# Patient Record
Sex: MALE | Race: WHITE | NOT HISPANIC OR LATINO | Employment: OTHER | URBAN - METROPOLITAN AREA
[De-identification: names, ages, dates, MRNs, and addresses within clinical notes are randomized per-mention and may not be internally consistent; named-entity substitution may affect disease eponyms.]

---

## 2017-04-04 DIAGNOSIS — I10 ESSENTIAL HYPERTENSION: ICD-10-CM

## 2017-04-04 RX ORDER — LOSARTAN POTASSIUM 50 MG/1
50 TABLET ORAL DAILY
Qty: 90 TAB | Refills: 3 | OUTPATIENT
Start: 2017-04-04 | End: 2017-06-19 | Stop reason: SDUPTHER

## 2017-04-13 ENCOUNTER — OFFICE VISIT (OUTPATIENT)
Dept: CARDIOLOGY | Facility: MEDICAL CENTER | Age: 77
End: 2017-04-13
Payer: MEDICARE

## 2017-04-13 VITALS
HEART RATE: 84 BPM | DIASTOLIC BLOOD PRESSURE: 80 MMHG | SYSTOLIC BLOOD PRESSURE: 150 MMHG | OXYGEN SATURATION: 96 % | HEIGHT: 75 IN | BODY MASS INDEX: 27.35 KG/M2 | WEIGHT: 220 LBS

## 2017-04-13 DIAGNOSIS — I49.3 PVC (PREMATURE VENTRICULAR CONTRACTION): ICD-10-CM

## 2017-04-13 DIAGNOSIS — E78.5 DYSLIPIDEMIA: ICD-10-CM

## 2017-04-13 DIAGNOSIS — I20.89 EFFORT ANGINA: ICD-10-CM

## 2017-04-13 DIAGNOSIS — I10 ESSENTIAL HYPERTENSION, BENIGN: ICD-10-CM

## 2017-04-13 PROCEDURE — 99214 OFFICE O/P EST MOD 30 MIN: CPT | Performed by: INTERNAL MEDICINE

## 2017-04-13 PROCEDURE — G8599 NO ASA/ANTIPLAT THER USE RNG: HCPCS | Performed by: INTERNAL MEDICINE

## 2017-04-13 PROCEDURE — G8432 DEP SCR NOT DOC, RNG: HCPCS | Performed by: INTERNAL MEDICINE

## 2017-04-13 PROCEDURE — 1101F PT FALLS ASSESS-DOCD LE1/YR: CPT | Mod: 8P | Performed by: INTERNAL MEDICINE

## 2017-04-13 PROCEDURE — 4040F PNEUMOC VAC/ADMIN/RCVD: CPT | Mod: 8P | Performed by: INTERNAL MEDICINE

## 2017-04-13 PROCEDURE — G8419 CALC BMI OUT NRM PARAM NOF/U: HCPCS | Performed by: INTERNAL MEDICINE

## 2017-04-13 PROCEDURE — 1036F TOBACCO NON-USER: CPT | Performed by: INTERNAL MEDICINE

## 2017-04-13 ASSESSMENT — ENCOUNTER SYMPTOMS
DIZZINESS: 1
NERVOUS/ANXIOUS: 1
BRUISES/BLEEDS EASILY: 1

## 2017-04-13 NOTE — Clinical Note
Name:          Pedro Champion   YOB: 1940  Date:     4/13/2017      Maame Ruelas M.D.  [address] [address]  [facility] [address]     Chuckie Escudero MD  [address] , [facility] [address]  [place] [address]  Phone: [phone]  Back Line: [phone]  Fax: [phone]  E-mail: [email or web address]   Dear Dr. Ruelas,    We had the pleasure of seeing your patient, Pedro Champion, in Cardiology Clinic at [facility] and Vascular today.    As you know, he is a 76-year-old man with a history of hypertension, left ventricular hypertrophy, dyslipidemia and previously symptomatic PACs and PVCs.    He tells me today about some shoulder pain with exertion that initially sounded like rotator cuff pain. However, I did do a rotator cuff examination, and he had good strength with those maneuvers. As such, I am somewhat concerned that it could represent angina related to which I ordered an exercise stress echocardiogram. His blood pressure was also a bit elevated in our office at 150/80 mmHg, and I asked him to send us and a blood pressure log. He is presently on losartan 50 mg by mouth daily. I also ordered labs including a CBC, chemistry panel, and lipids.    We will have the patient follow-up in 6 months, sooner if his stress echo is abnormal.    Thank you for the referral and please do not hesitate to contact me at any time. My contact information is listed above.    This note was dictated using Dragon speech recognition software.     A full note including my physical examination and a full list of rectified medications is available in our medical record, and can be faxed as well.    Chuckie Escudero MD  Cardiologist  [facility]

## 2017-04-13 NOTE — MR AVS SNAPSHOT
"        Pedro Brooksbrook   2017 11:30 AM   Office Visit   MRN: [id number]    Department:  [facility]   Dept Phone:  [phone]    Description:  Male : 1940   Provider:  Chuckie Escudero M.D.           Reason for Visit     Follow-Up           Allergies as of 2017     Allergen Noted Reactions    Nkda [No Known Drug Allergy] 07/15/2013         You were diagnosed with     Effort angina (CMS-HCC)   [280776]       PVC (premature ventricular contraction)   [613590]       Dyslipidemia   [699709]       Essential hypertension, benign   [401.1.ICD-9-CM]         Vital Signs     Blood Pressure Pulse Height Weight Body Mass Index Oxygen Saturation    150/80 mmHg 84 1.905 m (6' 3\") 99.791 kg (220 lb) 27.50 kg/m2 96%    Smoking Status                   Never Smoker            Basic Information     Date Of Birth Sex Race Ethnicity Preferred Language    1940 Male White Non- English      Your appointments     Oct 31, 2017 11:00 AM   FOLLOW UP with Chuckie Escudero M.D.   [facility] (--)    [address]  [place] [address]   [phone]              Problem List              ICD-10-CM Priority Class Noted - Resolved    Vertigo R42   2012 - Present    Palpitations R00.2   2012 - Present    Elevated blood pressure I10   2012 - Present    Hearing loss of both ears H91.93   2012 - Present    Hypercholesteremia E78.00   2012 - Present    Parotid nodule K11.8   2013 - Present    Essential hypertension, benign I10   2016 - Present    Dyslipidemia E78.5   2016 - Present    PVC (premature ventricular contraction) I49.3   2017 - Present      Health Maintenance        Date Due Completion Dates    COLONOSCOPY 1988 ---    IMM ZOSTER VACCINE 1998 ---    IMM PNEUMOCOCCAL 65+ (ADULT) LOW/MEDIUM RISK SERIES (1 of 2 - PCV13) 2003 ---    IMM DTaP/Tdap/Td Vaccine (2 - Td) 2022            Current " Immunizations     Tdap Vaccine 9/1/2012      Below and/or attached are the medications your provider expects you to take. Review all of your home medications and newly ordered medications with your provider and/or pharmacist. Follow medication instructions as directed by your provider and/or pharmacist. Please keep your medication list with you and share with your provider. Update the information when medications are discontinued, doses are changed, or new medications (including over-the-counter products) are added; and carry medication information at all times in the event of emergency situations     Allergies:  NKDA - (reactions not documented)               Medications  Valid as of: April 13, 2017 - 12:01 PM    Generic Name Brand Name Tablet Size Instructions for use    Doxycycline Hyclate (Tab) VIBRAMYCIN 100 MG Take 100 mg by mouth every day.        Escitalopram Oxalate (Tab) LEXAPRO 20 MG Take 20 mg by mouth every day.        Losartan Potassium (Tab) COZAAR 50 MG Take 1 Tab by mouth every day.        Multiple Vitamin   Take  by mouth every day.        Simvastatin (Tab) ZOCOR 40 MG Take 1 Tab by mouth every evening.        .                 Medicines prescribed today were sent to:     [facility][id number] - [place], [place] - [address] [id number] [facility] [address]    Phone: [phone] Fax: [phone]    Open 24 Hours?: No      Medication refill instructions:       If your prescription bottle indicates you have medication refills left, it is not necessary to call your provider’s office. Please contact your pharmacy and they will refill your medication.    If your prescription bottle indicates you do not have any refills left, you may request refills at any time through one of the following ways: The online [employer] system (except Urgent Care), by calling your provider’s office, or by asking your pharmacy to contact your provider’s office with a refill request. Medication refills are processed only during  regular business hours and may not be available until the next business day. Your provider may request additional information or to have a follow-up visit with you prior to refilling your medication.   *Please Note: Medication refills are assigned a new Rx number when refilled electronically. Your pharmacy may indicate that no refills were authorized even though a new prescription for the same medication is available at the pharmacy. Please request the medicine by name with the pharmacy before contacting your provider for a refill.        Your To Do List     Future Labs/Procedures Complete By Expires    BASIC METABOLIC PANEL  As directed 4/13/2018    ECHO-REST/STRESS W/O CONTRAST  As directed 4/14/2018         MyChart Status: Patient Declined

## 2017-04-13 NOTE — PROGRESS NOTES
Subjective:   Pedro Champion is a 76-year-old man with a history of hypertension, left ventricular hypertrophy, dyslipidemia and previously symptomatic PACs and PVCs.    He is doing well, and has no palpitations. However, he does tell me that he has bilateral shoulder pain with physical activity that lasts about 4 minutes at a time. That occurs about once a month, and has been present for the last several months. Prograf he tells me that his blood pressure was in the 150s/80s mmHg.    He reviews with me his vertigo, and chronic meclizine use.     Past Medical History   Diagnosis Date   • Hearing loss      Past Surgical History   Procedure Laterality Date   • Inguinal hernia repair bilateral  1960's   • Lumbar decompression  1988   • Parotidectomy superficial  7/19/2013     Performed by Mendy Stern M.D. at [facility]     Family History   Problem Relation Age of Onset   • Heart Disease Father      Sudden cardiac death probably coronary     History   Smoking status   • Never Smoker    Smokeless tobacco   • Never Used     Allergies   Allergen Reactions   • Nkda [No Known Drug Allergy]      Outpatient Encounter Prescriptions as of 4/13/2017   Medication Sig Dispense Refill   • losartan (COZAAR) 50 MG Tab Take 1 Tab by mouth every day. 90 Tab 3   • simvastatin (ZOCOR) 40 MG Tab Take 1 Tab by mouth every evening. 90 Tab 3   • escitalopram (LEXAPRO) 20 MG tablet Take 20 mg by mouth every day.     • Multiple Vitamin (ONE-A-DAY 55 PLUS PO) Take  by mouth every day.     • doxycycline (VIBRAMYCIN) 100 MG TABS Take 100 mg by mouth every day.       No facility-administered encounter medications on file as of 4/13/2017.     Review of Systems   Cardiovascular: Positive for chest pain.   Musculoskeletal: Positive for joint pain (shoulder pain).   Skin: Positive for rash.   Neurological: Positive for dizziness (chronic vertigo).   Endo/Heme/Allergies: Bruises/bleeds easily.   Psychiatric/Behavioral: The  "patient is nervous/anxious.    All other systems reviewed and are negative.       Objective:   /80 mmHg  Pulse 84  Ht 1.905 m (6' 3\")  Wt 99.791 kg (220 lb)  BMI 27.50 kg/m2  SpO2 96%    Physical Exam   Constitutional: He is oriented to person, place, and time. He appears well-developed and well-nourished. No distress.   HENT:   Head: Normocephalic and atraumatic.   All to healed scar on his left temple   Eyes: Conjunctivae and EOM are normal. Pupils are equal, round, and reactive to light. No scleral icterus.   Neck: Neck supple. No JVD present. No tracheal deviation present.   Cardiovascular: Normal rate, regular rhythm, normal heart sounds and intact distal pulses.  Exam reveals no gallop and no friction rub.    No murmur heard.  Pulses:       Dorsalis pedis pulses are 2+ on the right side, and 2+ on the left side.   No carotid bruits   Pulmonary/Chest: Effort normal and breath sounds normal. No stridor. No respiratory distress. He has no wheezes. He has no rales.   Abdominal: Soft. Bowel sounds are normal. He exhibits no distension.   Musculoskeletal: He exhibits no edema.   Neurological: He is alert and oriented to person, place, and time.   Skin: Skin is warm and dry. He is not diaphoretic. No erythema. No pallor.   Fairly pronouncing no purpura noted on his arms bilaterally.   Psychiatric: He has a normal mood and affect. Judgment and thought content normal.   Vitals reviewed.      Assessment:     1. Effort angina (CMS-HCC)  ECHO-REST/STRESS W/O CONTRAST    CBC WITH DIFFERENTIAL    BASIC METABOLIC PANEL    LIPID PANEL   2. PVC (premature ventricular contraction)     3. Dyslipidemia  LIPID PANEL   4. Essential hypertension, benign  CBC WITH DIFFERENTIAL    BASIC METABOLIC PANEL       Medical Decision Making:  Today's Assessment / Status / Plan:     Medical Decision Making:    He tells me today about some shoulder pain with exertion that initially sounded like rotator cuff pain. However, I did do a " rotator cuff examination, and he had good strength with those maneuvers. As such, I am somewhat concerned that it could represent angina related to which I ordered an exercise stress echocardiogram. His blood pressure was also a bit elevated in our office at 150/80 mmHg, and I asked him to send us and a blood pressure log. He is presently on losartan 50 mg by mouth daily. I also ordered labs including a CBC, chemistry panel, and lipids.    Chuckie Escudero MD  Cardiologist, [facility] V[facility] [place]     I spent 30 minutes with Mr. Pedro Champion, over fifty percent was spent counseling the patient on their condition, best management practices, reviewing test results, risks and benefits of treatment and coordinating care.

## 2017-04-14 NOTE — PROGRESS NOTES
Name:          Pedro Champion   YOB: 1940  Date:     4/13/2017      Maame Ruelas M.D.  [address] [facility] [address]  [facility] [address]     Chuckie Escudero MD  [address] [facility] [address]  [place] [address]  Phone: [phone]  Back Line: [phone]  Fax: [phone]  E-mail: [email or web address]   Dear Dr. Ruelas,    We had the pleasure of seeing your patient, Pedro Champion, in Cardiology Clinic at [facility] and Vascular today.    As you know, he is a 76-year-old man with a history of hypertension, left ventricular hypertrophy, dyslipidemia and previously symptomatic PACs and PVCs.    He tells me today about some shoulder pain with exertion that initially sounded like rotator cuff pain. However, I did do a rotator cuff examination, and he had good strength with those maneuvers. As such, I am somewhat concerned that it could represent angina related to which I ordered an exercise stress echocardiogram. His blood pressure was also a bit elevated in our office at 150/80 mmHg, and I asked him to send us and a blood pressure log. He is presently on losartan 50 mg by mouth daily. I also ordered labs including a CBC, chemistry panel, and lipids.    We will have the patient follow-up in 6 months, sooner if his stress echo is abnormal.    Thank you for the referral and please do not hesitate to contact me at any time. My contact information is listed above.    This note was dictated using Dragon speech recognition software.     A full note including my physical examination and a full list of rectified medications is available in our medical record, and can be faxed as well.    Chuckie Escudero MD  Cardiologist  [facility]

## 2017-04-15 DIAGNOSIS — E78.5 DYSLIPIDEMIA: ICD-10-CM

## 2017-04-17 RX ORDER — SIMVASTATIN 40 MG
40 TABLET ORAL EVERY EVENING
Qty: 90 TAB | Refills: 3 | OUTPATIENT
Start: 2017-04-17 | End: 2017-06-19 | Stop reason: SDUPTHER

## 2017-04-19 LAB
BASOPHILS # BLD AUTO: 0 X10E3/UL (ref 0–0.2)
BASOPHILS NFR BLD AUTO: 0 %
CHOLEST SERPL-MCNC: 169 MG/DL (ref 100–199)
COMMENT 011824: NORMAL
EOSINOPHIL # BLD AUTO: 0 X10E3/UL (ref 0–0.4)
EOSINOPHIL NFR BLD AUTO: 1 %
ERYTHROCYTE [DISTWIDTH] IN BLOOD BY AUTOMATED COUNT: 13 % (ref 12.3–15.4)
HCT VFR BLD AUTO: 46.7 % (ref 37.5–51)
HDLC SERPL-MCNC: 57 MG/DL
HGB BLD-MCNC: 16 G/DL (ref 12.6–17.7)
IMM GRANULOCYTES # BLD: 0 X10E3/UL (ref 0–0.1)
IMM GRANULOCYTES NFR BLD: 0 %
IMMATURE CELLS  115398: ABNORMAL
LDLC SERPL CALC-MCNC: 93 MG/DL (ref 0–99)
LYMPHOCYTES # BLD AUTO: 1.5 X10E3/UL (ref 0.7–3.1)
LYMPHOCYTES NFR BLD AUTO: 21 %
MCH RBC QN AUTO: 34.5 PG (ref 26.6–33)
MCHC RBC AUTO-ENTMCNC: 34.3 G/DL (ref 31.5–35.7)
MCV RBC AUTO: 101 FL (ref 79–97)
MONOCYTES # BLD AUTO: 0.4 X10E3/UL (ref 0.1–0.9)
MONOCYTES NFR BLD AUTO: 6 %
MORPHOLOGY BLD-IMP: ABNORMAL
NEUTROPHILS # BLD AUTO: 5 X10E3/UL (ref 1.4–7)
NEUTROPHILS NFR BLD AUTO: 72 %
NRBC BLD AUTO-RTO: ABNORMAL %
PLATELET # BLD AUTO: 219 X10E3/UL (ref 150–379)
RBC # BLD AUTO: 4.64 X10E6/UL (ref 4.14–5.8)
TRIGL SERPL-MCNC: 96 MG/DL (ref 0–149)
VLDLC SERPL CALC-MCNC: 19 MG/DL (ref 5–40)
WBC # BLD AUTO: 6.9 X10E3/UL (ref 3.4–10.8)

## 2017-05-23 ENCOUNTER — HOSPITAL ENCOUNTER (OUTPATIENT)
Dept: CARDIOLOGY | Facility: MEDICAL CENTER | Age: 77
End: 2017-05-23
Attending: INTERNAL MEDICINE
Payer: MEDICARE

## 2017-05-23 DIAGNOSIS — I20.89 EFFORT ANGINA: ICD-10-CM

## 2017-05-23 LAB — LV EJECT FRACT  99904: 60

## 2017-05-23 PROCEDURE — 93350 STRESS TTE ONLY: CPT

## 2017-05-23 PROCEDURE — 93350 STRESS TTE ONLY: CPT | Mod: 26 | Performed by: INTERNAL MEDICINE

## 2017-05-23 PROCEDURE — 93018 CV STRESS TEST I&R ONLY: CPT | Performed by: INTERNAL MEDICINE

## 2017-05-23 PROCEDURE — 93017 CV STRESS TEST TRACING ONLY: CPT

## 2017-06-01 ENCOUNTER — TELEPHONE (OUTPATIENT)
Dept: CARDIOLOGY | Facility: MEDICAL CENTER | Age: 77
End: 2017-06-01

## 2017-06-01 NOTE — TELEPHONE ENCOUNTER
Called patient's wife Rosana and advised her of negative stress echocardiogram result.    ASH RN    Stress Echo Report      Echocardiography Laboratory  CONCLUSIONS  Occasional PVC's , Occasional PAC's.  Hypertension with stress.  Below average exercise capacity.  Poor echo quality, but no evidence of flow limiting coronary disease to   105% maximal age predicted heart rate.     HETAL MOLINA  Age:    76    Gender:    M  MRN:    [id number]  :    1940  Exam Date:           2017

## 2017-06-01 NOTE — TELEPHONE ENCOUNTER
----- Message from Shy Burns sent at 6/1/2017 10:53 AM PDT -----  Regarding: echo results  Contact: [phone]  IA/rosa isela Espino's wife Rosana calling for results of echo on 5/23. Please call Rosana at .

## 2017-06-19 DIAGNOSIS — I10 ESSENTIAL HYPERTENSION: ICD-10-CM

## 2017-06-19 DIAGNOSIS — E78.5 DYSLIPIDEMIA: ICD-10-CM

## 2017-06-19 RX ORDER — LOSARTAN POTASSIUM 50 MG/1
50 TABLET ORAL DAILY
Qty: 90 TAB | Refills: 3 | Status: SHIPPED | OUTPATIENT
Start: 2017-06-19 | End: 2018-08-04 | Stop reason: SDUPTHER

## 2017-06-19 RX ORDER — SIMVASTATIN 40 MG
40 TABLET ORAL EVERY EVENING
Qty: 90 TAB | Refills: 3 | Status: SHIPPED | OUTPATIENT
Start: 2017-06-19 | End: 2018-07-11 | Stop reason: SDUPTHER

## 2017-10-31 ENCOUNTER — OFFICE VISIT (OUTPATIENT)
Dept: CARDIOLOGY | Facility: MEDICAL CENTER | Age: 77
End: 2017-10-31
Payer: MEDICARE

## 2017-10-31 VITALS
SYSTOLIC BLOOD PRESSURE: 144 MMHG | BODY MASS INDEX: 27.15 KG/M2 | OXYGEN SATURATION: 97 % | DIASTOLIC BLOOD PRESSURE: 82 MMHG | HEIGHT: 75 IN | WEIGHT: 218.4 LBS | HEART RATE: 72 BPM

## 2017-10-31 DIAGNOSIS — R07.89 NON-CARDIAC CHEST PAIN: Primary | ICD-10-CM

## 2017-10-31 DIAGNOSIS — I10 ESSENTIAL HYPERTENSION, BENIGN: ICD-10-CM

## 2017-10-31 DIAGNOSIS — E78.5 DYSLIPIDEMIA: ICD-10-CM

## 2017-10-31 PROCEDURE — 99213 OFFICE O/P EST LOW 20 MIN: CPT | Performed by: INTERNAL MEDICINE

## 2017-10-31 ASSESSMENT — ENCOUNTER SYMPTOMS
BRUISES/BLEEDS EASILY: 1
NERVOUS/ANXIOUS: 1
CARDIOVASCULAR NEGATIVE: 1
DIZZINESS: 1

## 2017-10-31 ASSESSMENT — PAIN SCALES - GENERAL: PAINLEVEL: NO PAIN

## 2017-10-31 NOTE — LETTER
Name:          Pedro Champion   YOB: 1940  Date:     10/31/2017      Maame Ruelas M.D.  [address]  [facility] [address]     Chuckie Escudero MD  [address]  [place] [address]  Phone: [phone]  Back Line: [phone]  Fax: [phone]  E-mail: [email or web address]   Dear Dr. Ruelas,    We had the pleasure of seeing your patient, Pedro Champion, in Cardiology Clinic at [facility] V[facility] today.    As you know, he is a 77-year-old man with a history of hypertension, left ventricular hypertrophy, dyslipidemia and previously symptomatic PACs and PVCs.    He is doing well today, and I reviewed with him the results of his exercise stress echocardiogram that was negative for ischemia. His blood pressure is well-controlled as have his lipids been in previously checked. I have ordered no additional testing nor changed his medical regimen.    Return in about 7 months (around 5/31/2018).    Thank you for the referral and please do not hesitate to contact me at any time. My contact information is listed above.    This note was dictated using Dragon speech recognition software.     A full note including my physical examination and a full list of rectified medications is available in our medical record, and can be faxed as well.    Chuckie Escudero MD  Cardiologist  [facility] for Heart and Vascular Health

## 2017-10-31 NOTE — PROGRESS NOTES
Subjective:   Pedro Champion is a 77 -year-old man with a history of hypertension, left ventricular hypertrophy, dyslipidemia and previously symptomatic PACs and PVCs.    He is doing well today, and comes in to review the results of his exercise stress echocardiogram done in May that documented no wall motion abnormality with adequate heart rate by exercise criteria and (105% of maximum though with poor exercise capacity).     His shoulder pain has since resolved, and probably was related to tendinitis without any significant weakness that I can find on physical examination again.    He is tolerating his medications well, and has no cardiovascular complaints today.    He shares with me today his zest for life telling me in anicteric about getting a small dog to bark on a road trip up to [place]. He'll be visiting family over the holidays in [place].    Past Medical History:   Diagnosis Date   • Hearing loss      Past Surgical History:   Procedure Laterality Date   • PAROTIDECTOMY SUPERFICIAL  7/19/2013    Performed by Mendy Stern M.D. at [facility]   • LUMBAR DECOMPRESSION  1988   • INGUINAL HERNIA REPAIR BILATERAL  1960's     Family History   Problem Relation Age of Onset   • Heart Disease Father      Sudden cardiac death probably coronary     History   Smoking Status   • Never Smoker   Smokeless Tobacco   • Never Used     Allergies   Allergen Reactions   • Nkda [No Known Drug Allergy]      Outpatient Encounter Prescriptions as of 10/31/2017   Medication Sig Dispense Refill   • losartan (COZAAR) 50 MG Tab Take 1 Tab by mouth every day. 90 Tab 3   • simvastatin (ZOCOR) 40 MG Tab Take 1 Tab by mouth every evening. 90 Tab 3   • escitalopram (LEXAPRO) 20 MG tablet Take 20 mg by mouth every day.     • Multiple Vitamin (ONE-A-DAY 55 PLUS PO) Take  by mouth every day.     • doxycycline (VIBRAMYCIN) 100 MG TABS Take 100 mg by mouth every day.       No facility-administered encounter  "medications on file as of 10/31/2017.      Review of Systems   Cardiovascular: Negative.    Musculoskeletal: Positive for joint pain (shoulder pain).   Skin: Positive for rash.   Neurological: Positive for dizziness (chronic vertigo).   Endo/Heme/Allergies: Bruises/bleeds easily.   Psychiatric/Behavioral: The patient is nervous/anxious.    All other systems reviewed and are negative.       Objective:   /82   Pulse 72   Ht 1.905 m (6' 3\")   Wt 99.1 kg (218 lb 6.4 oz)   SpO2 97%   BMI 27.30 kg/m²     Physical Exam   Constitutional: He is oriented to person, place, and time. He appears well-developed and well-nourished. No distress.   HENT:   Head: Normocephalic and atraumatic.   All to healed scar on his left temple   Eyes: Conjunctivae and EOM are normal. Pupils are equal, round, and reactive to light. No scleral icterus.   Neck: Neck supple. No JVD present. No tracheal deviation present.   Cardiovascular: Normal rate, regular rhythm, normal heart sounds and intact distal pulses.  Exam reveals no gallop and no friction rub.    No murmur heard.  Pulses:       Dorsalis pedis pulses are 2+ on the right side, and 2+ on the left side.   No carotid bruits   Pulmonary/Chest: Effort normal and breath sounds normal. No stridor. No respiratory distress. He has no wheezes. He has no rales.   Abdominal: Soft. Bowel sounds are normal. He exhibits no distension.   Musculoskeletal: He exhibits no edema.   Neurological: He is alert and oriented to person, place, and time.   Skin: Skin is warm and dry. He is not diaphoretic. No erythema. No pallor.   Fairly pronouncing no purpura noted on his arms bilaterally.   Psychiatric: He has a normal mood and affect. Judgment and thought content normal.   Vitals reviewed.    Lab Results   Component Value Date/Time    WBC 6.9 04/18/2017 11:08 AM    WBC 7.3 07/15/2013 01:21 PM    RBC 4.64 04/18/2017 11:08 AM    RBC 4.61 (L) 07/15/2013 01:21 PM    HEMOGLOBIN 16.0 04/18/2017 11:08 AM    " "HEMOGLOBIN 15.9 07/15/2013 01:21 PM    HEMATOCRIT 46.7 04/18/2017 11:08 AM    HEMATOCRIT 46.0 07/15/2013 01:21 PM     (H) 04/18/2017 11:08 AM    MCV 99.9 (H) 07/15/2013 01:21 PM    MCH 34.5 (H) 04/18/2017 11:08 AM    MCH 34.5 (H) 07/15/2013 01:21 PM    MCHC 34.3 04/18/2017 11:08 AM    MCHC 34.5 07/15/2013 01:21 PM    MPV 6.2 (L) 07/15/2013 01:21 PM        Lab Results   Component Value Date/Time    SODIUM 136 05/12/2016 11:26 AM    SODIUM 136 07/15/2013 01:21 PM    POTASSIUM 4.2 05/12/2016 11:26 AM    POTASSIUM 3.9 07/15/2013 01:21 PM    CHLORIDE 97 05/12/2016 11:26 AM    CHLORIDE 102 07/15/2013 01:21 PM    CO2 23 05/12/2016 11:26 AM    CO2 29 07/15/2013 01:21 PM    GLUCOSE 104 (H) 05/12/2016 11:26 AM    GLUCOSE 109 (H) 07/15/2013 01:21 PM    BUN 11 05/12/2016 11:26 AM    BUN 12 07/15/2013 01:21 PM    CREATININE 0.72 (L) 05/12/2016 11:26 AM    CREATININE 0.89 07/15/2013 01:21 PM    CREATININE 1.0 06/30/2005 10:40 AM    BUNCREATRAT 15 05/12/2016 11:26 AM        Lab Results   Component Value Date/Time    ASTSGOT 36 04/27/2015 10:14 AM    ASTSGOT 22 06/30/2005 10:40 AM    ALTSGPT 51 (H) 04/27/2015 10:14 AM    ALTSGPT 23 06/30/2005 10:40 AM        Lab Results   Component Value Date/Time    CHOLSTRLTOT 169 04/18/2017 11:08 AM    LDL 93 04/18/2017 11:08 AM    HDL 57 04/18/2017 11:08 AM    TRIGLYCERIDE 96 04/18/2017 11:08 AM       Exercise stress echo, 5/23/2017:  \"CONCLUSIONS  Occasional PVC's , Occasional PAC's.  Hypertension with stress.  Below average exercise capacity.  Poor echo quality, but no evidence of flow limiting coronary disease to   105% maximal age predicted heart rate.\"    Assessment:     1. Non-cardiac chest pain     2. Dyslipidemia     3. Essential hypertension, benign         Medical Decision Making:  Today's Assessment / Status / Plan:     He is doing well today, and I reviewed with him the results of his exercise stress echocardiogram that was negative for ischemia. His blood pressure is " well-controlled as have his lipids been in previously checked. I have ordered no additional testing nor changed his medical regimen.    Chuckie Escudero MD  Cardiologist, [facility] and Vascular [place]     Return in about 7 months (around 5/31/2018).

## 2018-05-03 ENCOUNTER — OFFICE VISIT (OUTPATIENT)
Dept: CARDIOLOGY | Facility: MEDICAL CENTER | Age: 78
End: 2018-05-03
Payer: MEDICARE

## 2018-05-03 VITALS
OXYGEN SATURATION: 98 % | HEIGHT: 75 IN | WEIGHT: 220 LBS | SYSTOLIC BLOOD PRESSURE: 132 MMHG | BODY MASS INDEX: 27.35 KG/M2 | DIASTOLIC BLOOD PRESSURE: 72 MMHG | HEART RATE: 72 BPM

## 2018-05-03 DIAGNOSIS — I49.3 PVC (PREMATURE VENTRICULAR CONTRACTION): Primary | ICD-10-CM

## 2018-05-03 DIAGNOSIS — I10 ESSENTIAL HYPERTENSION, BENIGN: ICD-10-CM

## 2018-05-03 DIAGNOSIS — E78.5 DYSLIPIDEMIA: ICD-10-CM

## 2018-05-03 PROCEDURE — 99213 OFFICE O/P EST LOW 20 MIN: CPT | Performed by: INTERNAL MEDICINE

## 2018-05-03 RX ORDER — VITS A,C,E/LUTEIN/MINERALS 300MCG-200
1 TABLET ORAL DAILY
COMMUNITY
End: 2020-01-08

## 2018-05-03 ASSESSMENT — ENCOUNTER SYMPTOMS
NERVOUS/ANXIOUS: 1
DIZZINESS: 1
CARDIOVASCULAR NEGATIVE: 1
BRUISES/BLEEDS EASILY: 1

## 2018-05-03 NOTE — PROGRESS NOTES
Subjective:   Pedro Champion is a 77 -year-old man with a history of hypertension, left ventricular hypertrophy, dyslipidemia and previously symptomatic PACs and PVCs.    He continues to do quite well from a cardiovascular perspective, and has no complaints today. He does tell me about her recent cold that he had, which he had gotten from his wife.    He has no complaints with his medications.    Past Medical History:   Diagnosis Date   • Hearing loss      Past Surgical History:   Procedure Laterality Date   • PAROTIDECTOMY SUPERFICIAL  7/19/2013    Performed by Mendy Stern M.D. at [facility]   • LUMBAR DECOMPRESSION  1988   • INGUINAL HERNIA REPAIR BILATERAL  1960's     Family History   Problem Relation Age of Onset   • Heart Disease Father      Sudden cardiac death probably coronary     History   Smoking Status   • Never Smoker   Smokeless Tobacco   • Never Used     Allergies   Allergen Reactions   • Nkda [No Known Drug Allergy]      Outpatient Encounter Prescriptions as of 5/3/2018   Medication Sig Dispense Refill   • Cholecalciferol (VITAMIN D3) 1000 units Cap Take  by mouth.     • Multiple Vitamins-Minerals (OCUVITE-LUTEIN) Tab Take 1 tablet by mouth every day.     • losartan (COZAAR) 50 MG Tab Take 1 Tab by mouth every day. 90 Tab 3   • simvastatin (ZOCOR) 40 MG Tab Take 1 Tab by mouth every evening. 90 Tab 3   • escitalopram (LEXAPRO) 20 MG tablet Take 20 mg by mouth every day.     • Multiple Vitamin (ONE-A-DAY 55 PLUS PO) Take  by mouth every day.     • doxycycline (VIBRAMYCIN) 100 MG TABS Take 100 mg by mouth every day.       No facility-administered encounter medications on file as of 5/3/2018.      Review of Systems   Cardiovascular: Negative.    Musculoskeletal: Positive for joint pain (shoulder pain).   Skin: Positive for rash.   Neurological: Positive for dizziness (chronic vertigo).   Endo/Heme/Allergies: Bruises/bleeds easily.   Psychiatric/Behavioral: The patient is  "nervous/anxious.    All other systems reviewed and are negative.       Objective:   /72   Pulse 72   Ht 1.905 m (6' 3\")   Wt 99.8 kg (220 lb)   SpO2 98%   BMI 27.50 kg/m²     Physical Exam   Constitutional: He is oriented to person, place, and time. He appears well-developed and well-nourished. No distress.   HENT:   Head: Normocephalic and atraumatic.   Old healed scar on his left temple   Eyes: Conjunctivae and EOM are normal. Pupils are equal, round, and reactive to light. No scleral icterus.   Neck: Neck supple. No JVD present. No tracheal deviation present.   Cardiovascular: Normal rate, regular rhythm, normal heart sounds and intact distal pulses.  Exam reveals no gallop and no friction rub.    No murmur heard.  Pulses:       Dorsalis pedis pulses are 2+ on the right side, and 2+ on the left side.   No carotid bruits   Pulmonary/Chest: Effort normal and breath sounds normal. No stridor. No respiratory distress. He has no wheezes. He has no rales.   Abdominal: Soft. Bowel sounds are normal. He exhibits no distension.   Musculoskeletal: He exhibits no edema.   Neurological: He is alert and oriented to person, place, and time.   Skin: Skin is warm and dry. He is not diaphoretic. No erythema. No pallor.   Fairly pronouncing purpura noted on his arms bilaterally.   Psychiatric: He has a normal mood and affect. Judgment and thought content normal.   Vitals reviewed.    Lab Results   Component Value Date/Time    WBC 6.9 04/18/2017 11:08 AM    WBC 7.3 07/15/2013 01:21 PM    RBC 4.64 04/18/2017 11:08 AM    RBC 4.61 (L) 07/15/2013 01:21 PM    HEMOGLOBIN 16.0 04/18/2017 11:08 AM    HEMOGLOBIN 15.9 07/15/2013 01:21 PM    HEMATOCRIT 46.7 04/18/2017 11:08 AM    HEMATOCRIT 46.0 07/15/2013 01:21 PM     (H) 04/18/2017 11:08 AM    MCV 99.9 (H) 07/15/2013 01:21 PM    MCH 34.5 (H) 04/18/2017 11:08 AM    MCH 34.5 (H) 07/15/2013 01:21 PM    MCHC 34.3 04/18/2017 11:08 AM    [facility] 34.5 07/15/2013 01:21 PM    MPV 6.2 " "(L) 07/15/2013 01:21 PM        Lab Results   Component Value Date/Time    SODIUM 136 05/12/2016 11:26 AM    SODIUM 136 07/15/2013 01:21 PM    POTASSIUM 4.2 05/12/2016 11:26 AM    POTASSIUM 3.9 07/15/2013 01:21 PM    CHLORIDE 97 05/12/2016 11:26 AM    CHLORIDE 102 07/15/2013 01:21 PM    CO2 23 05/12/2016 11:26 AM    CO2 29 07/15/2013 01:21 PM    GLUCOSE 104 (H) 05/12/2016 11:26 AM    GLUCOSE 109 (H) 07/15/2013 01:21 PM    BUN 11 05/12/2016 11:26 AM    BUN 12 07/15/2013 01:21 PM    CREATININE 0.72 (L) 05/12/2016 11:26 AM    CREATININE 0.89 07/15/2013 01:21 PM    CREATININE 1.0 06/30/2005 10:40 AM    BUNCREATRAT 15 05/12/2016 11:26 AM        Lab Results   Component Value Date/Time    ASTSGOT 36 04/27/2015 10:14 AM    ASTSGOT 22 06/30/2005 10:40 AM    ALTSGPT 51 (H) 04/27/2015 10:14 AM    ALTSGPT 23 06/30/2005 10:40 AM        Lab Results   Component Value Date/Time    CHOLSTRLTOT 169 04/18/2017 11:08 AM    LDL 93 04/18/2017 11:08 AM    HDL 57 04/18/2017 11:08 AM    TRIGLYCERIDE 96 04/18/2017 11:08 AM       Exercise stress echo, 5/23/2017:  \"CONCLUSIONS  Occasional PVC's , Occasional PAC's.  Hypertension with stress.  Below average exercise capacity.  Poor echo quality, but no evidence of flow limiting coronary disease to   105% maximal age predicted heart rate.\"    Assessment:     1. PVC (premature ventricular contraction)     2. Dyslipidemia     3. Essential hypertension, benign         Medical Decision Making:  Today's Assessment / Status / Plan:     He continues to do quite well from a cardiovascular perspective, and has good control of his blood pressure in our office. The labs that he had drawn at [facility] did not make it to us, though I we will request those modifying mostly his lipid regimen if needed though I would be reticent to do so as he seems to be doing quite well and has had reasonable control of his lipids in the past. My LDL target for him is less than 100 mg/dL.    Chuckie Escudero MD  Cardiologist, [facility]     Return in about 1 year (around 5/3/2019) for NP in 6 months, me in 1 year.      Return in about 1 year (around 5/3/2019) for NP in 6 months, me in 1 year.    Physical Exam   Constitutional: He is oriented to person, place, and time. He appears well-developed and well-nourished. No distress.   HENT:   Head: Normocephalic and atraumatic.   Old healed scar on his left temple   Eyes: Conjunctivae and EOM are normal. Pupils are equal, round, and reactive to light. No scleral icterus.   Neck: Neck supple. No JVD present. No tracheal deviation present.   Cardiovascular: Normal rate, regular rhythm, normal heart sounds and intact distal pulses.  Exam reveals no gallop and no friction rub.    No murmur heard.  Pulses:       Dorsalis pedis pulses are 2+ on the right side, and 2+ on the left side.   No carotid bruits   Pulmonary/Chest: Effort normal and breath sounds normal. No stridor. No respiratory distress. He has no wheezes. He has no rales.   Abdominal: Soft. Bowel sounds are normal. He exhibits no distension.   Musculoskeletal: He exhibits no edema.   Neurological: He is alert and oriented to person, place, and time.   Skin: Skin is warm and dry. He is not diaphoretic. No erythema. No pallor.   Fairly pronouncing purpura noted on his arms bilaterally.   Psychiatric: He has a normal mood and affect. Judgment and thought content normal.   Vitals reviewed.

## 2018-05-03 NOTE — LETTER
Name:          Pedro Champion   YOB: 1940  Date:     05/03/2018      Maame Ruelas M.D.  [address]  [place] [address]     Chuckie Escudero MD  [address]  [place] [address]  Phone: [phone]  Back Line: [phone]  Fax: [phone]  E-mail: [email or web address]   Dear Dr. Ruelas,    We had the pleasure of seeing your patient, Pedro Champion, in Cardiology Clinic at [facility] today.    As you know, he is a 77-year-old man with a history of hypertension, left ventricular hypertrophy, dyslipidemia and previously symptomatic PACs and PVCs.    He continues to do quite well from a cardiovascular perspective, and has good control of his blood pressure in our office. The labs that he had drawn at [facility] did not make it to us, though I we will request those modifying mostly his lipid regimen if needed though I would be reticent to do so as he seems to be doing quite well and has had reasonable control of his lipids in the past. My LDL target for him is less than 100 mg/dL.    Return in about 1 year (around 5/3/2019) for NP in 6 months, me in 1 year.    Thank you for the referral and please do not hesitate to contact me at any time. My contact information is listed above.    This note was dictated using Dragon speech recognition software.     A full note including my physical examination and a full list of rectified medications is available in our medical record, and can be faxed as well.    Chuckie Escudero MD  Cardiologist  [facility]

## 2018-05-12 LAB
BUN SERPL-MCNC: 13 MG/DL (ref 8–27)
BUN/CREAT SERPL: 18 (ref 10–24)
CALCIUM SERPL-MCNC: 11.2 MG/DL (ref 8.6–10.2)
CHLORIDE SERPL-SCNC: 100 MMOL/L (ref 96–106)
CO2 SERPL-SCNC: 23 MMOL/L (ref 18–29)
CREAT SERPL-MCNC: 0.73 MG/DL (ref 0.76–1.27)
GFR SERPLBLD CREATININE-BSD FMLA CKD-EPI: 102 ML/MIN/1.73
GFR SERPLBLD CREATININE-BSD FMLA CKD-EPI: 88 ML/MIN/1.73
GLUCOSE SERPL-MCNC: 119 MG/DL (ref 65–99)
POTASSIUM SERPL-SCNC: 4.1 MMOL/L (ref 3.5–5.2)
SODIUM SERPL-SCNC: 139 MMOL/L (ref 134–144)

## 2018-05-16 ENCOUNTER — TELEPHONE (OUTPATIENT)
Dept: CARDIOLOGY | Facility: MEDICAL CENTER | Age: 78
End: 2018-05-16

## 2018-05-16 NOTE — TELEPHONE ENCOUNTER
Pt notified and agrees to f/u with PCP re: elevated calcium levels.  Lab work sent to PCP as well.

## 2018-05-16 NOTE — TELEPHONE ENCOUNTER
----- Message from Chuckie Escudero M.D. sent at 5/15/2018  5:11 PM PDT -----  Normal renal function, and electrolytes.  Persistently elevated calcium levels are noted.  I will defer workup of those to the primary care setting.    NICOLÁS SHARMA

## 2018-06-25 ENCOUNTER — OFFICE VISIT (OUTPATIENT)
Dept: ENDOCRINOLOGY | Facility: MEDICAL CENTER | Age: 78
End: 2018-06-25
Payer: MEDICARE

## 2018-06-25 VITALS
SYSTOLIC BLOOD PRESSURE: 150 MMHG | HEART RATE: 58 BPM | DIASTOLIC BLOOD PRESSURE: 80 MMHG | WEIGHT: 217 LBS | HEIGHT: 75 IN | BODY MASS INDEX: 26.98 KG/M2 | OXYGEN SATURATION: 95 %

## 2018-06-25 DIAGNOSIS — E83.52 HYPERCALCEMIA: ICD-10-CM

## 2018-06-25 DIAGNOSIS — E21.3 HYPERPARATHYROIDISM (HCC): ICD-10-CM

## 2018-06-25 PROCEDURE — 99204 OFFICE O/P NEW MOD 45 MIN: CPT | Performed by: INTERNAL MEDICINE

## 2018-06-25 NOTE — PATIENT INSTRUCTIONS
· Drink at least 6-8 glasses of water daily  · Aim for calcium intake of 1000 mg per day, preferably through diet  · Maintain vitamin D intake of 400-800 international units daily

## 2018-06-25 NOTE — PROGRESS NOTES
New Patient Consult Note  Primary care physician: Maame Ruelas M.D.    Reason for consult: Hypercalcemia, hyperparathyroidism    HPI:  Pedro Champion is a 77 y.o. old patient who comes in today for evaluation of hypercalcemia and hyperparathyroidism. Lab review from 2015 shows elevated serum calcium of 10.4. Over the last couple of years serum calcium has increased further, most recently serum calcium was moderately elevated 11.2. He denies any associated history of kidney stones. No history of fragility fractures. No family history of kidney stones or parathyroid problems. He takes supplemental vitamin D, he is not sure of the dose. He wakes up 3-4 times a night to urinate.    ROS:  Constitutional: No weight loss  Cardiac: No palpitations or racing heart  Resp: No shortness of breath  Neuro: No numbness or tinging in feet  Endo: No heat or cold intolerance, no polyuria or polydipsia  All other systems were reviewed and were negative.    Past Medical History:  Patient Active Problem List    Diagnosis Date Noted   • PVC (premature ventricular contraction) 04/13/2017   • Essential hypertension, benign 04/13/2016   • Dyslipidemia 04/13/2016   • Parotid nodule 07/19/2013   • Hypercholesteremia 12/21/2012   • Vertigo 09/26/2012   • Palpitations 09/26/2012   • Elevated blood pressure 09/26/2012   • Hearing loss of both ears 09/26/2012       Past Surgical History:  Past Surgical History:   Procedure Laterality Date   • PAROTIDECTOMY SUPERFICIAL  7/19/2013    Performed by Mendy [facility] M.D. at [facility]   • LUMBAR DECOMPRESSION  1988   • INGUINAL HERNIA REPAIR BILATERAL  1960's       Allergies:  Nkda [no known drug allergy]    Social History:  Social History     Social History   • Marital status:      Spouse name: N/A   • Number of children: N/A   • Years of education: N/A     Occupational History   • Not on file.     Social History Main Topics   • Smoking status: Never Smoker   • Smokeless  "tobacco: Never Used   • Alcohol use 1.8 - 2.4 oz/week     3 - 4 Glasses of wine per week      Comment: wine or beer   • Drug use: No   • Sexual activity: Yes     Partners: Female     Other Topics Concern   • Not on file     Social History Narrative   • No narrative on file       Family History:  Family History   Problem Relation Age of Onset   • Heart Disease Father      Sudden cardiac death probably coronary       Medications:    Current Outpatient Prescriptions:   •  Cholecalciferol (VITAMIN D3) 1000 units Cap, Take  by mouth., Disp: , Rfl:   •  Multiple Vitamins-Minerals (OCUVITE-LUTEIN) Tab, Take 1 tablet by mouth every day., Disp: , Rfl:   •  losartan (COZAAR) 50 MG Tab, Take 1 Tab by mouth every day., Disp: 90 Tab, Rfl: 3  •  simvastatin (ZOCOR) 40 MG Tab, Take 1 Tab by mouth every evening., Disp: 90 Tab, Rfl: 3  •  escitalopram (LEXAPRO) 20 MG tablet, Take 20 mg by mouth every day., Disp: , Rfl:   •  Multiple Vitamin (ONE-A-DAY 55 PLUS PO), Take  by mouth every day., Disp: , Rfl:   •  doxycycline (VIBRAMYCIN) 100 MG TABS, Take 100 mg by mouth every day., Disp: , Rfl:       Physical Examination:  Vital signs: /80   Pulse (!) 58   Ht 1.905 m (6' 3\")   Wt 98.4 kg (217 lb)   SpO2 95%   BMI 27.12 kg/m²   General: No apparent distress, cooperative  Eyes: No scleral icterus or discharge  ENMT: Normal on external inspection of nose, lips  Neck: No abnormal masses on inspection  Resp: Normal effort, clear to auscultation bilaterally   CVS: Regular rate and rhythm, S1 S2 normal, no murmur   Extremities: No edema  Neuro: Alert and oriented  Skin: No rash  Psych: Normal mood and affect    Assessment and Plan:    1. Hypercalcemia, hyperparathyroidism  · From history he appears to have primary hyperparathyroidism  · We will obtain following lab work up  · We will obtain renal ultrasound to rule out nephrolithiasis   · Advised to drink 6-8 glasses of water daily  · Advised to take 400-800 international units of " vitamin D daily  · Advised to ensure daily calcium intake of 1000 mg per day, preferably from diet  - ALBUMIN; Future  - IONIZED CALCIUM; Future  - PHOSPHORUS; Future  - PTH WITH CALCIUM; Future  - VITAMIN D,25 HYDROXY; Future  - URINE CALCIUM 24 HR; Future  - URINE CREATININE 24 HR; Future  - US-RENAL; Future  - BASIC METABOLIC PANEL; Future    Return in about 6 weeks (around 8/6/2018).    Thank you for allowing me to participate in the care of this patient.    Andrey Lopez M.D.  06/25/18    CC:   Maame Ruelas M.D.    This note was created using voice recognition software (Dragon). The accuracy of the dictation is limited by the abilities of the software. I have reviewed the note prior to signing, however some errors in grammar and context are still possible. If you have any questions related to this note please do not hesitate to contact our office.

## 2018-07-11 DIAGNOSIS — E78.5 DYSLIPIDEMIA: ICD-10-CM

## 2018-07-12 RX ORDER — SIMVASTATIN 40 MG
TABLET ORAL
Qty: 90 TAB | Refills: 3 | Status: SHIPPED | OUTPATIENT
Start: 2018-07-12 | End: 2019-07-17 | Stop reason: SDUPTHER

## 2018-07-24 ENCOUNTER — HOSPITAL ENCOUNTER (OUTPATIENT)
Dept: LAB | Facility: MEDICAL CENTER | Age: 78
End: 2018-07-24
Attending: INTERNAL MEDICINE
Payer: MEDICARE

## 2018-07-24 ENCOUNTER — HOSPITAL ENCOUNTER (OUTPATIENT)
Dept: RADIOLOGY | Facility: MEDICAL CENTER | Age: 78
End: 2018-07-24
Attending: INTERNAL MEDICINE
Payer: MEDICARE

## 2018-07-24 DIAGNOSIS — E21.3 HYPERPARATHYROIDISM (HCC): ICD-10-CM

## 2018-07-24 DIAGNOSIS — E83.52 HYPERCALCEMIA: ICD-10-CM

## 2018-07-24 PROCEDURE — 83970 ASSAY OF PARATHORMONE: CPT

## 2018-07-24 PROCEDURE — 82330 ASSAY OF CALCIUM: CPT

## 2018-07-24 PROCEDURE — 82040 ASSAY OF SERUM ALBUMIN: CPT

## 2018-07-24 PROCEDURE — 36415 COLL VENOUS BLD VENIPUNCTURE: CPT

## 2018-07-24 PROCEDURE — 76775 US EXAM ABDO BACK WALL LIM: CPT

## 2018-07-24 PROCEDURE — 82306 VITAMIN D 25 HYDROXY: CPT

## 2018-07-24 PROCEDURE — 80048 BASIC METABOLIC PNL TOTAL CA: CPT

## 2018-07-24 PROCEDURE — 84100 ASSAY OF PHOSPHORUS: CPT

## 2018-07-25 LAB
25(OH)D3 SERPL-MCNC: 33 NG/ML (ref 30–100)
ALBUMIN SERPL BCP-MCNC: 4.2 G/DL (ref 3.2–4.9)
ANION GAP SERPL CALC-SCNC: 5 MMOL/L (ref 0–11.9)
BUN SERPL-MCNC: 11 MG/DL (ref 8–22)
CALCIUM SERPL-MCNC: 11.1 MG/DL (ref 8.5–10.5)
CHLORIDE SERPL-SCNC: 102 MMOL/L (ref 96–112)
CO2 SERPL-SCNC: 27 MMOL/L (ref 20–33)
CREAT SERPL-MCNC: 0.78 MG/DL (ref 0.5–1.4)
GLUCOSE SERPL-MCNC: 105 MG/DL (ref 65–99)
PHOSPHATE SERPL-MCNC: 1.9 MG/DL (ref 2.5–4.5)
POTASSIUM SERPL-SCNC: 4.1 MMOL/L (ref 3.6–5.5)
PTH-INTACT SERPL-MCNC: 184.3 PG/ML (ref 14–72)
SODIUM SERPL-SCNC: 134 MMOL/L (ref 135–145)

## 2018-07-27 ENCOUNTER — HOSPITAL ENCOUNTER (OUTPATIENT)
Facility: MEDICAL CENTER | Age: 78
End: 2018-07-27
Attending: INTERNAL MEDICINE
Payer: MEDICARE

## 2018-07-27 DIAGNOSIS — E21.3 HYPERPARATHYROIDISM (HCC): ICD-10-CM

## 2018-07-27 DIAGNOSIS — E83.52 HYPERCALCEMIA: ICD-10-CM

## 2018-07-27 LAB
CREAT 24H UR-MSRATE: 2164 MG/24 HR (ref 1000–2000)
CREAT UR-MCNC: 141.9 MG/DL
SPECIMEN VOL UR: 1525 ML

## 2018-07-27 PROCEDURE — 82570 ASSAY OF URINE CREATININE: CPT

## 2018-07-27 PROCEDURE — 82340 ASSAY OF CALCIUM IN URINE: CPT

## 2018-07-27 PROCEDURE — 81050 URINALYSIS VOLUME MEASURE: CPT

## 2018-07-29 LAB
CALCIUM 24H UR-MCNC: 44.9 MG/DL
CALCIUM 24H UR-MRATE: 685 MG/D
CALCIUM/CREAT 24H UR: 362 MG/G (ref 20–240)
COLLECT DURATION TIME SPEC: 24 HRS
CREAT 24H UR-MCNC: 124 MG/DL
CREAT 24H UR-MRATE: 1891 MG/D (ref 800–2100)
SPECIMEN VOL ?TM UR: 1525 ML

## 2018-08-01 ENCOUNTER — HOSPITAL ENCOUNTER (OUTPATIENT)
Facility: MEDICAL CENTER | Age: 78
End: 2018-08-01
Attending: INTERNAL MEDICINE
Payer: MEDICARE

## 2018-08-01 LAB — CA-I SERPL-SCNC: 1.4 MMOL/L (ref 1.1–1.3)

## 2018-08-01 PROCEDURE — 82330 ASSAY OF CALCIUM: CPT

## 2018-08-04 DIAGNOSIS — I10 ESSENTIAL HYPERTENSION: ICD-10-CM

## 2018-08-06 RX ORDER — LOSARTAN POTASSIUM 50 MG/1
TABLET ORAL
Qty: 90 TAB | Refills: 3 | Status: SHIPPED | OUTPATIENT
Start: 2018-08-06 | End: 2019-07-17 | Stop reason: SDUPTHER

## 2018-08-08 ENCOUNTER — OFFICE VISIT (OUTPATIENT)
Dept: ENDOCRINOLOGY | Facility: MEDICAL CENTER | Age: 78
End: 2018-08-08
Payer: MEDICARE

## 2018-08-08 VITALS
OXYGEN SATURATION: 95 % | HEIGHT: 75 IN | SYSTOLIC BLOOD PRESSURE: 130 MMHG | WEIGHT: 219 LBS | HEART RATE: 102 BPM | DIASTOLIC BLOOD PRESSURE: 72 MMHG | BODY MASS INDEX: 27.23 KG/M2

## 2018-08-08 DIAGNOSIS — R82.994 HYPERCALCIURIA: ICD-10-CM

## 2018-08-08 DIAGNOSIS — E83.52 HYPERCALCEMIA: ICD-10-CM

## 2018-08-08 DIAGNOSIS — E21.0 PRIMARY HYPERPARATHYROIDISM (HCC): ICD-10-CM

## 2018-08-08 PROCEDURE — 99214 OFFICE O/P EST MOD 30 MIN: CPT | Performed by: INTERNAL MEDICINE

## 2018-08-08 NOTE — PROGRESS NOTES
"Endocrinology Clinic Progress Note    CC: Hypercalcemia    HPI:  77-year-old male with hypercalcemia comes in today for follow-up.  Recent labs showed quite elevated 24 hour urine calcium of 635 mg per day.  Serum calcium is 11.1 and parathyroid hormone level is elevated at 184.  GFR is normal.  No history of kidney stones.  Renal ultrasound in July 2018 did not show any kidney stones.  No history of fragility fractures.    ROS:  Constitutional: Negative for unintentional weight loss  Endo: Positive for nocturia    PMH:  Patient Active Problem List   Diagnosis   • Vertigo   • Palpitations   • Elevated blood pressure   • Hearing loss of both ears   • Hypercholesteremia   • Parotid nodule   • Essential hypertension, benign   • Dyslipidemia   • PVC (premature ventricular contraction)     EXAM:  Vital signs: /72   Pulse (!) 102   Ht 1.905 m (6' 3\")   Wt 99.3 kg (219 lb)   SpO2 95%   BMI 27.37 kg/m²   General: No apparent distress, cooperative  Eyes: No scleral icterus, no discharge  Neck: Normal on external inspection  Resp: Normal effort, clear to auscultation bilaterally  CVS: Regular rate and rhythm, S1 S2 normal  Musculoskeletal: Normal gait  Extremities: No lower extremity edema  Skin: No rash on visible skin  Psych: Alert and oriented, normal mood and affect    Assessment and Plan:    1. Primary hyperparathyroidism (HCC)  · We discussed that he does meet 1 criteria for parathyroidectomy which is very high 24 hour urine calcium of 635 milligrams per day  · We will obtain parathyroid scan and refer to Dr. Ann  · We discussed about medical management including supplemental calcium, vitamin D, adequate hydration  · We also discussed that if surgery was not an option that we can use Sensipar to lower parathyroid hormone level and calcium level  - NM-PARATHYROID (SESTAMIBI) SCAN; Future  - COMP METABOLIC PANEL; Future  - IONIZED CALCIUM; Future  - PHOSPHORUS; Future  - PTH WITH CALCIUM; Future  - " REFERRAL TO GENERAL SURGERY    2. Hypercalcemia  · Due to primary hyperparathyroidism    3. Hypercalciuria  · Due to primary hyperparathyroidism  · Advised to drink at least 6-8 glasses of water daily    Return in about 3 months (around 11/8/2018).    Thank you for allowing me to participate in the care of this patient.    Andrey Lopez M.D.    CC:   Maame Ruelas M.D.    This note was created using voice recognition software (Dragon). The accuracy of the dictation is limited by the abilities of the software. I have reviewed the note prior to signing, however some errors in grammar and context are still possible. If you have any questions related to this note please do not hesitate to contact our office.

## 2018-08-22 ENCOUNTER — HOSPITAL ENCOUNTER (OUTPATIENT)
Dept: RADIOLOGY | Facility: MEDICAL CENTER | Age: 78
End: 2018-08-22
Attending: INTERNAL MEDICINE
Payer: MEDICARE

## 2018-08-22 DIAGNOSIS — E83.52 HYPERCALCEMIA: ICD-10-CM

## 2018-08-22 DIAGNOSIS — E21.0 PRIMARY HYPERPARATHYROIDISM (HCC): ICD-10-CM

## 2018-08-22 PROCEDURE — A9500 TC99M SESTAMIBI: HCPCS

## 2018-10-22 ENCOUNTER — HOSPITAL ENCOUNTER (OUTPATIENT)
Dept: LAB | Facility: MEDICAL CENTER | Age: 78
End: 2018-10-22
Attending: INTERNAL MEDICINE
Payer: MEDICARE

## 2018-10-22 ENCOUNTER — OFFICE VISIT (OUTPATIENT)
Dept: CARDIOLOGY | Facility: MEDICAL CENTER | Age: 78
End: 2018-10-22
Payer: MEDICARE

## 2018-10-22 VITALS
HEIGHT: 75 IN | HEART RATE: 80 BPM | WEIGHT: 218.26 LBS | OXYGEN SATURATION: 96 % | DIASTOLIC BLOOD PRESSURE: 82 MMHG | BODY MASS INDEX: 27.14 KG/M2 | SYSTOLIC BLOOD PRESSURE: 128 MMHG

## 2018-10-22 DIAGNOSIS — E21.0 PRIMARY HYPERPARATHYROIDISM (HCC): ICD-10-CM

## 2018-10-22 DIAGNOSIS — I10 ESSENTIAL HYPERTENSION, BENIGN: ICD-10-CM

## 2018-10-22 DIAGNOSIS — E78.5 DYSLIPIDEMIA: ICD-10-CM

## 2018-10-22 DIAGNOSIS — R00.2 PALPITATIONS: ICD-10-CM

## 2018-10-22 DIAGNOSIS — E78.00 HYPERCHOLESTEREMIA: ICD-10-CM

## 2018-10-22 DIAGNOSIS — I49.3 PVC (PREMATURE VENTRICULAR CONTRACTION): Primary | ICD-10-CM

## 2018-10-22 LAB
ALBUMIN SERPL BCP-MCNC: 4.4 G/DL (ref 3.2–4.9)
ALBUMIN/GLOB SERPL: 1.6 G/DL
ALP SERPL-CCNC: 72 U/L (ref 30–99)
ALT SERPL-CCNC: 15 U/L (ref 2–50)
ANION GAP SERPL CALC-SCNC: 7 MMOL/L (ref 0–11.9)
AST SERPL-CCNC: 18 U/L (ref 12–45)
BILIRUB SERPL-MCNC: 1 MG/DL (ref 0.1–1.5)
BUN SERPL-MCNC: 13 MG/DL (ref 8–22)
CA-I SERPL-SCNC: 1.2 MMOL/L (ref 1.1–1.3)
CALCIUM SERPL-MCNC: 9.8 MG/DL (ref 8.5–10.5)
CHLORIDE SERPL-SCNC: 102 MMOL/L (ref 96–112)
CO2 SERPL-SCNC: 28 MMOL/L (ref 20–33)
CREAT SERPL-MCNC: 0.84 MG/DL (ref 0.5–1.4)
GLOBULIN SER CALC-MCNC: 2.7 G/DL (ref 1.9–3.5)
GLUCOSE SERPL-MCNC: 94 MG/DL (ref 65–99)
PHOSPHATE SERPL-MCNC: 2.8 MG/DL (ref 2.5–4.5)
POTASSIUM SERPL-SCNC: 3.7 MMOL/L (ref 3.6–5.5)
PROT SERPL-MCNC: 7.1 G/DL (ref 6–8.2)
PTH-INTACT SERPL-MCNC: 67.1 PG/ML (ref 14–72)
SODIUM SERPL-SCNC: 137 MMOL/L (ref 135–145)

## 2018-10-22 PROCEDURE — 36415 COLL VENOUS BLD VENIPUNCTURE: CPT

## 2018-10-22 PROCEDURE — 83970 ASSAY OF PARATHORMONE: CPT

## 2018-10-22 PROCEDURE — 82330 ASSAY OF CALCIUM: CPT

## 2018-10-22 PROCEDURE — 84100 ASSAY OF PHOSPHORUS: CPT

## 2018-10-22 PROCEDURE — 99213 OFFICE O/P EST LOW 20 MIN: CPT | Performed by: INTERNAL MEDICINE

## 2018-10-22 PROCEDURE — 80053 COMPREHEN METABOLIC PANEL: CPT

## 2018-10-22 RX ORDER — FINASTERIDE 5 MG/1
5 TABLET, FILM COATED ORAL
Refills: 0 | COMMUNITY
Start: 2018-09-20 | End: 2021-12-14

## 2018-10-22 RX ORDER — TAMSULOSIN HYDROCHLORIDE 0.4 MG/1
0.4 CAPSULE ORAL
Refills: 0 | COMMUNITY
Start: 2018-09-20 | End: 2021-12-14

## 2018-10-22 ASSESSMENT — ENCOUNTER SYMPTOMS
NERVOUS/ANXIOUS: 1
CARDIOVASCULAR NEGATIVE: 1
DIZZINESS: 1
BRUISES/BLEEDS EASILY: 1

## 2018-10-22 NOTE — PROGRESS NOTES
Subjective:   Pedro Champion is an 78 -year-old man with a history of hypertension, left ventricular hypertrophy, dyslipidemia and previously symptomatic PACs and PVCs.    He is doing well as usual, and has no cardiovascular complaints nor relates any side effects with his medications.    He does tell me that he continues to exercise on the treadmill at a low intensity for 30 minutes several days a week without any new dyspnea nor chest discomfort.    He brings his labs in for review.  He is wonderfully jovial as usual.    Past Medical History:   Diagnosis Date   • Hearing loss      Past Surgical History:   Procedure Laterality Date   • PAROTIDECTOMY SUPERFICIAL  7/19/2013    Performed by Mendy Stern M.D. at [facility] ORS   • LUMBAR DECOMPRESSION  1988   • INGUINAL HERNIA REPAIR BILATERAL  1960's     Family History   Problem Relation Age of Onset   • Heart Disease Father         Sudden cardiac death probably coronary     History   Smoking Status   • Never Smoker   Smokeless Tobacco   • Never Used     Allergies   Allergen Reactions   • Nkda [No Known Drug Allergy]      Outpatient Encounter Prescriptions as of 10/22/2018   Medication Sig Dispense Refill   • tamsulosin (FLOMAX) 0.4 MG capsule Take 0.4 mg by mouth every day.  0   • Calcium 200 MG Tab Take  by mouth.     • losartan (COZAAR) 50 MG Tab TAKE 1 TABLET BY MOUTH EVERY DAY 90 Tab 3   • simvastatin (ZOCOR) 40 MG Tab TAKE 1 TABLET BY MOUTH EVERY EVENING 90 Tab 3   • Cholecalciferol (VITAMIN D3) 1000 units Cap Take  by mouth.     • Multiple Vitamins-Minerals (OCUVITE-LUTEIN) Tab Take 1 tablet by mouth every day.     • escitalopram (LEXAPRO) 20 MG tablet Take 20 mg by mouth every day.     • Multiple Vitamin (ONE-A-DAY 55 PLUS PO) Take  by mouth every day.     • finasteride (PROSCAR) 5 MG Tab Take 5 mg by mouth every day.  0   • [DISCONTINUED] doxycycline (VIBRAMYCIN) 100 MG TABS Take 100 mg by mouth every day.       No facility-administered  "encounter medications on file as of 10/22/2018.      Review of Systems   Cardiovascular: Negative.    Musculoskeletal: Positive for joint pain (shoulder pain).   Skin: Positive for rash.   Neurological: Positive for dizziness (chronic vertigo).   Endo/Heme/Allergies: Bruises/bleeds easily.   Psychiatric/Behavioral: The patient is nervous/anxious.    All other systems reviewed and are negative.       Objective:   /82 (BP Location: Right arm, Patient Position: Sitting, BP Cuff Size: Adult)   Pulse 80   Ht 1.905 m (6' 3\")   Wt 99 kg (218 lb 4.1 oz)   SpO2 96%   BMI 27.28 kg/m²     Physical Exam   Constitutional: He is oriented to person, place, and time. He appears well-developed and well-nourished. No distress.   Very pleasant elderly man in no distress (he does minimally remind me of the actor Hadley Calvillo).  Physical examination is unchanged except as specified from 5/3/2018.   HENT:   Head: Normocephalic and atraumatic.   Old healed scar on his left temple   Eyes: Pupils are equal, round, and reactive to light. Conjunctivae and EOM are normal. No scleral icterus.   Neck: Neck supple. No JVD present. No tracheal deviation present.   Cardiovascular: Normal rate, regular rhythm, normal heart sounds and intact distal pulses.  Exam reveals no gallop and no friction rub.    No murmur heard.  Pulses:       Dorsalis pedis pulses are 2+ on the right side, and 2+ on the left side.   No carotid bruits   Pulmonary/Chest: Effort normal and breath sounds normal. No stridor. No respiratory distress. He has no wheezes. He has no rales.   Abdominal: Soft. Bowel sounds are normal. He exhibits no distension.   Musculoskeletal: He exhibits no edema.   Neurological: He is alert and oriented to person, place, and time.   Skin: Skin is warm and dry. He is not diaphoretic. No erythema. No pallor.   Fairly pronouncing purpura noted on his arms bilaterally.   Psychiatric: He has a normal mood and affect. Judgment and thought " "content normal.   Vitals reviewed.    Lab Results   Component Value Date/Time    WBC 6.9 04/18/2017 11:08 AM    WBC 7.3 07/15/2013 01:21 PM    RBC 4.64 04/18/2017 11:08 AM    RBC 4.61 (L) 07/15/2013 01:21 PM    HEMOGLOBIN 16.0 04/18/2017 11:08 AM    HEMOGLOBIN 15.9 07/15/2013 01:21 PM    HEMATOCRIT 46.7 04/18/2017 11:08 AM    HEMATOCRIT 46.0 07/15/2013 01:21 PM     (H) 04/18/2017 11:08 AM    MCV 99.9 (H) 07/15/2013 01:21 PM    MCH 34.5 (H) 04/18/2017 11:08 AM    MCH 34.5 (H) 07/15/2013 01:21 PM    MCHC 34.3 04/18/2017 11:08 AM    MCHC 34.5 07/15/2013 01:21 PM    MPV 6.2 (L) 07/15/2013 01:21 PM        Lab Results   Component Value Date/Time    SODIUM 134 (L) 07/24/2018 10:43 AM    POTASSIUM 4.1 07/24/2018 10:43 AM    CHLORIDE 102 07/24/2018 10:43 AM    CO2 27 07/24/2018 10:43 AM    GLUCOSE 105 (H) 07/24/2018 10:43 AM    BUN 11 07/24/2018 10:43 AM    CREATININE 0.78 07/24/2018 10:43 AM    CREATININE 1.0 06/30/2005 10:40 AM    BUNCREATRAT 18 05/11/2018 12:34 PM        Lab Results   Component Value Date/Time    ASTSGOT 36 04/27/2015 10:14 AM    ASTSGOT 22 06/30/2005 10:40 AM    ALTSGPT 51 (H) 04/27/2015 10:14 AM    ALTSGPT 23 06/30/2005 10:40 AM        Lab Results   Component Value Date/Time    CHOLSTRLTOT 169 04/18/2017 11:08 AM    LDL 93 04/18/2017 11:08 AM    HDL 57 04/18/2017 11:08 AM    TRIGLYCERIDE 96 04/18/2017 11:08 AM       Labs, 5/11/2018  Chemistry panel: Sodium 139, potassium 4.1, chloride 100, CO2 23, BUN 13, creatinine 0.73, glucose 119, calcium 11.2 (repeat calcium 9/26/2018 was 7.2 with a PTH of 48 pg/mL at that time)    Exercise stress echo, 5/23/2017:  \"CONCLUSIONS  Occasional PVC's , Occasional PAC's.  Hypertension with stress.  Below average exercise capacity.  Poor echo quality, but no evidence of flow limiting coronary disease to   105% maximal age predicted heart rate.\"    Assessment:     1. PVC (premature ventricular contraction)  EC-ECHOCARDIOGRAM COMPLETE W/O CONT   2. Hypercholesteremia "  LIPID PANEL   3. Essential hypertension, benign  CBC WITH DIFFERENTIAL   4. Dyslipidemia     5. Palpitations         Medical Decision Making:  Today's Assessment / Status / Plan:     He continues to do very well with no complaints of palpitations in conjunction with his previous symptomatic PACs and PVCs.  I reviewed with him his labs from the summer, and discussed with him his upcoming labs in the near future.  I did not see a lipid panel or CBC on those lab requests, and I did order those tests.  I also ordered a repeat resting transthoracic echocardiogram.  Otherwise, I made no changes to his medical regimen.  His blood pressure is well controlled measured at 128/82 mmHg in our office, and his LDL previously is been at goal below 100 mg/dL.    Chuckie Escudero MD  Cardiologist, [facility] [place]     Return in about 6 months (around 4/22/2019).    Physical Exam   Constitutional: He is oriented to person, place, and time. He appears well-developed and well-nourished. No distress.   Very pleasant elderly man in no distress (he does minimally remind me of the actor Hadley Calvillo).  Physical examination is unchanged except as specified from 5/3/2018.   HENT:   Head: Normocephalic and atraumatic.   Old healed scar on his left temple   Eyes: Pupils are equal, round, and reactive to light. Conjunctivae and EOM are normal. No scleral icterus.   Neck: Neck supple. No JVD present. No tracheal deviation present.   Cardiovascular: Normal rate, regular rhythm, normal heart sounds and intact distal pulses.  Exam reveals no gallop and no friction rub.    No murmur heard.  Pulses:       Dorsalis pedis pulses are 2+ on the right side, and 2+ on the left side.   No carotid bruits   Pulmonary/Chest: Effort normal and breath sounds normal. No stridor. No respiratory distress. He has no wheezes. He has no rales.   Abdominal: Soft. Bowel sounds are normal. He exhibits no distension.   Musculoskeletal: He exhibits no  edema.   Neurological: He is alert and oriented to person, place, and time.   Skin: Skin is warm and dry. He is not diaphoretic. No erythema. No pallor.   Fairly pronouncing purpura noted on his arms bilaterally.   Psychiatric: He has a normal mood and affect. Judgment and thought content normal.   Vitals reviewed.

## 2018-10-22 NOTE — LETTER
Name:          Pedro Champion   YOB: 1940  Date:     10/22/2018      Maame Ruelas M.D.  [address]  [facility] [address]     Chuckie Escudero MD  [address], [address]  [place] [address]  Phone: [phone]  Back Line: [phone]  Fax: [phone]  E-mail: [email or web address]   Dear Dr. Ruelas,    We had the pleasure of seeing your patient, Pedro Champion, in Cardiology Clinic at [facility] today.    As you know, he is an 78-year-old man with a history of hypertension, left ventricular hypertrophy, dyslipidemia and previously symptomatic PACs and PVCs.    He continues to do very well with no complaints of palpitations in conjunction with his previous symptomatic PACs and PVCs.  I reviewed with him his labs from the summer, and discussed with him his upcoming labs in the near future.  I did not see a lipid panel or CBC on those lab requests, and I did order those tests.  I also ordered a repeat resting transthoracic echocardiogram.  Otherwise, I made no changes to his medical regimen.  His blood pressure is well controlled measured at 128/82 mmHg in our office, and his LDL previously is been at goal below 100 mg/dL.    Return in about 6 months (around 4/22/2019).    Thank you for the referral and please do not hesitate to contact me at any time. My contact information is listed above.    This note was dictated using Dragon speech recognition software.     A full note including my physical examination and a full list of rectified medications is available in our medical record, and can be faxed as well.    Chuckie Escudero MD  Cardiologist  [facility]

## 2018-10-30 ENCOUNTER — HOSPITAL ENCOUNTER (OUTPATIENT)
Dept: LAB | Facility: MEDICAL CENTER | Age: 78
End: 2018-10-30
Attending: INTERNAL MEDICINE
Payer: MEDICARE

## 2018-10-30 DIAGNOSIS — I10 ESSENTIAL HYPERTENSION, BENIGN: ICD-10-CM

## 2018-10-30 LAB
ANION GAP SERPL CALC-SCNC: 6 MMOL/L (ref 0–11.9)
BASOPHILS # BLD AUTO: 0.6 % (ref 0–1.8)
BASOPHILS # BLD: 0.04 K/UL (ref 0–0.12)
BUN SERPL-MCNC: 10 MG/DL (ref 8–22)
CALCIUM SERPL-MCNC: 9.3 MG/DL (ref 8.5–10.5)
CHLORIDE SERPL-SCNC: 105 MMOL/L (ref 96–112)
CHOLEST SERPL-MCNC: 180 MG/DL (ref 100–199)
CO2 SERPL-SCNC: 29 MMOL/L (ref 20–33)
CREAT SERPL-MCNC: 0.75 MG/DL (ref 0.5–1.4)
EOSINOPHIL # BLD AUTO: 0.13 K/UL (ref 0–0.51)
EOSINOPHIL NFR BLD: 1.8 % (ref 0–6.9)
ERYTHROCYTE [DISTWIDTH] IN BLOOD BY AUTOMATED COUNT: 46.6 FL (ref 35.9–50)
FASTING STATUS PATIENT QL REPORTED: NORMAL
GLUCOSE SERPL-MCNC: 118 MG/DL (ref 65–99)
HCT VFR BLD AUTO: 46.3 % (ref 42–52)
HDLC SERPL-MCNC: 57 MG/DL
HGB BLD-MCNC: 15 G/DL (ref 14–18)
IMM GRANULOCYTES # BLD AUTO: 0.04 K/UL (ref 0–0.11)
IMM GRANULOCYTES NFR BLD AUTO: 0.6 % (ref 0–0.9)
LDLC SERPL CALC-MCNC: 107 MG/DL
LYMPHOCYTES # BLD AUTO: 1.24 K/UL (ref 1–4.8)
LYMPHOCYTES NFR BLD: 17.4 % (ref 22–41)
MCH RBC QN AUTO: 32 PG (ref 27–33)
MCHC RBC AUTO-ENTMCNC: 32.4 G/DL (ref 33.7–35.3)
MCV RBC AUTO: 98.7 FL (ref 81.4–97.8)
MONOCYTES # BLD AUTO: 0.45 K/UL (ref 0–0.85)
MONOCYTES NFR BLD AUTO: 6.3 % (ref 0–13.4)
NEUTROPHILS # BLD AUTO: 5.24 K/UL (ref 1.82–7.42)
NEUTROPHILS NFR BLD: 73.3 % (ref 44–72)
NRBC # BLD AUTO: 0 K/UL
NRBC BLD-RTO: 0 /100 WBC
PLATELET # BLD AUTO: 200 K/UL (ref 164–446)
PMV BLD AUTO: 9.7 FL (ref 9–12.9)
POTASSIUM SERPL-SCNC: 4.1 MMOL/L (ref 3.6–5.5)
RBC # BLD AUTO: 4.69 M/UL (ref 4.7–6.1)
SODIUM SERPL-SCNC: 140 MMOL/L (ref 135–145)
TRIGL SERPL-MCNC: 79 MG/DL (ref 0–149)
WBC # BLD AUTO: 7.1 K/UL (ref 4.8–10.8)

## 2018-10-30 PROCEDURE — 85025 COMPLETE CBC W/AUTO DIFF WBC: CPT

## 2018-10-30 PROCEDURE — 80061 LIPID PANEL: CPT

## 2018-10-30 PROCEDURE — 36415 COLL VENOUS BLD VENIPUNCTURE: CPT

## 2018-10-30 PROCEDURE — 80048 BASIC METABOLIC PNL TOTAL CA: CPT

## 2018-11-01 ENCOUNTER — HOSPITAL ENCOUNTER (OUTPATIENT)
Dept: CARDIOLOGY | Facility: MEDICAL CENTER | Age: 78
End: 2018-11-01
Attending: INTERNAL MEDICINE
Payer: MEDICARE

## 2018-11-01 DIAGNOSIS — I49.3 PVC (PREMATURE VENTRICULAR CONTRACTION): ICD-10-CM

## 2018-11-01 PROCEDURE — 93306 TTE W/DOPPLER COMPLETE: CPT

## 2018-11-01 PROCEDURE — 93306 TTE W/DOPPLER COMPLETE: CPT | Mod: 26 | Performed by: INTERNAL MEDICINE

## 2018-11-05 ENCOUNTER — OFFICE VISIT (OUTPATIENT)
Dept: ENDOCRINOLOGY | Facility: MEDICAL CENTER | Age: 78
End: 2018-11-05
Payer: MEDICARE

## 2018-11-05 VITALS
SYSTOLIC BLOOD PRESSURE: 142 MMHG | OXYGEN SATURATION: 97 % | WEIGHT: 219 LBS | HEART RATE: 78 BPM | HEIGHT: 75 IN | BODY MASS INDEX: 27.23 KG/M2 | DIASTOLIC BLOOD PRESSURE: 82 MMHG

## 2018-11-05 DIAGNOSIS — Z86.39 H/O HYPERTHYROIDISM: ICD-10-CM

## 2018-11-05 DIAGNOSIS — R82.994 HYPERCALCIURIA: ICD-10-CM

## 2018-11-05 DIAGNOSIS — Z86.39 H/O HYPERPARATHYROIDISM: ICD-10-CM

## 2018-11-05 DIAGNOSIS — E83.52 HYPERCALCEMIA: ICD-10-CM

## 2018-11-05 LAB
LV EJECT FRACT  99904: 60
LV EJECT FRACT MOD 2C 99903: 70.47
LV EJECT FRACT MOD 4C 99902: 54.64
LV EJECT FRACT MOD BP 99901: 57.41

## 2018-11-05 PROCEDURE — 99214 OFFICE O/P EST MOD 30 MIN: CPT | Performed by: INTERNAL MEDICINE

## 2018-11-05 NOTE — PROGRESS NOTES
"Endocrinology Clinic Progress Note    CC: History of primary hyperparathyroid    HPI:  1. H/O primary hyperparathyroidism  He underwent resection of parathyroid adenoma a few months ago followed by resolution of hyperparathyroidism and hypercalcemia.  He denies any issues with numbness or tingling in fingers or around the lips.  Recent labs showed normal serum calcium and normal parathyroid hormone level.    2. Hypercalcemia  A few months ago his serum calcium was elevated at 11.2, since resection of parathyroid adenoma his calcium level has been normal.    3. Hypercalciuria  Prior to parathyroid surgery his 24-hour urine calcium was elevated at 685 mcg per 24 hours.    4. H/O hyperthyroidism  He was admitted to the hospital a few months ago with atrial fibrillation, at that time his TSH was found to be slightly suppressed and subclinical hypothyroidism range.    ROS:  Constitutional: Negative for unintentional weight loss  Cardiac: Negative for palpitations    PMH:  Patient Active Problem List   Diagnosis   • Vertigo   • Palpitations   • Elevated blood pressure   • Hearing loss of both ears   • Hypercholesteremia   • Parotid nodule   • Essential hypertension, benign   • Dyslipidemia   • PVC (premature ventricular contraction)     EXAM:  Vital signs: /82 (BP Location: Left arm, Patient Position: Sitting)   Pulse 78   Ht 1.905 m (6' 3\")   Wt 99.3 kg (219 lb)   SpO2 97%   BMI 27.37 kg/m²   General: No apparent distress, cooperative  Eyes: No scleral icterus, no discharge  Neck: Normal on external inspection  Resp: Normal effort  Extremities: No lower extremity edema  Skin: No rash on visible skin  Psych: Alert and oriented, normal mood and affect    Assessment and Plan:    1. H/O hyperparathyroidism  · Status post resection of parathyroid adenoma few months ago, followed by resolution of hyperparathyroidism and hypercalcemia    2. Hypercalcemia, hypercalciuria -- resolved    3. H/O hyperthyroidism  · Repeat " labs now  - TSH; Future  - FREE THYROXINE; Future    Thank you for allowing me to participate in the care of this patient.    Andrey Lopez M.D.    CC:   Maame Ruelas M.D.    This note was created using voice recognition software (Dragon). The accuracy of the dictation is limited by the abilities of the software. I have reviewed the note prior to signing, however some errors in grammar and context are still possible. If you have any questions related to this note please do not hesitate to contact our office.

## 2018-11-06 ENCOUNTER — HOSPITAL ENCOUNTER (OUTPATIENT)
Dept: LAB | Facility: MEDICAL CENTER | Age: 78
End: 2018-11-06
Attending: INTERNAL MEDICINE
Payer: MEDICARE

## 2018-11-06 DIAGNOSIS — Z86.39 H/O HYPERTHYROIDISM: ICD-10-CM

## 2018-11-06 LAB
T4 FREE SERPL-MCNC: 0.71 NG/DL (ref 0.53–1.43)
TSH SERPL DL<=0.005 MIU/L-ACNC: 7.44 UIU/ML (ref 0.38–5.33)

## 2018-11-06 PROCEDURE — 84439 ASSAY OF FREE THYROXINE: CPT

## 2018-11-06 PROCEDURE — 36415 COLL VENOUS BLD VENIPUNCTURE: CPT

## 2018-11-06 PROCEDURE — 84443 ASSAY THYROID STIM HORMONE: CPT

## 2018-11-09 DIAGNOSIS — I10 ESSENTIAL HYPERTENSION, BENIGN: ICD-10-CM

## 2018-11-09 DIAGNOSIS — E78.5 DYSLIPIDEMIA: ICD-10-CM

## 2018-11-13 ENCOUNTER — TELEPHONE (OUTPATIENT)
Dept: CARDIOLOGY | Facility: MEDICAL CENTER | Age: 78
End: 2018-11-13

## 2018-11-13 NOTE — TELEPHONE ENCOUNTER
Chuckie Escudero M.D.  Ivette Huizar R.N.; Maame Ruelas M.D.             Normal renal function and electrolytes, mildly elevated blood sugar, which I will defer further evaluation of the primary care setting      Chuckie Escudero M.D.  Ivette Huizar R.N.             Normal heart muscle function, mildly leaky valve unchanged from 6 years ago.  No cause for concern, will likely repeat an echocardiogram in 3-5 years.        Called pt, s/w wife (Rosana) discussed lab and Echo results per Dr Escudero, she verbalizes understanding

## 2018-11-27 ENCOUNTER — HOSPITAL ENCOUNTER (OUTPATIENT)
Dept: RADIOLOGY | Facility: MEDICAL CENTER | Age: 78
End: 2018-11-27
Attending: PHYSICIAN ASSISTANT
Payer: MEDICARE

## 2018-11-27 ENCOUNTER — OFFICE VISIT (OUTPATIENT)
Dept: URGENT CARE | Facility: PHYSICIAN GROUP | Age: 78
End: 2018-11-27
Payer: MEDICARE

## 2018-11-27 VITALS
DIASTOLIC BLOOD PRESSURE: 84 MMHG | RESPIRATION RATE: 16 BRPM | BODY MASS INDEX: 28.35 KG/M2 | HEART RATE: 84 BPM | OXYGEN SATURATION: 97 % | HEIGHT: 75 IN | WEIGHT: 228 LBS | TEMPERATURE: 98.4 F | SYSTOLIC BLOOD PRESSURE: 152 MMHG

## 2018-11-27 DIAGNOSIS — S80.12XA HEMATOMA OF LEFT LOWER EXTREMITY, INITIAL ENCOUNTER: ICD-10-CM

## 2018-11-27 DIAGNOSIS — S89.92XA INJURY OF LEFT LOWER EXTREMITY, INITIAL ENCOUNTER: ICD-10-CM

## 2018-11-27 DIAGNOSIS — S80.12XA CONTUSION OF LEFT LOWER EXTREMITY, INITIAL ENCOUNTER: ICD-10-CM

## 2018-11-27 PROCEDURE — 99214 OFFICE O/P EST MOD 30 MIN: CPT | Performed by: PHYSICIAN ASSISTANT

## 2018-11-27 PROCEDURE — 73590 X-RAY EXAM OF LOWER LEG: CPT | Mod: LT

## 2018-11-27 RX ORDER — CEPHALEXIN 500 MG/1
500 CAPSULE ORAL 4 TIMES DAILY
Qty: 20 CAP | Refills: 0 | Status: SHIPPED | OUTPATIENT
Start: 2018-11-27 | End: 2018-12-02

## 2018-11-30 ASSESSMENT — ENCOUNTER SYMPTOMS
NUMBNESS: 0
FEVER: 0
NAUSEA: 0
MUSCLE WEAKNESS: 0
ABDOMINAL PAIN: 0
VOMITING: 0
INABILITY TO BEAR WEIGHT: 0
DIARRHEA: 0
CHILLS: 0
FALLS: 0
TINGLING: 0
DIZZINESS: 0
SHORTNESS OF BREATH: 0

## 2018-11-30 NOTE — PROGRESS NOTES
"Subjective:      Pedro Champion is a 78 y.o. male who presents with Leg Injury (fell and landed on left shin, swelling, bruising x4days)            Leg Injury    The incident occurred 3 to 5 days ago (4 days). The incident occurred at home. The injury mechanism was a fall. The pain is present in the left leg. The quality of the pain is described as aching. The pain is at a severity of 2/10. The pain is mild. The pain has been constant since onset. Pertinent negatives include no inability to bear weight, muscle weakness, numbness or tingling. The symptoms are aggravated by weight bearing and palpation. He has tried nothing for the symptoms.     Patient presents to urgent care reporting a 4 day history of left lower leg pain and swelling following a fall where he landed on the leg. He denies prior leg injuries. No calf pain or swelling. He denies distal numbness/tingling.     Review of Systems   Constitutional: Negative for chills and fever.   HENT: Negative for congestion.    Respiratory: Negative for shortness of breath.    Cardiovascular: Negative for chest pain.   Gastrointestinal: Negative for abdominal pain, diarrhea, nausea and vomiting.   Genitourinary: Negative.    Musculoskeletal: Negative for falls.        + left leg pain   Skin: Negative for rash.   Neurological: Negative for dizziness, tingling and numbness.        Objective:     /84   Pulse 84   Temp 36.9 °C (98.4 °F)   Resp 16   Ht 1.905 m (6' 3\")   Wt 103.4 kg (228 lb)   SpO2 97%   BMI 28.50 kg/m²      Physical Exam   Constitutional: He is oriented to person, place, and time. He appears well-developed and well-nourished. No distress.   HENT:   Head: Normocephalic and atraumatic.   Eyes: Pupils are equal, round, and reactive to light.   Neck: Normal range of motion.   Cardiovascular: Normal rate.    Pulmonary/Chest: Effort normal.   Musculoskeletal: Normal range of motion.        Legs:  Superficial abrasion present on anterior " aspect of left lower extremity with firm area of swelling deep to the abrasion. Slight ecchymosis present. + TTP. No surrounding erythema, warmth to touch, or discharge from the site.    Neurological: He is alert and oriented to person, place, and time.   Skin: Skin is warm and dry. He is not diaphoretic.   Psychiatric: He has a normal mood and affect. His behavior is normal.   Nursing note and vitals reviewed.         PMH:  has a past medical history of Hearing loss.  MEDS:   Current Outpatient Prescriptions:   •  cephALEXin (KEFLEX) 500 MG Cap, Take 1 Cap by mouth 4 times a day for 5 days., Disp: 20 Cap, Rfl: 0  •  finasteride (PROSCAR) 5 MG Tab, Take 5 mg by mouth every day., Disp: , Rfl: 0  •  tamsulosin (FLOMAX) 0.4 MG capsule, Take 0.4 mg by mouth every day., Disp: , Rfl: 0  •  Calcium 200 MG Tab, Take  by mouth., Disp: , Rfl:   •  losartan (COZAAR) 50 MG Tab, TAKE 1 TABLET BY MOUTH EVERY DAY, Disp: 90 Tab, Rfl: 3  •  simvastatin (ZOCOR) 40 MG Tab, TAKE 1 TABLET BY MOUTH EVERY EVENING, Disp: 90 Tab, Rfl: 3  •  Cholecalciferol (VITAMIN D3) 1000 units Cap, Take  by mouth., Disp: , Rfl:   •  Multiple Vitamins-Minerals (OCUVITE-LUTEIN) Tab, Take 1 tablet by mouth every day., Disp: , Rfl:   •  escitalopram (LEXAPRO) 20 MG tablet, Take 20 mg by mouth every day., Disp: , Rfl:   •  Multiple Vitamin (ONE-A-DAY 55 PLUS PO), Take  by mouth every day., Disp: , Rfl:   ALLERGIES:   Allergies   Allergen Reactions   • Nkda [No Known Drug Allergy]      SURGHX:   Past Surgical History:   Procedure Laterality Date   • PAROTIDECTOMY SUPERFICIAL  7/19/2013    Performed by Mendy Stern M.D. at [facility] ORS   • LUMBAR DECOMPRESSION  1988   • INGUINAL HERNIA REPAIR BILATERAL  1960's     SOCHX:  reports that he has never smoked. He has never used smokeless tobacco. He reports that he drinks about 1.8 - 2.4 oz of alcohol per week . He reports that he does not use drugs.  FH: family history includes Heart Disease in  his father.     Assessment/Plan:     1. Contusion of left lower extremity, initial encounter  - DX-TIBIA AND FIBULA LEFT; Future  Impression       No radiographic evidence of acute traumatic bone injury.       - cephALEXin (KEFLEX) 500 MG Cap; Take 1 Cap by mouth 4 times a day for 5 days.  Dispense: 20 Cap; Refill: 0   - Complete full course of antibiotics as prescribed     2. Hematoma of left lower extremity, initial encounter    Encouraged warm compresses to the area, gentle massage, and elevation of the leg. OTC nsaids as needed for pain. Call or return to office if symptoms persist or worsen. The patient demonstrated a good understanding and agreed with the treatment plan.

## 2019-05-07 LAB
ALBUMIN SERPL-MCNC: 4.1 G/DL (ref 3.5–4.7)
ALBUMIN/GLOB SERPL: 1.7 {RATIO} (ref 1.2–2.2)
ALP SERPL-CCNC: 41 IU/L (ref 39–117)
ALT SERPL-CCNC: 18 IU/L (ref 0–44)
AST SERPL-CCNC: 21 IU/L (ref 0–40)
BILIRUB SERPL-MCNC: 1.1 MG/DL (ref 0–1.2)
BUN SERPL-MCNC: 14 MG/DL (ref 8–27)
BUN/CREAT SERPL: 16 (ref 10–24)
CALCIUM SERPL-MCNC: 9 MG/DL (ref 8.6–10.2)
CHLORIDE SERPL-SCNC: 104 MMOL/L (ref 96–106)
CHOLEST SERPL-MCNC: 158 MG/DL (ref 100–199)
CO2 SERPL-SCNC: 24 MMOL/L (ref 20–29)
CREAT SERPL-MCNC: 0.89 MG/DL (ref 0.76–1.27)
GLOBULIN SER CALC-MCNC: 2.4 G/DL (ref 1.5–4.5)
GLUCOSE SERPL-MCNC: 107 MG/DL (ref 65–99)
HDLC SERPL-MCNC: 52 MG/DL
LABORATORY COMMENT REPORT: NORMAL
LDLC SERPL CALC-MCNC: 88 MG/DL (ref 0–99)
POTASSIUM SERPL-SCNC: 4.3 MMOL/L (ref 3.5–5.2)
PROT SERPL-MCNC: 6.5 G/DL (ref 6–8.5)
SODIUM SERPL-SCNC: 139 MMOL/L (ref 134–144)
TRIGL SERPL-MCNC: 88 MG/DL (ref 0–149)
VLDLC SERPL CALC-MCNC: 18 MG/DL (ref 5–40)

## 2019-07-17 DIAGNOSIS — I10 ESSENTIAL HYPERTENSION: ICD-10-CM

## 2019-07-17 DIAGNOSIS — E78.5 DYSLIPIDEMIA: ICD-10-CM

## 2019-07-17 RX ORDER — LOSARTAN POTASSIUM 50 MG/1
50 TABLET ORAL
Qty: 90 TAB | Refills: 1 | Status: SHIPPED
Start: 2019-07-17 | End: 2020-01-08

## 2019-07-17 RX ORDER — SIMVASTATIN 40 MG
40 TABLET ORAL EVERY EVENING
Qty: 90 TAB | Refills: 1 | Status: SHIPPED
Start: 2019-07-17 | End: 2020-01-08

## 2019-08-24 ENCOUNTER — HOSPITAL ENCOUNTER (EMERGENCY)
Facility: MEDICAL CENTER | Age: 79
End: 2019-08-24
Attending: EMERGENCY MEDICINE
Payer: MEDICARE

## 2019-08-24 VITALS
OXYGEN SATURATION: 94 % | SYSTOLIC BLOOD PRESSURE: 121 MMHG | HEIGHT: 74 IN | DIASTOLIC BLOOD PRESSURE: 77 MMHG | BODY MASS INDEX: 31.18 KG/M2 | WEIGHT: 242.95 LBS | RESPIRATION RATE: 20 BRPM | TEMPERATURE: 97.2 F | HEART RATE: 87 BPM

## 2019-08-24 DIAGNOSIS — F51.05 INSOMNIA SECONDARY TO SITUATIONAL DEPRESSION: ICD-10-CM

## 2019-08-24 DIAGNOSIS — F43.21 INSOMNIA SECONDARY TO SITUATIONAL DEPRESSION: ICD-10-CM

## 2019-08-24 DIAGNOSIS — F41.9 ANXIETY: ICD-10-CM

## 2019-08-24 PROCEDURE — 99284 EMERGENCY DEPT VISIT MOD MDM: CPT

## 2019-08-24 PROCEDURE — A9270 NON-COVERED ITEM OR SERVICE: HCPCS | Performed by: EMERGENCY MEDICINE

## 2019-08-24 PROCEDURE — 700102 HCHG RX REV CODE 250 W/ 637 OVERRIDE(OP): Performed by: EMERGENCY MEDICINE

## 2019-08-24 RX ORDER — PAROXETINE HYDROCHLORIDE 20 MG/1
20 TABLET, FILM COATED ORAL ONCE
Status: COMPLETED | OUTPATIENT
Start: 2019-08-24 | End: 2019-08-24

## 2019-08-24 RX ORDER — LORAZEPAM 1 MG/1
1 TABLET ORAL ONCE
Status: COMPLETED | OUTPATIENT
Start: 2019-08-24 | End: 2019-08-24

## 2019-08-24 RX ORDER — PAROXETINE 10 MG/1
10 TABLET, FILM COATED ORAL DAILY
Qty: 30 TAB | Refills: 0 | Status: SHIPPED | OUTPATIENT
Start: 2019-08-24 | End: 2019-09-23

## 2019-08-24 RX ORDER — LORAZEPAM 1 MG/1
1 TABLET ORAL NIGHTLY PRN
Qty: 20 TAB | Refills: 0 | Status: SHIPPED | OUTPATIENT
Start: 2019-08-24 | End: 2019-09-13

## 2019-08-24 RX ADMIN — PAROXETINE HYDROCHLORIDE 20 MG: 20 TABLET, FILM COATED ORAL at 05:12

## 2019-08-24 RX ADMIN — LORAZEPAM 1 MG: 1 TABLET ORAL at 05:12

## 2019-08-24 NOTE — ED NOTES
Discharge instructions given and discussed. RX for ativan and paxil  given and pt educated, consent signed.  Pt educated to come back to ER for new or worsening symptoms, CP or SOB. Per wife, pt ha a hx of a fib and has a cardiologist.  Pt instructed to follow up with PCP as instructed. Pt verbalized understanding. VSS. Pt  Discharged in stable condition.

## 2019-08-24 NOTE — DISCHARGE INSTRUCTIONS
Call Dr. Ruelas's office first thing Monday morning to be seen this week.  Take the medications I am prescribing and get some sleep  Return to the ER immediately should you have worsening suicidal thoughts or inability to sleep.
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
## 2019-08-26 ENCOUNTER — TELEPHONE (OUTPATIENT)
Dept: CARDIOLOGY | Facility: MEDICAL CENTER | Age: 79
End: 2019-08-26

## 2019-08-26 NOTE — TELEPHONE ENCOUNTER
ADD Call    HODA/Walter Chavez with PCP Dr. Maame Ruelas office is calling. Pt is in office now. States pt is in AFIB with Ventricular rate of 132. They want to know if pt should go to the ER or wait for new pt appt with [facility] on 8/28. Please call Kathy at [phone](back line) to advise.

## 2019-08-27 ENCOUNTER — APPOINTMENT (OUTPATIENT)
Dept: RADIOLOGY | Facility: MEDICAL CENTER | Age: 79
DRG: 003 | End: 2019-08-27
Payer: MEDICARE

## 2019-08-27 ENCOUNTER — APPOINTMENT (OUTPATIENT)
Dept: RADIOLOGY | Facility: MEDICAL CENTER | Age: 79
DRG: 003 | End: 2019-08-27
Attending: EMERGENCY MEDICINE
Payer: MEDICARE

## 2019-08-27 ENCOUNTER — HOSPITAL ENCOUNTER (INPATIENT)
Facility: MEDICAL CENTER | Age: 79
LOS: 51 days | DRG: 003 | End: 2019-10-18
Attending: EMERGENCY MEDICINE | Admitting: SURGERY
Payer: MEDICARE

## 2019-08-27 DIAGNOSIS — R13.12 OROPHARYNGEAL DYSPHAGIA: ICD-10-CM

## 2019-08-27 DIAGNOSIS — S02.600B OPEN FRACTURE OF BODY OF MANDIBLE, UNSPECIFIED LATERALITY, INITIAL ENCOUNTER (HCC): ICD-10-CM

## 2019-08-27 DIAGNOSIS — J96.90 RESPIRATORY FAILURE FOLLOWING TRAUMA (HCC): ICD-10-CM

## 2019-08-27 DIAGNOSIS — S11.93XA GUNSHOT WOUND OF NECK WITH COMPLICATION, INITIAL ENCOUNTER: ICD-10-CM

## 2019-08-27 DIAGNOSIS — I48.91 ATRIAL FIBRILLATION, UNSPECIFIED TYPE (HCC): ICD-10-CM

## 2019-08-27 PROCEDURE — 80053 COMPREHEN METABOLIC PANEL: CPT

## 2019-08-27 PROCEDURE — 700105 HCHG RX REV CODE 258: Performed by: EMERGENCY MEDICINE

## 2019-08-27 PROCEDURE — 83605 ASSAY OF LACTIC ACID: CPT

## 2019-08-27 PROCEDURE — 99291 CRITICAL CARE FIRST HOUR: CPT

## 2019-08-27 PROCEDURE — 85347 COAGULATION TIME ACTIVATED: CPT

## 2019-08-27 PROCEDURE — 85384 FIBRINOGEN ACTIVITY: CPT

## 2019-08-27 PROCEDURE — 85610 PROTHROMBIN TIME: CPT

## 2019-08-27 PROCEDURE — 90471 IMMUNIZATION ADMIN: CPT

## 2019-08-27 PROCEDURE — 700111 HCHG RX REV CODE 636 W/ 250 OVERRIDE (IP): Performed by: EMERGENCY MEDICINE

## 2019-08-27 PROCEDURE — 85576 BLOOD PLATELET AGGREGATION: CPT | Mod: 91

## 2019-08-27 PROCEDURE — 80307 DRUG TEST PRSMV CHEM ANLYZR: CPT

## 2019-08-27 PROCEDURE — 82803 BLOOD GASES ANY COMBINATION: CPT

## 2019-08-27 PROCEDURE — G0390 TRAUMA RESPONS W/HOSP CRITI: HCPCS

## 2019-08-27 PROCEDURE — 85730 THROMBOPLASTIN TIME PARTIAL: CPT

## 2019-08-27 PROCEDURE — 85027 COMPLETE CBC AUTOMATED: CPT

## 2019-08-27 PROCEDURE — 90715 TDAP VACCINE 7 YRS/> IM: CPT | Performed by: EMERGENCY MEDICINE

## 2019-08-27 PROCEDURE — 96365 THER/PROPH/DIAG IV INF INIT: CPT

## 2019-08-27 RX ORDER — CEFAZOLIN SODIUM 1 G/50ML
INJECTION, SOLUTION INTRAVENOUS
Status: COMPLETED | OUTPATIENT
Start: 2019-08-27 | End: 2019-08-28

## 2019-08-27 RX ADMIN — AMPICILLIN AND SULBACTAM 3 G: 2; 1 INJECTION, POWDER, FOR SOLUTION INTRAVENOUS at 23:55

## 2019-08-27 RX ADMIN — CLOSTRIDIUM TETANI TOXOID ANTIGEN (FORMALDEHYDE INACTIVATED), CORYNEBACTERIUM DIPHTHERIAE TOXOID ANTIGEN (FORMALDEHYDE INACTIVATED), BORDETELLA PERTUSSIS TOXOID ANTIGEN (GLUTARALDEHYDE INACTIVATED), BORDETELLA PERTUSSIS FILAMENTOUS HEMAGGLUTININ ANTIGEN (FORMALDEHYDE INACTIVATED), BORDETELLA PERTUSSIS PERTACTIN ANTIGEN, AND BORDETELLA PERTUSSIS FIMBRIAE 2/3 ANTIGEN 0.5 ML: 5; 2; 2.5; 5; 3; 5 INJECTION, SUSPENSION INTRAMUSCULAR at 23:53

## 2019-08-28 ENCOUNTER — APPOINTMENT (OUTPATIENT)
Dept: RADIOLOGY | Facility: MEDICAL CENTER | Age: 79
DRG: 003 | End: 2019-08-28
Attending: SURGERY
Payer: MEDICARE

## 2019-08-28 ENCOUNTER — APPOINTMENT (OUTPATIENT)
Dept: CARDIOLOGY | Facility: MEDICAL CENTER | Age: 79
DRG: 003 | End: 2019-08-28
Attending: NURSE PRACTITIONER
Payer: MEDICARE

## 2019-08-28 ENCOUNTER — APPOINTMENT (OUTPATIENT)
Dept: RADIOLOGY | Facility: MEDICAL CENTER | Age: 79
DRG: 003 | End: 2019-08-28
Attending: NURSE PRACTITIONER
Payer: MEDICARE

## 2019-08-28 ENCOUNTER — APPOINTMENT (OUTPATIENT)
Dept: RADIOLOGY | Facility: MEDICAL CENTER | Age: 79
DRG: 003 | End: 2019-08-28
Attending: EMERGENCY MEDICINE
Payer: MEDICARE

## 2019-08-28 PROBLEM — S02.609B: Status: ACTIVE | Noted: 2019-08-28

## 2019-08-28 PROBLEM — Z79.01 ANTICOAGULANT LONG-TERM USE: Status: ACTIVE | Noted: 2019-08-28

## 2019-08-28 PROBLEM — J96.90 RESPIRATORY FAILURE FOLLOWING TRAUMA (HCC): Status: ACTIVE | Noted: 2019-08-28

## 2019-08-28 PROBLEM — Z53.09 CONTRAINDICATION TO DEEP VEIN THROMBOSIS (DVT) PROPHYLAXIS: Status: ACTIVE | Noted: 2019-08-28

## 2019-08-28 PROBLEM — T14.90XA TRAUMA: Status: ACTIVE | Noted: 2019-08-28

## 2019-08-28 PROBLEM — T14.91XA SUICIDAL BEHAVIOR WITH ATTEMPTED SELF-INJURY (HCC): Status: ACTIVE | Noted: 2019-08-28

## 2019-08-28 PROBLEM — F32.A DEPRESSION: Status: ACTIVE | Noted: 2019-08-28

## 2019-08-28 PROBLEM — I48.20 CHRONIC ATRIAL FIBRILLATION (HCC): Status: ACTIVE | Noted: 2019-08-28

## 2019-08-28 PROBLEM — I48.91 A-FIB (HCC): Status: ACTIVE | Noted: 2019-08-28

## 2019-08-28 LAB
ABO + RH BLD: NORMAL
ABO GROUP BLD: NORMAL
ALBUMIN SERPL BCP-MCNC: 2.8 G/DL (ref 3.2–4.9)
ALBUMIN SERPL BCP-MCNC: 3.7 G/DL (ref 3.2–4.9)
ALBUMIN/GLOB SERPL: 1.9 G/DL
ALBUMIN/GLOB SERPL: 1.9 G/DL
ALP SERPL-CCNC: 75 U/L (ref 30–99)
ALP SERPL-CCNC: 98 U/L (ref 30–99)
ALT SERPL-CCNC: 61 U/L (ref 2–50)
ALT SERPL-CCNC: 82 U/L (ref 2–50)
ANION GAP SERPL CALC-SCNC: 10 MMOL/L (ref 0–11.9)
ANION GAP SERPL CALC-SCNC: 6 MMOL/L (ref 0–11.9)
APTT PPP: 27.8 SEC (ref 24.7–36)
AST SERPL-CCNC: 32 U/L (ref 12–45)
AST SERPL-CCNC: 38 U/L (ref 12–45)
BASE EXCESS BLDA CALC-SCNC: -6 MMOL/L (ref -4–3)
BASOPHILS # BLD AUTO: 0.3 % (ref 0–1.8)
BASOPHILS # BLD: 0.07 K/UL (ref 0–0.12)
BILIRUB SERPL-MCNC: 1 MG/DL (ref 0.1–1.5)
BILIRUB SERPL-MCNC: 1.2 MG/DL (ref 0.1–1.5)
BLD GP AB SCN SERPL QL: NORMAL
BODY TEMPERATURE: ABNORMAL CENTIGRADE
BUN SERPL-MCNC: 15 MG/DL (ref 8–22)
BUN SERPL-MCNC: 16 MG/DL (ref 8–22)
CALCIUM SERPL-MCNC: 7.6 MG/DL (ref 8.5–10.5)
CALCIUM SERPL-MCNC: 8.6 MG/DL (ref 8.5–10.5)
CFT BLD TEG: 4.4 MIN (ref 5–10)
CHLORIDE SERPL-SCNC: 101 MMOL/L (ref 96–112)
CHLORIDE SERPL-SCNC: 99 MMOL/L (ref 96–112)
CLOT ANGLE BLD TEG: 64.3 DEGREES (ref 53–72)
CLOT LYSIS 30M P MA LENFR BLD TEG: 0 % (ref 0–8)
CO2 SERPL-SCNC: 22 MMOL/L (ref 20–33)
CO2 SERPL-SCNC: 22 MMOL/L (ref 20–33)
CREAT SERPL-MCNC: 0.88 MG/DL (ref 0.5–1.4)
CREAT SERPL-MCNC: 0.92 MG/DL (ref 0.5–1.4)
CT.EXTRINSIC BLD ROTEM: 1.9 MIN (ref 1–3)
EKG IMPRESSION: NORMAL
EKG IMPRESSION: NORMAL
EOSINOPHIL # BLD AUTO: 0.01 K/UL (ref 0–0.51)
EOSINOPHIL NFR BLD: 0 % (ref 0–6.9)
ERYTHROCYTE [DISTWIDTH] IN BLOOD BY AUTOMATED COUNT: 50.5 FL (ref 35.9–50)
ERYTHROCYTE [DISTWIDTH] IN BLOOD BY AUTOMATED COUNT: 52.5 FL (ref 35.9–50)
ETHANOL BLD-MCNC: 0 G/DL
GLOBULIN SER CALC-MCNC: 1.5 G/DL (ref 1.9–3.5)
GLOBULIN SER CALC-MCNC: 1.9 G/DL (ref 1.9–3.5)
GLUCOSE SERPL-MCNC: 112 MG/DL (ref 65–99)
GLUCOSE SERPL-MCNC: 169 MG/DL (ref 65–99)
HCO3 BLDA-SCNC: 17 MMOL/L (ref 17–25)
HCT VFR BLD AUTO: 36.2 % (ref 42–52)
HCT VFR BLD AUTO: 44.1 % (ref 42–52)
HGB BLD-MCNC: 10 G/DL (ref 14–18)
HGB BLD-MCNC: 10.1 G/DL (ref 14–18)
HGB BLD-MCNC: 11.4 G/DL (ref 14–18)
HGB BLD-MCNC: 14.7 G/DL (ref 14–18)
IMM GRANULOCYTES # BLD AUTO: 0.24 K/UL (ref 0–0.11)
IMM GRANULOCYTES NFR BLD AUTO: 1 % (ref 0–0.9)
INR PPP: 1.45 (ref 0.87–1.13)
LACTATE BLD-SCNC: 1.4 MMOL/L (ref 0.5–2)
LACTATE BLD-SCNC: 2.5 MMOL/L (ref 0.5–2)
LYMPHOCYTES # BLD AUTO: 1.09 K/UL (ref 1–4.8)
LYMPHOCYTES NFR BLD: 4.4 % (ref 22–41)
MAGNESIUM SERPL-MCNC: 1.8 MG/DL (ref 1.5–2.5)
MCF BLD TEG: 65.6 MM (ref 50–70)
MCH RBC QN AUTO: 32.5 PG (ref 27–33)
MCH RBC QN AUTO: 33.5 PG (ref 27–33)
MCHC RBC AUTO-ENTMCNC: 31.5 G/DL (ref 33.7–35.3)
MCHC RBC AUTO-ENTMCNC: 33.3 G/DL (ref 33.7–35.3)
MCV RBC AUTO: 100.5 FL (ref 81.4–97.8)
MCV RBC AUTO: 103.1 FL (ref 81.4–97.8)
MONOCYTES # BLD AUTO: 1.7 K/UL (ref 0–0.85)
MONOCYTES NFR BLD AUTO: 6.9 % (ref 0–13.4)
NEUTROPHILS # BLD AUTO: 21.67 K/UL (ref 1.82–7.42)
NEUTROPHILS NFR BLD: 87.4 % (ref 44–72)
NRBC # BLD AUTO: 0 K/UL
NRBC BLD-RTO: 0 /100 WBC
PA AA BLD-ACNC: 47.1 %
PA ADP BLD-ACNC: 89.4 %
PCO2 BLDA: 26.8 MMHG (ref 26–37)
PH BLDA: 7.42 [PH] (ref 7.4–7.5)
PLATELET # BLD AUTO: 224 K/UL (ref 164–446)
PLATELET # BLD AUTO: 297 K/UL (ref 164–446)
PMV BLD AUTO: 10.3 FL (ref 9–12.9)
PMV BLD AUTO: 10.4 FL (ref 9–12.9)
PO2 BLDA: 112.4 MMHG (ref 64–87)
POTASSIUM SERPL-SCNC: 3.5 MMOL/L (ref 3.6–5.5)
POTASSIUM SERPL-SCNC: 3.8 MMOL/L (ref 3.6–5.5)
PROT SERPL-MCNC: 4.3 G/DL (ref 6–8.2)
PROT SERPL-MCNC: 5.6 G/DL (ref 6–8.2)
PROTHROMBIN TIME: 18 SEC (ref 12–14.6)
RBC # BLD AUTO: 3.51 M/UL (ref 4.7–6.1)
RBC # BLD AUTO: 4.39 M/UL (ref 4.7–6.1)
RH BLD: NORMAL
SAO2 % BLDA: 97.9 % (ref 93–99)
SODIUM SERPL-SCNC: 129 MMOL/L (ref 135–145)
SODIUM SERPL-SCNC: 131 MMOL/L (ref 135–145)
TEG ALGORITHM TGALG: ABNORMAL
TSH SERPL DL<=0.005 MIU/L-ACNC: 2.29 UIU/ML (ref 0.38–5.33)
WBC # BLD AUTO: 17.5 K/UL (ref 4.8–10.8)
WBC # BLD AUTO: 24.8 K/UL (ref 4.8–10.8)

## 2019-08-28 PROCEDURE — A9270 NON-COVERED ITEM OR SERVICE: HCPCS | Performed by: SURGERY

## 2019-08-28 PROCEDURE — 93005 ELECTROCARDIOGRAM TRACING: CPT | Performed by: NURSE PRACTITIONER

## 2019-08-28 PROCEDURE — 51702 INSERT TEMP BLADDER CATH: CPT

## 2019-08-28 PROCEDURE — 700112 HCHG RX REV CODE 229: Performed by: NURSE PRACTITIONER

## 2019-08-28 PROCEDURE — 700101 HCHG RX REV CODE 250: Performed by: SURGERY

## 2019-08-28 PROCEDURE — C1751 CATH, INF, PER/CENT/MIDLINE: HCPCS

## 2019-08-28 PROCEDURE — 700102 HCHG RX REV CODE 250 W/ 637 OVERRIDE(OP): Performed by: SURGERY

## 2019-08-28 PROCEDURE — 36556 INSERT NON-TUNNEL CV CATH: CPT

## 2019-08-28 PROCEDURE — 31500 INSERT EMERGENCY AIRWAY: CPT

## 2019-08-28 PROCEDURE — 700105 HCHG RX REV CODE 258: Performed by: SURGERY

## 2019-08-28 PROCEDURE — 71045 X-RAY EXAM CHEST 1 VIEW: CPT

## 2019-08-28 PROCEDURE — 0BH17EZ INSERTION OF ENDOTRACHEAL AIRWAY INTO TRACHEA, VIA NATURAL OR ARTIFICIAL OPENING: ICD-10-PCS | Performed by: EMERGENCY MEDICINE

## 2019-08-28 PROCEDURE — 700111 HCHG RX REV CODE 636 W/ 250 OVERRIDE (IP): Performed by: NURSE PRACTITIONER

## 2019-08-28 PROCEDURE — 86900 BLOOD TYPING SEROLOGIC ABO: CPT

## 2019-08-28 PROCEDURE — 83605 ASSAY OF LACTIC ACID: CPT

## 2019-08-28 PROCEDURE — 302136 NUTRITION PUMP: Performed by: SURGERY

## 2019-08-28 PROCEDURE — 700111 HCHG RX REV CODE 636 W/ 250 OVERRIDE (IP)

## 2019-08-28 PROCEDURE — 83735 ASSAY OF MAGNESIUM: CPT

## 2019-08-28 PROCEDURE — 770022 HCHG ROOM/CARE - ICU (200)

## 2019-08-28 PROCEDURE — 700105 HCHG RX REV CODE 258: Performed by: NURSE PRACTITIONER

## 2019-08-28 PROCEDURE — A9270 NON-COVERED ITEM OR SERVICE: HCPCS | Performed by: NURSE PRACTITIONER

## 2019-08-28 PROCEDURE — 94002 VENT MGMT INPAT INIT DAY: CPT

## 2019-08-28 PROCEDURE — 700111 HCHG RX REV CODE 636 W/ 250 OVERRIDE (IP): Performed by: SURGERY

## 2019-08-28 PROCEDURE — 0T9B70Z DRAINAGE OF BLADDER WITH DRAINAGE DEVICE, VIA NATURAL OR ARTIFICIAL OPENING: ICD-10-PCS | Performed by: SURGERY

## 2019-08-28 PROCEDURE — 70450 CT HEAD/BRAIN W/O DYE: CPT

## 2019-08-28 PROCEDURE — 700102 HCHG RX REV CODE 250 W/ 637 OVERRIDE(OP): Performed by: NURSE PRACTITIONER

## 2019-08-28 PROCEDURE — 99291 CRITICAL CARE FIRST HOUR: CPT | Performed by: SURGERY

## 2019-08-28 PROCEDURE — 86850 RBC ANTIBODY SCREEN: CPT

## 2019-08-28 PROCEDURE — 93005 ELECTROCARDIOGRAM TRACING: CPT | Performed by: INTERNAL MEDICINE

## 2019-08-28 PROCEDURE — 303105 HCHG CATHETER EXTRA

## 2019-08-28 PROCEDURE — 85018 HEMOGLOBIN: CPT | Mod: 91

## 2019-08-28 PROCEDURE — 80053 COMPREHEN METABOLIC PANEL: CPT

## 2019-08-28 PROCEDURE — 5A1955Z RESPIRATORY VENTILATION, GREATER THAN 96 CONSECUTIVE HOURS: ICD-10-PCS | Performed by: EMERGENCY MEDICINE

## 2019-08-28 PROCEDURE — 700111 HCHG RX REV CODE 636 W/ 250 OVERRIDE (IP): Performed by: EMERGENCY MEDICINE

## 2019-08-28 PROCEDURE — 93010 ELECTROCARDIOGRAM REPORT: CPT | Mod: 76 | Performed by: INTERNAL MEDICINE

## 2019-08-28 PROCEDURE — 70486 CT MAXILLOFACIAL W/O DYE: CPT

## 2019-08-28 PROCEDURE — 85025 COMPLETE CBC W/AUTO DIFF WBC: CPT

## 2019-08-28 PROCEDURE — 700101 HCHG RX REV CODE 250

## 2019-08-28 PROCEDURE — 93010 ELECTROCARDIOGRAM REPORT: CPT | Performed by: INTERNAL MEDICINE

## 2019-08-28 PROCEDURE — C9132 KCENTRA, PER I.U.: HCPCS | Mod: JG | Performed by: EMERGENCY MEDICINE

## 2019-08-28 PROCEDURE — 84443 ASSAY THYROID STIM HORMONE: CPT

## 2019-08-28 PROCEDURE — 72125 CT NECK SPINE W/O DYE: CPT

## 2019-08-28 PROCEDURE — 86901 BLOOD TYPING SEROLOGIC RH(D): CPT

## 2019-08-28 PROCEDURE — 99221 1ST HOSP IP/OBS SF/LOW 40: CPT | Performed by: INTERNAL MEDICINE

## 2019-08-28 RX ORDER — POLYETHYLENE GLYCOL 3350 17 G/17G
1 POWDER, FOR SOLUTION ORAL 2 TIMES DAILY
Status: DISCONTINUED | OUTPATIENT
Start: 2019-08-28 | End: 2019-09-25

## 2019-08-28 RX ORDER — MORPHINE SULFATE 10 MG/ML
2-4 INJECTION, SOLUTION INTRAMUSCULAR; INTRAVENOUS
Status: DISCONTINUED | OUTPATIENT
Start: 2019-08-28 | End: 2019-09-02

## 2019-08-28 RX ORDER — ACETAMINOPHEN 650 MG/1
650 SUPPOSITORY RECTAL EVERY 4 HOURS PRN
Status: DISCONTINUED | OUTPATIENT
Start: 2019-08-28 | End: 2019-10-07

## 2019-08-28 RX ORDER — METOPROLOL TARTRATE 1 MG/ML
INJECTION, SOLUTION INTRAVENOUS
Status: COMPLETED
Start: 2019-08-28 | End: 2019-08-28

## 2019-08-28 RX ORDER — BISACODYL 10 MG
10 SUPPOSITORY, RECTAL RECTAL
Status: DISCONTINUED | OUTPATIENT
Start: 2019-08-28 | End: 2019-09-28

## 2019-08-28 RX ORDER — MIDAZOLAM HYDROCHLORIDE 1 MG/ML
INJECTION INTRAMUSCULAR; INTRAVENOUS
Status: COMPLETED | OUTPATIENT
Start: 2019-08-28 | End: 2019-08-28

## 2019-08-28 RX ORDER — SODIUM CHLORIDE, SODIUM LACTATE, POTASSIUM CHLORIDE, CALCIUM CHLORIDE 600; 310; 30; 20 MG/100ML; MG/100ML; MG/100ML; MG/100ML
INJECTION, SOLUTION INTRAVENOUS CONTINUOUS
Status: DISCONTINUED | OUTPATIENT
Start: 2019-08-28 | End: 2019-08-28

## 2019-08-28 RX ORDER — PAROXETINE 10 MG/1
10 TABLET, FILM COATED ORAL DAILY
Status: ON HOLD | COMMUNITY
End: 2019-10-18

## 2019-08-28 RX ORDER — ONDANSETRON 2 MG/ML
4 INJECTION INTRAMUSCULAR; INTRAVENOUS EVERY 4 HOURS PRN
Status: DISCONTINUED | OUTPATIENT
Start: 2019-08-28 | End: 2019-10-18

## 2019-08-28 RX ORDER — DOXYCYCLINE 50 MG/1
50 TABLET ORAL DAILY
Status: ON HOLD | COMMUNITY
End: 2019-10-18

## 2019-08-28 RX ORDER — SODIUM CHLORIDE 9 MG/ML
250 INJECTION, SOLUTION INTRAVENOUS
Status: DISCONTINUED | OUTPATIENT
Start: 2019-08-28 | End: 2019-09-08

## 2019-08-28 RX ORDER — AMOXICILLIN 250 MG
1 CAPSULE ORAL
Status: DISCONTINUED | OUTPATIENT
Start: 2019-08-28 | End: 2019-09-22

## 2019-08-28 RX ORDER — ACETAMINOPHEN 325 MG/1
650 TABLET ORAL EVERY 4 HOURS PRN
Status: DISCONTINUED | OUTPATIENT
Start: 2019-08-28 | End: 2019-10-07

## 2019-08-28 RX ORDER — AMOXICILLIN 250 MG
1 CAPSULE ORAL NIGHTLY
Status: DISCONTINUED | OUTPATIENT
Start: 2019-08-28 | End: 2019-09-25

## 2019-08-28 RX ORDER — DEXTROSE MONOHYDRATE 50 MG/ML
INJECTION, SOLUTION INTRAVENOUS CONTINUOUS
Status: DISCONTINUED | OUTPATIENT
Start: 2019-08-28 | End: 2019-08-30

## 2019-08-28 RX ORDER — ENEMA 19; 7 G/133ML; G/133ML
1 ENEMA RECTAL
Status: DISCONTINUED | OUTPATIENT
Start: 2019-08-28 | End: 2019-09-28

## 2019-08-28 RX ORDER — LORAZEPAM 1 MG/1
1 TABLET ORAL
Status: ON HOLD | COMMUNITY
End: 2019-10-18

## 2019-08-28 RX ORDER — SODIUM CHLORIDE 9 MG/ML
1000 INJECTION, SOLUTION INTRAVENOUS ONCE
Status: COMPLETED | OUTPATIENT
Start: 2019-08-28 | End: 2019-08-28

## 2019-08-28 RX ORDER — METOPROLOL SUCCINATE 25 MG/1
25 TABLET, EXTENDED RELEASE ORAL DAILY
Status: ON HOLD | COMMUNITY
End: 2019-10-18

## 2019-08-28 RX ORDER — ETOMIDATE 2 MG/ML
INJECTION INTRAVENOUS
Status: COMPLETED | OUTPATIENT
Start: 2019-08-28 | End: 2019-08-28

## 2019-08-28 RX ORDER — SUCCINYLCHOLINE CHLORIDE 20 MG/ML
INJECTION INTRAMUSCULAR; INTRAVENOUS
Status: COMPLETED | OUTPATIENT
Start: 2019-08-28 | End: 2019-08-28

## 2019-08-28 RX ORDER — METOPROLOL TARTRATE 1 MG/ML
5 INJECTION, SOLUTION INTRAVENOUS ONCE
Status: ACTIVE | OUTPATIENT
Start: 2019-08-28 | End: 2019-08-29

## 2019-08-28 RX ORDER — FAMOTIDINE 20 MG/1
20 TABLET, FILM COATED ORAL 2 TIMES DAILY
Status: DISCONTINUED | OUTPATIENT
Start: 2019-08-28 | End: 2019-09-12

## 2019-08-28 RX ORDER — ROCURONIUM BROMIDE 10 MG/ML
INJECTION, SOLUTION INTRAVENOUS
Status: COMPLETED | OUTPATIENT
Start: 2019-08-28 | End: 2019-08-28

## 2019-08-28 RX ORDER — METOPROLOL TARTRATE 1 MG/ML
5 INJECTION, SOLUTION INTRAVENOUS
Status: DISCONTINUED | OUTPATIENT
Start: 2019-08-28 | End: 2019-08-29

## 2019-08-28 RX ORDER — SODIUM CHLORIDE, SODIUM LACTATE, POTASSIUM CHLORIDE, AND CALCIUM CHLORIDE .6; .31; .03; .02 G/100ML; G/100ML; G/100ML; G/100ML
1000 INJECTION, SOLUTION INTRAVENOUS ONCE
Status: COMPLETED | OUTPATIENT
Start: 2019-08-28 | End: 2019-08-28

## 2019-08-28 RX ORDER — SODIUM CHLORIDE 9 MG/ML
INJECTION, SOLUTION INTRAVENOUS CONTINUOUS
Status: DISCONTINUED | OUTPATIENT
Start: 2019-08-28 | End: 2019-09-03

## 2019-08-28 RX ORDER — OXYCODONE HYDROCHLORIDE 5 MG/1
5-10 TABLET ORAL EVERY 4 HOURS PRN
Status: DISCONTINUED | OUTPATIENT
Start: 2019-08-28 | End: 2019-08-29

## 2019-08-28 RX ORDER — MORPHINE SULFATE 4 MG/ML
INJECTION, SOLUTION INTRAMUSCULAR; INTRAVENOUS
Status: COMPLETED
Start: 2019-08-28 | End: 2019-08-28

## 2019-08-28 RX ORDER — DOCUSATE SODIUM 50 MG/5ML
100 LIQUID ORAL 2 TIMES DAILY
Status: DISCONTINUED | OUTPATIENT
Start: 2019-08-28 | End: 2019-09-25

## 2019-08-28 RX ADMIN — MORPHINE SULFATE 2 MG: 10 INJECTION INTRAVENOUS at 08:45

## 2019-08-28 RX ADMIN — SODIUM CHLORIDE, POTASSIUM CHLORIDE, SODIUM LACTATE AND CALCIUM CHLORIDE: 600; 310; 30; 20 INJECTION, SOLUTION INTRAVENOUS at 11:01

## 2019-08-28 RX ADMIN — OXYCODONE HYDROCHLORIDE 5 MG: 5 TABLET ORAL at 18:11

## 2019-08-28 RX ADMIN — AMIODARONE HYDROCHLORIDE 1 MG/MIN: 50 INJECTION, SOLUTION INTRAVENOUS at 19:30

## 2019-08-28 RX ADMIN — SODIUM CHLORIDE, POTASSIUM CHLORIDE, SODIUM LACTATE AND CALCIUM CHLORIDE: 600; 310; 30; 20 INJECTION, SOLUTION INTRAVENOUS at 01:09

## 2019-08-28 RX ADMIN — SODIUM CHLORIDE, POTASSIUM CHLORIDE, SODIUM LACTATE AND CALCIUM CHLORIDE: 600; 310; 30; 20 INJECTION, SOLUTION INTRAVENOUS at 19:31

## 2019-08-28 RX ADMIN — METOPROLOL TARTRATE 5 MG: 5 INJECTION, SOLUTION INTRAVENOUS at 01:50

## 2019-08-28 RX ADMIN — SUCCINYLCHOLINE CHLORIDE 120 MG: 20 INJECTION, SOLUTION INTRAMUSCULAR; INTRAVENOUS at 00:00

## 2019-08-28 RX ADMIN — MORPHINE SULFATE 4 MG: 10 INJECTION INTRAVENOUS at 15:40

## 2019-08-28 RX ADMIN — DEXTROSE MONOHYDRATE 30 ML: 50 INJECTION, SOLUTION INTRAVENOUS at 02:22

## 2019-08-28 RX ADMIN — AMPICILLIN AND SULBACTAM 3 G: 2; 1 INJECTION, POWDER, FOR SOLUTION INTRAVENOUS at 06:07

## 2019-08-28 RX ADMIN — MIDAZOLAM 2 MG: 1 INJECTION INTRAMUSCULAR; INTRAVENOUS at 00:05

## 2019-08-28 RX ADMIN — DOCUSATE SODIUM 100 MG: 50 LIQUID ORAL at 18:11

## 2019-08-28 RX ADMIN — AMPICILLIN AND SULBACTAM 3 G: 2; 1 INJECTION, POWDER, FOR SOLUTION INTRAVENOUS at 18:11

## 2019-08-28 RX ADMIN — MORPHINE SULFATE 4 MG: 10 INJECTION INTRAVENOUS at 20:33

## 2019-08-28 RX ADMIN — SENNOSIDES, DOCUSATE SODIUM 1 TABLET: 50; 8.6 TABLET, FILM COATED ORAL at 18:12

## 2019-08-28 RX ADMIN — PROPOFOL 40 MCG/KG/MIN: 10 INJECTION, EMULSION INTRAVENOUS at 00:30

## 2019-08-28 RX ADMIN — PROTHROMBIN, COAGULATION FACTOR VII HUMAN, COAGULATION FACTOR IX HUMAN, COAGULATION FACTOR X HUMAN, PROTEIN C, PROTEIN S HUMAN, AND WATER 4794 UNITS: KIT at 00:45

## 2019-08-28 RX ADMIN — SODIUM CHLORIDE, POTASSIUM CHLORIDE, SODIUM LACTATE AND CALCIUM CHLORIDE 1000 ML: 600; 310; 30; 20 INJECTION, SOLUTION INTRAVENOUS at 00:50

## 2019-08-28 RX ADMIN — FENTANYL CITRATE 100 MCG: 50 INJECTION, SOLUTION INTRAMUSCULAR; INTRAVENOUS at 00:16

## 2019-08-28 RX ADMIN — ETOMIDATE 30 MG: 2 INJECTION INTRAVENOUS at 00:00

## 2019-08-28 RX ADMIN — FAMOTIDINE 20 MG: 10 INJECTION INTRAVENOUS at 06:08

## 2019-08-28 RX ADMIN — FAMOTIDINE 20 MG: 20 TABLET ORAL at 18:11

## 2019-08-28 RX ADMIN — AMPICILLIN AND SULBACTAM 3 G: 2; 1 INJECTION, POWDER, FOR SOLUTION INTRAVENOUS at 23:34

## 2019-08-28 RX ADMIN — AMIODARONE HYDROCHLORIDE 150 MG: 1.5 INJECTION, SOLUTION INTRAVENOUS at 02:08

## 2019-08-28 RX ADMIN — DEXTROSE MONOHYDRATE: 50 INJECTION, SOLUTION INTRAVENOUS at 18:17

## 2019-08-28 RX ADMIN — PROPOFOL 20 MCG/KG/MIN: 10 INJECTION, EMULSION INTRAVENOUS at 08:46

## 2019-08-28 RX ADMIN — MORPHINE SULFATE 4 MG: 4 INJECTION INTRAVENOUS at 01:06

## 2019-08-28 RX ADMIN — AMIODARONE HYDROCHLORIDE 1 MG/MIN: 50 INJECTION, SOLUTION INTRAVENOUS at 02:31

## 2019-08-28 RX ADMIN — PROPOFOL 20 MCG/KG/MIN: 10 INJECTION, EMULSION INTRAVENOUS at 20:25

## 2019-08-28 RX ADMIN — MORPHINE SULFATE 2 MG: 10 INJECTION INTRAVENOUS at 06:02

## 2019-08-28 RX ADMIN — SODIUM CHLORIDE 1000 ML: 9 INJECTION, SOLUTION INTRAVENOUS at 20:45

## 2019-08-28 RX ADMIN — SODIUM CHLORIDE: 9 INJECTION, SOLUTION INTRAVENOUS at 21:00

## 2019-08-28 RX ADMIN — ROCURONIUM BROMIDE 100 MG: 10 INJECTION, SOLUTION INTRAVENOUS at 00:05

## 2019-08-28 RX ADMIN — AMIODARONE HYDROCHLORIDE 150 MG: 1.5 INJECTION, SOLUTION INTRAVENOUS at 10:56

## 2019-08-28 RX ADMIN — AMIODARONE HYDROCHLORIDE 1 MG/MIN: 50 INJECTION, SOLUTION INTRAVENOUS at 11:50

## 2019-08-28 RX ADMIN — PROPOFOL 40 MCG/KG/MIN: 10 INJECTION, EMULSION INTRAVENOUS at 03:30

## 2019-08-28 RX ADMIN — METOPROLOL TARTRATE 25 MG: 25 TABLET, FILM COATED ORAL at 18:24

## 2019-08-28 RX ADMIN — MORPHINE SULFATE 4 MG: 10 INJECTION INTRAVENOUS at 12:09

## 2019-08-28 RX ADMIN — POLYETHYLENE GLYCOL 3350 1 PACKET: 17 POWDER, FOR SOLUTION ORAL at 18:11
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
## 2019-08-28 NOTE — ASSESSMENT & PLAN NOTE
Systemic anticoagulation contraindicated secondary to elevated bleeding risk.  8/29 screening duplex without DVT  On full anti-coagulation
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
## 2019-08-28 NOTE — CARE PLAN
Adult Ventilation Update    Total Vent Days: 1    Patient Lines/Drains/Airways Status      Active Airway       Name: Placement date: Placement time: Site: Days:    Airway ETT Oral 8.0  08/28/19   0004   Oral  less than 1                    In the last 24 hours, the patient tolerated SBT for 0 hours.             Plateau Pressure (Q Shift): 17 (08/28/19 0219)  Static Compliance (ml / cm H2O): 48 (08/28/19 0219)    Patient failed trials because of    Barriers to SBT    Length of Weaning Trial        Sputum/Suction                 Mobility  Activity Performed: Unable to mobilize (08/28/19 0200)  Reason Not Mobilized: Unstable condition (08/28/19 0200)    Events/Summary/Plan: Vent check  (08/28/19 0219)

## 2019-08-28 NOTE — PROGRESS NOTES
Gauze in mouth removed per Dr. López. If needed to be packed, use kerlex and leave a 6 inch tail outside the mouth.

## 2019-08-28 NOTE — CARE PLAN
Problem: Pain Management  Goal: Pain level will decrease to patient's comfort goal  Intervention: Follow pain managment plan developed in collaboration with patient and Interdisciplinary Team  Note:   Pain assessed q2h. Pt positioned with pillows for comfort. Pain medications given PRN as needed per MAR. Distraction techniques in place - television on.      Problem: Respiratory:  Goal: Respiratory status will improve  Outcome: PROGRESSING AS EXPECTED  Note:   VAP bundle in place. Q2h suctioning and oral care provided. Continuous pulse ox monitoring in place. Collaborating with MD and RT for vent management.

## 2019-08-28 NOTE — ASSESSMENT & PLAN NOTE
Seen in ED on 8/24/19- Contract made for safety  9/1 continue Paxil.  Seroquel for agitation  9/9 Psychiatry consult  9/10 Legal hold extended / DC Paxil  9/27 Legal hold continues    - Continue seroquel 75mg, consider switch to zyprexa if he doesn't improve cognitively    - Trazodone to prn due to somnolence    - D/C haldol prn / Geodon prn for agitation    10/11 Legal hold discontinued   Roselia Mcginnis M.D., Psychiatry
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
## 2019-08-28 NOTE — DIETARY
Nutrition Services: Nutrition Support Assessment  Day 0 of admit.  Pedro Champion is a 78 y.o. male with admitting DX of Trauma     Current problem list:  1. Respiratory failure following trauma  2. Mandibular fracture, open  3. A-fib  4. Anticoagulant long-term use  5. Depression  6. Trauma  7. Suicidal behavior with attempted self-injury     Assessment:  Estimated Nutritional Needs: based on: Ht: 182.9 cm, Wt : 104 kg via bed scale, IBW: 80.7 kg, BMI: 31.09 - Class I Obesity     Calculation/Equation: REE per MSJ x 1 = 1790 kcal/day (65-70%=0766-2979 kcal/day); RMR per [facility] (vent L/min 10.0, T max 24 hours 37.4) = 2004 kcal/day (65-70%=5090-6687 kcal/day)  Total Calories / day: 1144 - 1456  (Calories / k - 14)  Total Grams Protein / day: 121 - 161  (Grams Protein / kg IBW: 1.5 - 2) (Grams Protein / k.2 - 1.5)  Total Fluids ml / day: 2083.7 ml    Evaluation:   1. Consult received for TF assessment. Pt is intubated and in need of nutrition support.    2. Enteral access: Gastric Cortrak   3. Labs: Na 129, Glucose 169  4. Meds: colace, pepcid, milk of magnesia, lopressor, miralax, pericolace, morphine (prn)  5. Propofol running @ 5.7 mL/hr which provides 150 kcal/day; RD will adjust TF as needed.   6. Fluids: LR infusion @ 125 mL/hr  7. Pt with BMI >30, will hypo-calorically feed per SCCM guidelines.   8. Peptamen Intense VHP appropriate to meet pt's estimated protein needs      Malnutrition Risk: No criteria noted at this time.      Recommendations/Plan:  Start Peptamen Intense VHP @ 25 ml/hr and advance per protocol to 55 ml/hr (goal rate with propofol) to provide 1320 kcal + kcal from propofol (14 kcal/kg), 121 grams protein (1.2 gm/kg, 1.5 gm/kg IBW), and 1109 ml free water per day.   When propofol d/c'd increase TF to final goal rate of 60 ml/hr to provide 1440 kcal (14 kcal/kg), 132 grams protein (1.3 gm/kg, 1.6 gm/kg IBW), and 1210 ml free water per day.  Fluids per MD.        RD following
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
## 2019-08-28 NOTE — ASSESSMENT & PLAN NOTE
Per chart went into afib around 8/26/2019 prior to admission and was started on Eliquis. Took two doses prior to suicide attempt.   Rate 160's on arrival  On Lopressor at home  Amiodarone protocol initiated   8/28 Rebolus and initiate protocol  8/29 Increase Lopressor   - Echocardiogram: EF 20%, biventricular dilatation with significant wall motion abnormalities of the left ventricle  8/30 Continue Lopressor and digoxin  Amiodarone stopped  9/3 Start Eliquis   9/5 Amiodarone restarted.  9/7 Cardiology signed off - continue current medications  9/23 Decrease dig and amiodarone dosing  9/24 Decrease Metoprolol to 100mg TID-amiodarone stopped  9/27 Decreased Metoprolol to 75mg TID  Consider decreasing dose if bradycardia is present  10/13 am meds not given because of NPO for surgery  Atrial fibrillation postop - Digoxin level low: Reloaded and amiodarone  10/15 digoxin level 1.0 / restart anticoagulation  10/16 Increase digoxin to 250 mcg  10/17 DC amiodarone infusion  Ashkan Morrow MD: Cardiology.
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
## 2019-08-28 NOTE — ASSESSMENT & PLAN NOTE
Recently diagnosed with afib and started on Eliquis.  Reversed with K centra on arrival  10/14 restart anticoagulation after surgery
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
## 2019-08-28 NOTE — CARE PLAN
Adult Ventilation Update    Total Vent Days: 1    Patient Lines/Drains/Airways Status      Active Airway       Name: Placement date: Placement time: Site: Days:    Airway ETT Oral 8.0 @ 26  08/28/19   0004   Oral  less than 1                  APV: 22, 450, +8, 30%    No SBT's at this time, pt -160's
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
## 2019-08-28 NOTE — CARE PLAN
Problem: Safety  Goal: Will remain free from injury  Note:   Bed in low locked position room near nurses station.      Problem: Skin Integrity  Goal: Risk for impaired skin integrity will decrease  Note:   Pt medicated per MAR and active MD order

## 2019-08-28 NOTE — ASSESSMENT & PLAN NOTE
Legal 2000 initiated on arrival  Psych hold in place   Patient does not have the capacity to make medical decisions  10/7 Legal hold discontinued by psych in epic, needs signature on actual legal hold document by psych  10/11 Legal hold discontinued   Roselia Mcginnis MD. Psychiatry.
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
## 2019-08-29 ENCOUNTER — APPOINTMENT (OUTPATIENT)
Dept: RADIOLOGY | Facility: MEDICAL CENTER | Age: 79
DRG: 003 | End: 2019-08-29
Attending: NURSE PRACTITIONER
Payer: MEDICARE

## 2019-08-29 ENCOUNTER — APPOINTMENT (OUTPATIENT)
Dept: RADIOLOGY | Facility: MEDICAL CENTER | Age: 79
DRG: 003 | End: 2019-08-29
Attending: SURGERY
Payer: MEDICARE

## 2019-08-29 ENCOUNTER — APPOINTMENT (OUTPATIENT)
Dept: CARDIOLOGY | Facility: MEDICAL CENTER | Age: 79
DRG: 003 | End: 2019-08-29
Attending: NURSE PRACTITIONER
Payer: MEDICARE

## 2019-08-29 PROBLEM — R57.1 HYPOVOLEMIC SHOCK (HCC): Status: ACTIVE | Noted: 2019-08-29

## 2019-08-29 LAB
ACTION RANGE TRIGGERED IACRT: NO
ALBUMIN SERPL BCP-MCNC: 2.5 G/DL (ref 3.2–4.9)
ALBUMIN SERPL BCP-MCNC: 2.7 G/DL (ref 3.2–4.9)
ALBUMIN/GLOB SERPL: 1.6 G/DL
ALBUMIN/GLOB SERPL: 1.6 G/DL
ALP SERPL-CCNC: 58 U/L (ref 30–99)
ALP SERPL-CCNC: 59 U/L (ref 30–99)
ALT SERPL-CCNC: 129 U/L (ref 2–50)
ALT SERPL-CCNC: 57 U/L (ref 2–50)
ANION GAP SERPL CALC-SCNC: 7 MMOL/L (ref 0–11.9)
ANION GAP SERPL CALC-SCNC: 7 MMOL/L (ref 0–11.9)
AST SERPL-CCNC: 105 U/L (ref 12–45)
AST SERPL-CCNC: 35 U/L (ref 12–45)
BASE EXCESS BLDA CALC-SCNC: -5 MMOL/L (ref -4–3)
BASOPHILS # BLD AUTO: 0.2 % (ref 0–1.8)
BASOPHILS # BLD AUTO: 0.3 % (ref 0–1.8)
BASOPHILS # BLD: 0.03 K/UL (ref 0–0.12)
BASOPHILS # BLD: 0.06 K/UL (ref 0–0.12)
BILIRUB SERPL-MCNC: 1.2 MG/DL (ref 0.1–1.5)
BILIRUB SERPL-MCNC: 1.4 MG/DL (ref 0.1–1.5)
BODY TEMPERATURE: ABNORMAL DEGREES
BUN SERPL-MCNC: 13 MG/DL (ref 8–22)
BUN SERPL-MCNC: 16 MG/DL (ref 8–22)
CALCIUM SERPL-MCNC: 6.7 MG/DL (ref 8.5–10.5)
CALCIUM SERPL-MCNC: 7 MG/DL (ref 8.5–10.5)
CFT BLD TEG: 4 MIN (ref 5–10)
CHLORIDE SERPL-SCNC: 101 MMOL/L (ref 96–112)
CHLORIDE SERPL-SCNC: 101 MMOL/L (ref 96–112)
CLOT ANGLE BLD TEG: 67.9 DEGREES (ref 53–72)
CLOT LYSIS 30M P MA LENFR BLD TEG: 0 % (ref 0–8)
CO2 BLDA-SCNC: 19 MMOL/L (ref 20–33)
CO2 SERPL-SCNC: 19 MMOL/L (ref 20–33)
CO2 SERPL-SCNC: 20 MMOL/L (ref 20–33)
CORTIS SERPL-MCNC: 30.3 UG/DL (ref 0–23)
CREAT SERPL-MCNC: 0.76 MG/DL (ref 0.5–1.4)
CREAT SERPL-MCNC: 0.94 MG/DL (ref 0.5–1.4)
CRP SERPL HS-MCNC: 6.26 MG/DL (ref 0–0.75)
CT.EXTRINSIC BLD ROTEM: 1.6 MIN (ref 1–3)
EOSINOPHIL # BLD AUTO: 0 K/UL (ref 0–0.51)
EOSINOPHIL # BLD AUTO: 0.01 K/UL (ref 0–0.51)
EOSINOPHIL NFR BLD: 0 % (ref 0–6.9)
EOSINOPHIL NFR BLD: 0 % (ref 0–6.9)
ERYTHROCYTE [DISTWIDTH] IN BLOOD BY AUTOMATED COUNT: 52.5 FL (ref 35.9–50)
ERYTHROCYTE [DISTWIDTH] IN BLOOD BY AUTOMATED COUNT: 53.1 FL (ref 35.9–50)
GLOBULIN SER CALC-MCNC: 1.6 G/DL (ref 1.9–3.5)
GLOBULIN SER CALC-MCNC: 1.7 G/DL (ref 1.9–3.5)
GLUCOSE SERPL-MCNC: 159 MG/DL (ref 65–99)
GLUCOSE SERPL-MCNC: 189 MG/DL (ref 65–99)
HCO3 BLDA-SCNC: 18.3 MMOL/L (ref 17–25)
HCT VFR BLD AUTO: 31.3 % (ref 42–52)
HCT VFR BLD AUTO: 35.4 % (ref 42–52)
HGB BLD-MCNC: 10.4 G/DL (ref 14–18)
HGB BLD-MCNC: 10.4 G/DL (ref 14–18)
HGB BLD-MCNC: 11.3 G/DL (ref 14–18)
HOROWITZ INDEX BLDA+IHG-RTO: 273 MM[HG]
IMM GRANULOCYTES # BLD AUTO: 0.14 K/UL (ref 0–0.11)
IMM GRANULOCYTES # BLD AUTO: 0.24 K/UL (ref 0–0.11)
IMM GRANULOCYTES NFR BLD AUTO: 0.8 % (ref 0–0.9)
IMM GRANULOCYTES NFR BLD AUTO: 1 % (ref 0–0.9)
INST. QUALIFIED PATIENT IIQPT: YES
LV EJECT FRACT  99904: 20
LV EJECT FRACT MOD 2C 99903: 31.67
LV EJECT FRACT MOD 4C 99902: 28.2
LV EJECT FRACT MOD BP 99901: 29.46
LYMPHOCYTES # BLD AUTO: 1.15 K/UL (ref 1–4.8)
LYMPHOCYTES # BLD AUTO: 1.42 K/UL (ref 1–4.8)
LYMPHOCYTES NFR BLD: 6.1 % (ref 22–41)
LYMPHOCYTES NFR BLD: 6.7 % (ref 22–41)
MAGNESIUM SERPL-MCNC: 1.7 MG/DL (ref 1.5–2.5)
MAGNESIUM SERPL-MCNC: 1.9 MG/DL (ref 1.5–2.5)
MCF BLD TEG: 71.1 MM (ref 50–70)
MCH RBC QN AUTO: 32.3 PG (ref 27–33)
MCH RBC QN AUTO: 33.8 PG (ref 27–33)
MCHC RBC AUTO-ENTMCNC: 31.9 G/DL (ref 33.7–35.3)
MCHC RBC AUTO-ENTMCNC: 33.2 G/DL (ref 33.7–35.3)
MCV RBC AUTO: 101.1 FL (ref 81.4–97.8)
MCV RBC AUTO: 101.6 FL (ref 81.4–97.8)
MONOCYTES # BLD AUTO: 1.44 K/UL (ref 0–0.85)
MONOCYTES # BLD AUTO: 2.3 K/UL (ref 0–0.85)
MONOCYTES NFR BLD AUTO: 8.4 % (ref 0–13.4)
MONOCYTES NFR BLD AUTO: 9.9 % (ref 0–13.4)
NEUTROPHILS # BLD AUTO: 14.3 K/UL (ref 1.82–7.42)
NEUTROPHILS # BLD AUTO: 19.17 K/UL (ref 1.82–7.42)
NEUTROPHILS NFR BLD: 82.7 % (ref 44–72)
NEUTROPHILS NFR BLD: 83.9 % (ref 44–72)
NRBC # BLD AUTO: 0 K/UL
NRBC # BLD AUTO: 0.02 K/UL
NRBC BLD-RTO: 0 /100 WBC
NRBC BLD-RTO: 0.1 /100 WBC
O2/TOTAL GAS SETTING VFR VENT: 30 %
PA AA BLD-ACNC: 2.7 %
PA ADP BLD-ACNC: 95.6 %
PCO2 BLDA: 27.3 MMHG (ref 26–37)
PCO2 TEMP ADJ BLDA: 28.2 MMHG (ref 26–37)
PH BLDA: 7.43 [PH] (ref 7.4–7.5)
PH TEMP ADJ BLDA: 7.42 [PH] (ref 7.4–7.5)
PHOSPHATE SERPL-MCNC: 2.9 MG/DL (ref 2.5–4.5)
PHOSPHATE SERPL-MCNC: 3.1 MG/DL (ref 2.5–4.5)
PLATELET # BLD AUTO: 223 K/UL (ref 164–446)
PLATELET # BLD AUTO: 249 K/UL (ref 164–446)
PMV BLD AUTO: 10.5 FL (ref 9–12.9)
PMV BLD AUTO: 10.5 FL (ref 9–12.9)
PO2 BLDA: 82 MMHG (ref 64–87)
PO2 TEMP ADJ BLDA: 86 MMHG (ref 64–87)
POTASSIUM SERPL-SCNC: 3.5 MMOL/L (ref 3.6–5.5)
POTASSIUM SERPL-SCNC: 4.3 MMOL/L (ref 3.6–5.5)
PREALB SERPL-MCNC: 15 MG/DL (ref 18–38)
PROT SERPL-MCNC: 4.1 G/DL (ref 6–8.2)
PROT SERPL-MCNC: 4.4 G/DL (ref 6–8.2)
RBC # BLD AUTO: 3.08 M/UL (ref 4.7–6.1)
RBC # BLD AUTO: 3.5 M/UL (ref 4.7–6.1)
SAO2 % BLDA: 97 % (ref 93–99)
SODIUM SERPL-SCNC: 127 MMOL/L (ref 135–145)
SODIUM SERPL-SCNC: 128 MMOL/L (ref 135–145)
SPECIMEN DRAWN FROM PATIENT: ABNORMAL
TEG ALGORITHM TGALG: ABNORMAL
TSH SERPL DL<=0.005 MIU/L-ACNC: 5.65 UIU/ML (ref 0.38–5.33)
WBC # BLD AUTO: 17.1 K/UL (ref 4.8–10.8)
WBC # BLD AUTO: 23.2 K/UL (ref 4.8–10.8)

## 2019-08-29 PROCEDURE — 99233 SBSQ HOSP IP/OBS HIGH 50: CPT | Performed by: INTERNAL MEDICINE

## 2019-08-29 PROCEDURE — A9270 NON-COVERED ITEM OR SERVICE: HCPCS | Performed by: SURGERY

## 2019-08-29 PROCEDURE — 85347 COAGULATION TIME ACTIVATED: CPT

## 2019-08-29 PROCEDURE — 71045 X-RAY EXAM CHEST 1 VIEW: CPT

## 2019-08-29 PROCEDURE — 0B113F4 BYPASS TRACHEA TO CUTANEOUS WITH TRACHEOSTOMY DEVICE, PERCUTANEOUS APPROACH: ICD-10-PCS | Performed by: SURGERY

## 2019-08-29 PROCEDURE — 85025 COMPLETE CBC W/AUTO DIFF WBC: CPT | Mod: 91

## 2019-08-29 PROCEDURE — 700111 HCHG RX REV CODE 636 W/ 250 OVERRIDE (IP): Performed by: NURSE PRACTITIONER

## 2019-08-29 PROCEDURE — 36620 INSERTION CATHETER ARTERY: CPT | Performed by: SURGERY

## 2019-08-29 PROCEDURE — 99152 MOD SED SAME PHYS/QHP 5/>YRS: CPT

## 2019-08-29 PROCEDURE — 84134 ASSAY OF PREALBUMIN: CPT

## 2019-08-29 PROCEDURE — C1751 CATH, INF, PER/CENT/MIDLINE: HCPCS

## 2019-08-29 PROCEDURE — B548ZZA ULTRASONOGRAPHY OF SUPERIOR VENA CAVA, GUIDANCE: ICD-10-PCS | Performed by: SURGERY

## 2019-08-29 PROCEDURE — 93306 TTE W/DOPPLER COMPLETE: CPT | Mod: 26 | Performed by: INTERNAL MEDICINE

## 2019-08-29 PROCEDURE — 85576 BLOOD PLATELET AGGREGATION: CPT

## 2019-08-29 PROCEDURE — 700101 HCHG RX REV CODE 250: Performed by: SURGERY

## 2019-08-29 PROCEDURE — 700102 HCHG RX REV CODE 250 W/ 637 OVERRIDE(OP): Performed by: SURGERY

## 2019-08-29 PROCEDURE — 84443 ASSAY THYROID STIM HORMONE: CPT

## 2019-08-29 PROCEDURE — A9270 NON-COVERED ITEM OR SERVICE: HCPCS | Performed by: NURSE PRACTITIONER

## 2019-08-29 PROCEDURE — 02HV33Z INSERTION OF INFUSION DEVICE INTO SUPERIOR VENA CAVA, PERCUTANEOUS APPROACH: ICD-10-PCS | Performed by: SURGERY

## 2019-08-29 PROCEDURE — 82533 TOTAL CORTISOL: CPT

## 2019-08-29 PROCEDURE — 86140 C-REACTIVE PROTEIN: CPT

## 2019-08-29 PROCEDURE — 700105 HCHG RX REV CODE 258: Performed by: SURGERY

## 2019-08-29 PROCEDURE — 99153 MOD SED SAME PHYS/QHP EA: CPT

## 2019-08-29 PROCEDURE — 82803 BLOOD GASES ANY COMBINATION: CPT

## 2019-08-29 PROCEDURE — 700101 HCHG RX REV CODE 250

## 2019-08-29 PROCEDURE — 94003 VENT MGMT INPAT SUBQ DAY: CPT

## 2019-08-29 PROCEDURE — 36600 WITHDRAWAL OF ARTERIAL BLOOD: CPT

## 2019-08-29 PROCEDURE — 84100 ASSAY OF PHOSPHORUS: CPT

## 2019-08-29 PROCEDURE — 700117 HCHG RX CONTRAST REV CODE 255: Performed by: NURSE PRACTITIONER

## 2019-08-29 PROCEDURE — 99291 CRITICAL CARE FIRST HOUR: CPT | Mod: 25 | Performed by: SURGERY

## 2019-08-29 PROCEDURE — 31600 PLANNED TRACHEOSTOMY: CPT | Performed by: SURGERY

## 2019-08-29 PROCEDURE — 700111 HCHG RX REV CODE 636 W/ 250 OVERRIDE (IP): Performed by: SURGERY

## 2019-08-29 PROCEDURE — 700112 HCHG RX REV CODE 229: Performed by: NURSE PRACTITIONER

## 2019-08-29 PROCEDURE — 85018 HEMOGLOBIN: CPT

## 2019-08-29 PROCEDURE — 80053 COMPREHEN METABOLIC PANEL: CPT

## 2019-08-29 PROCEDURE — 85384 FIBRINOGEN ACTIVITY: CPT

## 2019-08-29 PROCEDURE — 770022 HCHG ROOM/CARE - ICU (200)

## 2019-08-29 PROCEDURE — 36556 INSERT NON-TUNNEL CV CATH: CPT | Performed by: SURGERY

## 2019-08-29 PROCEDURE — 36556 INSERT NON-TUNNEL CV CATH: CPT

## 2019-08-29 PROCEDURE — 302977 HCHG BRONCHOSCOPY PROC-THERAPEUTIC

## 2019-08-29 PROCEDURE — 304255 HCHG KIT,PERC TRACHE

## 2019-08-29 PROCEDURE — 31622 DX BRONCHOSCOPE/WASH: CPT | Performed by: SURGERY

## 2019-08-29 PROCEDURE — 83735 ASSAY OF MAGNESIUM: CPT | Mod: 91

## 2019-08-29 PROCEDURE — 31600 PLANNED TRACHEOSTOMY: CPT

## 2019-08-29 PROCEDURE — 700102 HCHG RX REV CODE 250 W/ 637 OVERRIDE(OP): Performed by: NURSE PRACTITIONER

## 2019-08-29 PROCEDURE — 700111 HCHG RX REV CODE 636 W/ 250 OVERRIDE (IP)

## 2019-08-29 PROCEDURE — 51798 US URINE CAPACITY MEASURE: CPT

## 2019-08-29 PROCEDURE — 03HY32Z INSERTION OF MONITORING DEVICE INTO UPPER ARTERY, PERCUTANEOUS APPROACH: ICD-10-PCS | Performed by: SURGERY

## 2019-08-29 PROCEDURE — 700102 HCHG RX REV CODE 250 W/ 637 OVERRIDE(OP): Performed by: INTERNAL MEDICINE

## 2019-08-29 PROCEDURE — 93970 EXTREMITY STUDY: CPT

## 2019-08-29 PROCEDURE — 36620 INSERTION CATHETER ARTERY: CPT

## 2019-08-29 PROCEDURE — A9270 NON-COVERED ITEM OR SERVICE: HCPCS | Performed by: INTERNAL MEDICINE

## 2019-08-29 PROCEDURE — 93306 TTE W/DOPPLER COMPLETE: CPT

## 2019-08-29 PROCEDURE — 700111 HCHG RX REV CODE 636 W/ 250 OVERRIDE (IP): Performed by: EMERGENCY MEDICINE

## 2019-08-29 RX ORDER — PHENYLEPHRINE HCL IN 0.9% NACL 0.5 MG/5ML
100-200 SYRINGE (ML) INTRAVENOUS
Status: DISCONTINUED | OUTPATIENT
Start: 2019-08-29 | End: 2019-09-02

## 2019-08-29 RX ORDER — ROCURONIUM BROMIDE 10 MG/ML
50 INJECTION, SOLUTION INTRAVENOUS ONCE
Status: COMPLETED | OUTPATIENT
Start: 2019-08-29 | End: 2019-08-29

## 2019-08-29 RX ORDER — DIGOXIN 125 MCG
125 TABLET ORAL DAILY
Status: DISCONTINUED | OUTPATIENT
Start: 2019-08-30 | End: 2019-08-30

## 2019-08-29 RX ORDER — NOREPINEPHRINE BITARTRATE 1 MG/ML
INJECTION, SOLUTION INTRAVENOUS
Status: COMPLETED
Start: 2019-08-29 | End: 2019-08-29

## 2019-08-29 RX ORDER — MAGNESIUM SULFATE HEPTAHYDRATE 40 MG/ML
2 INJECTION, SOLUTION INTRAVENOUS ONCE
Status: COMPLETED | OUTPATIENT
Start: 2019-08-29 | End: 2019-08-29

## 2019-08-29 RX ORDER — METOPROLOL TARTRATE 50 MG/1
50 TABLET, FILM COATED ORAL TWICE DAILY
Status: DISCONTINUED | OUTPATIENT
Start: 2019-08-29 | End: 2019-08-29

## 2019-08-29 RX ORDER — DIGOXIN 0.25 MG/ML
250 INJECTION INTRAMUSCULAR; INTRAVENOUS EVERY 6 HOURS
Status: COMPLETED | OUTPATIENT
Start: 2019-08-29 | End: 2019-08-30

## 2019-08-29 RX ORDER — OXYCODONE HYDROCHLORIDE 5 MG/1
5-10 TABLET ORAL EVERY 4 HOURS PRN
Status: DISCONTINUED | OUTPATIENT
Start: 2019-08-29 | End: 2019-09-03

## 2019-08-29 RX ORDER — CLINDAMYCIN PHOSPHATE 10 UG/ML
1 LOTION TOPICAL 2 TIMES DAILY
Status: ON HOLD | COMMUNITY
End: 2019-10-18

## 2019-08-29 RX ADMIN — SENNOSIDES, DOCUSATE SODIUM 1 TABLET: 50; 8.6 TABLET, FILM COATED ORAL at 22:47

## 2019-08-29 RX ADMIN — DOCUSATE SODIUM 100 MG: 50 LIQUID ORAL at 05:26

## 2019-08-29 RX ADMIN — AMPICILLIN AND SULBACTAM 3 G: 2; 1 INJECTION, POWDER, FOR SOLUTION INTRAVENOUS at 06:37

## 2019-08-29 RX ADMIN — NOREPINEPHRINE BITARTRATE 5 MCG/MIN: 1 INJECTION INTRAVENOUS at 14:37

## 2019-08-29 RX ADMIN — DIGOXIN 250 MCG: 0.25 INJECTION INTRAMUSCULAR; INTRAVENOUS at 22:42

## 2019-08-29 RX ADMIN — FAMOTIDINE 20 MG: 20 TABLET ORAL at 05:26

## 2019-08-29 RX ADMIN — NOREPINEPHRINE BITARTRATE 8 MG: 1 INJECTION INTRAVENOUS at 12:20

## 2019-08-29 RX ADMIN — METOPROLOL TARTRATE 25 MG: 25 TABLET, FILM COATED ORAL at 19:06

## 2019-08-29 RX ADMIN — AMPICILLIN AND SULBACTAM 3 G: 2; 1 INJECTION, POWDER, FOR SOLUTION INTRAVENOUS at 23:56

## 2019-08-29 RX ADMIN — AMIODARONE HYDROCHLORIDE 1 MG/MIN: 50 INJECTION, SOLUTION INTRAVENOUS at 12:40

## 2019-08-29 RX ADMIN — AMIODARONE HYDROCHLORIDE 1 MG/MIN: 50 INJECTION, SOLUTION INTRAVENOUS at 04:20

## 2019-08-29 RX ADMIN — PROPOFOL 10 MCG/KG/MIN: 10 INJECTION, EMULSION INTRAVENOUS at 11:14

## 2019-08-29 RX ADMIN — AMIODARONE HYDROCHLORIDE 0.5 MG/MIN: 50 INJECTION, SOLUTION INTRAVENOUS at 23:55

## 2019-08-29 RX ADMIN — AMPICILLIN AND SULBACTAM 3 G: 2; 1 INJECTION, POWDER, FOR SOLUTION INTRAVENOUS at 19:06

## 2019-08-29 RX ADMIN — MORPHINE SULFATE 4 MG: 10 INJECTION INTRAVENOUS at 04:39

## 2019-08-29 RX ADMIN — DIGOXIN 250 MCG: 0.25 INJECTION INTRAMUSCULAR; INTRAVENOUS at 14:47

## 2019-08-29 RX ADMIN — HUMAN ALBUMIN MICROSPHERES AND PERFLUTREN 3 ML: 10; .22 INJECTION, SOLUTION INTRAVENOUS at 11:30

## 2019-08-29 RX ADMIN — MORPHINE SULFATE 4 MG: 10 INJECTION INTRAVENOUS at 12:46

## 2019-08-29 RX ADMIN — FAMOTIDINE 20 MG: 20 TABLET ORAL at 19:06

## 2019-08-29 RX ADMIN — MORPHINE SULFATE 4 MG: 10 INJECTION INTRAVENOUS at 19:04

## 2019-08-29 RX ADMIN — DOCUSATE SODIUM 100 MG: 50 LIQUID ORAL at 19:06

## 2019-08-29 RX ADMIN — FENTANYL CITRATE 100 MCG: 50 INJECTION, SOLUTION INTRAMUSCULAR; INTRAVENOUS at 11:46

## 2019-08-29 RX ADMIN — MAGNESIUM SULFATE IN WATER 2 G: 40 INJECTION, SOLUTION INTRAVENOUS at 11:04

## 2019-08-29 RX ADMIN — POLYETHYLENE GLYCOL 3350 1 PACKET: 17 POWDER, FOR SOLUTION ORAL at 05:26

## 2019-08-29 RX ADMIN — ROCURONIUM BROMIDE 50 MG: 10 INJECTION INTRAVENOUS at 11:46

## 2019-08-29 RX ADMIN — AMPICILLIN AND SULBACTAM 3 G: 2; 1 INJECTION, POWDER, FOR SOLUTION INTRAVENOUS at 14:45

## 2019-08-29 RX ADMIN — POLYETHYLENE GLYCOL 3350 1 PACKET: 17 POWDER, FOR SOLUTION ORAL at 19:06

## 2019-08-29 RX ADMIN — POTASSIUM BICARBONATE 50 MEQ: 978 TABLET, EFFERVESCENT ORAL at 09:14

## 2019-08-29 RX ADMIN — METOPROLOL TARTRATE 25 MG: 25 TABLET, FILM COATED ORAL at 05:26

## 2019-08-29 RX ADMIN — OXYCODONE HYDROCHLORIDE 5 MG: 5 TABLET ORAL at 09:14
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
## 2019-08-29 NOTE — CARE PLAN
Problem: Psychosocial needs  Goal: Anxiety reduction  Outcome: PROGRESSING AS EXPECTED  Note:   Pt educated on all interventions and reoriented to situation frequently throughout shift.     Problem: Mechanical Ventilation  Goal: Safe management of artificial airway and ventilation  Intervention: Daily awakening trials, Daily assessment of readiness, oral care, DVT/VTE Prophylaxis, Peptic Ulcer Prophylaxis, Head of bed elevated  Note:   VAP bundle in place. Q2h suctioning and oral care provided. Continuous pulse ox monitoring in place. Collaborating with MD and RT for vent management.
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
## 2019-08-29 NOTE — CARE PLAN
Problem: Safety  Goal: Will remain free from injury  Note:   Bed in low locked position room near nurses station      Problem: Pain Management  Goal: Pain level will decrease to patient's comfort goal  Note:   Pt medicated per MAR and active MD order

## 2019-08-29 NOTE — CARE PLAN
Adult Ventilation Update    Total Vent Days: 2    Patient Lines/Drains/Airways Status      Active Airway       Name: Placement date: Placement time: Site: Days:    Airway ETT Oral 8.0  08/28/19   0004   Oral  1                    In the last 24 hours, the patient tolerated SBT for 0 hours.             Plateau Pressure (Q Shift): 16 (08/28/19 1922)  Static Compliance (ml / cm H2O): 48 (08/29/19 0230)    Patient failed trials because of Barriers to Wean: Other (Comments)(Pt unstable, -160) (08/28/19 1440)  Barriers to SBT    Length of Weaning Trial        Sputum/Suction   Cough: Non Productive (08/29/19 0230)  Sputum Amount: Small (08/29/19 0000)  Sputum Color: Bloody (08/29/19 0000)  Sputum Consistency: Thin (08/29/19 0000)    Mobility  Level of Mobility: Level IV (08/28/19 0800)  Activity Performed: Unable to mobilize (08/28/19 2000)  Reason Not Mobilized: Unstable condition(afib rvr, active bleeding) (08/28/19 2000)  Mobilization Comments: HR up to 160s at rest, afib RVR (08/28/19 0800)    Events/Summary/Plan: vent check (08/28/19 1440)
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
## 2019-08-29 NOTE — PROGRESS NOTES
Dr. Torres at bedside for trach and central line. Time out performed. Consents signed and on chart. See MAR for medications and flowsheet for vitals for these procedures.
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
## 2019-08-29 NOTE — PROGRESS NOTES
Cortrak unable to flush. Unable to retrace with wire. Pulled back to 70cm, able to thread wire, and new xray ordered.

## 2019-08-30 ENCOUNTER — APPOINTMENT (OUTPATIENT)
Dept: RADIOLOGY | Facility: MEDICAL CENTER | Age: 79
DRG: 003 | End: 2019-08-30
Attending: NURSE PRACTITIONER
Payer: MEDICARE

## 2019-08-30 PROBLEM — I10 BENIGN HYPERTENSION: Status: ACTIVE | Noted: 2019-08-30

## 2019-08-30 LAB
ALBUMIN SERPL BCP-MCNC: 2.6 G/DL (ref 3.2–4.9)
ALBUMIN/GLOB SERPL: 1.6 G/DL
ALP SERPL-CCNC: 68 U/L (ref 30–99)
ALT SERPL-CCNC: 332 U/L (ref 2–50)
ANION GAP SERPL CALC-SCNC: 7 MMOL/L (ref 0–11.9)
AST SERPL-CCNC: 230 U/L (ref 12–45)
BASOPHILS # BLD AUTO: 0.2 % (ref 0–1.8)
BASOPHILS # BLD: 0.03 K/UL (ref 0–0.12)
BILIRUB SERPL-MCNC: 1.1 MG/DL (ref 0.1–1.5)
BUN SERPL-MCNC: 15 MG/DL (ref 8–22)
CALCIUM SERPL-MCNC: 7 MG/DL (ref 8.5–10.5)
CHLORIDE SERPL-SCNC: 103 MMOL/L (ref 96–112)
CO2 SERPL-SCNC: 20 MMOL/L (ref 20–33)
CREAT SERPL-MCNC: 0.72 MG/DL (ref 0.5–1.4)
EOSINOPHIL # BLD AUTO: 0.01 K/UL (ref 0–0.51)
EOSINOPHIL NFR BLD: 0.1 % (ref 0–6.9)
ERYTHROCYTE [DISTWIDTH] IN BLOOD BY AUTOMATED COUNT: 51.9 FL (ref 35.9–50)
GLOBULIN SER CALC-MCNC: 1.6 G/DL (ref 1.9–3.5)
GLUCOSE SERPL-MCNC: 141 MG/DL (ref 65–99)
HCT VFR BLD AUTO: 32.3 % (ref 42–52)
HGB BLD-MCNC: 10.7 G/DL (ref 14–18)
IMM GRANULOCYTES # BLD AUTO: 0.23 K/UL (ref 0–0.11)
IMM GRANULOCYTES NFR BLD AUTO: 1.2 % (ref 0–0.9)
LYMPHOCYTES # BLD AUTO: 1.49 K/UL (ref 1–4.8)
LYMPHOCYTES NFR BLD: 7.9 % (ref 22–41)
MAGNESIUM SERPL-MCNC: 2.1 MG/DL (ref 1.5–2.5)
MCH RBC QN AUTO: 33 PG (ref 27–33)
MCHC RBC AUTO-ENTMCNC: 33.1 G/DL (ref 33.7–35.3)
MCV RBC AUTO: 99.7 FL (ref 81.4–97.8)
MONOCYTES # BLD AUTO: 1.18 K/UL (ref 0–0.85)
MONOCYTES NFR BLD AUTO: 6.2 % (ref 0–13.4)
NEUTROPHILS # BLD AUTO: 15.98 K/UL (ref 1.82–7.42)
NEUTROPHILS NFR BLD: 84.4 % (ref 44–72)
NRBC # BLD AUTO: 0.02 K/UL
NRBC BLD-RTO: 0.1 /100 WBC
PLATELET # BLD AUTO: 183 K/UL (ref 164–446)
PMV BLD AUTO: 10.4 FL (ref 9–12.9)
POTASSIUM SERPL-SCNC: 4 MMOL/L (ref 3.6–5.5)
PROT SERPL-MCNC: 4.2 G/DL (ref 6–8.2)
RBC # BLD AUTO: 3.24 M/UL (ref 4.7–6.1)
SODIUM SERPL-SCNC: 130 MMOL/L (ref 135–145)
TRIGL SERPL-MCNC: 79 MG/DL (ref 0–149)
WBC # BLD AUTO: 18.9 K/UL (ref 4.8–10.8)

## 2019-08-30 PROCEDURE — 700111 HCHG RX REV CODE 636 W/ 250 OVERRIDE (IP): Performed by: EMERGENCY MEDICINE

## 2019-08-30 PROCEDURE — 80053 COMPREHEN METABOLIC PANEL: CPT

## 2019-08-30 PROCEDURE — A9270 NON-COVERED ITEM OR SERVICE: HCPCS | Performed by: NURSE PRACTITIONER

## 2019-08-30 PROCEDURE — 700105 HCHG RX REV CODE 258: Performed by: SURGERY

## 2019-08-30 PROCEDURE — 700102 HCHG RX REV CODE 250 W/ 637 OVERRIDE(OP): Performed by: NURSE PRACTITIONER

## 2019-08-30 PROCEDURE — A9270 NON-COVERED ITEM OR SERVICE: HCPCS | Performed by: SURGERY

## 2019-08-30 PROCEDURE — A9270 NON-COVERED ITEM OR SERVICE: HCPCS | Performed by: INTERNAL MEDICINE

## 2019-08-30 PROCEDURE — 700111 HCHG RX REV CODE 636 W/ 250 OVERRIDE (IP): Performed by: SURGERY

## 2019-08-30 PROCEDURE — 700111 HCHG RX REV CODE 636 W/ 250 OVERRIDE (IP): Performed by: NURSE PRACTITIONER

## 2019-08-30 PROCEDURE — 700102 HCHG RX REV CODE 250 W/ 637 OVERRIDE(OP): Performed by: INTERNAL MEDICINE

## 2019-08-30 PROCEDURE — 84478 ASSAY OF TRIGLYCERIDES: CPT

## 2019-08-30 PROCEDURE — 94003 VENT MGMT INPAT SUBQ DAY: CPT

## 2019-08-30 PROCEDURE — 83735 ASSAY OF MAGNESIUM: CPT

## 2019-08-30 PROCEDURE — 770022 HCHG ROOM/CARE - ICU (200)

## 2019-08-30 PROCEDURE — 71045 X-RAY EXAM CHEST 1 VIEW: CPT

## 2019-08-30 PROCEDURE — 700112 HCHG RX REV CODE 229: Performed by: NURSE PRACTITIONER

## 2019-08-30 PROCEDURE — 85025 COMPLETE CBC W/AUTO DIFF WBC: CPT

## 2019-08-30 PROCEDURE — 700102 HCHG RX REV CODE 250 W/ 637 OVERRIDE(OP): Performed by: SURGERY

## 2019-08-30 PROCEDURE — 99291 CRITICAL CARE FIRST HOUR: CPT | Performed by: SURGERY

## 2019-08-30 RX ORDER — HYDRALAZINE HYDROCHLORIDE 20 MG/ML
20 INJECTION INTRAMUSCULAR; INTRAVENOUS EVERY 6 HOURS PRN
Status: DISCONTINUED | OUTPATIENT
Start: 2019-08-30 | End: 2019-09-14

## 2019-08-30 RX ORDER — ENALAPRILAT 1.25 MG/ML
1.25 INJECTION INTRAVENOUS EVERY 6 HOURS PRN
Status: DISCONTINUED | OUTPATIENT
Start: 2019-08-30 | End: 2019-09-14

## 2019-08-30 RX ORDER — DIGOXIN 125 MCG
125 TABLET ORAL DAILY
Status: DISCONTINUED | OUTPATIENT
Start: 2019-08-30 | End: 2019-09-02

## 2019-08-30 RX ADMIN — FAMOTIDINE 20 MG: 20 TABLET ORAL at 05:45

## 2019-08-30 RX ADMIN — METOPROLOL TARTRATE 25 MG: 25 TABLET, FILM COATED ORAL at 05:45

## 2019-08-30 RX ADMIN — MAGNESIUM HYDROXIDE 30 ML: 400 SUSPENSION ORAL at 05:45

## 2019-08-30 RX ADMIN — MORPHINE SULFATE 4 MG: 10 INJECTION INTRAVENOUS at 22:02

## 2019-08-30 RX ADMIN — METOPROLOL TARTRATE 25 MG: 25 TABLET, FILM COATED ORAL at 18:02

## 2019-08-30 RX ADMIN — PROPOFOL 20 MCG/KG/MIN: 10 INJECTION, EMULSION INTRAVENOUS at 13:30

## 2019-08-30 RX ADMIN — DOCUSATE SODIUM 100 MG: 50 LIQUID ORAL at 05:45

## 2019-08-30 RX ADMIN — OXYCODONE HYDROCHLORIDE 5 MG: 5 TABLET ORAL at 03:20

## 2019-08-30 RX ADMIN — OXYCODONE HYDROCHLORIDE 5 MG: 5 TABLET ORAL at 19:57

## 2019-08-30 RX ADMIN — POLYETHYLENE GLYCOL 3350 1 PACKET: 17 POWDER, FOR SOLUTION ORAL at 05:45

## 2019-08-30 RX ADMIN — PROPOFOL 20 MCG/KG/MIN: 10 INJECTION, EMULSION INTRAVENOUS at 03:21

## 2019-08-30 RX ADMIN — AMPICILLIN AND SULBACTAM 3 G: 2; 1 INJECTION, POWDER, FOR SOLUTION INTRAVENOUS at 05:43

## 2019-08-30 RX ADMIN — CALCIUM GLUCONATE 1 G: 98 INJECTION, SOLUTION INTRAVENOUS at 20:32

## 2019-08-30 RX ADMIN — SODIUM CHLORIDE: 9 INJECTION, SOLUTION INTRAVENOUS at 22:32

## 2019-08-30 RX ADMIN — POLYETHYLENE GLYCOL 3350 1 PACKET: 17 POWDER, FOR SOLUTION ORAL at 18:02

## 2019-08-30 RX ADMIN — PROPOFOL 30 MCG/KG/MIN: 10 INJECTION, EMULSION INTRAVENOUS at 22:27

## 2019-08-30 RX ADMIN — DIGOXIN 250 MCG: 0.25 INJECTION INTRAMUSCULAR; INTRAVENOUS at 05:50

## 2019-08-30 RX ADMIN — SENNOSIDES, DOCUSATE SODIUM 1 TABLET: 50; 8.6 TABLET, FILM COATED ORAL at 21:34

## 2019-08-30 RX ADMIN — FAMOTIDINE 20 MG: 20 TABLET ORAL at 18:02

## 2019-08-30 RX ADMIN — DIGOXIN 125 MCG: 125 TABLET ORAL at 18:02

## 2019-08-30 RX ADMIN — DIGOXIN 250 MCG: 0.25 INJECTION INTRAMUSCULAR; INTRAVENOUS at 11:45

## 2019-08-30 RX ADMIN — SODIUM CHLORIDE: 9 INJECTION, SOLUTION INTRAVENOUS at 05:41

## 2019-08-30 RX ADMIN — DOCUSATE SODIUM 100 MG: 50 LIQUID ORAL at 18:02

## 2019-08-30 RX ADMIN — AMPICILLIN AND SULBACTAM 3 G: 2; 1 INJECTION, POWDER, FOR SOLUTION INTRAVENOUS at 11:30

## 2019-08-30 RX ADMIN — AMPICILLIN AND SULBACTAM 3 G: 2; 1 INJECTION, POWDER, FOR SOLUTION INTRAVENOUS at 18:00

## 2019-08-30 RX ADMIN — OXYCODONE HYDROCHLORIDE 5 MG: 5 TABLET ORAL at 21:34

## 2019-08-30 NOTE — PROCEDURES
Arterial Line Procedure    Date of operation: 8/29/2019    Preoperative diagnosis: acute respiratory failure (518.81)    Postoperative diagnosis: acute respiratory failure (518.81)    Operation performed: insertion of right radial artery catheter.    Surgeon: Dr. Torres    Anesthesia: local anesthesia    Indications: The patient is a 78 y.o. male with acute respiratory failure (518.81). An arterial line is placed in the SICU    Procedure: Following informed consent, the patient was properly identified and optimally positioned in bed. An Zach test was employed to ensure collateral circulation via the ulnar artery to the hand. The ventral surface of the forearm was exposed. The wrist was dorsiflexed and secured. The wrist was prepped and draped in a sterile manner.  The radial artery was canulated percutaneously. A wire was advanced without resistance. The arterial catheter was advanced over the wire using sterile Selldinger technique and connected to an invasive hemodynamic monitoring line. A satisfactory arterial waveform was observed. The catheter was secured to the skin with a slik suture. A sterile dressing was applied.  The patient tolerated the procedure well. There were no apparent immediate complications.

## 2019-08-30 NOTE — PROGRESS NOTES
Cardiology APRN at bedside to assess patient. Spoke to Dr. Torres regarding multimodal therapy concerning afib. Awaiting new orders from cardiology APRN.

## 2019-08-30 NOTE — CARE PLAN
Problem: Nutritional:  Goal: Nutrition support tolerated and meeting greater than 85% of estimated needs  Outcome: PROGRESSING AS EXPECTED  TF @ goal with propofol

## 2019-08-30 NOTE — CARE PLAN
Adult Ventilation Update    Total Vent Days: 3    Patient Lines/Drains/Airways Status      Active Airway       Name: Placement date: Placement time: Site: Days:    Airway Trach 8.0  08/29/19   1206   --  2                    Barriers to Wean: FiO2 >60% or PEEP >10 CM H2O (08/29/19 1424)    Cough: Productive (08/30/19 0215)  Sputum Amount: Small (08/30/19 0215)  Sputum Color: Bloody;Clear (08/30/19 0215)  Sputum Consistency: Thin;Thick (08/30/19 0215)    Mobility  Level of Mobility: Level IV (08/28/19 0800)  Activity Performed: Unable to mobilize (08/29/19 2000)  Reason Not Mobilized: Unstable condition (08/29/19 2000)

## 2019-08-30 NOTE — ASSESSMENT & PLAN NOTE
Significant hypotension with oliguria.  Fluid resuscitation with high CVP  Given biventricular failure, augment resuscitation with vasopressors  Norepinephrine started to keep map greater than 65  8/30 off vasopressors since 0100  Labs normalizing  CVP more appropriate: 15-18  resolved

## 2019-08-30 NOTE — PROCEDURES
"Central line Op Note    Date of operation: 8/29/2019    Preoperative diagnosis: acute respiratory failure (518.81)    Postoperative diagnosis: acute respiratory failure (518.81)    Operation performed: Insertion of right subclavian triple lumen catheter placement.    Surgeon: Dr. Torres    Anesthesia: local anesthesia    Indications: The patient is a 78 y.o. male. Placement of a central line was performed in the SICU    Procedure: Following informed consent, the patient was properly identified and optimally positioned in bed. Maximal barrier precautions were employed. A\" time out\" was initiated. The correct patient, procedure, and operative site was verified. The patient's allergy status was assessed. Procedural modifications were made as required.    The right chest wall was prepped with chlorhexidine and draped into a sterile field  The patient was placed in a shallow Trendelenburg position. The subclavian vein was accessed percutaneously and a wire advanced without resistance. A previously flushed triple lumen catheter was passed using sterile Selldinger technique and secured to the skin with silk sutures. All of the ports flushed and aspirated freely. The site was cleaned. An antibacterial disk was placed about the catheter exit site and dressed with a transparent sterile occlusive dressing.    The patient tolerated the procedure well. There were no apparent immediate complications. A stat portable chest radiograph was ordered.    "
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
## 2019-08-30 NOTE — CARE PLAN
Problem: Skin Integrity  Goal: Risk for impaired skin integrity will decrease  Outcome: PROGRESSING AS EXPECTED  Note:   2 Rn skin check complete, bertrand skin scale assessed, all appropriate skin interventions in place.      Problem: Safety - Medical Restraint  Goal: Remains free of injury from restraints (Restraint for Interference with Medical Device)  Description  INTERVENTIONS:  1. Determine that other, less restrictive measures have been tried or would not be effective before applying the restraint  2. Evaluate the patient's condition at the time of restraint application  3. Inform patient/family regarding the reason for restraint  4. Q2H: Monitor safety, psychosocial status, comfort, nutrition and hydration  Outcome: PROGRESSING AS EXPECTED  Note:   Pt in soft wrist restraints, necessity of restraints continuously reviewed, pt reoriented to reasoning, reinforcement needed. Restraints removed q2 hrs, range of motion performed, no signs of injury.

## 2019-08-31 ENCOUNTER — APPOINTMENT (OUTPATIENT)
Dept: RADIOLOGY | Facility: MEDICAL CENTER | Age: 79
DRG: 003 | End: 2019-08-31
Attending: NURSE PRACTITIONER
Payer: MEDICARE

## 2019-08-31 ENCOUNTER — ANESTHESIA EVENT (OUTPATIENT)
Dept: SURGERY | Facility: MEDICAL CENTER | Age: 79
DRG: 003 | End: 2019-08-31
Payer: MEDICARE

## 2019-08-31 ENCOUNTER — ANESTHESIA (OUTPATIENT)
Dept: SURGERY | Facility: MEDICAL CENTER | Age: 79
DRG: 003 | End: 2019-08-31
Payer: MEDICARE

## 2019-08-31 LAB
ANION GAP SERPL CALC-SCNC: 5 MMOL/L (ref 0–11.9)
BASOPHILS # BLD AUTO: 0.2 % (ref 0–1.8)
BASOPHILS # BLD: 0.03 K/UL (ref 0–0.12)
BUN SERPL-MCNC: 12 MG/DL (ref 8–22)
CALCIUM SERPL-MCNC: 7.5 MG/DL (ref 8.5–10.5)
CHLORIDE SERPL-SCNC: 105 MMOL/L (ref 96–112)
CO2 SERPL-SCNC: 25 MMOL/L (ref 20–33)
CREAT SERPL-MCNC: 0.64 MG/DL (ref 0.5–1.4)
EOSINOPHIL # BLD AUTO: 0.03 K/UL (ref 0–0.51)
EOSINOPHIL NFR BLD: 0.2 % (ref 0–6.9)
ERYTHROCYTE [DISTWIDTH] IN BLOOD BY AUTOMATED COUNT: 52.8 FL (ref 35.9–50)
GLUCOSE SERPL-MCNC: 95 MG/DL (ref 65–99)
HCT VFR BLD AUTO: 32.5 % (ref 42–52)
HGB BLD-MCNC: 10.5 G/DL (ref 14–18)
IMM GRANULOCYTES # BLD AUTO: 0.15 K/UL (ref 0–0.11)
IMM GRANULOCYTES NFR BLD AUTO: 1.2 % (ref 0–0.9)
LYMPHOCYTES # BLD AUTO: 0.94 K/UL (ref 1–4.8)
LYMPHOCYTES NFR BLD: 7.6 % (ref 22–41)
MCH RBC QN AUTO: 33 PG (ref 27–33)
MCHC RBC AUTO-ENTMCNC: 32.3 G/DL (ref 33.7–35.3)
MCV RBC AUTO: 102.2 FL (ref 81.4–97.8)
MONOCYTES # BLD AUTO: 0.59 K/UL (ref 0–0.85)
MONOCYTES NFR BLD AUTO: 4.7 % (ref 0–13.4)
NEUTROPHILS # BLD AUTO: 10.7 K/UL (ref 1.82–7.42)
NEUTROPHILS NFR BLD: 86.1 % (ref 44–72)
NRBC # BLD AUTO: 0.02 K/UL
NRBC BLD-RTO: 0.2 /100 WBC
PLATELET # BLD AUTO: 160 K/UL (ref 164–446)
PMV BLD AUTO: 10.1 FL (ref 9–12.9)
POTASSIUM SERPL-SCNC: 3.4 MMOL/L (ref 3.6–5.5)
RBC # BLD AUTO: 3.18 M/UL (ref 4.7–6.1)
SODIUM SERPL-SCNC: 135 MMOL/L (ref 135–145)
WBC # BLD AUTO: 12.4 K/UL (ref 4.8–10.8)

## 2019-08-31 PROCEDURE — 700102 HCHG RX REV CODE 250 W/ 637 OVERRIDE(OP): Performed by: SURGERY

## 2019-08-31 PROCEDURE — 770022 HCHG ROOM/CARE - ICU (200)

## 2019-08-31 PROCEDURE — 71045 X-RAY EXAM CHEST 1 VIEW: CPT

## 2019-08-31 PROCEDURE — 700111 HCHG RX REV CODE 636 W/ 250 OVERRIDE (IP): Performed by: SURGERY

## 2019-08-31 PROCEDURE — 700102 HCHG RX REV CODE 250 W/ 637 OVERRIDE(OP): Performed by: NURSE PRACTITIONER

## 2019-08-31 PROCEDURE — 501838 HCHG SUTURE GENERAL: Performed by: ORAL & MAXILLOFACIAL SURGERY

## 2019-08-31 PROCEDURE — 94150 VITAL CAPACITY TEST: CPT

## 2019-08-31 PROCEDURE — 99291 CRITICAL CARE FIRST HOUR: CPT | Performed by: SURGERY

## 2019-08-31 PROCEDURE — 80048 BASIC METABOLIC PNL TOTAL CA: CPT

## 2019-08-31 PROCEDURE — 700105 HCHG RX REV CODE 258: Performed by: SURGERY

## 2019-08-31 PROCEDURE — 85025 COMPLETE CBC W/AUTO DIFF WBC: CPT

## 2019-08-31 PROCEDURE — A9270 NON-COVERED ITEM OR SERVICE: HCPCS | Performed by: SURGERY

## 2019-08-31 PROCEDURE — 700111 HCHG RX REV CODE 636 W/ 250 OVERRIDE (IP): Performed by: NURSE PRACTITIONER

## 2019-08-31 PROCEDURE — A6250 SKIN SEAL PROTECT MOISTURIZR: HCPCS | Performed by: SURGERY

## 2019-08-31 PROCEDURE — A9270 NON-COVERED ITEM OR SERVICE: HCPCS | Performed by: NURSE PRACTITIONER

## 2019-08-31 PROCEDURE — A9270 NON-COVERED ITEM OR SERVICE: HCPCS | Performed by: INTERNAL MEDICINE

## 2019-08-31 PROCEDURE — C1713 ANCHOR/SCREW BN/BN,TIS/BN: HCPCS | Performed by: ORAL & MAXILLOFACIAL SURGERY

## 2019-08-31 PROCEDURE — 160048 HCHG OR STATISTICAL LEVEL 1-5: Performed by: ORAL & MAXILLOFACIAL SURGERY

## 2019-08-31 PROCEDURE — 700105 HCHG RX REV CODE 258: Performed by: ANESTHESIOLOGY

## 2019-08-31 PROCEDURE — 160009 HCHG ANES TIME/MIN: Performed by: ORAL & MAXILLOFACIAL SURGERY

## 2019-08-31 PROCEDURE — 700101 HCHG RX REV CODE 250: Performed by: ORAL & MAXILLOFACIAL SURGERY

## 2019-08-31 PROCEDURE — 160028 HCHG SURGERY MINUTES - 1ST 30 MINS LEVEL 3: Performed by: ORAL & MAXILLOFACIAL SURGERY

## 2019-08-31 PROCEDURE — 700111 HCHG RX REV CODE 636 W/ 250 OVERRIDE (IP): Performed by: EMERGENCY MEDICINE

## 2019-08-31 PROCEDURE — 700101 HCHG RX REV CODE 250: Performed by: ANESTHESIOLOGY

## 2019-08-31 PROCEDURE — 160039 HCHG SURGERY MINUTES - EA ADDL 1 MIN LEVEL 3: Performed by: ORAL & MAXILLOFACIAL SURGERY

## 2019-08-31 PROCEDURE — 700111 HCHG RX REV CODE 636 W/ 250 OVERRIDE (IP): Performed by: ANESTHESIOLOGY

## 2019-08-31 PROCEDURE — 700102 HCHG RX REV CODE 250 W/ 637 OVERRIDE(OP): Performed by: INTERNAL MEDICINE

## 2019-08-31 PROCEDURE — 700101 HCHG RX REV CODE 250: Performed by: SURGERY

## 2019-08-31 PROCEDURE — 94003 VENT MGMT INPAT SUBQ DAY: CPT

## 2019-08-31 PROCEDURE — 700112 HCHG RX REV CODE 229: Performed by: NURSE PRACTITIONER

## 2019-08-31 PROCEDURE — 0NSV04Z REPOSITION LEFT MANDIBLE WITH INTERNAL FIXATION DEVICE, OPEN APPROACH: ICD-10-PCS | Performed by: ORAL & MAXILLOFACIAL SURGERY

## 2019-08-31 DEVICE — SCREW STRYK UFS 2.0X6MM ST - (2UFS5+M10=20): Type: IMPLANTABLE DEVICE | Status: FUNCTIONAL

## 2019-08-31 DEVICE — SCREW LOCKING 2.0X6MM SELF DRILLING (2UFSX50=100) (5EA/PK): Type: IMPLANTABLE DEVICE | Status: FUNCTIONAL

## 2019-08-31 DEVICE — PLATE STRYK UFS MIN 4H BAR HP - (2UFS2+M2=6): Type: IMPLANTABLE DEVICE | Status: FUNCTIONAL

## 2019-08-31 DEVICE — IMPLANTABLE DEVICE: Type: IMPLANTABLE DEVICE | Status: FUNCTIONAL

## 2019-08-31 DEVICE — SCREW STRYK UFS 2.0X5MM ST - (2UFS5+10=20): Type: IMPLANTABLE DEVICE | Status: FUNCTIONAL

## 2019-08-31 DEVICE — PLATE HMMF (2UFSX4=8) (2EA/PK): Type: IMPLANTABLE DEVICE | Status: FUNCTIONAL

## 2019-08-31 RX ORDER — METOPROLOL TARTRATE 1 MG/ML
5 INJECTION, SOLUTION INTRAVENOUS
Status: COMPLETED | OUTPATIENT
Start: 2019-08-31 | End: 2019-08-31

## 2019-08-31 RX ORDER — LIDOCAINE HYDROCHLORIDE AND EPINEPHRINE 10; 10 MG/ML; UG/ML
INJECTION, SOLUTION INFILTRATION; PERINEURAL
Status: DISCONTINUED | OUTPATIENT
Start: 2019-08-31 | End: 2019-08-31 | Stop reason: HOSPADM

## 2019-08-31 RX ORDER — METOPROLOL TARTRATE 50 MG/1
50 TABLET, FILM COATED ORAL EVERY 8 HOURS
Status: DISCONTINUED | OUTPATIENT
Start: 2019-08-31 | End: 2019-09-01

## 2019-08-31 RX ORDER — BACITRACIN ZINC AND POLYMYXIN B SULFATE 500; 1000 [USP'U]/G; [USP'U]/G
OINTMENT TOPICAL
Status: DISCONTINUED | OUTPATIENT
Start: 2019-08-31 | End: 2019-08-31 | Stop reason: HOSPADM

## 2019-08-31 RX ORDER — SODIUM CHLORIDE, SODIUM LACTATE, POTASSIUM CHLORIDE, CALCIUM CHLORIDE 600; 310; 30; 20 MG/100ML; MG/100ML; MG/100ML; MG/100ML
INJECTION, SOLUTION INTRAVENOUS
Status: DISCONTINUED | OUTPATIENT
Start: 2019-08-31 | End: 2019-08-31 | Stop reason: SURG

## 2019-08-31 RX ADMIN — METOPROLOL TARTRATE 25 MG: 25 TABLET, FILM COATED ORAL at 05:06

## 2019-08-31 RX ADMIN — AMPICILLIN AND SULBACTAM 3 G: 2; 1 INJECTION, POWDER, FOR SOLUTION INTRAVENOUS at 05:07

## 2019-08-31 RX ADMIN — SODIUM CHLORIDE, POTASSIUM CHLORIDE, SODIUM LACTATE AND CALCIUM CHLORIDE: 600; 310; 30; 20 INJECTION, SOLUTION INTRAVENOUS at 07:48

## 2019-08-31 RX ADMIN — PROPOFOL 20 MCG/KG/MIN: 10 INJECTION, EMULSION INTRAVENOUS at 19:10

## 2019-08-31 RX ADMIN — AMPICILLIN AND SULBACTAM 3 G: 2; 1 INJECTION, POWDER, FOR SOLUTION INTRAVENOUS at 00:19

## 2019-08-31 RX ADMIN — ROCURONIUM BROMIDE 25 MG: 10 INJECTION, SOLUTION INTRAVENOUS at 09:44

## 2019-08-31 RX ADMIN — ROCURONIUM BROMIDE 50 MG: 10 INJECTION, SOLUTION INTRAVENOUS at 08:08

## 2019-08-31 RX ADMIN — METOPROLOL TARTRATE 5 MG: 5 INJECTION, SOLUTION INTRAVENOUS at 19:43

## 2019-08-31 RX ADMIN — FENTANYL CITRATE 100 MCG: 50 INJECTION, SOLUTION INTRAMUSCULAR; INTRAVENOUS at 08:50

## 2019-08-31 RX ADMIN — FENTANYL CITRATE 100 MCG: 50 INJECTION, SOLUTION INTRAMUSCULAR; INTRAVENOUS at 12:30

## 2019-08-31 RX ADMIN — MORPHINE SULFATE 4 MG: 10 INJECTION INTRAVENOUS at 12:16

## 2019-08-31 RX ADMIN — AMPICILLIN AND SULBACTAM 3 G: 2; 1 INJECTION, POWDER, FOR SOLUTION INTRAVENOUS at 18:00

## 2019-08-31 RX ADMIN — METOPROLOL TARTRATE 5 MG: 5 INJECTION, SOLUTION INTRAVENOUS at 21:54

## 2019-08-31 RX ADMIN — SENNOSIDES, DOCUSATE SODIUM 1 TABLET: 50; 8.6 TABLET, FILM COATED ORAL at 19:43

## 2019-08-31 RX ADMIN — METOPROLOL TARTRATE 50 MG: 50 TABLET ORAL at 16:34

## 2019-08-31 RX ADMIN — METOPROLOL TARTRATE 5 MG: 5 INJECTION, SOLUTION INTRAVENOUS at 16:35

## 2019-08-31 RX ADMIN — PROPOFOL 40 MCG/KG/MIN: 10 INJECTION, EMULSION INTRAVENOUS at 12:30

## 2019-08-31 RX ADMIN — FENTANYL CITRATE 100 MCG: 50 INJECTION, SOLUTION INTRAMUSCULAR; INTRAVENOUS at 08:22

## 2019-08-31 RX ADMIN — MORPHINE SULFATE 4 MG: 10 INJECTION INTRAVENOUS at 01:31

## 2019-08-31 RX ADMIN — FENTANYL CITRATE 50 MCG: 50 INJECTION, SOLUTION INTRAMUSCULAR; INTRAVENOUS at 10:25

## 2019-08-31 RX ADMIN — POTASSIUM BICARBONATE 25 MEQ: 978 TABLET, EFFERVESCENT ORAL at 17:57

## 2019-08-31 RX ADMIN — PROPOFOL 40 MCG/KG/MIN: 10 INJECTION, EMULSION INTRAVENOUS at 02:24

## 2019-08-31 RX ADMIN — POLYETHYLENE GLYCOL 3350 1 PACKET: 17 POWDER, FOR SOLUTION ORAL at 17:57

## 2019-08-31 RX ADMIN — FAMOTIDINE 20 MG: 10 INJECTION INTRAVENOUS at 05:06

## 2019-08-31 RX ADMIN — DIGOXIN 125 MCG: 125 TABLET ORAL at 17:56

## 2019-08-31 RX ADMIN — FAMOTIDINE 20 MG: 20 TABLET ORAL at 17:57

## 2019-08-31 RX ADMIN — DOCUSATE SODIUM 100 MG: 50 LIQUID ORAL at 17:56

## 2019-08-31 RX ADMIN — METOPROLOL TARTRATE 50 MG: 50 TABLET ORAL at 21:31

## 2019-08-31 RX ADMIN — POTASSIUM BICARBONATE 25 MEQ: 978 TABLET, EFFERVESCENT ORAL at 15:03

## 2019-08-31 RX ADMIN — AMPICILLIN AND SULBACTAM 3 G: 2; 1 INJECTION, POWDER, FOR SOLUTION INTRAVENOUS at 13:00

## 2019-08-31 ASSESSMENT — PULMONARY FUNCTION TESTS: FVC: -7.2

## 2019-08-31 ASSESSMENT — PAIN SCALES - GENERAL: PAIN_LEVEL: 0

## 2019-08-31 NOTE — FLOWSHEET NOTE
08/31/19 1612   Weaning Parameters   RR (bpm) 13   #FVC / Vital Capacity (liters)  -7.2   NIF (cm H2O)  0.6   Rapid Shallow Breathing Index (RR/VT) 20   Spontaneous VE 8   Spontaneous    Weaning Trial   Weaning Trial Stopped due to: Pt weaned for 1 hour and returned to rest settings per protocol   Length of Weaning Trial (Hours) 1 hour   Vent Weaning Smart Text completed? Yes
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
## 2019-08-31 NOTE — CARE PLAN
Problem: Communication  Goal: The ability to communicate needs accurately and effectively will improve  Outcome: PROGRESSING AS EXPECTED  Note:   Pt with hearing deficit, use of hearing aids in place when communicating with patient. RN to reorient patient multiple times in regards to hospital admit and date. Patient appropriately writing notes under RN supervision. RN in communication with wife in regards to planned surgery for today's shift. 2 Rn consent verified over the phone. All questions answered and needs met at this time.      Problem: Pain Management  Goal: Pain level will decrease to patient's comfort goal  Outcome: PROGRESSING AS EXPECTED  Note:   Pain assessed using appropriate pain scale, non pharmacologic measures implemented, medicated PRN per MAR.

## 2019-08-31 NOTE — ANESTHESIA PROCEDURE NOTES
Airway  Date/Time: 8/31/2019 8:04 AM  Performed by: Douglas Burr D.O.  Authorized by: Douglas Burr D.O.     Indications for Airway Management:  Anesthesia and respiratory failure  Final Airway Type:  Endotracheal airway  Blade Type:  Malcom  Laryngoscope Blade/Videolaryngoscope Blade Size:  00   Trach in situ

## 2019-08-31 NOTE — ANESTHESIA QCDR
2019 Qualified Clinical Data Registry (for Quality Improvement)     Postoperative nausea/vomiting risk protocol (Adult = 18 yrs and Pediatric 3-17 yrs)- (430 and 463)  General inhalation anesthetic (NOT TIVA) with PONV risk factors: Yes  Provision of anti-emetic therapy with at least 2 different classes of agents: No   Patient DID NOT receive anti-emetic therapy and reason is documented in Medical Record: Yes       Multimodal Pain Management- (AQI59)  Patient undergoing Elective Surgery (i.e. Outpatient, or ASC, or Prescheduled Surgery prior to Hospital Admission): No  Use of Multimodal Pain Management, two or more drugs and/or interventions, NOT including systemic opioids: N/A  Exception: Documented allergy to multiple classes of analgesics: N/A    PACU assessment of acute postoperative pain prior to Anesthesia Care End- Applies to Patients Age = 18- (ABG7)  Initial PACU pain score is which of the following: Pain not assessed  Patient unable to report pain score: Yes (Patient Unable to Report)    Post-anesthetic transfer of care checklist/protocol to PACU/ICU- (426 and 427)  Upon conclusion of case, patient transferred to which of the following locations: ICU  Use of transfer checklist/protocol: Yes  Exclusion: Service Performed in Patient Hospital Room (and thus did not require transfer): N/A    PACU Reintubation- (AQI31)  General anesthesia requiring endotracheal intubation (ETT) along with subsequent extubation in OR or PACU: No  Required reintubation in the PACU: N/A  Extubation was a planned trial documented in the medical record prior to removal of the original airway device: N/A    Unplanned admission to ICU related to anesthesia service up through end of PACU care- (MD51)  Unplanned admission to ICU (not initially anticipated at anesthesia start time): No
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
## 2019-08-31 NOTE — ASSESSMENT & PLAN NOTE
Patient on no medication for hypertension at home.  Consistent control with multiple PO agents.

## 2019-08-31 NOTE — CARE PLAN
Problem: Safety  Goal: Will remain free from injury  Outcome: PROGRESSING AS EXPECTED  Intervention: Provide assistance with mobility  Note:   Pt understands fall risk and understands the need to call for help. The pt does require orientation and was assisted with mobilization with three staff members     Problem: Pain Management  Goal: Pain level will decrease to patient's comfort goal  Outcome: PROGRESSING AS EXPECTED  Intervention: Follow pain managment plan developed in collaboration with patient and Interdisciplinary Team  Note:   Pt medicated for pain per orders. The pt is able to communicate his pain with head nods and using arms. The pt was positioned and sedated per the MAR for comfort.

## 2019-08-31 NOTE — ANESTHESIA PREPROCEDURE EVALUATION
Relevant Problems   ANESTHESIA (within normal limits)      PULMONARY (within normal limits)      NEURO (within normal limits)      CARDIAC   (+) A-fib (HCC)   (+) Benign hypertension      GI (within normal limits)       (within normal limits)      ENDO (within normal limits)       Physical Exam    Airway - unable to assess  Patient is intubated/trached     Cardiovascular - normal exam  Rhythm: irregular  Rate: abnormal     Dental    Pulmonary - normal exam  (+) decreased breath sounds     Abdominal   (+) obese     Neurological              Anesthesia Plan    ASA 4   ASA physical status 4 criteria: respiratory failure    Plan - general       Airway plan will be ETT        Induction: inhalational      Pertinent diagnostic labs and testing reviewed    Informed Consent:    Anesthetic plan and risks discussed with healthcare power of .    Use of blood products discussed with: healthcare power of  whom.

## 2019-08-31 NOTE — PROGRESS NOTES
Pt placed on OR monitor, transported to Pre-op with ACLS RN, RT, and transport tech. VSS. Hand off report given to OR RN and anesthesiologist.
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
## 2019-08-31 NOTE — PROGRESS NOTES
Pt returned from OR, placed on ICU monitor. Patient in afib, rate 150-160's, SBP > 180. Sedation restarted. Dr. Torres at bedside. New order received to give 100 mcg Fentanyl.

## 2019-08-31 NOTE — PROGRESS NOTES
Pt remains in afib, sustaining -150's. -180's.     Cardiology APRN harshal and Dr. Torres updated on clinical status. New orders received and implemented.

## 2019-08-31 NOTE — CARE PLAN
Adult Ventilation Update    Total Vent Days: 4    Patient Lines/Drains/Airways Status      Active Airway       Name: Placement date: Placement time: Site: Days:    Airway Trach 8.0  08/29/19   1206   --  3                    In the last 24 hours, the patient tolerated SBT for 1 hour (08/30/19 1529)    Cough: Productive (08/31/19 0300)  Sputum Amount: Small (08/31/19 0300)  Sputum Color: Bloody;Clear (08/31/19 0300)  Sputum Consistency: Thin (08/31/19 0300)    Mobility  Pt Calls for Assistance: No (08/30/19 1800)

## 2019-09-01 ENCOUNTER — APPOINTMENT (OUTPATIENT)
Dept: RADIOLOGY | Facility: MEDICAL CENTER | Age: 79
DRG: 003 | End: 2019-09-01
Attending: SURGERY
Payer: MEDICARE

## 2019-09-01 ENCOUNTER — APPOINTMENT (OUTPATIENT)
Dept: RADIOLOGY | Facility: MEDICAL CENTER | Age: 79
DRG: 003 | End: 2019-09-01
Attending: ORAL & MAXILLOFACIAL SURGERY
Payer: MEDICARE

## 2019-09-01 ENCOUNTER — APPOINTMENT (OUTPATIENT)
Dept: RADIOLOGY | Facility: MEDICAL CENTER | Age: 79
DRG: 003 | End: 2019-09-01
Attending: NURSE PRACTITIONER
Payer: MEDICARE

## 2019-09-01 LAB
ANION GAP SERPL CALC-SCNC: 6 MMOL/L (ref 0–11.9)
BASOPHILS # BLD AUTO: 0.2 % (ref 0–1.8)
BASOPHILS # BLD: 0.03 K/UL (ref 0–0.12)
BUN SERPL-MCNC: 14 MG/DL (ref 8–22)
CALCIUM SERPL-MCNC: 7.3 MG/DL (ref 8.5–10.5)
CHLORIDE SERPL-SCNC: 105 MMOL/L (ref 96–112)
CO2 SERPL-SCNC: 24 MMOL/L (ref 20–33)
CREAT SERPL-MCNC: 0.55 MG/DL (ref 0.5–1.4)
EOSINOPHIL # BLD AUTO: 0.04 K/UL (ref 0–0.51)
EOSINOPHIL NFR BLD: 0.3 % (ref 0–6.9)
ERYTHROCYTE [DISTWIDTH] IN BLOOD BY AUTOMATED COUNT: 52.1 FL (ref 35.9–50)
GLUCOSE SERPL-MCNC: 125 MG/DL (ref 65–99)
HCT VFR BLD AUTO: 34.2 % (ref 42–52)
HGB BLD-MCNC: 11 G/DL (ref 14–18)
IMM GRANULOCYTES # BLD AUTO: 0.18 K/UL (ref 0–0.11)
IMM GRANULOCYTES NFR BLD AUTO: 1.2 % (ref 0–0.9)
LYMPHOCYTES # BLD AUTO: 1.09 K/UL (ref 1–4.8)
LYMPHOCYTES NFR BLD: 7.1 % (ref 22–41)
MCH RBC QN AUTO: 32.4 PG (ref 27–33)
MCHC RBC AUTO-ENTMCNC: 32.2 G/DL (ref 33.7–35.3)
MCV RBC AUTO: 100.6 FL (ref 81.4–97.8)
MONOCYTES # BLD AUTO: 0.88 K/UL (ref 0–0.85)
MONOCYTES NFR BLD AUTO: 5.7 % (ref 0–13.4)
NEUTROPHILS # BLD AUTO: 13.14 K/UL (ref 1.82–7.42)
NEUTROPHILS NFR BLD: 85.5 % (ref 44–72)
NRBC # BLD AUTO: 0 K/UL
NRBC BLD-RTO: 0 /100 WBC
PLATELET # BLD AUTO: 191 K/UL (ref 164–446)
PMV BLD AUTO: 9.3 FL (ref 9–12.9)
POTASSIUM SERPL-SCNC: 3.3 MMOL/L (ref 3.6–5.5)
RBC # BLD AUTO: 3.4 M/UL (ref 4.7–6.1)
SODIUM SERPL-SCNC: 135 MMOL/L (ref 135–145)
WBC # BLD AUTO: 15.4 K/UL (ref 4.8–10.8)

## 2019-09-01 PROCEDURE — 70486 CT MAXILLOFACIAL W/O DYE: CPT

## 2019-09-01 PROCEDURE — 71045 X-RAY EXAM CHEST 1 VIEW: CPT

## 2019-09-01 PROCEDURE — 700102 HCHG RX REV CODE 250 W/ 637 OVERRIDE(OP): Performed by: SURGERY

## 2019-09-01 PROCEDURE — A9270 NON-COVERED ITEM OR SERVICE: HCPCS | Performed by: SURGERY

## 2019-09-01 PROCEDURE — 700111 HCHG RX REV CODE 636 W/ 250 OVERRIDE (IP): Performed by: SURGERY

## 2019-09-01 PROCEDURE — 94003 VENT MGMT INPAT SUBQ DAY: CPT

## 2019-09-01 PROCEDURE — 700112 HCHG RX REV CODE 229: Performed by: NURSE PRACTITIONER

## 2019-09-01 PROCEDURE — 80048 BASIC METABOLIC PNL TOTAL CA: CPT

## 2019-09-01 PROCEDURE — 770022 HCHG ROOM/CARE - ICU (200)

## 2019-09-01 PROCEDURE — 700105 HCHG RX REV CODE 258: Performed by: SURGERY

## 2019-09-01 PROCEDURE — 85025 COMPLETE CBC W/AUTO DIFF WBC: CPT

## 2019-09-01 PROCEDURE — 700111 HCHG RX REV CODE 636 W/ 250 OVERRIDE (IP): Performed by: EMERGENCY MEDICINE

## 2019-09-01 PROCEDURE — A9270 NON-COVERED ITEM OR SERVICE: HCPCS | Performed by: NURSE PRACTITIONER

## 2019-09-01 PROCEDURE — 94150 VITAL CAPACITY TEST: CPT

## 2019-09-01 PROCEDURE — 700102 HCHG RX REV CODE 250 W/ 637 OVERRIDE(OP): Performed by: ORAL & MAXILLOFACIAL SURGERY

## 2019-09-01 PROCEDURE — 700102 HCHG RX REV CODE 250 W/ 637 OVERRIDE(OP): Performed by: NURSE PRACTITIONER

## 2019-09-01 PROCEDURE — A9270 NON-COVERED ITEM OR SERVICE: HCPCS | Performed by: ORAL & MAXILLOFACIAL SURGERY

## 2019-09-01 PROCEDURE — 99291 CRITICAL CARE FIRST HOUR: CPT | Performed by: SURGERY

## 2019-09-01 PROCEDURE — 700111 HCHG RX REV CODE 636 W/ 250 OVERRIDE (IP): Performed by: NURSE PRACTITIONER

## 2019-09-01 RX ORDER — CHLORHEXIDINE GLUCONATE ORAL RINSE 1.2 MG/ML
15 SOLUTION DENTAL 2 TIMES DAILY
Status: DISCONTINUED | OUTPATIENT
Start: 2019-09-01 | End: 2019-10-12

## 2019-09-01 RX ADMIN — Medication 10 MG: at 16:25

## 2019-09-01 RX ADMIN — SODIUM CHLORIDE: 9 INJECTION, SOLUTION INTRAVENOUS at 23:44

## 2019-09-01 RX ADMIN — SODIUM CHLORIDE: 9 INJECTION, SOLUTION INTRAVENOUS at 00:45

## 2019-09-01 RX ADMIN — METOPROLOL TARTRATE 75 MG: 25 TABLET, FILM COATED ORAL at 14:19

## 2019-09-01 RX ADMIN — MAGNESIUM HYDROXIDE 30 ML: 400 SUSPENSION ORAL at 06:16

## 2019-09-01 RX ADMIN — PROPOFOL 30 MCG/KG/MIN: 10 INJECTION, EMULSION INTRAVENOUS at 23:58

## 2019-09-01 RX ADMIN — PROPOFOL 20 MCG/KG/MIN: 10 INJECTION, EMULSION INTRAVENOUS at 17:15

## 2019-09-01 RX ADMIN — AMPICILLIN AND SULBACTAM 3 G: 2; 1 INJECTION, POWDER, FOR SOLUTION INTRAVENOUS at 00:45

## 2019-09-01 RX ADMIN — FAMOTIDINE 20 MG: 20 TABLET ORAL at 17:15

## 2019-09-01 RX ADMIN — MORPHINE SULFATE 2 MG: 10 INJECTION INTRAVENOUS at 20:47

## 2019-09-01 RX ADMIN — POLYETHYLENE GLYCOL 3350 1 PACKET: 17 POWDER, FOR SOLUTION ORAL at 06:13

## 2019-09-01 RX ADMIN — PROPOFOL 30 MCG/KG/MIN: 10 INJECTION, EMULSION INTRAVENOUS at 01:35

## 2019-09-01 RX ADMIN — METOPROLOL TARTRATE 50 MG: 50 TABLET ORAL at 06:14

## 2019-09-01 RX ADMIN — OXYCODONE HYDROCHLORIDE 5 MG: 5 TABLET ORAL at 02:50

## 2019-09-01 RX ADMIN — POTASSIUM BICARBONATE 25 MEQ: 978 TABLET, EFFERVESCENT ORAL at 17:15

## 2019-09-01 RX ADMIN — AMPICILLIN AND SULBACTAM 3 G: 2; 1 INJECTION, POWDER, FOR SOLUTION INTRAVENOUS at 11:30

## 2019-09-01 RX ADMIN — OXYCODONE HYDROCHLORIDE 10 MG: 5 TABLET ORAL at 11:45

## 2019-09-01 RX ADMIN — POTASSIUM BICARBONATE 25 MEQ: 978 TABLET, EFFERVESCENT ORAL at 14:18

## 2019-09-01 RX ADMIN — DIGOXIN 125 MCG: 125 TABLET ORAL at 17:15

## 2019-09-01 RX ADMIN — SODIUM CHLORIDE: 9 INJECTION, SOLUTION INTRAVENOUS at 22:07

## 2019-09-01 RX ADMIN — HYDRALAZINE HYDROCHLORIDE 20 MG: 20 INJECTION INTRAMUSCULAR; INTRAVENOUS at 02:59

## 2019-09-01 RX ADMIN — AMPICILLIN AND SULBACTAM 3 G: 2; 1 INJECTION, POWDER, FOR SOLUTION INTRAVENOUS at 17:15

## 2019-09-01 RX ADMIN — DOCUSATE SODIUM 100 MG: 50 LIQUID ORAL at 06:13

## 2019-09-01 RX ADMIN — ENOXAPARIN SODIUM 30 MG: 100 INJECTION SUBCUTANEOUS at 14:20

## 2019-09-01 RX ADMIN — CHLORHEXIDINE GLUCONATE 0.12% ORAL RINSE 15 ML: 1.2 LIQUID ORAL at 17:15

## 2019-09-01 RX ADMIN — MORPHINE SULFATE 2 MG: 10 INJECTION INTRAVENOUS at 03:04

## 2019-09-01 RX ADMIN — AMPICILLIN AND SULBACTAM 3 G: 2; 1 INJECTION, POWDER, FOR SOLUTION INTRAVENOUS at 06:13

## 2019-09-01 RX ADMIN — METOPROLOL TARTRATE 75 MG: 25 TABLET, FILM COATED ORAL at 22:06

## 2019-09-01 RX ADMIN — AMPICILLIN AND SULBACTAM 3 G: 2; 1 INJECTION, POWDER, FOR SOLUTION INTRAVENOUS at 23:44

## 2019-09-01 RX ADMIN — POTASSIUM BICARBONATE 25 MEQ: 978 TABLET, EFFERVESCENT ORAL at 11:27

## 2019-09-01 RX ADMIN — POTASSIUM BICARBONATE 25 MEQ: 978 TABLET, EFFERVESCENT ORAL at 22:06

## 2019-09-01 RX ADMIN — FAMOTIDINE 20 MG: 20 TABLET ORAL at 06:14

## 2019-09-01 RX ADMIN — PROPOFOL 40 MCG/KG/MIN: 10 INJECTION, EMULSION INTRAVENOUS at 06:13

## 2019-09-01 ASSESSMENT — PULMONARY FUNCTION TESTS: FVC: .7
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
## 2019-09-01 NOTE — CARE PLAN
Problem: Communication  Goal: The ability to communicate needs accurately and effectively will improve  Outcome: PROGRESSING AS EXPECTED  Note:   Pt with hearing and vision deficits, glasses and hearing aids in place when communicating with patient. Pt requests that they are removed to allow him resting/sleeping periods. Patient reoriented, updated on plan of care. Pt able to nod yes or no appropriately. Spoken to wife on the phone, updated as well. All questions answered and needs met at this time.      Problem: Pain Management  Goal: Pain level will decrease to patient's comfort goal  Outcome: PROGRESSING AS EXPECTED  Note:   Pain assessed using appropriate pain scale, non pharmacologic measures implemented, medicated per MAR.

## 2019-09-01 NOTE — PROGRESS NOTES
Cardiology APRN at bedside. Discussed mechanisms of heart rate control and plan of care. APRN to place new orders.

## 2019-09-01 NOTE — FLOWSHEET NOTE
09/01/19 1002   Weaning Parameters   RR (bpm) 17   #FVC / Vital Capacity (liters)  0.7   NIF (cm H2O)  -11   Rapid Shallow Breathing Index (RR/VT) 26   Spontaneous VE 9.1   Spontaneous    Weaning Trial   Weaning Trial Stopped due to: Pt weaned for 1 hour and returned to rest settings per protocol   Length of Weaning Trial (Hours) 1 hour   Vent Weaning Smart Text completed? Yes

## 2019-09-01 NOTE — CARE PLAN
Problem: Infection  Goal: Will remain free from infection  Intervention: Assess signs and symptoms of infection  Note:   Pt assessed for s.s infection at site of surgical repair of face, as well as via vital sign and lab trends. Mu-ism oral care in place for VAP prevention. Fever addressed, and patient/family educated on possibility of infection and preventative measures in place.     Problem: Skin Integrity  Goal: Risk for impaired skin integrity will decrease  Intervention: Assess risk factors for impaired skin integrity and/or pressure ulcers  Note:   Pt at risk for impaired skin integrity given age, weight, impaired bed mobility, prominent edema to extremities/face/scrotum, presence of various medical devices, and presence of existing wounds/incision sites. Q2h turns in place, 2-RN skin check performed. See skin note for further details.
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
## 2019-09-01 NOTE — CARE PLAN
Adult Ventilation Update    Total Vent Days: 5    Patient Lines/Drains/Airways Status      Active Airway      #8 Portex trach day#3                   In the last 24 hours, the patient tolerated SBT for 1 hour    Cough: Productive (09/01/19 0225)  Sputum Amount: Small (09/01/19 0225)  Sputum Color: Bloody;Clear (09/01/19 0225)  Sputum Consistency: Thin;Thick (09/01/19 0225)    Mobility  Level of Mobility: Level IV (08/31/19 2000)  Activity Performed: Unable to mobilize (09/01/19 0000)  Pt Calls for Assistance: No (09/01/19 0200)  Staff Present for Mobilization: RN;CNA (08/31/19 0400)  Reason Not Mobilized: Unstable condition (09/01/19 0000)  Mobilization Comments: Pt remains with HR fluctuating between 105-120's, requiring PRN antihypertensives at rest (09/01/19 0000)
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
## 2019-09-02 ENCOUNTER — APPOINTMENT (OUTPATIENT)
Dept: RADIOLOGY | Facility: MEDICAL CENTER | Age: 79
DRG: 003 | End: 2019-09-02
Attending: NURSE PRACTITIONER
Payer: MEDICARE

## 2019-09-02 LAB
ANION GAP SERPL CALC-SCNC: 7 MMOL/L (ref 0–11.9)
BASOPHILS # BLD AUTO: 0.2 % (ref 0–1.8)
BASOPHILS # BLD: 0.03 K/UL (ref 0–0.12)
BUN SERPL-MCNC: 13 MG/DL (ref 8–22)
CALCIUM SERPL-MCNC: 6.6 MG/DL (ref 8.5–10.5)
CHLORIDE SERPL-SCNC: 108 MMOL/L (ref 96–112)
CO2 SERPL-SCNC: 22 MMOL/L (ref 20–33)
CREAT SERPL-MCNC: 0.38 MG/DL (ref 0.5–1.4)
EOSINOPHIL # BLD AUTO: 0.1 K/UL (ref 0–0.51)
EOSINOPHIL NFR BLD: 0.8 % (ref 0–6.9)
ERYTHROCYTE [DISTWIDTH] IN BLOOD BY AUTOMATED COUNT: 53.7 FL (ref 35.9–50)
GLUCOSE SERPL-MCNC: 111 MG/DL (ref 65–99)
HCT VFR BLD AUTO: 31.2 % (ref 42–52)
HGB BLD-MCNC: 9.8 G/DL (ref 14–18)
IMM GRANULOCYTES # BLD AUTO: 0.08 K/UL (ref 0–0.11)
IMM GRANULOCYTES NFR BLD AUTO: 0.6 % (ref 0–0.9)
LYMPHOCYTES # BLD AUTO: 0.96 K/UL (ref 1–4.8)
LYMPHOCYTES NFR BLD: 7.3 % (ref 22–41)
MAGNESIUM SERPL-MCNC: 1.8 MG/DL (ref 1.5–2.5)
MCH RBC QN AUTO: 32.5 PG (ref 27–33)
MCHC RBC AUTO-ENTMCNC: 31.4 G/DL (ref 33.7–35.3)
MCV RBC AUTO: 103.3 FL (ref 81.4–97.8)
MONOCYTES # BLD AUTO: 0.75 K/UL (ref 0–0.85)
MONOCYTES NFR BLD AUTO: 5.7 % (ref 0–13.4)
NEUTROPHILS # BLD AUTO: 11.26 K/UL (ref 1.82–7.42)
NEUTROPHILS NFR BLD: 85.4 % (ref 44–72)
NRBC # BLD AUTO: 0 K/UL
NRBC BLD-RTO: 0 /100 WBC
PHOSPHATE SERPL-MCNC: 2.2 MG/DL (ref 2.5–4.5)
PLATELET # BLD AUTO: 171 K/UL (ref 164–446)
PMV BLD AUTO: 8.9 FL (ref 9–12.9)
POTASSIUM SERPL-SCNC: 3.3 MMOL/L (ref 3.6–5.5)
RBC # BLD AUTO: 3.02 M/UL (ref 4.7–6.1)
SODIUM SERPL-SCNC: 137 MMOL/L (ref 135–145)
TRIGL SERPL-MCNC: 79 MG/DL (ref 0–149)
WBC # BLD AUTO: 13.2 K/UL (ref 4.8–10.8)

## 2019-09-02 PROCEDURE — 700111 HCHG RX REV CODE 636 W/ 250 OVERRIDE (IP): Performed by: INTERNAL MEDICINE

## 2019-09-02 PROCEDURE — 700105 HCHG RX REV CODE 258: Performed by: SURGERY

## 2019-09-02 PROCEDURE — 97166 OT EVAL MOD COMPLEX 45 MIN: CPT

## 2019-09-02 PROCEDURE — 99291 CRITICAL CARE FIRST HOUR: CPT | Performed by: SURGERY

## 2019-09-02 PROCEDURE — 700102 HCHG RX REV CODE 250 W/ 637 OVERRIDE(OP): Performed by: NURSE PRACTITIONER

## 2019-09-02 PROCEDURE — 84478 ASSAY OF TRIGLYCERIDES: CPT

## 2019-09-02 PROCEDURE — 700111 HCHG RX REV CODE 636 W/ 250 OVERRIDE (IP): Performed by: NURSE PRACTITIONER

## 2019-09-02 PROCEDURE — 700111 HCHG RX REV CODE 636 W/ 250 OVERRIDE (IP): Performed by: SURGERY

## 2019-09-02 PROCEDURE — A9270 NON-COVERED ITEM OR SERVICE: HCPCS | Performed by: ORAL & MAXILLOFACIAL SURGERY

## 2019-09-02 PROCEDURE — 700102 HCHG RX REV CODE 250 W/ 637 OVERRIDE(OP): Performed by: INTERNAL MEDICINE

## 2019-09-02 PROCEDURE — 94003 VENT MGMT INPAT SUBQ DAY: CPT

## 2019-09-02 PROCEDURE — 770022 HCHG ROOM/CARE - ICU (200)

## 2019-09-02 PROCEDURE — A9270 NON-COVERED ITEM OR SERVICE: HCPCS | Performed by: NURSE PRACTITIONER

## 2019-09-02 PROCEDURE — A9270 NON-COVERED ITEM OR SERVICE: HCPCS | Performed by: INTERNAL MEDICINE

## 2019-09-02 PROCEDURE — 700102 HCHG RX REV CODE 250 W/ 637 OVERRIDE(OP): Performed by: ORAL & MAXILLOFACIAL SURGERY

## 2019-09-02 PROCEDURE — 84100 ASSAY OF PHOSPHORUS: CPT

## 2019-09-02 PROCEDURE — 700101 HCHG RX REV CODE 250: Performed by: SURGERY

## 2019-09-02 PROCEDURE — 80048 BASIC METABOLIC PNL TOTAL CA: CPT

## 2019-09-02 PROCEDURE — 83735 ASSAY OF MAGNESIUM: CPT

## 2019-09-02 PROCEDURE — 700111 HCHG RX REV CODE 636 W/ 250 OVERRIDE (IP): Performed by: EMERGENCY MEDICINE

## 2019-09-02 PROCEDURE — 71045 X-RAY EXAM CHEST 1 VIEW: CPT

## 2019-09-02 PROCEDURE — 85025 COMPLETE CBC W/AUTO DIFF WBC: CPT

## 2019-09-02 PROCEDURE — 97163 PT EVAL HIGH COMPLEX 45 MIN: CPT

## 2019-09-02 PROCEDURE — 94640 AIRWAY INHALATION TREATMENT: CPT

## 2019-09-02 RX ORDER — DIGOXIN 125 MCG
125 TABLET ORAL DAILY
Status: DISCONTINUED | OUTPATIENT
Start: 2019-09-03 | End: 2019-09-04

## 2019-09-02 RX ORDER — DIGOXIN 0.25 MG/ML
500 INJECTION INTRAMUSCULAR; INTRAVENOUS ONCE
Status: COMPLETED | OUTPATIENT
Start: 2019-09-02 | End: 2019-09-02

## 2019-09-02 RX ORDER — DIGOXIN 0.25 MG/ML
250 INJECTION INTRAMUSCULAR; INTRAVENOUS EVERY 6 HOURS
Status: COMPLETED | OUTPATIENT
Start: 2019-09-02 | End: 2019-09-02

## 2019-09-02 RX ORDER — MORPHINE SULFATE 4 MG/ML
2-4 INJECTION, SOLUTION INTRAMUSCULAR; INTRAVENOUS
Status: DISCONTINUED | OUTPATIENT
Start: 2019-09-02 | End: 2019-09-13

## 2019-09-02 RX ORDER — METOPROLOL TARTRATE 100 MG/1
100 TABLET ORAL TWICE DAILY
Status: DISCONTINUED | OUTPATIENT
Start: 2019-09-02 | End: 2019-09-05

## 2019-09-02 RX ORDER — MAGNESIUM SULFATE HEPTAHYDRATE 40 MG/ML
2 INJECTION, SOLUTION INTRAVENOUS ONCE
Status: COMPLETED | OUTPATIENT
Start: 2019-09-02 | End: 2019-09-02

## 2019-09-02 RX ADMIN — DIGOXIN 250 MCG: 0.25 INJECTION INTRAMUSCULAR; INTRAVENOUS at 17:13

## 2019-09-02 RX ADMIN — FAMOTIDINE 20 MG: 20 TABLET ORAL at 05:35

## 2019-09-02 RX ADMIN — CHLORHEXIDINE GLUCONATE 0.12% ORAL RINSE 15 ML: 1.2 LIQUID ORAL at 17:16

## 2019-09-02 RX ADMIN — PROPOFOL 35 MCG/KG/MIN: 10 INJECTION, EMULSION INTRAVENOUS at 12:33

## 2019-09-02 RX ADMIN — METOPROLOL TARTRATE 75 MG: 25 TABLET, FILM COATED ORAL at 05:35

## 2019-09-02 RX ADMIN — POTASSIUM PHOSPHATE, MONOBASIC AND POTASSIUM PHOSPHATE, DIBASIC 15 MMOL: 224; 236 INJECTION, SOLUTION, CONCENTRATE INTRAVENOUS at 12:17

## 2019-09-02 RX ADMIN — ENOXAPARIN SODIUM 30 MG: 100 INJECTION SUBCUTANEOUS at 17:16

## 2019-09-02 RX ADMIN — CALCIUM GLUCONATE 2 G: 98 INJECTION, SOLUTION INTRAVENOUS at 11:12

## 2019-09-02 RX ADMIN — ENOXAPARIN SODIUM 30 MG: 100 INJECTION SUBCUTANEOUS at 05:35

## 2019-09-02 RX ADMIN — PROPOFOL 25 MCG/KG/MIN: 10 INJECTION, EMULSION INTRAVENOUS at 05:46

## 2019-09-02 RX ADMIN — FAMOTIDINE 20 MG: 20 TABLET ORAL at 17:16

## 2019-09-02 RX ADMIN — METOPROLOL TARTRATE 100 MG: 100 TABLET ORAL at 17:16

## 2019-09-02 RX ADMIN — CHLORHEXIDINE GLUCONATE 0.12% ORAL RINSE 15 ML: 1.2 LIQUID ORAL at 05:36

## 2019-09-02 RX ADMIN — MAGNESIUM SULFATE IN WATER 2 G: 40 INJECTION, SOLUTION INTRAVENOUS at 09:45

## 2019-09-02 RX ADMIN — DIGOXIN 250 MCG: 0.25 INJECTION INTRAMUSCULAR; INTRAVENOUS at 14:30

## 2019-09-02 RX ADMIN — DIGOXIN 500 MCG: 0.25 INJECTION INTRAMUSCULAR; INTRAVENOUS at 08:19

## 2019-09-02 RX ADMIN — MORPHINE SULFATE 2 MG: 10 INJECTION INTRAVENOUS at 01:30

## 2019-09-02 ASSESSMENT — COGNITIVE AND FUNCTIONAL STATUS - GENERAL
SUGGESTED CMS G CODE MODIFIER MOBILITY: CM
EATING MEALS: TOTAL
MOVING TO AND FROM BED TO CHAIR: UNABLE
WALKING IN HOSPITAL ROOM: A LOT
SUGGESTED CMS G CODE MODIFIER DAILY ACTIVITY: CM
HELP NEEDED FOR BATHING: A LOT
MOBILITY SCORE: 9
DAILY ACTIVITIY SCORE: 8
DRESSING REGULAR LOWER BODY CLOTHING: TOTAL
TOILETING: TOTAL
PERSONAL GROOMING: A LOT
DRESSING REGULAR UPPER BODY CLOTHING: TOTAL
MOVING FROM LYING ON BACK TO SITTING ON SIDE OF FLAT BED: UNABLE
CLIMB 3 TO 5 STEPS WITH RAILING: A LOT
STANDING UP FROM CHAIR USING ARMS: A LOT
TURNING FROM BACK TO SIDE WHILE IN FLAT BAD: UNABLE

## 2019-09-02 ASSESSMENT — GAIT ASSESSMENTS: GAIT LEVEL OF ASSIST: UNABLE TO PARTICIPATE

## 2019-09-02 ASSESSMENT — ACTIVITIES OF DAILY LIVING (ADL): TOILETING: UNABLE TO DETERMINE AT THIS TIME

## 2019-09-02 NOTE — CARE PLAN
Problem: Nutritional:  Goal: Nutrition support tolerated and meeting greater than 85% of estimated needs  Outcome: MET  TF is with Peptamen Intense VHP at 55 mL/hr, which is the goal with pt on propofol.  However, propofol is now at 20 mL/hr, which provides 528 kcals/day.  In order to provide enough protein, pt is now being fed at estimated RMR of ~1800 kcals/day (MSJ x 1.0) instead of being hypocalorically fed.  RD following.
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
## 2019-09-02 NOTE — CARE PLAN
Problem: Venous Thromboembolism (VTW)/Deep Vein Thrombosis (DVT) Prevention:  Goal: Patient will participate in Venous Thrombosis (VTE)/Deep Vein Thrombosis (DVT)Prevention Measures  Outcome: PROGRESSING AS EXPECTED  Intervention: Ensure patient wears graduated elastic stockings (JULIO hose) and/or SCDs, if ordered, when in bed or chair (Remove at least once per shift for skin check)  Flowsheets (Taken 9/1/2019 2000 by Elle Cedillo RCLEMENT)  Mechanical Prophylaxis : SCDs, Sequential Compression Device  Note:   SCDs in place for mechanical prophylaxis. Pharmacologic prophylaxis contraindicated at this time per MD.     Problem: Pain Management  Goal: Pain level will decrease to patient's comfort goal  Outcome: PROGRESSING AS EXPECTED  Intervention: Follow pain managment plan developed in collaboration with patient and Interdisciplinary Team  Note:   Assessing pain q2hrs and PRN. Providing pain meds per MAR as needed. Assisting patient to rest and reposition for comfort.

## 2019-09-02 NOTE — FLOWSHEET NOTE
09/02/19 1134   Trache Weaning   Placed on T-Piece/Collar and Stopped Vent Clock Yes   Length of T-Piece Trial (Hours) 4 hours 4 min   T-Piece Trial Smart Text Completed? Yes
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
## 2019-09-02 NOTE — CARE PLAN
Problem: Pain Management  Goal: Pain level will decrease to patient's comfort goal  Outcome: PROGRESSING AS EXPECTED  Intervention: Follow pain managment plan developed in collaboration with patient and Interdisciplinary Team  Note:   Assess pain Q2H and provide interventions as indicated     Problem: Respiratory:  Goal: Respiratory status will improve  Outcome: PROGRESSING AS EXPECTED  Intervention: Assess and monitor pulmonary status  Note:   Collaborate with RT in attempt to wean patient's ventilator requirements

## 2019-09-02 NOTE — FLOWSHEET NOTE
09/02/19 0730   Weaning Parameters   RR (bpm) 17   Rapid Shallow Breathing Index (RR/VT) 20   Spontaneous VE 9.5   Spontaneous    Weaning Trial   Weaning Trial Stopped due to: Pt weaned for 1 hour and returned to rest settings per protocol  (Pt Place don T-Piece )   Length of Weaning Trial (Hours) 1 hour   Vent Weaning Smart Text completed? Yes
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
## 2019-09-02 NOTE — PROGRESS NOTES
Cortrak Placement    Tube Team verified patient name and medical record number prior to tube placement.  Cortrak tube (55 inches, 8 [place] citizen) placed at 70 cm in right nare.  Per Cortrak picture, tube appears to be in the stomach.  Nursing Instructions: Awaiting KUB to confirm placement before use for medications or feeding. Once placement confirmed, flush tube with 30 ml of water, and then remove and save stylet, in patient medication drawer.
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
## 2019-09-02 NOTE — CARE PLAN
Problem: Ventilation Defect:  Goal: Ability to achieve and maintain unassisted ventilation or tolerate decreased levels of ventilator support  Outcome: PROGRESSING AS EXPECTED   Adult Ventilation Update    Total Vent Days: 6  Trach Day: 4    Patient Lines/Drains/Airways Status      Active Airway       Name: Placement date: Placement time: Site: Days:    Airway Trach Tracheostomy 8.0  08/29/19   1800   Tracheostomy  3      Plateau Pressure (Q Shift): 10 (08/31/19 1852)  Static Compliance (ml / cm H2O): 73 (09/02/19 0229)    3 day post trache.  The patient is currently in T-piece wean according to protocol.    (ASV) % MIN VOL: 120 (09/02/19 0229)  PEEP: 8  FIO2: 40%    Pt remains on vent with no changes made overnight.

## 2019-09-03 ENCOUNTER — APPOINTMENT (OUTPATIENT)
Dept: RADIOLOGY | Facility: MEDICAL CENTER | Age: 79
DRG: 003 | End: 2019-09-03
Attending: NURSE PRACTITIONER
Payer: MEDICARE

## 2019-09-03 LAB
ANION GAP SERPL CALC-SCNC: 6 MMOL/L (ref 0–11.9)
BASOPHILS # BLD AUTO: 0.2 % (ref 0–1.8)
BASOPHILS # BLD: 0.03 K/UL (ref 0–0.12)
BUN SERPL-MCNC: 15 MG/DL (ref 8–22)
CALCIUM SERPL-MCNC: 7.8 MG/DL (ref 8.5–10.5)
CHLORIDE SERPL-SCNC: 108 MMOL/L (ref 96–112)
CO2 SERPL-SCNC: 27 MMOL/L (ref 20–33)
CREAT SERPL-MCNC: 0.58 MG/DL (ref 0.5–1.4)
CRP SERPL HS-MCNC: 4.18 MG/DL (ref 0–0.75)
EOSINOPHIL # BLD AUTO: 0.15 K/UL (ref 0–0.51)
EOSINOPHIL NFR BLD: 1.2 % (ref 0–6.9)
ERYTHROCYTE [DISTWIDTH] IN BLOOD BY AUTOMATED COUNT: 53.8 FL (ref 35.9–50)
GLUCOSE SERPL-MCNC: 105 MG/DL (ref 65–99)
HCT VFR BLD AUTO: 33.8 % (ref 42–52)
HGB BLD-MCNC: 10.2 G/DL (ref 14–18)
IMM GRANULOCYTES # BLD AUTO: 0.09 K/UL (ref 0–0.11)
IMM GRANULOCYTES NFR BLD AUTO: 0.7 % (ref 0–0.9)
LYMPHOCYTES # BLD AUTO: 1.02 K/UL (ref 1–4.8)
LYMPHOCYTES NFR BLD: 8.3 % (ref 22–41)
MCH RBC QN AUTO: 31.1 PG (ref 27–33)
MCHC RBC AUTO-ENTMCNC: 30.2 G/DL (ref 33.7–35.3)
MCV RBC AUTO: 103 FL (ref 81.4–97.8)
MONOCYTES # BLD AUTO: 0.66 K/UL (ref 0–0.85)
MONOCYTES NFR BLD AUTO: 5.4 % (ref 0–13.4)
NEUTROPHILS # BLD AUTO: 10.38 K/UL (ref 1.82–7.42)
NEUTROPHILS NFR BLD: 84.2 % (ref 44–72)
NRBC # BLD AUTO: 0 K/UL
NRBC BLD-RTO: 0 /100 WBC
PLATELET # BLD AUTO: 187 K/UL (ref 164–446)
PMV BLD AUTO: 8.9 FL (ref 9–12.9)
POTASSIUM SERPL-SCNC: 3.5 MMOL/L (ref 3.6–5.5)
PREALB SERPL-MCNC: 12 MG/DL (ref 18–38)
RBC # BLD AUTO: 3.28 M/UL (ref 4.7–6.1)
SODIUM SERPL-SCNC: 141 MMOL/L (ref 135–145)
WBC # BLD AUTO: 12.3 K/UL (ref 4.8–10.8)

## 2019-09-03 PROCEDURE — 700102 HCHG RX REV CODE 250 W/ 637 OVERRIDE(OP): Performed by: SURGERY

## 2019-09-03 PROCEDURE — 700112 HCHG RX REV CODE 229: Performed by: NURSE PRACTITIONER

## 2019-09-03 PROCEDURE — 700111 HCHG RX REV CODE 636 W/ 250 OVERRIDE (IP): Performed by: SURGERY

## 2019-09-03 PROCEDURE — 700102 HCHG RX REV CODE 250 W/ 637 OVERRIDE(OP): Performed by: NURSE PRACTITIONER

## 2019-09-03 PROCEDURE — 700111 HCHG RX REV CODE 636 W/ 250 OVERRIDE (IP): Performed by: NURSE PRACTITIONER

## 2019-09-03 PROCEDURE — 86140 C-REACTIVE PROTEIN: CPT

## 2019-09-03 PROCEDURE — 84134 ASSAY OF PREALBUMIN: CPT

## 2019-09-03 PROCEDURE — A9270 NON-COVERED ITEM OR SERVICE: HCPCS | Performed by: NURSE PRACTITIONER

## 2019-09-03 PROCEDURE — 71045 X-RAY EXAM CHEST 1 VIEW: CPT

## 2019-09-03 PROCEDURE — A9270 NON-COVERED ITEM OR SERVICE: HCPCS | Performed by: SURGERY

## 2019-09-03 PROCEDURE — 94003 VENT MGMT INPAT SUBQ DAY: CPT

## 2019-09-03 PROCEDURE — 770022 HCHG ROOM/CARE - ICU (200)

## 2019-09-03 PROCEDURE — 99232 SBSQ HOSP IP/OBS MODERATE 35: CPT | Performed by: INTERNAL MEDICINE

## 2019-09-03 PROCEDURE — 94640 AIRWAY INHALATION TREATMENT: CPT

## 2019-09-03 PROCEDURE — 85025 COMPLETE CBC W/AUTO DIFF WBC: CPT

## 2019-09-03 PROCEDURE — A9270 NON-COVERED ITEM OR SERVICE: HCPCS | Performed by: ORAL & MAXILLOFACIAL SURGERY

## 2019-09-03 PROCEDURE — A9270 NON-COVERED ITEM OR SERVICE: HCPCS | Performed by: INTERNAL MEDICINE

## 2019-09-03 PROCEDURE — 700102 HCHG RX REV CODE 250 W/ 637 OVERRIDE(OP): Performed by: INTERNAL MEDICINE

## 2019-09-03 PROCEDURE — 99291 CRITICAL CARE FIRST HOUR: CPT | Performed by: SURGERY

## 2019-09-03 PROCEDURE — 80048 BASIC METABOLIC PNL TOTAL CA: CPT

## 2019-09-03 PROCEDURE — 700111 HCHG RX REV CODE 636 W/ 250 OVERRIDE (IP): Performed by: EMERGENCY MEDICINE

## 2019-09-03 PROCEDURE — 700102 HCHG RX REV CODE 250 W/ 637 OVERRIDE(OP): Performed by: ORAL & MAXILLOFACIAL SURGERY

## 2019-09-03 RX ORDER — PAROXETINE HYDROCHLORIDE 20 MG/1
10 TABLET, FILM COATED ORAL DAILY
Status: DISCONTINUED | OUTPATIENT
Start: 2019-09-04 | End: 2019-09-10

## 2019-09-03 RX ORDER — LISINOPRIL 5 MG/1
5 TABLET ORAL
Status: DISCONTINUED | OUTPATIENT
Start: 2019-09-03 | End: 2019-09-03

## 2019-09-03 RX ORDER — PAROXETINE HYDROCHLORIDE 20 MG/1
10 TABLET, FILM COATED ORAL DAILY
Status: DISCONTINUED | OUTPATIENT
Start: 2019-09-03 | End: 2019-09-03

## 2019-09-03 RX ORDER — POTASSIUM CHLORIDE 20 MEQ/1
10 TABLET, EXTENDED RELEASE ORAL 2 TIMES DAILY
Status: DISCONTINUED | OUTPATIENT
Start: 2019-09-03 | End: 2019-09-03

## 2019-09-03 RX ORDER — FUROSEMIDE 10 MG/ML
20 INJECTION INTRAMUSCULAR; INTRAVENOUS
Status: DISCONTINUED | OUTPATIENT
Start: 2019-09-03 | End: 2019-09-04

## 2019-09-03 RX ORDER — OXYCODONE HYDROCHLORIDE 10 MG/1
10 TABLET ORAL EVERY 4 HOURS PRN
Status: DISCONTINUED | OUTPATIENT
Start: 2019-09-03 | End: 2019-09-23

## 2019-09-03 RX ORDER — LISINOPRIL 5 MG/1
5 TABLET ORAL
Status: DISCONTINUED | OUTPATIENT
Start: 2019-09-04 | End: 2019-09-15

## 2019-09-03 RX ORDER — OXYCODONE HYDROCHLORIDE 5 MG/1
5 TABLET ORAL EVERY 4 HOURS PRN
Status: DISCONTINUED | OUTPATIENT
Start: 2019-09-03 | End: 2019-09-23

## 2019-09-03 RX ADMIN — APIXABAN 5 MG: 5 TABLET, FILM COATED ORAL at 18:10

## 2019-09-03 RX ADMIN — CHLORHEXIDINE GLUCONATE 0.12% ORAL RINSE 15 ML: 1.2 LIQUID ORAL at 06:09

## 2019-09-03 RX ADMIN — FAMOTIDINE 20 MG: 20 TABLET ORAL at 06:09

## 2019-09-03 RX ADMIN — DIGOXIN 125 MCG: 125 TABLET ORAL at 18:10

## 2019-09-03 RX ADMIN — OXYCODONE HYDROCHLORIDE 10 MG: 5 TABLET ORAL at 14:14

## 2019-09-03 RX ADMIN — FUROSEMIDE 20 MG: 10 INJECTION, SOLUTION INTRAMUSCULAR; INTRAVENOUS at 15:59

## 2019-09-03 RX ADMIN — PROPOFOL 25 MCG/KG/MIN: 10 INJECTION, EMULSION INTRAVENOUS at 11:15

## 2019-09-03 RX ADMIN — POLYETHYLENE GLYCOL 3350 1 PACKET: 17 POWDER, FOR SOLUTION ORAL at 18:09

## 2019-09-03 RX ADMIN — POTASSIUM BICARBONATE 25 MEQ: 978 TABLET, EFFERVESCENT ORAL at 11:18

## 2019-09-03 RX ADMIN — DOCUSATE SODIUM 100 MG: 50 LIQUID ORAL at 18:09

## 2019-09-03 RX ADMIN — ENOXAPARIN SODIUM 30 MG: 100 INJECTION SUBCUTANEOUS at 06:10

## 2019-09-03 RX ADMIN — FAMOTIDINE 20 MG: 20 TABLET ORAL at 18:09

## 2019-09-03 RX ADMIN — FUROSEMIDE 20 MG: 10 INJECTION, SOLUTION INTRAMUSCULAR; INTRAVENOUS at 11:18

## 2019-09-03 RX ADMIN — OXYCODONE HYDROCHLORIDE 10 MG: 5 TABLET ORAL at 09:51

## 2019-09-03 RX ADMIN — CHLORHEXIDINE GLUCONATE 0.12% ORAL RINSE 15 ML: 1.2 LIQUID ORAL at 18:09

## 2019-09-03 RX ADMIN — OXYCODONE HYDROCHLORIDE 10 MG: 5 TABLET ORAL at 02:00

## 2019-09-03 RX ADMIN — SENNOSIDES, DOCUSATE SODIUM 1 TABLET: 50; 8.6 TABLET, FILM COATED ORAL at 22:00

## 2019-09-03 RX ADMIN — DOCUSATE SODIUM 100 MG: 50 LIQUID ORAL at 06:09

## 2019-09-03 RX ADMIN — POTASSIUM BICARBONATE 25 MEQ: 978 TABLET, EFFERVESCENT ORAL at 18:09

## 2019-09-03 RX ADMIN — HYDRALAZINE HYDROCHLORIDE 20 MG: 20 INJECTION INTRAMUSCULAR; INTRAVENOUS at 00:11

## 2019-09-03 RX ADMIN — OXYCODONE HYDROCHLORIDE 10 MG: 10 TABLET ORAL at 18:09

## 2019-09-03 RX ADMIN — LISINOPRIL 5 MG: 5 TABLET ORAL at 11:17

## 2019-09-03 RX ADMIN — MORPHINE SULFATE 4 MG: 4 INJECTION INTRAVENOUS at 15:18

## 2019-09-03 RX ADMIN — METOPROLOL TARTRATE 100 MG: 100 TABLET ORAL at 18:10

## 2019-09-03 RX ADMIN — OXYCODONE HYDROCHLORIDE 10 MG: 10 TABLET ORAL at 22:00

## 2019-09-03 RX ADMIN — POTASSIUM BICARBONATE 50 MEQ: 978 TABLET, EFFERVESCENT ORAL at 09:51

## 2019-09-03 RX ADMIN — METOPROLOL TARTRATE 100 MG: 100 TABLET ORAL at 06:09

## 2019-09-03 RX ADMIN — PAROXETINE HYDROCHLORIDE 10 MG: 20 TABLET, FILM COATED ORAL at 14:14

## 2019-09-03 ASSESSMENT — CHA2DS2 SCORE
AGE 75 OR GREATER: YES
PRIOR STROKE OR TIA OR THROMBOEMBOLISM: NO
HYPERTENSION: YES
AGE 65 TO 74: NO
DIABETES: NO
CHF OR LEFT VENTRICULAR DYSFUNCTION: YES
VASCULAR DISEASE: NO
SEX: MALE
CHA2DS2 VASC SCORE: 4

## 2019-09-03 NOTE — CARE PLAN
Problem: Skin Integrity  Goal: Risk for impaired skin integrity will decrease  Outcome: PROGRESSING AS EXPECTED  Note:   Problem: Skin Integrity  Goal: Skin Integrity is maintained or improved  Intervention: Pato Skin Risk Assessment  Pato assessed q shift-14, repositioned q 2hrs  Intervention: TURN EVERY 2 HOURS WHILE ON BEDREST  Pillows for positioning, mattress pressure 26, SCDs in place, Heel Float Boots in Place      Problem: Mechanical Ventilation  Goal: Ability to express needs and understand communication  Outcome: PROGRESSING AS EXPECTED  Note:   Patient tolerating T-piece trials

## 2019-09-03 NOTE — CARE PLAN
Problem: Pain Management  Goal: Pain level will decrease to patient's comfort goal  Outcome: PROGRESSING AS EXPECTED  Intervention: Follow pain managment plan developed in collaboration with patient and Interdisciplinary Team  Note:   Assess pain level Q2H and provide interventions as indicated     Problem: Respiratory:  Goal: Respiratory status will improve  Outcome: PROGRESSING AS EXPECTED  Intervention: Assess and monitor pulmonary status  Note:   Collaborate with RT in attempt to wean patient from the ventilator

## 2019-09-03 NOTE — CARE PLAN
Vent Day 7, Trache day 5. T-Piece from 1284-3899. 30-40% 5-10LPM. Desat X1. Moderate thick tan/bloody secretions.
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
## 2019-09-03 NOTE — PROGRESS NOTES
2 Rn skin check with ANAMARIA Rogel. Skin noted as followed:     Skin assessed under SCD's- no pressure areas of concern.   Skin assessed around trach, old blood present. Trach care performed.    R cortrak- no areas of concern.   Tay assessed- no breakdown noted; increasing edema 3/4+ penile/scrotal edema.   Central line assessed- no areas of concern.  Sacrum intact-mepilex  Pillows in use for q2 turns.   Elbows and heels floated on pillows, no areas of concern.   LUE w/ skin tear- weeping   Surgical incision to chin- sutured, EMRE.   Mouth and lips assessed- pink; jaw wiring   Oral hygiene per protocol.   Facial edema to chin/neck L>R     All lines & skin preventative measures in place.

## 2019-09-03 NOTE — CARE PLAN
Problem: Ventilation Defect:  Goal: Ability to achieve and maintain unassisted ventilation or tolerate decreased levels of ventilator support  Outcome: PROGRESSING AS EXPECTED   Adult Ventilation Update    Total Vent Days: 7  Trach Day: 5    Patient Lines/Drains/Airways Status      Active Airway       Name: Placement date: Placement time: Site: Days:    Airway Trach Tracheostomy 8.0  08/29/19   1800   Tracheostomy  4            Plateau Pressure (Q Shift): 13 (09/03/19 0239)  Static Compliance (ml / cm H2O): 110 (09/03/19 0239)        5 day post trache.  The patient is currently in T-piece wean according to protocol.    (ASV) % MIN VOL: 120 (09/03/19 0239)    Pt did 4 hours on T-piece and then placed back on vent for the night.
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
## 2019-09-04 ENCOUNTER — APPOINTMENT (OUTPATIENT)
Dept: RADIOLOGY | Facility: MEDICAL CENTER | Age: 79
DRG: 003 | End: 2019-09-04
Attending: SURGERY
Payer: MEDICARE

## 2019-09-04 ENCOUNTER — APPOINTMENT (OUTPATIENT)
Dept: RADIOLOGY | Facility: MEDICAL CENTER | Age: 79
DRG: 003 | End: 2019-09-04
Attending: NURSE PRACTITIONER
Payer: MEDICARE

## 2019-09-04 LAB
ANION GAP SERPL CALC-SCNC: 6 MMOL/L (ref 0–11.9)
BASOPHILS # BLD AUTO: 0.3 % (ref 0–1.8)
BASOPHILS # BLD: 0.04 K/UL (ref 0–0.12)
BUN SERPL-MCNC: 22 MG/DL (ref 8–22)
CALCIUM SERPL-MCNC: 7.8 MG/DL (ref 8.5–10.5)
CHLORIDE SERPL-SCNC: 104 MMOL/L (ref 96–112)
CO2 SERPL-SCNC: 28 MMOL/L (ref 20–33)
CREAT SERPL-MCNC: 0.67 MG/DL (ref 0.5–1.4)
EOSINOPHIL # BLD AUTO: 0.14 K/UL (ref 0–0.51)
EOSINOPHIL NFR BLD: 1.1 % (ref 0–6.9)
ERYTHROCYTE [DISTWIDTH] IN BLOOD BY AUTOMATED COUNT: 53.9 FL (ref 35.9–50)
GLUCOSE SERPL-MCNC: 103 MG/DL (ref 65–99)
HCT VFR BLD AUTO: 32.3 % (ref 42–52)
HGB BLD-MCNC: 9.8 G/DL (ref 14–18)
IMM GRANULOCYTES # BLD AUTO: 0.09 K/UL (ref 0–0.11)
IMM GRANULOCYTES NFR BLD AUTO: 0.7 % (ref 0–0.9)
LYMPHOCYTES # BLD AUTO: 0.86 K/UL (ref 1–4.8)
LYMPHOCYTES NFR BLD: 7 % (ref 22–41)
MCH RBC QN AUTO: 31.3 PG (ref 27–33)
MCHC RBC AUTO-ENTMCNC: 30.3 G/DL (ref 33.7–35.3)
MCV RBC AUTO: 103.2 FL (ref 81.4–97.8)
MONOCYTES # BLD AUTO: 0.67 K/UL (ref 0–0.85)
MONOCYTES NFR BLD AUTO: 5.5 % (ref 0–13.4)
NEUTROPHILS # BLD AUTO: 10.49 K/UL (ref 1.82–7.42)
NEUTROPHILS NFR BLD: 85.4 % (ref 44–72)
NRBC # BLD AUTO: 0 K/UL
NRBC BLD-RTO: 0 /100 WBC
PLATELET # BLD AUTO: 182 K/UL (ref 164–446)
PMV BLD AUTO: 8.9 FL (ref 9–12.9)
POTASSIUM SERPL-SCNC: 3.8 MMOL/L (ref 3.6–5.5)
RBC # BLD AUTO: 3.13 M/UL (ref 4.7–6.1)
SODIUM SERPL-SCNC: 138 MMOL/L (ref 135–145)
WBC # BLD AUTO: 12.3 K/UL (ref 4.8–10.8)

## 2019-09-04 PROCEDURE — 700102 HCHG RX REV CODE 250 W/ 637 OVERRIDE(OP): Performed by: INTERNAL MEDICINE

## 2019-09-04 PROCEDURE — A9270 NON-COVERED ITEM OR SERVICE: HCPCS | Performed by: NURSE PRACTITIONER

## 2019-09-04 PROCEDURE — 97530 THERAPEUTIC ACTIVITIES: CPT

## 2019-09-04 PROCEDURE — 97535 SELF CARE MNGMENT TRAINING: CPT

## 2019-09-04 PROCEDURE — 700112 HCHG RX REV CODE 229: Performed by: NURSE PRACTITIONER

## 2019-09-04 PROCEDURE — A9270 NON-COVERED ITEM OR SERVICE: HCPCS | Performed by: SURGERY

## 2019-09-04 PROCEDURE — 94003 VENT MGMT INPAT SUBQ DAY: CPT

## 2019-09-04 PROCEDURE — 700111 HCHG RX REV CODE 636 W/ 250 OVERRIDE (IP): Performed by: INTERNAL MEDICINE

## 2019-09-04 PROCEDURE — 99232 SBSQ HOSP IP/OBS MODERATE 35: CPT | Performed by: INTERNAL MEDICINE

## 2019-09-04 PROCEDURE — 700111 HCHG RX REV CODE 636 W/ 250 OVERRIDE (IP): Performed by: NURSE PRACTITIONER

## 2019-09-04 PROCEDURE — 700105 HCHG RX REV CODE 258: Performed by: INTERNAL MEDICINE

## 2019-09-04 PROCEDURE — 700102 HCHG RX REV CODE 250 W/ 637 OVERRIDE(OP): Performed by: SURGERY

## 2019-09-04 PROCEDURE — 94640 AIRWAY INHALATION TREATMENT: CPT

## 2019-09-04 PROCEDURE — 700102 HCHG RX REV CODE 250 W/ 637 OVERRIDE(OP): Performed by: NURSE PRACTITIONER

## 2019-09-04 PROCEDURE — 71045 X-RAY EXAM CHEST 1 VIEW: CPT

## 2019-09-04 PROCEDURE — 85025 COMPLETE CBC W/AUTO DIFF WBC: CPT

## 2019-09-04 PROCEDURE — A9270 NON-COVERED ITEM OR SERVICE: HCPCS | Performed by: ORAL & MAXILLOFACIAL SURGERY

## 2019-09-04 PROCEDURE — A9270 NON-COVERED ITEM OR SERVICE: HCPCS | Performed by: INTERNAL MEDICINE

## 2019-09-04 PROCEDURE — 700102 HCHG RX REV CODE 250 W/ 637 OVERRIDE(OP): Performed by: ORAL & MAXILLOFACIAL SURGERY

## 2019-09-04 PROCEDURE — 80048 BASIC METABOLIC PNL TOTAL CA: CPT

## 2019-09-04 PROCEDURE — 99291 CRITICAL CARE FIRST HOUR: CPT | Performed by: SURGERY

## 2019-09-04 PROCEDURE — 770022 HCHG ROOM/CARE - ICU (200)

## 2019-09-04 RX ORDER — QUETIAPINE FUMARATE 25 MG/1
50 TABLET, FILM COATED ORAL EVERY 8 HOURS
Status: DISCONTINUED | OUTPATIENT
Start: 2019-09-04 | End: 2019-09-06

## 2019-09-04 RX ORDER — FUROSEMIDE 10 MG/ML
20 INJECTION INTRAMUSCULAR; INTRAVENOUS ONCE
Status: COMPLETED | OUTPATIENT
Start: 2019-09-04 | End: 2019-09-04

## 2019-09-04 RX ORDER — DIGOXIN 250 MCG
250 TABLET ORAL DAILY
Status: DISCONTINUED | OUTPATIENT
Start: 2019-09-04 | End: 2019-09-23

## 2019-09-04 RX ORDER — DIGOXIN 0.25 MG/ML
125 INJECTION INTRAMUSCULAR; INTRAVENOUS ONCE
Status: COMPLETED | OUTPATIENT
Start: 2019-09-04 | End: 2019-09-04

## 2019-09-04 RX ORDER — DIGOXIN 125 MCG
125 TABLET ORAL ONCE
Status: DISCONTINUED | OUTPATIENT
Start: 2019-09-04 | End: 2019-09-04

## 2019-09-04 RX ORDER — FUROSEMIDE 10 MG/ML
40 INJECTION INTRAMUSCULAR; INTRAVENOUS
Status: DISCONTINUED | OUTPATIENT
Start: 2019-09-04 | End: 2019-09-12

## 2019-09-04 RX ADMIN — DOCUSATE SODIUM 100 MG: 50 LIQUID ORAL at 16:25

## 2019-09-04 RX ADMIN — MORPHINE SULFATE 4 MG: 4 INJECTION INTRAVENOUS at 09:45

## 2019-09-04 RX ADMIN — CHLORHEXIDINE GLUCONATE 0.12% ORAL RINSE 15 ML: 1.2 LIQUID ORAL at 05:25

## 2019-09-04 RX ADMIN — MORPHINE SULFATE 4 MG: 4 INJECTION INTRAVENOUS at 04:00

## 2019-09-04 RX ADMIN — SENNOSIDES, DOCUSATE SODIUM 1 TABLET: 50; 8.6 TABLET, FILM COATED ORAL at 21:43

## 2019-09-04 RX ADMIN — AMIODARONE HYDROCHLORIDE 1 MG/MIN: 50 INJECTION, SOLUTION INTRAVENOUS at 16:39

## 2019-09-04 RX ADMIN — OXYCODONE HYDROCHLORIDE 10 MG: 10 TABLET ORAL at 17:51

## 2019-09-04 RX ADMIN — MORPHINE SULFATE 4 MG: 4 INJECTION INTRAVENOUS at 02:00

## 2019-09-04 RX ADMIN — FUROSEMIDE 20 MG: 10 INJECTION, SOLUTION INTRAMUSCULAR; INTRAVENOUS at 05:26

## 2019-09-04 RX ADMIN — PAROXETINE HYDROCHLORIDE 10 MG: 20 TABLET, FILM COATED ORAL at 05:25

## 2019-09-04 RX ADMIN — DOCUSATE SODIUM 100 MG: 50 LIQUID ORAL at 05:25

## 2019-09-04 RX ADMIN — MAGNESIUM HYDROXIDE 30 ML: 400 SUSPENSION ORAL at 05:26

## 2019-09-04 RX ADMIN — MORPHINE SULFATE 4 MG: 4 INJECTION INTRAVENOUS at 14:25

## 2019-09-04 RX ADMIN — QUETIAPINE FUMARATE 50 MG: 25 TABLET ORAL at 09:45

## 2019-09-04 RX ADMIN — CHLORHEXIDINE GLUCONATE 0.12% ORAL RINSE 15 ML: 1.2 LIQUID ORAL at 16:25

## 2019-09-04 RX ADMIN — QUETIAPINE FUMARATE 50 MG: 25 TABLET ORAL at 16:24

## 2019-09-04 RX ADMIN — POTASSIUM BICARBONATE 25 MEQ: 978 TABLET, EFFERVESCENT ORAL at 05:25

## 2019-09-04 RX ADMIN — METOPROLOL TARTRATE 100 MG: 100 TABLET ORAL at 05:25

## 2019-09-04 RX ADMIN — APIXABAN 5 MG: 5 TABLET, FILM COATED ORAL at 05:25

## 2019-09-04 RX ADMIN — DIGOXIN 250 MCG: 250 TABLET ORAL at 16:25

## 2019-09-04 RX ADMIN — APIXABAN 5 MG: 5 TABLET, FILM COATED ORAL at 16:25

## 2019-09-04 RX ADMIN — ACETAMINOPHEN 650 MG: 325 TABLET, FILM COATED ORAL at 17:51

## 2019-09-04 RX ADMIN — POLYETHYLENE GLYCOL 3350 1 PACKET: 17 POWDER, FOR SOLUTION ORAL at 05:26

## 2019-09-04 RX ADMIN — OXYCODONE HYDROCHLORIDE 10 MG: 10 TABLET ORAL at 03:00

## 2019-09-04 RX ADMIN — FAMOTIDINE 20 MG: 20 TABLET ORAL at 05:26

## 2019-09-04 RX ADMIN — AMIODARONE HYDROCHLORIDE 0.5 MG/MIN: 50 INJECTION, SOLUTION INTRAVENOUS at 23:59

## 2019-09-04 RX ADMIN — MORPHINE SULFATE 4 MG: 4 INJECTION INTRAVENOUS at 00:15

## 2019-09-04 RX ADMIN — FUROSEMIDE 40 MG: 10 INJECTION, SOLUTION INTRAMUSCULAR; INTRAVENOUS at 16:24

## 2019-09-04 RX ADMIN — MORPHINE SULFATE 4 MG: 4 INJECTION INTRAVENOUS at 12:15

## 2019-09-04 RX ADMIN — OXYCODONE HYDROCHLORIDE 10 MG: 10 TABLET ORAL at 08:30

## 2019-09-04 RX ADMIN — METOPROLOL TARTRATE 100 MG: 100 TABLET ORAL at 16:24

## 2019-09-04 RX ADMIN — OXYCODONE HYDROCHLORIDE 10 MG: 10 TABLET ORAL at 14:35

## 2019-09-04 RX ADMIN — AMIODARONE HYDROCHLORIDE 150 MG: 1.5 INJECTION, SOLUTION INTRAVENOUS at 16:11

## 2019-09-04 RX ADMIN — POLYETHYLENE GLYCOL 3350 1 PACKET: 17 POWDER, FOR SOLUTION ORAL at 16:24

## 2019-09-04 RX ADMIN — DIGOXIN 125 MCG: 0.25 INJECTION INTRAMUSCULAR; INTRAVENOUS at 14:29

## 2019-09-04 RX ADMIN — POTASSIUM BICARBONATE 50 MEQ: 978 TABLET, EFFERVESCENT ORAL at 16:26

## 2019-09-04 RX ADMIN — LISINOPRIL 5 MG: 5 TABLET ORAL at 05:26

## 2019-09-04 RX ADMIN — FAMOTIDINE 20 MG: 20 TABLET ORAL at 16:25

## 2019-09-04 RX ADMIN — FUROSEMIDE 20 MG: 10 INJECTION, SOLUTION INTRAMUSCULAR; INTRAVENOUS at 08:21

## 2019-09-04 ASSESSMENT — COGNITIVE AND FUNCTIONAL STATUS - GENERAL
DAILY ACTIVITIY SCORE: 7
WALKING IN HOSPITAL ROOM: A LOT
SUGGESTED CMS G CODE MODIFIER MOBILITY: CM
DRESSING REGULAR UPPER BODY CLOTHING: TOTAL
TURNING FROM BACK TO SIDE WHILE IN FLAT BAD: UNABLE
MOBILITY SCORE: 9
HELP NEEDED FOR BATHING: TOTAL
MOVING FROM LYING ON BACK TO SITTING ON SIDE OF FLAT BED: UNABLE
MOVING TO AND FROM BED TO CHAIR: UNABLE
TOILETING: TOTAL
CLIMB 3 TO 5 STEPS WITH RAILING: A LOT
STANDING UP FROM CHAIR USING ARMS: A LOT
PERSONAL GROOMING: A LOT
EATING MEALS: TOTAL
DRESSING REGULAR LOWER BODY CLOTHING: TOTAL
SUGGESTED CMS G CODE MODIFIER DAILY ACTIVITY: CM

## 2019-09-04 ASSESSMENT — CHA2DS2 SCORE
PRIOR STROKE OR TIA OR THROMBOEMBOLISM: NO
CHA2DS2 VASC SCORE: 4
VASCULAR DISEASE: NO
AGE 75 OR GREATER: YES
AGE 65 TO 74: NO
SEX: MALE
DIABETES: NO
CHF OR LEFT VENTRICULAR DYSFUNCTION: YES
HYPERTENSION: YES

## 2019-09-04 ASSESSMENT — GAIT ASSESSMENTS: GAIT LEVEL OF ASSIST: UNABLE TO PARTICIPATE

## 2019-09-04 NOTE — CARE PLAN
Problem: Pain Management  Goal: Pain level will decrease to patient's comfort goal  Outcome: PROGRESSING AS EXPECTED  Note:   PRN medications utilized to reduce patient pain level and anxiety related restlessness     Problem: Skin Integrity  Goal: Risk for impaired skin integrity will decrease  Outcome: PROGRESSING AS EXPECTED  Note:   Problem: Skin Integrity  Goal: Skin Integrity is maintained or improved  Intervention: Pato Skin Risk Assessment  Pato assessed q shift-15, repositioned q 2hrs  Intervention: TURN EVERY 2 HOURS WHILE ON BEDREST  Pillows for positioning, mattress pressure 26, SCDs in place, Heel Float Boots in Place

## 2019-09-04 NOTE — PROGRESS NOTES
Cortrak Placement    Tube Team verified patient name and medical record number prior to tube placement.  Cortrak tube (43 inches, 12 Nauruan) placed at 81 cm in right nare.  Per Cortrak picture, tube appears to be in the stomach.  Nursing Instructions: Awaiting KUB to confirm placement before use for medications or feeding. Once placement confirmed, flush tube with 30 ml of water, and then remove and save stylet, in patient medication drawer.
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
## 2019-09-04 NOTE — THERAPY
"Physical Therapy Treatment completed.   Bed Mobility:  Supine to Sit: Moderate Assist  Transfers: Sit to Stand: Moderate Assist(x2)  Gait: Level Of Assist: Unable to Participate with No Equipment Needed       Plan of Care: Will benefit from Physical Therapy 3 times per week  Discharge Recommendations: Equipment: Front-Wheel Walker. Post-acute therapy Discharge to a transitional care facility for continued skilled therapy services.     See \"Rehab Therapy-Acute\" Patient Summary Report for complete documentation.     Pt was much more lucid during therapy today. Pt was able to follow 1 step commands 75% of the time. Pt demonstrates increased functional mobility, balance, activity tolerance, and ability to follow commands compared to previous session. Pt is still very impulsive when sitting on EOB (pt unsafely reaching for chair and attempting standing). Pt would benefit from skilled PT in the acute setting to improve his limitations. Anticipate d/c to transitional care. Pt is able to communicate with paper and pen.    "
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
## 2019-09-04 NOTE — CARE PLAN
Problem: Ventilation Defect:  Goal: Ability to achieve and maintain unassisted ventilation or tolerate decreased levels of ventilator support  Outcome: PROGRESSING AS EXPECTED   Adult Ventilation Update    Total Vent Days: 8  Trach Day: 6    Patient Lines/Drains/Airways Status      Active Airway       Name: Placement date: Placement time: Site: Days:    Airway Trach Tracheostomy 8.0  08/29/19   1800   Tracheostomy  5      Plateau Pressure (Q Shift): 12 (09/03/19 1858)  Static Compliance (ml / cm H2O): 68 (09/04/19 0245)      6 day post trache.  The patient is currently in T-piece wean according to protocol.    (ASV) % MIN VOL: 120 (09/04/19 0245)    Pt remains on vent with no changes made overnight.

## 2019-09-04 NOTE — PROGRESS NOTES
2 Rn skin check with Marli DOMINGUEZ RN. Skin noted as followed:     Skin assessed under SCD's- no pressure areas of concern.   Skin assessed around trache. Trach care performed.    R cortrak- no areas of concern.   Tay assessed- no breakdown noted; increasing edema 3+ penile/scrotal edema.   Central line assessed- no areas of concern.  Sacrum intact-mepilex  Pillows in use for q2 turns.   Elbows and heels floated on pillows, no areas of concern.   LUE w/ skin tear- weeping   Surgical incision to chin- sutured, EMRE.   Mouth and lips assessed- pink; jaw wiring   Oral hygiene per protocol.   Facial edema to chin/neck L>R  L chin/neck incision with sutures      All lines & skin preventative measures in place.

## 2019-09-04 NOTE — THERAPY
"Occupational Therapy Treatment completed with focus on ADLs, ADL transfers and patient education.  Functional Status:  Mod A supine>sit, Max A x2 sit>stand, Max A x2 lateral steps EOB, Max A LB dressing and seated grooming.   Plan of Care: Will benefit from Occupational Therapy 3 times per week  Discharge Recommendations:  Equipment Will Continue to Assess for Equipment Needs. Post-acute therapy Recommend post-acute placement for additional occupational therapy services prior to discharge home. Patient can tolerate post-acute therapies at a 5x/week frequency.    See \"Rehab Therapy-Acute\" Patient Summary Report for complete documentation.     Pt seen for OT tx session on this date. Pt eager to mobilize, wrote notes to communicate, perseverating on voiding and his wife. Pt followed 75% of 1 step commands w/ very delayed responses. Pt's strength appears to be WFL, however balance, coordination, and cognition appear impaired. Will continue to follow for Acute OT services. Recommend post acte placement.   "

## 2019-09-04 NOTE — PROGRESS NOTES
"Pt is able to write comprehensive notes & nods appropriately; pt nods \"yes\" when asked if he wants to die, nods \"yes\" when asked if he wants to kill himself, writes notes that read \"God help me, I need help\".  Pt found trying to smother himself with his pillow; MD made aware, legal hold initiated  "

## 2019-09-05 ENCOUNTER — APPOINTMENT (OUTPATIENT)
Dept: RADIOLOGY | Facility: MEDICAL CENTER | Age: 79
DRG: 003 | End: 2019-09-05
Attending: NURSE PRACTITIONER
Payer: MEDICARE

## 2019-09-05 ENCOUNTER — APPOINTMENT (OUTPATIENT)
Dept: RADIOLOGY | Facility: MEDICAL CENTER | Age: 79
DRG: 003 | End: 2019-09-05
Attending: SURGERY
Payer: MEDICARE

## 2019-09-05 LAB
ANION GAP SERPL CALC-SCNC: 6 MMOL/L (ref 0–11.9)
BASOPHILS # BLD AUTO: 0.4 % (ref 0–1.8)
BASOPHILS # BLD: 0.03 K/UL (ref 0–0.12)
BUN SERPL-MCNC: 23 MG/DL (ref 8–22)
CALCIUM SERPL-MCNC: 7.9 MG/DL (ref 8.5–10.5)
CHLORIDE SERPL-SCNC: 100 MMOL/L (ref 96–112)
CO2 SERPL-SCNC: 30 MMOL/L (ref 20–33)
CREAT SERPL-MCNC: 0.64 MG/DL (ref 0.5–1.4)
DIGOXIN SERPL-MCNC: 1.1 NG/ML (ref 0.8–2)
EOSINOPHIL # BLD AUTO: 0.1 K/UL (ref 0–0.51)
EOSINOPHIL NFR BLD: 1.3 % (ref 0–6.9)
ERYTHROCYTE [DISTWIDTH] IN BLOOD BY AUTOMATED COUNT: 52.2 FL (ref 35.9–50)
GLUCOSE SERPL-MCNC: 104 MG/DL (ref 65–99)
HCT VFR BLD AUTO: 30.7 % (ref 42–52)
HGB BLD-MCNC: 9.7 G/DL (ref 14–18)
IMM GRANULOCYTES # BLD AUTO: 0.06 K/UL (ref 0–0.11)
IMM GRANULOCYTES NFR BLD AUTO: 0.8 % (ref 0–0.9)
LYMPHOCYTES # BLD AUTO: 0.72 K/UL (ref 1–4.8)
LYMPHOCYTES NFR BLD: 9.2 % (ref 22–41)
MAGNESIUM SERPL-MCNC: 1.9 MG/DL (ref 1.5–2.5)
MCH RBC QN AUTO: 32.2 PG (ref 27–33)
MCHC RBC AUTO-ENTMCNC: 31.6 G/DL (ref 33.7–35.3)
MCV RBC AUTO: 102 FL (ref 81.4–97.8)
MONOCYTES # BLD AUTO: 0.55 K/UL (ref 0–0.85)
MONOCYTES NFR BLD AUTO: 7.1 % (ref 0–13.4)
NEUTROPHILS # BLD AUTO: 6.34 K/UL (ref 1.82–7.42)
NEUTROPHILS NFR BLD: 81.2 % (ref 44–72)
NRBC # BLD AUTO: 0 K/UL
NRBC BLD-RTO: 0 /100 WBC
PHOSPHATE SERPL-MCNC: 2.9 MG/DL (ref 2.5–4.5)
PLATELET # BLD AUTO: 170 K/UL (ref 164–446)
PMV BLD AUTO: 9.2 FL (ref 9–12.9)
POTASSIUM SERPL-SCNC: 3.5 MMOL/L (ref 3.6–5.5)
RBC # BLD AUTO: 3.01 M/UL (ref 4.7–6.1)
SODIUM SERPL-SCNC: 136 MMOL/L (ref 135–145)
TRIGL SERPL-MCNC: 89 MG/DL (ref 0–149)
WBC # BLD AUTO: 7.8 K/UL (ref 4.8–10.8)

## 2019-09-05 PROCEDURE — 84478 ASSAY OF TRIGLYCERIDES: CPT

## 2019-09-05 PROCEDURE — 700102 HCHG RX REV CODE 250 W/ 637 OVERRIDE(OP): Performed by: INTERNAL MEDICINE

## 2019-09-05 PROCEDURE — 700102 HCHG RX REV CODE 250 W/ 637 OVERRIDE(OP): Performed by: SURGERY

## 2019-09-05 PROCEDURE — 94003 VENT MGMT INPAT SUBQ DAY: CPT

## 2019-09-05 PROCEDURE — 94760 N-INVAS EAR/PLS OXIMETRY 1: CPT

## 2019-09-05 PROCEDURE — 99291 CRITICAL CARE FIRST HOUR: CPT | Performed by: SURGERY

## 2019-09-05 PROCEDURE — 700111 HCHG RX REV CODE 636 W/ 250 OVERRIDE (IP): Performed by: INTERNAL MEDICINE

## 2019-09-05 PROCEDURE — 700102 HCHG RX REV CODE 250 W/ 637 OVERRIDE(OP): Performed by: NURSE PRACTITIONER

## 2019-09-05 PROCEDURE — 306565 RIGID MIT RESTRAINT(PAIR): Performed by: SURGERY

## 2019-09-05 PROCEDURE — 700112 HCHG RX REV CODE 229: Performed by: NURSE PRACTITIONER

## 2019-09-05 PROCEDURE — 94640 AIRWAY INHALATION TREATMENT: CPT

## 2019-09-05 PROCEDURE — 83735 ASSAY OF MAGNESIUM: CPT

## 2019-09-05 PROCEDURE — 80162 ASSAY OF DIGOXIN TOTAL: CPT

## 2019-09-05 PROCEDURE — A9270 NON-COVERED ITEM OR SERVICE: HCPCS | Performed by: NURSE PRACTITIONER

## 2019-09-05 PROCEDURE — 99232 SBSQ HOSP IP/OBS MODERATE 35: CPT | Performed by: INTERNAL MEDICINE

## 2019-09-05 PROCEDURE — 700102 HCHG RX REV CODE 250 W/ 637 OVERRIDE(OP): Performed by: ORAL & MAXILLOFACIAL SURGERY

## 2019-09-05 PROCEDURE — A9270 NON-COVERED ITEM OR SERVICE: HCPCS | Performed by: ORAL & MAXILLOFACIAL SURGERY

## 2019-09-05 PROCEDURE — A9270 NON-COVERED ITEM OR SERVICE: HCPCS | Performed by: INTERNAL MEDICINE

## 2019-09-05 PROCEDURE — 85025 COMPLETE CBC W/AUTO DIFF WBC: CPT

## 2019-09-05 PROCEDURE — A9270 NON-COVERED ITEM OR SERVICE: HCPCS | Performed by: SURGERY

## 2019-09-05 PROCEDURE — 700111 HCHG RX REV CODE 636 W/ 250 OVERRIDE (IP): Performed by: NURSE PRACTITIONER

## 2019-09-05 PROCEDURE — 770022 HCHG ROOM/CARE - ICU (200)

## 2019-09-05 PROCEDURE — 700105 HCHG RX REV CODE 258: Performed by: INTERNAL MEDICINE

## 2019-09-05 PROCEDURE — 84100 ASSAY OF PHOSPHORUS: CPT

## 2019-09-05 PROCEDURE — 80048 BASIC METABOLIC PNL TOTAL CA: CPT

## 2019-09-05 PROCEDURE — 71045 X-RAY EXAM CHEST 1 VIEW: CPT

## 2019-09-05 RX ORDER — METOPROLOL TARTRATE 50 MG/1
100 TABLET, FILM COATED ORAL 3 TIMES DAILY
Status: DISCONTINUED | OUTPATIENT
Start: 2019-09-05 | End: 2019-09-06

## 2019-09-05 RX ORDER — AMIODARONE HYDROCHLORIDE 200 MG/1
400 TABLET ORAL
Status: DISCONTINUED | OUTPATIENT
Start: 2019-09-05 | End: 2019-09-23

## 2019-09-05 RX ADMIN — PAROXETINE HYDROCHLORIDE 10 MG: 20 TABLET, FILM COATED ORAL at 05:10

## 2019-09-05 RX ADMIN — POTASSIUM BICARBONATE 50 MEQ: 978 TABLET, EFFERVESCENT ORAL at 13:34

## 2019-09-05 RX ADMIN — AMIODARONE HYDROCHLORIDE 0.5 MG/MIN: 50 INJECTION, SOLUTION INTRAVENOUS at 13:34

## 2019-09-05 RX ADMIN — METOPROLOL TARTRATE 100 MG: 100 TABLET ORAL at 05:11

## 2019-09-05 RX ADMIN — MAGNESIUM HYDROXIDE 30 ML: 400 SUSPENSION ORAL at 05:10

## 2019-09-05 RX ADMIN — ACETAMINOPHEN 650 MG: 325 TABLET, FILM COATED ORAL at 08:47

## 2019-09-05 RX ADMIN — MORPHINE SULFATE 4 MG: 4 INJECTION INTRAVENOUS at 00:06

## 2019-09-05 RX ADMIN — FAMOTIDINE 20 MG: 20 TABLET ORAL at 20:05

## 2019-09-05 RX ADMIN — QUETIAPINE FUMARATE 50 MG: 25 TABLET ORAL at 01:41

## 2019-09-05 RX ADMIN — QUETIAPINE FUMARATE 50 MG: 25 TABLET ORAL at 08:47

## 2019-09-05 RX ADMIN — POTASSIUM BICARBONATE 50 MEQ: 978 TABLET, EFFERVESCENT ORAL at 22:43

## 2019-09-05 RX ADMIN — APIXABAN 5 MG: 5 TABLET, FILM COATED ORAL at 20:06

## 2019-09-05 RX ADMIN — OXYCODONE HYDROCHLORIDE 5 MG: 5 TABLET ORAL at 12:22

## 2019-09-05 RX ADMIN — POTASSIUM BICARBONATE 50 MEQ: 978 TABLET, EFFERVESCENT ORAL at 05:27

## 2019-09-05 RX ADMIN — FAMOTIDINE 20 MG: 20 TABLET ORAL at 05:10

## 2019-09-05 RX ADMIN — POLYETHYLENE GLYCOL 3350 1 PACKET: 17 POWDER, FOR SOLUTION ORAL at 05:10

## 2019-09-05 RX ADMIN — DIGOXIN 250 MCG: 250 TABLET ORAL at 20:05

## 2019-09-05 RX ADMIN — FUROSEMIDE 40 MG: 10 INJECTION, SOLUTION INTRAMUSCULAR; INTRAVENOUS at 05:11

## 2019-09-05 RX ADMIN — METOPROLOL TARTRATE 100 MG: 50 TABLET ORAL at 14:37

## 2019-09-05 RX ADMIN — DOCUSATE SODIUM 100 MG: 50 LIQUID ORAL at 05:10

## 2019-09-05 RX ADMIN — MORPHINE SULFATE 4 MG: 4 INJECTION INTRAVENOUS at 20:09

## 2019-09-05 RX ADMIN — LISINOPRIL 5 MG: 5 TABLET ORAL at 05:10

## 2019-09-05 RX ADMIN — CHLORHEXIDINE GLUCONATE 0.12% ORAL RINSE 15 ML: 1.2 LIQUID ORAL at 05:10

## 2019-09-05 RX ADMIN — CHLORHEXIDINE GLUCONATE 0.12% ORAL RINSE 15 ML: 1.2 LIQUID ORAL at 18:07

## 2019-09-05 RX ADMIN — QUETIAPINE FUMARATE 50 MG: 25 TABLET ORAL at 20:11

## 2019-09-05 RX ADMIN — AMIODARONE HYDROCHLORIDE 400 MG: 200 TABLET ORAL at 14:37

## 2019-09-05 RX ADMIN — METOPROLOL TARTRATE 100 MG: 50 TABLET ORAL at 20:06

## 2019-09-05 RX ADMIN — APIXABAN 5 MG: 5 TABLET, FILM COATED ORAL at 05:11

## 2019-09-05 RX ADMIN — FUROSEMIDE 40 MG: 10 INJECTION, SOLUTION INTRAMUSCULAR; INTRAVENOUS at 15:45

## 2019-09-05 ASSESSMENT — CHA2DS2 SCORE
DIABETES: NO
PRIOR STROKE OR TIA OR THROMBOEMBOLISM: NO
CHA2DS2 VASC SCORE: 4
AGE 75 OR GREATER: YES
VASCULAR DISEASE: NO
AGE 65 TO 74: NO
SEX: MALE
CHF OR LEFT VENTRICULAR DYSFUNCTION: YES
HYPERTENSION: YES

## 2019-09-05 NOTE — PROGRESS NOTES
2 RN skin check complete with ANAMARIA Munguia.  Devices in place BP cuff, EKG leads, pulse ox, cortrak, trach, vale   Skin assessed under the above devices.   Preventative measures in place including mepilex, q2h turns.    Following areas of concern:     -Laceration to chin, sutures in place. Well approximated edges. Small amount of serosanguinous drainage noted, open to air.  -Tracheostomy site red and edematous, sutures removed.  -Small skin tear to right flank, open to air.        Wound consult placedYES/NO: N\A    Wound reported YES/NO: yes  Appropriate LDAs opened YES/NO: yes
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
## 2019-09-05 NOTE — CARE PLAN
Respiratory Update    Treatment modality:     Heated aerosol 10 lpm 40%    Vent as needed for resp distress per MD    Pt tolerating current treatments well with no adverse reactions.

## 2019-09-05 NOTE — PROGRESS NOTES
I sat with the patient from [id number] and then gave report to PSA @ 1830. Safety checklist completed with ANAMARIA Davis.
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
## 2019-09-05 NOTE — PROGRESS NOTES
0920: Interdisciplinary team at bedside. MD notified of large thick secretions. PT/OT orders received.    1600: Pt assisted to cardiac chair. Wife at bedside, updated on plan of care.    1745: Pt removed cortrak while restrained.

## 2019-09-05 NOTE — PROGRESS NOTES
2 RN skin check completed    Devices in place: Tracheostomy, BP cuff, EKG leads, SCDs bilaterally, vale catheter, right subclavian CVC, cortrak     Skin assessed under all devices. Devices repositioned Q2hr.     Areas of concern:  -Laceration to chin, sutures in place. Well approximated edges. Small amount of serosanguinous drainage noted  -Tracheostomy site red and edematous  -Small skin tear to right flank.   -Jaw wired shut     Preventative measures:  -Sacral mepilex in place  -Q2hour turning minimum  -Frequent skin assessments/keeping the body free of moisture  -Device repositioning Q2hour minimum  -ROM  -Mobilization     Wound found: No  Wound consult placed: no  Appropriate LDAs open in flowsheets: no

## 2019-09-05 NOTE — CARE PLAN
Problem: Communication  Goal: The ability to communicate needs accurately and effectively will improve  Outcome: PROGRESSING AS EXPECTED  Note:   Pt writes notes     Problem: Psychosocial Needs:  Goal: Level of anxiety will decrease  Outcome: PROGRESSING AS EXPECTED  Note:   Emotional support provided

## 2019-09-05 NOTE — PROGRESS NOTES
Patient safety checklist completed bedside with sitter. Wife at bedside. Patient sleeping at this time.

## 2019-09-05 NOTE — CARE PLAN
Problem: Pain Management  Goal: Pain level will decrease to patient's comfort goal  Intervention: Follow pain managment plan developed in collaboration with patient and Interdisciplinary Team  Note:   Pain assessed utilizing CPOT pain assessment tool. Pain medication given per MAR      Problem: Skin Integrity  Goal: Risk for impaired skin integrity will decrease  Intervention: Implement precautions to protect skin integrity in collaboration with the interdisciplinary team  Note:   Interventions currently in place to prevent skin breakdown include: Q2hour turning minimum, frequent skin assessments, keeping the body free of excess moisture. Pillows in use for pressure redistribution. Sacral mepilex applied.

## 2019-09-06 ENCOUNTER — APPOINTMENT (OUTPATIENT)
Dept: RADIOLOGY | Facility: MEDICAL CENTER | Age: 79
DRG: 003 | End: 2019-09-06
Attending: SURGERY
Payer: MEDICARE

## 2019-09-06 ENCOUNTER — APPOINTMENT (OUTPATIENT)
Dept: RADIOLOGY | Facility: MEDICAL CENTER | Age: 79
DRG: 003 | End: 2019-09-06
Attending: NURSE PRACTITIONER
Payer: MEDICARE

## 2019-09-06 LAB
ANION GAP SERPL CALC-SCNC: 8 MMOL/L (ref 0–11.9)
BASOPHILS # BLD AUTO: 0.4 % (ref 0–1.8)
BASOPHILS # BLD: 0.05 K/UL (ref 0–0.12)
BUN SERPL-MCNC: 21 MG/DL (ref 8–22)
CALCIUM SERPL-MCNC: 8.2 MG/DL (ref 8.5–10.5)
CHLORIDE SERPL-SCNC: 100 MMOL/L (ref 96–112)
CO2 SERPL-SCNC: 29 MMOL/L (ref 20–33)
CREAT SERPL-MCNC: 0.61 MG/DL (ref 0.5–1.4)
EOSINOPHIL # BLD AUTO: 0.1 K/UL (ref 0–0.51)
EOSINOPHIL NFR BLD: 0.7 % (ref 0–6.9)
ERYTHROCYTE [DISTWIDTH] IN BLOOD BY AUTOMATED COUNT: 51 FL (ref 35.9–50)
GLUCOSE SERPL-MCNC: 115 MG/DL (ref 65–99)
HCT VFR BLD AUTO: 33 % (ref 42–52)
HGB BLD-MCNC: 10.1 G/DL (ref 14–18)
IMM GRANULOCYTES # BLD AUTO: 0.11 K/UL (ref 0–0.11)
IMM GRANULOCYTES NFR BLD AUTO: 0.8 % (ref 0–0.9)
LYMPHOCYTES # BLD AUTO: 0.52 K/UL (ref 1–4.8)
LYMPHOCYTES NFR BLD: 3.7 % (ref 22–41)
MCH RBC QN AUTO: 31.1 PG (ref 27–33)
MCHC RBC AUTO-ENTMCNC: 30.6 G/DL (ref 33.7–35.3)
MCV RBC AUTO: 101.5 FL (ref 81.4–97.8)
MONOCYTES # BLD AUTO: 0.82 K/UL (ref 0–0.85)
MONOCYTES NFR BLD AUTO: 5.8 % (ref 0–13.4)
NEUTROPHILS # BLD AUTO: 12.48 K/UL (ref 1.82–7.42)
NEUTROPHILS NFR BLD: 88.6 % (ref 44–72)
NRBC # BLD AUTO: 0 K/UL
NRBC BLD-RTO: 0 /100 WBC
NT-PROBNP SERPL IA-MCNC: 7787 PG/ML (ref 0–125)
PLATELET # BLD AUTO: 187 K/UL (ref 164–446)
PMV BLD AUTO: 8.9 FL (ref 9–12.9)
POTASSIUM SERPL-SCNC: 3.8 MMOL/L (ref 3.6–5.5)
RBC # BLD AUTO: 3.25 M/UL (ref 4.7–6.1)
SODIUM SERPL-SCNC: 137 MMOL/L (ref 135–145)
WBC # BLD AUTO: 14.1 K/UL (ref 4.8–10.8)

## 2019-09-06 PROCEDURE — 74018 RADEX ABDOMEN 1 VIEW: CPT

## 2019-09-06 PROCEDURE — A9270 NON-COVERED ITEM OR SERVICE: HCPCS | Performed by: NURSE PRACTITIONER

## 2019-09-06 PROCEDURE — 700102 HCHG RX REV CODE 250 W/ 637 OVERRIDE(OP): Performed by: INTERNAL MEDICINE

## 2019-09-06 PROCEDURE — 306565 RIGID MIT RESTRAINT(PAIR): Performed by: SURGERY

## 2019-09-06 PROCEDURE — 94640 AIRWAY INHALATION TREATMENT: CPT

## 2019-09-06 PROCEDURE — 305246 HCHG SPEAKING VALVE ADAPTER

## 2019-09-06 PROCEDURE — 700102 HCHG RX REV CODE 250 W/ 637 OVERRIDE(OP): Performed by: SURGERY

## 2019-09-06 PROCEDURE — 92522 EVALUATE SPEECH PRODUCTION: CPT

## 2019-09-06 PROCEDURE — 83880 ASSAY OF NATRIURETIC PEPTIDE: CPT

## 2019-09-06 PROCEDURE — A9270 NON-COVERED ITEM OR SERVICE: HCPCS | Performed by: SURGERY

## 2019-09-06 PROCEDURE — 80048 BASIC METABOLIC PNL TOTAL CA: CPT

## 2019-09-06 PROCEDURE — A9270 NON-COVERED ITEM OR SERVICE: HCPCS | Performed by: ORAL & MAXILLOFACIAL SURGERY

## 2019-09-06 PROCEDURE — L8501 TRACHEOSTOMY SPEAKING VALVE: HCPCS

## 2019-09-06 PROCEDURE — 85025 COMPLETE CBC W/AUTO DIFF WBC: CPT

## 2019-09-06 PROCEDURE — A9270 NON-COVERED ITEM OR SERVICE: HCPCS | Performed by: INTERNAL MEDICINE

## 2019-09-06 PROCEDURE — 700102 HCHG RX REV CODE 250 W/ 637 OVERRIDE(OP): Performed by: NURSE PRACTITIONER

## 2019-09-06 PROCEDURE — 99291 CRITICAL CARE FIRST HOUR: CPT | Performed by: SURGERY

## 2019-09-06 PROCEDURE — 94760 N-INVAS EAR/PLS OXIMETRY 1: CPT

## 2019-09-06 PROCEDURE — 302101 FENESTRATED FOAM: Performed by: SURGERY

## 2019-09-06 PROCEDURE — 700111 HCHG RX REV CODE 636 W/ 250 OVERRIDE (IP): Performed by: NURSE PRACTITIONER

## 2019-09-06 PROCEDURE — 700102 HCHG RX REV CODE 250 W/ 637 OVERRIDE(OP): Performed by: ORAL & MAXILLOFACIAL SURGERY

## 2019-09-06 PROCEDURE — 97763 ORTHC/PROSTC MGMT SBSQ ENC: CPT

## 2019-09-06 PROCEDURE — 770022 HCHG ROOM/CARE - ICU (200)

## 2019-09-06 PROCEDURE — 71045 X-RAY EXAM CHEST 1 VIEW: CPT

## 2019-09-06 RX ORDER — SPIRONOLACTONE 50 MG/1
25 TABLET, FILM COATED ORAL
Status: DISCONTINUED | OUTPATIENT
Start: 2019-09-07 | End: 2019-09-30

## 2019-09-06 RX ORDER — QUETIAPINE FUMARATE 25 MG/1
100 TABLET, FILM COATED ORAL
Status: DISCONTINUED | OUTPATIENT
Start: 2019-09-06 | End: 2019-09-24

## 2019-09-06 RX ORDER — METOPROLOL TARTRATE 1 MG/ML
5 INJECTION, SOLUTION INTRAVENOUS
Status: DISCONTINUED | OUTPATIENT
Start: 2019-09-06 | End: 2019-09-14

## 2019-09-06 RX ORDER — QUETIAPINE FUMARATE 25 MG/1
50 TABLET, FILM COATED ORAL 2 TIMES DAILY
Status: DISCONTINUED | OUTPATIENT
Start: 2019-09-06 | End: 2019-09-25

## 2019-09-06 RX ORDER — SPIRONOLACTONE 25 MG/1
25 TABLET ORAL
Status: DISCONTINUED | OUTPATIENT
Start: 2019-09-06 | End: 2019-09-06

## 2019-09-06 RX ORDER — QUETIAPINE FUMARATE 25 MG/1
50 TABLET, FILM COATED ORAL 2 TIMES DAILY
Status: DISCONTINUED | OUTPATIENT
Start: 2019-09-07 | End: 2019-09-06

## 2019-09-06 RX ORDER — METOPROLOL TARTRATE 100 MG/1
100 TABLET ORAL 4 TIMES DAILY
Status: DISCONTINUED | OUTPATIENT
Start: 2019-09-06 | End: 2019-09-24

## 2019-09-06 RX ORDER — QUETIAPINE FUMARATE 100 MG/1
100 TABLET, FILM COATED ORAL EVERY EVENING
Status: DISCONTINUED | OUTPATIENT
Start: 2019-09-06 | End: 2019-09-06

## 2019-09-06 RX ADMIN — AMIODARONE HYDROCHLORIDE 400 MG: 200 TABLET ORAL at 04:56

## 2019-09-06 RX ADMIN — FAMOTIDINE 20 MG: 20 TABLET ORAL at 17:40

## 2019-09-06 RX ADMIN — POTASSIUM BICARBONATE 50 MEQ: 978 TABLET, EFFERVESCENT ORAL at 21:17

## 2019-09-06 RX ADMIN — FUROSEMIDE 40 MG: 10 INJECTION, SOLUTION INTRAMUSCULAR; INTRAVENOUS at 04:56

## 2019-09-06 RX ADMIN — METOPROLOL TARTRATE 100 MG: 100 TABLET ORAL at 17:32

## 2019-09-06 RX ADMIN — METOPROLOL TARTRATE 100 MG: 50 TABLET ORAL at 04:55

## 2019-09-06 RX ADMIN — LISINOPRIL 5 MG: 5 TABLET ORAL at 04:55

## 2019-09-06 RX ADMIN — FUROSEMIDE 40 MG: 10 INJECTION, SOLUTION INTRAMUSCULAR; INTRAVENOUS at 17:32

## 2019-09-06 RX ADMIN — PAROXETINE HYDROCHLORIDE 10 MG: 20 TABLET, FILM COATED ORAL at 04:55

## 2019-09-06 RX ADMIN — DIGOXIN 250 MCG: 250 TABLET ORAL at 17:34

## 2019-09-06 RX ADMIN — APIXABAN 5 MG: 5 TABLET, FILM COATED ORAL at 04:55

## 2019-09-06 RX ADMIN — QUETIAPINE FUMARATE 100 MG: 100 TABLET ORAL at 19:52

## 2019-09-06 RX ADMIN — FAMOTIDINE 20 MG: 20 TABLET ORAL at 04:55

## 2019-09-06 RX ADMIN — CHLORHEXIDINE GLUCONATE 0.12% ORAL RINSE 15 ML: 1.2 LIQUID ORAL at 17:34

## 2019-09-06 RX ADMIN — APIXABAN 5 MG: 5 TABLET, FILM COATED ORAL at 17:32

## 2019-09-06 RX ADMIN — CHLORHEXIDINE GLUCONATE 0.12% ORAL RINSE 15 ML: 1.2 LIQUID ORAL at 04:56

## 2019-09-06 RX ADMIN — POTASSIUM BICARBONATE 50 MEQ: 978 TABLET, EFFERVESCENT ORAL at 05:16

## 2019-09-06 RX ADMIN — QUETIAPINE FUMARATE 50 MG: 25 TABLET ORAL at 03:47

## 2019-09-06 RX ADMIN — METOPROLOL TARTRATE 100 MG: 100 TABLET ORAL at 21:17

## 2019-09-06 ASSESSMENT — CHA2DS2 SCORE
PRIOR STROKE OR TIA OR THROMBOEMBOLISM: NO
VASCULAR DISEASE: NO
CHA2DS2 VASC SCORE: 4
AGE 65 TO 74: NO
CHF OR LEFT VENTRICULAR DYSFUNCTION: YES
HYPERTENSION: YES
AGE 75 OR GREATER: YES
SEX: MALE
DIABETES: NO

## 2019-09-06 NOTE — PROGRESS NOTES
Cortrak Placement    Tube Team verified patient name and medical record number prior to tube placement.  Cortrak tube (43 inches, 10 Egyptian) placed at 58 cm in right nare.  Per Cortrak picture, tube appears to be in the stomach.  Nursing Instructions: Awaiting KUB to confirm placement before use for medications or feeding. Once placement confirmed, flush tube with 30 ml of water, and then remove and save stylet, in patient medication drawer.
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
## 2019-09-06 NOTE — THERAPY
"Speech Language Therapy Evaluation completed to address speech  Functional Status:  Pt seen on this date for a speaking valve evaluation. Pt awake upon entry and sitter outside room. Pt currently on 40% Fi02 at 10L via t-piece. Pt tolerated in-line suctioning x2 with thick bloody secretions removed from t-piece and minimal secretions via sub-glottic port. Cuff was deflated with 3cc of air and speaking valve was placed. He tolerated speaking valve for 22 minutes with no significant change in vitals. Pt had increased audible upper airway congestion the longer speaking valve was on so it was removed x1 to deep suction and then replaced. Minimal thin bloody secretions removed. Thick bloody nasal secretions were removed x2. Vocal quality was strong, however, speech was unintelligible 2/2 jaw wiring, however, pt attempting to speaking at phrase level. He had a weak volitional cough and unable to clear upper airway congestion independently. Intermittent reflexive swallowing, however, pt unable to follow directive for volitional swallow. The speaking valve was removed, cuff was re-inflated with 3cc of air, and pt tolerated in-line suction x3 and thin to thick secretions were removed. Vitals stable and RN aware of session and cuff inflation. At this time, okay for trained RN and RT to place speaking valve for short increments of time (15-30 minutes) as tolerated. SLP to follow.    Recommendations:  Okay for trained RN and RT to place speaking valve for short increments of time (15-30 minutes) as tolerated  Plan of Care: Will benefit from Speech Therapy 5 times per week  Post-Acute Therapy: Recommend inpatient transitional care services for continued speech therapy services.        See \"Rehab Therapy-Acute\" Patient Summary Report for complete documentation.     "
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
## 2019-09-06 NOTE — DISCHARGE PLANNING
Legal hold expires today 9/6 at 1700. Pt will not be medically clear by end of 72 hrs. Filed limbo petition to the court via [employer]. Waiting on verified petition.

## 2019-09-06 NOTE — PROGRESS NOTES
Received report and relieved PSA for break at 0920. Check list completed with PSA. Jennifer Pimentel CCT

## 2019-09-06 NOTE — DISCHARGE PLANNING
SERENA received a call from Legal Hold [facility] . Lashanda requested a psych consult to be placed by MD so psych can see pt to determine if legal hold extension is appropriate. HODAW informed Dr. Green during ITD rounds.

## 2019-09-06 NOTE — CARE PLAN
Problem: Pain Management  Goal: Pain level will decrease to patient's comfort goal  Intervention: Follow pain managment plan developed in collaboration with patient and Interdisciplinary Team  Note:   Pain assessed utilizing nonverbal Pain assessment tool. Pain medications given per MAR       Problem: Skin Integrity  Goal: Risk for impaired skin integrity will decrease  Intervention: Implement precautions to protect skin integrity in collaboration with the interdisciplinary team  Note:   Interventions currently in place to prevent skin breakdown include: Q2hour turning minimum, frequent skin assessments, keeping the body free of excess moisture. Pillows in use for pressure redistribution. Sacral mepilex applied.

## 2019-09-06 NOTE — PROGRESS NOTES
Cortrak Placement    Tube Team verified patient name and medical record number prior to tube placement.  Cortrak tube (55 inches, 10 Bahamian) placed at 58 cm in right nare.  Per Cortrak picture, tube appears to be in the stomach.  Nursing Instructions: Awaiting KUB to confirm placement before use for medications or feeding. Once placement confirmed, flush tube with 30 ml of water, and then remove and save stylet, in patient medication drawer.       1650: ** Per radiology read, cortrak feeding tube needs to be advanced.  Patient positioned at 90 degrees.  Advanced feeding to to 95 cm.  Bridal placed.

## 2019-09-07 ENCOUNTER — APPOINTMENT (OUTPATIENT)
Dept: RADIOLOGY | Facility: MEDICAL CENTER | Age: 79
DRG: 003 | End: 2019-09-07
Attending: NURSE PRACTITIONER
Payer: MEDICARE

## 2019-09-07 LAB
ANION GAP SERPL CALC-SCNC: 7 MMOL/L (ref 0–11.9)
BASOPHILS # BLD AUTO: 0.5 % (ref 0–1.8)
BASOPHILS # BLD: 0.06 K/UL (ref 0–0.12)
BUN SERPL-MCNC: 23 MG/DL (ref 8–22)
CALCIUM SERPL-MCNC: 8.3 MG/DL (ref 8.5–10.5)
CHLORIDE SERPL-SCNC: 100 MMOL/L (ref 96–112)
CO2 SERPL-SCNC: 30 MMOL/L (ref 20–33)
CREAT SERPL-MCNC: 0.62 MG/DL (ref 0.5–1.4)
DIGOXIN SERPL-MCNC: 1.2 NG/ML (ref 0.8–2)
EOSINOPHIL # BLD AUTO: 0.12 K/UL (ref 0–0.51)
EOSINOPHIL NFR BLD: 1 % (ref 0–6.9)
ERYTHROCYTE [DISTWIDTH] IN BLOOD BY AUTOMATED COUNT: 50.4 FL (ref 35.9–50)
GLUCOSE SERPL-MCNC: 117 MG/DL (ref 65–99)
HCT VFR BLD AUTO: 31.2 % (ref 42–52)
HGB BLD-MCNC: 9.7 G/DL (ref 14–18)
IMM GRANULOCYTES # BLD AUTO: 0.08 K/UL (ref 0–0.11)
IMM GRANULOCYTES NFR BLD AUTO: 0.7 % (ref 0–0.9)
LYMPHOCYTES # BLD AUTO: 0.72 K/UL (ref 1–4.8)
LYMPHOCYTES NFR BLD: 6 % (ref 22–41)
MCH RBC QN AUTO: 31.4 PG (ref 27–33)
MCHC RBC AUTO-ENTMCNC: 31.1 G/DL (ref 33.7–35.3)
MCV RBC AUTO: 101 FL (ref 81.4–97.8)
MONOCYTES # BLD AUTO: 0.79 K/UL (ref 0–0.85)
MONOCYTES NFR BLD AUTO: 6.6 % (ref 0–13.4)
NEUTROPHILS # BLD AUTO: 10.25 K/UL (ref 1.82–7.42)
NEUTROPHILS NFR BLD: 85.2 % (ref 44–72)
NRBC # BLD AUTO: 0 K/UL
NRBC BLD-RTO: 0 /100 WBC
PLATELET # BLD AUTO: 193 K/UL (ref 164–446)
PMV BLD AUTO: 9.2 FL (ref 9–12.9)
POTASSIUM SERPL-SCNC: 3.4 MMOL/L (ref 3.6–5.5)
RBC # BLD AUTO: 3.09 M/UL (ref 4.7–6.1)
SODIUM SERPL-SCNC: 137 MMOL/L (ref 135–145)
WBC # BLD AUTO: 12 K/UL (ref 4.8–10.8)

## 2019-09-07 PROCEDURE — 700102 HCHG RX REV CODE 250 W/ 637 OVERRIDE(OP): Performed by: NURSE PRACTITIONER

## 2019-09-07 PROCEDURE — A9270 NON-COVERED ITEM OR SERVICE: HCPCS | Performed by: SURGERY

## 2019-09-07 PROCEDURE — 302101 FENESTRATED FOAM: Performed by: SURGERY

## 2019-09-07 PROCEDURE — A9270 NON-COVERED ITEM OR SERVICE: HCPCS | Performed by: INTERNAL MEDICINE

## 2019-09-07 PROCEDURE — 99233 SBSQ HOSP IP/OBS HIGH 50: CPT | Performed by: SURGERY

## 2019-09-07 PROCEDURE — 99231 SBSQ HOSP IP/OBS SF/LOW 25: CPT | Performed by: INTERNAL MEDICINE

## 2019-09-07 PROCEDURE — 80162 ASSAY OF DIGOXIN TOTAL: CPT

## 2019-09-07 PROCEDURE — A9270 NON-COVERED ITEM OR SERVICE: HCPCS | Performed by: NURSE PRACTITIONER

## 2019-09-07 PROCEDURE — 700102 HCHG RX REV CODE 250 W/ 637 OVERRIDE(OP): Performed by: ORAL & MAXILLOFACIAL SURGERY

## 2019-09-07 PROCEDURE — 770022 HCHG ROOM/CARE - ICU (200)

## 2019-09-07 PROCEDURE — 700102 HCHG RX REV CODE 250 W/ 637 OVERRIDE(OP): Performed by: SURGERY

## 2019-09-07 PROCEDURE — 80048 BASIC METABOLIC PNL TOTAL CA: CPT

## 2019-09-07 PROCEDURE — 92526 ORAL FUNCTION THERAPY: CPT

## 2019-09-07 PROCEDURE — 94003 VENT MGMT INPAT SUBQ DAY: CPT

## 2019-09-07 PROCEDURE — 94640 AIRWAY INHALATION TREATMENT: CPT

## 2019-09-07 PROCEDURE — 85025 COMPLETE CBC W/AUTO DIFF WBC: CPT

## 2019-09-07 PROCEDURE — 700111 HCHG RX REV CODE 636 W/ 250 OVERRIDE (IP): Performed by: NURSE PRACTITIONER

## 2019-09-07 PROCEDURE — 700102 HCHG RX REV CODE 250 W/ 637 OVERRIDE(OP): Performed by: INTERNAL MEDICINE

## 2019-09-07 PROCEDURE — 92507 TX SP LANG VOICE COMM INDIV: CPT

## 2019-09-07 PROCEDURE — A9270 NON-COVERED ITEM OR SERVICE: HCPCS | Performed by: ORAL & MAXILLOFACIAL SURGERY

## 2019-09-07 PROCEDURE — 71045 X-RAY EXAM CHEST 1 VIEW: CPT

## 2019-09-07 RX ADMIN — POTASSIUM BICARBONATE 50 MEQ: 978 TABLET, EFFERVESCENT ORAL at 14:28

## 2019-09-07 RX ADMIN — SPIRONOLACTONE 25 MG: 50 TABLET ORAL at 05:10

## 2019-09-07 RX ADMIN — LISINOPRIL 5 MG: 5 TABLET ORAL at 05:10

## 2019-09-07 RX ADMIN — FAMOTIDINE 20 MG: 20 TABLET ORAL at 17:42

## 2019-09-07 RX ADMIN — POTASSIUM BICARBONATE 50 MEQ: 978 TABLET, EFFERVESCENT ORAL at 05:09

## 2019-09-07 RX ADMIN — APIXABAN 5 MG: 5 TABLET, FILM COATED ORAL at 05:10

## 2019-09-07 RX ADMIN — FAMOTIDINE 20 MG: 20 TABLET ORAL at 05:09

## 2019-09-07 RX ADMIN — QUETIAPINE FUMARATE 50 MG: 25 TABLET ORAL at 14:27

## 2019-09-07 RX ADMIN — QUETIAPINE FUMARATE 100 MG: 100 TABLET ORAL at 21:39

## 2019-09-07 RX ADMIN — METOPROLOL TARTRATE 100 MG: 100 TABLET ORAL at 14:27

## 2019-09-07 RX ADMIN — CHLORHEXIDINE GLUCONATE 0.12% ORAL RINSE 15 ML: 1.2 LIQUID ORAL at 05:09

## 2019-09-07 RX ADMIN — DIGOXIN 250 MCG: 250 TABLET ORAL at 17:42

## 2019-09-07 RX ADMIN — METOPROLOL TARTRATE 100 MG: 100 TABLET ORAL at 09:16

## 2019-09-07 RX ADMIN — AMIODARONE HYDROCHLORIDE 400 MG: 200 TABLET ORAL at 05:08

## 2019-09-07 RX ADMIN — FUROSEMIDE 40 MG: 10 INJECTION, SOLUTION INTRAMUSCULAR; INTRAVENOUS at 05:09

## 2019-09-07 RX ADMIN — METOPROLOL TARTRATE 100 MG: 100 TABLET ORAL at 17:42

## 2019-09-07 RX ADMIN — CHLORHEXIDINE GLUCONATE 0.12% ORAL RINSE 15 ML: 1.2 LIQUID ORAL at 17:42

## 2019-09-07 RX ADMIN — METOPROLOL TARTRATE 100 MG: 100 TABLET ORAL at 21:39

## 2019-09-07 RX ADMIN — POTASSIUM BICARBONATE 50 MEQ: 978 TABLET, EFFERVESCENT ORAL at 21:39

## 2019-09-07 RX ADMIN — FUROSEMIDE 40 MG: 10 INJECTION, SOLUTION INTRAMUSCULAR; INTRAVENOUS at 17:43

## 2019-09-07 RX ADMIN — APIXABAN 5 MG: 5 TABLET, FILM COATED ORAL at 17:42

## 2019-09-07 RX ADMIN — PAROXETINE HYDROCHLORIDE 10 MG: 20 TABLET, FILM COATED ORAL at 05:09

## 2019-09-07 RX ADMIN — QUETIAPINE FUMARATE 50 MG: 25 TABLET ORAL at 05:08

## 2019-09-07 ASSESSMENT — CHA2DS2 SCORE
AGE 75 OR GREATER: YES
AGE 65 TO 74: NO
PRIOR STROKE OR TIA OR THROMBOEMBOLISM: NO
HYPERTENSION: YES
SEX: MALE
CHA2DS2 VASC SCORE: 4
VASCULAR DISEASE: NO
DIABETES: NO
CHF OR LEFT VENTRICULAR DYSFUNCTION: YES

## 2019-09-07 ASSESSMENT — COPD QUESTIONNAIRES
COPD SCREENING SCORE: 2
DO YOU EVER COUGH UP ANY MUCUS OR PHLEGM?: NO/ONLY WITH OCCASIONAL COLDS OR INFECTIONS
DURING THE PAST 4 WEEKS HOW MUCH DID YOU FEEL SHORT OF BREATH: NONE/LITTLE OF THE TIME
HAVE YOU SMOKED AT LEAST 100 CIGARETTES IN YOUR ENTIRE LIFE: NO/DON'T KNOW

## 2019-09-07 ASSESSMENT — LIFESTYLE VARIABLES: EVER_SMOKED: NEVER

## 2019-09-07 NOTE — CARE PLAN
Problem: Knowledge Deficit  Goal: Knowledge of disease process/condition, treatment plan, diagnostic tests, and medications will improve  Intervention: Explain information regarding disease process/condition, treatment plan, diagnostic tests, and medications and document in education  Note:   Patient and family updated on plan of care for the shift. All questions answered and needs met at this time.       Problem: Pain  Goal: Alleviation of Pain or a reduction in pain to the patient's comfort goal  Intervention: Pain Management--Medications  Note:   Pharmacological and nonpharmacological methods used to control pain. Pain assessed Q2.  Medications administered as needed per the MAR.

## 2019-09-07 NOTE — PROGRESS NOTES
Patient is seen and examined.  Patient heart rate is better controlled.  Continue current regimen with amiodarone 400 daily, digoxin, lisinopril, metoprolol 100 four times daily.  Eliquis for anticoagulation.  Medical therapy for cardiomyopathy, no invasive testing is planned at this time.  Cardiology will sign off for now.  Please call me back if needed

## 2019-09-07 NOTE — PROGRESS NOTES
2 RN skin check complete with Rosie CONRAD .  Devices in place: right nare cortrak, tracheostomy, R subclavian CVC, BP cuff, pulse ox, cardiac leads, vale, bilateral SCDs         Skin assessed under the above devices  Preventative measures in place including:  - elbows and heels floated on pillows  - Repositioning q2h turns  Following areas of concern:     Laceration to chin, sutured, ERME  Tracheostomy site red and edematous   Generalized bruising/abrasions to BUE    Appropriate wound LDA's in place. No new areas of concern noted.

## 2019-09-07 NOTE — CARE PLAN
Problem: Pain Management  Goal: Pain level will decrease to patient's comfort goal  Outcome: PROGRESSING AS EXPECTED  Pt's pain will decrease to comfort goal through rest, repositioning, a quiet environment, and PRN pharmacologic analgesia.'     Problem: Skin Integrity  Goal: Risk for impaired skin integrity will decrease  Outcome: PROGRESSING AS EXPECTED  Skin will be assessed frequently for breakdown and pt will remain clean, dry, and intact. All listed wounds will be assessed. Pressure ulcer prevention will be utilized when appropriate & based on Pt stability
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
## 2019-09-07 NOTE — PROGRESS NOTES
2 RN skin check complete with ANAMARIA Caraballo.  Devices in place: right nare cortrak, tracheostomy, R subclavian CVC, BP cuff, pulse ox, cardiac leads, vale, bilateral SCDs         Skin assessed under the above devices  Preventative measures in place including:  - elbows and heels floated on pillows  - Repositioning q2h turns  Following areas of concern:   Jaw wired shut, wire cutters at bedside  Laceration to chin, sutured, EMRE  Tracheostomy site red and edematous, optifoam in place  Generalized bruising/abrasions to BUE  Appropriate wound LDA's in place. No new areas of concern noted.

## 2019-09-07 NOTE — CARE PLAN
Respiratory Therapy Update    Interdisciplinary Plan of Care-Goals (Indications)  Bronchopulmonary Hygiene Outcome: Patient at Stable Baseline     Cough: Productive;Moist   Sputum Amount: Large   Sputum Color: Tan   Sputum Consistency: Thick     FiO2%: 40 %   O2 (LPM): 10   O2 Daily Delivery Respiratory : T-Piece     Breath Sounds  Pre/Post Intervention: Pre Intervention Assessment   RUL Breath Sounds: Rhonchi   RML Breath Sounds: Rhonchi  RLL Breath Sounds: Diminished  DARLENE Breath Sounds: Rhonchi   LLL Breath Sounds: Diminished       Events/Summary/Plan: Aerosol Check/Family at bedside.

## 2019-09-08 ENCOUNTER — APPOINTMENT (OUTPATIENT)
Dept: RADIOLOGY | Facility: MEDICAL CENTER | Age: 79
DRG: 003 | End: 2019-09-08
Attending: NURSE PRACTITIONER
Payer: MEDICARE

## 2019-09-08 LAB
ANION GAP SERPL CALC-SCNC: 6 MMOL/L (ref 0–11.9)
BASOPHILS # BLD AUTO: 0.3 % (ref 0–1.8)
BASOPHILS # BLD: 0.04 K/UL (ref 0–0.12)
BUN SERPL-MCNC: 28 MG/DL (ref 8–22)
CALCIUM SERPL-MCNC: 8.5 MG/DL (ref 8.5–10.5)
CHLORIDE SERPL-SCNC: 101 MMOL/L (ref 96–112)
CO2 SERPL-SCNC: 31 MMOL/L (ref 20–33)
CREAT SERPL-MCNC: 0.67 MG/DL (ref 0.5–1.4)
EOSINOPHIL # BLD AUTO: 0.19 K/UL (ref 0–0.51)
EOSINOPHIL NFR BLD: 1.3 % (ref 0–6.9)
ERYTHROCYTE [DISTWIDTH] IN BLOOD BY AUTOMATED COUNT: 50.1 FL (ref 35.9–50)
GLUCOSE SERPL-MCNC: 115 MG/DL (ref 65–99)
HCT VFR BLD AUTO: 32 % (ref 42–52)
HGB BLD-MCNC: 10.2 G/DL (ref 14–18)
IMM GRANULOCYTES # BLD AUTO: 0.07 K/UL (ref 0–0.11)
IMM GRANULOCYTES NFR BLD AUTO: 0.5 % (ref 0–0.9)
LYMPHOCYTES # BLD AUTO: 0.85 K/UL (ref 1–4.8)
LYMPHOCYTES NFR BLD: 6 % (ref 22–41)
MCH RBC QN AUTO: 32.1 PG (ref 27–33)
MCHC RBC AUTO-ENTMCNC: 31.9 G/DL (ref 33.7–35.3)
MCV RBC AUTO: 100.6 FL (ref 81.4–97.8)
MONOCYTES # BLD AUTO: 0.8 K/UL (ref 0–0.85)
MONOCYTES NFR BLD AUTO: 5.7 % (ref 0–13.4)
NEUTROPHILS # BLD AUTO: 12.19 K/UL (ref 1.82–7.42)
NEUTROPHILS NFR BLD: 86.2 % (ref 44–72)
NRBC # BLD AUTO: 0 K/UL
NRBC BLD-RTO: 0 /100 WBC
NT-PROBNP SERPL IA-MCNC: 2214 PG/ML (ref 0–125)
PLATELET # BLD AUTO: 225 K/UL (ref 164–446)
PMV BLD AUTO: 9.4 FL (ref 9–12.9)
POTASSIUM SERPL-SCNC: 3.6 MMOL/L (ref 3.6–5.5)
RBC # BLD AUTO: 3.18 M/UL (ref 4.7–6.1)
SODIUM SERPL-SCNC: 138 MMOL/L (ref 135–145)
WBC # BLD AUTO: 14.1 K/UL (ref 4.8–10.8)

## 2019-09-08 PROCEDURE — A9270 NON-COVERED ITEM OR SERVICE: HCPCS | Performed by: SURGERY

## 2019-09-08 PROCEDURE — 85025 COMPLETE CBC W/AUTO DIFF WBC: CPT

## 2019-09-08 PROCEDURE — 302101 FENESTRATED FOAM: Performed by: SURGERY

## 2019-09-08 PROCEDURE — 99233 SBSQ HOSP IP/OBS HIGH 50: CPT | Performed by: SURGERY

## 2019-09-08 PROCEDURE — A9270 NON-COVERED ITEM OR SERVICE: HCPCS | Performed by: NURSE PRACTITIONER

## 2019-09-08 PROCEDURE — 71045 X-RAY EXAM CHEST 1 VIEW: CPT

## 2019-09-08 PROCEDURE — 83880 ASSAY OF NATRIURETIC PEPTIDE: CPT

## 2019-09-08 PROCEDURE — 700102 HCHG RX REV CODE 250 W/ 637 OVERRIDE(OP): Performed by: SURGERY

## 2019-09-08 PROCEDURE — 700102 HCHG RX REV CODE 250 W/ 637 OVERRIDE(OP): Performed by: ORAL & MAXILLOFACIAL SURGERY

## 2019-09-08 PROCEDURE — 700102 HCHG RX REV CODE 250 W/ 637 OVERRIDE(OP): Performed by: NURSE PRACTITIONER

## 2019-09-08 PROCEDURE — 80048 BASIC METABOLIC PNL TOTAL CA: CPT

## 2019-09-08 PROCEDURE — 700102 HCHG RX REV CODE 250 W/ 637 OVERRIDE(OP): Performed by: INTERNAL MEDICINE

## 2019-09-08 PROCEDURE — 770022 HCHG ROOM/CARE - ICU (200)

## 2019-09-08 PROCEDURE — A9270 NON-COVERED ITEM OR SERVICE: HCPCS | Performed by: ORAL & MAXILLOFACIAL SURGERY

## 2019-09-08 PROCEDURE — A9270 NON-COVERED ITEM OR SERVICE: HCPCS | Performed by: INTERNAL MEDICINE

## 2019-09-08 PROCEDURE — 700112 HCHG RX REV CODE 229: Performed by: NURSE PRACTITIONER

## 2019-09-08 PROCEDURE — 94640 AIRWAY INHALATION TREATMENT: CPT

## 2019-09-08 PROCEDURE — 306565 RIGID MIT RESTRAINT(PAIR): Performed by: SURGERY

## 2019-09-08 PROCEDURE — 700111 HCHG RX REV CODE 636 W/ 250 OVERRIDE (IP): Performed by: NURSE PRACTITIONER

## 2019-09-08 PROCEDURE — 700101 HCHG RX REV CODE 250: Performed by: NURSE PRACTITIONER

## 2019-09-08 RX ADMIN — LISINOPRIL 5 MG: 5 TABLET ORAL at 05:01

## 2019-09-08 RX ADMIN — FUROSEMIDE 40 MG: 10 INJECTION, SOLUTION INTRAMUSCULAR; INTRAVENOUS at 05:00

## 2019-09-08 RX ADMIN — APIXABAN 5 MG: 5 TABLET, FILM COATED ORAL at 05:01

## 2019-09-08 RX ADMIN — QUETIAPINE FUMARATE 50 MG: 25 TABLET ORAL at 05:00

## 2019-09-08 RX ADMIN — SPIRONOLACTONE 25 MG: 50 TABLET ORAL at 05:01

## 2019-09-08 RX ADMIN — APIXABAN 5 MG: 5 TABLET, FILM COATED ORAL at 18:18

## 2019-09-08 RX ADMIN — CHLORHEXIDINE GLUCONATE 0.12% ORAL RINSE 15 ML: 1.2 LIQUID ORAL at 05:00

## 2019-09-08 RX ADMIN — QUETIAPINE FUMARATE 100 MG: 100 TABLET ORAL at 21:06

## 2019-09-08 RX ADMIN — CHLORHEXIDINE GLUCONATE 0.12% ORAL RINSE 15 ML: 1.2 LIQUID ORAL at 18:18

## 2019-09-08 RX ADMIN — METOPROLOL TARTRATE 100 MG: 100 TABLET ORAL at 16:53

## 2019-09-08 RX ADMIN — DIGOXIN 250 MCG: 250 TABLET ORAL at 18:18

## 2019-09-08 RX ADMIN — PAROXETINE HYDROCHLORIDE 10 MG: 20 TABLET, FILM COATED ORAL at 05:00

## 2019-09-08 RX ADMIN — METOPROLOL TARTRATE 100 MG: 100 TABLET ORAL at 09:07

## 2019-09-08 RX ADMIN — OXYCODONE HYDROCHLORIDE 10 MG: 10 TABLET ORAL at 09:08

## 2019-09-08 RX ADMIN — POTASSIUM BICARBONATE 50 MEQ: 978 TABLET, EFFERVESCENT ORAL at 05:00

## 2019-09-08 RX ADMIN — QUETIAPINE FUMARATE 50 MG: 25 TABLET ORAL at 13:10

## 2019-09-08 RX ADMIN — AMIODARONE HYDROCHLORIDE 400 MG: 200 TABLET ORAL at 05:00

## 2019-09-08 RX ADMIN — POLYETHYLENE GLYCOL 3350 1 PACKET: 17 POWDER, FOR SOLUTION ORAL at 18:18

## 2019-09-08 RX ADMIN — POTASSIUM BICARBONATE 50 MEQ: 978 TABLET, EFFERVESCENT ORAL at 13:09

## 2019-09-08 RX ADMIN — METOPROLOL TARTRATE 100 MG: 100 TABLET ORAL at 21:06

## 2019-09-08 RX ADMIN — FUROSEMIDE 40 MG: 10 INJECTION, SOLUTION INTRAMUSCULAR; INTRAVENOUS at 16:29

## 2019-09-08 RX ADMIN — POTASSIUM BICARBONATE 50 MEQ: 978 TABLET, EFFERVESCENT ORAL at 21:06

## 2019-09-08 RX ADMIN — METOPROLOL TARTRATE 100 MG: 100 TABLET ORAL at 13:10

## 2019-09-08 RX ADMIN — DOCUSATE SODIUM 100 MG: 50 LIQUID ORAL at 18:18

## 2019-09-08 RX ADMIN — FAMOTIDINE 20 MG: 20 TABLET ORAL at 18:18

## 2019-09-08 RX ADMIN — FAMOTIDINE 20 MG: 20 TABLET ORAL at 05:00

## 2019-09-08 RX ADMIN — DOCUSATE SODIUM 100 MG: 50 LIQUID ORAL at 05:00

## 2019-09-08 ASSESSMENT — CHA2DS2 SCORE
PRIOR STROKE OR TIA OR THROMBOEMBOLISM: NO
CHF OR LEFT VENTRICULAR DYSFUNCTION: YES
AGE 75 OR GREATER: YES
VASCULAR DISEASE: NO
HYPERTENSION: YES
SEX: MALE
AGE 65 TO 74: NO
CHA2DS2 VASC SCORE: 4
DIABETES: NO
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
## 2019-09-08 NOTE — THERAPY
"Speech Language Therapy speaking valve treatment completed.     Functional Status: Patient was seen on this date for PMSV treatment. Patient awake with sitter at bedside and AAOx3 with confusion regarding day. Pt currently on 10 L at 40% FiO2 via T-piece. Cuff was deflated of 3 cc and tracheal suctioning performed with removal of moderate amount of thin blood tinged secretions x2. Speaking valve was placed in-line with T-piece. Pt relied mostly on written communication. Pt required moderate encouragement to engage in falsetto exercises but performed 5 reps with good intensity for 3-4 seconds. Intelligibility improved at the word level (intelligible ~50% of the time). Pt unable to perform volitional swallow trigger despite max verbal and tactile cues. Improved secretions and upper airway congestion compared to yesterday's session. Drop in O2 sats x1 to 86% and intermittent increase in RR to 30-40. Speaking valve was removed after 20 minutes, tracheal suctioning performed with removal of minimal secretions, and cuff was re-inflated.     Recommendations: Continue placement of speaking valve throughout the day for 20-30 minute intervals (or as tolerated) with trained RN/RT/SLP given direct supervision.     Plan of Care: Will benefit from Speech Therapy 5 times per week    Post-Acute Therapy: Recommend inpatient transitional care services for continued speech therapy services.      See \"Rehab Therapy-Acute\" Patient Summary Report for complete documentation.     "

## 2019-09-08 NOTE — PROGRESS NOTES
2 RN skin check complete with Rosie CONRAD .  Devices in place: right nare cortrak, tracheostomy, R subclavian CVC, BP cuff, pulse ox, cardiac leads, vale, bilateral SCDs         Skin assessed under the above devices  Preventative measures in place including:  - elbows and heels floated on pillows  - Repositioning q2h turns  Following areas of concern:      Laceration to chin, sutured, EMRE  Tracheostomy site red and edematous, purulent   Generalized bruising/abrasions to BUE     Appropriate wound LDA's in place.    Wound consult placed regarding trach site

## 2019-09-08 NOTE — PROGRESS NOTES
2 RN skin check completed.  Devices in place: right nare cortrak, tracheostomy, R subclavian CVC, BP cuff, pulse ox, cardiac leads, vale, bilateral SCDs         Skin assessed under the above devices  Preventative measures in place including:  - elbows and heels floated on pillows  - Repositioning q2h turns  Following areas of concern:   Jaw wired shut, wire cutters at bedside  Laceration to chin, sutured, EMRE  Tracheostomy site red and edematous, optifoam in place  Generalized bruising/abrasions to BUE  Generalized blanching erythema noted to the back.   Appropriate wound LDA's in place. No new areas of concern noted.

## 2019-09-08 NOTE — CARE PLAN
Problem: Knowledge Deficit  Goal: Knowledge of disease process/condition, treatment plan, diagnostic tests, and medications will improve  Intervention: Explain information regarding disease process/condition, treatment plan, diagnostic tests, and medications and document in education  Note:   Family and patient updated on plan of care for the shift. All questions answered and needs met at this time.       Problem: Pain Management  Goal: Pain level will decrease to patient's comfort goal  Intervention: Follow pain managment plan developed in collaboration with patient and Interdisciplinary Team  Note:   Pharmacological and nonpharmacological methods used to control pain. Pain assessed Q2.  Medications administered as needed per the MAR.

## 2019-09-08 NOTE — PROGRESS NOTES
Speaking valve placed on patient while wife and friends at bedside. A&Ox2, disoriented to place and event. Patient tolerated approximately 10 minutes before speaking valve removed.

## 2019-09-08 NOTE — FLOWSHEET NOTE
09/08/19 0204   Events/Summary/Plan   Events/Summary/Plan aerosol check   Interdisciplinary Plan of Care-Goals (Indications)   Bronchopulmonary Hygiene Indications Difficulty with Secretion Clearance   Interdisciplinary Plan of Care-Outcomes    Bronchopulmonary Hygiene Outcome Patient at Stable Baseline   Aerosol Therapy / Airway Management   #Aerosol Therapy / Airway Management T-Piece   Aerosol Humidity Temp (celsius) 37   Respiratory WDL   Respiratory (WDL) X   Chest Exam   Respiration (!) 31   Pulse (!) 104   Work Of Breathing / Effort Mild   Breath Sounds   RUL Breath Sounds Coarse Crackles   RML Breath Sounds Coarse Crackles   RLL Breath Sounds Diminished   DARLENE Breath Sounds Coarse Crackles   LLL Breath Sounds Diminished   Pre/Post Intervention Post Intervention Assessment   Secretions   Cough Productive;Moist   Sputum Amount Large   Sputum Color Bloody   Sputum Consistency Thick   Airway Trach Tracheostomy 8.0   Placement Date/Time: 08/29/19 1800   Airway Type: Trach  Brand: Portex  Style: Cuffed  Airway Location: Tracheostomy  Airway Size: 8.0  Inserted In: Unit  Inserted by: MD   Site Assessment Intact   Airway Tube Secured Ties;Velcro attachment   Cuffless No   Extra Tracheostomy Tube at Bedside Yes   Oximetry   Continuous Oximetry Yes   Oxygen   Pulse Oximetry 98 %   O2 (LPM) 10   FiO2% 40 %   O2 Daily Delivery Respiratory  T-Piece

## 2019-09-08 NOTE — CARE PLAN
Problem: Safety  Goal: Will remain free from injury  Outcome: PROGRESSING AS EXPECTED  Note:   Pt safety continuously assessed, pt on legal hold with sitter present. 1:1 direct observation. Potentially dangerous items removed from room, safety checklist updated at 0700. Pt with bed alarm or chair alarm present and in use. X3 bedrails up, wrist restraints in place-- necessity reviewed. Wrist restraints removed and assessed q2 hours. Non skid socks in place. All appropriate safety interventions in place.      Problem: Infection  Goal: Will remain free from infection  Outcome: PROGRESSING AS EXPECTED  Note:   Pt remains free from infection, hand hygiene performed prior to and after patient contact, discussed central line necessity-- okay per MD to remove central line. Discussed vale necessity-- okay to remove per MD. WBC assessed. All appropriate infection prevention methods in place.

## 2019-09-08 NOTE — CARE PLAN
Problem: Pain Management  Goal: Pain level will decrease to patient's comfort goal  Outcome: PROGRESSING AS EXPECTED  Pt's pain will decrease to comfort goal through rest, repositioning, a quiet environment, and PRN pharmacologic analgesia.       Problem: Skin Integrity  Goal: Risk for impaired skin integrity will decrease  Outcome: PROGRESSING AS EXPECTED  Skin will be assessed frequently for breakdown and pt will remain clean, dry, and intact. All listed wounds will be assessed. Pressure ulcer prevention will be utilized when appropriate & based on Pt stability

## 2019-09-09 ENCOUNTER — APPOINTMENT (OUTPATIENT)
Dept: RADIOLOGY | Facility: MEDICAL CENTER | Age: 79
DRG: 003 | End: 2019-09-09
Attending: NURSE PRACTITIONER
Payer: MEDICARE

## 2019-09-09 PROBLEM — R33.8 ACUTE URINARY RETENTION: Status: ACTIVE | Noted: 2019-09-09

## 2019-09-09 LAB
ALBUMIN SERPL BCP-MCNC: 3.3 G/DL (ref 3.2–4.9)
ALBUMIN/GLOB SERPL: 1.2 G/DL
ALP SERPL-CCNC: 113 U/L (ref 30–99)
ALT SERPL-CCNC: 48 U/L (ref 2–50)
ANION GAP SERPL CALC-SCNC: 10 MMOL/L (ref 0–11.9)
AST SERPL-CCNC: 36 U/L (ref 12–45)
BASOPHILS # BLD AUTO: 0.8 % (ref 0–1.8)
BASOPHILS # BLD: 0.11 K/UL (ref 0–0.12)
BILIRUB SERPL-MCNC: 1 MG/DL (ref 0.1–1.5)
BUN SERPL-MCNC: 34 MG/DL (ref 8–22)
CALCIUM SERPL-MCNC: 8.5 MG/DL (ref 8.5–10.5)
CHLORIDE SERPL-SCNC: 102 MMOL/L (ref 96–112)
CO2 SERPL-SCNC: 25 MMOL/L (ref 20–33)
CREAT SERPL-MCNC: 0.8 MG/DL (ref 0.5–1.4)
EOSINOPHIL # BLD AUTO: 0.22 K/UL (ref 0–0.51)
EOSINOPHIL NFR BLD: 1.7 % (ref 0–6.9)
ERYTHROCYTE [DISTWIDTH] IN BLOOD BY AUTOMATED COUNT: 54.1 FL (ref 35.9–50)
GLOBULIN SER CALC-MCNC: 2.8 G/DL (ref 1.9–3.5)
GLUCOSE SERPL-MCNC: 113 MG/DL (ref 65–99)
HCT VFR BLD AUTO: 37.3 % (ref 42–52)
HGB BLD-MCNC: 11.2 G/DL (ref 14–18)
IMM GRANULOCYTES # BLD AUTO: 0.13 K/UL (ref 0–0.11)
IMM GRANULOCYTES NFR BLD AUTO: 1 % (ref 0–0.9)
LYMPHOCYTES # BLD AUTO: 1.26 K/UL (ref 1–4.8)
LYMPHOCYTES NFR BLD: 9.6 % (ref 22–41)
MAGNESIUM SERPL-MCNC: 2.1 MG/DL (ref 1.5–2.5)
MCH RBC QN AUTO: 31.5 PG (ref 27–33)
MCHC RBC AUTO-ENTMCNC: 30 G/DL (ref 33.7–35.3)
MCV RBC AUTO: 104.8 FL (ref 81.4–97.8)
MONOCYTES # BLD AUTO: 0.95 K/UL (ref 0–0.85)
MONOCYTES NFR BLD AUTO: 7.2 % (ref 0–13.4)
NEUTROPHILS # BLD AUTO: 10.44 K/UL (ref 1.82–7.42)
NEUTROPHILS NFR BLD: 79.7 % (ref 44–72)
NRBC # BLD AUTO: 0 K/UL
NRBC BLD-RTO: 0 /100 WBC
PHOSPHATE SERPL-MCNC: 3.3 MG/DL (ref 2.5–4.5)
PLATELET # BLD AUTO: 262 K/UL (ref 164–446)
PMV BLD AUTO: 10.4 FL (ref 9–12.9)
POTASSIUM SERPL-SCNC: 4.3 MMOL/L (ref 3.6–5.5)
PROT SERPL-MCNC: 6.1 G/DL (ref 6–8.2)
RBC # BLD AUTO: 3.56 M/UL (ref 4.7–6.1)
SODIUM SERPL-SCNC: 137 MMOL/L (ref 135–145)
WBC # BLD AUTO: 13.1 K/UL (ref 4.8–10.8)

## 2019-09-09 PROCEDURE — 84100 ASSAY OF PHOSPHORUS: CPT

## 2019-09-09 PROCEDURE — 700111 HCHG RX REV CODE 636 W/ 250 OVERRIDE (IP): Performed by: NURSE PRACTITIONER

## 2019-09-09 PROCEDURE — 700102 HCHG RX REV CODE 250 W/ 637 OVERRIDE(OP): Performed by: SURGERY

## 2019-09-09 PROCEDURE — A9270 NON-COVERED ITEM OR SERVICE: HCPCS | Performed by: SURGERY

## 2019-09-09 PROCEDURE — 700102 HCHG RX REV CODE 250 W/ 637 OVERRIDE(OP): Performed by: INTERNAL MEDICINE

## 2019-09-09 PROCEDURE — 85025 COMPLETE CBC W/AUTO DIFF WBC: CPT

## 2019-09-09 PROCEDURE — A9270 NON-COVERED ITEM OR SERVICE: HCPCS | Performed by: INTERNAL MEDICINE

## 2019-09-09 PROCEDURE — 83735 ASSAY OF MAGNESIUM: CPT

## 2019-09-09 PROCEDURE — 80053 COMPREHEN METABOLIC PANEL: CPT

## 2019-09-09 PROCEDURE — A9270 NON-COVERED ITEM OR SERVICE: HCPCS | Performed by: ORAL & MAXILLOFACIAL SURGERY

## 2019-09-09 PROCEDURE — 94640 AIRWAY INHALATION TREATMENT: CPT

## 2019-09-09 PROCEDURE — A9270 NON-COVERED ITEM OR SERVICE: HCPCS | Performed by: NURSE PRACTITIONER

## 2019-09-09 PROCEDURE — 99233 SBSQ HOSP IP/OBS HIGH 50: CPT | Performed by: SURGERY

## 2019-09-09 PROCEDURE — 700102 HCHG RX REV CODE 250 W/ 637 OVERRIDE(OP): Performed by: NURSE PRACTITIONER

## 2019-09-09 PROCEDURE — 770022 HCHG ROOM/CARE - ICU (200)

## 2019-09-09 PROCEDURE — 71045 X-RAY EXAM CHEST 1 VIEW: CPT

## 2019-09-09 PROCEDURE — 700101 HCHG RX REV CODE 250: Performed by: NURSE PRACTITIONER

## 2019-09-09 PROCEDURE — 51798 US URINE CAPACITY MEASURE: CPT

## 2019-09-09 PROCEDURE — 700102 HCHG RX REV CODE 250 W/ 637 OVERRIDE(OP): Performed by: ORAL & MAXILLOFACIAL SURGERY

## 2019-09-09 PROCEDURE — 700112 HCHG RX REV CODE 229: Performed by: NURSE PRACTITIONER

## 2019-09-09 RX ADMIN — METOPROLOL TARTRATE 5 MG: 5 INJECTION, SOLUTION INTRAVENOUS at 05:15

## 2019-09-09 RX ADMIN — LISINOPRIL 5 MG: 5 TABLET ORAL at 04:51

## 2019-09-09 RX ADMIN — FAMOTIDINE 20 MG: 20 TABLET ORAL at 04:50

## 2019-09-09 RX ADMIN — DOCUSATE SODIUM 100 MG: 50 LIQUID ORAL at 17:20

## 2019-09-09 RX ADMIN — SPIRONOLACTONE 25 MG: 50 TABLET ORAL at 04:51

## 2019-09-09 RX ADMIN — OXYCODONE HYDROCHLORIDE 10 MG: 10 TABLET ORAL at 22:10

## 2019-09-09 RX ADMIN — QUETIAPINE FUMARATE 50 MG: 25 TABLET ORAL at 04:49

## 2019-09-09 RX ADMIN — FAMOTIDINE 20 MG: 20 TABLET ORAL at 17:20

## 2019-09-09 RX ADMIN — METOPROLOL TARTRATE 100 MG: 100 TABLET ORAL at 21:17

## 2019-09-09 RX ADMIN — QUETIAPINE FUMARATE 100 MG: 100 TABLET ORAL at 21:18

## 2019-09-09 RX ADMIN — METOPROLOL TARTRATE 100 MG: 100 TABLET ORAL at 13:27

## 2019-09-09 RX ADMIN — CHLORHEXIDINE GLUCONATE 0.12% ORAL RINSE 15 ML: 1.2 LIQUID ORAL at 17:20

## 2019-09-09 RX ADMIN — AMIODARONE HYDROCHLORIDE 400 MG: 200 TABLET ORAL at 04:49

## 2019-09-09 RX ADMIN — OXYCODONE HYDROCHLORIDE 10 MG: 10 TABLET ORAL at 07:10

## 2019-09-09 RX ADMIN — PAROXETINE HYDROCHLORIDE 10 MG: 20 TABLET, FILM COATED ORAL at 04:49

## 2019-09-09 RX ADMIN — POLYETHYLENE GLYCOL 3350 1 PACKET: 17 POWDER, FOR SOLUTION ORAL at 04:49

## 2019-09-09 RX ADMIN — MORPHINE SULFATE 4 MG: 4 INJECTION INTRAVENOUS at 07:10

## 2019-09-09 RX ADMIN — POTASSIUM BICARBONATE 50 MEQ: 978 TABLET, EFFERVESCENT ORAL at 21:17

## 2019-09-09 RX ADMIN — METOPROLOL TARTRATE 100 MG: 100 TABLET ORAL at 17:20

## 2019-09-09 RX ADMIN — APIXABAN 5 MG: 5 TABLET, FILM COATED ORAL at 17:20

## 2019-09-09 RX ADMIN — QUETIAPINE FUMARATE 50 MG: 25 TABLET ORAL at 13:27

## 2019-09-09 RX ADMIN — MORPHINE SULFATE 2 MG: 4 INJECTION INTRAVENOUS at 06:04

## 2019-09-09 RX ADMIN — METOPROLOL TARTRATE 5 MG: 5 INJECTION, SOLUTION INTRAVENOUS at 06:10

## 2019-09-09 RX ADMIN — DIGOXIN 250 MCG: 250 TABLET ORAL at 17:20

## 2019-09-09 RX ADMIN — APIXABAN 5 MG: 5 TABLET, FILM COATED ORAL at 04:51

## 2019-09-09 RX ADMIN — POTASSIUM BICARBONATE 50 MEQ: 978 TABLET, EFFERVESCENT ORAL at 04:49

## 2019-09-09 RX ADMIN — POTASSIUM BICARBONATE 50 MEQ: 978 TABLET, EFFERVESCENT ORAL at 13:26

## 2019-09-09 RX ADMIN — FUROSEMIDE 40 MG: 10 INJECTION, SOLUTION INTRAMUSCULAR; INTRAVENOUS at 04:49

## 2019-09-09 RX ADMIN — DOCUSATE SODIUM 100 MG: 50 LIQUID ORAL at 04:49

## 2019-09-09 RX ADMIN — CHLORHEXIDINE GLUCONATE 0.12% ORAL RINSE 15 ML: 1.2 LIQUID ORAL at 04:49

## 2019-09-09 RX ADMIN — FUROSEMIDE 40 MG: 10 INJECTION, SOLUTION INTRAMUSCULAR; INTRAVENOUS at 16:12

## 2019-09-09 ASSESSMENT — CHA2DS2 SCORE
AGE 65 TO 74: NO
SEX: MALE
HYPERTENSION: YES
CHF OR LEFT VENTRICULAR DYSFUNCTION: YES
DIABETES: NO
CHA2DS2 VASC SCORE: 4
AGE 75 OR GREATER: YES
PRIOR STROKE OR TIA OR THROMBOEMBOLISM: NO
VASCULAR DISEASE: NO

## 2019-09-09 NOTE — THERAPY
PT session attempted X2, Pt unable to be aroused by verbal stimuli or sternal rub.  Will re attempt as able/approprite when Pt able to actively participate.      Marli Robles PT/DPT  Pager# [phone]

## 2019-09-09 NOTE — CARE PLAN
Problem: Skin Integrity  Goal: Risk for impaired skin integrity will decrease  Outcome: PROGRESSING AS EXPECTED   Skin will be assessed frequently for breakdown and pt will remain clean, dry, and intact. All listed wounds will be assessed. Pressure ulcer prevention will be utilized when appropriate & based on Pt stability    Problem: Pain  Goal: Alleviation of Pain or a reduction in pain to the patient's comfort goal  Outcome: PROGRESSING AS EXPECTED  Pt's pain will decrease to comfort goal through rest, repositioning, a quiet environment, and PRN pharmacologic analgesia.
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
## 2019-09-09 NOTE — CARE PLAN
Problem: Safety - Medical Restraint  Goal: Remains free of injury from restraints (Restraint for Interference with Medical Device)  Description  INTERVENTIONS:  1. Determine that other, less restrictive measures have been tried or would not be effective before applying the restraint  2. Evaluate the patient's condition at the time of restraint application  3. Inform patient/family regarding the reason for restraint  4. Q2H: Monitor safety, psychosocial status, comfort, nutrition and hydration  Outcome: PROGRESSING AS EXPECTED  Note:   Pt in bilateral soft wrist and mitt restraints. Pt continuously attempting to pull off necessary medical equipment. Pt reoriented as needed, discussed the need and use of restraints for safety. Restraints removed q2 hours, range of motion performed, no areas of concern. Will continue to assess for restraint necessity.

## 2019-09-09 NOTE — CARE PLAN
Problem: Communication  Goal: The ability to communicate needs accurately and effectively will improve  Outcome: PROGRESSING SLOWER THAN EXPECTED  Note:   Pt presenting with increasing agitation and restlessness. Pt unable to communicate clearly his needs at this point in time due to patients jaw being wired shut. Patient is trached and unable to speak. Patient attempted to writes notes, requires repeated reorientation. Educated and updated about plan of care. RN presented patient in interdisciplinary rounds. Discussed psych consult. Pt still remains on legal hold due to suicide attempt.      Problem: Safety - Medical Restraint  Goal: Remains free of injury from restraints (Restraint for Interference with Medical Device)  Description  INTERVENTIONS:  1. Determine that other, less restrictive measures have been tried or would not be effective before applying the restraint  2. Evaluate the patient's condition at the time of restraint application  3. Inform patient/family regarding the reason for restraint  4. Q2H: Monitor safety, psychosocial status, comfort, nutrition and hydration  Outcome: PROGRESSING AS EXPECTED  Note:   Pt in bilateral soft wrist and mitt restraints. Pt continuously attempting to pull off necessary medical equipment. Pt reoriented as needed, discussed the need and use of restraints for safety. Restraints removed q2 hours, range of motion performed, no areas of concern. Will continue to assess for restraint necessity.

## 2019-09-09 NOTE — PROGRESS NOTES
Late Entry:    06:40 RN paged Norma SHARMA regarding Pt's HR sustaining 140s-160s. 2 doses of IV metoprolol administered, & AM scheduled medications, see MAR. MD returned page & instructed to page Cardiology. No new orders.       06:45 RN paged Cardiology. Aleja SHARMA informed of Pt's HR & interventions. Aleja SHARMA stated no orders at this time.

## 2019-09-09 NOTE — ASSESSMENT & PLAN NOTE
9/8 Vale removed  9/9 bladder scan > 1000 cc / vale to gravity  9/14 re-attempt Vale removal, failed  9/16 Patient pulled Vale, but got stuck.  Required invasive replacement. Keep vale for 5-7 days.   9/22 Hytrin initiated  9/30 Vale placed   10/4 Increase Hytrin   10/6  Trial vale removal  10/7 Vale placed for retention

## 2019-09-09 NOTE — DISCHARGE PLANNING
On 09/06, HODAW requested Dr. Green to placed psych consult for pt's legal hold. Pt is able to participate with psych by writing notes and speaking valve. As of today, no psych consult placed. HODAW received a call from legal hold [facility] , today, requesting a psych consult because there is none. LSW and bedside RN requested Dr. Lira to place consult. LSW informed [place].     On 09/06, [place] filed limbo petition to the court. Still waiting on petition.

## 2019-09-09 NOTE — PROGRESS NOTES
2 RN skin check complete with Rosie CONRAD .  Devices in place: right nare cortrak, tracheostomy, R subclavian CVC, BP cuff, pulse ox, cardiac leads, vale, bilateral SCDs         Skin assessed under the above devices  Preventative measures in place including:  - elbows and heels floated on pillows  - Repositioning q2h turns  Following areas of concern:      Laceration to chin, sutured, EMRE  Tracheostomy site red and edematous, purulent   Generalized bruising/abrasions to BUE     Appropriate wound LDA's in place.     Wound consult placed regarding trach site  Photo uploaded

## 2019-09-09 NOTE — THERAPY
Occupational Therapy Contact Note:    Attempted to initiate OT tx session 2x this morning. Pt very lethargic, minimally responsive to sternal rub, not following commands. Will round back as able.     Tania Ignacio OTR/L  Pager: [phone]

## 2019-09-09 NOTE — PROGRESS NOTES
2 RN skin check complete with ANAMARIA Wing     Devices in place: right nare cortrak, tracheostomy, BP cuff, pulse ox, cardiac leads, vale, bilateral SCDs, PIV's, bilateral soft wrist and mitt restraints in place. Assessed q2 hours with ROM performed. No areas of concern.   Skin assessed under the previous devises.     Preventative measures in place including:  - elbows and heels floated on pillows  - Repositioning q2h turns  Following areas of concern:      Laceration to chin, sutured, EMRE, cleansed by MD today.   Tracheostomy site red and edematous, purulent.   Generalized bruising/abrasions to BUE.

## 2019-09-10 ENCOUNTER — APPOINTMENT (OUTPATIENT)
Dept: RADIOLOGY | Facility: MEDICAL CENTER | Age: 79
DRG: 003 | End: 2019-09-10
Attending: NURSE PRACTITIONER
Payer: MEDICARE

## 2019-09-10 LAB
ALBUMIN SERPL BCP-MCNC: 3 G/DL (ref 3.2–4.9)
ALBUMIN/GLOB SERPL: 1.1 G/DL
ALP SERPL-CCNC: 115 U/L (ref 30–99)
ALT SERPL-CCNC: 50 U/L (ref 2–50)
ANION GAP SERPL CALC-SCNC: 8 MMOL/L (ref 0–11.9)
AST SERPL-CCNC: 31 U/L (ref 12–45)
BASOPHILS # BLD AUTO: 0.4 % (ref 0–1.8)
BASOPHILS # BLD: 0.06 K/UL (ref 0–0.12)
BILIRUB SERPL-MCNC: 0.8 MG/DL (ref 0.1–1.5)
BUN SERPL-MCNC: 49 MG/DL (ref 8–22)
CALCIUM SERPL-MCNC: 8.6 MG/DL (ref 8.5–10.5)
CHLORIDE SERPL-SCNC: 102 MMOL/L (ref 96–112)
CO2 SERPL-SCNC: 31 MMOL/L (ref 20–33)
CREAT SERPL-MCNC: 0.83 MG/DL (ref 0.5–1.4)
CRP SERPL HS-MCNC: 11.08 MG/DL (ref 0–0.75)
EOSINOPHIL # BLD AUTO: 0.25 K/UL (ref 0–0.51)
EOSINOPHIL NFR BLD: 1.8 % (ref 0–6.9)
ERYTHROCYTE [DISTWIDTH] IN BLOOD BY AUTOMATED COUNT: 50.7 FL (ref 35.9–50)
GLOBULIN SER CALC-MCNC: 2.8 G/DL (ref 1.9–3.5)
GLUCOSE SERPL-MCNC: 120 MG/DL (ref 65–99)
HCT VFR BLD AUTO: 31.1 % (ref 42–52)
HGB BLD-MCNC: 9.4 G/DL (ref 14–18)
IMM GRANULOCYTES # BLD AUTO: 0.1 K/UL (ref 0–0.11)
IMM GRANULOCYTES NFR BLD AUTO: 0.7 % (ref 0–0.9)
LYMPHOCYTES # BLD AUTO: 1.17 K/UL (ref 1–4.8)
LYMPHOCYTES NFR BLD: 8.3 % (ref 22–41)
MCH RBC QN AUTO: 30.5 PG (ref 27–33)
MCHC RBC AUTO-ENTMCNC: 30.2 G/DL (ref 33.7–35.3)
MCV RBC AUTO: 101 FL (ref 81.4–97.8)
MONOCYTES # BLD AUTO: 0.88 K/UL (ref 0–0.85)
MONOCYTES NFR BLD AUTO: 6.2 % (ref 0–13.4)
NEUTROPHILS # BLD AUTO: 11.72 K/UL (ref 1.82–7.42)
NEUTROPHILS NFR BLD: 82.6 % (ref 44–72)
NRBC # BLD AUTO: 0 K/UL
NRBC BLD-RTO: 0 /100 WBC
PLATELET # BLD AUTO: 335 K/UL (ref 164–446)
PMV BLD AUTO: 9.5 FL (ref 9–12.9)
POTASSIUM SERPL-SCNC: 3.9 MMOL/L (ref 3.6–5.5)
PREALB SERPL-MCNC: 12 MG/DL (ref 18–38)
PROT SERPL-MCNC: 5.8 G/DL (ref 6–8.2)
RBC # BLD AUTO: 3.08 M/UL (ref 4.7–6.1)
SODIUM SERPL-SCNC: 141 MMOL/L (ref 135–145)
WBC # BLD AUTO: 14.2 K/UL (ref 4.8–10.8)

## 2019-09-10 PROCEDURE — 700102 HCHG RX REV CODE 250 W/ 637 OVERRIDE(OP): Performed by: SURGERY

## 2019-09-10 PROCEDURE — 97530 THERAPEUTIC ACTIVITIES: CPT

## 2019-09-10 PROCEDURE — 85025 COMPLETE CBC W/AUTO DIFF WBC: CPT

## 2019-09-10 PROCEDURE — 94640 AIRWAY INHALATION TREATMENT: CPT

## 2019-09-10 PROCEDURE — 80053 COMPREHEN METABOLIC PANEL: CPT

## 2019-09-10 PROCEDURE — 99233 SBSQ HOSP IP/OBS HIGH 50: CPT | Performed by: SURGERY

## 2019-09-10 PROCEDURE — 700102 HCHG RX REV CODE 250 W/ 637 OVERRIDE(OP): Performed by: ORAL & MAXILLOFACIAL SURGERY

## 2019-09-10 PROCEDURE — A9270 NON-COVERED ITEM OR SERVICE: HCPCS | Performed by: ORAL & MAXILLOFACIAL SURGERY

## 2019-09-10 PROCEDURE — A9270 NON-COVERED ITEM OR SERVICE: HCPCS | Performed by: INTERNAL MEDICINE

## 2019-09-10 PROCEDURE — A9270 NON-COVERED ITEM OR SERVICE: HCPCS | Performed by: SURGERY

## 2019-09-10 PROCEDURE — 86140 C-REACTIVE PROTEIN: CPT

## 2019-09-10 PROCEDURE — 700112 HCHG RX REV CODE 229: Performed by: NURSE PRACTITIONER

## 2019-09-10 PROCEDURE — 770022 HCHG ROOM/CARE - ICU (200)

## 2019-09-10 PROCEDURE — 97535 SELF CARE MNGMENT TRAINING: CPT

## 2019-09-10 PROCEDURE — A9270 NON-COVERED ITEM OR SERVICE: HCPCS | Performed by: NURSE PRACTITIONER

## 2019-09-10 PROCEDURE — 700111 HCHG RX REV CODE 636 W/ 250 OVERRIDE (IP): Performed by: NURSE PRACTITIONER

## 2019-09-10 PROCEDURE — 84134 ASSAY OF PREALBUMIN: CPT

## 2019-09-10 PROCEDURE — 97112 NEUROMUSCULAR REEDUCATION: CPT

## 2019-09-10 PROCEDURE — 99222 1ST HOSP IP/OBS MODERATE 55: CPT | Mod: GC | Performed by: PSYCHIATRY & NEUROLOGY

## 2019-09-10 PROCEDURE — 700102 HCHG RX REV CODE 250 W/ 637 OVERRIDE(OP): Performed by: INTERNAL MEDICINE

## 2019-09-10 PROCEDURE — 700102 HCHG RX REV CODE 250 W/ 637 OVERRIDE(OP): Performed by: NURSE PRACTITIONER

## 2019-09-10 PROCEDURE — 71045 X-RAY EXAM CHEST 1 VIEW: CPT

## 2019-09-10 RX ADMIN — LISINOPRIL 5 MG: 5 TABLET ORAL at 05:25

## 2019-09-10 RX ADMIN — POTASSIUM BICARBONATE 50 MEQ: 978 TABLET, EFFERVESCENT ORAL at 21:46

## 2019-09-10 RX ADMIN — DOCUSATE SODIUM 100 MG: 50 LIQUID ORAL at 05:26

## 2019-09-10 RX ADMIN — QUETIAPINE FUMARATE 100 MG: 100 TABLET ORAL at 21:25

## 2019-09-10 RX ADMIN — APIXABAN 5 MG: 5 TABLET, FILM COATED ORAL at 05:25

## 2019-09-10 RX ADMIN — METOPROLOL TARTRATE 100 MG: 100 TABLET ORAL at 10:44

## 2019-09-10 RX ADMIN — POTASSIUM BICARBONATE 50 MEQ: 978 TABLET, EFFERVESCENT ORAL at 05:26

## 2019-09-10 RX ADMIN — APIXABAN 5 MG: 5 TABLET, FILM COATED ORAL at 17:05

## 2019-09-10 RX ADMIN — METOPROLOL TARTRATE 100 MG: 100 TABLET ORAL at 13:45

## 2019-09-10 RX ADMIN — METOPROLOL TARTRATE 100 MG: 100 TABLET ORAL at 21:25

## 2019-09-10 RX ADMIN — METOPROLOL TARTRATE 100 MG: 100 TABLET ORAL at 17:04

## 2019-09-10 RX ADMIN — CHLORHEXIDINE GLUCONATE 0.12% ORAL RINSE 15 ML: 1.2 LIQUID ORAL at 05:26

## 2019-09-10 RX ADMIN — POLYETHYLENE GLYCOL 3350 1 PACKET: 17 POWDER, FOR SOLUTION ORAL at 05:27

## 2019-09-10 RX ADMIN — QUETIAPINE FUMARATE 50 MG: 25 TABLET ORAL at 05:27

## 2019-09-10 RX ADMIN — FAMOTIDINE 20 MG: 20 TABLET ORAL at 17:04

## 2019-09-10 RX ADMIN — CHLORHEXIDINE GLUCONATE 0.12% ORAL RINSE 15 ML: 1.2 LIQUID ORAL at 17:04

## 2019-09-10 RX ADMIN — PAROXETINE HYDROCHLORIDE 10 MG: 20 TABLET, FILM COATED ORAL at 05:26

## 2019-09-10 RX ADMIN — SPIRONOLACTONE 25 MG: 50 TABLET ORAL at 05:26

## 2019-09-10 RX ADMIN — FUROSEMIDE 40 MG: 10 INJECTION, SOLUTION INTRAMUSCULAR; INTRAVENOUS at 17:04

## 2019-09-10 RX ADMIN — FAMOTIDINE 20 MG: 20 TABLET ORAL at 05:26

## 2019-09-10 RX ADMIN — FUROSEMIDE 40 MG: 10 INJECTION, SOLUTION INTRAMUSCULAR; INTRAVENOUS at 05:26

## 2019-09-10 RX ADMIN — AMIODARONE HYDROCHLORIDE 400 MG: 200 TABLET ORAL at 05:26

## 2019-09-10 RX ADMIN — POTASSIUM BICARBONATE 50 MEQ: 978 TABLET, EFFERVESCENT ORAL at 13:46

## 2019-09-10 RX ADMIN — DIGOXIN 250 MCG: 250 TABLET ORAL at 17:05

## 2019-09-10 RX ADMIN — QUETIAPINE FUMARATE 50 MG: 25 TABLET ORAL at 13:45

## 2019-09-10 ASSESSMENT — COGNITIVE AND FUNCTIONAL STATUS - GENERAL
DRESSING REGULAR UPPER BODY CLOTHING: A LITTLE
MOBILITY SCORE: 10
EATING MEALS: TOTAL
DRESSING REGULAR LOWER BODY CLOTHING: A LOT
HELP NEEDED FOR BATHING: A LOT
MOVING FROM LYING ON BACK TO SITTING ON SIDE OF FLAT BED: UNABLE
TOILETING: TOTAL
PERSONAL GROOMING: A LITTLE
TURNING FROM BACK TO SIDE WHILE IN FLAT BAD: UNABLE
WALKING IN HOSPITAL ROOM: A LOT
STANDING UP FROM CHAIR USING ARMS: A LITTLE
DAILY ACTIVITIY SCORE: 12
SUGGESTED CMS G CODE MODIFIER MOBILITY: CL
CLIMB 3 TO 5 STEPS WITH RAILING: A LOT
MOVING TO AND FROM BED TO CHAIR: UNABLE
SUGGESTED CMS G CODE MODIFIER DAILY ACTIVITY: CL

## 2019-09-10 ASSESSMENT — CHA2DS2 SCORE
CHA2DS2 VASC SCORE: 4
AGE 65 TO 74: NO
VASCULAR DISEASE: NO
PRIOR STROKE OR TIA OR THROMBOEMBOLISM: NO
HYPERTENSION: YES
SEX: MALE
CHF OR LEFT VENTRICULAR DYSFUNCTION: YES
DIABETES: NO
AGE 75 OR GREATER: YES

## 2019-09-10 ASSESSMENT — GAIT ASSESSMENTS
GAIT LEVEL OF ASSIST: MODERATE ASSIST
DISTANCE (FEET): 3
ASSISTIVE DEVICE: HAND HELD ASSIST
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
## 2019-09-10 NOTE — CARE PLAN
Respiratory Update    Treatment modality:     Heated aerosol 10 lpm 40%    Pt tolerating current treatments well with no adverse reactions.

## 2019-09-10 NOTE — THERAPY
"Physical Therapy Treatment completed.   Bed Mobility:  Supine to Sit: Minimal Assist  Transfers: Sit to Stand: Moderate Assist(for balance, x2)  Gait: Level Of Assist: Moderate Assist(x2) with No Equipment Needed       Plan of Care: Will benefit from Physical Therapy 3 times per week  Discharge Recommendations: Equipment: Front-Wheel Walker. Post-acute therapy Discharge to a transitional care facility for continued skilled therapy services.     See \"Rehab Therapy-Acute\" Patient Summary Report for complete documentation.     Pt continued to be lucid during therapy today, though he did write notes to communicate which did not seem to pertain to the session. Pt demonstrates increased ability to mobilize, and has increased activity tolerance and willingness to participate in therapy. Because pt is unable to speak, it is difficult to determine whether pt's apparent motor planning deficits are d/t brain deficit or stubborn attitude. When attempting side-stepping at EOB, pt showed difficulty with motor planning on LLE and attempted stepping forward and reaching toward a table in front of him. Pt not at baseline. Anticipate d/c to a transitional care facility.  "

## 2019-09-10 NOTE — PROGRESS NOTES
2 RN skin check complete with ANAMARIA Wing     Devices in place: right nare cortrak, tracheostomy, BP cuff, pulse ox, cardiac leads, vale, bilateral SCDs, PIV's, bilateral soft wrist and mitt restraints in place. Assessed q2 hours with ROM performed. No areas of concern.   Skin assessed under the previous devises.     Preventative measures in place including:  - elbows and heels floated on pillows  - Repositioning q2h turns     Following areas of concern:   Laceration to chin, sutured, EMRE, cleansed by MD yesterday.   Tracheostomy site red and edematous, purulent. Trach care provided.   Generalized bruising/abrasions to BUE.

## 2019-09-10 NOTE — CARE PLAN
Problem: Safety  Goal: Will remain free from injury  Outcome: PROGRESSING AS EXPECTED     Problem: Infection  Goal: Will remain free from infection  Outcome: PROGRESSING SLOWER THAN EXPECTED

## 2019-09-10 NOTE — CARE PLAN
Problem: Bowel/Gastric:  Goal: Normal bowel function is maintained or improved  Outcome: PROGRESSING AS EXPECTED  Note:   Patient passing gas. Stool softeners provided to patient this am.     Problem: Pain Management  Goal: Pain level will decrease to patient's comfort goal  Outcome: PROGRESSING AS EXPECTED  Note:   Pain medication provided to patient to to maintain pain management goal of RASS 0-+1.

## 2019-09-11 ENCOUNTER — APPOINTMENT (OUTPATIENT)
Dept: RADIOLOGY | Facility: MEDICAL CENTER | Age: 79
DRG: 003 | End: 2019-09-11
Attending: NURSE PRACTITIONER
Payer: MEDICARE

## 2019-09-11 LAB
ALBUMIN SERPL BCP-MCNC: 3.4 G/DL (ref 3.2–4.9)
ALBUMIN/GLOB SERPL: 1.1 G/DL
ALP SERPL-CCNC: 110 U/L (ref 30–99)
ALT SERPL-CCNC: 48 U/L (ref 2–50)
ANION GAP SERPL CALC-SCNC: 11 MMOL/L (ref 0–11.9)
AST SERPL-CCNC: 30 U/L (ref 12–45)
BASOPHILS # BLD AUTO: 0.6 % (ref 0–1.8)
BASOPHILS # BLD: 0.07 K/UL (ref 0–0.12)
BILIRUB SERPL-MCNC: 1 MG/DL (ref 0.1–1.5)
BUN SERPL-MCNC: 44 MG/DL (ref 8–22)
CALCIUM SERPL-MCNC: 8.9 MG/DL (ref 8.5–10.5)
CHLORIDE SERPL-SCNC: 100 MMOL/L (ref 96–112)
CO2 SERPL-SCNC: 27 MMOL/L (ref 20–33)
CREAT SERPL-MCNC: 0.86 MG/DL (ref 0.5–1.4)
EOSINOPHIL # BLD AUTO: 0.24 K/UL (ref 0–0.51)
EOSINOPHIL NFR BLD: 1.9 % (ref 0–6.9)
ERYTHROCYTE [DISTWIDTH] IN BLOOD BY AUTOMATED COUNT: 49.4 FL (ref 35.9–50)
GLOBULIN SER CALC-MCNC: 3.2 G/DL (ref 1.9–3.5)
GLUCOSE SERPL-MCNC: 114 MG/DL (ref 65–99)
HCT VFR BLD AUTO: 34.4 % (ref 42–52)
HGB BLD-MCNC: 10.8 G/DL (ref 14–18)
IMM GRANULOCYTES # BLD AUTO: 0.11 K/UL (ref 0–0.11)
IMM GRANULOCYTES NFR BLD AUTO: 0.9 % (ref 0–0.9)
LYMPHOCYTES # BLD AUTO: 1.23 K/UL (ref 1–4.8)
LYMPHOCYTES NFR BLD: 9.9 % (ref 22–41)
MCH RBC QN AUTO: 31 PG (ref 27–33)
MCHC RBC AUTO-ENTMCNC: 31.4 G/DL (ref 33.7–35.3)
MCV RBC AUTO: 98.9 FL (ref 81.4–97.8)
MONOCYTES # BLD AUTO: 0.76 K/UL (ref 0–0.85)
MONOCYTES NFR BLD AUTO: 6.1 % (ref 0–13.4)
NEUTROPHILS # BLD AUTO: 10 K/UL (ref 1.82–7.42)
NEUTROPHILS NFR BLD: 80.6 % (ref 44–72)
NRBC # BLD AUTO: 0 K/UL
NRBC BLD-RTO: 0 /100 WBC
PLATELET # BLD AUTO: 324 K/UL (ref 164–446)
PMV BLD AUTO: 9.5 FL (ref 9–12.9)
POTASSIUM SERPL-SCNC: 4.7 MMOL/L (ref 3.6–5.5)
PROT SERPL-MCNC: 6.6 G/DL (ref 6–8.2)
RBC # BLD AUTO: 3.48 M/UL (ref 4.7–6.1)
SODIUM SERPL-SCNC: 138 MMOL/L (ref 135–145)
WBC # BLD AUTO: 12.4 K/UL (ref 4.8–10.8)

## 2019-09-11 PROCEDURE — 302101 FENESTRATED FOAM: Performed by: SURGERY

## 2019-09-11 PROCEDURE — 94640 AIRWAY INHALATION TREATMENT: CPT

## 2019-09-11 PROCEDURE — 80053 COMPREHEN METABOLIC PANEL: CPT

## 2019-09-11 PROCEDURE — 92507 TX SP LANG VOICE COMM INDIV: CPT

## 2019-09-11 PROCEDURE — 700102 HCHG RX REV CODE 250 W/ 637 OVERRIDE(OP): Performed by: ORAL & MAXILLOFACIAL SURGERY

## 2019-09-11 PROCEDURE — 99233 SBSQ HOSP IP/OBS HIGH 50: CPT | Performed by: SURGERY

## 2019-09-11 PROCEDURE — 700102 HCHG RX REV CODE 250 W/ 637 OVERRIDE(OP): Performed by: INTERNAL MEDICINE

## 2019-09-11 PROCEDURE — 700112 HCHG RX REV CODE 229: Performed by: NURSE PRACTITIONER

## 2019-09-11 PROCEDURE — 700102 HCHG RX REV CODE 250 W/ 637 OVERRIDE(OP): Performed by: SURGERY

## 2019-09-11 PROCEDURE — A9270 NON-COVERED ITEM OR SERVICE: HCPCS | Performed by: SURGERY

## 2019-09-11 PROCEDURE — 700111 HCHG RX REV CODE 636 W/ 250 OVERRIDE (IP): Performed by: NURSE PRACTITIONER

## 2019-09-11 PROCEDURE — 85025 COMPLETE CBC W/AUTO DIFF WBC: CPT

## 2019-09-11 PROCEDURE — A9270 NON-COVERED ITEM OR SERVICE: HCPCS | Performed by: ORAL & MAXILLOFACIAL SURGERY

## 2019-09-11 PROCEDURE — 71045 X-RAY EXAM CHEST 1 VIEW: CPT

## 2019-09-11 PROCEDURE — 770022 HCHG ROOM/CARE - ICU (200)

## 2019-09-11 PROCEDURE — 700102 HCHG RX REV CODE 250 W/ 637 OVERRIDE(OP): Performed by: NURSE PRACTITIONER

## 2019-09-11 PROCEDURE — 306565 RIGID MIT RESTRAINT(PAIR): Performed by: SURGERY

## 2019-09-11 PROCEDURE — 51798 US URINE CAPACITY MEASURE: CPT

## 2019-09-11 PROCEDURE — A9270 NON-COVERED ITEM OR SERVICE: HCPCS | Performed by: NURSE PRACTITIONER

## 2019-09-11 PROCEDURE — A9270 NON-COVERED ITEM OR SERVICE: HCPCS | Performed by: INTERNAL MEDICINE

## 2019-09-11 RX ADMIN — AMIODARONE HYDROCHLORIDE 400 MG: 200 TABLET ORAL at 05:47

## 2019-09-11 RX ADMIN — POTASSIUM BICARBONATE 50 MEQ: 978 TABLET, EFFERVESCENT ORAL at 22:51

## 2019-09-11 RX ADMIN — METOPROLOL TARTRATE 100 MG: 100 TABLET ORAL at 10:05

## 2019-09-11 RX ADMIN — OXYCODONE HYDROCHLORIDE 10 MG: 10 TABLET ORAL at 16:37

## 2019-09-11 RX ADMIN — FUROSEMIDE 40 MG: 10 INJECTION, SOLUTION INTRAMUSCULAR; INTRAVENOUS at 05:47

## 2019-09-11 RX ADMIN — METOPROLOL TARTRATE 100 MG: 100 TABLET ORAL at 16:36

## 2019-09-11 RX ADMIN — MORPHINE SULFATE 4 MG: 4 INJECTION INTRAVENOUS at 15:56

## 2019-09-11 RX ADMIN — QUETIAPINE FUMARATE 50 MG: 25 TABLET ORAL at 05:47

## 2019-09-11 RX ADMIN — SPIRONOLACTONE 25 MG: 50 TABLET ORAL at 05:52

## 2019-09-11 RX ADMIN — DOCUSATE SODIUM 100 MG: 50 LIQUID ORAL at 05:51

## 2019-09-11 RX ADMIN — METOPROLOL TARTRATE 100 MG: 100 TABLET ORAL at 20:22

## 2019-09-11 RX ADMIN — METOPROLOL TARTRATE 100 MG: 100 TABLET ORAL at 14:51

## 2019-09-11 RX ADMIN — CHLORHEXIDINE GLUCONATE 0.12% ORAL RINSE 15 ML: 1.2 LIQUID ORAL at 05:51

## 2019-09-11 RX ADMIN — FAMOTIDINE 20 MG: 20 TABLET ORAL at 18:24

## 2019-09-11 RX ADMIN — FUROSEMIDE 40 MG: 10 INJECTION, SOLUTION INTRAMUSCULAR; INTRAVENOUS at 16:36

## 2019-09-11 RX ADMIN — FAMOTIDINE 20 MG: 20 TABLET ORAL at 05:47

## 2019-09-11 RX ADMIN — CHLORHEXIDINE GLUCONATE 0.12% ORAL RINSE 15 ML: 1.2 LIQUID ORAL at 18:23

## 2019-09-11 RX ADMIN — APIXABAN 5 MG: 5 TABLET, FILM COATED ORAL at 05:52

## 2019-09-11 RX ADMIN — APIXABAN 5 MG: 5 TABLET, FILM COATED ORAL at 18:23

## 2019-09-11 RX ADMIN — LISINOPRIL 5 MG: 5 TABLET ORAL at 05:51

## 2019-09-11 RX ADMIN — OXYCODONE HYDROCHLORIDE 5 MG: 5 TABLET ORAL at 10:05

## 2019-09-11 RX ADMIN — QUETIAPINE FUMARATE 50 MG: 25 TABLET ORAL at 14:51

## 2019-09-11 RX ADMIN — QUETIAPINE FUMARATE 100 MG: 100 TABLET ORAL at 20:22

## 2019-09-11 RX ADMIN — POTASSIUM BICARBONATE 50 MEQ: 978 TABLET, EFFERVESCENT ORAL at 05:48

## 2019-09-11 RX ADMIN — DIGOXIN 250 MCG: 250 TABLET ORAL at 18:24

## 2019-09-11 RX ADMIN — POTASSIUM BICARBONATE 50 MEQ: 978 TABLET, EFFERVESCENT ORAL at 14:51

## 2019-09-11 ASSESSMENT — CHA2DS2 SCORE
AGE 75 OR GREATER: YES
CHF OR LEFT VENTRICULAR DYSFUNCTION: YES
DIABETES: NO
PRIOR STROKE OR TIA OR THROMBOEMBOLISM: NO
HYPERTENSION: YES
VASCULAR DISEASE: NO
AGE 65 TO 74: NO
CHA2DS2 VASC SCORE: 4
SEX: MALE

## 2019-09-11 NOTE — THERAPY
"Occupational Therapy Treatment completed with focus on ADLs, ADL transfers and patient education.  Functional Status:  Pt appeared restless in bed prior to arrival.  Pt is able to follow simple commands with a delay.  Pt did attempt to write with pen/paper but it was not all logical & difficult to follow.  Pt stood with Min A x 2 but was unable to sequence his BLE to step up towards head of the bed.  Pt returned to supine in bed with Min A.  Bilateral wrist restraints & mitts applied.  Plan of Care: Will benefit from Occupational Therapy 3 times per week  Discharge Recommendations:  Equipment Will Continue to Assess for Equipment Needs. Post-acute therapy Discharge to a transitional care facility for continued skilled therapy services.    See \"Rehab Therapy-Acute\" Patient Summary Report for complete documentation.   "

## 2019-09-11 NOTE — WOUND TEAM
[facility]  Inpatient Services  Initial Wound and Skin Care Evaluation    Admission Date: 8/27/2019     Consult Date: 09/07 @ 1332   [facility], PMH, SH: Reviewed    Unit where seen by Wound Team: [id number]/[id number]     WOUND CONSULT RELATED TO:  Trach site        Self Report / Pain Level:  Grimacing with wound cleansing        OBJECTIVE:  In bed,  Trached, producing large amounts of mucous with foul odor.     WOUND TYPE, LOCATION, CHARACTERISTICS (Pressure Injuries: location, stage, POA or date identified)          Wound 09/07/19 Full Thickness Wound Neck inferior trach related to erosion/moisture  (Active)   Wound Image      Site Assessment Yellow    Yung-wound Assessment Intact    Margins Attached edges    Wound Length (cm) 2 cm    Wound Width (cm) 1 cm    Wound Surface Area (cm^2) 2 cm^2    Tunneling 0 cm    Undermining 0 cm    Closure None    Drainage Amount Moderate    Drainage Description Yellow    Non-staged Wound Description Full thickness    Treatments Cleansed;Site care    Cleansing Normal Saline Irrigation    Periwound Protectant Skin Protectant wipes to Periwound    Dressing Options Hydrofiber Silver;Hydrocolloid Thick    Dressing Cleansing/Solutions Not Applicable    Dressing Changed New    Dressing Status Clean;Dry;Intact    Dressing Change Frequency Every 48 hrs    NEXT Dressing Change  09/12/19    WOUND NURSE ONLY - Odor None    WOUND NURSE ONLY - Exposed Structures None    WOUND NURSE ONLY - Tissue Type and Percentage 100% yellow    WOUND NURSE ONLY - Time Spent with Patient (mins) 45      Vascular:    NA    Lab Values:    Lab Results   Component Value Date/Time    WBC 14.2 (H) 09/10/2019 05:00 AM    RBC 3.08 (L) 09/10/2019 05:00 AM    HEMOGLOBIN 9.4 (L) 09/10/2019 05:00 AM    HEMATOCRIT 31.1 (L) 09/10/2019 05:00 AM        No results found for: HBA1C      Culture:   Not indicated at this time     INTERVENTIONS BY WOUND TEAM:  Area cleansed with NS and gauze, no sting skin prep to yung-wound.  Covered  wound with cut piece of hydrofiber silver (Aquacel AG), covered with cut piece of hydrocolloid thick.      Dressing Selection:  hydrofiber silver, hydrocolloid thick         Interdisciplinary consultation: Patient, Bedside RN (Ana)     EVALUATION: patient with wound below trach related to erosion/moisture.  hydrofiber silver for absroption and antimicrobial, hydrocolloid to assist with water proofing dressing.                    Factors affecting wound healing: moisture, trach    Goals: Steady decrease in wound area and depth weekly.    NURSING PLAN OF CARE ORDERS (X):    Dressing changes: See Dressing Care orders: X  Skin care: See Skin Care orders: X  Rectal tube care: See Rectal Tube Care orders:   Other orders:    RSKIN: CURRENT (X) ORDERED (O):   Q shift Pato:  X  Q shift pressure point assessments:  X  Pressure redistribution mattress            MARICEL        ICU  Bariatric MARICEL         Bariatric foam           Heel float boots          Float Heels off Bed with Pillows             X  Barrier wipes         Barrier Cream         Barrier paste          Sacral silicone dressing       PRN  Silicone O2 tubing         Anchorfast         Cannula fixation Device (Tender )          Gray Foam Ear protectors           Trach with Optifoam split foam                 Waffle cushion        Waffle Overlay         Rectal tube or BMS         Antifungal tx      Interdry          Reposition q 2 hours      X  Up to chair        Ambulate      PT/OT        Dietician        Diabetes Education      PO     TF   X  TPN     NPO   # days   Other        WOUND TEAM PLAN OF CARE (X):   NPWT change 3 x week:        Dressing changes by wound team:       Follow up as needed:     Wound team to follow up  Other (explain):     Anticipated discharge plans (X): TBA - will follow, may need ongoing wound care post DC  SNF:           Home Care:           Outpatient Wound Center:            Self Care:            Other:
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
## 2019-09-11 NOTE — PROGRESS NOTES
Trach site is crusty, oozing and malodorous. RT changed out and provided trach care. Gelatinous reddish brown discharge in T-piece tubing. Thick secretions from inline suctioning.     Chapo Elizondo RN, BSN, [facility], CCRN

## 2019-09-11 NOTE — CARE PLAN
Respiratory Therapy Update    Interdisciplinary Plan of Care-Goals (Indications)  Bronchopulmonary Hygiene Indications: Difficulty with Secretion Clearance (09/11/19 0246)  Interdisciplinary Plan of Care-Outcomes   Bronchopulmonary Hygiene Outcome: Patient at Stable Baseline (09/11/19 0246)    Cough: Strong;Productive (09/11/19 0246)  Sputum Amount: Large (09/11/19 0246)  Sputum Color: Tan;Bloody (09/11/19 0246)  Sputum Consistency: Thick;Thin (09/11/19 0246)    FiO2%: 40 % (09/11/19 0246)  O2 (LPM): 10 (09/11/19 0246)  O2 Daily Delivery Respiratory : T-Piece (09/11/19 0246)    Breath Sounds  Pre/Post Intervention: Post Intervention Assessment (09/11/19 0246)  RUL Breath Sounds: Clear (09/11/19 0246)  RML Breath Sounds: Diminished (09/11/19 0246)  RLL Breath Sounds: Diminished (09/11/19 0246)  DARLENE Breath Sounds: Clear (09/11/19 0246)  LLL Breath Sounds: Diminished (09/11/19 0246)    Events/Summary/Plan: Pt tolerating aerosol well. Large amount of tan, bloody secretions requires frequent suction and circuit clearance.
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
## 2019-09-11 NOTE — CARE PLAN
Problem: Safety  Goal: Will remain free from injury  Note:   Bed in low locked position room near nurses station. Sitter at bedside     Problem: Skin Integrity  Goal: Skin Integrity is maintained or improved  Note:   Collaboration with Wound team
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
## 2019-09-11 NOTE — THERAPY
"Speech Language Therapy speaking valve treatment completed.     Functional Status: Patient was seen on this date for speaking valve placement and speech treatment. SO and 2 friends at bedside. Fluid in  balloon noted - RT was immediately notified who deflated cuff for session. Pt on 5 L at 40% FiO2 via tsp. Tracheal suctioning performed with moderate amount of frothy secretions removed. Speaking valve was placed in-line with T-piece and pt tolererated placement for ~15 minutes. Patient speaking through jaw wiring at the short phrase level and able to produce automatics with good accuracy. Vocal quality with good intensity and intermittently clear and became increasingly gurgly in quality. Pt inconsistently followed written and verbal cues for volitional swallow trigger and falsetto. Pt with increasing cough and expectoration of thick secretions through trach. Therefore, speaking valve was removed and RT was notified to re-inflate cuff.     Recommendations: At this time, given increase in tracheal secretions and increase coughing would recommend speaking valve placement by trained SLP and RT only for short intervals at a time (10-15 minutes) or as tolerated. SLP following closely.     Plan of Care: Will benefit from Speech Therapy 5 times per week    Post-Acute Therapy: Recommend inpatient transitional care services for continued speech therapy services.      See \"Rehab Therapy-Acute\" Patient Summary Report for complete documentation.     "

## 2019-09-12 ENCOUNTER — APPOINTMENT (OUTPATIENT)
Dept: RADIOLOGY | Facility: MEDICAL CENTER | Age: 79
DRG: 003 | End: 2019-09-12
Attending: NURSE PRACTITIONER
Payer: MEDICARE

## 2019-09-12 LAB
ALBUMIN SERPL BCP-MCNC: 3.4 G/DL (ref 3.2–4.9)
ALBUMIN/GLOB SERPL: 1.1 G/DL
ALP SERPL-CCNC: 110 U/L (ref 30–99)
ALT SERPL-CCNC: 41 U/L (ref 2–50)
ANION GAP SERPL CALC-SCNC: 10 MMOL/L (ref 0–11.9)
AST SERPL-CCNC: 23 U/L (ref 12–45)
BASOPHILS # BLD AUTO: 0.7 % (ref 0–1.8)
BASOPHILS # BLD: 0.1 K/UL (ref 0–0.12)
BILIRUB SERPL-MCNC: 0.9 MG/DL (ref 0.1–1.5)
BUN SERPL-MCNC: 66 MG/DL (ref 8–22)
CALCIUM SERPL-MCNC: 9.1 MG/DL (ref 8.5–10.5)
CHLORIDE SERPL-SCNC: 100 MMOL/L (ref 96–112)
CO2 SERPL-SCNC: 29 MMOL/L (ref 20–33)
CREAT SERPL-MCNC: 1.19 MG/DL (ref 0.5–1.4)
EOSINOPHIL # BLD AUTO: 0.19 K/UL (ref 0–0.51)
EOSINOPHIL NFR BLD: 1.3 % (ref 0–6.9)
ERYTHROCYTE [DISTWIDTH] IN BLOOD BY AUTOMATED COUNT: 48.3 FL (ref 35.9–50)
GLOBULIN SER CALC-MCNC: 3.1 G/DL (ref 1.9–3.5)
GLUCOSE SERPL-MCNC: 125 MG/DL (ref 65–99)
HCT VFR BLD AUTO: 34.8 % (ref 42–52)
HGB BLD-MCNC: 10.8 G/DL (ref 14–18)
IMM GRANULOCYTES # BLD AUTO: 0.19 K/UL (ref 0–0.11)
IMM GRANULOCYTES NFR BLD AUTO: 1.3 % (ref 0–0.9)
LYMPHOCYTES # BLD AUTO: 1.12 K/UL (ref 1–4.8)
LYMPHOCYTES NFR BLD: 7.7 % (ref 22–41)
MAGNESIUM SERPL-MCNC: 2.4 MG/DL (ref 1.5–2.5)
MCH RBC QN AUTO: 30.3 PG (ref 27–33)
MCHC RBC AUTO-ENTMCNC: 31 G/DL (ref 33.7–35.3)
MCV RBC AUTO: 97.8 FL (ref 81.4–97.8)
MONOCYTES # BLD AUTO: 0.98 K/UL (ref 0–0.85)
MONOCYTES NFR BLD AUTO: 6.7 % (ref 0–13.4)
NEUTROPHILS # BLD AUTO: 11.95 K/UL (ref 1.82–7.42)
NEUTROPHILS NFR BLD: 82.3 % (ref 44–72)
NRBC # BLD AUTO: 0 K/UL
NRBC BLD-RTO: 0 /100 WBC
PHOSPHATE SERPL-MCNC: 3.8 MG/DL (ref 2.5–4.5)
PLATELET # BLD AUTO: 384 K/UL (ref 164–446)
PMV BLD AUTO: 9.6 FL (ref 9–12.9)
POTASSIUM SERPL-SCNC: 4.3 MMOL/L (ref 3.6–5.5)
PROT SERPL-MCNC: 6.5 G/DL (ref 6–8.2)
RBC # BLD AUTO: 3.56 M/UL (ref 4.7–6.1)
SODIUM SERPL-SCNC: 139 MMOL/L (ref 135–145)
WBC # BLD AUTO: 14.5 K/UL (ref 4.8–10.8)

## 2019-09-12 PROCEDURE — 700102 HCHG RX REV CODE 250 W/ 637 OVERRIDE(OP): Performed by: SURGERY

## 2019-09-12 PROCEDURE — A9270 NON-COVERED ITEM OR SERVICE: HCPCS | Performed by: SURGERY

## 2019-09-12 PROCEDURE — A9270 NON-COVERED ITEM OR SERVICE: HCPCS | Performed by: NURSE PRACTITIONER

## 2019-09-12 PROCEDURE — 85025 COMPLETE CBC W/AUTO DIFF WBC: CPT

## 2019-09-12 PROCEDURE — 700111 HCHG RX REV CODE 636 W/ 250 OVERRIDE (IP): Performed by: NURSE PRACTITIONER

## 2019-09-12 PROCEDURE — 700102 HCHG RX REV CODE 250 W/ 637 OVERRIDE(OP): Performed by: INTERNAL MEDICINE

## 2019-09-12 PROCEDURE — A9270 NON-COVERED ITEM OR SERVICE: HCPCS | Performed by: ORAL & MAXILLOFACIAL SURGERY

## 2019-09-12 PROCEDURE — 700112 HCHG RX REV CODE 229: Performed by: NURSE PRACTITIONER

## 2019-09-12 PROCEDURE — 99233 SBSQ HOSP IP/OBS HIGH 50: CPT | Performed by: SURGERY

## 2019-09-12 PROCEDURE — 94640 AIRWAY INHALATION TREATMENT: CPT

## 2019-09-12 PROCEDURE — 83735 ASSAY OF MAGNESIUM: CPT

## 2019-09-12 PROCEDURE — 700111 HCHG RX REV CODE 636 W/ 250 OVERRIDE (IP): Performed by: SURGERY

## 2019-09-12 PROCEDURE — 80053 COMPREHEN METABOLIC PANEL: CPT

## 2019-09-12 PROCEDURE — 84100 ASSAY OF PHOSPHORUS: CPT

## 2019-09-12 PROCEDURE — A9270 NON-COVERED ITEM OR SERVICE: HCPCS | Performed by: INTERNAL MEDICINE

## 2019-09-12 PROCEDURE — 92507 TX SP LANG VOICE COMM INDIV: CPT

## 2019-09-12 PROCEDURE — 700102 HCHG RX REV CODE 250 W/ 637 OVERRIDE(OP): Performed by: ORAL & MAXILLOFACIAL SURGERY

## 2019-09-12 PROCEDURE — 700102 HCHG RX REV CODE 250 W/ 637 OVERRIDE(OP): Performed by: NURSE PRACTITIONER

## 2019-09-12 PROCEDURE — 770022 HCHG ROOM/CARE - ICU (200)

## 2019-09-12 PROCEDURE — 71045 X-RAY EXAM CHEST 1 VIEW: CPT

## 2019-09-12 RX ORDER — FUROSEMIDE 10 MG/ML
20 INJECTION INTRAMUSCULAR; INTRAVENOUS
Status: DISCONTINUED | OUTPATIENT
Start: 2019-09-12 | End: 2019-09-13

## 2019-09-12 RX ADMIN — POLYETHYLENE GLYCOL 3350 1 PACKET: 17 POWDER, FOR SOLUTION ORAL at 18:09

## 2019-09-12 RX ADMIN — SPIRONOLACTONE 25 MG: 50 TABLET ORAL at 05:47

## 2019-09-12 RX ADMIN — CHLORHEXIDINE GLUCONATE 0.12% ORAL RINSE 15 ML: 1.2 LIQUID ORAL at 18:08

## 2019-09-12 RX ADMIN — QUETIAPINE FUMARATE 100 MG: 100 TABLET ORAL at 20:29

## 2019-09-12 RX ADMIN — QUETIAPINE FUMARATE 50 MG: 25 TABLET ORAL at 14:22

## 2019-09-12 RX ADMIN — METOPROLOL TARTRATE 100 MG: 100 TABLET ORAL at 17:03

## 2019-09-12 RX ADMIN — DIGOXIN 250 MCG: 250 TABLET ORAL at 18:08

## 2019-09-12 RX ADMIN — OXYCODONE HYDROCHLORIDE 10 MG: 10 TABLET ORAL at 14:58

## 2019-09-12 RX ADMIN — OXYCODONE HYDROCHLORIDE 10 MG: 10 TABLET ORAL at 09:37

## 2019-09-12 RX ADMIN — QUETIAPINE FUMARATE 50 MG: 25 TABLET ORAL at 05:47

## 2019-09-12 RX ADMIN — POTASSIUM BICARBONATE 50 MEQ: 978 TABLET, EFFERVESCENT ORAL at 14:58

## 2019-09-12 RX ADMIN — LISINOPRIL 5 MG: 5 TABLET ORAL at 05:46

## 2019-09-12 RX ADMIN — APIXABAN 5 MG: 5 TABLET, FILM COATED ORAL at 05:47

## 2019-09-12 RX ADMIN — DOCUSATE SODIUM 100 MG: 50 LIQUID ORAL at 18:00

## 2019-09-12 RX ADMIN — APIXABAN 5 MG: 5 TABLET, FILM COATED ORAL at 18:08

## 2019-09-12 RX ADMIN — POTASSIUM BICARBONATE 50 MEQ: 978 TABLET, EFFERVESCENT ORAL at 21:10

## 2019-09-12 RX ADMIN — METOPROLOL TARTRATE 100 MG: 100 TABLET ORAL at 11:41

## 2019-09-12 RX ADMIN — DOCUSATE SODIUM 100 MG: 50 LIQUID ORAL at 05:17

## 2019-09-12 RX ADMIN — FUROSEMIDE 40 MG: 10 INJECTION, SOLUTION INTRAMUSCULAR; INTRAVENOUS at 05:38

## 2019-09-12 RX ADMIN — SENNOSIDES, DOCUSATE SODIUM 1 TABLET: 50; 8.6 TABLET, FILM COATED ORAL at 20:29

## 2019-09-12 RX ADMIN — POTASSIUM BICARBONATE 50 MEQ: 978 TABLET, EFFERVESCENT ORAL at 05:17

## 2019-09-12 RX ADMIN — FUROSEMIDE 20 MG: 10 INJECTION, SOLUTION INTRAMUSCULAR; INTRAVENOUS at 17:02

## 2019-09-12 RX ADMIN — AMIODARONE HYDROCHLORIDE 400 MG: 200 TABLET ORAL at 05:14

## 2019-09-12 RX ADMIN — FAMOTIDINE 20 MG: 20 TABLET ORAL at 05:17

## 2019-09-12 RX ADMIN — METOPROLOL TARTRATE 100 MG: 100 TABLET ORAL at 21:10

## 2019-09-12 ASSESSMENT — CHA2DS2 SCORE
CHA2DS2 VASC SCORE: 4
AGE 65 TO 74: NO
HYPERTENSION: YES
VASCULAR DISEASE: NO
PRIOR STROKE OR TIA OR THROMBOEMBOLISM: NO
CHF OR LEFT VENTRICULAR DYSFUNCTION: YES
DIABETES: NO
SEX: MALE
AGE 75 OR GREATER: YES
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
## 2019-09-12 NOTE — WOUND TEAM
Received call from RN  That dressing under trach not staying in place due to mucous production.  Advised to remove and cleanse wound and apply barrier paste with fenestrated foam.  Wound team to follow up later in week.
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
## 2019-09-12 NOTE — CARE PLAN
Pt on 5L, 30% heated aerosol for optimal hydration of secretion clearance.  Pt has strong productive cough producing moderate/large amounts of thick tan, brown, bloody secretions.    Plan: Continue to provide humidity, monitor secretions and provide bronchopulmonary hygiene as indicated. Will titrated O2 as tolerated.

## 2019-09-12 NOTE — CARE PLAN
Problem: Safety  Goal: Will remain free from injury  Outcome: PROGRESSING AS EXPECTED     Problem: Venous Thromboembolism (VTW)/Deep Vein Thrombosis (DVT) Prevention:  Goal: Patient will participate in Venous Thrombosis (VTE)/Deep Vein Thrombosis (DVT)Prevention Measures  Outcome: PROGRESSING AS EXPECTED     Problem: Pain Management  Goal: Pain level will decrease to patient's comfort goal  Outcome: PROGRESSING AS EXPECTED     Problem: Communication  Goal: The ability to communicate needs accurately and effectively will improve  Outcome: PROGRESSING SLOWER THAN EXPECTED     Problem: Bowel/Gastric:  Goal: Normal bowel function is maintained or improved  Outcome: PROGRESSING SLOWER THAN EXPECTED     Problem: Knowledge Deficit  Goal: Knowledge of the prescribed therapeutic regimen will improve  Outcome: PROGRESSING SLOWER THAN EXPECTED     Problem: Respiratory:  Goal: Respiratory status will improve  Outcome: PROGRESSING SLOWER THAN EXPECTED

## 2019-09-12 NOTE — THERAPY
"Speech Language Therapy speaking valve treatment completed.     Functional Status: Patient was seen on this date for speaking valve treatment. SO and 2 friends at bedside. Patient was more restless today but improved secretions and no coughing this session. Pt on 5 L and 30% FiO2 via T-piece. Cuff was deflated of ~3 cc and tracheal suctioning x1 performed with removal of moderate amount of thick-thin secretions. Speaking valve was placed in line with T-piece and patient initially required mod-max verbal cues to phonate. However, once engaged with SO and friends pt speaking through jaw wiring at the short sentence level although only intelligible 50-75% of the time. Vocal quality improved and clear most of the session. Patient frequently stating, \"I want to go home, \"I want to go to bed,\" and \"I want food. I'm hungry.\" After >15 minutes patient stating \"I'm tired\" and began closing his eyes; therefore, speaking valve was removed. Minimal amount of tracheal secretions removed and cuff was re-inflated. Education provided to pt, SO, and friends at bedside.     Recommendations: At this time, recommend speaking valve to be placed by trained RN/RT/SLP only for short intervals at a time (10-15 minutes) given direct supervision. SLP following.     Plan of Care: Will benefit from Speech Therapy 5 times per week    Post-Acute Therapy: Recommend inpatient transitional care services for continued speech therapy services.      See \"Rehab Therapy-Acute\" Patient Summary Report for complete documentation.     "

## 2019-09-12 NOTE — DISCHARGE PLANNING
Received Order in Response to Request for Court Ordered Involuntary Admission from the court continuing pt until 9/19 at 0900. Sent copy to unit LSW.

## 2019-09-13 ENCOUNTER — APPOINTMENT (OUTPATIENT)
Dept: RADIOLOGY | Facility: MEDICAL CENTER | Age: 79
DRG: 003 | End: 2019-09-13
Attending: NURSE PRACTITIONER
Payer: MEDICARE

## 2019-09-13 LAB
ANION GAP SERPL CALC-SCNC: 7 MMOL/L (ref 0–11.9)
BASOPHILS # BLD AUTO: 0.8 % (ref 0–1.8)
BASOPHILS # BLD: 0.09 K/UL (ref 0–0.12)
BUN SERPL-MCNC: 73 MG/DL (ref 8–22)
CALCIUM SERPL-MCNC: 8.8 MG/DL (ref 8.5–10.5)
CHLORIDE SERPL-SCNC: 101 MMOL/L (ref 96–112)
CO2 SERPL-SCNC: 33 MMOL/L (ref 20–33)
CREAT SERPL-MCNC: 1.27 MG/DL (ref 0.5–1.4)
EOSINOPHIL # BLD AUTO: 0.28 K/UL (ref 0–0.51)
EOSINOPHIL NFR BLD: 2.5 % (ref 0–6.9)
ERYTHROCYTE [DISTWIDTH] IN BLOOD BY AUTOMATED COUNT: 50.3 FL (ref 35.9–50)
GLUCOSE SERPL-MCNC: 135 MG/DL (ref 65–99)
HCT VFR BLD AUTO: 32.1 % (ref 42–52)
HGB BLD-MCNC: 9.9 G/DL (ref 14–18)
IMM GRANULOCYTES # BLD AUTO: 0.18 K/UL (ref 0–0.11)
IMM GRANULOCYTES NFR BLD AUTO: 1.6 % (ref 0–0.9)
LYMPHOCYTES # BLD AUTO: 1.24 K/UL (ref 1–4.8)
LYMPHOCYTES NFR BLD: 10.9 % (ref 22–41)
MCH RBC QN AUTO: 30.7 PG (ref 27–33)
MCHC RBC AUTO-ENTMCNC: 30.8 G/DL (ref 33.7–35.3)
MCV RBC AUTO: 99.7 FL (ref 81.4–97.8)
MONOCYTES # BLD AUTO: 0.74 K/UL (ref 0–0.85)
MONOCYTES NFR BLD AUTO: 6.5 % (ref 0–13.4)
NEUTROPHILS # BLD AUTO: 8.89 K/UL (ref 1.82–7.42)
NEUTROPHILS NFR BLD: 77.7 % (ref 44–72)
NRBC # BLD AUTO: 0 K/UL
NRBC BLD-RTO: 0 /100 WBC
NT-PROBNP SERPL IA-MCNC: 753 PG/ML (ref 0–125)
PLATELET # BLD AUTO: 351 K/UL (ref 164–446)
PMV BLD AUTO: 9.8 FL (ref 9–12.9)
POTASSIUM SERPL-SCNC: 4.4 MMOL/L (ref 3.6–5.5)
RBC # BLD AUTO: 3.22 M/UL (ref 4.7–6.1)
SODIUM SERPL-SCNC: 141 MMOL/L (ref 135–145)
WBC # BLD AUTO: 11.4 K/UL (ref 4.8–10.8)

## 2019-09-13 PROCEDURE — 700111 HCHG RX REV CODE 636 W/ 250 OVERRIDE (IP): Performed by: SURGERY

## 2019-09-13 PROCEDURE — A9270 NON-COVERED ITEM OR SERVICE: HCPCS | Performed by: NURSE PRACTITIONER

## 2019-09-13 PROCEDURE — 97535 SELF CARE MNGMENT TRAINING: CPT

## 2019-09-13 PROCEDURE — 700102 HCHG RX REV CODE 250 W/ 637 OVERRIDE(OP): Performed by: NURSE PRACTITIONER

## 2019-09-13 PROCEDURE — 700112 HCHG RX REV CODE 229: Performed by: NURSE PRACTITIONER

## 2019-09-13 PROCEDURE — A9270 NON-COVERED ITEM OR SERVICE: HCPCS | Performed by: SURGERY

## 2019-09-13 PROCEDURE — 83880 ASSAY OF NATRIURETIC PEPTIDE: CPT

## 2019-09-13 PROCEDURE — 92610 EVALUATE SWALLOWING FUNCTION: CPT

## 2019-09-13 PROCEDURE — 97530 THERAPEUTIC ACTIVITIES: CPT

## 2019-09-13 PROCEDURE — 700102 HCHG RX REV CODE 250 W/ 637 OVERRIDE(OP): Performed by: ORAL & MAXILLOFACIAL SURGERY

## 2019-09-13 PROCEDURE — 85025 COMPLETE CBC W/AUTO DIFF WBC: CPT

## 2019-09-13 PROCEDURE — 99233 SBSQ HOSP IP/OBS HIGH 50: CPT | Performed by: SURGERY

## 2019-09-13 PROCEDURE — 700102 HCHG RX REV CODE 250 W/ 637 OVERRIDE(OP): Performed by: INTERNAL MEDICINE

## 2019-09-13 PROCEDURE — 700102 HCHG RX REV CODE 250 W/ 637 OVERRIDE(OP): Performed by: SURGERY

## 2019-09-13 PROCEDURE — A9270 NON-COVERED ITEM OR SERVICE: HCPCS | Performed by: ORAL & MAXILLOFACIAL SURGERY

## 2019-09-13 PROCEDURE — 94640 AIRWAY INHALATION TREATMENT: CPT

## 2019-09-13 PROCEDURE — 770022 HCHG ROOM/CARE - ICU (200)

## 2019-09-13 PROCEDURE — 71045 X-RAY EXAM CHEST 1 VIEW: CPT

## 2019-09-13 PROCEDURE — 80048 BASIC METABOLIC PNL TOTAL CA: CPT

## 2019-09-13 PROCEDURE — A9270 NON-COVERED ITEM OR SERVICE: HCPCS | Performed by: INTERNAL MEDICINE

## 2019-09-13 RX ADMIN — METOPROLOL TARTRATE 100 MG: 100 TABLET ORAL at 20:58

## 2019-09-13 RX ADMIN — QUETIAPINE FUMARATE 50 MG: 25 TABLET ORAL at 13:21

## 2019-09-13 RX ADMIN — POTASSIUM BICARBONATE 50 MEQ: 978 TABLET, EFFERVESCENT ORAL at 05:11

## 2019-09-13 RX ADMIN — OXYCODONE HYDROCHLORIDE 10 MG: 10 TABLET ORAL at 00:00

## 2019-09-13 RX ADMIN — METOPROLOL TARTRATE 100 MG: 100 TABLET ORAL at 13:21

## 2019-09-13 RX ADMIN — CHLORHEXIDINE GLUCONATE 0.12% ORAL RINSE 15 ML: 1.2 LIQUID ORAL at 17:32

## 2019-09-13 RX ADMIN — QUETIAPINE FUMARATE 50 MG: 25 TABLET ORAL at 05:10

## 2019-09-13 RX ADMIN — METOPROLOL TARTRATE 100 MG: 100 TABLET ORAL at 17:32

## 2019-09-13 RX ADMIN — QUETIAPINE FUMARATE 100 MG: 100 TABLET ORAL at 20:59

## 2019-09-13 RX ADMIN — DIGOXIN 250 MCG: 250 TABLET ORAL at 17:32

## 2019-09-13 RX ADMIN — OXYCODONE HYDROCHLORIDE 5 MG: 5 TABLET ORAL at 13:21

## 2019-09-13 RX ADMIN — APIXABAN 5 MG: 5 TABLET, FILM COATED ORAL at 17:32

## 2019-09-13 RX ADMIN — FUROSEMIDE 20 MG: 10 INJECTION, SOLUTION INTRAMUSCULAR; INTRAVENOUS at 05:10

## 2019-09-13 RX ADMIN — POLYETHYLENE GLYCOL 3350 1 PACKET: 17 POWDER, FOR SOLUTION ORAL at 05:11

## 2019-09-13 RX ADMIN — METOPROLOL TARTRATE 100 MG: 100 TABLET ORAL at 09:30

## 2019-09-13 RX ADMIN — APIXABAN 5 MG: 5 TABLET, FILM COATED ORAL at 05:10

## 2019-09-13 RX ADMIN — OXYCODONE HYDROCHLORIDE 10 MG: 10 TABLET ORAL at 20:59

## 2019-09-13 RX ADMIN — MAGNESIUM HYDROXIDE 30 ML: 400 SUSPENSION ORAL at 05:10

## 2019-09-13 RX ADMIN — AMIODARONE HYDROCHLORIDE 400 MG: 200 TABLET ORAL at 05:10

## 2019-09-13 RX ADMIN — DOCUSATE SODIUM 100 MG: 50 LIQUID ORAL at 05:10

## 2019-09-13 RX ADMIN — CHLORHEXIDINE GLUCONATE 0.12% ORAL RINSE 15 ML: 1.2 LIQUID ORAL at 05:11

## 2019-09-13 ASSESSMENT — COGNITIVE AND FUNCTIONAL STATUS - GENERAL
STANDING UP FROM CHAIR USING ARMS: A LOT
MOVING FROM LYING ON BACK TO SITTING ON SIDE OF FLAT BED: UNABLE
TOILETING: A LOT
PERSONAL GROOMING: A LOT
CLIMB 3 TO 5 STEPS WITH RAILING: TOTAL
SUGGESTED CMS G CODE MODIFIER MOBILITY: CM
MOVING TO AND FROM BED TO CHAIR: UNABLE
HELP NEEDED FOR BATHING: A LOT
EATING MEALS: TOTAL
DAILY ACTIVITIY SCORE: 12
MOBILITY SCORE: 7
DRESSING REGULAR UPPER BODY CLOTHING: A LITTLE
SUGGESTED CMS G CODE MODIFIER DAILY ACTIVITY: CL
DRESSING REGULAR LOWER BODY CLOTHING: A LOT
WALKING IN HOSPITAL ROOM: TOTAL
TURNING FROM BACK TO SIDE WHILE IN FLAT BAD: UNABLE

## 2019-09-13 ASSESSMENT — CHA2DS2 SCORE
AGE 65 TO 74: NO
CHF OR LEFT VENTRICULAR DYSFUNCTION: YES
PRIOR STROKE OR TIA OR THROMBOEMBOLISM: NO
VASCULAR DISEASE: NO
DIABETES: NO
SEX: MALE
AGE 75 OR GREATER: YES
HYPERTENSION: YES
CHA2DS2 VASC SCORE: 4

## 2019-09-13 NOTE — FLOWSHEET NOTE
09/12/19 1400   Interdisciplinary Plan of Care-Goals (Indications)   Bronchopulmonary Hygiene Indications Difficulty with Secretion Clearance   Interdisciplinary Plan of Care-Outcomes    Bronchopulmonary Hygiene Outcome Optimal Hydration with Moderate or Less Sputum Production   Education   Education Yes - Pt. / Family has been Instructed in use of Respiratory Equipment   Aerosol Therapy / Airway Management   #Aerosol Therapy / Airway Management T-Piece   Aerosol Humidity Temp (celsius) 35   Respiratory WDL   Respiratory (WDL) X   Chest Exam   Work Of Breathing / Effort Mild   Respiration (!) 24   Pulse 82   Heart Rate (Monitored) 82   Breath Sounds   RUL Breath Sounds Clear After Suction   RML Breath Sounds Clear After Suction   RLL Breath Sounds Clear After Suction   DARLENE Breath Sounds Clear After Suction   LLL Breath Sounds Clear After Suction   Secretions   Cough Congested;Moist;Productive;Strong   How Sputum Obtained Tracheal;Suction   Sputum Amount Moderate   Sputum Color Brown   Sputum Consistency Thick   Suction Frequency Suctioned Once or Twice Per Encounter   Airway Trach Tracheostomy 8.0   Placement Date/Time: 08/29/19 1800   Airway Type: Trach  Brand: Portex  Style: Cuffed  Airway Location: Tracheostomy  Airway Size: 8.0  Inserted In: Unit  Inserted by: MD   Site Assessment Oozing secretions   Airway Tube Secured Velcro attachment   Cuffless No   Tracheostomy/Stoma Care Clean Stoma Site;Dressing Change;Inner Cannula Changed   Extra Tracheostomy Tube at Bedside Yes   Oximetry   Continuous Oximetry Yes   Oxygen   Pulse Oximetry 92 %   O2 (LPM) 5   FiO2% 30 %   O2 Daily Delivery Respiratory  T-Piece

## 2019-09-13 NOTE — CARE PLAN
Problem: Communication  Goal: The ability to communicate needs accurately and effectively will improve  Intervention: Reorient patient to environment as needed  Note:   Patient is reorient to enviroment and is educated on reason for hospitalization, will continue to implement and assess for environmental stressors.        Problem: Pain  Goal: Alleviation of Pain or a reduction in pain to the patient's comfort goal  Intervention: Pain Management--Medications  Note:   Appropriate pain measuring tool used for assessment. Addressing patients pain needs with appropriate interventions. Assessing pain interventions every two hours or/and PRN.

## 2019-09-13 NOTE — THERAPY
"Occupational Therapy Treatment completed with focus on ADLs and ADL transfers.  Functional Status:  Max A seated grooming due to cog, Mod A LB dressing, Min A sit>Stand, Min A supine>sit  Plan of Care: Will benefit from Occupational Therapy 3 times per week  Discharge Recommendations:  Equipment Will Continue to Assess for Equipment Needs. Post-acute therapy Recommend post-acute placement for additional occupational therapy services prior to discharge home. Patient can tolerate post-acute therapies at a 5x/week frequency.    See \"Rehab Therapy-Acute\" Patient Summary Report for complete documentation.     Pt seen for OT tx on this date. Pt much more alert during session, primarily limited by cognition. Pt difficult to redirect to tasks. Pt given warm washcloth to wash face, pt pretended to wash face then threw washcloth behind him. Pt perseverative on getting back scratched throughout session. Pt continues to be below functional baseline, will continue to follow for Acute OT services. Recommend post acute placement.   "

## 2019-09-13 NOTE — DISCHARGE PLANNING
LSW received Response to Request for Court Ordered Involuntary Admission from legal hold [facility] Michelle.     The court is continuing pt until 09/19/2019 @ 0900. Copy of documentation in pt's hard chart.

## 2019-09-13 NOTE — PROGRESS NOTES
1010: Dr. Reynoso and IDT at bedside for rounds. Discuss patients labs values and elevated BUN. Will continue to monitor. New orders for SLP swallow eval, orders entered into epic.

## 2019-09-13 NOTE — CARE PLAN
Problem: Communication  Goal: The ability to communicate needs accurately and effectively will improve  Outcome: PROGRESSING AS EXPECTED  Intervention: [place] patient and significant other/support system to call light to alert staff of needs  Flowsheets (Taken 9/12/2019 2231)  Oriented to:: All of the Following : Location of Bathroom, Visiting Policy, Unit Routine, Call Light and Bedside Controls, Bedside Rail Policy, Smoking Policy, Rights and Responsibilities, Bedside Report, and Patient Education Notebook  Intervention: Reorient patient to environment as needed  Flowsheets (Taken 9/12/2019 2231)  Oriented to:: All of the Following : Location of Bathroom, Visiting Policy, Unit Routine, Call Light and Bedside Controls, Bedside Rail Policy, Smoking Policy, Rights and Responsibilities, Bedside Report, and Patient Education Notebook     Problem: Safety  Goal: Will remain free from injury  Outcome: PROGRESSING AS EXPECTED  Goal: Will remain free from falls  Outcome: PROGRESSING AS EXPECTED  Intervention: Implement fall precautions  Flowsheets  Taken 9/12/2019 2231  Bed Alarm: Yes - Alarm On  Taken 9/12/2019 2200  Environmental Precautions: Bed in Low Position     Problem: Safety - Medical Restraint  Goal: Remains free of injury from restraints (Restraint for Interference with Medical Device)  Description  INTERVENTIONS:  1. Determine that other, less restrictive measures have been tried or would not be effective before applying the restraint  2. Evaluate the patient's condition at the time of restraint application  3. Inform patient/family regarding the reason for restraint  4. Q2H: Monitor safety, psychosocial status, comfort, nutrition and hydration  Outcome: PROGRESSING AS EXPECTED  Flowsheets (Taken 9/12/2019 2231)  Addressed this shift: Remains free of injury from restraints (restraint for interference with medical device): Determine that other, less restrictive measures have been tried or would not be effective  before applying the restraint; Evaluate the patient's condition at the time of restraint application; Inform patient/family regarding the reason for restraint; Every 2 hours: Monitor safety, psychosocial status, comfort, nutrition and hydration

## 2019-09-13 NOTE — THERAPY
"Physical Therapy Treatment completed.   Bed Mobility:  Supine to Sit: Minimal Assist(max cues for initiation)  Transfers: Sit to Stand: Minimal Assist  Gait: Level Of Assist: resisted all attempts, internally distracted by need for back scratch     Plan of Care: Will benefit from Physical Therapy 3 times per week  Discharge Recommendations: Equipment: Will Continue to Assess for Equipment Needs.     Pt with improving activty tolerance and motor control but continues to direct care and have inattention c/b [place]. Continue to recommend placement at this time. Will follow.     See \"Rehab Therapy-Acute\" Patient Summary Report for complete documentation.       "
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
## 2019-09-13 NOTE — THERAPY
Speech Language Therapy Clinical Swallow Evaluation completed.    Functional Status: Orders received for a clinical swallow evaluation. Verified orders with MD to ensure no structural abnormalities that would contraindicate swallow eval. OK to proceed per Dr. Reynoso. Pt on 5 L at 30% FiO2 via T-piece. Cuff was deflated of 3 cc, tracheal suctioning performed with removal of moderately thick yellow-brown secretions, and speaking valve was placed in line with T-piece. Pt with reduced verbal output compared to yesterday's session. No gross asymmetry of face. Unable to visualize oral cavity due to jaw wiring. Blue dyed PO trials were administered and consisted of 3 single 1/2 tsp of NTL. Patient with anterior pooling of bolus and significant delayed response to bolus in oral cavity requiring MAX verbal and tactile cueing to initiate oral prep. Absent swallow trigger in 1/3 trials. Attempted presentation of straw; however, patient with poor awareness and unable to wrap lips around straw. Patient with increase in audible upper airway sounds and increase in wet vocal quality. Patient had increased coughing as session progressed concerning for aspiration. PMSV was removed after 10-12 minutes due to persisting cough and tracheal suctioning was performed with no blue noted in secretions. However, PO trials were very minimal this session. Of note, RT notified of min brown fluid in  balloon and inflation of cuff following strong cough.    Recommendations - Diet:  At this time, recommend speaking valve to be placed by trained RN/RT/SLP only for short intervals at a time (10-15 minutes) given direct supervision. Patient does not appear at the level for a PO diet given s/sx most consistent with severe oral-pharyngeal dysphagia. Blue dye swallow evaluation in progress - please document any blue in tracheal secretions noted within the next 24 hours. SLP following for PMSV and dysphagia treatment.                              "Strategies: To be determined                             Medication Administration: Via Gastric Tube     Plan of Care: Will benefit from Speech Therapy 5 times per week    Post-Acute Therapy: Recommend inpatient transitional care services for continued speech therapy services.      See \"Rehab Therapy-Acute\" Patient Summary Report for complete documentation. Thank you for the consult.   "
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
## 2019-09-14 ENCOUNTER — APPOINTMENT (OUTPATIENT)
Dept: RADIOLOGY | Facility: MEDICAL CENTER | Age: 79
DRG: 003 | End: 2019-09-14
Attending: NURSE PRACTITIONER
Payer: MEDICARE

## 2019-09-14 LAB
ANION GAP SERPL CALC-SCNC: 8 MMOL/L (ref 0–11.9)
BASOPHILS # BLD AUTO: 0.8 % (ref 0–1.8)
BASOPHILS # BLD: 0.1 K/UL (ref 0–0.12)
BUN SERPL-MCNC: 67 MG/DL (ref 8–22)
CALCIUM SERPL-MCNC: 9 MG/DL (ref 8.5–10.5)
CHLORIDE SERPL-SCNC: 101 MMOL/L (ref 96–112)
CO2 SERPL-SCNC: 31 MMOL/L (ref 20–33)
CREAT SERPL-MCNC: 1.11 MG/DL (ref 0.5–1.4)
EOSINOPHIL # BLD AUTO: 0.31 K/UL (ref 0–0.51)
EOSINOPHIL NFR BLD: 2.5 % (ref 0–6.9)
ERYTHROCYTE [DISTWIDTH] IN BLOOD BY AUTOMATED COUNT: 51.4 FL (ref 35.9–50)
GLUCOSE SERPL-MCNC: 111 MG/DL (ref 65–99)
HCT VFR BLD AUTO: 34.9 % (ref 42–52)
HGB BLD-MCNC: 10.5 G/DL (ref 14–18)
IMM GRANULOCYTES # BLD AUTO: 0.17 K/UL (ref 0–0.11)
IMM GRANULOCYTES NFR BLD AUTO: 1.4 % (ref 0–0.9)
LYMPHOCYTES # BLD AUTO: 1.46 K/UL (ref 1–4.8)
LYMPHOCYTES NFR BLD: 11.9 % (ref 22–41)
MCH RBC QN AUTO: 30.2 PG (ref 27–33)
MCHC RBC AUTO-ENTMCNC: 30.1 G/DL (ref 33.7–35.3)
MCV RBC AUTO: 100.3 FL (ref 81.4–97.8)
MONOCYTES # BLD AUTO: 0.79 K/UL (ref 0–0.85)
MONOCYTES NFR BLD AUTO: 6.4 % (ref 0–13.4)
NEUTROPHILS # BLD AUTO: 9.42 K/UL (ref 1.82–7.42)
NEUTROPHILS NFR BLD: 77 % (ref 44–72)
NRBC # BLD AUTO: 0 K/UL
NRBC BLD-RTO: 0 /100 WBC
PLATELET # BLD AUTO: 379 K/UL (ref 164–446)
PMV BLD AUTO: 9.8 FL (ref 9–12.9)
POTASSIUM SERPL-SCNC: 3.7 MMOL/L (ref 3.6–5.5)
RBC # BLD AUTO: 3.48 M/UL (ref 4.7–6.1)
SODIUM SERPL-SCNC: 140 MMOL/L (ref 135–145)
WBC # BLD AUTO: 12.3 K/UL (ref 4.8–10.8)

## 2019-09-14 PROCEDURE — 94640 AIRWAY INHALATION TREATMENT: CPT

## 2019-09-14 PROCEDURE — 700112 HCHG RX REV CODE 229: Performed by: NURSE PRACTITIONER

## 2019-09-14 PROCEDURE — A9270 NON-COVERED ITEM OR SERVICE: HCPCS | Performed by: SURGERY

## 2019-09-14 PROCEDURE — 71045 X-RAY EXAM CHEST 1 VIEW: CPT

## 2019-09-14 PROCEDURE — A9270 NON-COVERED ITEM OR SERVICE: HCPCS | Performed by: NURSE PRACTITIONER

## 2019-09-14 PROCEDURE — 700102 HCHG RX REV CODE 250 W/ 637 OVERRIDE(OP): Performed by: INTERNAL MEDICINE

## 2019-09-14 PROCEDURE — 770022 HCHG ROOM/CARE - ICU (200)

## 2019-09-14 PROCEDURE — A9270 NON-COVERED ITEM OR SERVICE: HCPCS | Performed by: ORAL & MAXILLOFACIAL SURGERY

## 2019-09-14 PROCEDURE — 99233 SBSQ HOSP IP/OBS HIGH 50: CPT | Performed by: SURGERY

## 2019-09-14 PROCEDURE — 700102 HCHG RX REV CODE 250 W/ 637 OVERRIDE(OP): Performed by: SURGERY

## 2019-09-14 PROCEDURE — 51798 US URINE CAPACITY MEASURE: CPT

## 2019-09-14 PROCEDURE — 700102 HCHG RX REV CODE 250 W/ 637 OVERRIDE(OP): Performed by: ORAL & MAXILLOFACIAL SURGERY

## 2019-09-14 PROCEDURE — 304255 HCHG KIT,PERC TRACHE

## 2019-09-14 PROCEDURE — 85025 COMPLETE CBC W/AUTO DIFF WBC: CPT

## 2019-09-14 PROCEDURE — 80048 BASIC METABOLIC PNL TOTAL CA: CPT

## 2019-09-14 PROCEDURE — A9270 NON-COVERED ITEM OR SERVICE: HCPCS | Performed by: INTERNAL MEDICINE

## 2019-09-14 RX ORDER — TAMSULOSIN HYDROCHLORIDE 0.4 MG/1
0.4 CAPSULE ORAL
Status: DISCONTINUED | OUTPATIENT
Start: 2019-09-14 | End: 2019-09-14

## 2019-09-14 RX ADMIN — QUETIAPINE FUMARATE 50 MG: 25 TABLET ORAL at 15:24

## 2019-09-14 RX ADMIN — DIGOXIN 250 MCG: 250 TABLET ORAL at 17:24

## 2019-09-14 RX ADMIN — METOPROLOL TARTRATE 100 MG: 100 TABLET ORAL at 10:00

## 2019-09-14 RX ADMIN — QUETIAPINE FUMARATE 100 MG: 100 TABLET ORAL at 20:26

## 2019-09-14 RX ADMIN — OXYCODONE HYDROCHLORIDE 10 MG: 10 TABLET ORAL at 04:29

## 2019-09-14 RX ADMIN — APIXABAN 5 MG: 5 TABLET, FILM COATED ORAL at 05:44

## 2019-09-14 RX ADMIN — OXYCODONE HYDROCHLORIDE 10 MG: 10 TABLET ORAL at 12:04

## 2019-09-14 RX ADMIN — DOCUSATE SODIUM 100 MG: 50 LIQUID ORAL at 17:22

## 2019-09-14 RX ADMIN — APIXABAN 5 MG: 5 TABLET, FILM COATED ORAL at 17:24

## 2019-09-14 RX ADMIN — CHLORHEXIDINE GLUCONATE 0.12% ORAL RINSE 15 ML: 1.2 LIQUID ORAL at 17:22

## 2019-09-14 RX ADMIN — QUETIAPINE FUMARATE 50 MG: 25 TABLET ORAL at 05:38

## 2019-09-14 RX ADMIN — CHLORHEXIDINE GLUCONATE 0.12% ORAL RINSE 15 ML: 1.2 LIQUID ORAL at 05:38

## 2019-09-14 RX ADMIN — LISINOPRIL 5 MG: 5 TABLET ORAL at 05:44

## 2019-09-14 RX ADMIN — AMIODARONE HYDROCHLORIDE 400 MG: 200 TABLET ORAL at 05:38

## 2019-09-14 RX ADMIN — SPIRONOLACTONE 25 MG: 50 TABLET ORAL at 05:44

## 2019-09-14 RX ADMIN — METOPROLOL TARTRATE 100 MG: 100 TABLET ORAL at 20:27

## 2019-09-14 NOTE — CARE PLAN
Problem: Communication  Goal: The ability to communicate needs accurately and effectively will improve  Outcome: PROGRESSING AS EXPECTED  Intervention: Develop alternate methods of communication with patient and significant other/support system  Note:   The pt is nodding appropriately when asked questions, follows with all extremities. The pt is hard of hearing, so repeated questioning is necessary.      Problem: Safety  Goal: Will remain free from injury  Outcome: PROGRESSING SLOWER THAN EXPECTED  Intervention: Provide assistance with mobility  Note:   The pt is very impulsive and requires a safety sitter d/t pulling at lines, tubes, and drains, as well as trying to get out of bed. The pt requires reorientation and reminders of not getting out of bed without staff.
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
## 2019-09-14 NOTE — PROGRESS NOTES
Dr. Reynoso doing morning rounds, pt continues to be restless. Charge RN goes in to assist pt while this RN finishes rounds with MD. Pt becomes more agitated and attempts to remove trach. RN protects trach, but pt manages to pull off  balloon. MD and RT's already in the pod, multiple staff go in to assess pt. Pt is placed on 4 L SNC and capped. Pt sating at 100%. After several minutes pt is titrated to 2 L SNC sating at 96%. Will continue to closely monitor pt.

## 2019-09-14 NOTE — PROGRESS NOTES
2 RN skin check complete with ANAMARIA Gonzalez.    Devices in place: tracheostomy, cortrak, vale catheter, BP cuff, EKG leads, SPO2 monitor and wrist restraints.              Skin assessed under the following devices listed above.              Preventative measures in place including: mepilex on sacrum, assessing skin under restraints, tracheostomy care as needed, rotating BP cuff and SpO2 probe.  Patient turns himself and repositions self in bed frequently.   Tube feed at goal and no signs of skin breakdown around nare.   Following areas of concern:   ·           Jaw wired shut, lips dry and moisturizer applied and oral care completed q2 hours. Sheers taped to bed and extra set on tray table  Surgical incision site under chin with sutures in place;dried drainage noted and site cleaned  Trach site oozing with serous secretion, wound at trach site with fenestrated guaze in place.   Red rash with scabs to back, cream applied..   Skin on coccyx is red but blanching. Left lower coccy/buttock has red area that looks like a rash, cream applied. Area blanching  Generalized bruising and fragile skin to upper extremities      Wound consult placedYES/NO: yes    Wound reported YES/NO: yes  Appropriate LDAs opened YES/NO: yes

## 2019-09-14 NOTE — CARE PLAN
Problem: Respiratory:  Goal: Respiratory status will improve  Outcome: NOT MET  Note:   Collaborating with RT and Interdisciplinary team on pt's treatment measures to improve respiratory function.       Problem: Skin Integrity  Goal: Risk for impaired skin integrity will decrease  Outcome: PROGRESSING AS EXPECTED  Intervention: Assess risk factors for impaired skin integrity and/or pressure ulcers  Note:   Performing Pato Skin Risk assessment every shift or more to maintain or improve skin integrity. Any signs of skin breakdown are being documented per hospital policy. Utilizing barrier wipes, cream, and moisturizer when need to help prevent skin breakdown. Pt mobilizing as per MD orders.

## 2019-09-15 LAB
ANION GAP SERPL CALC-SCNC: 8 MMOL/L (ref 0–11.9)
BASOPHILS # BLD AUTO: 0.7 % (ref 0–1.8)
BASOPHILS # BLD: 0.09 K/UL (ref 0–0.12)
BUN SERPL-MCNC: 66 MG/DL (ref 8–22)
CALCIUM SERPL-MCNC: 9 MG/DL (ref 8.5–10.5)
CHLORIDE SERPL-SCNC: 103 MMOL/L (ref 96–112)
CO2 SERPL-SCNC: 27 MMOL/L (ref 20–33)
CREAT SERPL-MCNC: 0.99 MG/DL (ref 0.5–1.4)
EOSINOPHIL # BLD AUTO: 0.27 K/UL (ref 0–0.51)
EOSINOPHIL NFR BLD: 2 % (ref 0–6.9)
ERYTHROCYTE [DISTWIDTH] IN BLOOD BY AUTOMATED COUNT: 50.5 FL (ref 35.9–50)
GLUCOSE SERPL-MCNC: 121 MG/DL (ref 65–99)
HCT VFR BLD AUTO: 33 % (ref 42–52)
HGB BLD-MCNC: 10 G/DL (ref 14–18)
IMM GRANULOCYTES # BLD AUTO: 0.16 K/UL (ref 0–0.11)
IMM GRANULOCYTES NFR BLD AUTO: 1.2 % (ref 0–0.9)
LYMPHOCYTES # BLD AUTO: 1.28 K/UL (ref 1–4.8)
LYMPHOCYTES NFR BLD: 9.6 % (ref 22–41)
MCH RBC QN AUTO: 30.2 PG (ref 27–33)
MCHC RBC AUTO-ENTMCNC: 30.3 G/DL (ref 33.7–35.3)
MCV RBC AUTO: 99.7 FL (ref 81.4–97.8)
MONOCYTES # BLD AUTO: 0.72 K/UL (ref 0–0.85)
MONOCYTES NFR BLD AUTO: 5.4 % (ref 0–13.4)
NEUTROPHILS # BLD AUTO: 10.84 K/UL (ref 1.82–7.42)
NEUTROPHILS NFR BLD: 81.1 % (ref 44–72)
NRBC # BLD AUTO: 0 K/UL
NRBC BLD-RTO: 0 /100 WBC
PLATELET # BLD AUTO: 379 K/UL (ref 164–446)
PMV BLD AUTO: 9.8 FL (ref 9–12.9)
POTASSIUM SERPL-SCNC: 3.8 MMOL/L (ref 3.6–5.5)
RBC # BLD AUTO: 3.31 M/UL (ref 4.7–6.1)
SODIUM SERPL-SCNC: 138 MMOL/L (ref 135–145)
WBC # BLD AUTO: 13.4 K/UL (ref 4.8–10.8)

## 2019-09-15 PROCEDURE — 94760 N-INVAS EAR/PLS OXIMETRY 1: CPT

## 2019-09-15 PROCEDURE — A9270 NON-COVERED ITEM OR SERVICE: HCPCS | Performed by: SURGERY

## 2019-09-15 PROCEDURE — 700105 HCHG RX REV CODE 258: Performed by: SURGERY

## 2019-09-15 PROCEDURE — 85025 COMPLETE CBC W/AUTO DIFF WBC: CPT

## 2019-09-15 PROCEDURE — 700102 HCHG RX REV CODE 250 W/ 637 OVERRIDE(OP): Performed by: SURGERY

## 2019-09-15 PROCEDURE — 700102 HCHG RX REV CODE 250 W/ 637 OVERRIDE(OP): Performed by: ORAL & MAXILLOFACIAL SURGERY

## 2019-09-15 PROCEDURE — 99233 SBSQ HOSP IP/OBS HIGH 50: CPT | Mod: 25 | Performed by: SURGERY

## 2019-09-15 PROCEDURE — A9270 NON-COVERED ITEM OR SERVICE: HCPCS | Performed by: ORAL & MAXILLOFACIAL SURGERY

## 2019-09-15 PROCEDURE — 700111 HCHG RX REV CODE 636 W/ 250 OVERRIDE (IP): Performed by: SURGERY

## 2019-09-15 PROCEDURE — 770022 HCHG ROOM/CARE - ICU (200)

## 2019-09-15 PROCEDURE — 80048 BASIC METABOLIC PNL TOTAL CA: CPT

## 2019-09-15 PROCEDURE — 0T2BX0Z CHANGE DRAINAGE DEVICE IN BLADDER, EXTERNAL APPROACH: ICD-10-PCS | Performed by: SURGERY

## 2019-09-15 PROCEDURE — 94640 AIRWAY INHALATION TREATMENT: CPT

## 2019-09-15 PROCEDURE — A9270 NON-COVERED ITEM OR SERVICE: HCPCS | Performed by: NURSE PRACTITIONER

## 2019-09-15 PROCEDURE — 700112 HCHG RX REV CODE 229: Performed by: NURSE PRACTITIONER

## 2019-09-15 PROCEDURE — 700102 HCHG RX REV CODE 250 W/ 637 OVERRIDE(OP): Performed by: INTERNAL MEDICINE

## 2019-09-15 PROCEDURE — 99152 MOD SED SAME PHYS/QHP 5/>YRS: CPT

## 2019-09-15 PROCEDURE — 51703 INSERT BLADDER CATH COMPLEX: CPT | Performed by: SURGERY

## 2019-09-15 PROCEDURE — A9270 NON-COVERED ITEM OR SERVICE: HCPCS | Performed by: INTERNAL MEDICINE

## 2019-09-15 RX ORDER — PROPOFOL 10 MG/ML
50 INJECTION, EMULSION INTRAVENOUS ONCE
Status: COMPLETED | OUTPATIENT
Start: 2019-09-15 | End: 2019-09-15

## 2019-09-15 RX ORDER — SODIUM CHLORIDE 9 MG/ML
500 INJECTION, SOLUTION INTRAVENOUS ONCE
Status: COMPLETED | OUTPATIENT
Start: 2019-09-15 | End: 2019-09-15

## 2019-09-15 RX ADMIN — METOPROLOL TARTRATE 100 MG: 100 TABLET ORAL at 21:24

## 2019-09-15 RX ADMIN — AMIODARONE HYDROCHLORIDE 400 MG: 200 TABLET ORAL at 05:45

## 2019-09-15 RX ADMIN — QUETIAPINE FUMARATE 100 MG: 100 TABLET ORAL at 21:24

## 2019-09-15 RX ADMIN — CHLORHEXIDINE GLUCONATE 0.12% ORAL RINSE 15 ML: 1.2 LIQUID ORAL at 05:45

## 2019-09-15 RX ADMIN — LISINOPRIL 5 MG: 5 TABLET ORAL at 05:46

## 2019-09-15 RX ADMIN — APIXABAN 5 MG: 5 TABLET, FILM COATED ORAL at 17:36

## 2019-09-15 RX ADMIN — SODIUM CHLORIDE 500 ML: 9 INJECTION, SOLUTION INTRAVENOUS at 23:00

## 2019-09-15 RX ADMIN — APIXABAN 5 MG: 5 TABLET, FILM COATED ORAL at 05:46

## 2019-09-15 RX ADMIN — CHLORHEXIDINE GLUCONATE 0.12% ORAL RINSE 15 ML: 1.2 LIQUID ORAL at 17:33

## 2019-09-15 RX ADMIN — METOPROLOL TARTRATE 100 MG: 100 TABLET ORAL at 17:33

## 2019-09-15 RX ADMIN — SPIRONOLACTONE 25 MG: 50 TABLET ORAL at 05:45

## 2019-09-15 RX ADMIN — QUETIAPINE FUMARATE 50 MG: 25 TABLET ORAL at 13:51

## 2019-09-15 RX ADMIN — DOCUSATE SODIUM 100 MG: 50 LIQUID ORAL at 17:33

## 2019-09-15 RX ADMIN — OXYCODONE HYDROCHLORIDE 5 MG: 5 TABLET ORAL at 20:52

## 2019-09-15 RX ADMIN — PROPOFOL 50 MG: 10 INJECTION, EMULSION INTRAVENOUS at 22:45

## 2019-09-15 RX ADMIN — DIGOXIN 250 MCG: 250 TABLET ORAL at 17:33

## 2019-09-15 RX ADMIN — QUETIAPINE FUMARATE 50 MG: 25 TABLET ORAL at 05:46

## 2019-09-15 RX ADMIN — METOPROLOL TARTRATE 100 MG: 100 TABLET ORAL at 13:51

## 2019-09-15 NOTE — PROGRESS NOTES
1230: Pt having a difficult time coughing secretions up. Pt suctioned. Family at the bedside. Pt states he wants to get up 2 RN's and 2 RT's assisted in getting pt to the EOB and standing twice. Pt had large BM while standing. Assisted back to bed. While bathing and turning pt he had another BM and vasovagaled. Pt became bradycardic to the 40's and pulse ox was unable to read. Pt was immediately sat back up and placed on T-piece at 15 L and 60%. Pt returned to A-fib in the 70's and O2 saturation in the 90's. MD notified. Pt was downsized to a #6 Shiley cuff.
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
## 2019-09-15 NOTE — PROGRESS NOTES
2 RN skin check complete with ANAMARIA Chau.  Devices in place monitoring devices/leads/ear O2 prob (moved q2h), tracheostomy, cortrack with bridle, restraints, vale removed.   Skin assessed under the following devices listed above.   Preventative measures in place including q2h tunrs, q2h restraints assessments, q2h moving wires/ear prob, mepilex to sacrum, optifoam under trach.  Following areas of concern:   · Jaw wired shut (scissors at end of bed), surgical incision site under chin (cleansed), trach down sized to #6Shiley today optifoam placed under trach site, rash to back cream applied prn, coccyx red and blanching with mepilex in place. Pt is very restless and moves frequently.    The following interventions in place listed above.     Wound consult placedYES/NO: yes    Wound reported YES/NO: N\A  Appropriate LDAs opened YES/NO: yes

## 2019-09-15 NOTE — CARE PLAN
Problem: Venous Thromboembolism (VTW)/Deep Vein Thrombosis (DVT) Prevention:  Goal: Patient will participate in Venous Thrombosis (VTE)/Deep Vein Thrombosis (DVT)Prevention Measures  Intervention: Ensure patient wears graduated elastic stockings (JULIO hose) and/or SCDs, if ordered, when in bed or chair (Remove at least once per shift for skin check)  Note:   Pt will wear SCDs for for extent of time he remains in bed except when performing 2 RN skin check, ambulation, and/or bed bath     Problem: Respiratory:  Goal: Respiratory status will improve  Intervention: Administer and titrate oxygen therapy  Note:   Will titrate O2 delivery to meet minimum O2  demand requirements. T piece 40% 12L currently

## 2019-09-15 NOTE — PROGRESS NOTES
2 RN skin check complete with ANAMARIA Gaffney.    Devices in place right nare cortrak with bridle, bilateral hearing aides, glasses, ear clip pulse ox, tracheostomy, EKG leads, BP cuff, bilateral soft wrist restraints, Tay.  Skin assessed under the following devices mentioned above.     Preventative measures in place including pillows to float extremities, mepilex on bilateral elbows, reposition devices.    Following areas of concern:    -Wound around bottom of trach stoma site, area cleansed with NS, barrier paste applied, new fenestrated foam place (per wound care order)   -Surgical incision on chin, well approximated, open to air, site CDI   -Bilateral elbows red and blanching   -Sacrum red and blanching, mepilex in place   -Left earlobe red and blanching, ear clip repositioned q2h    Wound consult placedYES/NO: yes, previously    Wound reported YES/NO: yes  Appropriate LDAs opened YES/NO: yes

## 2019-09-15 NOTE — CARE PLAN
Problem: Safety  Goal: Will remain free from injury  Note:   Patient educated on the need for restraints, and to call for assistance prior to getting out of bed. 1:1 sitter at bedside to prevent self-harm.      Problem: Pain Management  Goal: Pain level will decrease to patient's comfort goal  Intervention: Follow pain managment plan developed in collaboration with patient and Interdisciplinary Team  Note:   Pain assessed q2h using CPOT score. Following pain management plan developed by interdisciplinary care team. Patient medicated per MAR.

## 2019-09-15 NOTE — PROGRESS NOTES
2 RN skin check complete with ANAMARIA Rodriguez.  Devices in place:  Pulse ox ear probe (rotated Q2hr), tracheostomy w/ velcro tie,cortrack with bridle, restraints, vale, EKG leads, BP cuff, SCDs.    Skin assessed under the aforementioned devices    Preventative measures in place  Q2hr turns/repositioning w/ pillows, Q2hr restraint assessments, Q2hr repositioning wires/ear prob from under skin, mepilex to sacrum, mepilex to bilateral elbows, pillows floated on pillows, trach care provided and optifoam under trach.    Following areas of concern:   -Tracheostomy site red, trach care provided, optifoam in place  -Bilateral elbows red/blanching, mepilex placed  -Area of redness/blanching noted to sacrum/buttocks, mepilex placed  -Redness/rash noted across back, CDI, EMRE  -Septum of nose unable to fully visualize due to tightness of bridle, will continue to monitor.     All preventative measures in place     Wound consult placedYES/NO: No  Wound reported YES/NO: No  Appropriate LDAs opened YES/NO: No

## 2019-09-16 ENCOUNTER — APPOINTMENT (OUTPATIENT)
Dept: RADIOLOGY | Facility: MEDICAL CENTER | Age: 79
DRG: 003 | End: 2019-09-16
Attending: SURGERY
Payer: MEDICARE

## 2019-09-16 LAB
ANION GAP SERPL CALC-SCNC: 8 MMOL/L (ref 0–11.9)
BASOPHILS # BLD AUTO: 0.6 % (ref 0–1.8)
BASOPHILS # BLD: 0.11 K/UL (ref 0–0.12)
BUN SERPL-MCNC: 61 MG/DL (ref 8–22)
CALCIUM SERPL-MCNC: 8.9 MG/DL (ref 8.5–10.5)
CHLORIDE SERPL-SCNC: 104 MMOL/L (ref 96–112)
CO2 SERPL-SCNC: 27 MMOL/L (ref 20–33)
CREAT SERPL-MCNC: 1.09 MG/DL (ref 0.5–1.4)
CRP SERPL HS-MCNC: 2.27 MG/DL (ref 0–0.75)
EOSINOPHIL # BLD AUTO: 0.34 K/UL (ref 0–0.51)
EOSINOPHIL NFR BLD: 1.9 % (ref 0–6.9)
ERYTHROCYTE [DISTWIDTH] IN BLOOD BY AUTOMATED COUNT: 51.1 FL (ref 35.9–50)
GLUCOSE SERPL-MCNC: 115 MG/DL (ref 65–99)
HCT VFR BLD AUTO: 32.3 % (ref 42–52)
HGB BLD-MCNC: 10 G/DL (ref 14–18)
IMM GRANULOCYTES # BLD AUTO: 0.24 K/UL (ref 0–0.11)
IMM GRANULOCYTES NFR BLD AUTO: 1.3 % (ref 0–0.9)
LYMPHOCYTES # BLD AUTO: 1.52 K/UL (ref 1–4.8)
LYMPHOCYTES NFR BLD: 8.4 % (ref 22–41)
MAGNESIUM SERPL-MCNC: 2.3 MG/DL (ref 1.5–2.5)
MCH RBC QN AUTO: 30.7 PG (ref 27–33)
MCHC RBC AUTO-ENTMCNC: 31 G/DL (ref 33.7–35.3)
MCV RBC AUTO: 99.1 FL (ref 81.4–97.8)
MONOCYTES # BLD AUTO: 1.06 K/UL (ref 0–0.85)
MONOCYTES NFR BLD AUTO: 5.9 % (ref 0–13.4)
NEUTROPHILS # BLD AUTO: 14.81 K/UL (ref 1.82–7.42)
NEUTROPHILS NFR BLD: 81.9 % (ref 44–72)
NRBC # BLD AUTO: 0 K/UL
NRBC BLD-RTO: 0 /100 WBC
PHOSPHATE SERPL-MCNC: 2.5 MG/DL (ref 2.5–4.5)
PLATELET # BLD AUTO: 377 K/UL (ref 164–446)
PMV BLD AUTO: 9.9 FL (ref 9–12.9)
POTASSIUM SERPL-SCNC: 3.8 MMOL/L (ref 3.6–5.5)
PREALB SERPL-MCNC: 19 MG/DL (ref 18–38)
RBC # BLD AUTO: 3.26 M/UL (ref 4.7–6.1)
SODIUM SERPL-SCNC: 139 MMOL/L (ref 135–145)
WBC # BLD AUTO: 18.1 K/UL (ref 4.8–10.8)

## 2019-09-16 PROCEDURE — 85025 COMPLETE CBC W/AUTO DIFF WBC: CPT

## 2019-09-16 PROCEDURE — 84100 ASSAY OF PHOSPHORUS: CPT

## 2019-09-16 PROCEDURE — 700102 HCHG RX REV CODE 250 W/ 637 OVERRIDE(OP): Performed by: NURSE PRACTITIONER

## 2019-09-16 PROCEDURE — A9270 NON-COVERED ITEM OR SERVICE: HCPCS | Performed by: INTERNAL MEDICINE

## 2019-09-16 PROCEDURE — 83735 ASSAY OF MAGNESIUM: CPT

## 2019-09-16 PROCEDURE — A9270 NON-COVERED ITEM OR SERVICE: HCPCS | Performed by: SURGERY

## 2019-09-16 PROCEDURE — 770022 HCHG ROOM/CARE - ICU (200)

## 2019-09-16 PROCEDURE — 97535 SELF CARE MNGMENT TRAINING: CPT

## 2019-09-16 PROCEDURE — 86140 C-REACTIVE PROTEIN: CPT

## 2019-09-16 PROCEDURE — 700102 HCHG RX REV CODE 250 W/ 637 OVERRIDE(OP): Performed by: INTERNAL MEDICINE

## 2019-09-16 PROCEDURE — 80048 BASIC METABOLIC PNL TOTAL CA: CPT

## 2019-09-16 PROCEDURE — A9270 NON-COVERED ITEM OR SERVICE: HCPCS | Performed by: ORAL & MAXILLOFACIAL SURGERY

## 2019-09-16 PROCEDURE — 700102 HCHG RX REV CODE 250 W/ 637 OVERRIDE(OP): Performed by: SURGERY

## 2019-09-16 PROCEDURE — 302101 FENESTRATED FOAM: Performed by: SURGERY

## 2019-09-16 PROCEDURE — A9270 NON-COVERED ITEM OR SERVICE: HCPCS | Performed by: NURSE PRACTITIONER

## 2019-09-16 PROCEDURE — 97530 THERAPEUTIC ACTIVITIES: CPT

## 2019-09-16 PROCEDURE — 700102 HCHG RX REV CODE 250 W/ 637 OVERRIDE(OP): Performed by: ORAL & MAXILLOFACIAL SURGERY

## 2019-09-16 PROCEDURE — 84134 ASSAY OF PREALBUMIN: CPT

## 2019-09-16 PROCEDURE — 71045 X-RAY EXAM CHEST 1 VIEW: CPT

## 2019-09-16 PROCEDURE — 99233 SBSQ HOSP IP/OBS HIGH 50: CPT | Mod: 24 | Performed by: SURGERY

## 2019-09-16 PROCEDURE — 700112 HCHG RX REV CODE 229: Performed by: NURSE PRACTITIONER

## 2019-09-16 PROCEDURE — 94640 AIRWAY INHALATION TREATMENT: CPT

## 2019-09-16 RX ORDER — BACITRACIN ZINC 500 [USP'U]/G
OINTMENT TOPICAL 2 TIMES DAILY
Status: DISCONTINUED | OUTPATIENT
Start: 2019-09-16 | End: 2019-10-18 | Stop reason: HOSPADM

## 2019-09-16 RX ADMIN — METOPROLOL TARTRATE 100 MG: 100 TABLET ORAL at 21:56

## 2019-09-16 RX ADMIN — POLYETHYLENE GLYCOL 3350 1 PACKET: 17 POWDER, FOR SOLUTION ORAL at 16:56

## 2019-09-16 RX ADMIN — QUETIAPINE FUMARATE 100 MG: 100 TABLET ORAL at 21:56

## 2019-09-16 RX ADMIN — DOCUSATE SODIUM 100 MG: 50 LIQUID ORAL at 16:54

## 2019-09-16 RX ADMIN — CHLORHEXIDINE GLUCONATE 0.12% ORAL RINSE 15 ML: 1.2 LIQUID ORAL at 16:54

## 2019-09-16 RX ADMIN — BACITRACIN ZINC: 500 OINTMENT TOPICAL at 21:57

## 2019-09-16 RX ADMIN — SPIRONOLACTONE 25 MG: 50 TABLET ORAL at 04:49

## 2019-09-16 RX ADMIN — METOPROLOL TARTRATE 100 MG: 100 TABLET ORAL at 10:08

## 2019-09-16 RX ADMIN — METOPROLOL TARTRATE 100 MG: 100 TABLET ORAL at 13:44

## 2019-09-16 RX ADMIN — QUETIAPINE FUMARATE 50 MG: 25 TABLET ORAL at 04:49

## 2019-09-16 RX ADMIN — SENNOSIDES, DOCUSATE SODIUM 1 TABLET: 50; 8.6 TABLET, FILM COATED ORAL at 21:56

## 2019-09-16 RX ADMIN — QUETIAPINE FUMARATE 50 MG: 25 TABLET ORAL at 13:43

## 2019-09-16 RX ADMIN — METOPROLOL TARTRATE 100 MG: 100 TABLET ORAL at 16:54

## 2019-09-16 RX ADMIN — AMIODARONE HYDROCHLORIDE 400 MG: 200 TABLET ORAL at 04:49

## 2019-09-16 RX ADMIN — CHLORHEXIDINE GLUCONATE 0.12% ORAL RINSE 15 ML: 1.2 LIQUID ORAL at 04:49

## 2019-09-16 RX ADMIN — APIXABAN 5 MG: 5 TABLET, FILM COATED ORAL at 04:49

## 2019-09-16 RX ADMIN — OXYCODONE HYDROCHLORIDE 5 MG: 5 TABLET ORAL at 10:08

## 2019-09-16 RX ADMIN — DIGOXIN 250 MCG: 250 TABLET ORAL at 16:52

## 2019-09-16 RX ADMIN — APIXABAN 5 MG: 5 TABLET, FILM COATED ORAL at 16:52

## 2019-09-16 ASSESSMENT — GAIT ASSESSMENTS
ASSISTIVE DEVICE: HAND HELD ASSIST
DISTANCE (FEET): 3
GAIT LEVEL OF ASSIST: MODERATE ASSIST

## 2019-09-16 ASSESSMENT — COGNITIVE AND FUNCTIONAL STATUS - GENERAL
DRESSING REGULAR UPPER BODY CLOTHING: A LITTLE
WALKING IN HOSPITAL ROOM: A LOT
HELP NEEDED FOR BATHING: A LOT
SUGGESTED CMS G CODE MODIFIER DAILY ACTIVITY: CL
TOILETING: A LOT
STANDING UP FROM CHAIR USING ARMS: A LOT
CLIMB 3 TO 5 STEPS WITH RAILING: A LOT
SUGGESTED CMS G CODE MODIFIER MOBILITY: CL
DRESSING REGULAR LOWER BODY CLOTHING: A LOT
MOVING TO AND FROM BED TO CHAIR: UNABLE
EATING MEALS: TOTAL
PERSONAL GROOMING: A LITTLE
MOBILITY SCORE: 10
MOVING FROM LYING ON BACK TO SITTING ON SIDE OF FLAT BED: A LOT
TURNING FROM BACK TO SIDE WHILE IN FLAT BAD: UNABLE
DAILY ACTIVITIY SCORE: 13

## 2019-09-16 NOTE — PROGRESS NOTES
2 RN skin check completed.   Devices in place: right nare cortrak, bilateral hearing aides, tracheostomy, BP cuff, pulse ox, cardiac leads, PIV, vale, bilateral SCDs, bilateral soft wrist restraints        Skin assessed under the above devices  Preventative measures in place including:  - elbows and heels floated on pillows  - Repositioning q2h turns  - devices repositioned frequently  Following areas of concern:   Wound/redness noted to bottom of tracheostomy stoma, dressing and cannula changed.   Surgical incision under chin, sutured, EMRE  Bilateral elbows red/blanching, mepilex in place.   Sacrum red/blanching, barrier cream applied.   Appropriate wound LDA's in place.

## 2019-09-16 NOTE — CARE PLAN
Problem: Safety  Goal: Free from accidental injury  Outcome: PROGRESSING AS EXPECTED  Intervention:   Pt call light and personal belongings within reach, monitor on and patient’s cardiac rhythm being monitored.   Bed in low position, non skid socks on, bed alarm on.  Pt currently able to communicate some/most needs with sitter or RN for assistance.   Pt near nursing station.    Problem: Pain  Goal: Alleviation of Pain or a reduction in pain to the patient’s comfort goal  Outcome: PROGRESSING AS EXPECTED  Interventions:   CPOT pain scale used to assess pain every 4 hours and PRN.   Non-pharmacological pain measures taken such as rest, repositioning.

## 2019-09-16 NOTE — CARE PLAN
Problem: Pain Management  Goal: Pain level will decrease to patient's comfort goal  Intervention: Follow pain managment plan developed in collaboration with patient and Interdisciplinary Team  Note:   Pharmacological and nonpharmacological methods used to control pain. Pain assessed Q2.  Medications administered as needed per the MAR.      Problem: Respiratory:  Goal: Respiratory status will improve  Intervention: Assess and monitor pulmonary status  Note:   Pulmonary status assessed with each encounter. RT notified of changes. Patient suctioned as indicated. Oral care provided per protocol. Will continue to monitor.

## 2019-09-16 NOTE — WOUND TEAM
[facility]  Inpatient Services  Wound and Skin Care Progress Note    Admission Date: 8/27/2019     Consult Date: 09/07 @ 1332   HPI, PMH, SH: Reviewed    Unit where seen by Wound Team: S121/00     WOUND CONSULT RELATED TO:  Trach site        Self Report / Pain Level:  Grimacing with wound cleansing        OBJECTIVE:  In bed,  secretions have lessened, wound appears improved.     WOUND TYPE, LOCATION, CHARACTERISTICS (Pressure Injuries: location, stage, POA or date identified)           Wound 09/07/19 Full Thickness Wound Neck inferior trach related to erosion/moisture  (Active)   Wound Image      Site Assessment Red;Pink;Yellow    Carla-wound Assessment Intact    Margins Attached edges    Wound Length (cm) 1 cm    Wound Width (cm) 0.5 cm    Wound Depth (cm) 0.3 cm    Wound Surface Area (cm^2) 0.5 cm^2    Tunneling 0 cm    Undermining 0 cm    Closure None    Drainage Amount Scant    Drainage Description Serosanguineous    Non-staged Wound Description Full thickness    Treatments Cleansed;Site care    Cleansing Normal Saline Irrigation    Periwound Protectant Barrier Paste    Dressing Options Fenestrated Foam    Dressing Cleansing/Solutions Not Applicable    Dressing Changed New    Dressing Status Clean;Dry;Intact    Dressing Change Frequency As Needed    NEXT Dressing Change  09/16/19    NEXT Weekly Photo (Inpatient Only) 09/18/19    WOUND NURSE ONLY - Odor None    WOUND NURSE ONLY - Exposed Structures None    WOUND NURSE ONLY - Tissue Type and Percentage 80% pink/20% yellow    WOUND NURSE ONLY - Time Spent with Patient (mins) 45        Vascular:    NA    Lab Values:    Lab Results   Component Value Date/Time    WBC 18.1 (H) 09/16/2019 04:29 AM    RBC 3.26 (L) 09/16/2019 04:29 AM    HEMOGLOBIN 10.0 (L) 09/16/2019 04:29 AM    HEMATOCRIT 32.3 (L) 09/16/2019 04:29 AM        No results found for: HBA1C      Culture:   Not indicated at this time     INTERVENTIONS BY WOUND TEAM:  Area cleansed with NS and  gauze, new photo taken.  Covered wound barrier paste using cotton applicator.  New fenestrated foam applied.        Dressing Selection:  Barrier paste fenestrated foam      Interdisciplinary consultation: Patient, Bedside RN     EVALUATION: patient with wound below trach related to erosion/moisture. Previous dressing not staying in place.  Secretions improving.  Barrier paste seems to be keeping wound clean and dry.              Factors affecting wound healing: moisture, trach    Goals: Steady decrease in wound area and depth weekly.    NURSING PLAN OF CARE ORDERS (X):    Dressing changes: See Dressing Care orders: X  Skin care: See Skin Care orders: X  Rectal tube care: See Rectal Tube Care orders:   Other orders:    WOUND TEAM PLAN OF CARE (X):   NPWT change 3 x week:        Dressing changes by wound team:       Follow up as needed:     Wound team to follow up  Other (explain):     Anticipated discharge plans (X): not likely any wound care needs post DC  SNF:           Home Care:           Outpatient Wound Center:            Self Care:            Other:
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
## 2019-09-16 NOTE — CARE PLAN
Problem: Oxygenation:  Goal: Maintain adequate oxygenation dependent on patient condition  Outcome: PROGRESSING AS EXPECTED   Pt remains on T-piece 10L/40%      Problem: Bronchopulmonary Hygiene:  Goal: Increase mobilization of retained secretions  Outcome: PROGRESSING AS EXPECTED   Moderate amount of secretions overnight.

## 2019-09-16 NOTE — THERAPY
"Physical Therapy Treatment completed.   Bed Mobility:  Supine to Sit: Total Assist X 2  Transfers: Sit to Stand: Contact Guard Assist  Gait: Level Of Assist: Moderate Assist(x2) with No Equipment Needed       Plan of Care: Will benefit from Physical Therapy 2 times per week  Discharge Recommendations: Equipment: Front-Wheel Walker. Post-acute therapy Discharge to a transitional care facility for continued skilled therapy services.     See \"Rehab Therapy-Acute\" Patient Summary Report for complete documentation.     Pt uncooperative during therapy today, refusing to move from supine to sit without assist. Pt has been observed to be able to perform bed mobility with Min A, but chose not to do so today. Pt was continually perseverating on glasses, and required frequent verbal cues to stay on task. Pt would be able to tolerate more therapy, however cog issues are his main limiting factor. Pt continues to be impulsive and impaired with safety. Bump down to 2x/wk d/t cog deficits and uncooperative nature. Anticipate d/c to transitional care facility.  "
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
## 2019-09-16 NOTE — PROGRESS NOTES
2020: Patient pulled on vale catheter, unable to deflate balloon or advance back into bladder. MD paged. Instructed to cut catheter in order to deflate balloon and reinsert new catheter.   2045: once catheter was cut, balloon still unable to deflate. Catheter will not advance or dislodge. MD paged. Instructed to notify trauma MD, awaiting further instructions.
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
## 2019-09-17 LAB
ANION GAP SERPL CALC-SCNC: 11 MMOL/L (ref 0–11.9)
ANISOCYTOSIS BLD QL SMEAR: ABNORMAL
BASOPHILS # BLD AUTO: 0.9 % (ref 0–1.8)
BASOPHILS # BLD: 0.11 K/UL (ref 0–0.12)
BUN SERPL-MCNC: 46 MG/DL (ref 8–22)
CALCIUM SERPL-MCNC: 8.5 MG/DL (ref 8.5–10.5)
CHLORIDE SERPL-SCNC: 107 MMOL/L (ref 96–112)
CO2 SERPL-SCNC: 20 MMOL/L (ref 20–33)
COMMENT 1642: NORMAL
CREAT SERPL-MCNC: 0.85 MG/DL (ref 0.5–1.4)
EOSINOPHIL # BLD AUTO: 0.3 K/UL (ref 0–0.51)
EOSINOPHIL NFR BLD: 2.4 % (ref 0–6.9)
ERYTHROCYTE [DISTWIDTH] IN BLOOD BY AUTOMATED COUNT: 55.8 FL (ref 35.9–50)
GLUCOSE SERPL-MCNC: 114 MG/DL (ref 65–99)
HCT VFR BLD AUTO: 36.9 % (ref 42–52)
HGB BLD-MCNC: 10.3 G/DL (ref 14–18)
HYPOCHROMIA BLD QL SMEAR: ABNORMAL
IMM GRANULOCYTES # BLD AUTO: 0.22 K/UL (ref 0–0.11)
IMM GRANULOCYTES NFR BLD AUTO: 1.8 % (ref 0–0.9)
LYMPHOCYTES # BLD AUTO: 1.41 K/UL (ref 1–4.8)
LYMPHOCYTES NFR BLD: 11.4 % (ref 22–41)
MACROCYTES BLD QL SMEAR: ABNORMAL
MCH RBC QN AUTO: 30.3 PG (ref 27–33)
MCHC RBC AUTO-ENTMCNC: 27.9 G/DL (ref 33.7–35.3)
MCV RBC AUTO: 108.5 FL (ref 81.4–97.8)
MICROCYTES BLD QL SMEAR: ABNORMAL
MONOCYTES # BLD AUTO: 0.83 K/UL (ref 0–0.85)
MONOCYTES NFR BLD AUTO: 6.7 % (ref 0–13.4)
MORPHOLOGY BLD-IMP: NORMAL
NEUTROPHILS # BLD AUTO: 9.55 K/UL (ref 1.82–7.42)
NEUTROPHILS NFR BLD: 76.8 % (ref 44–72)
NRBC # BLD AUTO: 0 K/UL
NRBC BLD-RTO: 0 /100 WBC
OVALOCYTES BLD QL SMEAR: NORMAL
PLATELET # BLD AUTO: 363 K/UL (ref 164–446)
PLATELET BLD QL SMEAR: NORMAL
PMV BLD AUTO: 9.7 FL (ref 9–12.9)
POIKILOCYTOSIS BLD QL SMEAR: NORMAL
POTASSIUM SERPL-SCNC: 3.8 MMOL/L (ref 3.6–5.5)
RBC # BLD AUTO: 3.4 M/UL (ref 4.7–6.1)
RBC BLD AUTO: PRESENT
SODIUM SERPL-SCNC: 138 MMOL/L (ref 135–145)
WBC # BLD AUTO: 12.4 K/UL (ref 4.8–10.8)

## 2019-09-17 PROCEDURE — 302101 FENESTRATED FOAM: Performed by: SURGERY

## 2019-09-17 PROCEDURE — 770022 HCHG ROOM/CARE - ICU (200)

## 2019-09-17 PROCEDURE — 700101 HCHG RX REV CODE 250: Performed by: SURGERY

## 2019-09-17 PROCEDURE — 306558 VEST RESTRAINT (MEDIUM): Performed by: SURGERY

## 2019-09-17 PROCEDURE — 700112 HCHG RX REV CODE 229: Performed by: NURSE PRACTITIONER

## 2019-09-17 PROCEDURE — A9270 NON-COVERED ITEM OR SERVICE: HCPCS | Performed by: NURSE PRACTITIONER

## 2019-09-17 PROCEDURE — 700102 HCHG RX REV CODE 250 W/ 637 OVERRIDE(OP): Performed by: SURGERY

## 2019-09-17 PROCEDURE — 92526 ORAL FUNCTION THERAPY: CPT

## 2019-09-17 PROCEDURE — 99291 CRITICAL CARE FIRST HOUR: CPT | Performed by: SURGERY

## 2019-09-17 PROCEDURE — 80048 BASIC METABOLIC PNL TOTAL CA: CPT

## 2019-09-17 PROCEDURE — A9270 NON-COVERED ITEM OR SERVICE: HCPCS | Performed by: SURGERY

## 2019-09-17 PROCEDURE — 94640 AIRWAY INHALATION TREATMENT: CPT

## 2019-09-17 PROCEDURE — A9270 NON-COVERED ITEM OR SERVICE: HCPCS | Performed by: ORAL & MAXILLOFACIAL SURGERY

## 2019-09-17 PROCEDURE — A9270 NON-COVERED ITEM OR SERVICE: HCPCS | Performed by: INTERNAL MEDICINE

## 2019-09-17 PROCEDURE — 700102 HCHG RX REV CODE 250 W/ 637 OVERRIDE(OP): Performed by: ORAL & MAXILLOFACIAL SURGERY

## 2019-09-17 PROCEDURE — 700102 HCHG RX REV CODE 250 W/ 637 OVERRIDE(OP): Performed by: NURSE PRACTITIONER

## 2019-09-17 PROCEDURE — 85025 COMPLETE CBC W/AUTO DIFF WBC: CPT

## 2019-09-17 PROCEDURE — 700102 HCHG RX REV CODE 250 W/ 637 OVERRIDE(OP): Performed by: INTERNAL MEDICINE

## 2019-09-17 PROCEDURE — 306565 RIGID MIT RESTRAINT(PAIR): Performed by: SURGERY

## 2019-09-17 RX ORDER — ACETYLCYSTEINE 200 MG/ML
3-5 SOLUTION ORAL; RESPIRATORY (INHALATION)
Status: DISCONTINUED | OUTPATIENT
Start: 2019-09-17 | End: 2019-09-24

## 2019-09-17 RX ORDER — ACETYLCYSTEINE 200 MG/ML
3-5 SOLUTION ORAL; RESPIRATORY (INHALATION) EVERY 4 HOURS
Status: DISCONTINUED | OUTPATIENT
Start: 2019-09-17 | End: 2019-09-17

## 2019-09-17 RX ADMIN — ACETYLCYSTEINE 4 ML: 200 INHALANT RESPIRATORY (INHALATION) at 09:51

## 2019-09-17 RX ADMIN — POLYETHYLENE GLYCOL 3350 1 PACKET: 17 POWDER, FOR SOLUTION ORAL at 05:14

## 2019-09-17 RX ADMIN — ALBUTEROL SULFATE 2.5 MG: 5 SOLUTION RESPIRATORY (INHALATION) at 18:47

## 2019-09-17 RX ADMIN — DIGOXIN 250 MCG: 250 TABLET ORAL at 20:33

## 2019-09-17 RX ADMIN — ALBUTEROL SULFATE 2.5 MG: 5 SOLUTION RESPIRATORY (INHALATION) at 15:04

## 2019-09-17 RX ADMIN — METOPROLOL TARTRATE 100 MG: 100 TABLET ORAL at 18:08

## 2019-09-17 RX ADMIN — ACETYLCYSTEINE 4 ML: 200 INHALANT RESPIRATORY (INHALATION) at 15:04

## 2019-09-17 RX ADMIN — BACITRACIN ZINC 1 EACH: 500 OINTMENT TOPICAL at 05:14

## 2019-09-17 RX ADMIN — DOCUSATE SODIUM 100 MG: 50 LIQUID ORAL at 05:14

## 2019-09-17 RX ADMIN — QUETIAPINE FUMARATE 50 MG: 25 TABLET ORAL at 15:02

## 2019-09-17 RX ADMIN — METOPROLOL TARTRATE 100 MG: 100 TABLET ORAL at 09:38

## 2019-09-17 RX ADMIN — METOPROLOL TARTRATE 100 MG: 100 TABLET ORAL at 21:09

## 2019-09-17 RX ADMIN — BACITRACIN ZINC: 500 OINTMENT TOPICAL at 18:08

## 2019-09-17 RX ADMIN — AMIODARONE HYDROCHLORIDE 400 MG: 200 TABLET ORAL at 05:14

## 2019-09-17 RX ADMIN — MAGNESIUM HYDROXIDE 30 ML: 400 SUSPENSION ORAL at 05:13

## 2019-09-17 RX ADMIN — METOPROLOL TARTRATE 100 MG: 100 TABLET ORAL at 15:02

## 2019-09-17 RX ADMIN — CHLORHEXIDINE GLUCONATE 0.12% ORAL RINSE 15 ML: 1.2 LIQUID ORAL at 05:13

## 2019-09-17 RX ADMIN — APIXABAN 5 MG: 5 TABLET, FILM COATED ORAL at 05:14

## 2019-09-17 RX ADMIN — ALBUTEROL SULFATE 2.5 MG: 5 SOLUTION RESPIRATORY (INHALATION) at 09:49

## 2019-09-17 RX ADMIN — QUETIAPINE FUMARATE 50 MG: 25 TABLET ORAL at 05:14

## 2019-09-17 RX ADMIN — QUETIAPINE FUMARATE 100 MG: 100 TABLET ORAL at 21:09

## 2019-09-17 RX ADMIN — ACETYLCYSTEINE 4 ML: 200 INHALANT RESPIRATORY (INHALATION) at 18:48

## 2019-09-17 RX ADMIN — SPIRONOLACTONE 25 MG: 50 TABLET ORAL at 05:14

## 2019-09-17 RX ADMIN — APIXABAN 5 MG: 5 TABLET, FILM COATED ORAL at 18:08

## 2019-09-17 RX ADMIN — CHLORHEXIDINE GLUCONATE 0.12% ORAL RINSE 15 ML: 1.2 LIQUID ORAL at 18:08

## 2019-09-17 NOTE — CARE PLAN
Problem: Bowel/Gastric:  Goal: Will not experience complications related to bowel motility  Outcome: PROGRESSING SLOWER THAN EXPECTED  Intervention: Assess baseline bowel pattern  Note:   Patient having frequent loose bowel movements. Stool softeners to be held accordingly. Patient tube feedings at goal.     Problem: Skin Integrity  Goal: Risk for impaired skin integrity will decrease  Outcome: PROGRESSING SLOWER THAN EXPECTED  Intervention: Assess risk factors for impaired skin integrity and/or pressure ulcers  Note:   Patient frequently repositions self in bed. Barrier cream and mepilex applied as needed to prevent skin.
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
## 2019-09-17 NOTE — PROGRESS NOTES
Upon 1800 mouth care there is a front wire that is loose.     Dr. Dong paged and awaiting a call back.

## 2019-09-17 NOTE — THERAPY
"Occupational Therapy Evaluation completed.   Functional Status:  Total A supine<>sit, Max A LB dressing, Mod A stand pivot EOB>Chair  Plan of Care: Will benefit from Occupational Therapy 3 times per week  Discharge Recommendations:  Equipment: Will Continue to Assess for Equipment Needs. Post-acute therapy Recommend post-acute placement for additional occupational therapy services prior to discharge home. Patient can tolerate post-acute therapies at a 5x/week frequency.    See \"Rehab Therapy-Acute\" Patient Summary Report for complete documentation.      Pt seen for OT tx session. Pt appears more behavioral on this date, required total A for supine<>sit EOB due to behaviors. Pt demo'd physical ability to don/doff socks, however was impaired cognitively. Pt required Max A to don L sock, pt attempted to put R sock over L sock, unable to redirect pt. Pt required mod A for stand pivot txf to chair. Will continue to follow for acute OT services. Recommend post acute placement.   "

## 2019-09-17 NOTE — PROGRESS NOTES
2 RN skin assessment performed with Douglas CONRAD.     Pt with the following noted:    - dry, cracked lips. Moisturizer applied  - red/pink wound to front of neck, under trach site. Oozing tan secretions when pt coughs. Area cleansed and optifoam and barrier cream applied  - bruising/redness to BUE's. Fragile skin.  - redness to torso, blanching  - pink/red, edema to penis. Scant sanguineous output noted. MD aware. Coude in place.  - dry, cracked bilateral heels. Moisturizer applied.  - redness to sacrum, blanching. Mepilex in place. CDI    Devices in place:    - ekg leads and stickers, SCD's intermittently, BP cuff, O2 probe, bridled cortrak, pts eyeglasses intermittently, coude catheter, hearing aids, wrist restraints, mitts    Interventions:    - skin assessed under devices regularly, devices rotated regularly.  - pt restless and turns self frequently in bed. Pillows under bony prominences as able, pt constantly removing and throwing on floor.     Wound LDA: yes  Dressings changed: yes  Wound following: yes

## 2019-09-17 NOTE — THERAPY
"Speech Language Therapy dysphagia treatment and speaking valve treatment completed.     Functional Status: Patient was seen on this date for dysphagia and PMSV treatment. Patient on 4L and 28% FiO2 via T-piece. Cuff was deflated of 2 cc and tracheal suctioning performed with removal of moderate amount of secretions. PMSV placed in-line with T-piece. Patient conversing at the short phrase level intermittently. Absent responses to verbal stimuli but significantly improved with written communication. He was AAO to self and month/year with confusion regarding place stating \"colloseum compound.\" Blue dyed PO trials were administered and consisted of 2 tsp of NTL and 1 tsp of thin puree. Patient had adequate response to PO via tsp but swallow trigger mod-severely delayed requiring verbal cues to swallow. Laryngeal elevation weak to palpation. Patient orally defensive turning his head away  and declined further PO trials stating, \"wilton, wilton.\" O2 sats dropped below 88% during a few instances; however, RN and RT stating likely poor read. Pt refusing to participate in laryngeal exercises and began getting increasing agitated/restless. Speaking valve was removed after 25 minutes and tracheal suctioning performed with removal of moderate amount of secretions x2 - no blue noted although PO trials were minimal. Cuff was reinflated.     Recommendations: At this time, recommend speaking valve to be placed by trained RN/RT/SLP only for 30 minutes or as tolerated given close supervision. Patient does not appear at the level for a PO diet given significant oral aversion and would recommend continue NPO/cortrak. However, minimal blye dyed foods were administered and blue dye swallow evaluation in progress - please document any blue in tracheal secretions noted within the next 24 hours. SLP following for PMSV and dysphagia treatment.     Plan of Care: Will benefit from Speech Therapy 5 times per week    Post-Acute Therapy: Recommend " "inpatient transitional care services for continued speech therapy services.      See \"Rehab Therapy-Acute\" Patient Summary Report for complete documentation.     "
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
## 2019-09-17 NOTE — PROGRESS NOTES
2 RN skin check complete with ANAMARIA Kim.  Devices in place Tracheostomy, BP cuff, SCDs, ekg leads, pulse oximeter to ear, cortrak, vale catheter.   Skin assessed under the following devices listed above.   Preventative measures in place including mepilex and barrier cream to sacrum, wound care provided to penis, jaw wound, and trache wound.  .  Following areas of concern:   ·Wound under chin, sutures removed, area cleansed with normal saline and open to air  -  Wound underneath tracheostomy site, area cleansed with normal saline. optifoam pad placed and barrier cream applied.  -redness/bleeding at urethral meatus, area cleansed with soap and water.   - redness to sacrum, mepilex and barrier cream applied.      Wound consult placedYES/NO: yes    Wound reported YES/NO: yes  Appropriate LDAs opened YES/NO: yes
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
## 2019-09-18 ENCOUNTER — APPOINTMENT (OUTPATIENT)
Dept: RADIOLOGY | Facility: MEDICAL CENTER | Age: 79
DRG: 003 | End: 2019-09-18
Attending: SURGERY
Payer: MEDICARE

## 2019-09-18 LAB
ANION GAP SERPL CALC-SCNC: 7 MMOL/L (ref 0–11.9)
BASOPHILS # BLD AUTO: 0.5 % (ref 0–1.8)
BASOPHILS # BLD: 0.07 K/UL (ref 0–0.12)
BUN SERPL-MCNC: 42 MG/DL (ref 8–22)
CALCIUM SERPL-MCNC: 9 MG/DL (ref 8.5–10.5)
CHLORIDE SERPL-SCNC: 103 MMOL/L (ref 96–112)
CO2 SERPL-SCNC: 27 MMOL/L (ref 20–33)
CREAT SERPL-MCNC: 0.93 MG/DL (ref 0.5–1.4)
EOSINOPHIL # BLD AUTO: 0.34 K/UL (ref 0–0.51)
EOSINOPHIL NFR BLD: 2.4 % (ref 0–6.9)
ERYTHROCYTE [DISTWIDTH] IN BLOOD BY AUTOMATED COUNT: 49.1 FL (ref 35.9–50)
GLUCOSE SERPL-MCNC: 115 MG/DL (ref 65–99)
HCT VFR BLD AUTO: 35.5 % (ref 42–52)
HGB BLD-MCNC: 10.8 G/DL (ref 14–18)
IMM GRANULOCYTES # BLD AUTO: 0.31 K/UL (ref 0–0.11)
IMM GRANULOCYTES NFR BLD AUTO: 2.2 % (ref 0–0.9)
LYMPHOCYTES # BLD AUTO: 1.17 K/UL (ref 1–4.8)
LYMPHOCYTES NFR BLD: 8.2 % (ref 22–41)
MCH RBC QN AUTO: 29.8 PG (ref 27–33)
MCHC RBC AUTO-ENTMCNC: 30.4 G/DL (ref 33.7–35.3)
MCV RBC AUTO: 97.8 FL (ref 81.4–97.8)
MONOCYTES # BLD AUTO: 0.85 K/UL (ref 0–0.85)
MONOCYTES NFR BLD AUTO: 6 % (ref 0–13.4)
NEUTROPHILS # BLD AUTO: 11.48 K/UL (ref 1.82–7.42)
NEUTROPHILS NFR BLD: 80.7 % (ref 44–72)
NRBC # BLD AUTO: 0 K/UL
NRBC BLD-RTO: 0 /100 WBC
PLATELET # BLD AUTO: 410 K/UL (ref 164–446)
PMV BLD AUTO: 9.4 FL (ref 9–12.9)
POTASSIUM SERPL-SCNC: 3.7 MMOL/L (ref 3.6–5.5)
RBC # BLD AUTO: 3.63 M/UL (ref 4.7–6.1)
SODIUM SERPL-SCNC: 137 MMOL/L (ref 135–145)
WBC # BLD AUTO: 14.2 K/UL (ref 4.8–10.8)

## 2019-09-18 PROCEDURE — A9270 NON-COVERED ITEM OR SERVICE: HCPCS | Performed by: INTERNAL MEDICINE

## 2019-09-18 PROCEDURE — 94640 AIRWAY INHALATION TREATMENT: CPT

## 2019-09-18 PROCEDURE — 700102 HCHG RX REV CODE 250 W/ 637 OVERRIDE(OP): Performed by: SURGERY

## 2019-09-18 PROCEDURE — A9270 NON-COVERED ITEM OR SERVICE: HCPCS | Performed by: ORAL & MAXILLOFACIAL SURGERY

## 2019-09-18 PROCEDURE — 700101 HCHG RX REV CODE 250: Performed by: SURGERY

## 2019-09-18 PROCEDURE — 85025 COMPLETE CBC W/AUTO DIFF WBC: CPT

## 2019-09-18 PROCEDURE — 700102 HCHG RX REV CODE 250 W/ 637 OVERRIDE(OP): Performed by: ORAL & MAXILLOFACIAL SURGERY

## 2019-09-18 PROCEDURE — A9270 NON-COVERED ITEM OR SERVICE: HCPCS | Performed by: SURGERY

## 2019-09-18 PROCEDURE — 80048 BASIC METABOLIC PNL TOTAL CA: CPT

## 2019-09-18 PROCEDURE — 770022 HCHG ROOM/CARE - ICU (200)

## 2019-09-18 PROCEDURE — 99291 CRITICAL CARE FIRST HOUR: CPT | Performed by: SURGERY

## 2019-09-18 PROCEDURE — 700102 HCHG RX REV CODE 250 W/ 637 OVERRIDE(OP): Performed by: INTERNAL MEDICINE

## 2019-09-18 PROCEDURE — 71045 X-RAY EXAM CHEST 1 VIEW: CPT

## 2019-09-18 RX ORDER — DIVALPROEX SODIUM 125 MG/1
125 CAPSULE, COATED PELLETS ORAL EVERY 8 HOURS
Status: DISCONTINUED | OUTPATIENT
Start: 2019-09-18 | End: 2019-09-23

## 2019-09-18 RX ADMIN — CHLORHEXIDINE GLUCONATE 0.12% ORAL RINSE 15 ML: 1.2 LIQUID ORAL at 17:17

## 2019-09-18 RX ADMIN — METOPROLOL TARTRATE 100 MG: 100 TABLET ORAL at 20:35

## 2019-09-18 RX ADMIN — DIGOXIN 250 MCG: 250 TABLET ORAL at 17:17

## 2019-09-18 RX ADMIN — CHLORHEXIDINE GLUCONATE 0.12% ORAL RINSE 15 ML: 1.2 LIQUID ORAL at 05:02

## 2019-09-18 RX ADMIN — ACETYLCYSTEINE 4 ML: 200 INHALANT RESPIRATORY (INHALATION) at 06:51

## 2019-09-18 RX ADMIN — SPIRONOLACTONE 25 MG: 50 TABLET ORAL at 05:02

## 2019-09-18 RX ADMIN — QUETIAPINE FUMARATE 50 MG: 25 TABLET ORAL at 13:56

## 2019-09-18 RX ADMIN — BACITRACIN ZINC: 500 OINTMENT TOPICAL at 05:02

## 2019-09-18 RX ADMIN — ACETYLCYSTEINE 4 ML: 200 INHALANT RESPIRATORY (INHALATION) at 19:28

## 2019-09-18 RX ADMIN — ALBUTEROL SULFATE 2.5 MG: 5 SOLUTION RESPIRATORY (INHALATION) at 11:06

## 2019-09-18 RX ADMIN — METOPROLOL TARTRATE 100 MG: 100 TABLET ORAL at 09:23

## 2019-09-18 RX ADMIN — ALBUTEROL SULFATE 2.5 MG: 5 SOLUTION RESPIRATORY (INHALATION) at 06:50

## 2019-09-18 RX ADMIN — APIXABAN 5 MG: 5 TABLET, FILM COATED ORAL at 05:02

## 2019-09-18 RX ADMIN — DIVALPROEX SODIUM 125 MG: 125 CAPSULE, COATED PELLETS ORAL at 13:57

## 2019-09-18 RX ADMIN — DIVALPROEX SODIUM 125 MG: 125 CAPSULE, COATED PELLETS ORAL at 21:53

## 2019-09-18 RX ADMIN — ALBUTEROL SULFATE 2.5 MG: 5 SOLUTION RESPIRATORY (INHALATION) at 19:00

## 2019-09-18 RX ADMIN — QUETIAPINE FUMARATE 50 MG: 25 TABLET ORAL at 05:02

## 2019-09-18 RX ADMIN — BACITRACIN ZINC: 500 OINTMENT TOPICAL at 17:16

## 2019-09-18 RX ADMIN — APIXABAN 5 MG: 5 TABLET, FILM COATED ORAL at 17:16

## 2019-09-18 RX ADMIN — AMIODARONE HYDROCHLORIDE 400 MG: 200 TABLET ORAL at 05:02

## 2019-09-18 RX ADMIN — QUETIAPINE FUMARATE 100 MG: 100 TABLET ORAL at 20:32

## 2019-09-18 RX ADMIN — ACETYLCYSTEINE 4 ML: 200 INHALANT RESPIRATORY (INHALATION) at 11:06

## 2019-09-18 NOTE — PROGRESS NOTES
0900: Patient presented in IDT rounds and plan of care discussed. Plan to try and cap patient today and new medication added for agitation.   Patients submandibular wound has a new opening where sutures were removed on 9/16. Dr. López visualized opening, no new orders at this time. Will continue to monitor.
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
## 2019-09-18 NOTE — CARE PLAN
Problem: Oxygenation:  Goal: Maintain adequate oxygenation dependent on patient condition  Outcome: PROGRESSING AS EXPECTED   4L 28%      Problem: Bronchopulmonary Hygiene:  Goal: Increase mobilization of retained secretions  Outcome: PROGRESSING AS EXPECTED   Suctioning as needed  Mucomyst QID  Albuterol QID    Current trache day 5- shiley 6.0 cuffed with cuff deflated

## 2019-09-18 NOTE — DISCHARGE PLANNING
SERENA spoke with legal hold [employer] who stated that since pt is not medically cleared to go to a psych facility the  has not seen pt therefore pt is automatically extended one more week by the court. Lashanda stated pt will be extended until 09/26 @ 0900. Lashanda will be receiving court documentation, tomorrow AM, re extension of hold.

## 2019-09-18 NOTE — PROGRESS NOTES
2 RN skin check completed.  Devices in place: trach, R nare cortrak, BP cuff, pulse ox to ear, cardiac leads, R PIV, vale, bilateral SCDs         Skin assessed under the above devices  Preventative measures in place including:  - Repositioning q2h turns  - mepilex and barrier cream to sacrum  - devices repositioned frequently  Following areas of concern:   Wound/redness noted to bottom of tracheostomy site, optifoam in place  Surgical incision under chin, EMRE  Redness/bleeding at urethral meatus, area cleansed with soap and water.   Sacrum red/blanching, mepilex and barrier cream applied.   Appropriate wound LDA's in place.

## 2019-09-18 NOTE — CARE PLAN
Problem: Communication  Goal: The ability to communicate needs accurately and effectively will improve  Intervention: Reorient patient to environment as needed  Note:   Patient is reorient to enviroment and is educated on reason for hospitalization, will continue to implement and assess for environmental stressors.        Problem: Safety - Medical Restraint  Goal: Remains free of injury from restraints (Restraint for Interference with Medical Device)  Description  INTERVENTIONS:  1. Determine that other, less restrictive measures have been tried or would not be effective before applying the restraint  2. Evaluate the patient's condition at the time of restraint application  3. Inform patient/family regarding the reason for restraint  4. Q2H: Monitor safety, psychosocial status, comfort, nutrition and hydration  Note:   Skin assessed under restraints frequently for any signs of injury. Restraints use evaluated each shift.
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
## 2019-09-19 LAB
ANION GAP SERPL CALC-SCNC: 9 MMOL/L (ref 0–11.9)
BASOPHILS # BLD AUTO: 0.5 % (ref 0–1.8)
BASOPHILS # BLD: 0.09 K/UL (ref 0–0.12)
BUN SERPL-MCNC: 37 MG/DL (ref 8–22)
CALCIUM SERPL-MCNC: 8.9 MG/DL (ref 8.5–10.5)
CHLORIDE SERPL-SCNC: 105 MMOL/L (ref 96–112)
CO2 SERPL-SCNC: 24 MMOL/L (ref 20–33)
CREAT SERPL-MCNC: 0.77 MG/DL (ref 0.5–1.4)
EOSINOPHIL # BLD AUTO: 0.42 K/UL (ref 0–0.51)
EOSINOPHIL NFR BLD: 2.3 % (ref 0–6.9)
ERYTHROCYTE [DISTWIDTH] IN BLOOD BY AUTOMATED COUNT: 51.1 FL (ref 35.9–50)
GLUCOSE SERPL-MCNC: 117 MG/DL (ref 65–99)
HCT VFR BLD AUTO: 37.3 % (ref 42–52)
HGB BLD-MCNC: 11.3 G/DL (ref 14–18)
IMM GRANULOCYTES # BLD AUTO: 0.35 K/UL (ref 0–0.11)
IMM GRANULOCYTES NFR BLD AUTO: 1.9 % (ref 0–0.9)
LYMPHOCYTES # BLD AUTO: 1.39 K/UL (ref 1–4.8)
LYMPHOCYTES NFR BLD: 7.6 % (ref 22–41)
MAGNESIUM SERPL-MCNC: 2.2 MG/DL (ref 1.5–2.5)
MCH RBC QN AUTO: 29.9 PG (ref 27–33)
MCHC RBC AUTO-ENTMCNC: 30.3 G/DL (ref 33.7–35.3)
MCV RBC AUTO: 98.7 FL (ref 81.4–97.8)
MONOCYTES # BLD AUTO: 0.97 K/UL (ref 0–0.85)
MONOCYTES NFR BLD AUTO: 5.3 % (ref 0–13.4)
NEUTROPHILS # BLD AUTO: 15.02 K/UL (ref 1.82–7.42)
NEUTROPHILS NFR BLD: 82.4 % (ref 44–72)
NRBC # BLD AUTO: 0 K/UL
NRBC BLD-RTO: 0 /100 WBC
PHOSPHATE SERPL-MCNC: 2.7 MG/DL (ref 2.5–4.5)
PLATELET # BLD AUTO: 409 K/UL (ref 164–446)
PMV BLD AUTO: 9.5 FL (ref 9–12.9)
POTASSIUM SERPL-SCNC: 3.8 MMOL/L (ref 3.6–5.5)
RBC # BLD AUTO: 3.78 M/UL (ref 4.7–6.1)
SODIUM SERPL-SCNC: 138 MMOL/L (ref 135–145)
WBC # BLD AUTO: 18.2 K/UL (ref 4.8–10.8)

## 2019-09-19 PROCEDURE — 80048 BASIC METABOLIC PNL TOTAL CA: CPT

## 2019-09-19 PROCEDURE — 700102 HCHG RX REV CODE 250 W/ 637 OVERRIDE(OP): Performed by: ORAL & MAXILLOFACIAL SURGERY

## 2019-09-19 PROCEDURE — 94640 AIRWAY INHALATION TREATMENT: CPT

## 2019-09-19 PROCEDURE — 700102 HCHG RX REV CODE 250 W/ 637 OVERRIDE(OP): Performed by: NURSE PRACTITIONER

## 2019-09-19 PROCEDURE — A9270 NON-COVERED ITEM OR SERVICE: HCPCS | Performed by: INTERNAL MEDICINE

## 2019-09-19 PROCEDURE — 700102 HCHG RX REV CODE 250 W/ 637 OVERRIDE(OP): Performed by: SURGERY

## 2019-09-19 PROCEDURE — 84100 ASSAY OF PHOSPHORUS: CPT

## 2019-09-19 PROCEDURE — 770022 HCHG ROOM/CARE - ICU (200)

## 2019-09-19 PROCEDURE — A9270 NON-COVERED ITEM OR SERVICE: HCPCS | Performed by: ORAL & MAXILLOFACIAL SURGERY

## 2019-09-19 PROCEDURE — A9270 NON-COVERED ITEM OR SERVICE: HCPCS | Performed by: NURSE PRACTITIONER

## 2019-09-19 PROCEDURE — 99291 CRITICAL CARE FIRST HOUR: CPT | Performed by: SURGERY

## 2019-09-19 PROCEDURE — 700101 HCHG RX REV CODE 250: Performed by: SURGERY

## 2019-09-19 PROCEDURE — A9270 NON-COVERED ITEM OR SERVICE: HCPCS | Performed by: SURGERY

## 2019-09-19 PROCEDURE — 85025 COMPLETE CBC W/AUTO DIFF WBC: CPT

## 2019-09-19 PROCEDURE — 92526 ORAL FUNCTION THERAPY: CPT

## 2019-09-19 PROCEDURE — 700102 HCHG RX REV CODE 250 W/ 637 OVERRIDE(OP): Performed by: INTERNAL MEDICINE

## 2019-09-19 PROCEDURE — 700112 HCHG RX REV CODE 229: Performed by: NURSE PRACTITIONER

## 2019-09-19 PROCEDURE — 83735 ASSAY OF MAGNESIUM: CPT

## 2019-09-19 RX ORDER — TRAZODONE HYDROCHLORIDE 50 MG/1
50 TABLET ORAL
Status: DISCONTINUED | OUTPATIENT
Start: 2019-09-19 | End: 2019-09-27

## 2019-09-19 RX ADMIN — DIVALPROEX SODIUM 125 MG: 125 CAPSULE, COATED PELLETS ORAL at 21:14

## 2019-09-19 RX ADMIN — DIVALPROEX SODIUM 125 MG: 125 CAPSULE, COATED PELLETS ORAL at 05:50

## 2019-09-19 RX ADMIN — ALBUTEROL SULFATE 2.5 MG: 5 SOLUTION RESPIRATORY (INHALATION) at 06:29

## 2019-09-19 RX ADMIN — ACETYLCYSTEINE 4 ML: 200 INHALANT RESPIRATORY (INHALATION) at 16:14

## 2019-09-19 RX ADMIN — DIGOXIN 250 MCG: 250 TABLET ORAL at 18:06

## 2019-09-19 RX ADMIN — ACETYLCYSTEINE 4 ML: 200 INHALANT RESPIRATORY (INHALATION) at 06:29

## 2019-09-19 RX ADMIN — ALBUTEROL SULFATE 2.5 MG: 5 SOLUTION RESPIRATORY (INHALATION) at 12:22

## 2019-09-19 RX ADMIN — METOPROLOL TARTRATE 100 MG: 100 TABLET ORAL at 13:43

## 2019-09-19 RX ADMIN — ALBUTEROL SULFATE 2.5 MG: 5 SOLUTION RESPIRATORY (INHALATION) at 16:14

## 2019-09-19 RX ADMIN — ACETYLCYSTEINE 4 ML: 200 INHALANT RESPIRATORY (INHALATION) at 20:58

## 2019-09-19 RX ADMIN — APIXABAN 5 MG: 5 TABLET, FILM COATED ORAL at 05:49

## 2019-09-19 RX ADMIN — TRAZODONE HYDROCHLORIDE 50 MG: 50 TABLET ORAL at 21:12

## 2019-09-19 RX ADMIN — ACETYLCYSTEINE 4 ML: 200 INHALANT RESPIRATORY (INHALATION) at 12:22

## 2019-09-19 RX ADMIN — QUETIAPINE FUMARATE 100 MG: 100 TABLET ORAL at 21:12

## 2019-09-19 RX ADMIN — QUETIAPINE FUMARATE 50 MG: 25 TABLET ORAL at 13:43

## 2019-09-19 RX ADMIN — METOPROLOL TARTRATE 100 MG: 100 TABLET ORAL at 21:12

## 2019-09-19 RX ADMIN — APIXABAN 5 MG: 5 TABLET, FILM COATED ORAL at 18:06

## 2019-09-19 RX ADMIN — METOPROLOL TARTRATE 100 MG: 100 TABLET ORAL at 18:05

## 2019-09-19 RX ADMIN — ALBUTEROL SULFATE 2.5 MG: 5 SOLUTION RESPIRATORY (INHALATION) at 20:57

## 2019-09-19 RX ADMIN — SPIRONOLACTONE 25 MG: 50 TABLET ORAL at 05:50

## 2019-09-19 RX ADMIN — CHLORHEXIDINE GLUCONATE 0.12% ORAL RINSE 15 ML: 1.2 LIQUID ORAL at 05:49

## 2019-09-19 RX ADMIN — DIVALPROEX SODIUM 125 MG: 125 CAPSULE, COATED PELLETS ORAL at 13:43

## 2019-09-19 RX ADMIN — BACITRACIN ZINC: 500 OINTMENT TOPICAL at 18:07

## 2019-09-19 RX ADMIN — QUETIAPINE FUMARATE 50 MG: 25 TABLET ORAL at 05:49

## 2019-09-19 RX ADMIN — DOCUSATE SODIUM 100 MG: 50 LIQUID ORAL at 05:49

## 2019-09-19 RX ADMIN — BACITRACIN ZINC: 500 OINTMENT TOPICAL at 05:50

## 2019-09-19 RX ADMIN — ACETAMINOPHEN 650 MG: 325 TABLET, FILM COATED ORAL at 21:12

## 2019-09-19 RX ADMIN — METOPROLOL TARTRATE 100 MG: 100 TABLET ORAL at 09:41

## 2019-09-19 RX ADMIN — AMIODARONE HYDROCHLORIDE 400 MG: 200 TABLET ORAL at 05:49

## 2019-09-19 RX ADMIN — CHLORHEXIDINE GLUCONATE 0.12% ORAL RINSE 15 ML: 1.2 LIQUID ORAL at 18:07

## 2019-09-19 NOTE — CARE PLAN
Respiratory Therapy Update    Interdisciplinary Plan of Care-Goals (Indications)  Bronchodilator Indications: History / Diagnosis (09/18/19 1106)  Bronchopulmonary Hygiene Indications: Difficulty with Secretion Clearance (09/18/19 1106)  Interdisciplinary Plan of Care-Outcomes   Bronchodilator Outcome: Patient at Stable Baseline (09/18/19 1106)  Bronchopulmonary Hygiene Outcome: Optimal Hydration with Moderate or Less Sputum Production (09/18/19 1106)          #SVN Performed: Yes (09/18/19 1106)    Cough: Moist;Productive;Strong (09/19/19 0400)  Sputum Amount: Small (09/19/19 0400)  Sputum Color: Brown;Tan;Pink Tinged (09/19/19 0400)  Sputum Consistency: Thick;Thin (09/19/19 0400)               FiO2%: 40 % (09/19/19 0400)  O2 (LPM): 8 (09/19/19 0400)  O2 Daily Delivery Respiratory : T-Piece (09/19/19 0255)    Breath Sounds  Pre/Post Intervention: Post Intervention Assessment (09/18/19 2232)  RUL Breath Sounds: Clear (09/19/19 0400)  RML Breath Sounds: Clear (09/19/19 0400)  RLL Breath Sounds: Diminished (09/19/19 0400)  DARLENE Breath Sounds: Clear (09/19/19 0400)  LLL Breath Sounds: Diminished (09/19/19 0400)    Events/Summary/Plan: pt placed back on aerosol pole (09/18/19 1541)

## 2019-09-19 NOTE — CARE PLAN
Problem: Safety  Goal: Will remain free from injury  Outcome: PROGRESSING AS EXPECTED  Note:   Bed is locked and in low position. Bed alarm is on. Patient is close to nursing station. Call light is within reach and educated on how to use it.      Problem: Skin Integrity  Goal: Risk for impaired skin integrity will decrease  Outcome: PROGRESSING AS EXPECTED  Note:   Mepilex on sacrum. Pillows in use to float heels. Q2 hour turns.

## 2019-09-19 NOTE — CARE PLAN
Respiratory Update    Treatment modality: SVN/Aerosol  Frequency: QID/Q4    Pt tolerating current treatments well with no adverse reactions.

## 2019-09-19 NOTE — DISCHARGE PLANNING
SERENA was informed by legal hold [employer] that Order in Response to Request for Court Ordered Involuntary Admission from the court was scanned into . HODAW printed document and placed in pt's hard chart.     Documentation from court states legal hold is continued until 09/26 @ 0900.
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
## 2019-09-19 NOTE — DISCHARGE PLANNING
Received Order in Response to Request for Court Ordered Involuntary Admission from the court continuing pt until 9/26 at 0900. Contacted SERENA Munguia and let her know copy of court order has been scanned onto media manager.

## 2019-09-19 NOTE — PROGRESS NOTES
2 RN skin check complete with ANAMARIA Cole.  Devices in place EKG leads, tracheostomy, BP cuff, cortrak, SCDs, pulse ox, peripheral IV, vale.               Skin assessed under the following devices listed above.              Preventative measures in place including repositioning patient when he isn't turning self, Pillows used under elbows/ankles, Mepilex on bilateral elbows, Mepilex on sacrum, Devices repositioned frequently, Ear probe alternated q2 hours  Following areas of concern:   · Wound on bottom of tracheostomy site, optifoam in place, trach site cleansed   Surgical incision under chin has opening with small drainage, EMRE per Md order  Redness/bleding to uretheral meatus, area cleansed with soap and water    Sacrum red/blanching, mepilex in place and barrier cream applied  Rash/redness to back, lotion and cream applied      Wound consult placedYES/NO: yes    Wound reported YES/NO: yes  Appropriate LDAs opened YES/NO: yes

## 2019-09-19 NOTE — THERAPY
"Speech Language Therapy dysphagia treatment completed.     Functional Status: Patient was seen on this date for dysphagia treatment. Pt capped this session. Written communication utilized given severe hearing loss and poor reliability with hearing aids. Patient demonstrated difficulty following simple commands for dysphagia exercises. Patient with improved oral response to PO trials and was agreeable to 10-12 single tsp of NTL before declining. Swallow trigger moderately delayed (up to 10 seconds) and laryngeal elevation weak to palpation. Patient able to draw liquids through straw in 1/5 opportunities. Patient had increase WOB and audible breath sounds as PO trials progressed concerning for aspiration. Delayed congested cough x1 at the end of PO trials.      Recommendations: At this time, patient with improved oral response to PO trials and overall swallow function. However, does not yet appear appropriate for a PO diet. Recommend continue NPO/cortrak. Will consider a diagnostic swallow study early next week as swallow function and receptiveness to PO trials continues to improve.    Plan of Care: Will benefit from Speech Therapy 5 times per week    Post-Acute Therapy: Recommend inpatient transitional care services for continued speech therapy services.      See \"Rehab Therapy-Acute\" Patient Summary Report for complete documentation.     "

## 2019-09-19 NOTE — PROGRESS NOTES
2 RN skin check complete with ANAMARIA Cook.  Devices in place EKG leads, tracheostomy, BP cuff, cortrak, SCDs, SPO2 clip, peripheral IV, vale.   Skin assessed under the following devices listed above.   Preventative measures in place including repositioning patient when he isn't turning self.  Pillows used under elbows/ankles  Mepilex on bilateral elbows  Mepilex on sacrum   Devices repositioned frequently  Ear probe alternated q2 hours  Following areas of concern:   · Wound on bottom of tracheostomy site, optifoam in place, trach site cleansed   Surgical incision under chin has opening with small drainage, EMRE per Md order  Redness/bleding to uretheral meatus, area cleansed with soap and water    Sacrum red/blanching, mepilex in place and barrier cream applied  Rash/redness to back, lotion and cream applied     Wound consult placedYES/NO: yes    Wound reported YES/NO: yes  Appropriate LDAs opened YES/NO: yes

## 2019-09-19 NOTE — CARE PLAN
Problem: Communication  Goal: The ability to communicate needs accurately and effectively will improve  Intervention: Reorient patient to environment as needed  Note:   Patient is reorient to enviroment and is educated on reason for hospitalization, will continue to implement and assess for environmental stressors.        Problem: Respiratory:  Goal: Respiratory status will improve  Outcome: PROGRESSING SLOWER THAN EXPECTED     Problem: Skin Integrity  Goal: Skin Integrity is maintained or improved  Intervention: Measure and Document any Skin Breakdown  Note:   Patient turned Q2 hours when needed, check bony prominences for break down and reposition pillows.  Waffle cushion in place when in chair

## 2019-09-20 ENCOUNTER — APPOINTMENT (OUTPATIENT)
Dept: RADIOLOGY | Facility: MEDICAL CENTER | Age: 79
DRG: 003 | End: 2019-09-20
Attending: SURGERY
Payer: MEDICARE

## 2019-09-20 PROBLEM — D72.829 LEUKOCYTOSIS: Status: ACTIVE | Noted: 2019-09-20

## 2019-09-20 LAB
ANION GAP SERPL CALC-SCNC: 4 MMOL/L (ref 0–11.9)
BASOPHILS # BLD AUTO: 0.3 % (ref 0–1.8)
BASOPHILS # BLD: 0.07 K/UL (ref 0–0.12)
BUN SERPL-MCNC: 35 MG/DL (ref 8–22)
CALCIUM SERPL-MCNC: 8.7 MG/DL (ref 8.5–10.5)
CHLORIDE SERPL-SCNC: 104 MMOL/L (ref 96–112)
CO2 SERPL-SCNC: 29 MMOL/L (ref 20–33)
CREAT SERPL-MCNC: 0.94 MG/DL (ref 0.5–1.4)
EOSINOPHIL # BLD AUTO: 0.24 K/UL (ref 0–0.51)
EOSINOPHIL NFR BLD: 0.9 % (ref 0–6.9)
ERYTHROCYTE [DISTWIDTH] IN BLOOD BY AUTOMATED COUNT: 50.2 FL (ref 35.9–50)
GLUCOSE SERPL-MCNC: 139 MG/DL (ref 65–99)
GRAM STN SPEC: NORMAL
HCT VFR BLD AUTO: 34.5 % (ref 42–52)
HGB BLD-MCNC: 10.9 G/DL (ref 14–18)
IMM GRANULOCYTES # BLD AUTO: 0.35 K/UL (ref 0–0.11)
IMM GRANULOCYTES NFR BLD AUTO: 1.3 % (ref 0–0.9)
LYMPHOCYTES # BLD AUTO: 1.1 K/UL (ref 1–4.8)
LYMPHOCYTES NFR BLD: 4 % (ref 22–41)
MCH RBC QN AUTO: 31.1 PG (ref 27–33)
MCHC RBC AUTO-ENTMCNC: 31.6 G/DL (ref 33.7–35.3)
MCV RBC AUTO: 98.6 FL (ref 81.4–97.8)
MONOCYTES # BLD AUTO: 1.19 K/UL (ref 0–0.85)
MONOCYTES NFR BLD AUTO: 4.3 % (ref 0–13.4)
MRSA DNA SPEC QL NAA+PROBE: NORMAL
NEUTROPHILS # BLD AUTO: 24.48 K/UL (ref 1.82–7.42)
NEUTROPHILS NFR BLD: 89.2 % (ref 44–72)
NRBC # BLD AUTO: 0 K/UL
NRBC BLD-RTO: 0 /100 WBC
PLATELET # BLD AUTO: 440 K/UL (ref 164–446)
PMV BLD AUTO: 9.4 FL (ref 9–12.9)
POTASSIUM SERPL-SCNC: 4.4 MMOL/L (ref 3.6–5.5)
RBC # BLD AUTO: 3.5 M/UL (ref 4.7–6.1)
SIGNIFICANT IND 70042: NORMAL
SIGNIFICANT IND 70042: NORMAL
SITE SITE: NORMAL
SITE SITE: NORMAL
SODIUM SERPL-SCNC: 137 MMOL/L (ref 135–145)
SOURCE SOURCE: NORMAL
SOURCE SOURCE: NORMAL
WBC # BLD AUTO: 27.4 K/UL (ref 4.8–10.8)

## 2019-09-20 PROCEDURE — 85025 COMPLETE CBC W/AUTO DIFF WBC: CPT

## 2019-09-20 PROCEDURE — A9270 NON-COVERED ITEM OR SERVICE: HCPCS | Performed by: SURGERY

## 2019-09-20 PROCEDURE — 87040 BLOOD CULTURE FOR BACTERIA: CPT

## 2019-09-20 PROCEDURE — 87205 SMEAR GRAM STAIN: CPT

## 2019-09-20 PROCEDURE — A9270 NON-COVERED ITEM OR SERVICE: HCPCS | Performed by: ORAL & MAXILLOFACIAL SURGERY

## 2019-09-20 PROCEDURE — A9270 NON-COVERED ITEM OR SERVICE: HCPCS | Performed by: INTERNAL MEDICINE

## 2019-09-20 PROCEDURE — 700102 HCHG RX REV CODE 250 W/ 637 OVERRIDE(OP): Performed by: INTERNAL MEDICINE

## 2019-09-20 PROCEDURE — 87186 SC STD MICRODIL/AGAR DIL: CPT | Mod: 91

## 2019-09-20 PROCEDURE — 770022 HCHG ROOM/CARE - ICU (200)

## 2019-09-20 PROCEDURE — 71045 X-RAY EXAM CHEST 1 VIEW: CPT

## 2019-09-20 PROCEDURE — 94640 AIRWAY INHALATION TREATMENT: CPT

## 2019-09-20 PROCEDURE — 31624 DX BRONCHOSCOPE/LAVAGE: CPT | Performed by: SURGERY

## 2019-09-20 PROCEDURE — 700111 HCHG RX REV CODE 636 W/ 250 OVERRIDE (IP): Performed by: SURGERY

## 2019-09-20 PROCEDURE — 700101 HCHG RX REV CODE 250: Performed by: SURGERY

## 2019-09-20 PROCEDURE — 80048 BASIC METABOLIC PNL TOTAL CA: CPT

## 2019-09-20 PROCEDURE — 99152 MOD SED SAME PHYS/QHP 5/>YRS: CPT

## 2019-09-20 PROCEDURE — 97530 THERAPEUTIC ACTIVITIES: CPT

## 2019-09-20 PROCEDURE — 87077 CULTURE AEROBIC IDENTIFY: CPT | Mod: 91

## 2019-09-20 PROCEDURE — 87641 MR-STAPH DNA AMP PROBE: CPT

## 2019-09-20 PROCEDURE — 700102 HCHG RX REV CODE 250 W/ 637 OVERRIDE(OP): Performed by: SURGERY

## 2019-09-20 PROCEDURE — 700105 HCHG RX REV CODE 258: Performed by: SURGERY

## 2019-09-20 PROCEDURE — 99291 CRITICAL CARE FIRST HOUR: CPT | Mod: 25 | Performed by: SURGERY

## 2019-09-20 PROCEDURE — 0B9F8ZX DRAINAGE OF RIGHT LOWER LUNG LOBE, VIA NATURAL OR ARTIFICIAL OPENING ENDOSCOPIC, DIAGNOSTIC: ICD-10-PCS | Performed by: SURGERY

## 2019-09-20 PROCEDURE — 700102 HCHG RX REV CODE 250 W/ 637 OVERRIDE(OP): Performed by: ORAL & MAXILLOFACIAL SURGERY

## 2019-09-20 PROCEDURE — 302978 HCHG BRONCHOSCOPY-DIAGNOSTIC

## 2019-09-20 PROCEDURE — 87070 CULTURE OTHR SPECIMN AEROBIC: CPT

## 2019-09-20 RX ORDER — ROCURONIUM BROMIDE 10 MG/ML
50 INJECTION, SOLUTION INTRAVENOUS ONCE
Status: COMPLETED | OUTPATIENT
Start: 2019-09-20 | End: 2019-09-20

## 2019-09-20 RX ORDER — MIDAZOLAM HYDROCHLORIDE 1 MG/ML
1-5 INJECTION INTRAMUSCULAR; INTRAVENOUS ONCE
Status: COMPLETED | OUTPATIENT
Start: 2019-09-20 | End: 2019-09-20

## 2019-09-20 RX ADMIN — BACITRACIN ZINC: 500 OINTMENT TOPICAL at 17:24

## 2019-09-20 RX ADMIN — MIDAZOLAM HYDROCHLORIDE 3 MG: 1 INJECTION, SOLUTION INTRAMUSCULAR; INTRAVENOUS at 11:52

## 2019-09-20 RX ADMIN — TRAZODONE HYDROCHLORIDE 50 MG: 50 TABLET ORAL at 22:04

## 2019-09-20 RX ADMIN — APIXABAN 5 MG: 5 TABLET, FILM COATED ORAL at 17:24

## 2019-09-20 RX ADMIN — ACETYLCYSTEINE 4 ML: 200 INHALANT RESPIRATORY (INHALATION) at 09:57

## 2019-09-20 RX ADMIN — ACETYLCYSTEINE 4 ML: 200 INHALANT RESPIRATORY (INHALATION) at 14:22

## 2019-09-20 RX ADMIN — QUETIAPINE FUMARATE 50 MG: 25 TABLET ORAL at 05:28

## 2019-09-20 RX ADMIN — ALBUTEROL SULFATE 2.5 MG: 5 SOLUTION RESPIRATORY (INHALATION) at 06:18

## 2019-09-20 RX ADMIN — DIVALPROEX SODIUM 125 MG: 125 CAPSULE, COATED PELLETS ORAL at 12:43

## 2019-09-20 RX ADMIN — CHLORHEXIDINE GLUCONATE 0.12% ORAL RINSE 15 ML: 1.2 LIQUID ORAL at 05:28

## 2019-09-20 RX ADMIN — QUETIAPINE FUMARATE 100 MG: 100 TABLET ORAL at 22:04

## 2019-09-20 RX ADMIN — CEFEPIME 2 G: 2 INJECTION, POWDER, FOR SOLUTION INTRAVENOUS at 23:12

## 2019-09-20 RX ADMIN — AMIODARONE HYDROCHLORIDE 400 MG: 200 TABLET ORAL at 05:28

## 2019-09-20 RX ADMIN — METOPROLOL TARTRATE 100 MG: 100 TABLET ORAL at 12:43

## 2019-09-20 RX ADMIN — METOPROLOL TARTRATE 100 MG: 100 TABLET ORAL at 08:31

## 2019-09-20 RX ADMIN — DIGOXIN 250 MCG: 250 TABLET ORAL at 17:24

## 2019-09-20 RX ADMIN — ALBUTEROL SULFATE 2.5 MG: 5 SOLUTION RESPIRATORY (INHALATION) at 14:21

## 2019-09-20 RX ADMIN — APIXABAN 5 MG: 5 TABLET, FILM COATED ORAL at 05:29

## 2019-09-20 RX ADMIN — ROCURONIUM BROMIDE 50 MG: 10 INJECTION, SOLUTION INTRAVENOUS at 11:52

## 2019-09-20 RX ADMIN — BACITRACIN ZINC: 500 OINTMENT TOPICAL at 05:30

## 2019-09-20 RX ADMIN — DIVALPROEX SODIUM 125 MG: 125 CAPSULE, COATED PELLETS ORAL at 05:28

## 2019-09-20 RX ADMIN — METOPROLOL TARTRATE 100 MG: 100 TABLET ORAL at 17:24

## 2019-09-20 RX ADMIN — CEFEPIME 2 G: 2 INJECTION, POWDER, FOR SOLUTION INTRAVENOUS at 12:50

## 2019-09-20 RX ADMIN — ALBUTEROL SULFATE 2.5 MG: 5 SOLUTION RESPIRATORY (INHALATION) at 18:39

## 2019-09-20 RX ADMIN — ACETYLCYSTEINE 4 ML: 200 INHALANT RESPIRATORY (INHALATION) at 18:39

## 2019-09-20 RX ADMIN — SPIRONOLACTONE 25 MG: 50 TABLET ORAL at 05:29

## 2019-09-20 RX ADMIN — CHLORHEXIDINE GLUCONATE 0.12% ORAL RINSE 15 ML: 1.2 LIQUID ORAL at 17:24

## 2019-09-20 RX ADMIN — DIVALPROEX SODIUM 125 MG: 125 CAPSULE, COATED PELLETS ORAL at 22:05

## 2019-09-20 RX ADMIN — ALBUTEROL SULFATE 2.5 MG: 5 SOLUTION RESPIRATORY (INHALATION) at 09:57

## 2019-09-20 RX ADMIN — VANCOMYCIN HYDROCHLORIDE 2200 MG: 500 INJECTION, POWDER, LYOPHILIZED, FOR SOLUTION INTRAVENOUS at 12:50

## 2019-09-20 RX ADMIN — METOPROLOL TARTRATE 100 MG: 100 TABLET ORAL at 22:04

## 2019-09-20 RX ADMIN — ACETYLCYSTEINE 4 ML: 200 INHALANT RESPIRATORY (INHALATION) at 06:18

## 2019-09-20 RX ADMIN — QUETIAPINE FUMARATE 50 MG: 25 TABLET ORAL at 12:43

## 2019-09-20 ASSESSMENT — COGNITIVE AND FUNCTIONAL STATUS - GENERAL
MOVING FROM LYING ON BACK TO SITTING ON SIDE OF FLAT BED: A LOT
TOILETING: A LOT
STANDING UP FROM CHAIR USING ARMS: A LOT
SUGGESTED CMS G CODE MODIFIER DAILY ACTIVITY: CL
DAILY ACTIVITIY SCORE: 11
EATING MEALS: TOTAL
DRESSING REGULAR UPPER BODY CLOTHING: A LOT
MOBILITY SCORE: 9
HELP NEEDED FOR BATHING: A LOT
WALKING IN HOSPITAL ROOM: A LOT
MOVING TO AND FROM BED TO CHAIR: UNABLE
CLIMB 3 TO 5 STEPS WITH RAILING: TOTAL
DRESSING REGULAR LOWER BODY CLOTHING: A LOT
PERSONAL GROOMING: A LOT
SUGGESTED CMS G CODE MODIFIER MOBILITY: CM
TURNING FROM BACK TO SIDE WHILE IN FLAT BAD: UNABLE

## 2019-09-20 NOTE — CARE PLAN
Problem: Safety  Goal: Will remain free from falls  Outcome: PROGRESSING AS EXPECTED  Intervention: Implement fall precautions  Note:   Patient and family educated on saftey precautions. Bed in low and locked position. Call light within reach. Room near nursing station. Patient encouraged to call staff for help before ambulating.      Problem: Infection  Goal: Will remain free from infection  Outcome: PROGRESSING AS EXPECTED  Note:   Hand hygiene performed before and after assessing patient. Daily CHG bath completed. Scrub the hub prior to accessing IVs. Linens changed daily and PRN when soiled.

## 2019-09-20 NOTE — ASSESSMENT & PLAN NOTE
9/20 rising WBC, purulent secretions. Bronch/BAL/Blood cultures and empiric vancomycin and cefepime started  9/23  Antibiotic day 4 of 7, preliminary BAL results Pseudomonas, vancomycin discontinued  9/24 Cefepime day 5 of 7-stopped secondary to possible allergic reaction.  9/25 Cipro initiated   9/26 WBC 16.7    - chin wound with purulent drainage - culture sent   - Linezolid, Flagyl and Azactam initiated   - CT face, head and neck without evidence of osteomyelitis  9/27 WBC 19.4  9/28 4 episodes of diarrhea this AM - C diff negative  9/29 WBC 22.1 wound culture with Candida - Fluconazole initiated   9/30 WBC 21.2, CXR unremarkable, UA unremarkable. Continue Diflucan, stop all other antibiotics    10/6 WBC now normal. EKG with prolonged QTc. Switched to Micafungin.    ~ 14 day course of antifungal to completed on 10/11
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
## 2019-09-20 NOTE — CARE PLAN
Respiratory Update    Treatment modality: Aerosol/SVN  Frequency: Q4/QID    Pt tolerating current treatments well with no adverse reactions.

## 2019-09-20 NOTE — THERAPY
Speech Therapy Contact Note  Per RN, patient is back on the T-piece. Pt with increased secretions and agitation this morning - not appropriate at this time. Will attempt at a later time as appropriate.

## 2019-09-20 NOTE — THERAPY
"Occupational Therapy Treatment completed with focus on ADLs.  Functional Status:  Total A x2 supine<>sit, Total A LB dressing, Max A simulated grooming  Plan of Care: Will benefit from Occupational Therapy 2 times per week  Discharge Recommendations:  Equipment Will Continue to Assess for Equipment Needs. Post-acute therapy Recommend post-acute placement for additional occupational therapy services prior to discharge home. Patient can tolerate post-acute therapies at a 5x/week frequency.    See \"Rehab Therapy-Acute\" Patient Summary Report for complete documentation.       Pt seen for OT tx session. Pt continues to be primarily limited by cognition throughout session. Pt very impulsive, gesturing sexually when seated EOB, and required max verbal and tactile cues for redirection to task. Decreasing frequency to 2x/wk until pt becomes more participatory. Recommend post acute placement.   "

## 2019-09-20 NOTE — CARE PLAN
Problem: Communication  Goal: The ability to communicate needs accurately and effectively will improve  Outcome: PROGRESSING SLOWER THAN EXPECTED     Problem: Safety  Goal: Will remain free from injury  Outcome: PROGRESSING SLOWER THAN EXPECTED  Goal: Will remain free from falls  Outcome: PROGRESSING SLOWER THAN EXPECTED     Problem: Infection  Goal: Will remain free from infection  Outcome: PROGRESSING SLOWER THAN EXPECTED     Problem: Venous Thromboembolism (VTW)/Deep Vein Thrombosis (DVT) Prevention:  Goal: Patient will participate in Venous Thrombosis (VTE)/Deep Vein Thrombosis (DVT)Prevention Measures  Outcome: PROGRESSING SLOWER THAN EXPECTED     Problem: Bowel/Gastric:  Goal: Normal bowel function is maintained or improved  Outcome: PROGRESSING SLOWER THAN EXPECTED  Goal: Will not experience complications related to bowel motility  Outcome: PROGRESSING SLOWER THAN EXPECTED     Problem: Knowledge Deficit  Goal: Knowledge of disease process/condition, treatment plan, diagnostic tests, and medications will improve  Outcome: PROGRESSING SLOWER THAN EXPECTED  Goal: Knowledge of the prescribed therapeutic regimen will improve  Outcome: PROGRESSING SLOWER THAN EXPECTED     Problem: Discharge Barriers/Planning  Goal: Patient's continuum of care needs will be met  Outcome: PROGRESSING SLOWER THAN EXPECTED     Problem: Pain Management  Goal: Pain level will decrease to patient's comfort goal  Outcome: PROGRESSING SLOWER THAN EXPECTED     Problem: Respiratory:  Goal: Respiratory status will improve  Outcome: PROGRESSING SLOWER THAN EXPECTED     Problem: Skin Integrity  Goal: Risk for impaired skin integrity will decrease  Outcome: PROGRESSING SLOWER THAN EXPECTED     Problem: Safety - Medical Restraint  Goal: Remains free of injury from restraints (Restraint for Interference with Medical Device)  Description  INTERVENTIONS:  1. Determine that other, less restrictive measures have been tried or would not be effective  before applying the restraint  2. Evaluate the patient's condition at the time of restraint application  3. Inform patient/family regarding the reason for restraint  4. Q2H: Monitor safety, psychosocial status, comfort, nutrition and hydration  Outcome: PROGRESSING SLOWER THAN EXPECTED  Goal: Free from restraint(s) (Restraint for Interference with Medical Device)  Description  INTERVENTIONS:  1. ONCE/SHIFT or MINIMUM Q12H: Assess and document the continuing need for restraints  2. Q24H: Continued use of restraint requires LIP to perform face to face examination and written order  3. Identify and implement measures to help patient regain control  Outcome: PROGRESSING SLOWER THAN EXPECTED     Problem: Psychosocial Needs:  Goal: Level of anxiety will decrease  Outcome: PROGRESSING SLOWER THAN EXPECTED     Problem: Risk for injury related to physical restraint use  Goal: Safe and appropriate use of physical restraints. Restraints discontinued at the earliest possibility while ensuring patient safety.  Outcome: PROGRESSING SLOWER THAN EXPECTED     Problem: Knowledge Deficit  Goal: Maintain or improve baseline circulation, motion and sensation  Outcome: PROGRESSING SLOWER THAN EXPECTED  Goal: Patient/Significant other demonstrates understanding of disease process, treatment plan, medications and discharge instructions  Outcome: PROGRESSING SLOWER THAN EXPECTED     Problem: Psychosocial needs  Goal: Anxiety reduction  Outcome: PROGRESSING SLOWER THAN EXPECTED     Problem: Skin Integrity  Goal: Skin Integrity is maintained or improved  Outcome: PROGRESSING SLOWER THAN EXPECTED     Problem: Risk for Infection, Impaired Wound Healing  Goal: Remain free from signs and symptoms of infection  Outcome: PROGRESSING SLOWER THAN EXPECTED  Goal: Promotion of Wound Healing and Drain Management  Outcome: PROGRESSING SLOWER THAN EXPECTED     Problem: Mechanical Ventilation  Goal: Safe management of artificial airway and  ventilation  Outcome: PROGRESSING SLOWER THAN EXPECTED  Goal: Ability to express needs and understand communication  Outcome: PROGRESSING SLOWER THAN EXPECTED  Goal: Successful weaning off mechanical ventilator. Spontaneously maintains adequate gas exchange  Outcome: PROGRESSING SLOWER THAN EXPECTED     Problem: Pain  Goal: Alleviation of Pain or a reduction in pain to the patient's comfort goal  Outcome: PROGRESSING SLOWER THAN EXPECTED     Problem: Hemodynamic Status  Goal: Vital Signs and Fluid Balance Management  Outcome: PROGRESSING SLOWER THAN EXPECTED

## 2019-09-20 NOTE — PROGRESS NOTES
RN skin check complete.  Devices in place EKG leads, tracheostomy, BP cuff, cortrak, SCDs, SPO2 clip, peripheral IV, vale.              Skin assessed under the following devices listed above.              Preventative measures in place including repositioning patient when he isn't turning self.  Pillows used under elbows/ankles  Mepilex on bilateral elbows  Mepilex on sacrum   Devices repositioned frequently  Ear probe alternated q2 hours  Padding under glasses to prevent pressure ulcers  Ear foams on glasses  Waffle cushion in place when patient up to chair  Mepilex on elbows and pillow used to float them      Following areas of concern:   ·     Wound on bottom of tracheostomy site, optifoam in place, trach site cleansed   Surgical incision under chin has opening with small drainage, EMRE per Md order  Redness/bleding to uretheral meatus, area cleansed with soap and water    Sacrum red/blanching, mepilex in place and barrier cream applied  Rash/redness to back, lotion and cream applied   Redness to bilateral elbows, blanching, mepilex in place   Ear lobes red but blanching and ear probe changed Q2 hours      Wound consult placedYES/NO: yes    Wound reported YES/NO: yes  Appropriate LDAs opened YES/NO: yes

## 2019-09-20 NOTE — PROCEDURES
Procedure Report    Date of Procedure: 9/20/2019    Surgeon: Desmond López MD.    Diagnosis: leukocytosis    Procedure: Flexible bronchoscopy and broncho-alveolar lavage    Indications: leukocytosis, purulent secretions    Findings: minimal greenish mucous bilaterally    Procedure in detail: The patient was preoxygenated with 100% FiO2 and sedated with versed and rocuronium.  The bronchoscope was advanced down the endotracheal tube and the right and left segmental and subsegmental airways were explored.  There was a small amount of greenish mucopurlent material in the airways.  12mLs of saline were injected into the right lower lobe airway, suctioned, and sent as a specimen.  Once the airways were clear, the bronchoscope was removed.   The patient tolerated the procedure well.    Specimen: BAL for gram stain and culture      Desmond López MD  [facility]  [phone]
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
## 2019-09-20 NOTE — THERAPY
"Physical Therapy Treatment completed.   Bed Mobility:  Supine to Sit: Total Assist X 2  Transfers: Sit to Stand: Unable to Participate  Gait: Level Of Assist: (deferred) with No Equipment Needed       Plan of Care: Will benefit from Physical Therapy 2 times per week  Discharge Recommendations: Equipment: Will Continue to Assess for Equipment Needs. Post-acute therapy Discharge to a transitional care facility for continued skilled therapy services.    Pt appears to be most limited by cognition. He was not noted to follow commands and frequently attempted to pull lines. In sitting, pt was able to sustain at close SBA to min A for safety, but would not follow cues for additional seated tasks; thus, standing attempts deferred. While here, PT will follow to address activity tolerance, LE weakness, balance impairments, and fall risk. Recommend placement.      See \"Rehab Therapy-Acute\" Patient Summary Report for complete documentation.       "

## 2019-09-21 LAB
ANION GAP SERPL CALC-SCNC: 15 MMOL/L (ref 0–11.9)
BASOPHILS # BLD AUTO: 0.4 % (ref 0–1.8)
BASOPHILS # BLD: 0.08 K/UL (ref 0–0.12)
BUN SERPL-MCNC: 38 MG/DL (ref 8–22)
CALCIUM SERPL-MCNC: 8.1 MG/DL (ref 8.4–10.2)
CHLORIDE SERPL-SCNC: 102 MMOL/L (ref 96–112)
CO2 SERPL-SCNC: 20 MMOL/L (ref 20–33)
CREAT SERPL-MCNC: 0.9 MG/DL (ref 0.5–1.4)
EOSINOPHIL # BLD AUTO: 0.34 K/UL (ref 0–0.51)
EOSINOPHIL NFR BLD: 1.5 % (ref 0–6.9)
ERYTHROCYTE [DISTWIDTH] IN BLOOD BY AUTOMATED COUNT: 52.2 FL (ref 35.9–50)
GLUCOSE SERPL-MCNC: 135 MG/DL (ref 65–99)
HCT VFR BLD AUTO: 30.6 % (ref 42–52)
HGB BLD-MCNC: 9.6 G/DL (ref 14–18)
IMM GRANULOCYTES # BLD AUTO: 0.26 K/UL (ref 0–0.11)
IMM GRANULOCYTES NFR BLD AUTO: 1.2 % (ref 0–0.9)
LYMPHOCYTES # BLD AUTO: 1.07 K/UL (ref 1–4.8)
LYMPHOCYTES NFR BLD: 4.7 % (ref 22–41)
MAGNESIUM SERPL-MCNC: 2.5 MG/DL (ref 1.5–2.5)
MCH RBC QN AUTO: 30.8 PG (ref 27–33)
MCHC RBC AUTO-ENTMCNC: 31.4 G/DL (ref 33.7–35.3)
MCV RBC AUTO: 98.1 FL (ref 81.4–97.8)
MONOCYTES # BLD AUTO: 0.94 K/UL (ref 0–0.85)
MONOCYTES NFR BLD AUTO: 4.2 % (ref 0–13.4)
NEUTROPHILS # BLD AUTO: 19.85 K/UL (ref 1.82–7.42)
NEUTROPHILS NFR BLD: 88 % (ref 44–72)
NRBC # BLD AUTO: 0 K/UL
NRBC BLD-RTO: 0 /100 WBC
PHOSPHATE SERPL-MCNC: 2.6 MG/DL (ref 2.5–4.5)
PLATELET # BLD AUTO: 356 K/UL (ref 164–446)
PMV BLD AUTO: 9.5 FL (ref 9–12.9)
POTASSIUM SERPL-SCNC: 4.8 MMOL/L (ref 3.6–5.5)
RBC # BLD AUTO: 3.12 M/UL (ref 4.7–6.1)
SODIUM SERPL-SCNC: 137 MMOL/L (ref 135–145)
WBC # BLD AUTO: 22.5 K/UL (ref 4.8–10.8)

## 2019-09-21 PROCEDURE — A9270 NON-COVERED ITEM OR SERVICE: HCPCS | Performed by: NURSE PRACTITIONER

## 2019-09-21 PROCEDURE — 700105 HCHG RX REV CODE 258: Performed by: SURGERY

## 2019-09-21 PROCEDURE — 700102 HCHG RX REV CODE 250 W/ 637 OVERRIDE(OP): Performed by: NURSE PRACTITIONER

## 2019-09-21 PROCEDURE — 94640 AIRWAY INHALATION TREATMENT: CPT

## 2019-09-21 PROCEDURE — 85025 COMPLETE CBC W/AUTO DIFF WBC: CPT

## 2019-09-21 PROCEDURE — A9270 NON-COVERED ITEM OR SERVICE: HCPCS | Performed by: INTERNAL MEDICINE

## 2019-09-21 PROCEDURE — 700102 HCHG RX REV CODE 250 W/ 637 OVERRIDE(OP): Performed by: SURGERY

## 2019-09-21 PROCEDURE — A9270 NON-COVERED ITEM OR SERVICE: HCPCS | Performed by: SURGERY

## 2019-09-21 PROCEDURE — 700102 HCHG RX REV CODE 250 W/ 637 OVERRIDE(OP): Performed by: ORAL & MAXILLOFACIAL SURGERY

## 2019-09-21 PROCEDURE — 51798 US URINE CAPACITY MEASURE: CPT

## 2019-09-21 PROCEDURE — A9270 NON-COVERED ITEM OR SERVICE: HCPCS | Performed by: ORAL & MAXILLOFACIAL SURGERY

## 2019-09-21 PROCEDURE — 80048 BASIC METABOLIC PNL TOTAL CA: CPT

## 2019-09-21 PROCEDURE — 700101 HCHG RX REV CODE 250: Performed by: SURGERY

## 2019-09-21 PROCEDURE — 700102 HCHG RX REV CODE 250 W/ 637 OVERRIDE(OP): Performed by: INTERNAL MEDICINE

## 2019-09-21 PROCEDURE — 700111 HCHG RX REV CODE 636 W/ 250 OVERRIDE (IP): Performed by: SURGERY

## 2019-09-21 PROCEDURE — 302101 FENESTRATED FOAM: Performed by: SURGERY

## 2019-09-21 PROCEDURE — 83735 ASSAY OF MAGNESIUM: CPT

## 2019-09-21 PROCEDURE — 770022 HCHG ROOM/CARE - ICU (200)

## 2019-09-21 PROCEDURE — 99291 CRITICAL CARE FIRST HOUR: CPT | Performed by: SURGERY

## 2019-09-21 PROCEDURE — 84100 ASSAY OF PHOSPHORUS: CPT

## 2019-09-21 RX ADMIN — DIVALPROEX SODIUM 125 MG: 125 CAPSULE, COATED PELLETS ORAL at 05:27

## 2019-09-21 RX ADMIN — DIGOXIN 250 MCG: 250 TABLET ORAL at 17:27

## 2019-09-21 RX ADMIN — QUETIAPINE FUMARATE 100 MG: 100 TABLET ORAL at 22:18

## 2019-09-21 RX ADMIN — ALBUTEROL SULFATE 2.5 MG: 5 SOLUTION RESPIRATORY (INHALATION) at 19:59

## 2019-09-21 RX ADMIN — APIXABAN 5 MG: 5 TABLET, FILM COATED ORAL at 05:29

## 2019-09-21 RX ADMIN — ALBUTEROL SULFATE 2.5 MG: 5 SOLUTION RESPIRATORY (INHALATION) at 06:44

## 2019-09-21 RX ADMIN — SENNOSIDES, DOCUSATE SODIUM 1 TABLET: 50; 8.6 TABLET, FILM COATED ORAL at 22:18

## 2019-09-21 RX ADMIN — ACETYLCYSTEINE 4 ML: 200 INHALANT RESPIRATORY (INHALATION) at 10:45

## 2019-09-21 RX ADMIN — BACITRACIN ZINC: 500 OINTMENT TOPICAL at 17:27

## 2019-09-21 RX ADMIN — QUETIAPINE FUMARATE 50 MG: 25 TABLET ORAL at 05:28

## 2019-09-21 RX ADMIN — DIVALPROEX SODIUM 125 MG: 125 CAPSULE, COATED PELLETS ORAL at 13:29

## 2019-09-21 RX ADMIN — ALBUTEROL SULFATE 2.5 MG: 5 SOLUTION RESPIRATORY (INHALATION) at 10:38

## 2019-09-21 RX ADMIN — SPIRONOLACTONE 25 MG: 50 TABLET ORAL at 05:27

## 2019-09-21 RX ADMIN — CEFEPIME 2 G: 2 INJECTION, POWDER, FOR SOLUTION INTRAVENOUS at 22:19

## 2019-09-21 RX ADMIN — VANCOMYCIN HYDROCHLORIDE 1600 MG: 500 INJECTION, POWDER, LYOPHILIZED, FOR SOLUTION INTRAVENOUS at 14:47

## 2019-09-21 RX ADMIN — ACETYLCYSTEINE 4 ML: 200 INHALANT RESPIRATORY (INHALATION) at 20:00

## 2019-09-21 RX ADMIN — APIXABAN 5 MG: 5 TABLET, FILM COATED ORAL at 17:27

## 2019-09-21 RX ADMIN — CEFEPIME 2 G: 2 INJECTION, POWDER, FOR SOLUTION INTRAVENOUS at 06:19

## 2019-09-21 RX ADMIN — AMIODARONE HYDROCHLORIDE 400 MG: 200 TABLET ORAL at 05:29

## 2019-09-21 RX ADMIN — DIVALPROEX SODIUM 125 MG: 125 CAPSULE, COATED PELLETS ORAL at 22:18

## 2019-09-21 RX ADMIN — CHLORHEXIDINE GLUCONATE 0.12% ORAL RINSE 15 ML: 1.2 LIQUID ORAL at 17:27

## 2019-09-21 RX ADMIN — TRAZODONE HYDROCHLORIDE 50 MG: 50 TABLET ORAL at 22:18

## 2019-09-21 RX ADMIN — METOPROLOL TARTRATE 100 MG: 100 TABLET ORAL at 17:27

## 2019-09-21 RX ADMIN — CHLORHEXIDINE GLUCONATE 0.12% ORAL RINSE 15 ML: 1.2 LIQUID ORAL at 05:27

## 2019-09-21 RX ADMIN — METOPROLOL TARTRATE 100 MG: 100 TABLET ORAL at 12:20

## 2019-09-21 RX ADMIN — CEFEPIME 2 G: 2 INJECTION, POWDER, FOR SOLUTION INTRAVENOUS at 13:31

## 2019-09-21 RX ADMIN — METOPROLOL TARTRATE 100 MG: 100 TABLET ORAL at 22:18

## 2019-09-21 RX ADMIN — QUETIAPINE FUMARATE 50 MG: 25 TABLET ORAL at 13:30

## 2019-09-21 RX ADMIN — BACITRACIN ZINC: 500 OINTMENT TOPICAL at 05:29

## 2019-09-21 RX ADMIN — ACETYLCYSTEINE 4 ML: 200 INHALANT RESPIRATORY (INHALATION) at 06:48
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
## 2019-09-21 NOTE — CARE PLAN
Problem: Safety  Goal: Will remain free from falls  Intervention: Implement fall precautions  Note:   1:1 sitter at bedside. Bed alarm on, sides rails up x3, treaded slipper socks in place, bed locked and in lowest position.     Problem: Infection  Goal: Will remain free from infection  Intervention: Implement standard precautions and perform hand washing before and after patient contact  Note:   Standard precautions in place to prevent infection. Hand hygiene performed before and after patient contact.
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
## 2019-09-21 NOTE — CARE PLAN
Problem: Communication  Goal: The ability to communicate needs accurately and effectively will improve  Outcome: PROGRESSING SLOWER THAN EXPECTED  Intervention: Reorient patient to environment as needed  Note:   Patients mouth is wired shut, so unable to communicate. Patient writes notes, not coherent and very repetitive. Reassured patient he is in a safe place, and will continue to orient him to his surroundings.        Problem: Infection  Goal: Will remain free from infection  Outcome: PROGRESSING SLOWER THAN EXPECTED  Intervention: Assess signs and symptoms of infection  Note:   Bronchoscopy and BAL performed yesterday. Patient receiving abx. Trending WBC. Will continue to monitor vitals, and hang wash per encounter.

## 2019-09-22 LAB
ANION GAP SERPL CALC-SCNC: 7 MMOL/L (ref 0–11.9)
BASOPHILS # BLD AUTO: 0.5 % (ref 0–1.8)
BASOPHILS # BLD: 0.07 K/UL (ref 0–0.12)
BUN SERPL-MCNC: 37 MG/DL (ref 8–22)
CALCIUM SERPL-MCNC: 7.9 MG/DL (ref 8.5–10.5)
CHLORIDE SERPL-SCNC: 106 MMOL/L (ref 96–112)
CO2 SERPL-SCNC: 26 MMOL/L (ref 20–33)
CREAT SERPL-MCNC: 0.88 MG/DL (ref 0.5–1.4)
EOSINOPHIL # BLD AUTO: 0.53 K/UL (ref 0–0.51)
EOSINOPHIL NFR BLD: 3.6 % (ref 0–6.9)
ERYTHROCYTE [DISTWIDTH] IN BLOOD BY AUTOMATED COUNT: 52.8 FL (ref 35.9–50)
GLUCOSE SERPL-MCNC: 115 MG/DL (ref 65–99)
HCT VFR BLD AUTO: 31.7 % (ref 42–52)
HGB BLD-MCNC: 10 G/DL (ref 14–18)
IMM GRANULOCYTES # BLD AUTO: 0.27 K/UL (ref 0–0.11)
IMM GRANULOCYTES NFR BLD AUTO: 1.8 % (ref 0–0.9)
LYMPHOCYTES # BLD AUTO: 0.89 K/UL (ref 1–4.8)
LYMPHOCYTES NFR BLD: 6 % (ref 22–41)
MCH RBC QN AUTO: 31.2 PG (ref 27–33)
MCHC RBC AUTO-ENTMCNC: 31.5 G/DL (ref 33.7–35.3)
MCV RBC AUTO: 98.8 FL (ref 81.4–97.8)
MONOCYTES # BLD AUTO: 0.52 K/UL (ref 0–0.85)
MONOCYTES NFR BLD AUTO: 3.5 % (ref 0–13.4)
NEUTROPHILS # BLD AUTO: 12.62 K/UL (ref 1.82–7.42)
NEUTROPHILS NFR BLD: 84.6 % (ref 44–72)
NRBC # BLD AUTO: 0 K/UL
NRBC BLD-RTO: 0 /100 WBC
PLATELET # BLD AUTO: 362 K/UL (ref 164–446)
PMV BLD AUTO: 9.8 FL (ref 9–12.9)
POTASSIUM SERPL-SCNC: 4 MMOL/L (ref 3.6–5.5)
RBC # BLD AUTO: 3.21 M/UL (ref 4.7–6.1)
SODIUM SERPL-SCNC: 139 MMOL/L (ref 135–145)
WBC # BLD AUTO: 14.9 K/UL (ref 4.8–10.8)

## 2019-09-22 PROCEDURE — 700105 HCHG RX REV CODE 258

## 2019-09-22 PROCEDURE — A9270 NON-COVERED ITEM OR SERVICE: HCPCS | Performed by: ORAL & MAXILLOFACIAL SURGERY

## 2019-09-22 PROCEDURE — 94640 AIRWAY INHALATION TREATMENT: CPT

## 2019-09-22 PROCEDURE — 700102 HCHG RX REV CODE 250 W/ 637 OVERRIDE(OP): Performed by: ORAL & MAXILLOFACIAL SURGERY

## 2019-09-22 PROCEDURE — 85025 COMPLETE CBC W/AUTO DIFF WBC: CPT

## 2019-09-22 PROCEDURE — 700102 HCHG RX REV CODE 250 W/ 637 OVERRIDE(OP): Performed by: NURSE PRACTITIONER

## 2019-09-22 PROCEDURE — 700102 HCHG RX REV CODE 250 W/ 637 OVERRIDE(OP): Performed by: SURGERY

## 2019-09-22 PROCEDURE — 700112 HCHG RX REV CODE 229: Performed by: NURSE PRACTITIONER

## 2019-09-22 PROCEDURE — 700105 HCHG RX REV CODE 258: Performed by: SURGERY

## 2019-09-22 PROCEDURE — 770001 HCHG ROOM/CARE - MED/SURG/GYN PRIV*

## 2019-09-22 PROCEDURE — A9270 NON-COVERED ITEM OR SERVICE: HCPCS | Performed by: SURGERY

## 2019-09-22 PROCEDURE — 700111 HCHG RX REV CODE 636 W/ 250 OVERRIDE (IP): Performed by: SURGERY

## 2019-09-22 PROCEDURE — A9270 NON-COVERED ITEM OR SERVICE: HCPCS | Performed by: NURSE PRACTITIONER

## 2019-09-22 PROCEDURE — 80048 BASIC METABOLIC PNL TOTAL CA: CPT

## 2019-09-22 PROCEDURE — A9270 NON-COVERED ITEM OR SERVICE: HCPCS | Performed by: INTERNAL MEDICINE

## 2019-09-22 PROCEDURE — 302196 LINEN, HYPOALLERGENIC: Performed by: SURGERY

## 2019-09-22 PROCEDURE — 99233 SBSQ HOSP IP/OBS HIGH 50: CPT | Performed by: SURGERY

## 2019-09-22 PROCEDURE — 700101 HCHG RX REV CODE 250: Performed by: SURGERY

## 2019-09-22 PROCEDURE — 700102 HCHG RX REV CODE 250 W/ 637 OVERRIDE(OP): Performed by: INTERNAL MEDICINE

## 2019-09-22 RX ORDER — SODIUM CHLORIDE 9 MG/ML
INJECTION, SOLUTION INTRAVENOUS
Status: COMPLETED
Start: 2019-09-22 | End: 2019-09-22

## 2019-09-22 RX ORDER — TERAZOSIN 1 MG/1
1 CAPSULE ORAL EVERY EVENING
Status: DISCONTINUED | OUTPATIENT
Start: 2019-09-22 | End: 2019-10-04

## 2019-09-22 RX ADMIN — BACITRACIN ZINC: 500 OINTMENT TOPICAL at 18:04

## 2019-09-22 RX ADMIN — ALBUTEROL SULFATE 2.5 MG: 5 SOLUTION RESPIRATORY (INHALATION) at 16:27

## 2019-09-22 RX ADMIN — CEFEPIME 2 G: 2 INJECTION, POWDER, FOR SOLUTION INTRAVENOUS at 20:53

## 2019-09-22 RX ADMIN — SODIUM CHLORIDE 500 ML: 9 INJECTION, SOLUTION INTRAVENOUS at 20:54

## 2019-09-22 RX ADMIN — CHLORHEXIDINE GLUCONATE 0.12% ORAL RINSE 15 ML: 1.2 LIQUID ORAL at 05:35

## 2019-09-22 RX ADMIN — DIVALPROEX SODIUM 125 MG: 125 CAPSULE, COATED PELLETS ORAL at 05:36

## 2019-09-22 RX ADMIN — DOCUSATE SODIUM 100 MG: 50 LIQUID ORAL at 05:35

## 2019-09-22 RX ADMIN — APIXABAN 5 MG: 5 TABLET, FILM COATED ORAL at 05:36

## 2019-09-22 RX ADMIN — CEFEPIME 2 G: 2 INJECTION, POWDER, FOR SOLUTION INTRAVENOUS at 05:37

## 2019-09-22 RX ADMIN — ACETYLCYSTEINE 4 ML: 200 INHALANT RESPIRATORY (INHALATION) at 07:05

## 2019-09-22 RX ADMIN — SENNOSIDES, DOCUSATE SODIUM 1 TABLET: 50; 8.6 TABLET, FILM COATED ORAL at 20:52

## 2019-09-22 RX ADMIN — CHLORHEXIDINE GLUCONATE 0.12% ORAL RINSE 15 ML: 1.2 LIQUID ORAL at 18:03

## 2019-09-22 RX ADMIN — QUETIAPINE FUMARATE 100 MG: 100 TABLET ORAL at 20:52

## 2019-09-22 RX ADMIN — SPIRONOLACTONE 25 MG: 50 TABLET ORAL at 05:36

## 2019-09-22 RX ADMIN — ALBUTEROL SULFATE 2.5 MG: 5 SOLUTION RESPIRATORY (INHALATION) at 10:58

## 2019-09-22 RX ADMIN — METOPROLOL TARTRATE 100 MG: 100 TABLET ORAL at 14:33

## 2019-09-22 RX ADMIN — TERAZOSIN HYDROCHLORIDE ANHYDROUS 1 MG: 1 CAPSULE ORAL at 18:03

## 2019-09-22 RX ADMIN — ACETYLCYSTEINE 4 ML: 200 INHALANT RESPIRATORY (INHALATION) at 16:27

## 2019-09-22 RX ADMIN — ALBUTEROL SULFATE 2.5 MG: 5 SOLUTION RESPIRATORY (INHALATION) at 07:04

## 2019-09-22 RX ADMIN — DIVALPROEX SODIUM 125 MG: 125 CAPSULE, COATED PELLETS ORAL at 20:53

## 2019-09-22 RX ADMIN — BACITRACIN ZINC: 500 OINTMENT TOPICAL at 05:37

## 2019-09-22 RX ADMIN — ACETYLCYSTEINE 4 ML: 200 INHALANT RESPIRATORY (INHALATION) at 10:58

## 2019-09-22 RX ADMIN — QUETIAPINE FUMARATE 50 MG: 25 TABLET ORAL at 14:30

## 2019-09-22 RX ADMIN — METOPROLOL TARTRATE 100 MG: 100 TABLET ORAL at 20:52

## 2019-09-22 RX ADMIN — CEFEPIME 2 G: 2 INJECTION, POWDER, FOR SOLUTION INTRAVENOUS at 14:30

## 2019-09-22 RX ADMIN — APIXABAN 5 MG: 5 TABLET, FILM COATED ORAL at 18:05

## 2019-09-22 RX ADMIN — METOPROLOL TARTRATE 100 MG: 100 TABLET ORAL at 18:03

## 2019-09-22 RX ADMIN — POLYETHYLENE GLYCOL 3350 1 PACKET: 17 POWDER, FOR SOLUTION ORAL at 18:04

## 2019-09-22 RX ADMIN — POLYETHYLENE GLYCOL 3350 1 PACKET: 17 POWDER, FOR SOLUTION ORAL at 05:35

## 2019-09-22 RX ADMIN — TRAZODONE HYDROCHLORIDE 50 MG: 50 TABLET ORAL at 20:52

## 2019-09-22 RX ADMIN — QUETIAPINE FUMARATE 50 MG: 25 TABLET ORAL at 05:35

## 2019-09-22 RX ADMIN — DOCUSATE SODIUM 100 MG: 50 LIQUID ORAL at 18:03

## 2019-09-22 RX ADMIN — DIVALPROEX SODIUM 125 MG: 125 CAPSULE, COATED PELLETS ORAL at 14:30

## 2019-09-22 RX ADMIN — DIGOXIN 250 MCG: 250 TABLET ORAL at 18:05

## 2019-09-22 RX ADMIN — AMIODARONE HYDROCHLORIDE 400 MG: 200 TABLET ORAL at 05:35

## 2019-09-22 NOTE — PROGRESS NOTES
Pt has generalized red itchy rash on body. APRN notified. Barrier cream applied and hypoallergenic sheets ordered.

## 2019-09-22 NOTE — PROGRESS NOTES
2 RN skin check complete with ANAMARIA Wilson.  Devices in place SCDs, bridled cortrak, vale, trach, intermittently glasses/hearing aids, PIVs.   Skin assessed under the following devices: all.   Preventative measures in place including: q2h turns, mepilex to sacrum, barrier paste to sacrum, dressing changes in place.  Following areas of concern:   · Reddened trach site  - Scattered bruising  - IAD to sacrum

## 2019-09-22 NOTE — CARE PLAN
Problem: Pain Management  Goal: Pain level will decrease to patient's comfort goal  Intervention: Follow pain managment plan developed in collaboration with patient and Interdisciplinary Team  Note:   Pharmacological and nonpharmacological methods used to control pain. Pain assessed Q2.  Medications administered as needed per the MAR.      Problem: Respiratory:  Goal: Respiratory status will improve  Intervention: Assess and monitor pulmonary status  Note:   Pulmonary status assessed with each encounter. RT notified of changes, and oxygen titrated as needed. Patient suctioned as indicated. Oral care provided per protocol. Will continue to monitor.

## 2019-09-22 NOTE — CARE PLAN
Problem: Respiratory:  Goal: Respiratory status will improve  Outcome: PROGRESSING AS EXPECTED  Note:   VAP bundle in place. Q2h suctioning and oral care provided. Continuous pulse ox monitoring in place. Collaborating with MD and RT for weaning.      Problem: Psychosocial Needs:  Goal: Level of anxiety will decrease  Outcome: PROGRESSING AS EXPECTED  Note:   Sitter at bedside, pt encouraged to write notes to communicate. Pt reassured and reoriented throughout shift.

## 2019-09-22 NOTE — CARE PLAN
Respiratory Therapy Update    Interdisciplinary Plan of Care-Goals (Indications)  Bronchodilator Indications: History / Diagnosis;Strong Subjective / Objective Improvement (09/21/19 1053)  Bronchopulmonary Hygiene Indications: Evidence of Retained Secretions (09/21/19 1053)  Interdisciplinary Plan of Care-Outcomes   Bronchodilator Outcome: Patient at Stable Baseline (09/21/19 1053)  Bronchopulmonary Hygiene Outcome: Optimal Hydration with Moderate or Less Sputum Production (09/21/19 1053)          #SVN Performed: Yes (09/21/19 1053)    Cough: Productive (09/22/19 0400)  Sputum Amount: Moderate (09/22/19 0400)  Sputum Color: Tan (09/22/19 0400)  Sputum Consistency: Thick (09/22/19 0400)               FiO2%: 50 % (09/22/19 0230)  O2 (LPM): 8 (09/22/19 0230)  O2 Daily Delivery Respiratory : T-Piece (09/22/19 0230)    Breath Sounds  Pre/Post Intervention: Pre Intervention Assessment (09/22/19 0400)  RUL Breath Sounds: Coarse Crackles (09/22/19 0400)  RML Breath Sounds: Coarse Crackles (09/22/19 0400)  RLL Breath Sounds: Diminished (09/22/19 0400)  DARLENE Breath Sounds: Coarse Crackles (09/22/19 0400)  LLL Breath Sounds: Diminished (09/22/19 0400)    Events/Summary/Plan: no changes made, pt resting comfortably  (09/22/19 0004)
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
## 2019-09-23 ENCOUNTER — APPOINTMENT (OUTPATIENT)
Dept: CARDIOLOGY | Facility: MEDICAL CENTER | Age: 79
DRG: 003 | End: 2019-09-23
Attending: NURSE PRACTITIONER
Payer: MEDICARE

## 2019-09-23 ENCOUNTER — APPOINTMENT (OUTPATIENT)
Dept: RADIOLOGY | Facility: MEDICAL CENTER | Age: 79
DRG: 003 | End: 2019-09-23
Attending: SURGERY
Payer: MEDICARE

## 2019-09-23 PROBLEM — E03.9 HYPOTHYROID: Status: ACTIVE | Noted: 2019-09-23

## 2019-09-23 PROBLEM — I50.1 HEART FAILURE, LEFT, WITH LVEF <=30% (HCC): Status: ACTIVE | Noted: 2019-09-23

## 2019-09-23 LAB
ANION GAP SERPL CALC-SCNC: 6 MMOL/L (ref 0–11.9)
BACTERIA SPEC RESP CULT: ABNORMAL
BASOPHILS # BLD AUTO: 0.5 % (ref 0–1.8)
BASOPHILS # BLD: 0.07 K/UL (ref 0–0.12)
BUN SERPL-MCNC: 29 MG/DL (ref 8–22)
CALCIUM SERPL-MCNC: 7.8 MG/DL (ref 8.5–10.5)
CHLORIDE SERPL-SCNC: 107 MMOL/L (ref 96–112)
CO2 SERPL-SCNC: 25 MMOL/L (ref 20–33)
CREAT SERPL-MCNC: 0.86 MG/DL (ref 0.5–1.4)
CRP SERPL HS-MCNC: 4.44 MG/DL (ref 0–0.75)
DIGOXIN SERPL-MCNC: 1.5 NG/ML (ref 0.8–2)
EKG IMPRESSION: NORMAL
EOSINOPHIL # BLD AUTO: 0.61 K/UL (ref 0–0.51)
EOSINOPHIL NFR BLD: 4.6 % (ref 0–6.9)
ERYTHROCYTE [DISTWIDTH] IN BLOOD BY AUTOMATED COUNT: 51.3 FL (ref 35.9–50)
GLUCOSE SERPL-MCNC: 106 MG/DL (ref 65–99)
GRAM STN SPEC: ABNORMAL
HCT VFR BLD AUTO: 30.9 % (ref 42–52)
HGB BLD-MCNC: 9.6 G/DL (ref 14–18)
IMM GRANULOCYTES # BLD AUTO: 0.33 K/UL (ref 0–0.11)
IMM GRANULOCYTES NFR BLD AUTO: 2.5 % (ref 0–0.9)
LV EJECT FRACT  99904: 45
LV EJECT FRACT MOD 2C 99903: 51.59
LV EJECT FRACT MOD 4C 99902: 51.53
LV EJECT FRACT MOD BP 99901: 50.13
LYMPHOCYTES # BLD AUTO: 0.91 K/UL (ref 1–4.8)
LYMPHOCYTES NFR BLD: 6.9 % (ref 22–41)
MCH RBC QN AUTO: 30.3 PG (ref 27–33)
MCHC RBC AUTO-ENTMCNC: 31.1 G/DL (ref 33.7–35.3)
MCV RBC AUTO: 97.5 FL (ref 81.4–97.8)
MONOCYTES # BLD AUTO: 0.67 K/UL (ref 0–0.85)
MONOCYTES NFR BLD AUTO: 5.1 % (ref 0–13.4)
NEUTROPHILS # BLD AUTO: 10.56 K/UL (ref 1.82–7.42)
NEUTROPHILS NFR BLD: 80.4 % (ref 44–72)
NRBC # BLD AUTO: 0 K/UL
NRBC BLD-RTO: 0 /100 WBC
NT-PROBNP SERPL IA-MCNC: 1846 PG/ML (ref 0–125)
PLATELET # BLD AUTO: 301 K/UL (ref 164–446)
PMV BLD AUTO: 9.5 FL (ref 9–12.9)
POTASSIUM SERPL-SCNC: 4 MMOL/L (ref 3.6–5.5)
PREALB SERPL-MCNC: 13 MG/DL (ref 18–38)
RBC # BLD AUTO: 3.17 M/UL (ref 4.7–6.1)
SIGNIFICANT IND 70042: ABNORMAL
SITE SITE: ABNORMAL
SODIUM SERPL-SCNC: 138 MMOL/L (ref 135–145)
SOURCE SOURCE: ABNORMAL
TSH SERPL DL<=0.005 MIU/L-ACNC: 10.46 UIU/ML (ref 0.38–5.33)
VALPROATE SERPL-MCNC: 13.7 UG/ML (ref 50–100)
WBC # BLD AUTO: 13.2 K/UL (ref 4.8–10.8)

## 2019-09-23 PROCEDURE — A9270 NON-COVERED ITEM OR SERVICE: HCPCS | Performed by: SURGERY

## 2019-09-23 PROCEDURE — 700102 HCHG RX REV CODE 250 W/ 637 OVERRIDE(OP): Performed by: INTERNAL MEDICINE

## 2019-09-23 PROCEDURE — 80164 ASSAY DIPROPYLACETIC ACD TOT: CPT

## 2019-09-23 PROCEDURE — 700102 HCHG RX REV CODE 250 W/ 637 OVERRIDE(OP): Performed by: SURGERY

## 2019-09-23 PROCEDURE — A9270 NON-COVERED ITEM OR SERVICE: HCPCS | Performed by: NURSE PRACTITIONER

## 2019-09-23 PROCEDURE — 80048 BASIC METABOLIC PNL TOTAL CA: CPT

## 2019-09-23 PROCEDURE — 80162 ASSAY OF DIGOXIN TOTAL: CPT

## 2019-09-23 PROCEDURE — 700101 HCHG RX REV CODE 250: Performed by: SURGERY

## 2019-09-23 PROCEDURE — 94640 AIRWAY INHALATION TREATMENT: CPT

## 2019-09-23 PROCEDURE — 85025 COMPLETE CBC W/AUTO DIFF WBC: CPT

## 2019-09-23 PROCEDURE — 700111 HCHG RX REV CODE 636 W/ 250 OVERRIDE (IP): Performed by: NURSE PRACTITIONER

## 2019-09-23 PROCEDURE — 700112 HCHG RX REV CODE 229: Performed by: NURSE PRACTITIONER

## 2019-09-23 PROCEDURE — 86140 C-REACTIVE PROTEIN: CPT

## 2019-09-23 PROCEDURE — 700102 HCHG RX REV CODE 250 W/ 637 OVERRIDE(OP): Performed by: ORAL & MAXILLOFACIAL SURGERY

## 2019-09-23 PROCEDURE — 700102 HCHG RX REV CODE 250 W/ 637 OVERRIDE(OP): Performed by: STUDENT IN AN ORGANIZED HEALTH CARE EDUCATION/TRAINING PROGRAM

## 2019-09-23 PROCEDURE — 90662 IIV NO PRSV INCREASED AG IM: CPT | Performed by: SURGERY

## 2019-09-23 PROCEDURE — 84134 ASSAY OF PREALBUMIN: CPT

## 2019-09-23 PROCEDURE — 90471 IMMUNIZATION ADMIN: CPT

## 2019-09-23 PROCEDURE — 770020 HCHG ROOM/CARE - TELE (206)

## 2019-09-23 PROCEDURE — 700102 HCHG RX REV CODE 250 W/ 637 OVERRIDE(OP): Performed by: NURSE PRACTITIONER

## 2019-09-23 PROCEDURE — 84439 ASSAY OF FREE THYROXINE: CPT

## 2019-09-23 PROCEDURE — 71045 X-RAY EXAM CHEST 1 VIEW: CPT

## 2019-09-23 PROCEDURE — 3E02340 INTRODUCTION OF INFLUENZA VACCINE INTO MUSCLE, PERCUTANEOUS APPROACH: ICD-10-PCS | Performed by: SURGERY

## 2019-09-23 PROCEDURE — A9270 NON-COVERED ITEM OR SERVICE: HCPCS | Performed by: INTERNAL MEDICINE

## 2019-09-23 PROCEDURE — 36415 COLL VENOUS BLD VENIPUNCTURE: CPT

## 2019-09-23 PROCEDURE — 93005 ELECTROCARDIOGRAM TRACING: CPT | Performed by: NURSE PRACTITIONER

## 2019-09-23 PROCEDURE — A9270 NON-COVERED ITEM OR SERVICE: HCPCS | Performed by: STUDENT IN AN ORGANIZED HEALTH CARE EDUCATION/TRAINING PROGRAM

## 2019-09-23 PROCEDURE — 94669 MECHANICAL CHEST WALL OSCILL: CPT

## 2019-09-23 PROCEDURE — 302101 FENESTRATED FOAM: Performed by: NURSE PRACTITIONER

## 2019-09-23 PROCEDURE — 83880 ASSAY OF NATRIURETIC PEPTIDE: CPT

## 2019-09-23 PROCEDURE — 700105 HCHG RX REV CODE 258: Performed by: SURGERY

## 2019-09-23 PROCEDURE — 700117 HCHG RX CONTRAST REV CODE 255: Performed by: NURSE PRACTITIONER

## 2019-09-23 PROCEDURE — 93010 ELECTROCARDIOGRAM REPORT: CPT | Performed by: INTERNAL MEDICINE

## 2019-09-23 PROCEDURE — 700111 HCHG RX REV CODE 636 W/ 250 OVERRIDE (IP): Performed by: SURGERY

## 2019-09-23 PROCEDURE — 93308 TTE F-UP OR LMTD: CPT

## 2019-09-23 PROCEDURE — 84443 ASSAY THYROID STIM HORMONE: CPT

## 2019-09-23 PROCEDURE — 93308 TTE F-UP OR LMTD: CPT | Mod: 26 | Performed by: INTERNAL MEDICINE

## 2019-09-23 PROCEDURE — A9270 NON-COVERED ITEM OR SERVICE: HCPCS | Performed by: ORAL & MAXILLOFACIAL SURGERY

## 2019-09-23 PROCEDURE — 99232 SBSQ HOSP IP/OBS MODERATE 35: CPT | Performed by: PSYCHIATRY & NEUROLOGY

## 2019-09-23 RX ORDER — GUAIFENESIN 600 MG/1
600 TABLET, EXTENDED RELEASE ORAL EVERY 12 HOURS
Status: DISCONTINUED | OUTPATIENT
Start: 2019-09-23 | End: 2019-09-23

## 2019-09-23 RX ORDER — MORPHINE SULFATE 4 MG/ML
2-4 INJECTION, SOLUTION INTRAMUSCULAR; INTRAVENOUS
Status: DISCONTINUED | OUTPATIENT
Start: 2019-09-23 | End: 2019-09-24

## 2019-09-23 RX ORDER — AMIODARONE HYDROCHLORIDE 200 MG/1
200 TABLET ORAL
Status: DISCONTINUED | OUTPATIENT
Start: 2019-09-24 | End: 2019-09-24

## 2019-09-23 RX ORDER — LORAZEPAM 2 MG/ML
0.5 INJECTION INTRAMUSCULAR
Status: COMPLETED | OUTPATIENT
Start: 2019-09-23 | End: 2019-09-23

## 2019-09-23 RX ORDER — LORAZEPAM 2 MG/ML
0.5 INJECTION INTRAMUSCULAR ONCE
Status: DISCONTINUED | OUTPATIENT
Start: 2019-09-23 | End: 2019-09-23

## 2019-09-23 RX ORDER — MORPHINE SULFATE 4 MG/ML
2 INJECTION, SOLUTION INTRAMUSCULAR; INTRAVENOUS ONCE
Status: COMPLETED | OUTPATIENT
Start: 2019-09-23 | End: 2019-09-23

## 2019-09-23 RX ORDER — DIGOXIN 250 MCG
125 TABLET ORAL DAILY
Status: DISCONTINUED | OUTPATIENT
Start: 2019-09-23 | End: 2019-10-07

## 2019-09-23 RX ORDER — HYDROCODONE BITARTRATE AND ACETAMINOPHEN 5; 325 MG/1; MG/1
1-2 TABLET ORAL EVERY 6 HOURS PRN
Status: DISCONTINUED | OUTPATIENT
Start: 2019-09-23 | End: 2019-10-07

## 2019-09-23 RX ORDER — DIVALPROEX SODIUM 125 MG/1
250 CAPSULE, COATED PELLETS ORAL EVERY 8 HOURS
Status: DISCONTINUED | OUTPATIENT
Start: 2019-09-23 | End: 2019-10-07

## 2019-09-23 RX ADMIN — GUAIFENESIN 200 MG: 100 SOLUTION ORAL at 15:06

## 2019-09-23 RX ADMIN — OXYCODONE HYDROCHLORIDE 5 MG: 5 TABLET ORAL at 00:41

## 2019-09-23 RX ADMIN — GUAIFENESIN 200 MG: 100 SOLUTION ORAL at 21:12

## 2019-09-23 RX ADMIN — INFLUENZA A VIRUS A/MICHIGAN/45/2015 X-275 (H1N1) ANTIGEN (FORMALDEHYDE INACTIVATED), INFLUENZA A VIRUS A/SINGAPORE/INFIMH-16-0019/2016 IVR-186 (H3N2) ANTIGEN (FORMALDEHYDE INACTIVATED), AND INFLUENZA B VIRUS B/MARYLAND/15/2016 BX-69A (A B/COLORADO/6/2017-LIKE VIRUS) ANTIGEN (FORMALDEHYDE INACTIVATED) 0.5 ML: 60; 60; 60 INJECTION, SUSPENSION INTRAMUSCULAR at 21:44

## 2019-09-23 RX ADMIN — ALBUTEROL SULFATE 2.5 MG: 5 SOLUTION RESPIRATORY (INHALATION) at 19:56

## 2019-09-23 RX ADMIN — QUETIAPINE FUMARATE 50 MG: 25 TABLET ORAL at 14:58

## 2019-09-23 RX ADMIN — SENNOSIDES, DOCUSATE SODIUM 1 TABLET: 50; 8.6 TABLET, FILM COATED ORAL at 21:13

## 2019-09-23 RX ADMIN — METOPROLOL TARTRATE 100 MG: 100 TABLET ORAL at 11:05

## 2019-09-23 RX ADMIN — CHLORHEXIDINE GLUCONATE 0.12% ORAL RINSE 15 ML: 1.2 LIQUID ORAL at 18:20

## 2019-09-23 RX ADMIN — HYDROCODONE BITARTRATE AND ACETAMINOPHEN 1 TABLET: 5; 325 TABLET ORAL at 15:08

## 2019-09-23 RX ADMIN — CEFEPIME 2 G: 2 INJECTION, POWDER, FOR SOLUTION INTRAVENOUS at 14:58

## 2019-09-23 RX ADMIN — METOPROLOL TARTRATE 100 MG: 100 TABLET ORAL at 18:20

## 2019-09-23 RX ADMIN — MORPHINE SULFATE 4 MG: 4 INJECTION INTRAVENOUS at 21:30

## 2019-09-23 RX ADMIN — MORPHINE SULFATE 2 MG: 4 INJECTION INTRAVENOUS at 08:58

## 2019-09-23 RX ADMIN — DOCUSATE SODIUM 100 MG: 50 LIQUID ORAL at 18:20

## 2019-09-23 RX ADMIN — QUETIAPINE FUMARATE 100 MG: 100 TABLET ORAL at 21:12

## 2019-09-23 RX ADMIN — ALBUTEROL SULFATE 2.5 MG: 5 SOLUTION RESPIRATORY (INHALATION) at 16:21

## 2019-09-23 RX ADMIN — ACETYLCYSTEINE 4 ML: 200 INHALANT RESPIRATORY (INHALATION) at 11:24

## 2019-09-23 RX ADMIN — TERAZOSIN HYDROCHLORIDE ANHYDROUS 1 MG: 1 CAPSULE ORAL at 18:23

## 2019-09-23 RX ADMIN — QUETIAPINE FUMARATE 50 MG: 25 TABLET ORAL at 04:26

## 2019-09-23 RX ADMIN — BACITRACIN ZINC: 500 OINTMENT TOPICAL at 04:29

## 2019-09-23 RX ADMIN — CHLORHEXIDINE GLUCONATE 0.12% ORAL RINSE 15 ML: 1.2 LIQUID ORAL at 04:25

## 2019-09-23 RX ADMIN — DOCUSATE SODIUM 100 MG: 50 LIQUID ORAL at 04:25

## 2019-09-23 RX ADMIN — DIGOXIN 125 MCG: 250 TABLET ORAL at 18:20

## 2019-09-23 RX ADMIN — DIVALPROEX SODIUM 250 MG: 125 CAPSULE, COATED PELLETS ORAL at 21:12

## 2019-09-23 RX ADMIN — APIXABAN 5 MG: 5 TABLET, FILM COATED ORAL at 18:22

## 2019-09-23 RX ADMIN — LORAZEPAM 0.5 MG: 2 INJECTION INTRAMUSCULAR; INTRAVENOUS at 17:11

## 2019-09-23 RX ADMIN — TRAZODONE HYDROCHLORIDE 50 MG: 50 TABLET ORAL at 21:12

## 2019-09-23 RX ADMIN — DIVALPROEX SODIUM 125 MG: 125 CAPSULE, COATED PELLETS ORAL at 04:29

## 2019-09-23 RX ADMIN — AMIODARONE HYDROCHLORIDE 400 MG: 200 TABLET ORAL at 04:25

## 2019-09-23 RX ADMIN — ALBUTEROL SULFATE 2.5 MG: 5 SOLUTION RESPIRATORY (INHALATION) at 07:48

## 2019-09-23 RX ADMIN — HUMAN ALBUMIN MICROSPHERES AND PERFLUTREN 3 ML: 10; .22 INJECTION, SOLUTION INTRAVENOUS at 14:00

## 2019-09-23 RX ADMIN — CEFEPIME 2 G: 2 INJECTION, POWDER, FOR SOLUTION INTRAVENOUS at 21:13

## 2019-09-23 RX ADMIN — APIXABAN 5 MG: 5 TABLET, FILM COATED ORAL at 04:25

## 2019-09-23 RX ADMIN — CEFEPIME 2 G: 2 INJECTION, POWDER, FOR SOLUTION INTRAVENOUS at 04:27

## 2019-09-23 RX ADMIN — SPIRONOLACTONE 25 MG: 50 TABLET ORAL at 04:27

## 2019-09-23 RX ADMIN — ACETYLCYSTEINE 4 ML: 200 INHALANT RESPIRATORY (INHALATION) at 16:21

## 2019-09-23 RX ADMIN — MORPHINE SULFATE 2 MG: 4 INJECTION INTRAVENOUS at 18:17

## 2019-09-23 RX ADMIN — BACITRACIN ZINC: 500 OINTMENT TOPICAL at 18:21

## 2019-09-23 RX ADMIN — OXYCODONE HYDROCHLORIDE 5 MG: 5 TABLET ORAL at 04:27

## 2019-09-23 RX ADMIN — ACETYLCYSTEINE 4 ML: 200 INHALANT RESPIRATORY (INHALATION) at 19:57

## 2019-09-23 RX ADMIN — ALBUTEROL SULFATE 2.5 MG: 5 SOLUTION RESPIRATORY (INHALATION) at 11:24

## 2019-09-23 RX ADMIN — POLYETHYLENE GLYCOL 3350 1 PACKET: 17 POWDER, FOR SOLUTION ORAL at 18:21

## 2019-09-23 RX ADMIN — GUAIFENESIN 200 MG: 100 SOLUTION ORAL at 11:14

## 2019-09-23 RX ADMIN — METOPROLOL TARTRATE 100 MG: 100 TABLET ORAL at 21:12

## 2019-09-23 RX ADMIN — METOPROLOL TARTRATE 100 MG: 100 TABLET ORAL at 14:58

## 2019-09-23 SDOH — HEALTH STABILITY: MENTAL HEALTH: HOW OFTEN DO YOU HAVE A DRINK CONTAINING ALCOHOL?: NEVER

## 2019-09-23 ASSESSMENT — COGNITIVE AND FUNCTIONAL STATUS - GENERAL
DAILY ACTIVITIY SCORE: 15
STANDING UP FROM CHAIR USING ARMS: A LOT
TOILETING: A LOT
EATING MEALS: A LITTLE
CLIMB 3 TO 5 STEPS WITH RAILING: A LOT
MOVING TO AND FROM BED TO CHAIR: A LITTLE
SUGGESTED CMS G CODE MODIFIER DAILY ACTIVITY: CK
DRESSING REGULAR UPPER BODY CLOTHING: A LITTLE
WALKING IN HOSPITAL ROOM: A LOT
MOBILITY SCORE: 15
HELP NEEDED FOR BATHING: A LOT
MOVING FROM LYING ON BACK TO SITTING ON SIDE OF FLAT BED: A LITTLE
PERSONAL GROOMING: A LITTLE
SUGGESTED CMS G CODE MODIFIER MOBILITY: CK
TURNING FROM BACK TO SIDE WHILE IN FLAT BAD: A LITTLE
DRESSING REGULAR LOWER BODY CLOTHING: A LOT

## 2019-09-23 ASSESSMENT — PATIENT HEALTH QUESTIONNAIRE - PHQ9
2. FEELING DOWN, DEPRESSED, IRRITABLE, OR HOPELESS: NEARLY EVERY DAY
SUM OF ALL RESPONSES TO PHQ9 QUESTIONS 1 AND 2: 6
1. LITTLE INTEREST OR PLEASURE IN DOING THINGS: NEARLY EVERY DAY

## 2019-09-23 ASSESSMENT — LIFESTYLE VARIABLES: EVER_SMOKED: UNABLE TO EVALUATE AT THIS TIME - NEEDS ASSESSMENT PRIOR TO DISCHARGE
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
## 2019-09-23 NOTE — DISCHARGE PLANNING
Anticipated Discharge Disposition: Inpatient Psych Hospital.    Action: Ariane, Bedside RN requested clarification on the status of the Legal Hold.    LSW consulted with Lashanda, Legal Hold CCA.  Per Lashanda, this patient is on a Legal Hold which is extended by the court until 9/26/19, RN notified.    Barriers to Discharge: None.    Plan: Inpatient Psych Hospital, pending medical clearance and referrals.
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
## 2019-09-23 NOTE — CARE PLAN
Problem: Safety  Goal: Will remain free from injury  Outcome: PROGRESSING AS EXPECTED  Goal: Will remain free from falls  Outcome: PROGRESSING AS EXPECTED  Intervention: Assess risk factors for falls  Note:   Pt was educated on fall risk and need to call nursing staff prior to mobilization. Pt unable to verbalize understanding D/T wired jaw and trache. Pt does note demonstrate understanding. Bed is locked and in the lowest position. 1:1 sitter is at bedside. Bilateral soft wrist restraints are in use.  CLIP. Hourly rounding in place.      Problem: Pain Management  Goal: Pain level will decrease to patient's comfort goal  Outcome: PROGRESSING AS EXPECTED  Intervention: Follow pain managment plan developed in collaboration with patient and Interdisciplinary Team  Note:   Pt was educated on 0-10 pain scale, non-pharm methods of pain relief and MAR. Pt demonstrates understanding by nodding or shaking head when asked if he requires pain medication. Pt was medicated per MAR.

## 2019-09-23 NOTE — PROGRESS NOTES
Dr. Dong at bedside. Assessed chin wound. Okay that wound is dehissing slightly - expected per MD.

## 2019-09-23 NOTE — PROGRESS NOTES
Assumed care of patient at 0700. Patient is alert, agitated, unable to determine orientation, earlier this AM patient trying to swing legs out of bed, bilateral soft wrist restraints. Lung sounds diminished in bases, trach in place with t-piece and inline suction on 10LO2 at 92%. Patient is calm now, checked skin under restraints, intact, bruising to bilateral upper extremities. Cortrak to right nare with bridle, tube feed diabetasource at 70mL, patient tolerating. Abdomen soft, non tender, +bowel sounds. Tay in place, secured with statlock. Wound to chin, EMRE. Bed in lowest position, sitter at bedside.

## 2019-09-23 NOTE — PROGRESS NOTES
"Report received from Ana CONRAD  Pt escorted to unit by transport and ICU RN, Ana, at roughly 1930.    1:1 sitter at bedside   Pt has no hearing aids in place as his wife took them home to clean them, so a writing board is in use to assess pt. Pt can nod and shake head, but cannot write legibly.   [employer] orientation as the pt is non-verbal with a trache in place and jaw wired shut.   Pt shook head \"no\" when questioned about pain.   Nausea denied   Tolerating Diabetasource @ 70 ml/hour via coretrak to rt nare.    Trache in place. T-piece in place. O2 saturation maintaining in the 90's on 10 liters therapeutic O2. Secretions are thick, but pt has a strong cough and is able to clear most secretions. Trache care completed. Trache equipment at bedside. Obturator taped above HOB.   Jaws are wired shut. Mouth moisturize at bedside. Wire cutters taped above HOB.   Surgical incision/wound x 2 to chin. EMRE. Scant serous/yellow drainage noted. Approximated except lateral portion of lt incision. Per Ana CONRAD, MD is aware.   Generalized rash noted.   + Urine output via vale catheter   + BM    + Flatus  Up x 1-2 assist. Unable to mobilize pt this shift as the pt is not following commands.   Bed in lowest position and locked.  Pt resting comfortably now.  Review plan of care with patient  Call light within reach  Hourly rounds in place  All needs met at this time  "

## 2019-09-23 NOTE — PROGRESS NOTES
1936: report given to ANAMARIA Streeter. Pt transported on monitor and oxygen to T430 with ACLS RN.Chart, meds and belongings with patient.

## 2019-09-23 NOTE — ASSESSMENT & PLAN NOTE
New diagnosed EF of 20%  Rate related vs Ischemic  Further work up defered due to current state  Spirolactone  ACE held due to hypotension  Switch to Toprol XL when able to take uncrushed medication  9/23 Repeat limited echo with improved EF to 45%

## 2019-09-23 NOTE — PROGRESS NOTES
2220 Pt pulling at lines and attempting to place blankets over trache site. Per CNA, pt attempted to plug trache T-piece multiple times.   When this RN came to bedside, the pt was actively trying to plug T-piece with fingers. Pt was educated not to handle cords/lines, but is very hard of hearing and cannot verbalize understanding D/T jaw being wired and trache. Pt was placed in soft wrist restraints D/T continued attempts to grab trache.   Pt was re-educated on need to not pull at lines/cords/trache. Pt does nod understanding when letter board was used to educate. However, the pt does not display evidence of learning and made continued attempts to raise hands to trache.   2225 APRNHaritha was notified about events stated above. Orders received. Charge RN also made aware.

## 2019-09-24 PROBLEM — R21 RASH AND NONSPECIFIC SKIN ERUPTION: Status: ACTIVE | Noted: 2019-09-24

## 2019-09-24 LAB
ANION GAP SERPL CALC-SCNC: 5 MMOL/L (ref 0–11.9)
BASOPHILS # BLD AUTO: 0.5 % (ref 0–1.8)
BASOPHILS # BLD: 0.07 K/UL (ref 0–0.12)
BUN SERPL-MCNC: 25 MG/DL (ref 8–22)
CALCIUM SERPL-MCNC: 8 MG/DL (ref 8.5–10.5)
CHLORIDE SERPL-SCNC: 104 MMOL/L (ref 96–112)
CO2 SERPL-SCNC: 27 MMOL/L (ref 20–33)
CREAT SERPL-MCNC: 0.77 MG/DL (ref 0.5–1.4)
EOSINOPHIL # BLD AUTO: 0.7 K/UL (ref 0–0.51)
EOSINOPHIL NFR BLD: 4.7 % (ref 0–6.9)
ERYTHROCYTE [DISTWIDTH] IN BLOOD BY AUTOMATED COUNT: 51.2 FL (ref 35.9–50)
GLUCOSE SERPL-MCNC: 105 MG/DL (ref 65–99)
HCT VFR BLD AUTO: 30.7 % (ref 42–52)
HGB BLD-MCNC: 9.6 G/DL (ref 14–18)
IMM GRANULOCYTES # BLD AUTO: 0.6 K/UL (ref 0–0.11)
IMM GRANULOCYTES NFR BLD AUTO: 4.1 % (ref 0–0.9)
LYMPHOCYTES # BLD AUTO: 0.8 K/UL (ref 1–4.8)
LYMPHOCYTES NFR BLD: 5.4 % (ref 22–41)
MCH RBC QN AUTO: 30.9 PG (ref 27–33)
MCHC RBC AUTO-ENTMCNC: 31.3 G/DL (ref 33.7–35.3)
MCV RBC AUTO: 98.7 FL (ref 81.4–97.8)
MONOCYTES # BLD AUTO: 0.65 K/UL (ref 0–0.85)
MONOCYTES NFR BLD AUTO: 4.4 % (ref 0–13.4)
NEUTROPHILS # BLD AUTO: 11.93 K/UL (ref 1.82–7.42)
NEUTROPHILS NFR BLD: 80.9 % (ref 44–72)
NRBC # BLD AUTO: 0 K/UL
NRBC BLD-RTO: 0 /100 WBC
PLATELET # BLD AUTO: 305 K/UL (ref 164–446)
PMV BLD AUTO: 9.4 FL (ref 9–12.9)
POTASSIUM SERPL-SCNC: 4.2 MMOL/L (ref 3.6–5.5)
RBC # BLD AUTO: 3.11 M/UL (ref 4.7–6.1)
SODIUM SERPL-SCNC: 136 MMOL/L (ref 135–145)
T4 FREE SERPL-MCNC: 1 NG/DL (ref 0.53–1.43)
WBC # BLD AUTO: 14.8 K/UL (ref 4.8–10.8)

## 2019-09-24 PROCEDURE — 700105 HCHG RX REV CODE 258

## 2019-09-24 PROCEDURE — 700102 HCHG RX REV CODE 250 W/ 637 OVERRIDE(OP): Performed by: SURGERY

## 2019-09-24 PROCEDURE — 700102 HCHG RX REV CODE 250 W/ 637 OVERRIDE(OP): Performed by: STUDENT IN AN ORGANIZED HEALTH CARE EDUCATION/TRAINING PROGRAM

## 2019-09-24 PROCEDURE — 80048 BASIC METABOLIC PNL TOTAL CA: CPT

## 2019-09-24 PROCEDURE — A9270 NON-COVERED ITEM OR SERVICE: HCPCS | Performed by: NURSE PRACTITIONER

## 2019-09-24 PROCEDURE — 700101 HCHG RX REV CODE 250: Performed by: SURGERY

## 2019-09-24 PROCEDURE — 700105 HCHG RX REV CODE 258: Performed by: SURGERY

## 2019-09-24 PROCEDURE — 302196 LINEN, HYPOALLERGENIC: Performed by: SURGERY

## 2019-09-24 PROCEDURE — 700102 HCHG RX REV CODE 250 W/ 637 OVERRIDE(OP): Performed by: NURSE PRACTITIONER

## 2019-09-24 PROCEDURE — 36415 COLL VENOUS BLD VENIPUNCTURE: CPT

## 2019-09-24 PROCEDURE — A9270 NON-COVERED ITEM OR SERVICE: HCPCS | Performed by: INTERNAL MEDICINE

## 2019-09-24 PROCEDURE — A9270 NON-COVERED ITEM OR SERVICE: HCPCS | Performed by: STUDENT IN AN ORGANIZED HEALTH CARE EDUCATION/TRAINING PROGRAM

## 2019-09-24 PROCEDURE — A9270 NON-COVERED ITEM OR SERVICE: HCPCS | Performed by: SURGERY

## 2019-09-24 PROCEDURE — 700102 HCHG RX REV CODE 250 W/ 637 OVERRIDE(OP): Performed by: INTERNAL MEDICINE

## 2019-09-24 PROCEDURE — 99223 1ST HOSP IP/OBS HIGH 75: CPT | Mod: AI | Performed by: INTERNAL MEDICINE

## 2019-09-24 PROCEDURE — 700111 HCHG RX REV CODE 636 W/ 250 OVERRIDE (IP): Performed by: NURSE PRACTITIONER

## 2019-09-24 PROCEDURE — 94640 AIRWAY INHALATION TREATMENT: CPT

## 2019-09-24 PROCEDURE — A9270 NON-COVERED ITEM OR SERVICE: HCPCS | Performed by: ORAL & MAXILLOFACIAL SURGERY

## 2019-09-24 PROCEDURE — 700111 HCHG RX REV CODE 636 W/ 250 OVERRIDE (IP): Performed by: SURGERY

## 2019-09-24 PROCEDURE — 94669 MECHANICAL CHEST WALL OSCILL: CPT

## 2019-09-24 PROCEDURE — 700112 HCHG RX REV CODE 229: Performed by: NURSE PRACTITIONER

## 2019-09-24 PROCEDURE — 85025 COMPLETE CBC W/AUTO DIFF WBC: CPT

## 2019-09-24 PROCEDURE — 770020 HCHG ROOM/CARE - TELE (206)

## 2019-09-24 PROCEDURE — 700102 HCHG RX REV CODE 250 W/ 637 OVERRIDE(OP): Performed by: ORAL & MAXILLOFACIAL SURGERY

## 2019-09-24 RX ORDER — DIPHENHYDRAMINE HCL 25 MG
25 TABLET ORAL EVERY 6 HOURS PRN
Status: DISCONTINUED | OUTPATIENT
Start: 2019-09-24 | End: 2019-09-24

## 2019-09-24 RX ORDER — DIPHENHYDRAMINE HYDROCHLORIDE, ZINC ACETATE 2; .1 G/100G; G/100G
CREAM TOPICAL 3 TIMES DAILY PRN
Status: DISCONTINUED | OUTPATIENT
Start: 2019-09-24 | End: 2019-10-13

## 2019-09-24 RX ORDER — MORPHINE SULFATE 4 MG/ML
2 INJECTION, SOLUTION INTRAMUSCULAR; INTRAVENOUS ONCE
Status: COMPLETED | OUTPATIENT
Start: 2019-09-24 | End: 2019-09-24

## 2019-09-24 RX ORDER — ACETAMINOPHEN 500 MG
500 TABLET ORAL 3 TIMES DAILY
Status: DISCONTINUED | OUTPATIENT
Start: 2019-09-24 | End: 2019-09-24

## 2019-09-24 RX ORDER — METOPROLOL TARTRATE 100 MG/1
100 TABLET ORAL 3 TIMES DAILY
Status: DISCONTINUED | OUTPATIENT
Start: 2019-09-24 | End: 2019-09-27

## 2019-09-24 RX ORDER — ACETAMINOPHEN 500 MG
500 TABLET ORAL 3 TIMES DAILY
Status: DISCONTINUED | OUTPATIENT
Start: 2019-09-24 | End: 2019-09-30

## 2019-09-24 RX ORDER — RANITIDINE 15 MG/ML
300 SOLUTION ORAL DAILY
Status: DISCONTINUED | OUTPATIENT
Start: 2019-09-24 | End: 2019-09-24

## 2019-09-24 RX ORDER — SODIUM CHLORIDE 9 MG/ML
INJECTION, SOLUTION INTRAVENOUS
Status: COMPLETED
Start: 2019-09-24 | End: 2019-09-24

## 2019-09-24 RX ORDER — QUETIAPINE FUMARATE 25 MG/1
50 TABLET, FILM COATED ORAL
Status: DISCONTINUED | OUTPATIENT
Start: 2019-09-24 | End: 2019-09-25

## 2019-09-24 RX ORDER — FAMOTIDINE 20 MG/1
20 TABLET, FILM COATED ORAL DAILY
Status: DISCONTINUED | OUTPATIENT
Start: 2019-09-24 | End: 2019-09-24

## 2019-09-24 RX ORDER — FAMOTIDINE 20 MG/1
20 TABLET, FILM COATED ORAL DAILY
Status: DISCONTINUED | OUTPATIENT
Start: 2019-09-25 | End: 2019-09-25

## 2019-09-24 RX ORDER — CIPROFLOXACIN 2 MG/ML
400 INJECTION, SOLUTION INTRAVENOUS EVERY 8 HOURS
Status: DISCONTINUED | OUTPATIENT
Start: 2019-09-24 | End: 2019-09-26

## 2019-09-24 RX ADMIN — ALBUTEROL SULFATE 2.5 MG: 5 SOLUTION RESPIRATORY (INHALATION) at 08:08

## 2019-09-24 RX ADMIN — POLYETHYLENE GLYCOL 3350 1 PACKET: 17 POWDER, FOR SOLUTION ORAL at 18:11

## 2019-09-24 RX ADMIN — GUAIFENESIN 200 MG: 100 SOLUTION ORAL at 08:58

## 2019-09-24 RX ADMIN — BACITRACIN ZINC 1 EACH: 500 OINTMENT TOPICAL at 18:00

## 2019-09-24 RX ADMIN — ALBUTEROL SULFATE 2.5 MG: 5 SOLUTION RESPIRATORY (INHALATION) at 15:25

## 2019-09-24 RX ADMIN — MORPHINE SULFATE 2 MG: 4 INJECTION INTRAVENOUS at 16:19

## 2019-09-24 RX ADMIN — DOCUSATE SODIUM 100 MG: 50 LIQUID ORAL at 04:45

## 2019-09-24 RX ADMIN — BACITRACIN ZINC: 500 OINTMENT TOPICAL at 04:46

## 2019-09-24 RX ADMIN — DOCUSATE SODIUM 100 MG: 50 LIQUID ORAL at 18:13

## 2019-09-24 RX ADMIN — METOPROLOL TARTRATE 100 MG: 100 TABLET ORAL at 18:11

## 2019-09-24 RX ADMIN — ACETYLCYSTEINE 4 ML: 200 INHALANT RESPIRATORY (INHALATION) at 08:08

## 2019-09-24 RX ADMIN — DIVALPROEX SODIUM 250 MG: 125 CAPSULE, COATED PELLETS ORAL at 14:47

## 2019-09-24 RX ADMIN — GUAIFENESIN 200 MG: 100 SOLUTION ORAL at 20:07

## 2019-09-24 RX ADMIN — Medication: at 15:06

## 2019-09-24 RX ADMIN — APIXABAN 5 MG: 5 TABLET, FILM COATED ORAL at 04:45

## 2019-09-24 RX ADMIN — AMIODARONE HYDROCHLORIDE 200 MG: 200 TABLET ORAL at 04:45

## 2019-09-24 RX ADMIN — ALBUTEROL SULFATE 2.5 MG: 5 SOLUTION RESPIRATORY (INHALATION) at 19:54

## 2019-09-24 RX ADMIN — ACETYLCYSTEINE 4 ML: 200 INHALANT RESPIRATORY (INHALATION) at 11:43

## 2019-09-24 RX ADMIN — MORPHINE SULFATE 4 MG: 4 INJECTION INTRAVENOUS at 04:46

## 2019-09-24 RX ADMIN — CHLORHEXIDINE GLUCONATE 0.12% ORAL RINSE 15 ML: 1.2 LIQUID ORAL at 18:18

## 2019-09-24 RX ADMIN — HYDROCODONE BITARTRATE AND ACETAMINOPHEN 2 TABLET: 5; 325 TABLET ORAL at 20:06

## 2019-09-24 RX ADMIN — SODIUM CHLORIDE 500 ML: 9 INJECTION, SOLUTION INTRAVENOUS at 04:46

## 2019-09-24 RX ADMIN — ACETAMINOPHEN 500 MG: 500 TABLET ORAL at 18:11

## 2019-09-24 RX ADMIN — GUAIFENESIN 200 MG: 100 SOLUTION ORAL at 14:47

## 2019-09-24 RX ADMIN — DIPHENHYDRAMINE HCL 25 MG: 25 TABLET ORAL at 00:48

## 2019-09-24 RX ADMIN — HYDROCODONE BITARTRATE AND ACETAMINOPHEN 2 TABLET: 5; 325 TABLET ORAL at 00:48

## 2019-09-24 RX ADMIN — GUAIFENESIN 200 MG: 100 SOLUTION ORAL at 04:45

## 2019-09-24 RX ADMIN — GUAIFENESIN 200 MG: 100 SOLUTION ORAL at 18:21

## 2019-09-24 RX ADMIN — TERAZOSIN HYDROCHLORIDE ANHYDROUS 1 MG: 1 CAPSULE ORAL at 18:17

## 2019-09-24 RX ADMIN — QUETIAPINE FUMARATE 50 MG: 25 TABLET ORAL at 14:47

## 2019-09-24 RX ADMIN — MAGNESIUM HYDROXIDE 30 ML: 400 SUSPENSION ORAL at 04:45

## 2019-09-24 RX ADMIN — QUETIAPINE FUMARATE 50 MG: 25 TABLET ORAL at 04:45

## 2019-09-24 RX ADMIN — DIVALPROEX SODIUM 250 MG: 125 CAPSULE, COATED PELLETS ORAL at 20:07

## 2019-09-24 RX ADMIN — QUETIAPINE FUMARATE 50 MG: 25 TABLET ORAL at 20:07

## 2019-09-24 RX ADMIN — DIVALPROEX SODIUM 250 MG: 125 CAPSULE, COATED PELLETS ORAL at 04:45

## 2019-09-24 RX ADMIN — CEFEPIME 2 G: 2 INJECTION, POWDER, FOR SOLUTION INTRAVENOUS at 04:46

## 2019-09-24 RX ADMIN — HYDROCODONE BITARTRATE AND ACETAMINOPHEN 2 TABLET: 5; 325 TABLET ORAL at 08:58

## 2019-09-24 RX ADMIN — DIGOXIN 125 MCG: 250 TABLET ORAL at 18:11

## 2019-09-24 RX ADMIN — GUAIFENESIN 200 MG: 100 SOLUTION ORAL at 00:48

## 2019-09-24 RX ADMIN — TRAZODONE HYDROCHLORIDE 50 MG: 50 TABLET ORAL at 20:07

## 2019-09-24 RX ADMIN — SPIRONOLACTONE 25 MG: 50 TABLET ORAL at 04:45

## 2019-09-24 RX ADMIN — METOPROLOL TARTRATE 100 MG: 100 TABLET ORAL at 08:58

## 2019-09-24 RX ADMIN — CHLORHEXIDINE GLUCONATE 0.12% ORAL RINSE 15 ML: 1.2 LIQUID ORAL at 04:45

## 2019-09-24 RX ADMIN — POLYETHYLENE GLYCOL 3350 1 PACKET: 17 POWDER, FOR SOLUTION ORAL at 04:46

## 2019-09-24 RX ADMIN — APIXABAN 5 MG: 5 TABLET, FILM COATED ORAL at 18:11

## 2019-09-24 RX ADMIN — SENNOSIDES, DOCUSATE SODIUM 1 TABLET: 50; 8.6 TABLET, FILM COATED ORAL at 20:07

## 2019-09-24 RX ADMIN — ALBUTEROL SULFATE 2.5 MG: 5 SOLUTION RESPIRATORY (INHALATION) at 11:44

## 2019-09-24 RX ADMIN — CIPROFLOXACIN 400 MG: 2 INJECTION, SOLUTION INTRAVENOUS at 20:07

## 2019-09-24 RX ADMIN — DIPHENHYDRAMINE HCL 25 MG: 25 TABLET ORAL at 10:39
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
## 2019-09-24 NOTE — PROGRESS NOTES
2 RN skin check complete.   Devices in place trache, pulse ox, cortrak, vale catheter, glasses, PIV, bilateral soft wrist restraints, and mepilex.  Skin assessed under devices (when possible) and all bony prominences.  Generalized rash noted to abd/trunk. Barrier cream in use.   Blanchable redness and peeling noted to sacrum. Barrier cream and mepilex in use.   Scattered bruising noted.   Surgical incision/wound noted to lt chin. Site is 75% approximated but partially dehisced; MD aware. Wound is EMRE and cleansed with NS/peroxide and covered with bacitracin. Scant drainage noted. Barrier cream in use around wound to prevent skin breakdown.   Maceration noted under lt portion of trache plate. Site was cleansed Q shift and PRN with NS. Barrier cream in use below stoma to prevent skin breakdown.  Jaw is wired shut. Mouth moisturizer and oral care provided to prevent skin breakdown.   The following interventions in place: Pillows in use to support head elbows and hips. Pt turns self in the bed frequently. Restraint sites checked Q2 and PRN. Moisturizing lotion in use. Tube feeding in use to support nutrition. Waffle cushion in place. Pt was mobilized in room.

## 2019-09-24 NOTE — CARE PLAN
"  Problem: Safety  Goal: Will remain free from injury  Outcome: PROGRESSING AS EXPECTED  Goal: Will remain free from falls  Outcome: PROGRESSING AS EXPECTED  Pt was educated on fall risk and need to alert nursing staff prior to mobilization. Pt nods \"yes\" to demonstrate understanding. Bed alarm is in place and on.  CLIP. Hourly rounding in place. 1:! Sitter at bedside.     Problem: Pain Management  Goal: Pain level will decrease to patient's comfort goal  Outcome: PROGRESSING AS EXPECTED   Pt was educated on the need to alert nursing staff when he is in pain. Q2 hour pain assessments completed to assist pt was expressing need for pain medication. Pt demonstrates understanding by nodding \"yes\" or shaking his head \"no\" when asked if he is in pain.   "

## 2019-09-24 NOTE — THERAPY
Speech Therapy Contact Note  Per RN, ok to proceed for session; however, stated pt with increase agitation. RT at bedside who reported pt on last day of Mucomix but continues to have thick, yellow secretions. Pt currently receiving IPV breathing treatment and per RT, SLP to HOLD treatment today due to significant amount of secretions. SLP will attempt at a later time as pt is appropriate.

## 2019-09-24 NOTE — CARE PLAN
Problem: Pain Management  Goal: Pain level will decrease to patient's comfort goal  Outcome: PROGRESSING AS EXPECTED  Intervention: Follow pain managment plan developed in collaboration with patient and Interdisciplinary Team  Note:   Pt c/o pain when asked, he noded yes. Prn pain medication given see EMAR.     Problem: Skin Integrity  Goal: Risk for impaired skin integrity will decrease  Outcome: PROGRESSING AS EXPECTED  Intervention: Assess risk factors for impaired skin integrity and/or pressure ulcers  Note:   Turn patient q2hrs while in bed, q1hr while sitting up. Patient restless in bed constantly repositioning.
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
## 2019-09-24 NOTE — CARE PLAN
Patient has 1:1 sitter at bedside, will continue to monitor. Sitter at bedside providing range of motion to extremities.

## 2019-09-24 NOTE — PROGRESS NOTES
Monitor Summary    A flutter 64- 86  A fib 77  Rare - Occasional PVC    -/.10/-    As per Monitor Summary Rpeort
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
## 2019-09-24 NOTE — ASSESSMENT & PLAN NOTE
Urticaria  rash with rising IGE  Cefepime stopped- Pepcid initiated- Benadryl cream  Dose of PO benadryl given.  Monitor.

## 2019-09-24 NOTE — PROGRESS NOTES
Report received from Ariane CONRAD  Assumed care at 1900  1:1 sitter at bedside. Bilateral wrist restraints in place. No evidence of skin break down noted. ROM completed.   Hearing aids in place. Pt responding to verbal commands, but does not always follow commands.   Nausea denied   Tolerating Diabetasource @ 70 ml/hour via coretrak to rt nare.    Trache in place. Pt has a strong cough and is able to clear secretions. Pt only suctioned once prior to trache care. Extra trache equipment at bedside. Obturator taped above HOB.   Jaws are wired shut. Mouth moisturize and swabs used to complete oral care. Wire cutters taped above HOB.   Surgical incision/wound to lt portion of chin. EMRE. Scant serous/yellow drainage noted. Approximated except lt lateral portion of incision.   Generalized rash noted. Pt demonstrates itching and restlessness R/T rash. Haritha APRN was updated. Benadryl given per new MAR orders.   + Urine output via vale catheter   + BM    + Flatus  Up x 1-2 assist. Pt was mobilized to EOB, stood and ambulated 5 ft. Pt followed some commands, but was not safe to mobilize in desouza.   Bed in lowest position and locked.  Bed alarm in place and on.   Pt resting comfortably now.  Review plan of care with patient  Hourly rounds in place  All needs met at this time

## 2019-09-24 NOTE — PROGRESS NOTES
2 RN skin check complete.   Devices in place trache, pulse ox, cortrak, vale catheter, glasses, PIV x 2, bilateral soft wrist restraints, and mepilex.  Skin assessed under devices (when possible) and all bony prominences.  Generalized rash noted to abd/trunk. Barrier cream in use.   Blanchable redness and peeling noted to sacrum. Barrier cream and mepilex in use.   Scattered bruising noted.   Surgical incision/wound noted to lt chin. Site is partially dehisced; MD is aware per notes. Wound is EMRE and cleansed with NS/peroxide and covered with bacitracin. Scant drainage noted. Barrier cream in use around wound to prevent skin breakdown.   Maceration noted under lt portion of trache plate. Site was cleansed Q shift and PRN with NS. Barrier cream in use below stoma to prevent skin breakdown.  Jaw is wired shut. Mouth moisturizer in use to prevent skin breakdown.   The following interventions in place: Pillows in use to support head elbows and hips. Pt turns self in the bed frequently. Restraint sites checked Q2 and PRN. Moisturizing lotion in use. Tube feeding in use to support nutrition.
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
## 2019-09-25 ENCOUNTER — APPOINTMENT (OUTPATIENT)
Dept: RADIOLOGY | Facility: MEDICAL CENTER | Age: 79
DRG: 003 | End: 2019-09-25
Attending: SURGERY
Payer: MEDICARE

## 2019-09-25 LAB
ALBUMIN SERPL BCP-MCNC: 2.9 G/DL (ref 3.2–4.9)
ALP SERPL-CCNC: 60 U/L (ref 30–99)
ALT SERPL-CCNC: 11 U/L (ref 2–50)
ANION GAP SERPL CALC-SCNC: 5 MMOL/L (ref 0–11.9)
AST SERPL-CCNC: 14 U/L (ref 12–45)
BACTERIA BLD CULT: NORMAL
BACTERIA BLD CULT: NORMAL
BASOPHILS # BLD AUTO: 0.4 % (ref 0–1.8)
BASOPHILS # BLD: 0.07 K/UL (ref 0–0.12)
BILIRUB CONJ SERPL-MCNC: 0.2 MG/DL (ref 0.1–0.5)
BILIRUB INDIRECT SERPL-MCNC: 0.5 MG/DL (ref 0–1)
BILIRUB SERPL-MCNC: 0.7 MG/DL (ref 0.1–1.5)
BUN SERPL-MCNC: 20 MG/DL (ref 8–22)
CALCIUM SERPL-MCNC: 8.2 MG/DL (ref 8.5–10.5)
CHLORIDE SERPL-SCNC: 102 MMOL/L (ref 96–112)
CHOLEST SERPL-MCNC: 105 MG/DL (ref 100–199)
CO2 SERPL-SCNC: 28 MMOL/L (ref 20–33)
CREAT SERPL-MCNC: 0.8 MG/DL (ref 0.5–1.4)
DIGOXIN SERPL-MCNC: 0.6 NG/ML (ref 0.8–2)
EOSINOPHIL # BLD AUTO: 0.87 K/UL (ref 0–0.51)
EOSINOPHIL NFR BLD: 5.4 % (ref 0–6.9)
ERYTHROCYTE [DISTWIDTH] IN BLOOD BY AUTOMATED COUNT: 50.6 FL (ref 35.9–50)
FERRITIN SERPL-MCNC: 115.6 NG/ML (ref 22–322)
GLUCOSE SERPL-MCNC: 111 MG/DL (ref 65–99)
HCT VFR BLD AUTO: 29.1 % (ref 42–52)
HDLC SERPL-MCNC: 20 MG/DL
HGB BLD-MCNC: 9.2 G/DL (ref 14–18)
IMM GRANULOCYTES # BLD AUTO: 0.79 K/UL (ref 0–0.11)
IMM GRANULOCYTES NFR BLD AUTO: 4.9 % (ref 0–0.9)
LDLC SERPL CALC-MCNC: 65 MG/DL
LYMPHOCYTES # BLD AUTO: 0.77 K/UL (ref 1–4.8)
LYMPHOCYTES NFR BLD: 4.8 % (ref 22–41)
MCH RBC QN AUTO: 30.7 PG (ref 27–33)
MCHC RBC AUTO-ENTMCNC: 31.6 G/DL (ref 33.7–35.3)
MCV RBC AUTO: 97 FL (ref 81.4–97.8)
MONOCYTES # BLD AUTO: 0.72 K/UL (ref 0–0.85)
MONOCYTES NFR BLD AUTO: 4.5 % (ref 0–13.4)
NEUTROPHILS # BLD AUTO: 12.95 K/UL (ref 1.82–7.42)
NEUTROPHILS NFR BLD: 80 % (ref 44–72)
NRBC # BLD AUTO: 0 K/UL
NRBC BLD-RTO: 0 /100 WBC
PLATELET # BLD AUTO: 279 K/UL (ref 164–446)
PMV BLD AUTO: 9.3 FL (ref 9–12.9)
POTASSIUM SERPL-SCNC: 4.1 MMOL/L (ref 3.6–5.5)
PROT SERPL-MCNC: 5.3 G/DL (ref 6–8.2)
RBC # BLD AUTO: 3 M/UL (ref 4.7–6.1)
SIGNIFICANT IND 70042: NORMAL
SIGNIFICANT IND 70042: NORMAL
SITE SITE: NORMAL
SITE SITE: NORMAL
SODIUM SERPL-SCNC: 135 MMOL/L (ref 135–145)
SOURCE SOURCE: NORMAL
SOURCE SOURCE: NORMAL
TRIGL SERPL-MCNC: 102 MG/DL (ref 0–149)
WBC # BLD AUTO: 16.2 K/UL (ref 4.8–10.8)

## 2019-09-25 PROCEDURE — 700112 HCHG RX REV CODE 229: Performed by: NURSE PRACTITIONER

## 2019-09-25 PROCEDURE — 700111 HCHG RX REV CODE 636 W/ 250 OVERRIDE (IP): Performed by: NURSE PRACTITIONER

## 2019-09-25 PROCEDURE — 80048 BASIC METABOLIC PNL TOTAL CA: CPT

## 2019-09-25 PROCEDURE — 700102 HCHG RX REV CODE 250 W/ 637 OVERRIDE(OP): Performed by: STUDENT IN AN ORGANIZED HEALTH CARE EDUCATION/TRAINING PROGRAM

## 2019-09-25 PROCEDURE — A9270 NON-COVERED ITEM OR SERVICE: HCPCS | Performed by: NURSE PRACTITIONER

## 2019-09-25 PROCEDURE — 700102 HCHG RX REV CODE 250 W/ 637 OVERRIDE(OP): Performed by: ORAL & MAXILLOFACIAL SURGERY

## 2019-09-25 PROCEDURE — 700102 HCHG RX REV CODE 250 W/ 637 OVERRIDE(OP): Performed by: NURSE PRACTITIONER

## 2019-09-25 PROCEDURE — 94640 AIRWAY INHALATION TREATMENT: CPT

## 2019-09-25 PROCEDURE — 71045 X-RAY EXAM CHEST 1 VIEW: CPT

## 2019-09-25 PROCEDURE — A9270 NON-COVERED ITEM OR SERVICE: HCPCS | Performed by: ORAL & MAXILLOFACIAL SURGERY

## 2019-09-25 PROCEDURE — 700101 HCHG RX REV CODE 250: Performed by: SURGERY

## 2019-09-25 PROCEDURE — 99232 SBSQ HOSP IP/OBS MODERATE 35: CPT | Performed by: PSYCHIATRY & NEUROLOGY

## 2019-09-25 PROCEDURE — 80061 LIPID PANEL: CPT

## 2019-09-25 PROCEDURE — 700102 HCHG RX REV CODE 250 W/ 637 OVERRIDE(OP): Performed by: SURGERY

## 2019-09-25 PROCEDURE — 97535 SELF CARE MNGMENT TRAINING: CPT

## 2019-09-25 PROCEDURE — 80162 ASSAY OF DIGOXIN TOTAL: CPT

## 2019-09-25 PROCEDURE — 97530 THERAPEUTIC ACTIVITIES: CPT

## 2019-09-25 PROCEDURE — A9270 NON-COVERED ITEM OR SERVICE: HCPCS | Performed by: STUDENT IN AN ORGANIZED HEALTH CARE EDUCATION/TRAINING PROGRAM

## 2019-09-25 PROCEDURE — 92526 ORAL FUNCTION THERAPY: CPT

## 2019-09-25 PROCEDURE — 80076 HEPATIC FUNCTION PANEL: CPT

## 2019-09-25 PROCEDURE — A9270 NON-COVERED ITEM OR SERVICE: HCPCS | Performed by: SURGERY

## 2019-09-25 PROCEDURE — 770020 HCHG ROOM/CARE - TELE (206)

## 2019-09-25 PROCEDURE — 85025 COMPLETE CBC W/AUTO DIFF WBC: CPT

## 2019-09-25 PROCEDURE — 99233 SBSQ HOSP IP/OBS HIGH 50: CPT | Performed by: INTERNAL MEDICINE

## 2019-09-25 PROCEDURE — 82728 ASSAY OF FERRITIN: CPT

## 2019-09-25 PROCEDURE — 36415 COLL VENOUS BLD VENIPUNCTURE: CPT

## 2019-09-25 RX ORDER — SODIUM CHLORIDE 9 MG/ML
INJECTION, SOLUTION INTRAVENOUS
Status: DISCONTINUED
Start: 2019-09-25 | End: 2019-09-26

## 2019-09-25 RX ORDER — POLYETHYLENE GLYCOL 3350 17 G/17G
1 POWDER, FOR SOLUTION ORAL DAILY
Status: DISCONTINUED | OUTPATIENT
Start: 2019-09-26 | End: 2019-09-28

## 2019-09-25 RX ORDER — HALOPERIDOL 5 MG/ML
1 INJECTION INTRAMUSCULAR ONCE
Status: COMPLETED | OUTPATIENT
Start: 2019-09-25 | End: 2019-09-25

## 2019-09-25 RX ORDER — QUETIAPINE FUMARATE 25 MG/1
75 TABLET, FILM COATED ORAL 3 TIMES DAILY
Status: DISCONTINUED | OUTPATIENT
Start: 2019-09-25 | End: 2019-09-27

## 2019-09-25 RX ORDER — QUETIAPINE FUMARATE 25 MG/1
25 TABLET, FILM COATED ORAL ONCE
Status: COMPLETED | OUTPATIENT
Start: 2019-09-25 | End: 2019-09-25

## 2019-09-25 RX ORDER — HALOPERIDOL 5 MG/ML
1 INJECTION INTRAMUSCULAR ONCE
Status: DISCONTINUED | OUTPATIENT
Start: 2019-09-25 | End: 2019-09-25

## 2019-09-25 RX ADMIN — CHLORHEXIDINE GLUCONATE 0.12% ORAL RINSE 15 ML: 1.2 LIQUID ORAL at 18:59

## 2019-09-25 RX ADMIN — ALBUTEROL SULFATE 2.5 MG: 5 SOLUTION RESPIRATORY (INHALATION) at 16:02

## 2019-09-25 RX ADMIN — HYDROCODONE BITARTRATE AND ACETAMINOPHEN 2 TABLET: 5; 325 TABLET ORAL at 02:45

## 2019-09-25 RX ADMIN — FAMOTIDINE 20 MG: 20 TABLET, FILM COATED ORAL at 05:27

## 2019-09-25 RX ADMIN — BACITRACIN ZINC 1 EACH: 500 OINTMENT TOPICAL at 18:59

## 2019-09-25 RX ADMIN — APIXABAN 5 MG: 5 TABLET, FILM COATED ORAL at 18:58

## 2019-09-25 RX ADMIN — TERAZOSIN HYDROCHLORIDE ANHYDROUS 1 MG: 1 CAPSULE ORAL at 18:59

## 2019-09-25 RX ADMIN — CHLORHEXIDINE GLUCONATE 0.12% ORAL RINSE 15 ML: 1.2 LIQUID ORAL at 05:27

## 2019-09-25 RX ADMIN — GUAIFENESIN 200 MG: 100 SOLUTION ORAL at 02:45

## 2019-09-25 RX ADMIN — GUAIFENESIN 200 MG: 100 SOLUTION ORAL at 10:51

## 2019-09-25 RX ADMIN — ALBUTEROL SULFATE 2.5 MG: 5 SOLUTION RESPIRATORY (INHALATION) at 06:54

## 2019-09-25 RX ADMIN — CIPROFLOXACIN 400 MG: 2 INJECTION, SOLUTION INTRAVENOUS at 05:27

## 2019-09-25 RX ADMIN — ALBUTEROL SULFATE 2.5 MG: 5 SOLUTION RESPIRATORY (INHALATION) at 11:17

## 2019-09-25 RX ADMIN — METOPROLOL TARTRATE 100 MG: 100 TABLET ORAL at 18:59

## 2019-09-25 RX ADMIN — TRAZODONE HYDROCHLORIDE 50 MG: 50 TABLET ORAL at 20:55

## 2019-09-25 RX ADMIN — QUETIAPINE FUMARATE 75 MG: 25 TABLET ORAL at 20:54

## 2019-09-25 RX ADMIN — HALOPERIDOL LACTATE 1 MG: 5 INJECTION, SOLUTION INTRAMUSCULAR at 22:59

## 2019-09-25 RX ADMIN — METOPROLOL TARTRATE 100 MG: 100 TABLET ORAL at 05:26

## 2019-09-25 RX ADMIN — ACETAMINOPHEN 500 MG: 500 TABLET ORAL at 18:59

## 2019-09-25 RX ADMIN — CIPROFLOXACIN 400 MG: 2 INJECTION, SOLUTION INTRAVENOUS at 13:44

## 2019-09-25 RX ADMIN — APIXABAN 5 MG: 5 TABLET, FILM COATED ORAL at 05:27

## 2019-09-25 RX ADMIN — DIVALPROEX SODIUM 250 MG: 125 CAPSULE, COATED PELLETS ORAL at 05:26

## 2019-09-25 RX ADMIN — ACETAMINOPHEN 500 MG: 500 TABLET ORAL at 11:04

## 2019-09-25 RX ADMIN — METOPROLOL TARTRATE 100 MG: 100 TABLET ORAL at 11:04

## 2019-09-25 RX ADMIN — DOCUSATE SODIUM 100 MG: 50 LIQUID ORAL at 05:27

## 2019-09-25 RX ADMIN — QUETIAPINE FUMARATE 50 MG: 25 TABLET ORAL at 13:44

## 2019-09-25 RX ADMIN — DIGOXIN 125 MCG: 250 TABLET ORAL at 18:58

## 2019-09-25 RX ADMIN — CIPROFLOXACIN 400 MG: 2 INJECTION, SOLUTION INTRAVENOUS at 20:54

## 2019-09-25 RX ADMIN — SPIRONOLACTONE 25 MG: 50 TABLET ORAL at 05:27

## 2019-09-25 RX ADMIN — HYDROCODONE BITARTRATE AND ACETAMINOPHEN 2 TABLET: 5; 325 TABLET ORAL at 20:55

## 2019-09-25 RX ADMIN — MAGNESIUM HYDROXIDE 30 ML: 400 SUSPENSION ORAL at 05:27

## 2019-09-25 RX ADMIN — ACETAMINOPHEN 500 MG: 500 TABLET ORAL at 05:26

## 2019-09-25 RX ADMIN — QUETIAPINE FUMARATE 50 MG: 25 TABLET ORAL at 05:26

## 2019-09-25 RX ADMIN — DIVALPROEX SODIUM 250 MG: 125 CAPSULE, COATED PELLETS ORAL at 13:44

## 2019-09-25 RX ADMIN — ALBUTEROL SULFATE 2.5 MG: 5 SOLUTION RESPIRATORY (INHALATION) at 18:24

## 2019-09-25 RX ADMIN — DIVALPROEX SODIUM 250 MG: 125 CAPSULE, COATED PELLETS ORAL at 20:55

## 2019-09-25 RX ADMIN — BACITRACIN ZINC: 500 OINTMENT TOPICAL at 05:27

## 2019-09-25 RX ADMIN — POLYETHYLENE GLYCOL 3350 1 PACKET: 17 POWDER, FOR SOLUTION ORAL at 05:32

## 2019-09-25 RX ADMIN — GUAIFENESIN 200 MG: 100 SOLUTION ORAL at 20:54

## 2019-09-25 RX ADMIN — QUETIAPINE FUMARATE 25 MG: 25 TABLET ORAL at 16:06

## 2019-09-25 RX ADMIN — GUAIFENESIN 200 MG: 100 SOLUTION ORAL at 05:32

## 2019-09-25 RX ADMIN — HALOPERIDOL LACTATE 1 MG: 5 INJECTION, SOLUTION INTRAMUSCULAR at 16:06

## 2019-09-25 RX ADMIN — GUAIFENESIN 200 MG: 100 SOLUTION ORAL at 16:05

## 2019-09-25 ASSESSMENT — COGNITIVE AND FUNCTIONAL STATUS - GENERAL
DRESSING REGULAR LOWER BODY CLOTHING: A LOT
SUGGESTED CMS G CODE MODIFIER DAILY ACTIVITY: CL
PERSONAL GROOMING: A LITTLE
TOILETING: A LOT
HELP NEEDED FOR BATHING: A LOT
EATING MEALS: TOTAL
DAILY ACTIVITIY SCORE: 13
DRESSING REGULAR UPPER BODY CLOTHING: A LITTLE

## 2019-09-25 ASSESSMENT — GAIT ASSESSMENTS
ASSISTIVE DEVICE: HAND HELD ASSIST
GAIT LEVEL OF ASSIST: MINIMAL ASSIST
DISTANCE (FEET): 3

## 2019-09-25 NOTE — THERAPY
"Assisted nsg in cleaning pt as he was soiled.  Pt was able to role side to side w/ verbal cues only.  Once cleaned, he was able to sit edge of bed, min assist to stand.  He was able to sidestep along the edge of the bed w/ min assist.  Apparently, he has been aggressive and has been attempting to pull his lines/tube feed etc.  Today, he was appropriate when his wrist restraints were discontinued in order to participate w/ therapy.  He was returned to supine.  CNA present t/o.  Continue to follow.      Physical Therapy Evaluation completed.   Bed Mobility:  Supine to Sit: Total Assist X 2  Transfers: Sit to Stand: Unable to Participate  Gait: Level Of Assist: (deferred) with No Equipment Needed       Plan of Care: Will benefit from Physical Therapy 3 times per week  Discharge Recommendations: Equipment: Will Continue to Assess for Equipment Needs. Post-acute therapy Recommend post-acute placement for continued physical therapy services prior to discharge home. Patient can tolerate post-acute therapies at a 5x/week frequency.       See \"Rehab Therapy-Acute\" Patient Summary Report for complete documentation.     "
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
## 2019-09-25 NOTE — PROGRESS NOTES
Report received from Kelly CONRAD  Assumed care at 1900  1:1 sitter at bedside. Bilateral wrist restraints in place. Redness noted. ROM completed.   Hearing aids in place.   Nausea denied   Tolerating Diabetasource @ 70 ml/hour via coretrak to rt nare. Flushes given per order.   Trache in place. Pt has a strong cough and is able to clear secretions. Extra trache equipment at bedside.  Jaws are wired shut. Mouth moisturize and swabs used to complete oral care. Surgical incision/wound to lt portion of chin. EMRE. No drainage noted. Approximated.   Generalized rash noted.   New cuts noted to rt elbows and over lt bridge of nose. Mepilex in place.   + Urine output via vale catheter   + BM    + Flatus  Up x 1-2 assist. Pt followed some commands, but was not safe to mobilize in desouza.   Bed in lowest position and locked.  Bed alarm in place and on.   Pt resting comfortably now.  Review plan of care with patient  Hourly rounds in place  All needs met at this time

## 2019-09-25 NOTE — CARE PLAN
Problem: Safety  Goal: Will remain free from falls  Outcome: PROGRESSING AS EXPECTED     Problem: Knowledge Deficit  Goal: Knowledge of disease process/condition, treatment plan, diagnostic tests, and medications will improve  Outcome: PROGRESSING AS EXPECTED  Intervention: Explain information regarding disease process/condition, treatment plan, diagnostic tests, and medications and document in education  Note:   Pt was educated on POC, MAR, shift routine and nursing education R/T Dx. Pt is very hard of hearing; hearing aids in use. Pt is nods understanding but does not demonstrate understanding.          Problem: Pain Management  Goal: Pain level will decrease to patient's comfort goal  Outcome: PROGRESSING AS EXPECTED
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
## 2019-09-25 NOTE — CARE PLAN
6.0 cuffless shiley trach  HHATC at 35%/8L  Problem: Oxygenation:  Goal: Maintain adequate oxygenation dependent on patient condition  Outcome: PROGRESSING AS EXPECTED     Problem: Bronchopulmonary Hygiene:  Goal: Increase mobilization of retained secretions  Outcome: PROGRESSING AS EXPECTED   IPV QID   Albuterol QID

## 2019-09-25 NOTE — PROGRESS NOTES
2 RN skin check complete.   Devices in place trache, pulse ox, cortrak, vale catheter, glasses, PIV, bilateral soft wrist restraints, and mepilex.  Skin assessed under devices and all bony prominences.  Generalized rash noted to abd/trunk. Barrier cream and benadryl cream in use.   Redness and peeling noted to sacrum. Barrier cream and mepilex in use. Pt repeatedly rubbed mepilex off sacrum.   New cut noted to bridge of nose. Pt placed glasses on his face too roughly and cut himself with nose pads. Mepilex in place.   New cut noted to rt elbow. Pt using elbows to boost self despite education. Mepilex in place. Pt rubbing mepilex off frequently.   Scattered bruising noted.   Surgical incision/wound noted to lt chin. Site is approximated. Wound is EMRE and cleansed with NS/peroxide and covered with bacitracin. No drainage noted. Barrier cream in use around wound to prevent skin breakdown.   Maceration noted under lt portion of trache plate. Site was cleansed Q shift and PRN with NS. Barrier cream in use below stoma to prevent skin breakdown.  Jaw is wired shut. Mouth moisturizer and oral care provided to prevent skin breakdown.   The following interventions in place: Pillows in use to support head elbows and hips. Pt turns self in the bed frequently. Restraint sites checked Q2 and PRN. Moisturizing lotion in use. Tube feeding in use to support nutrition. Waffle cushion in place. Pt mobilized in room when possible. Mepilex in place to elbows and sacrum.

## 2019-09-25 NOTE — THERAPY
"Occupational Therapy Treatment completed with focus on ADLs and patient education.  Functional Status: Pt seen for OT tx session. Pt progressing with following commands and attending to task. Pt min A for supine>sit>stand, grooming while seated and sit>supine with min A for safety only. Pt made no attempts to pull at lines throughout tx session. Pt continues to be limited due to poor safety awareness and insight into current deficits. Will continue to follow for acute OT services while in-house.   Plan of Care: Will benefit from Occupational Therapy 2 times per week  Discharge Recommendations:  Equipment Will Continue to Assess for Equipment Needs. Recommend post-acute placement for additional occupational therapy services prior to discharge home. Patient can tolerate post-acute therapies at a 5x/week frequency.    See \"Rehab Therapy-Acute\" Patient Summary Report for complete documentation.   "

## 2019-09-26 ENCOUNTER — APPOINTMENT (OUTPATIENT)
Dept: RADIOLOGY | Facility: MEDICAL CENTER | Age: 79
DRG: 003 | End: 2019-09-26
Attending: NURSE PRACTITIONER
Payer: MEDICARE

## 2019-09-26 LAB
ANION GAP SERPL CALC-SCNC: 7 MMOL/L (ref 0–11.9)
ANISOCYTOSIS BLD QL SMEAR: ABNORMAL
BASOPHILS # BLD AUTO: 0 % (ref 0–1.8)
BASOPHILS # BLD: 0 K/UL (ref 0–0.12)
BUN SERPL-MCNC: 17 MG/DL (ref 8–22)
CALCIUM SERPL-MCNC: 8.4 MG/DL (ref 8.5–10.5)
CHLORIDE SERPL-SCNC: 103 MMOL/L (ref 96–112)
CO2 SERPL-SCNC: 26 MMOL/L (ref 20–33)
CREAT SERPL-MCNC: 0.72 MG/DL (ref 0.5–1.4)
EOSINOPHIL # BLD AUTO: 0.73 K/UL (ref 0–0.51)
EOSINOPHIL NFR BLD: 4.4 % (ref 0–6.9)
ERYTHROCYTE [DISTWIDTH] IN BLOOD BY AUTOMATED COUNT: 51.9 FL (ref 35.9–50)
GLUCOSE SERPL-MCNC: 101 MG/DL (ref 65–99)
GRAM STN SPEC: NORMAL
HCT VFR BLD AUTO: 31.8 % (ref 42–52)
HGB BLD-MCNC: 9.8 G/DL (ref 14–18)
LYMPHOCYTES # BLD AUTO: 0.89 K/UL (ref 1–4.8)
LYMPHOCYTES NFR BLD: 5.3 % (ref 22–41)
MACROCYTES BLD QL SMEAR: ABNORMAL
MAGNESIUM SERPL-MCNC: 2.2 MG/DL (ref 1.5–2.5)
MANUAL DIFF BLD: NORMAL
MCH RBC QN AUTO: 30.3 PG (ref 27–33)
MCHC RBC AUTO-ENTMCNC: 30.8 G/DL (ref 33.7–35.3)
MCV RBC AUTO: 98.5 FL (ref 81.4–97.8)
MICROCYTES BLD QL SMEAR: ABNORMAL
MONOCYTES # BLD AUTO: 0.3 K/UL (ref 0–0.85)
MONOCYTES NFR BLD AUTO: 1.8 % (ref 0–13.4)
MORPHOLOGY BLD-IMP: NORMAL
MYELOCYTES NFR BLD MANUAL: 2.6 %
NEUTROPHILS # BLD AUTO: 14.35 K/UL (ref 1.82–7.42)
NEUTROPHILS NFR BLD: 84.1 % (ref 44–72)
NEUTS BAND NFR BLD MANUAL: 1.8 % (ref 0–10)
NRBC # BLD AUTO: 0 K/UL
NRBC BLD-RTO: 0 /100 WBC
PHOSPHATE SERPL-MCNC: 3.1 MG/DL (ref 2.5–4.5)
PLATELET # BLD AUTO: 299 K/UL (ref 164–446)
PLATELET BLD QL SMEAR: NORMAL
PMV BLD AUTO: 9.7 FL (ref 9–12.9)
POTASSIUM SERPL-SCNC: 3.9 MMOL/L (ref 3.6–5.5)
RBC # BLD AUTO: 3.23 M/UL (ref 4.7–6.1)
RBC BLD AUTO: PRESENT
SIGNIFICANT IND 70042: NORMAL
SITE SITE: NORMAL
SODIUM SERPL-SCNC: 136 MMOL/L (ref 135–145)
SOURCE SOURCE: NORMAL
WBC # BLD AUTO: 16.7 K/UL (ref 4.8–10.8)

## 2019-09-26 PROCEDURE — 87070 CULTURE OTHR SPECIMN AEROBIC: CPT

## 2019-09-26 PROCEDURE — 87205 SMEAR GRAM STAIN: CPT

## 2019-09-26 PROCEDURE — 700117 HCHG RX CONTRAST REV CODE 255: Performed by: NURSE PRACTITIONER

## 2019-09-26 PROCEDURE — 84100 ASSAY OF PHOSPHORUS: CPT

## 2019-09-26 PROCEDURE — A9270 NON-COVERED ITEM OR SERVICE: HCPCS | Performed by: NURSE PRACTITIONER

## 2019-09-26 PROCEDURE — 94640 AIRWAY INHALATION TREATMENT: CPT

## 2019-09-26 PROCEDURE — 94760 N-INVAS EAR/PLS OXIMETRY 1: CPT

## 2019-09-26 PROCEDURE — 700102 HCHG RX REV CODE 250 W/ 637 OVERRIDE(OP): Performed by: STUDENT IN AN ORGANIZED HEALTH CARE EDUCATION/TRAINING PROGRAM

## 2019-09-26 PROCEDURE — 700101 HCHG RX REV CODE 250: Performed by: SURGERY

## 2019-09-26 PROCEDURE — 83735 ASSAY OF MAGNESIUM: CPT

## 2019-09-26 PROCEDURE — 85007 BL SMEAR W/DIFF WBC COUNT: CPT

## 2019-09-26 PROCEDURE — 700102 HCHG RX REV CODE 250 W/ 637 OVERRIDE(OP): Performed by: NURSE PRACTITIONER

## 2019-09-26 PROCEDURE — A9270 NON-COVERED ITEM OR SERVICE: HCPCS | Performed by: STUDENT IN AN ORGANIZED HEALTH CARE EDUCATION/TRAINING PROGRAM

## 2019-09-26 PROCEDURE — 700101 HCHG RX REV CODE 250: Performed by: NURSE PRACTITIONER

## 2019-09-26 PROCEDURE — A9270 NON-COVERED ITEM OR SERVICE: HCPCS | Performed by: SURGERY

## 2019-09-26 PROCEDURE — 80048 BASIC METABOLIC PNL TOTAL CA: CPT

## 2019-09-26 PROCEDURE — A9270 NON-COVERED ITEM OR SERVICE: HCPCS | Performed by: ORAL & MAXILLOFACIAL SURGERY

## 2019-09-26 PROCEDURE — 700102 HCHG RX REV CODE 250 W/ 637 OVERRIDE(OP): Performed by: ORAL & MAXILLOFACIAL SURGERY

## 2019-09-26 PROCEDURE — 70491 CT SOFT TISSUE NECK W/DYE: CPT

## 2019-09-26 PROCEDURE — 770020 HCHG ROOM/CARE - TELE (206)

## 2019-09-26 PROCEDURE — 700105 HCHG RX REV CODE 258: Performed by: NURSE PRACTITIONER

## 2019-09-26 PROCEDURE — 700102 HCHG RX REV CODE 250 W/ 637 OVERRIDE(OP): Performed by: SURGERY

## 2019-09-26 PROCEDURE — 70450 CT HEAD/BRAIN W/O DYE: CPT

## 2019-09-26 PROCEDURE — 36415 COLL VENOUS BLD VENIPUNCTURE: CPT

## 2019-09-26 PROCEDURE — 99232 SBSQ HOSP IP/OBS MODERATE 35: CPT | Performed by: INTERNAL MEDICINE

## 2019-09-26 PROCEDURE — 94669 MECHANICAL CHEST WALL OSCILL: CPT

## 2019-09-26 PROCEDURE — 70487 CT MAXILLOFACIAL W/DYE: CPT

## 2019-09-26 PROCEDURE — 700111 HCHG RX REV CODE 636 W/ 250 OVERRIDE (IP): Performed by: NURSE PRACTITIONER

## 2019-09-26 PROCEDURE — 85027 COMPLETE CBC AUTOMATED: CPT

## 2019-09-26 RX ORDER — METRONIDAZOLE 500 MG/1
500 TABLET ORAL EVERY 8 HOURS
Status: DISCONTINUED | OUTPATIENT
Start: 2019-09-26 | End: 2019-09-26

## 2019-09-26 RX ORDER — HALOPERIDOL 5 MG/ML
1 INJECTION INTRAMUSCULAR
Status: DISCONTINUED | OUTPATIENT
Start: 2019-09-26 | End: 2019-09-27

## 2019-09-26 RX ORDER — METRONIDAZOLE 500 MG/1
500 TABLET ORAL EVERY 8 HOURS
Status: DISCONTINUED | OUTPATIENT
Start: 2019-09-26 | End: 2019-09-30

## 2019-09-26 RX ORDER — LINEZOLID 600 MG/1
600 TABLET, FILM COATED ORAL EVERY 12 HOURS
Status: DISCONTINUED | OUTPATIENT
Start: 2019-09-26 | End: 2019-09-30

## 2019-09-26 RX ADMIN — GUAIFENESIN 200 MG: 100 SOLUTION ORAL at 14:07

## 2019-09-26 RX ADMIN — DIVALPROEX SODIUM 250 MG: 125 CAPSULE, COATED PELLETS ORAL at 22:02

## 2019-09-26 RX ADMIN — Medication: at 18:16

## 2019-09-26 RX ADMIN — DIVALPROEX SODIUM 250 MG: 125 CAPSULE, COATED PELLETS ORAL at 05:12

## 2019-09-26 RX ADMIN — GUAIFENESIN 200 MG: 100 SOLUTION ORAL at 06:00

## 2019-09-26 RX ADMIN — Medication: at 22:08

## 2019-09-26 RX ADMIN — CHLORHEXIDINE GLUCONATE 0.12% ORAL RINSE 15 ML: 1.2 LIQUID ORAL at 05:14

## 2019-09-26 RX ADMIN — APIXABAN 5 MG: 5 TABLET, FILM COATED ORAL at 18:14

## 2019-09-26 RX ADMIN — ALBUTEROL SULFATE 2.5 MG: 5 SOLUTION RESPIRATORY (INHALATION) at 10:43

## 2019-09-26 RX ADMIN — METRONIDAZOLE 500 MG: 500 TABLET ORAL at 22:02

## 2019-09-26 RX ADMIN — ACETAMINOPHEN 500 MG: 500 TABLET ORAL at 05:13

## 2019-09-26 RX ADMIN — METOPROLOL TARTRATE 100 MG: 100 TABLET ORAL at 05:13

## 2019-09-26 RX ADMIN — GUAIFENESIN 200 MG: 100 SOLUTION ORAL at 18:14

## 2019-09-26 RX ADMIN — ACETAMINOPHEN 500 MG: 500 TABLET ORAL at 11:16

## 2019-09-26 RX ADMIN — TERAZOSIN HYDROCHLORIDE ANHYDROUS 1 MG: 1 CAPSULE ORAL at 18:14

## 2019-09-26 RX ADMIN — SODIUM CHLORIDE 2 G: 900 INJECTION INTRAVENOUS at 18:17

## 2019-09-26 RX ADMIN — ACETAMINOPHEN 500 MG: 500 TABLET ORAL at 18:14

## 2019-09-26 RX ADMIN — GUAIFENESIN 200 MG: 100 SOLUTION ORAL at 22:02

## 2019-09-26 RX ADMIN — IOHEXOL 100 ML: 350 INJECTION, SOLUTION INTRAVENOUS at 17:40

## 2019-09-26 RX ADMIN — BACITRACIN ZINC: 500 OINTMENT TOPICAL at 05:14

## 2019-09-26 RX ADMIN — SPIRONOLACTONE 25 MG: 50 TABLET ORAL at 05:13

## 2019-09-26 RX ADMIN — SODIUM CHLORIDE 2 G: 900 INJECTION INTRAVENOUS at 22:03

## 2019-09-26 RX ADMIN — QUETIAPINE FUMARATE 75 MG: 25 TABLET ORAL at 11:16

## 2019-09-26 RX ADMIN — LINEZOLID 600 MG: 600 TABLET, FILM COATED ORAL at 14:07

## 2019-09-26 RX ADMIN — DIVALPROEX SODIUM 250 MG: 125 CAPSULE, COATED PELLETS ORAL at 15:14

## 2019-09-26 RX ADMIN — METOPROLOL TARTRATE 100 MG: 100 TABLET ORAL at 18:15

## 2019-09-26 RX ADMIN — GUAIFENESIN 200 MG: 100 SOLUTION ORAL at 02:19

## 2019-09-26 RX ADMIN — METRONIDAZOLE 500 MG: 500 TABLET ORAL at 14:07

## 2019-09-26 RX ADMIN — ALBUTEROL SULFATE 2.5 MG: 5 SOLUTION RESPIRATORY (INHALATION) at 20:21

## 2019-09-26 RX ADMIN — APIXABAN 5 MG: 5 TABLET, FILM COATED ORAL at 05:13

## 2019-09-26 RX ADMIN — METOPROLOL TARTRATE 100 MG: 100 TABLET ORAL at 11:16

## 2019-09-26 RX ADMIN — CIPROFLOXACIN 400 MG: 2 INJECTION, SOLUTION INTRAVENOUS at 05:14

## 2019-09-26 RX ADMIN — DIGOXIN 125 MCG: 250 TABLET ORAL at 18:14

## 2019-09-26 RX ADMIN — GUAIFENESIN 200 MG: 100 SOLUTION ORAL at 11:15

## 2019-09-26 RX ADMIN — QUETIAPINE FUMARATE 75 MG: 25 TABLET ORAL at 05:13

## 2019-09-26 RX ADMIN — ALBUTEROL SULFATE 2.5 MG: 5 SOLUTION RESPIRATORY (INHALATION) at 07:13

## 2019-09-26 RX ADMIN — BACITRACIN ZINC: 500 OINTMENT TOPICAL at 18:16

## 2019-09-26 RX ADMIN — ALBUTEROL SULFATE 2.5 MG: 5 SOLUTION RESPIRATORY (INHALATION) at 15:14

## 2019-09-26 RX ADMIN — QUETIAPINE FUMARATE 75 MG: 25 TABLET ORAL at 18:14

## 2019-09-26 RX ADMIN — TRAZODONE HYDROCHLORIDE 50 MG: 50 TABLET ORAL at 22:02
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
## 2019-09-26 NOTE — PROGRESS NOTES
Assumed care [id number]    -A&OX4, [place] (hearing aids)   -VSS  -Tracheostomy   -1:1 sitter for SI  -soft limb restrains to R/L wrist  -NPO  -Coretrak to R nare with diabetasource AC @ 70 mL/hr (goal)  -PIV-SL  -CT scan this afternoon  -Family visited this afternoon  -Assisted with frequent repositioning   -Tay with adequate output   -No BM this shift     Jayden Dos Santos R.N.

## 2019-09-26 NOTE — CARE PLAN
Problem: Bronchopulmonary Hygiene:  Goal: Increase mobilization of retained secretions  Intervention: Perform bronchopulmonary therapy as indicated by assessment  Note:   Aerosol 5L 30%     Problem: Bronchoconstriction:  Goal: Improve in air movement and diminished wheezing  Intervention: Implement inhaled treatments  Note:   Albuterol QID

## 2019-09-26 NOTE — DISCHARGE PLANNING
Received Order in Response to Request for Court Ordered Involuntary Admission from the court continuing pt until 10/3 at 0900. Sent copy to unit LSW.     No
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
## 2019-09-26 NOTE — CARE PLAN
Problem: Bronchopulmonary Hygiene:  Goal: Increase mobilization of retained secretions  Intervention: Perform bronchopulmonary therapy as indicated by assessment  Note:   Aerosol 5L 30%

## 2019-09-26 NOTE — CARE PLAN
Problem: Communication  Goal: The ability to communicate needs accurately and effectively will improve  Outcome: PROGRESSING SLOWER THAN EXPECTED  Patient jaw wired shut and confused and restless        Problem: Safety  Goal: Will remain free from injury  Outcome: PROGRESSING SLOWER THAN EXPECTED  Patient very restless sitter at bedside     Goal: Will remain free from falls  Outcome: PROGRESSING SLOWER THAN EXPECTED  Fall precautions in place

## 2019-09-27 ENCOUNTER — APPOINTMENT (OUTPATIENT)
Dept: RADIOLOGY | Facility: MEDICAL CENTER | Age: 79
DRG: 003 | End: 2019-09-27
Attending: SURGERY
Payer: MEDICARE

## 2019-09-27 LAB
ANION GAP SERPL CALC-SCNC: 4 MMOL/L (ref 0–11.9)
BASOPHILS # BLD AUTO: 0 % (ref 0–1.8)
BASOPHILS # BLD: 0 K/UL (ref 0–0.12)
BUN SERPL-MCNC: 22 MG/DL (ref 8–22)
CALCIUM SERPL-MCNC: 8.3 MG/DL (ref 8.5–10.5)
CHLORIDE SERPL-SCNC: 102 MMOL/L (ref 96–112)
CO2 SERPL-SCNC: 27 MMOL/L (ref 20–33)
CREAT SERPL-MCNC: 0.91 MG/DL (ref 0.5–1.4)
EOSINOPHIL # BLD AUTO: 1.18 K/UL (ref 0–0.51)
EOSINOPHIL NFR BLD: 6.1 % (ref 0–6.9)
ERYTHROCYTE [DISTWIDTH] IN BLOOD BY AUTOMATED COUNT: 51.5 FL (ref 35.9–50)
GLUCOSE SERPL-MCNC: 124 MG/DL (ref 65–99)
HCT VFR BLD AUTO: 32.1 % (ref 42–52)
HGB BLD-MCNC: 9.8 G/DL (ref 14–18)
LYMPHOCYTES # BLD AUTO: 0.5 K/UL (ref 1–4.8)
LYMPHOCYTES NFR BLD: 2.6 % (ref 22–41)
MANUAL DIFF BLD: NORMAL
MCH RBC QN AUTO: 29.7 PG (ref 27–33)
MCHC RBC AUTO-ENTMCNC: 30.5 G/DL (ref 33.7–35.3)
MCV RBC AUTO: 97.3 FL (ref 81.4–97.8)
METAMYELOCYTES NFR BLD MANUAL: 1.7 %
MONOCYTES # BLD AUTO: 0.85 K/UL (ref 0–0.85)
MONOCYTES NFR BLD AUTO: 4.4 % (ref 0–13.4)
MORPHOLOGY BLD-IMP: NORMAL
MYELOCYTES NFR BLD MANUAL: 1.7 %
NEUTROPHILS # BLD AUTO: 16.2 K/UL (ref 1.82–7.42)
NEUTROPHILS NFR BLD: 80 % (ref 44–72)
NEUTS BAND NFR BLD MANUAL: 3.5 % (ref 0–10)
NRBC # BLD AUTO: 0 K/UL
NRBC BLD-RTO: 0 /100 WBC
PLATELET # BLD AUTO: 275 K/UL (ref 164–446)
PLATELET BLD QL SMEAR: NORMAL
PMV BLD AUTO: 9.1 FL (ref 9–12.9)
POTASSIUM SERPL-SCNC: 4 MMOL/L (ref 3.6–5.5)
RBC # BLD AUTO: 3.3 M/UL (ref 4.7–6.1)
RBC BLD AUTO: NORMAL
SODIUM SERPL-SCNC: 133 MMOL/L (ref 135–145)
WBC # BLD AUTO: 19.4 K/UL (ref 4.8–10.8)

## 2019-09-27 PROCEDURE — 85027 COMPLETE CBC AUTOMATED: CPT

## 2019-09-27 PROCEDURE — 99232 SBSQ HOSP IP/OBS MODERATE 35: CPT | Performed by: PSYCHIATRY & NEUROLOGY

## 2019-09-27 PROCEDURE — 700101 HCHG RX REV CODE 250: Performed by: NURSE PRACTITIONER

## 2019-09-27 PROCEDURE — A9270 NON-COVERED ITEM OR SERVICE: HCPCS | Performed by: SURGERY

## 2019-09-27 PROCEDURE — 700102 HCHG RX REV CODE 250 W/ 637 OVERRIDE(OP): Performed by: NURSE PRACTITIONER

## 2019-09-27 PROCEDURE — 700102 HCHG RX REV CODE 250 W/ 637 OVERRIDE(OP): Performed by: STUDENT IN AN ORGANIZED HEALTH CARE EDUCATION/TRAINING PROGRAM

## 2019-09-27 PROCEDURE — 80048 BASIC METABOLIC PNL TOTAL CA: CPT

## 2019-09-27 PROCEDURE — 700105 HCHG RX REV CODE 258: Performed by: NURSE PRACTITIONER

## 2019-09-27 PROCEDURE — 700102 HCHG RX REV CODE 250 W/ 637 OVERRIDE(OP): Performed by: SURGERY

## 2019-09-27 PROCEDURE — 94640 AIRWAY INHALATION TREATMENT: CPT

## 2019-09-27 PROCEDURE — 99232 SBSQ HOSP IP/OBS MODERATE 35: CPT | Performed by: INTERNAL MEDICINE

## 2019-09-27 PROCEDURE — A9270 NON-COVERED ITEM OR SERVICE: HCPCS | Performed by: STUDENT IN AN ORGANIZED HEALTH CARE EDUCATION/TRAINING PROGRAM

## 2019-09-27 PROCEDURE — A9270 NON-COVERED ITEM OR SERVICE: HCPCS | Performed by: NURSE PRACTITIONER

## 2019-09-27 PROCEDURE — 94669 MECHANICAL CHEST WALL OSCILL: CPT

## 2019-09-27 PROCEDURE — 36415 COLL VENOUS BLD VENIPUNCTURE: CPT

## 2019-09-27 PROCEDURE — 700105 HCHG RX REV CODE 258

## 2019-09-27 PROCEDURE — 770020 HCHG ROOM/CARE - TELE (206)

## 2019-09-27 PROCEDURE — 94760 N-INVAS EAR/PLS OXIMETRY 1: CPT

## 2019-09-27 PROCEDURE — 700101 HCHG RX REV CODE 250: Performed by: SURGERY

## 2019-09-27 PROCEDURE — 71045 X-RAY EXAM CHEST 1 VIEW: CPT

## 2019-09-27 PROCEDURE — A9270 NON-COVERED ITEM OR SERVICE: HCPCS | Performed by: ORAL & MAXILLOFACIAL SURGERY

## 2019-09-27 PROCEDURE — 700102 HCHG RX REV CODE 250 W/ 637 OVERRIDE(OP): Performed by: ORAL & MAXILLOFACIAL SURGERY

## 2019-09-27 PROCEDURE — 85007 BL SMEAR W/DIFF WBC COUNT: CPT

## 2019-09-27 RX ORDER — ZIPRASIDONE MESYLATE 20 MG/ML
10 INJECTION, POWDER, LYOPHILIZED, FOR SOLUTION INTRAMUSCULAR EVERY 4 HOURS PRN
Status: DISCONTINUED | OUTPATIENT
Start: 2019-09-27 | End: 2019-10-18

## 2019-09-27 RX ORDER — TRAZODONE HYDROCHLORIDE 50 MG/1
50 TABLET ORAL NIGHTLY PRN
Status: DISCONTINUED | OUTPATIENT
Start: 2019-09-27 | End: 2019-10-07

## 2019-09-27 RX ORDER — SODIUM CHLORIDE, SODIUM LACTATE, POTASSIUM CHLORIDE, AND CALCIUM CHLORIDE .6; .31; .03; .02 G/100ML; G/100ML; G/100ML; G/100ML
500 INJECTION, SOLUTION INTRAVENOUS ONCE
Status: COMPLETED | OUTPATIENT
Start: 2019-09-27 | End: 2019-09-27

## 2019-09-27 RX ORDER — QUETIAPINE FUMARATE 25 MG/1
75 TABLET, FILM COATED ORAL 3 TIMES DAILY
Status: DISCONTINUED | OUTPATIENT
Start: 2019-09-27 | End: 2019-10-04

## 2019-09-27 RX ORDER — SODIUM CHLORIDE FOR INHALATION 3 %
3 VIAL, NEBULIZER (ML) INHALATION
Status: DISCONTINUED | OUTPATIENT
Start: 2019-09-27 | End: 2019-10-06 | Stop reason: ALTCHOICE

## 2019-09-27 RX ORDER — SODIUM CHLORIDE, SODIUM LACTATE, POTASSIUM CHLORIDE, CALCIUM CHLORIDE 600; 310; 30; 20 MG/100ML; MG/100ML; MG/100ML; MG/100ML
INJECTION, SOLUTION INTRAVENOUS
Status: COMPLETED
Start: 2019-09-27 | End: 2019-09-27

## 2019-09-27 RX ADMIN — METOPROLOL TARTRATE 100 MG: 100 TABLET ORAL at 04:33

## 2019-09-27 RX ADMIN — METRONIDAZOLE 500 MG: 500 TABLET ORAL at 04:33

## 2019-09-27 RX ADMIN — ACETAMINOPHEN 500 MG: 500 TABLET ORAL at 11:30

## 2019-09-27 RX ADMIN — METRONIDAZOLE 500 MG: 500 TABLET ORAL at 21:40

## 2019-09-27 RX ADMIN — GUAIFENESIN 200 MG: 100 SOLUTION ORAL at 13:58

## 2019-09-27 RX ADMIN — ACETAMINOPHEN 500 MG: 500 TABLET ORAL at 18:27

## 2019-09-27 RX ADMIN — ALBUTEROL SULFATE 2.5 MG: 5 SOLUTION RESPIRATORY (INHALATION) at 21:14

## 2019-09-27 RX ADMIN — ALBUTEROL SULFATE 2.5 MG: 5 SOLUTION RESPIRATORY (INHALATION) at 15:13

## 2019-09-27 RX ADMIN — BACITRACIN ZINC 1 EACH: 500 OINTMENT TOPICAL at 18:28

## 2019-09-27 RX ADMIN — SODIUM CHLORIDE 2 G: 900 INJECTION INTRAVENOUS at 21:39

## 2019-09-27 RX ADMIN — QUETIAPINE FUMARATE 75 MG: 25 TABLET ORAL at 11:30

## 2019-09-27 RX ADMIN — LINEZOLID 600 MG: 600 TABLET, FILM COATED ORAL at 18:28

## 2019-09-27 RX ADMIN — CHLORHEXIDINE GLUCONATE 0.12% ORAL RINSE 15 ML: 1.2 LIQUID ORAL at 18:27

## 2019-09-27 RX ADMIN — DIVALPROEX SODIUM 250 MG: 125 CAPSULE, COATED PELLETS ORAL at 04:33

## 2019-09-27 RX ADMIN — SODIUM CHLORIDE 2 G: 900 INJECTION INTRAVENOUS at 13:58

## 2019-09-27 RX ADMIN — HYDROCODONE BITARTRATE AND ACETAMINOPHEN 2 TABLET: 5; 325 TABLET ORAL at 21:49

## 2019-09-27 RX ADMIN — GUAIFENESIN 200 MG: 100 SOLUTION ORAL at 21:40

## 2019-09-27 RX ADMIN — QUETIAPINE FUMARATE 75 MG: 25 TABLET ORAL at 04:33

## 2019-09-27 RX ADMIN — DIVALPROEX SODIUM 250 MG: 125 CAPSULE, COATED PELLETS ORAL at 21:40

## 2019-09-27 RX ADMIN — HYDROCODONE BITARTRATE AND ACETAMINOPHEN 2 TABLET: 5; 325 TABLET ORAL at 13:58

## 2019-09-27 RX ADMIN — HYDROCODONE BITARTRATE AND ACETAMINOPHEN 2 TABLET: 5; 325 TABLET ORAL at 03:38

## 2019-09-27 RX ADMIN — ALBUTEROL SULFATE 2.5 MG: 5 SOLUTION RESPIRATORY (INHALATION) at 07:00

## 2019-09-27 RX ADMIN — METRONIDAZOLE 500 MG: 500 TABLET ORAL at 13:58

## 2019-09-27 RX ADMIN — QUETIAPINE FUMARATE 75 MG: 25 TABLET ORAL at 18:27

## 2019-09-27 RX ADMIN — SODIUM CHLORIDE, POTASSIUM CHLORIDE, SODIUM LACTATE AND CALCIUM CHLORIDE 500 ML: 600; 310; 30; 20 INJECTION, SOLUTION INTRAVENOUS at 18:50

## 2019-09-27 RX ADMIN — GUAIFENESIN 200 MG: 100 SOLUTION ORAL at 11:30

## 2019-09-27 RX ADMIN — CHLORHEXIDINE GLUCONATE 0.12% ORAL RINSE 15 ML: 1.2 LIQUID ORAL at 04:35

## 2019-09-27 RX ADMIN — SODIUM CHLORIDE SOLN NEBU 3% 3 ML: 3 NEBU SOLN at 21:14

## 2019-09-27 RX ADMIN — GUAIFENESIN 200 MG: 100 SOLUTION ORAL at 04:33

## 2019-09-27 RX ADMIN — DIVALPROEX SODIUM 250 MG: 125 CAPSULE, COATED PELLETS ORAL at 13:58

## 2019-09-27 RX ADMIN — METOPROLOL TARTRATE 75 MG: 25 TABLET, FILM COATED ORAL at 18:27

## 2019-09-27 RX ADMIN — SODIUM CHLORIDE 2 G: 900 INJECTION INTRAVENOUS at 04:34

## 2019-09-27 RX ADMIN — METOPROLOL TARTRATE 100 MG: 100 TABLET ORAL at 11:30

## 2019-09-27 RX ADMIN — GUAIFENESIN 200 MG: 100 SOLUTION ORAL at 02:16

## 2019-09-27 RX ADMIN — SODIUM CHLORIDE, SODIUM LACTATE, POTASSIUM CHLORIDE, AND CALCIUM CHLORIDE 500 ML: .6; .31; .03; .02 INJECTION, SOLUTION INTRAVENOUS at 18:50

## 2019-09-27 RX ADMIN — LINEZOLID 600 MG: 600 TABLET, FILM COATED ORAL at 04:33

## 2019-09-27 RX ADMIN — APIXABAN 5 MG: 5 TABLET, FILM COATED ORAL at 04:33

## 2019-09-27 RX ADMIN — BACITRACIN ZINC: 500 OINTMENT TOPICAL at 04:33

## 2019-09-27 RX ADMIN — ALBUTEROL SULFATE 2.5 MG: 5 SOLUTION RESPIRATORY (INHALATION) at 12:23

## 2019-09-27 RX ADMIN — DIGOXIN 125 MCG: 250 TABLET ORAL at 18:28

## 2019-09-27 RX ADMIN — GUAIFENESIN 200 MG: 100 SOLUTION ORAL at 18:43

## 2019-09-27 RX ADMIN — APIXABAN 5 MG: 5 TABLET, FILM COATED ORAL at 18:27

## 2019-09-27 ASSESSMENT — ENCOUNTER SYMPTOMS: ROS GI COMMENTS: BM 9/27

## 2019-09-27 NOTE — PROGRESS NOTES
Assumed care [id number]     -[place] (hearing aids), communicating with white board  -Jaw wired shut   -VSS  -Tracheostomy   -1:1 sitter for SI  -Patient pulled out PIV, replaced   -NPO  -Coretrak to R nare with diabetasource AC @ 70 mL/hr (goal)  -PIV for intermittent ABX   -Family visited this afternoon  -Assisted with frequent repositioning   -Tay removed, low UOP, bolus given   -Free water 250 mL Q 6 hours via coretrak   -Large BM 9/27     Jayden Dos Santos R.N.

## 2019-09-27 NOTE — PROGRESS NOTES
Report received from Jayden CONRAD  Assumed care at 1900  1:1 sitter at bedside. Bilateral wrist restraints discontinued D/T extreme pt compliance at 2200.   Pt able to talk around wired jaws and trache. Speech is breathy and mumbled, but can be understood intermittently. Pt also communicates with writing board.   Pain and nausea denied   Tolerating Diabetasource @ 70 ml/hour via coretrak to rt nare. Free water flushes given per order.   Trache in place. Pt has a strong cough and is able to clear secretions. O2 saturations 90's on 5 liters via T-piece. Extra trache equipment and obturator at bedside.  Jaws are wired shut. Mouth moisturize and swabs used to complete oral care. Wire cutters at bedside.  Surgical incision/wound to lt portion of chin. EMRE. No drainage noted. Approximated. Wound care completed per order.   Generalized rash noted. Benadryl cream applied PRN for itching.   Scabbing to rt elbow and lt bridge of nose. Barrier cream applied.   Blanchable redness to sacrum. Mepilex in place.   + Urine output via vale catheter   + BM    + Flatus  Up x 1-2 assist. Pt following commands and demonstrating understanding of education.   Bed in lowest position and locked.  Bed alarm in place and on.   Pt resting comfortably now.  Review plan of care with patient  Hourly rounds in place  All needs met at this time
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
## 2019-09-27 NOTE — PROGRESS NOTES
2 RN skin check complete.   Devices in place trache, pulse ox, cortrak, vale catheter, glasses, hearing aids, PIV, and mepilex.  Skin assessed under devices and all bony prominences.  Generalized rash noted to abd/trunk. Barrier cream and benadryl cream in use.   Blanchable redness noted to sacrum. Barrier cream and mepilex in use. Skin appears much more intact that previous assessment.   Scab noted to bridge of nose. Barrier cream in use.   Scab noted to rt elbow. Barrier cream in use. Pt allowing nursing staff to assist with mobilization. No longer using elbows to boost self in bed.   Scattered bruising noted.   Surgical incision noted to lt chin. Site is approximated. Wound is EMRE and cleansed with NS/peroxide and covered with bacitracin. No drainage noted. Barrier cream in use around wound to prevent skin breakdown.   Maceration noted under lt portion of trache plate. Site was cleansed Q shift and PRN with NS and peroxide. Barrier cream in use below stoma to prevent skin breakdown.  Jaw is wired shut. Mouth moisturizer and oral care provided to prevent skin breakdown.   The following interventions in place: Pillows in use to support head elbows and hips. Pt turns self in the bed frequently. Restraints removed D/T pt compliance. Moisturizing lotion in use. Tube feeding in use to support nutrition. Waffle cushion in place. Pt mobilized in room when possible. Mepilex in place to sacrum. Barrier cream in use.

## 2019-09-27 NOTE — CARE PLAN
Problem: Knowledge Deficit  Goal: Knowledge of disease process/condition, treatment plan, diagnostic tests, and medications will improve  Outcome: PROGRESSING AS EXPECTED  Intervention: Explain information regarding disease process/condition, treatment plan, diagnostic tests, and medications and document in education  Note:   Pt was educated on POC, MAR, shift routine and nursing education R/T Dx. Questions were encouraged and answered. Pt is much more verbal and demonstrates understanding.         Problem: Communication  Goal: The ability to communicate needs accurately and effectively will improve  Outcome: PROGRESSING SLOWER THAN EXPECTED  Intervention: Reorient patient to environment as needed  Note:   Pt was reminded where he was, why he was here and what time/year it was. Pt demonstrated understanding by nodding and speaking in mumbled words that he remembered   Intervention: Use communication aids and/or /Language Line as appropriate  Note:   Hearing aids in place, writing board and simple speech in use.

## 2019-09-27 NOTE — CARE PLAN
Respiratory Therapy Update    Interdisciplinary Plan of Care-Goals (Indications)  Bronchodilator Indications: History / Diagnosis (09/26/19 2021)  Bronchopulmonary Hygiene Indications: Difficulty with Secretion Clearance (09/27/19 0125)  Interdisciplinary Plan of Care-Outcomes   Bronchodilator Outcome: Patient at Stable Baseline (09/26/19 2021)  Bronchopulmonary Hygiene Outcome: Optimal Hydration with Moderate or Less Sputum Production (09/27/19 0125     #SVN Performed: Yes (09/26/19 2021)    Cough: Productive (09/27/19 0125)  Sputum Amount: Scant (09/27/19 0125)  Sputum Color: White;Yellow (09/27/19 0125)  Sputum Consistency: Thin;Thick (09/27/19 0125)    FiO2%: 30 % (09/27/19 0125)  O2 (LPM): 5 (09/27/19 0351)  O2 Daily Delivery Respiratory : T-Piece (09/27/19 0125)    Breath Sounds  Pre/Post Intervention: Pre Intervention Assessment (09/26/19 2311)  RUL Breath Sounds: Diminished (09/27/19 0125)  RML Breath Sounds: Diminished (09/27/19 0125)  RLL Breath Sounds: Diminished (09/27/19 0125)  DARLENE Breath Sounds: Diminished (09/27/19 0125)  LLL Breath Sounds: Diminished (09/27/19 0125)      Events/Summary/Plan:IPV QID. Albuterol QID.
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
## 2019-09-28 LAB
ANION GAP SERPL CALC-SCNC: 8 MMOL/L (ref 0–11.9)
ANISOCYTOSIS BLD QL SMEAR: ABNORMAL
BACTERIA WND AEROBE CULT: ABNORMAL
BACTERIA WND AEROBE CULT: ABNORMAL
BASOPHILS # BLD AUTO: 0 % (ref 0–1.8)
BASOPHILS # BLD: 0 K/UL (ref 0–0.12)
BUN SERPL-MCNC: 27 MG/DL (ref 8–22)
BURR CELLS BLD QL SMEAR: NORMAL
C DIFF DNA SPEC QL NAA+PROBE: NEGATIVE
C DIFF TOX GENS STL QL NAA+PROBE: NEGATIVE
CALCIUM SERPL-MCNC: 8.2 MG/DL (ref 8.5–10.5)
CHLORIDE SERPL-SCNC: 103 MMOL/L (ref 96–112)
CO2 SERPL-SCNC: 24 MMOL/L (ref 20–33)
CREAT SERPL-MCNC: 0.77 MG/DL (ref 0.5–1.4)
EOSINOPHIL # BLD AUTO: 1.52 K/UL (ref 0–0.51)
EOSINOPHIL NFR BLD: 8.9 % (ref 0–6.9)
ERYTHROCYTE [DISTWIDTH] IN BLOOD BY AUTOMATED COUNT: 52.7 FL (ref 35.9–50)
GLUCOSE SERPL-MCNC: 136 MG/DL (ref 65–99)
GRAM STN SPEC: ABNORMAL
HCT VFR BLD AUTO: 30.3 % (ref 42–52)
HGB BLD-MCNC: 9.4 G/DL (ref 14–18)
LYMPHOCYTES # BLD AUTO: 0.62 K/UL (ref 1–4.8)
LYMPHOCYTES NFR BLD: 3.6 % (ref 22–41)
MANUAL DIFF BLD: NORMAL
MCH RBC QN AUTO: 30.4 PG (ref 27–33)
MCHC RBC AUTO-ENTMCNC: 31 G/DL (ref 33.7–35.3)
MCV RBC AUTO: 98.1 FL (ref 81.4–97.8)
MICROCYTES BLD QL SMEAR: ABNORMAL
MONOCYTES # BLD AUTO: 0 K/UL (ref 0–0.85)
MONOCYTES NFR BLD AUTO: 0 % (ref 0–13.4)
MORPHOLOGY BLD-IMP: NORMAL
MYELOCYTES NFR BLD MANUAL: 0.9 %
NEUTROPHILS # BLD AUTO: 14.81 K/UL (ref 1.82–7.42)
NEUTROPHILS NFR BLD: 83.9 % (ref 44–72)
NEUTS BAND NFR BLD MANUAL: 2.7 % (ref 0–10)
NRBC # BLD AUTO: 0 K/UL
NRBC BLD-RTO: 0 /100 WBC
PLATELET # BLD AUTO: 254 K/UL (ref 164–446)
PLATELET BLD QL SMEAR: NORMAL
PMV BLD AUTO: 9.7 FL (ref 9–12.9)
POIKILOCYTOSIS BLD QL SMEAR: NORMAL
POLYCHROMASIA BLD QL SMEAR: NORMAL
POTASSIUM SERPL-SCNC: 3.9 MMOL/L (ref 3.6–5.5)
RBC # BLD AUTO: 3.09 M/UL (ref 4.7–6.1)
RBC BLD AUTO: PRESENT
SIGNIFICANT IND 70042: ABNORMAL
SITE SITE: ABNORMAL
SODIUM SERPL-SCNC: 135 MMOL/L (ref 135–145)
SOURCE SOURCE: ABNORMAL
WBC # BLD AUTO: 17.1 K/UL (ref 4.8–10.8)

## 2019-09-28 PROCEDURE — 306565 RIGID MIT RESTRAINT(PAIR): Performed by: SURGERY

## 2019-09-28 PROCEDURE — A9270 NON-COVERED ITEM OR SERVICE: HCPCS | Performed by: NURSE PRACTITIONER

## 2019-09-28 PROCEDURE — 306565 RIGID MIT RESTRAINT(PAIR): Performed by: NURSE PRACTITIONER

## 2019-09-28 PROCEDURE — 85007 BL SMEAR W/DIFF WBC COUNT: CPT

## 2019-09-28 PROCEDURE — 700102 HCHG RX REV CODE 250 W/ 637 OVERRIDE(OP): Performed by: SURGERY

## 2019-09-28 PROCEDURE — A9270 NON-COVERED ITEM OR SERVICE: HCPCS | Performed by: SURGERY

## 2019-09-28 PROCEDURE — A9270 NON-COVERED ITEM OR SERVICE: HCPCS | Performed by: ORAL & MAXILLOFACIAL SURGERY

## 2019-09-28 PROCEDURE — 700111 HCHG RX REV CODE 636 W/ 250 OVERRIDE (IP): Performed by: PSYCHIATRY & NEUROLOGY

## 2019-09-28 PROCEDURE — 700111 HCHG RX REV CODE 636 W/ 250 OVERRIDE (IP): Performed by: NURSE PRACTITIONER

## 2019-09-28 PROCEDURE — 700101 HCHG RX REV CODE 250: Performed by: NURSE PRACTITIONER

## 2019-09-28 PROCEDURE — 302196 LINEN, HYPOALLERGENIC: Performed by: SURGERY

## 2019-09-28 PROCEDURE — 700102 HCHG RX REV CODE 250 W/ 637 OVERRIDE(OP): Performed by: NURSE PRACTITIONER

## 2019-09-28 PROCEDURE — A9270 NON-COVERED ITEM OR SERVICE: HCPCS | Performed by: INTERNAL MEDICINE

## 2019-09-28 PROCEDURE — A9270 NON-COVERED ITEM OR SERVICE: HCPCS | Performed by: STUDENT IN AN ORGANIZED HEALTH CARE EDUCATION/TRAINING PROGRAM

## 2019-09-28 PROCEDURE — 770020 HCHG ROOM/CARE - TELE (206)

## 2019-09-28 PROCEDURE — 94760 N-INVAS EAR/PLS OXIMETRY 1: CPT

## 2019-09-28 PROCEDURE — 94640 AIRWAY INHALATION TREATMENT: CPT

## 2019-09-28 PROCEDURE — 700102 HCHG RX REV CODE 250 W/ 637 OVERRIDE(OP): Performed by: ORAL & MAXILLOFACIAL SURGERY

## 2019-09-28 PROCEDURE — 700102 HCHG RX REV CODE 250 W/ 637 OVERRIDE(OP): Performed by: STUDENT IN AN ORGANIZED HEALTH CARE EDUCATION/TRAINING PROGRAM

## 2019-09-28 PROCEDURE — 80048 BASIC METABOLIC PNL TOTAL CA: CPT

## 2019-09-28 PROCEDURE — 700101 HCHG RX REV CODE 250: Performed by: SURGERY

## 2019-09-28 PROCEDURE — 85027 COMPLETE CBC AUTOMATED: CPT

## 2019-09-28 PROCEDURE — 302101 FENESTRATED FOAM: Performed by: SURGERY

## 2019-09-28 PROCEDURE — A9270 NON-COVERED ITEM OR SERVICE: HCPCS | Performed by: PSYCHIATRY & NEUROLOGY

## 2019-09-28 PROCEDURE — 700102 HCHG RX REV CODE 250 W/ 637 OVERRIDE(OP): Performed by: PSYCHIATRY & NEUROLOGY

## 2019-09-28 PROCEDURE — 87493 C DIFF AMPLIFIED PROBE: CPT

## 2019-09-28 PROCEDURE — 36415 COLL VENOUS BLD VENIPUNCTURE: CPT

## 2019-09-28 PROCEDURE — 700102 HCHG RX REV CODE 250 W/ 637 OVERRIDE(OP): Performed by: INTERNAL MEDICINE

## 2019-09-28 PROCEDURE — 700105 HCHG RX REV CODE 258: Performed by: NURSE PRACTITIONER

## 2019-09-28 RX ORDER — FLUCONAZOLE 200 MG/1
400 TABLET ORAL ONCE
Status: COMPLETED | OUTPATIENT
Start: 2019-09-28 | End: 2019-09-28

## 2019-09-28 RX ORDER — FLUCONAZOLE 200 MG/1
200 TABLET ORAL DAILY
Status: DISCONTINUED | OUTPATIENT
Start: 2019-09-29 | End: 2019-09-30

## 2019-09-28 RX ORDER — HALOPERIDOL 5 MG/ML
1 INJECTION INTRAMUSCULAR ONCE
Status: COMPLETED | OUTPATIENT
Start: 2019-09-28 | End: 2019-09-28

## 2019-09-28 RX ORDER — FLUCONAZOLE 200 MG/1
200 TABLET ORAL DAILY
Status: DISCONTINUED | OUTPATIENT
Start: 2019-09-29 | End: 2019-09-28

## 2019-09-28 RX ADMIN — SODIUM CHLORIDE 2 G: 900 INJECTION INTRAVENOUS at 15:26

## 2019-09-28 RX ADMIN — ALBUTEROL SULFATE 2.5 MG: 5 SOLUTION RESPIRATORY (INHALATION) at 07:37

## 2019-09-28 RX ADMIN — DIVALPROEX SODIUM 250 MG: 125 CAPSULE, COATED PELLETS ORAL at 15:27

## 2019-09-28 RX ADMIN — ALBUTEROL SULFATE 2.5 MG: 5 SOLUTION RESPIRATORY (INHALATION) at 15:03

## 2019-09-28 RX ADMIN — METOPROLOL TARTRATE 75 MG: 25 TABLET, FILM COATED ORAL at 07:52

## 2019-09-28 RX ADMIN — SODIUM CHLORIDE 2 G: 900 INJECTION INTRAVENOUS at 21:36

## 2019-09-28 RX ADMIN — SODIUM CHLORIDE SOLN NEBU 3%: 3 NEBU SOLN at 07:37

## 2019-09-28 RX ADMIN — METOPROLOL TARTRATE 75 MG: 25 TABLET, FILM COATED ORAL at 11:44

## 2019-09-28 RX ADMIN — Medication: at 01:42

## 2019-09-28 RX ADMIN — APIXABAN 5 MG: 5 TABLET, FILM COATED ORAL at 17:47

## 2019-09-28 RX ADMIN — GUAIFENESIN 200 MG: 100 SOLUTION ORAL at 21:36

## 2019-09-28 RX ADMIN — LINEZOLID 600 MG: 600 TABLET, FILM COATED ORAL at 07:53

## 2019-09-28 RX ADMIN — CHLORHEXIDINE GLUCONATE 0.12% ORAL RINSE 15 ML: 1.2 LIQUID ORAL at 17:46

## 2019-09-28 RX ADMIN — TERAZOSIN HYDROCHLORIDE ANHYDROUS 1 MG: 1 CAPSULE ORAL at 17:50

## 2019-09-28 RX ADMIN — GUAIFENESIN 200 MG: 100 SOLUTION ORAL at 15:31

## 2019-09-28 RX ADMIN — HALOPERIDOL LACTATE 1 MG: 5 INJECTION, SOLUTION INTRAMUSCULAR at 03:16

## 2019-09-28 RX ADMIN — METRONIDAZOLE 500 MG: 500 TABLET ORAL at 21:36

## 2019-09-28 RX ADMIN — BACITRACIN ZINC: 500 OINTMENT TOPICAL at 06:00

## 2019-09-28 RX ADMIN — SODIUM CHLORIDE 2 G: 900 INJECTION INTRAVENOUS at 07:54

## 2019-09-28 RX ADMIN — ZIPRASIDONE MESYLATE 10 MG: 20 INJECTION, POWDER, LYOPHILIZED, FOR SOLUTION INTRAMUSCULAR at 02:16

## 2019-09-28 RX ADMIN — BACITRACIN ZINC: 500 OINTMENT TOPICAL at 17:51

## 2019-09-28 RX ADMIN — ACETAMINOPHEN 500 MG: 500 TABLET ORAL at 11:44

## 2019-09-28 RX ADMIN — CHLORHEXIDINE GLUCONATE 0.12% ORAL RINSE 15 ML: 1.2 LIQUID ORAL at 07:51

## 2019-09-28 RX ADMIN — DIVALPROEX SODIUM 250 MG: 125 CAPSULE, COATED PELLETS ORAL at 07:51

## 2019-09-28 RX ADMIN — GUAIFENESIN 200 MG: 100 SOLUTION ORAL at 03:19

## 2019-09-28 RX ADMIN — DIGOXIN 125 MCG: 250 TABLET ORAL at 17:48

## 2019-09-28 RX ADMIN — ACETAMINOPHEN 500 MG: 500 TABLET ORAL at 07:52

## 2019-09-28 RX ADMIN — QUETIAPINE FUMARATE 75 MG: 25 TABLET ORAL at 17:46

## 2019-09-28 RX ADMIN — QUETIAPINE FUMARATE 75 MG: 25 TABLET ORAL at 07:52

## 2019-09-28 RX ADMIN — METRONIDAZOLE 500 MG: 500 TABLET ORAL at 07:51

## 2019-09-28 RX ADMIN — METRONIDAZOLE 500 MG: 500 TABLET ORAL at 15:27

## 2019-09-28 RX ADMIN — SODIUM CHLORIDE SOLN NEBU 3% 3 ML: 3 NEBU SOLN at 11:19

## 2019-09-28 RX ADMIN — FLUCONAZOLE 400 MG: 200 TABLET ORAL at 15:31

## 2019-09-28 RX ADMIN — GUAIFENESIN 200 MG: 100 SOLUTION ORAL at 11:44

## 2019-09-28 RX ADMIN — LINEZOLID 600 MG: 600 TABLET, FILM COATED ORAL at 17:47

## 2019-09-28 RX ADMIN — SODIUM CHLORIDE SOLN NEBU 3% 3 ML: 3 NEBU SOLN at 15:03

## 2019-09-28 RX ADMIN — TRAZODONE HYDROCHLORIDE 50 MG: 50 TABLET ORAL at 21:36

## 2019-09-28 RX ADMIN — SODIUM CHLORIDE SOLN NEBU 3% 3 ML: 3 NEBU SOLN at 20:51

## 2019-09-28 RX ADMIN — ACETAMINOPHEN 500 MG: 500 TABLET ORAL at 17:49

## 2019-09-28 RX ADMIN — METOPROLOL TARTRATE 75 MG: 25 TABLET, FILM COATED ORAL at 17:46

## 2019-09-28 RX ADMIN — APIXABAN 5 MG: 5 TABLET, FILM COATED ORAL at 07:52

## 2019-09-28 RX ADMIN — GUAIFENESIN 200 MG: 100 SOLUTION ORAL at 08:20

## 2019-09-28 RX ADMIN — ALBUTEROL SULFATE 2.5 MG: 5 SOLUTION RESPIRATORY (INHALATION) at 20:51

## 2019-09-28 RX ADMIN — TRAZODONE HYDROCHLORIDE 50 MG: 50 TABLET ORAL at 01:34

## 2019-09-28 RX ADMIN — ZIPRASIDONE MESYLATE 10 MG: 20 INJECTION, POWDER, LYOPHILIZED, FOR SOLUTION INTRAMUSCULAR at 08:09

## 2019-09-28 RX ADMIN — QUETIAPINE FUMARATE 75 MG: 25 TABLET ORAL at 11:45

## 2019-09-28 RX ADMIN — GUAIFENESIN 200 MG: 100 SOLUTION ORAL at 17:59

## 2019-09-28 RX ADMIN — DIVALPROEX SODIUM 250 MG: 125 CAPSULE, COATED PELLETS ORAL at 21:36

## 2019-09-28 RX ADMIN — ALBUTEROL SULFATE 2.5 MG: 5 SOLUTION RESPIRATORY (INHALATION) at 11:19

## 2019-09-28 ASSESSMENT — ENCOUNTER SYMPTOMS: DIARRHEA: 1
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
## 2019-09-28 NOTE — PROGRESS NOTES
Report received from ANAMARIA Leigh.  Assumed care of the pt at approximately 0045.  Checked on pt, no concerns at this time, all needs met.

## 2019-09-28 NOTE — CARE PLAN
Problem: Safety  Goal: Will remain free from injury  Outcome: PROGRESSING SLOWER THAN EXPECTED  Note:   Pt on restraints d/t removal of lines and fall risk.      Problem: Safety - Medical Restraint  Goal: Remains free of injury from restraints (Restraint for Interference with Medical Device)  Description  INTERVENTIONS:  1. Determine that other, less restrictive measures have been tried or would not be effective before applying the restraint  2. Evaluate the patient's condition at the time of restraint application  3. Inform patient/family regarding the reason for restraint  4. Q2H: Monitor safety, psychosocial status, comfort, nutrition and hydration  Outcome: PROGRESSING SLOWER THAN EXPECTED  Flowsheets (Taken 9/28/2019 1515)  Addressed this shift: Remains free of injury from restraints (restraint for interference with medical device): Every 2 hours: Monitor safety, psychosocial status, comfort, nutrition and hydration

## 2019-09-28 NOTE — CARE PLAN
Problem: Pain Management  Goal: Pain level will decrease to patient's comfort goal  Outcome: PROGRESSING AS EXPECTED     Problem: Respiratory:  Goal: Respiratory status will improve  Outcome: PROGRESSING AS EXPECTED     Problem: Skin Integrity  Goal: Risk for impaired skin integrity will decrease  Outcome: PROGRESSING AS EXPECTED     Problem: Communication  Goal: The ability to communicate needs accurately and effectively will improve  Outcome: PROGRESSING SLOWER THAN EXPECTED     Problem: Safety  Goal: Will remain free from injury  Outcome: PROGRESSING SLOWER THAN EXPECTED  Goal: Will remain free from falls  Outcome: PROGRESSING SLOWER THAN EXPECTED     Problem: Knowledge Deficit  Goal: Knowledge of disease process/condition, treatment plan, diagnostic tests, and medications will improve  Outcome: PROGRESSING SLOWER THAN EXPECTED  Goal: Knowledge of the prescribed therapeutic regimen will improve  Outcome: PROGRESSING SLOWER THAN EXPECTED

## 2019-09-28 NOTE — PROGRESS NOTES
Report received from NOC shift RN,   Pt has been combative, attempts to remove lines and is a high fall risk.   Pt currently on soft wrist restraints.   1:1 sitter at bedside.     ILEANA A/O status.   Pt tachycardic this am.   /67   Pulse (!) 118 Comment: RN notified  Temp 36.9 °C (98.4 °F) (Temporal)   Resp 18   Ht 1.829 m (6')   Wt 92.3 kg (203 lb 7.8 oz)   SpO2 98%   BMI 27.60 kg/m²    Labs reviewed.   WBC trending down.   Hgb at 9.4  Bun at 27  Ca at 8.2    Ruling out C-diff this am d/t continued loose stools. If negative to request BMS.     ILEANA pain.   Pt is extremely restless and appears to be actively attempting to remove cortrak, remote tele monitor and PIV.     Mittens requested.     +BM, lots and loose.   Incontinent of urine.     Skin appears to be in decent condition but skin at medial buttocks is excoriated and reddened.   Pt on [id number].     Cortrak secured at 92cm at nare and running Diabetisource at 70mL/hr.     Right FA PIV, SL.     Pt demonstrates 5/5 strength in all extremities.   Skin beneath restraints appears PWD and intact.     Call light with sitter.
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
## 2019-09-29 ENCOUNTER — APPOINTMENT (OUTPATIENT)
Dept: RADIOLOGY | Facility: MEDICAL CENTER | Age: 79
DRG: 003 | End: 2019-09-29
Attending: SURGERY
Payer: MEDICARE

## 2019-09-29 LAB
ANION GAP SERPL CALC-SCNC: 6 MMOL/L (ref 0–11.9)
BASOPHILS # BLD AUTO: 0 % (ref 0–1.8)
BASOPHILS # BLD: 0 K/UL (ref 0–0.12)
BUN SERPL-MCNC: 32 MG/DL (ref 8–22)
BURR CELLS BLD QL SMEAR: NORMAL
CALCIUM SERPL-MCNC: 8.3 MG/DL (ref 8.5–10.5)
CHLORIDE SERPL-SCNC: 104 MMOL/L (ref 96–112)
CO2 SERPL-SCNC: 28 MMOL/L (ref 20–33)
CREAT SERPL-MCNC: 1.22 MG/DL (ref 0.5–1.4)
EOSINOPHIL # BLD AUTO: 1.55 K/UL (ref 0–0.51)
EOSINOPHIL NFR BLD: 7 % (ref 0–6.9)
ERYTHROCYTE [DISTWIDTH] IN BLOOD BY AUTOMATED COUNT: 52 FL (ref 35.9–50)
GLUCOSE SERPL-MCNC: 129 MG/DL (ref 65–99)
HCT VFR BLD AUTO: 34.1 % (ref 42–52)
HGB BLD-MCNC: 10.6 G/DL (ref 14–18)
LYMPHOCYTES # BLD AUTO: 0.97 K/UL (ref 1–4.8)
LYMPHOCYTES NFR BLD: 4.4 % (ref 22–41)
MANUAL DIFF BLD: NORMAL
MCH RBC QN AUTO: 30 PG (ref 27–33)
MCHC RBC AUTO-ENTMCNC: 31.1 G/DL (ref 33.7–35.3)
MCV RBC AUTO: 96.6 FL (ref 81.4–97.8)
METAMYELOCYTES NFR BLD MANUAL: 2.6 %
MONOCYTES # BLD AUTO: 0.77 K/UL (ref 0–0.85)
MONOCYTES NFR BLD AUTO: 3.5 % (ref 0–13.4)
MORPHOLOGY BLD-IMP: NORMAL
MYELOCYTES NFR BLD MANUAL: 1.8 %
NEUTROPHILS # BLD AUTO: 17.83 K/UL (ref 1.82–7.42)
NEUTROPHILS NFR BLD: 80.7 % (ref 44–72)
NRBC # BLD AUTO: 0 K/UL
NRBC BLD-RTO: 0 /100 WBC
OVALOCYTES BLD QL SMEAR: NORMAL
PLATELET # BLD AUTO: 271 K/UL (ref 164–446)
PLATELET BLD QL SMEAR: NORMAL
PMV BLD AUTO: 9.2 FL (ref 9–12.9)
POIKILOCYTOSIS BLD QL SMEAR: NORMAL
POTASSIUM SERPL-SCNC: 3.9 MMOL/L (ref 3.6–5.5)
RBC # BLD AUTO: 3.53 M/UL (ref 4.7–6.1)
RBC BLD AUTO: PRESENT
SODIUM SERPL-SCNC: 138 MMOL/L (ref 135–145)
WBC # BLD AUTO: 22.1 K/UL (ref 4.8–10.8)

## 2019-09-29 PROCEDURE — 80048 BASIC METABOLIC PNL TOTAL CA: CPT

## 2019-09-29 PROCEDURE — 36415 COLL VENOUS BLD VENIPUNCTURE: CPT

## 2019-09-29 PROCEDURE — 700105 HCHG RX REV CODE 258: Performed by: NURSE PRACTITIONER

## 2019-09-29 PROCEDURE — 700101 HCHG RX REV CODE 250: Performed by: NURSE PRACTITIONER

## 2019-09-29 PROCEDURE — 700102 HCHG RX REV CODE 250 W/ 637 OVERRIDE(OP): Performed by: SURGERY

## 2019-09-29 PROCEDURE — 700101 HCHG RX REV CODE 250: Performed by: SURGERY

## 2019-09-29 PROCEDURE — 94640 AIRWAY INHALATION TREATMENT: CPT

## 2019-09-29 PROCEDURE — A9270 NON-COVERED ITEM OR SERVICE: HCPCS | Performed by: SURGERY

## 2019-09-29 PROCEDURE — 700102 HCHG RX REV CODE 250 W/ 637 OVERRIDE(OP): Performed by: STUDENT IN AN ORGANIZED HEALTH CARE EDUCATION/TRAINING PROGRAM

## 2019-09-29 PROCEDURE — A9270 NON-COVERED ITEM OR SERVICE: HCPCS | Performed by: NURSE PRACTITIONER

## 2019-09-29 PROCEDURE — 306565 RIGID MIT RESTRAINT(PAIR): Performed by: SURGERY

## 2019-09-29 PROCEDURE — 306595 SYSTEM,FEED TUBE CLOG ZAPPER: Performed by: SURGERY

## 2019-09-29 PROCEDURE — 85007 BL SMEAR W/DIFF WBC COUNT: CPT

## 2019-09-29 PROCEDURE — 700102 HCHG RX REV CODE 250 W/ 637 OVERRIDE(OP): Performed by: NURSE PRACTITIONER

## 2019-09-29 PROCEDURE — 700105 HCHG RX REV CODE 258

## 2019-09-29 PROCEDURE — A9270 NON-COVERED ITEM OR SERVICE: HCPCS | Performed by: STUDENT IN AN ORGANIZED HEALTH CARE EDUCATION/TRAINING PROGRAM

## 2019-09-29 PROCEDURE — 94760 N-INVAS EAR/PLS OXIMETRY 1: CPT

## 2019-09-29 PROCEDURE — 770020 HCHG ROOM/CARE - TELE (206)

## 2019-09-29 PROCEDURE — 85027 COMPLETE CBC AUTOMATED: CPT

## 2019-09-29 PROCEDURE — A9270 NON-COVERED ITEM OR SERVICE: HCPCS | Performed by: ORAL & MAXILLOFACIAL SURGERY

## 2019-09-29 PROCEDURE — 700102 HCHG RX REV CODE 250 W/ 637 OVERRIDE(OP): Performed by: ORAL & MAXILLOFACIAL SURGERY

## 2019-09-29 RX ORDER — SODIUM CHLORIDE 9 MG/ML
INJECTION, SOLUTION INTRAVENOUS
Status: COMPLETED
Start: 2019-09-29 | End: 2019-09-29

## 2019-09-29 RX ADMIN — SODIUM CHLORIDE SOLN NEBU 3% 3 ML: 3 NEBU SOLN at 07:34

## 2019-09-29 RX ADMIN — SODIUM CHLORIDE SOLN NEBU 3% 3 ML: 3 NEBU SOLN at 16:23

## 2019-09-29 RX ADMIN — METOPROLOL TARTRATE 75 MG: 25 TABLET, FILM COATED ORAL at 13:01

## 2019-09-29 RX ADMIN — METRONIDAZOLE 500 MG: 500 TABLET ORAL at 06:17

## 2019-09-29 RX ADMIN — QUETIAPINE FUMARATE 75 MG: 25 TABLET ORAL at 13:01

## 2019-09-29 RX ADMIN — ALBUTEROL SULFATE 2.5 MG: 5 SOLUTION RESPIRATORY (INHALATION) at 21:05

## 2019-09-29 RX ADMIN — ACETAMINOPHEN 500 MG: 500 TABLET ORAL at 06:18

## 2019-09-29 RX ADMIN — ACETAMINOPHEN 500 MG: 500 TABLET ORAL at 13:01

## 2019-09-29 RX ADMIN — DIVALPROEX SODIUM 250 MG: 125 CAPSULE, COATED PELLETS ORAL at 14:49

## 2019-09-29 RX ADMIN — GUAIFENESIN 200 MG: 100 SOLUTION ORAL at 09:51

## 2019-09-29 RX ADMIN — DIVALPROEX SODIUM 250 MG: 125 CAPSULE, COATED PELLETS ORAL at 06:17

## 2019-09-29 RX ADMIN — ALBUTEROL SULFATE 2.5 MG: 5 SOLUTION RESPIRATORY (INHALATION) at 07:34

## 2019-09-29 RX ADMIN — SODIUM CHLORIDE SOLN NEBU 3% 3 ML: 3 NEBU SOLN at 21:05

## 2019-09-29 RX ADMIN — BACITRACIN ZINC: 500 OINTMENT TOPICAL at 06:19

## 2019-09-29 RX ADMIN — CHLORHEXIDINE GLUCONATE 0.12% ORAL RINSE 15 ML: 1.2 LIQUID ORAL at 06:17

## 2019-09-29 RX ADMIN — FLUCONAZOLE 200 MG: 200 TABLET ORAL at 06:17

## 2019-09-29 RX ADMIN — GUAIFENESIN 200 MG: 100 SOLUTION ORAL at 06:19

## 2019-09-29 RX ADMIN — APIXABAN 5 MG: 5 TABLET, FILM COATED ORAL at 06:17

## 2019-09-29 RX ADMIN — METRONIDAZOLE 500 MG: 500 TABLET ORAL at 14:49

## 2019-09-29 RX ADMIN — SODIUM CHLORIDE SOLN NEBU 3% 3 ML: 3 NEBU SOLN at 10:47

## 2019-09-29 RX ADMIN — ALBUTEROL SULFATE 2.5 MG: 5 SOLUTION RESPIRATORY (INHALATION) at 16:23

## 2019-09-29 RX ADMIN — GUAIFENESIN 200 MG: 100 SOLUTION ORAL at 13:00

## 2019-09-29 RX ADMIN — SPIRONOLACTONE 25 MG: 50 TABLET ORAL at 06:18

## 2019-09-29 RX ADMIN — LINEZOLID 600 MG: 600 TABLET, FILM COATED ORAL at 06:18

## 2019-09-29 RX ADMIN — METOPROLOL TARTRATE 75 MG: 25 TABLET, FILM COATED ORAL at 06:17

## 2019-09-29 RX ADMIN — Medication: at 06:17

## 2019-09-29 RX ADMIN — SODIUM CHLORIDE 2 G: 900 INJECTION INTRAVENOUS at 06:17

## 2019-09-29 RX ADMIN — ALBUTEROL SULFATE 2.5 MG: 5 SOLUTION RESPIRATORY (INHALATION) at 10:47

## 2019-09-29 RX ADMIN — SODIUM CHLORIDE 2 G: 900 INJECTION INTRAVENOUS at 14:49

## 2019-09-29 RX ADMIN — GUAIFENESIN 200 MG: 100 SOLUTION ORAL at 03:20

## 2019-09-29 RX ADMIN — SODIUM CHLORIDE 500 ML: 9 INJECTION, SOLUTION INTRAVENOUS at 06:20

## 2019-09-29 RX ADMIN — QUETIAPINE FUMARATE 75 MG: 25 TABLET ORAL at 06:17

## 2019-09-29 ASSESSMENT — ENCOUNTER SYMPTOMS: DIARRHEA: 1

## 2019-09-29 NOTE — PROGRESS NOTES
Pt awake and alert. Agitated and continuing to attempt to pull at lines and tubes. Pt able to removed restraints at times. Safety sitter at bedside at all times.  Trache care performed, inner cannula replaced. Pt has not required suctioning this shift.  Coretrac in place, tolerating tube feedings at goal of diabetasource 70mL/hr.
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
## 2019-09-29 NOTE — CARE PLAN
Problem: Safety  Goal: Will remain free from falls  Note:   Patient at high risk to fall. Bed locked in lowest position, treaded socks in place, call light within reach.  1:1 sitter at bedside.     Problem: Skin Integrity  Goal: Risk for impaired skin integrity will decrease  Outcome: PROGRESSING AS EXPECTED  Note:   Patient at moderate risk for skin breakdown. Q2h turns in place, incontinence monitoring completed, and linens kept clean/dry. Barrier cream in use after incontinent episodes.

## 2019-09-29 NOTE — CARE PLAN
Problem: Safety - Medical Restraint  Goal: Remains free of injury from restraints (Restraint for Interference with Medical Device)  Description  INTERVENTIONS:  1. Determine that other, less restrictive measures have been tried or would not be effective before applying the restraint  2. Evaluate the patient's condition at the time of restraint application  3. Inform patient/family regarding the reason for restraint  4. Q2H: Monitor safety, psychosocial status, comfort, nutrition and hydration  Outcome: PROGRESSING AS EXPECTED     Problem: Oxygenation:  Goal: Maintain adequate oxygenation dependent on patient condition  Outcome: PROGRESSING AS EXPECTED     Problem: Bronchopulmonary Hygiene:  Goal: Increase mobilization of retained secretions  Outcome: PROGRESSING AS EXPECTED     3% and albuterol QID  Aerosol 4L 28%  Small-moderate white to  yellow thick to then secrection

## 2019-09-29 NOTE — CARE PLAN
Problem: Communication  Goal: The ability to communicate needs accurately and effectively will improve  Outcome: PROGRESSING SLOWER THAN EXPECTED     Problem: Safety  Goal: Will remain free from injury  Outcome: PROGRESSING SLOWER THAN EXPECTED

## 2019-09-30 ENCOUNTER — APPOINTMENT (OUTPATIENT)
Dept: RADIOLOGY | Facility: MEDICAL CENTER | Age: 79
DRG: 003 | End: 2019-09-30
Attending: SURGERY
Payer: MEDICARE

## 2019-09-30 PROBLEM — N28.9 RENAL INSUFFICIENCY: Status: ACTIVE | Noted: 2019-09-30

## 2019-09-30 LAB
ANION GAP SERPL CALC-SCNC: 10 MMOL/L (ref 0–11.9)
ANION GAP SERPL CALC-SCNC: 11 MMOL/L (ref 0–11.9)
ANION GAP SERPL CALC-SCNC: 12 MMOL/L (ref 0–11.9)
ANISOCYTOSIS BLD QL SMEAR: ABNORMAL
BASOPHILS # BLD AUTO: 0 % (ref 0–1.8)
BASOPHILS # BLD: 0 K/UL (ref 0–0.12)
BUN SERPL-MCNC: 44 MG/DL (ref 8–22)
BUN SERPL-MCNC: 49 MG/DL (ref 8–22)
BUN SERPL-MCNC: 54 MG/DL (ref 8–22)
CALCIUM SERPL-MCNC: 7.5 MG/DL (ref 8.5–10.5)
CALCIUM SERPL-MCNC: 7.5 MG/DL (ref 8.5–10.5)
CALCIUM SERPL-MCNC: 8.1 MG/DL (ref 8.5–10.5)
CHLORIDE SERPL-SCNC: 103 MMOL/L (ref 96–112)
CHLORIDE SERPL-SCNC: 104 MMOL/L (ref 96–112)
CHLORIDE SERPL-SCNC: 105 MMOL/L (ref 96–112)
CO2 SERPL-SCNC: 19 MMOL/L (ref 20–33)
CO2 SERPL-SCNC: 22 MMOL/L (ref 20–33)
CO2 SERPL-SCNC: 25 MMOL/L (ref 20–33)
CREAT SERPL-MCNC: 2.79 MG/DL (ref 0.5–1.4)
CREAT SERPL-MCNC: 3.59 MG/DL (ref 0.5–1.4)
CREAT SERPL-MCNC: 4.37 MG/DL (ref 0.5–1.4)
CRP SERPL HS-MCNC: 3.55 MG/DL (ref 0–0.75)
DIGOXIN SERPL-MCNC: 1.1 NG/ML (ref 0.8–2)
EOSINOPHIL # BLD AUTO: 1.48 K/UL (ref 0–0.51)
EOSINOPHIL NFR BLD: 7 % (ref 0–6.9)
ERYTHROCYTE [DISTWIDTH] IN BLOOD BY AUTOMATED COUNT: 54.1 FL (ref 35.9–50)
GLUCOSE SERPL-MCNC: 111 MG/DL (ref 65–99)
GLUCOSE SERPL-MCNC: 85 MG/DL (ref 65–99)
GLUCOSE SERPL-MCNC: 89 MG/DL (ref 65–99)
HCT VFR BLD AUTO: 32.3 % (ref 42–52)
HGB BLD-MCNC: 9.7 G/DL (ref 14–18)
HYPOCHROMIA BLD QL SMEAR: ABNORMAL
LYMPHOCYTES # BLD AUTO: 0.55 K/UL (ref 1–4.8)
LYMPHOCYTES NFR BLD: 2.6 % (ref 22–41)
MACROCYTES BLD QL SMEAR: ABNORMAL
MAGNESIUM SERPL-MCNC: 2.5 MG/DL (ref 1.5–2.5)
MANUAL DIFF BLD: NORMAL
MCH RBC QN AUTO: 29.2 PG (ref 27–33)
MCHC RBC AUTO-ENTMCNC: 30 G/DL (ref 33.7–35.3)
MCV RBC AUTO: 97.3 FL (ref 81.4–97.8)
MONOCYTES # BLD AUTO: 0.55 K/UL (ref 0–0.85)
MONOCYTES NFR BLD AUTO: 2.6 % (ref 0–13.4)
MORPHOLOGY BLD-IMP: NORMAL
NEUTROPHILS # BLD AUTO: 18.61 K/UL (ref 1.82–7.42)
NEUTROPHILS NFR BLD: 87.8 % (ref 44–72)
NRBC # BLD AUTO: 0 K/UL
NRBC BLD-RTO: 0 /100 WBC
PHOSPHATE SERPL-MCNC: 5 MG/DL (ref 2.5–4.5)
PLATELET # BLD AUTO: 233 K/UL (ref 164–446)
PLATELET BLD QL SMEAR: NORMAL
PMV BLD AUTO: 9.2 FL (ref 9–12.9)
POLYCHROMASIA BLD QL SMEAR: NORMAL
POTASSIUM SERPL-SCNC: 4 MMOL/L (ref 3.6–5.5)
POTASSIUM SERPL-SCNC: 4.2 MMOL/L (ref 3.6–5.5)
POTASSIUM SERPL-SCNC: 5.1 MMOL/L (ref 3.6–5.5)
PREALB SERPL-MCNC: 16 MG/DL (ref 18–38)
RBC # BLD AUTO: 3.32 M/UL (ref 4.7–6.1)
RBC BLD AUTO: PRESENT
SODIUM SERPL-SCNC: 135 MMOL/L (ref 135–145)
SODIUM SERPL-SCNC: 136 MMOL/L (ref 135–145)
SODIUM SERPL-SCNC: 140 MMOL/L (ref 135–145)
WBC # BLD AUTO: 21.2 K/UL (ref 4.8–10.8)

## 2019-09-30 PROCEDURE — 700102 HCHG RX REV CODE 250 W/ 637 OVERRIDE(OP): Performed by: NURSE PRACTITIONER

## 2019-09-30 PROCEDURE — A9270 NON-COVERED ITEM OR SERVICE: HCPCS | Performed by: STUDENT IN AN ORGANIZED HEALTH CARE EDUCATION/TRAINING PROGRAM

## 2019-09-30 PROCEDURE — 85027 COMPLETE CBC AUTOMATED: CPT

## 2019-09-30 PROCEDURE — 700102 HCHG RX REV CODE 250 W/ 637 OVERRIDE(OP): Performed by: SURGERY

## 2019-09-30 PROCEDURE — 94760 N-INVAS EAR/PLS OXIMETRY 1: CPT

## 2019-09-30 PROCEDURE — A9270 NON-COVERED ITEM OR SERVICE: HCPCS | Performed by: NURSE PRACTITIONER

## 2019-09-30 PROCEDURE — 770022 HCHG ROOM/CARE - ICU (200)

## 2019-09-30 PROCEDURE — A9270 NON-COVERED ITEM OR SERVICE: HCPCS | Performed by: SURGERY

## 2019-09-30 PROCEDURE — 700105 HCHG RX REV CODE 258: Performed by: NURSE PRACTITIONER

## 2019-09-30 PROCEDURE — A9270 NON-COVERED ITEM OR SERVICE: HCPCS | Performed by: ORAL & MAXILLOFACIAL SURGERY

## 2019-09-30 PROCEDURE — 700102 HCHG RX REV CODE 250 W/ 637 OVERRIDE(OP): Performed by: STUDENT IN AN ORGANIZED HEALTH CARE EDUCATION/TRAINING PROGRAM

## 2019-09-30 PROCEDURE — 700105 HCHG RX REV CODE 258: Performed by: SURGERY

## 2019-09-30 PROCEDURE — A9270 NON-COVERED ITEM OR SERVICE: HCPCS | Performed by: PSYCHIATRY & NEUROLOGY

## 2019-09-30 PROCEDURE — 80162 ASSAY OF DIGOXIN TOTAL: CPT

## 2019-09-30 PROCEDURE — 700102 HCHG RX REV CODE 250 W/ 637 OVERRIDE(OP): Performed by: PSYCHIATRY & NEUROLOGY

## 2019-09-30 PROCEDURE — 700101 HCHG RX REV CODE 250: Performed by: NURSE PRACTITIONER

## 2019-09-30 PROCEDURE — 85007 BL SMEAR W/DIFF WBC COUNT: CPT

## 2019-09-30 PROCEDURE — 700102 HCHG RX REV CODE 250 W/ 637 OVERRIDE(OP): Performed by: ORAL & MAXILLOFACIAL SURGERY

## 2019-09-30 PROCEDURE — 302136 NUTRITION PUMP: Performed by: SURGERY

## 2019-09-30 PROCEDURE — 84100 ASSAY OF PHOSPHORUS: CPT

## 2019-09-30 PROCEDURE — 94640 AIRWAY INHALATION TREATMENT: CPT

## 2019-09-30 PROCEDURE — 700101 HCHG RX REV CODE 250: Performed by: SURGERY

## 2019-09-30 PROCEDURE — 51798 US URINE CAPACITY MEASURE: CPT

## 2019-09-30 PROCEDURE — 80048 BASIC METABOLIC PNL TOTAL CA: CPT

## 2019-09-30 PROCEDURE — 71045 X-RAY EXAM CHEST 1 VIEW: CPT

## 2019-09-30 PROCEDURE — 84134 ASSAY OF PREALBUMIN: CPT

## 2019-09-30 PROCEDURE — 99232 SBSQ HOSP IP/OBS MODERATE 35: CPT | Performed by: INTERNAL MEDICINE

## 2019-09-30 PROCEDURE — 86140 C-REACTIVE PROTEIN: CPT

## 2019-09-30 PROCEDURE — 83735 ASSAY OF MAGNESIUM: CPT

## 2019-09-30 PROCEDURE — 302146: Performed by: SURGERY

## 2019-09-30 RX ORDER — SODIUM CHLORIDE 9 MG/ML
500 INJECTION, SOLUTION INTRAVENOUS ONCE
Status: COMPLETED | OUTPATIENT
Start: 2019-09-30 | End: 2019-09-30

## 2019-09-30 RX ORDER — FLUCONAZOLE 100 MG/1
100 TABLET ORAL DAILY
Status: DISCONTINUED | OUTPATIENT
Start: 2019-09-30 | End: 2019-10-02

## 2019-09-30 RX ORDER — SODIUM CHLORIDE, SODIUM LACTATE, POTASSIUM CHLORIDE, CALCIUM CHLORIDE 600; 310; 30; 20 MG/100ML; MG/100ML; MG/100ML; MG/100ML
INJECTION, SOLUTION INTRAVENOUS CONTINUOUS
Status: DISCONTINUED | OUTPATIENT
Start: 2019-09-30 | End: 2019-10-02

## 2019-09-30 RX ORDER — SODIUM CHLORIDE, SODIUM LACTATE, POTASSIUM CHLORIDE, AND CALCIUM CHLORIDE .6; .31; .03; .02 G/100ML; G/100ML; G/100ML; G/100ML
2000 INJECTION, SOLUTION INTRAVENOUS ONCE
Status: COMPLETED | OUTPATIENT
Start: 2019-09-30 | End: 2019-10-01

## 2019-09-30 RX ADMIN — QUETIAPINE FUMARATE 75 MG: 25 TABLET ORAL at 18:04

## 2019-09-30 RX ADMIN — METOPROLOL TARTRATE 75 MG: 25 TABLET, FILM COATED ORAL at 14:02

## 2019-09-30 RX ADMIN — ACETAMINOPHEN 500 MG: 500 TABLET ORAL at 06:05

## 2019-09-30 RX ADMIN — GUAIFENESIN 200 MG: 100 SOLUTION ORAL at 10:58

## 2019-09-30 RX ADMIN — QUETIAPINE FUMARATE 75 MG: 25 TABLET ORAL at 14:02

## 2019-09-30 RX ADMIN — DIGOXIN 125 MCG: 250 TABLET ORAL at 06:06

## 2019-09-30 RX ADMIN — CHLORHEXIDINE GLUCONATE 0.12% ORAL RINSE 15 ML: 1.2 LIQUID ORAL at 06:05

## 2019-09-30 RX ADMIN — BACITRACIN ZINC: 500 OINTMENT TOPICAL at 06:07

## 2019-09-30 RX ADMIN — GUAIFENESIN 200 MG: 100 SOLUTION ORAL at 22:29

## 2019-09-30 RX ADMIN — DIVALPROEX SODIUM 250 MG: 125 CAPSULE, COATED PELLETS ORAL at 14:07

## 2019-09-30 RX ADMIN — METOPROLOL TARTRATE 75 MG: 25 TABLET, FILM COATED ORAL at 06:06

## 2019-09-30 RX ADMIN — LINEZOLID 600 MG: 600 TABLET, FILM COATED ORAL at 06:05

## 2019-09-30 RX ADMIN — ALBUTEROL SULFATE 2.5 MG: 5 SOLUTION RESPIRATORY (INHALATION) at 20:09

## 2019-09-30 RX ADMIN — GUAIFENESIN 200 MG: 100 SOLUTION ORAL at 06:05

## 2019-09-30 RX ADMIN — GUAIFENESIN 200 MG: 100 SOLUTION ORAL at 14:07

## 2019-09-30 RX ADMIN — TRAZODONE HYDROCHLORIDE 50 MG: 50 TABLET ORAL at 21:55

## 2019-09-30 RX ADMIN — SPIRONOLACTONE 25 MG: 50 TABLET ORAL at 06:06

## 2019-09-30 RX ADMIN — FLUCONAZOLE 100 MG: 200 TABLET ORAL at 06:06

## 2019-09-30 RX ADMIN — GUAIFENESIN 200 MG: 100 SOLUTION ORAL at 18:03

## 2019-09-30 RX ADMIN — CHLORHEXIDINE GLUCONATE 0.12% ORAL RINSE 15 ML: 1.2 LIQUID ORAL at 18:03

## 2019-09-30 RX ADMIN — ALBUTEROL SULFATE 2.5 MG: 5 SOLUTION RESPIRATORY (INHALATION) at 14:34

## 2019-09-30 RX ADMIN — SODIUM CHLORIDE SOLN NEBU 3% 3 ML: 3 NEBU SOLN at 14:34

## 2019-09-30 RX ADMIN — DIVALPROEX SODIUM 250 MG: 125 CAPSULE, COATED PELLETS ORAL at 22:29

## 2019-09-30 RX ADMIN — SODIUM CHLORIDE SOLN NEBU 3% 3 ML: 3 NEBU SOLN at 20:06

## 2019-09-30 RX ADMIN — BACITRACIN ZINC: 500 OINTMENT TOPICAL at 18:04

## 2019-09-30 RX ADMIN — METRONIDAZOLE 500 MG: 500 TABLET ORAL at 06:06

## 2019-09-30 RX ADMIN — SODIUM CHLORIDE 2 G: 900 INJECTION INTRAVENOUS at 06:05

## 2019-09-30 RX ADMIN — QUETIAPINE FUMARATE 75 MG: 25 TABLET ORAL at 06:06

## 2019-09-30 RX ADMIN — SODIUM CHLORIDE, POTASSIUM CHLORIDE, SODIUM LACTATE AND CALCIUM CHLORIDE 2000 ML: 600; 310; 30; 20 INJECTION, SOLUTION INTRAVENOUS at 20:42

## 2019-09-30 RX ADMIN — SODIUM CHLORIDE 2 G: 900 INJECTION INTRAVENOUS at 00:58

## 2019-09-30 RX ADMIN — METOPROLOL TARTRATE 75 MG: 25 TABLET, FILM COATED ORAL at 18:03

## 2019-09-30 RX ADMIN — BACITRACIN ZINC: 500 OINTMENT TOPICAL at 01:02

## 2019-09-30 RX ADMIN — SODIUM CHLORIDE, POTASSIUM CHLORIDE, SODIUM LACTATE AND CALCIUM CHLORIDE: 600; 310; 30; 20 INJECTION, SOLUTION INTRAVENOUS at 10:58

## 2019-09-30 RX ADMIN — APIXABAN 5 MG: 5 TABLET, FILM COATED ORAL at 06:05

## 2019-09-30 RX ADMIN — DIVALPROEX SODIUM 250 MG: 125 CAPSULE, COATED PELLETS ORAL at 06:05

## 2019-09-30 RX ADMIN — APIXABAN 2.5 MG: 5 TABLET, FILM COATED ORAL at 18:04

## 2019-09-30 RX ADMIN — TERAZOSIN HYDROCHLORIDE ANHYDROUS 1 MG: 1 CAPSULE ORAL at 18:03

## 2019-09-30 RX ADMIN — SODIUM CHLORIDE 500 ML: 9 INJECTION, SOLUTION INTRAVENOUS at 04:27

## 2019-09-30 RX ADMIN — SODIUM CHLORIDE SOLN NEBU 3% 3 ML: 3 NEBU SOLN at 08:51

## 2019-09-30 RX ADMIN — ALBUTEROL SULFATE 2.5 MG: 5 SOLUTION RESPIRATORY (INHALATION) at 12:40

## 2019-09-30 RX ADMIN — ALBUTEROL SULFATE 2.5 MG: 5 SOLUTION RESPIRATORY (INHALATION) at 08:51

## 2019-09-30 RX ADMIN — SODIUM CHLORIDE SOLN NEBU 3% 3 ML: 3 NEBU SOLN at 11:00

## 2019-09-30 ASSESSMENT — ENCOUNTER SYMPTOMS: DIARRHEA: 1
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
## 2019-09-30 NOTE — PROGRESS NOTES
Report received. Assumed care at 0700. Assessment completed.  Unable to assess orientation as pt is trached and unwilling to answer questions  Doesn't appear to be in pain or distress  Pt wrists restrained with soft restraints and mittens per active order d/t continued attempts to pull at lines and trache  Trache with inline suction in place, suctioning frequently  Tele monitor in place  +loose incontinent BM's  +void via condom cath for incontinence  Cortrac in place with tube feed running at 70mL/hr  Q2 turns, oral care and restraint checks in place   All needs met at this time. Fall Precautions and hourly rounding in place.  1:1 sitter at bedside

## 2019-09-30 NOTE — PROGRESS NOTES
Late entry - Received report of patient at start of shift. Unable to assess orientation - patient able to minimally speak, but does not respond to questions appropriately. Wife at bedside in afternoon. Scheduled medications administered through Cortrak. Cortrak noted to be clogged at 1500. Clog zapper attempted, unsuccessful. New Cortrak placed at 1800, per abdominal xray Cortrak is in incorrect location. Pending new placement, 1800 medications held. 1:1 sitter at bedside. Bilateral wrist and mitt restraints continued. Safety precautions in place.

## 2019-09-30 NOTE — PROGRESS NOTES
Multiple attempts made to replace Cortrak. Tube successfully placed at 0500 with x-ray placement verification. Pharmacy notified, meds rescheduled.  Trauma APRN notified of patient's renal labs, orders received.  Pt continued to pull at tubes and lines and attempting to ambulate with restraints.

## 2019-09-30 NOTE — PROGRESS NOTES
/86   Pulse 85   Temp 36.7 °C (98.1 °F) (Temporal)   Resp 16   Ht 1.829 m (6')   Wt 92.3 kg (203 lb 7.8 oz)   SpO2 94%     Notified of AM labs with worsening renal indices.    Chart reviewed:  - Persistent leukocytosis. Antibiotics for chin wound drainage since 9/26, Fluconazole added 9/28  - Patient has had episodes of diarrhea / loose stool pst few days with negative C-diff test.   - Tube feeding had been off since patient accidentally pulled cortrak last night (~12 hrs now) pt has no other PO or IV fluid intake (IV abx only account for  200ml over past 16 hrs)  - Pt with history of CHF and most current EF of 45% (no diuretics)   - Patient is incontinent with condom catheter / unable to measure accurate UO    Plan:  - give total of 500 ml of NS infusion at 200 ml / hr   - monitor patient's respiratory status  - repeat BMP at 0800  - re-place cortrak as soon as able and resume tube feeds.  - may need adjustment of Fluconazole dosage

## 2019-09-30 NOTE — DISCHARGE PLANNING
Anticipated Discharge Disposition: [facility]patient [facility].    Action: LSW discussed this case with Rosamaria Boudreaux.  Per Kanika, this patient is not medical cleared and his medical needs can not be manage at AM, LTAC, or SNF.    Barriers to Discharge: None.    Plan: As Above.  This patient is on a Legal Hold.  LSW will initiate the referrals when this patient is medically cleared.

## 2019-09-30 NOTE — ASSESSMENT & PLAN NOTE
9/30 worsening renal indices with BUN 44/ Cr 2.79 / GFR 22  - diarrhea past few days  - tube feeding off x 12 hrs  - no other PO or IV fluid intake  - Fuconazole added on 9/28- dose adjusted to 50% this AM  Give NS bolus repeat BMP at 0800     10/2 Renal indices normalized

## 2019-10-01 ENCOUNTER — APPOINTMENT (OUTPATIENT)
Dept: RADIOLOGY | Facility: MEDICAL CENTER | Age: 79
DRG: 003 | End: 2019-10-01
Attending: SURGERY
Payer: MEDICARE

## 2019-10-01 LAB
ANION GAP SERPL CALC-SCNC: 5 MMOL/L (ref 0–11.9)
ANION GAP SERPL CALC-SCNC: 8 MMOL/L (ref 0–11.9)
BASOPHILS # BLD AUTO: 0.5 % (ref 0–1.8)
BASOPHILS # BLD: 0.07 K/UL (ref 0–0.12)
BUN SERPL-MCNC: 34 MG/DL (ref 8–22)
BUN SERPL-MCNC: 42 MG/DL (ref 8–22)
CALCIUM SERPL-MCNC: 7.6 MG/DL (ref 8.5–10.5)
CALCIUM SERPL-MCNC: 7.9 MG/DL (ref 8.5–10.5)
CHLORIDE SERPL-SCNC: 106 MMOL/L (ref 96–112)
CHLORIDE SERPL-SCNC: 110 MMOL/L (ref 96–112)
CO2 SERPL-SCNC: 24 MMOL/L (ref 20–33)
CO2 SERPL-SCNC: 24 MMOL/L (ref 20–33)
CREAT SERPL-MCNC: 1.56 MG/DL (ref 0.5–1.4)
CREAT SERPL-MCNC: 2.68 MG/DL (ref 0.5–1.4)
EOSINOPHIL # BLD AUTO: 1.97 K/UL (ref 0–0.51)
EOSINOPHIL NFR BLD: 14.1 % (ref 0–6.9)
ERYTHROCYTE [DISTWIDTH] IN BLOOD BY AUTOMATED COUNT: 55 FL (ref 35.9–50)
GLUCOSE SERPL-MCNC: 102 MG/DL (ref 65–99)
GLUCOSE SERPL-MCNC: 80 MG/DL (ref 65–99)
HCT VFR BLD AUTO: 27.6 % (ref 42–52)
HGB BLD-MCNC: 8.7 G/DL (ref 14–18)
IMM GRANULOCYTES # BLD AUTO: 0.55 K/UL (ref 0–0.11)
IMM GRANULOCYTES NFR BLD AUTO: 3.9 % (ref 0–0.9)
LYMPHOCYTES # BLD AUTO: 1.23 K/UL (ref 1–4.8)
LYMPHOCYTES NFR BLD: 8.8 % (ref 22–41)
MCH RBC QN AUTO: 30.5 PG (ref 27–33)
MCHC RBC AUTO-ENTMCNC: 31.5 G/DL (ref 33.7–35.3)
MCV RBC AUTO: 96.8 FL (ref 81.4–97.8)
MONOCYTES # BLD AUTO: 0.71 K/UL (ref 0–0.85)
MONOCYTES NFR BLD AUTO: 5.1 % (ref 0–13.4)
NEUTROPHILS # BLD AUTO: 9.4 K/UL (ref 1.82–7.42)
NEUTROPHILS NFR BLD: 67.6 % (ref 44–72)
NRBC # BLD AUTO: 0 K/UL
NRBC BLD-RTO: 0 /100 WBC
PLATELET # BLD AUTO: 181 K/UL (ref 164–446)
PMV BLD AUTO: 9.2 FL (ref 9–12.9)
POTASSIUM SERPL-SCNC: 4 MMOL/L (ref 3.6–5.5)
POTASSIUM SERPL-SCNC: 4.3 MMOL/L (ref 3.6–5.5)
RBC # BLD AUTO: 2.85 M/UL (ref 4.7–6.1)
SODIUM SERPL-SCNC: 138 MMOL/L (ref 135–145)
SODIUM SERPL-SCNC: 139 MMOL/L (ref 135–145)
WBC # BLD AUTO: 13.9 K/UL (ref 4.8–10.8)

## 2019-10-01 PROCEDURE — 700102 HCHG RX REV CODE 250 W/ 637 OVERRIDE(OP): Performed by: SURGERY

## 2019-10-01 PROCEDURE — 700101 HCHG RX REV CODE 250: Performed by: SURGERY

## 2019-10-01 PROCEDURE — 700102 HCHG RX REV CODE 250 W/ 637 OVERRIDE(OP): Performed by: NURSE PRACTITIONER

## 2019-10-01 PROCEDURE — 80048 BASIC METABOLIC PNL TOTAL CA: CPT

## 2019-10-01 PROCEDURE — A9270 NON-COVERED ITEM OR SERVICE: HCPCS | Performed by: NURSE PRACTITIONER

## 2019-10-01 PROCEDURE — 99232 SBSQ HOSP IP/OBS MODERATE 35: CPT | Performed by: INTERNAL MEDICINE

## 2019-10-01 PROCEDURE — 94640 AIRWAY INHALATION TREATMENT: CPT

## 2019-10-01 PROCEDURE — 85025 COMPLETE CBC W/AUTO DIFF WBC: CPT

## 2019-10-01 PROCEDURE — 99233 SBSQ HOSP IP/OBS HIGH 50: CPT | Performed by: SURGERY

## 2019-10-01 PROCEDURE — A9270 NON-COVERED ITEM OR SERVICE: HCPCS | Performed by: SURGERY

## 2019-10-01 PROCEDURE — 770001 HCHG ROOM/CARE - MED/SURG/GYN PRIV*

## 2019-10-01 PROCEDURE — 700102 HCHG RX REV CODE 250 W/ 637 OVERRIDE(OP): Performed by: STUDENT IN AN ORGANIZED HEALTH CARE EDUCATION/TRAINING PROGRAM

## 2019-10-01 PROCEDURE — 71045 X-RAY EXAM CHEST 1 VIEW: CPT

## 2019-10-01 PROCEDURE — 700101 HCHG RX REV CODE 250: Performed by: NURSE PRACTITIONER

## 2019-10-01 PROCEDURE — A9270 NON-COVERED ITEM OR SERVICE: HCPCS | Performed by: STUDENT IN AN ORGANIZED HEALTH CARE EDUCATION/TRAINING PROGRAM

## 2019-10-01 PROCEDURE — A9270 NON-COVERED ITEM OR SERVICE: HCPCS | Performed by: ORAL & MAXILLOFACIAL SURGERY

## 2019-10-01 PROCEDURE — 92526 ORAL FUNCTION THERAPY: CPT

## 2019-10-01 PROCEDURE — 700102 HCHG RX REV CODE 250 W/ 637 OVERRIDE(OP): Performed by: ORAL & MAXILLOFACIAL SURGERY

## 2019-10-01 RX ADMIN — SODIUM CHLORIDE SOLN NEBU 3% 3 ML: 3 NEBU SOLN at 19:26

## 2019-10-01 RX ADMIN — GUAIFENESIN 200 MG: 100 SOLUTION ORAL at 14:28

## 2019-10-01 RX ADMIN — DIVALPROEX SODIUM 250 MG: 125 CAPSULE, COATED PELLETS ORAL at 05:29

## 2019-10-01 RX ADMIN — METOPROLOL TARTRATE 75 MG: 25 TABLET, FILM COATED ORAL at 05:29

## 2019-10-01 RX ADMIN — BACITRACIN ZINC: 500 OINTMENT TOPICAL at 17:28

## 2019-10-01 RX ADMIN — CHLORHEXIDINE GLUCONATE 0.12% ORAL RINSE 15 ML: 1.2 LIQUID ORAL at 17:28

## 2019-10-01 RX ADMIN — DIVALPROEX SODIUM 250 MG: 125 CAPSULE, COATED PELLETS ORAL at 14:28

## 2019-10-01 RX ADMIN — APIXABAN 2.5 MG: 5 TABLET, FILM COATED ORAL at 05:29

## 2019-10-01 RX ADMIN — METOPROLOL TARTRATE 75 MG: 25 TABLET, FILM COATED ORAL at 17:28

## 2019-10-01 RX ADMIN — ALBUTEROL SULFATE 2.5 MG: 5 SOLUTION RESPIRATORY (INHALATION) at 11:57

## 2019-10-01 RX ADMIN — ALBUTEROL SULFATE 2.5 MG: 5 SOLUTION RESPIRATORY (INHALATION) at 19:26

## 2019-10-01 RX ADMIN — GUAIFENESIN 200 MG: 100 SOLUTION ORAL at 10:41

## 2019-10-01 RX ADMIN — TERAZOSIN HYDROCHLORIDE ANHYDROUS 1 MG: 1 CAPSULE ORAL at 17:28

## 2019-10-01 RX ADMIN — SODIUM CHLORIDE SOLN NEBU 3% 3 ML: 3 NEBU SOLN at 11:57

## 2019-10-01 RX ADMIN — QUETIAPINE FUMARATE 75 MG: 25 TABLET ORAL at 12:35

## 2019-10-01 RX ADMIN — SODIUM CHLORIDE SOLN NEBU 3% 3 ML: 3 NEBU SOLN at 07:17

## 2019-10-01 RX ADMIN — GUAIFENESIN 200 MG: 100 SOLUTION ORAL at 17:29

## 2019-10-01 RX ADMIN — SODIUM CHLORIDE SOLN NEBU 3% 3 ML: 3 NEBU SOLN at 15:10

## 2019-10-01 RX ADMIN — GUAIFENESIN 200 MG: 100 SOLUTION ORAL at 05:29

## 2019-10-01 RX ADMIN — QUETIAPINE FUMARATE 75 MG: 25 TABLET ORAL at 05:29

## 2019-10-01 RX ADMIN — GUAIFENESIN 200 MG: 100 SOLUTION ORAL at 20:59

## 2019-10-01 RX ADMIN — GUAIFENESIN 200 MG: 100 SOLUTION ORAL at 02:42

## 2019-10-01 RX ADMIN — APIXABAN 2.5 MG: 5 TABLET, FILM COATED ORAL at 17:28

## 2019-10-01 RX ADMIN — BACITRACIN ZINC: 500 OINTMENT TOPICAL at 05:30

## 2019-10-01 RX ADMIN — TRAZODONE HYDROCHLORIDE 50 MG: 50 TABLET ORAL at 20:59

## 2019-10-01 RX ADMIN — CHLORHEXIDINE GLUCONATE 0.12% ORAL RINSE 15 ML: 1.2 LIQUID ORAL at 05:29

## 2019-10-01 RX ADMIN — FLUCONAZOLE 100 MG: 200 TABLET ORAL at 05:29

## 2019-10-01 RX ADMIN — DIVALPROEX SODIUM 250 MG: 125 CAPSULE, COATED PELLETS ORAL at 20:59

## 2019-10-01 RX ADMIN — ALBUTEROL SULFATE 2.5 MG: 5 SOLUTION RESPIRATORY (INHALATION) at 15:10

## 2019-10-01 RX ADMIN — ALBUTEROL SULFATE 2.5 MG: 5 SOLUTION RESPIRATORY (INHALATION) at 07:17

## 2019-10-01 RX ADMIN — METOPROLOL TARTRATE 75 MG: 25 TABLET, FILM COATED ORAL at 12:33

## 2019-10-01 RX ADMIN — QUETIAPINE FUMARATE 75 MG: 25 TABLET ORAL at 17:28

## 2019-10-01 RX ADMIN — DIGOXIN 125 MCG: 250 TABLET ORAL at 05:29

## 2019-10-01 NOTE — THERAPY
Pt transferred to a higher level of care.  Please re consult physical therapy once he is able/appropriate to actively participate in therapy interventions.    Marli Robles PT/DPT  Pager# [phone]

## 2019-10-01 NOTE — CARE PLAN
Problem: Safety  Goal: Will remain free from injury  Outcome: PROGRESSING AS EXPECTED  Note:   Patient educated on POC tonight, sitter at bedside, call light within reach, bed alarm on     Problem: Infection  Goal: Will remain free from infection  Outcome: PROGRESSING AS EXPECTED  Note:   Assessing and monitoring for signs and symptoms of infection, administering antibiotics per MAR, standard and VAP protocol in place, dressing changes complete per orders      Problem: Venous Thromboembolism (VTW)/Deep Vein Thrombosis (DVT) Prevention:  Goal: Patient will participate in Venous Thrombosis (VTE)/Deep Vein Thrombosis (DVT)Prevention Measures  Outcome: PROGRESSING AS EXPECTED  Note:   SCDs in place, prophylactic anticoagulation administered per MAR      Problem: Urinary Elimination:  Goal: Ability to reestablish a normal urinary elimination pattern will improve  Outcome: PROGRESSING AS EXPECTED  Note:   Urinary catheter placed for acute retention, trending BMP per orders, administering fluids per orders

## 2019-10-01 NOTE — PROGRESS NOTES
Patient arrived to [facility] at 2030 via bed on monitor. VSS. Tmep: 98.7f Wt: 96.8kg     2 RN skin assessment complete upon transfer to unit  - moisture associated redness to scrotum and buttocks  - scabs to right face and head  - blanching redness to heels     Preventative measures in place:  Q 2 hour turns, pillows in place for support and cushioning, ensuring medical devices/tubing are not under patient, low air loss mattress, mobility as tolerated    1:1 sitter at bedside

## 2019-10-01 NOTE — PROGRESS NOTES
Pt refusing to urinate   Bladder scan >900  Called APRN  Order for vale  Vale inserted   1700mL dark urine drained

## 2019-10-01 NOTE — THERAPY
Pt transferred to higher level of care. Please re-consult if and when patient is appropriate for therapeutic intervention. DCing current orders.     Please contact with any questions:    Belinda Garcia OTR/L     Pager: [phone]  Voicemail: [phone]

## 2019-10-01 NOTE — CARE PLAN
Problem: Bronchopulmonary Hygiene:  Goal: Increase mobilization of retained secretions  Intervention: Perform bronchopulmonary therapy as indicated by assessment  Note:   Aerosol 4l 28%  3%   Alb QID     Problem: Bronchopulmonary Hygiene:  Goal: Increase mobilization of retained secretions  Intervention: Perform bronchopulmonary therapy as indicated by assessment  Note:   Aerosol 4l 28%  3%   Alb QID

## 2019-10-01 NOTE — CARE PLAN
Problem: Bronchopulmonary Hygiene:  Goal: Increase mobilization of retained secretions  Outcome: PROGRESSING AS EXPECTED    Aerosol T-piece 4l/28%    3% QID  Albuterol QID

## 2019-10-01 NOTE — PROGRESS NOTES
Pharmacy Pharmacotherapy Consult for LOS >30 days    Admit Date: 8/27/2019      Medications were reviewed for appropriateness and ongoing need.     Current Facility-Administered Medications   Medication Dose Route Frequency Provider Last Rate Last Dose   • fluconazole (DIFLUCAN) tablet 100 mg  100 mg Enteral Tube DAILY LUAN MartinezR.N.   100 mg at 10/01/19 0529   • lactated ringers infusion   Intravenous Continuous Kanika Gunn, A.P.N. 100 mL/hr at 09/30/19 1058     • apixaban (ELIQUIS) tablet 2.5 mg  2.5 mg Enteral Tube BID Desmond López M.D.   2.5 mg at 10/01/19 0529   • sodium chloride 3% nebulizer solution 3 mL  3 mL Nebulization 4X/DAY (RT) KAREN Licea.P.N.   3 mL at 09/30/19 2006   • metoprolol (LOPRESSOR) tablet 75 mg  75 mg Enteral Tube TID Kanika Gunn, A.P.N.   75 mg at 10/01/19 0529   • traZODone (DESYREL) tablet 50 mg  50 mg Enteral Tube HS PRN Roselia Mcginnis M.D.   50 mg at 09/30/19 2155   • ziprasidone (GEODON) injection 10 mg  10 mg Intramuscular Q4HRS PRN Roselia Mcginnis M.D.   10 mg at 09/28/19 0809   • QUEtiapine (SEROQUEL) tablet 75 mg  75 mg Enteral Tube TID Desmond López M.D.   75 mg at 10/01/19 0529   • diphenhydrAMINE (BENADRYL ITCH) topical cream   Topical TID PRN Austin Vick M.D.       • digoxin (LANOXIN) tablet 125 mcg  125 mcg Enteral Tube DAILY AT 1800 Marli Boyd, A.P.N.   125 mcg at 10/01/19 0529   • HYDROcodone-acetaminophen (NORCO) 5-325 MG per tablet 1-2 Tab  1-2 Tab Enteral Tube Q6HRS PRN Marli Boyd, A.P.N.   2 Tab at 09/27/19 2149   • guaiFENesin (ROBITUSSIN) 100 MG/5ML solution 200 mg  10 mL Enteral Tube Q4HRS Marli Boyd, A.P.N.   200 mg at 10/01/19 0529   • Divalproex Sodium (DEPAKOTE) capsule 250 mg  250 mg Enteral Tube Q8HRS Latosha Slater M.D.   250 mg at 10/01/19 0529   • terazosin (HYTRIN) capsule 1 mg  1 mg Enteral Tube Q EVENING Mishel Tay A.P.R.N.   1 mg at 09/30/19 1803   •  albuterol (PROVENTIL) 2.5mg/0.5ml nebulizer solution 2.5 mg  2.5 mg Nebulization 4X/DAY (RT) Elvis Reynoso M.D.   2.5 mg at 09/30/19 2009   • bacitracin ointment   Topical BID Troy Dong D.D.S.       • chlorhexidine (PERIDEX) 0.12 % solution 15 mL  15 mL Mouth/Throat BID Troy Dong D.D.S.   15 mL at 10/01/19 0529   • Respiratory Care per Protocol   Nebulization Continuous RT Marli Boyd, A.P.N.       • ondansetron (ZOFRAN) syringe/vial injection 4 mg  4 mg Intravenous Q4HRS PRN Marli Boyd, A.P.N.       • acetaminophen (TYLENOL) tablet 650 mg  650 mg Enteral Tube Q4HRS PRN Marli Boyd, A.P.N.   650 mg at 09/19/19 2112    Or   • acetaminophen (TYLENOL) suppository 650 mg  650 mg Rectal Q4HRS PRN Marli Boyd, A.P.N.       • Pharmacy Consult: Enteral tube insertion - review meds/change route/product selection  1 Each Other PHARMACY TO DOSE Kam Torres D.O.           Recommendations:  Reviewed patient chart. No recommended interventions at this time.    Sharon Tate, Pharmacy Intern    Co-signature  Agree with above recommendations.  Chrissie Fontenot, Pharm.D., BCPS
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
## 2019-10-01 NOTE — PROGRESS NOTES
Monitor Summary:    Afib   Rare/Occasional PVC  Rare couplet  Bigem   -/.10/-      Per monitor strip summary

## 2019-10-01 NOTE — PALLIATIVE CARE
"Palliative Care     SERENA met with pt's spouse, Rosana at bedside.  Rosana stated that the pt will be transferring back to [facility] sometime today.  Rosana stated that she had no needs at this time.  She is anxious about where the pt will transfer to for rehab.  Rosana stated that yesterday, pt wrote \"what is the future if everything remains the same\" Rosana denied that the pt is suicidal. She believes that the pt has not seen any progress during his stay.  SERENA updated the bedside RN.  Pt has a PSA at bedside, since pt is on a legal hold.   "

## 2019-10-01 NOTE — THERAPY
"Speech Language Therapy dysphagia treatment and speaking valve treatment completed.     Functional Status: Patient was seen on this date for dysphagia and speaking valve treatment. Patient tx to SICU from T4. SO at bedside for session. Pt out of bilateral wrist restraints and appeared calm. Tracheal suctioning performed with removal of moderate amount of mod thick secretions. Fluid in cuff (RT aware) and was deflated of 1 cc of air with majority of fluid in syringe. Speaking valve was placed in-line with T-piece and pt tolerated valve placement for 35 minutes. PO trials were administered and consisted of ~6 oz NTL via 2 single teaspoons and single sips from the straw. Swallow trigger moderately delayed (up to 10 seconds) and laryngeal elevation reduced to palpation. Patient intermittently grimacing and difficult to determine if related to odynophagia or distaste of PO trials. Intermittent upper airway congestion concerning for possible penetration/aspiration. Tracheal suctioning was performed following PO trials and valve was left on due to tolerance - RT and RN aware. Patient with improved mentation, participation, and swallow function compared to last SLP visit.     Recommendations: Continue NPO/cortrak. Will consider a diagnostic swallow study in the following days given that mentation remains stable and pt continues to participate during pre-feeding trials. OK for trained RN/RT/SLP to place speaking valve during waking hours given intermittent supervision. SLP following closely.       Plan of Care: Will benefit from Speech Therapy 3 times per week    Post-Acute Therapy: Recommend inpatient transitional care services for continued speech therapy services.      See \"Rehab Therapy-Acute\" Patient Summary Report for complete documentation.     "
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
## 2019-10-02 ENCOUNTER — APPOINTMENT (OUTPATIENT)
Dept: RADIOLOGY | Facility: MEDICAL CENTER | Age: 79
DRG: 003 | End: 2019-10-02
Attending: ORAL & MAXILLOFACIAL SURGERY
Payer: MEDICARE

## 2019-10-02 ENCOUNTER — APPOINTMENT (OUTPATIENT)
Dept: RADIOLOGY | Facility: MEDICAL CENTER | Age: 79
DRG: 003 | End: 2019-10-02
Attending: SURGERY
Payer: MEDICARE

## 2019-10-02 LAB
ANION GAP SERPL CALC-SCNC: 4 MMOL/L (ref 0–11.9)
BASOPHILS # BLD AUTO: 0.4 % (ref 0–1.8)
BASOPHILS # BLD: 0.05 K/UL (ref 0–0.12)
BUN SERPL-MCNC: 21 MG/DL (ref 8–22)
CALCIUM SERPL-MCNC: 7.2 MG/DL (ref 8.5–10.5)
CHLORIDE SERPL-SCNC: 112 MMOL/L (ref 96–112)
CO2 SERPL-SCNC: 25 MMOL/L (ref 20–33)
CREAT SERPL-MCNC: 0.69 MG/DL (ref 0.5–1.4)
EOSINOPHIL # BLD AUTO: 1.65 K/UL (ref 0–0.51)
EOSINOPHIL NFR BLD: 13.9 % (ref 0–6.9)
ERYTHROCYTE [DISTWIDTH] IN BLOOD BY AUTOMATED COUNT: 57.5 FL (ref 35.9–50)
GLUCOSE SERPL-MCNC: 83 MG/DL (ref 65–99)
HCT VFR BLD AUTO: 26.2 % (ref 42–52)
HGB BLD-MCNC: 7.8 G/DL (ref 14–18)
IMM GRANULOCYTES # BLD AUTO: 0.44 K/UL (ref 0–0.11)
IMM GRANULOCYTES NFR BLD AUTO: 3.7 % (ref 0–0.9)
LYMPHOCYTES # BLD AUTO: 1.46 K/UL (ref 1–4.8)
LYMPHOCYTES NFR BLD: 12.3 % (ref 22–41)
MCH RBC QN AUTO: 29.8 PG (ref 27–33)
MCHC RBC AUTO-ENTMCNC: 29.8 G/DL (ref 33.7–35.3)
MCV RBC AUTO: 100 FL (ref 81.4–97.8)
MONOCYTES # BLD AUTO: 0.69 K/UL (ref 0–0.85)
MONOCYTES NFR BLD AUTO: 5.8 % (ref 0–13.4)
NEUTROPHILS # BLD AUTO: 7.58 K/UL (ref 1.82–7.42)
NEUTROPHILS NFR BLD: 63.9 % (ref 44–72)
NRBC # BLD AUTO: 0 K/UL
NRBC BLD-RTO: 0 /100 WBC
PLATELET # BLD AUTO: 136 K/UL (ref 164–446)
PMV BLD AUTO: 9.3 FL (ref 9–12.9)
POTASSIUM SERPL-SCNC: 4.7 MMOL/L (ref 3.6–5.5)
RBC # BLD AUTO: 2.62 M/UL (ref 4.7–6.1)
SODIUM SERPL-SCNC: 141 MMOL/L (ref 135–145)
WBC # BLD AUTO: 11.9 K/UL (ref 4.8–10.8)

## 2019-10-02 PROCEDURE — 94640 AIRWAY INHALATION TREATMENT: CPT

## 2019-10-02 PROCEDURE — A9270 NON-COVERED ITEM OR SERVICE: HCPCS | Performed by: SURGERY

## 2019-10-02 PROCEDURE — 700101 HCHG RX REV CODE 250: Performed by: SURGERY

## 2019-10-02 PROCEDURE — 80048 BASIC METABOLIC PNL TOTAL CA: CPT

## 2019-10-02 PROCEDURE — 770001 HCHG ROOM/CARE - MED/SURG/GYN PRIV*

## 2019-10-02 PROCEDURE — 700102 HCHG RX REV CODE 250 W/ 637 OVERRIDE(OP): Performed by: ORAL & MAXILLOFACIAL SURGERY

## 2019-10-02 PROCEDURE — 700102 HCHG RX REV CODE 250 W/ 637 OVERRIDE(OP): Performed by: SURGERY

## 2019-10-02 PROCEDURE — 71045 X-RAY EXAM CHEST 1 VIEW: CPT

## 2019-10-02 PROCEDURE — 85025 COMPLETE CBC W/AUTO DIFF WBC: CPT

## 2019-10-02 PROCEDURE — 99233 SBSQ HOSP IP/OBS HIGH 50: CPT | Performed by: SURGERY

## 2019-10-02 PROCEDURE — A9270 NON-COVERED ITEM OR SERVICE: HCPCS | Performed by: ORAL & MAXILLOFACIAL SURGERY

## 2019-10-02 PROCEDURE — 99232 SBSQ HOSP IP/OBS MODERATE 35: CPT | Performed by: INTERNAL MEDICINE

## 2019-10-02 RX ORDER — FLUCONAZOLE 200 MG/1
200 TABLET ORAL DAILY
Status: COMPLETED | OUTPATIENT
Start: 2019-10-03 | End: 2019-10-04

## 2019-10-02 RX ADMIN — GUAIFENESIN 200 MG: 100 SOLUTION ORAL at 13:18

## 2019-10-02 RX ADMIN — APIXABAN 5 MG: 5 TABLET, FILM COATED ORAL at 17:20

## 2019-10-02 RX ADMIN — APIXABAN 2.5 MG: 5 TABLET, FILM COATED ORAL at 05:08

## 2019-10-02 RX ADMIN — TERAZOSIN HYDROCHLORIDE ANHYDROUS 1 MG: 1 CAPSULE ORAL at 17:20

## 2019-10-02 RX ADMIN — GUAIFENESIN 200 MG: 100 SOLUTION ORAL at 21:42

## 2019-10-02 RX ADMIN — DIGOXIN 125 MCG: 250 TABLET ORAL at 05:08

## 2019-10-02 RX ADMIN — CHLORHEXIDINE GLUCONATE 0.12% ORAL RINSE 15 ML: 1.2 LIQUID ORAL at 05:08

## 2019-10-02 RX ADMIN — QUETIAPINE FUMARATE 75 MG: 25 TABLET ORAL at 17:22

## 2019-10-02 RX ADMIN — DIVALPROEX SODIUM 250 MG: 125 CAPSULE, COATED PELLETS ORAL at 05:08

## 2019-10-02 RX ADMIN — ALBUTEROL SULFATE 2.5 MG: 5 SOLUTION RESPIRATORY (INHALATION) at 06:47

## 2019-10-02 RX ADMIN — ALBUTEROL SULFATE 2.5 MG: 5 SOLUTION RESPIRATORY (INHALATION) at 14:22

## 2019-10-02 RX ADMIN — SODIUM CHLORIDE SOLN NEBU 3% 3 ML: 3 NEBU SOLN at 18:41

## 2019-10-02 RX ADMIN — BACITRACIN ZINC: 500 OINTMENT TOPICAL at 05:09

## 2019-10-02 RX ADMIN — GUAIFENESIN 200 MG: 100 SOLUTION ORAL at 05:08

## 2019-10-02 RX ADMIN — QUETIAPINE FUMARATE 75 MG: 25 TABLET ORAL at 11:58

## 2019-10-02 RX ADMIN — GUAIFENESIN 200 MG: 100 SOLUTION ORAL at 09:10

## 2019-10-02 RX ADMIN — SODIUM CHLORIDE SOLN NEBU 3% 3 ML: 3 NEBU SOLN at 10:30

## 2019-10-02 RX ADMIN — BACITRACIN ZINC: 500 OINTMENT TOPICAL at 17:20

## 2019-10-02 RX ADMIN — METOPROLOL TARTRATE 75 MG: 25 TABLET, FILM COATED ORAL at 11:58

## 2019-10-02 RX ADMIN — ALBUTEROL SULFATE 2.5 MG: 5 SOLUTION RESPIRATORY (INHALATION) at 18:41

## 2019-10-02 RX ADMIN — GUAIFENESIN 200 MG: 100 SOLUTION ORAL at 02:20

## 2019-10-02 RX ADMIN — GUAIFENESIN 200 MG: 100 SOLUTION ORAL at 17:20

## 2019-10-02 RX ADMIN — DIVALPROEX SODIUM 250 MG: 125 CAPSULE, COATED PELLETS ORAL at 13:18

## 2019-10-02 RX ADMIN — ALBUTEROL SULFATE 2.5 MG: 5 SOLUTION RESPIRATORY (INHALATION) at 10:30

## 2019-10-02 RX ADMIN — CHLORHEXIDINE GLUCONATE 0.12% ORAL RINSE 15 ML: 1.2 LIQUID ORAL at 17:22

## 2019-10-02 RX ADMIN — METOPROLOL TARTRATE 75 MG: 25 TABLET, FILM COATED ORAL at 17:22

## 2019-10-02 RX ADMIN — TRAZODONE HYDROCHLORIDE 50 MG: 50 TABLET ORAL at 23:35

## 2019-10-02 RX ADMIN — FLUCONAZOLE 100 MG: 200 TABLET ORAL at 05:09

## 2019-10-02 RX ADMIN — SODIUM CHLORIDE SOLN NEBU 3% 3 ML: 3 NEBU SOLN at 14:22

## 2019-10-02 RX ADMIN — SODIUM CHLORIDE SOLN NEBU 3% 3 ML: 3 NEBU SOLN at 06:47

## 2019-10-02 RX ADMIN — DIVALPROEX SODIUM 250 MG: 125 CAPSULE, COATED PELLETS ORAL at 21:42

## 2019-10-02 RX ADMIN — QUETIAPINE FUMARATE 75 MG: 25 TABLET ORAL at 05:08

## 2019-10-02 RX ADMIN — METOPROLOL TARTRATE 75 MG: 25 TABLET, FILM COATED ORAL at 05:08
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
## 2019-10-02 NOTE — CARE PLAN
Problem: Oxygenation:  Goal: Maintain adequate oxygenation dependent on patient condition  Note:   4lpm and 28% t-piece  Tolerated speaking valve today     Problem: Bronchopulmonary Hygiene:  Goal: Increase mobilization of retained secretions  Note:   3% QID     Problem: Bronchoconstriction:  Goal: Improve in air movement and diminished wheezing  Note:   Albuterol QID

## 2019-10-02 NOTE — CARE PLAN
Problem: Oxygenation:  Goal: Maintain adequate oxygenation dependent on patient condition  Intervention: Manage oxygen therapy by monitoring pulse oximetry and/or ABG values  Note:   5 LPM and 30% FiO2     Problem: Bronchopulmonary Hygiene:  Goal: Increase mobilization of retained secretions  Intervention: Perform bronchopulmonary therapy as indicated by assessment  Note:   3% hypertonic saline SVN  PRN suction     Problem: Bronchoconstriction:  Goal: Improve in air movement and diminished wheezing  Intervention: Implement inhaled treatments  Note:   Albuterol SVN

## 2019-10-02 NOTE — CARE PLAN
Problem: Oxygenation:  Goal: Maintain adequate oxygenation dependent on patient condition  Outcome: PROGRESSING AS EXPECTED     Aerosol T-Piece 5l/30%    Problem: Bronchopulmonary Hygiene:  Goal: Increase mobilization of retained secretions  Outcome: PROGRESSING AS EXPECTED    3% QID       Problem: Bronchoconstriction:  Goal: Improve in air movement and diminished wheezing  Outcome: PROGRESSING AS EXPECTED    Albuterol QID

## 2019-10-02 NOTE — PROGRESS NOTES
0920: Interdisciplinary team at bedside. MIVF fluids d/c'd. OK to leave Tay in for now per Dr Tellez. VS stable, will continue to monitor.

## 2019-10-02 NOTE — DISCHARGE PLANNING
While pt has been in the SICU this LSW has been up to date on pt's care & legal hold. Will continue to assist with social/dc needs.

## 2019-10-02 NOTE — CARE PLAN
Problem: Communication  Goal: The ability to communicate needs accurately and effectively will improve  Outcome: PROGRESSING AS EXPECTED  Note:   Patient reoriented to situation and plan of care, educated to use call light or ask sitter for assistance, speaking valve in place when tolerated.      Problem: Infection  Goal: Will remain free from infection  Outcome: PROGRESSING AS EXPECTED  Note:   Assessing for signs and symptoms of infection, standard and vap protocols in place

## 2019-10-02 NOTE — CARE PLAN
Problem: Safety  Goal: Will remain free from injury  Outcome: PROGRESSING AS EXPECTED  Note:   Sitter at bedside, pt remains free from injury     Problem: Bowel/Gastric:  Goal: Normal bowel function is maintained or improved  Outcome: PROGRESSING AS EXPECTED  Note:   BM 9/30, active bowel sounds
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
## 2019-10-02 NOTE — CARE PLAN
Problem: Venous Thromboembolism (VTW)/Deep Vein Thrombosis (DVT) Prevention:  Goal: Patient will participate in Venous Thrombosis (VTE)/Deep Vein Thrombosis (DVT)Prevention Measures  Note:   SCDs in place. Pt on therapeutic anticoagulation.      Problem: Skin Integrity  Goal: Risk for impaired skin integrity will decrease  Note:   Skin fully assessed when patient up standing out of bed. Repositioning patient regularly and elevating extremities on pillows.

## 2019-10-03 ENCOUNTER — HOSPITAL ENCOUNTER (OUTPATIENT)
Dept: RADIOLOGY | Facility: MEDICAL CENTER | Age: 79
End: 2019-10-03
Attending: ORAL & MAXILLOFACIAL SURGERY

## 2019-10-03 LAB
ANION GAP SERPL CALC-SCNC: 8 MMOL/L (ref 0–11.9)
BASOPHILS # BLD AUTO: 0.3 % (ref 0–1.8)
BASOPHILS # BLD: 0.04 K/UL (ref 0–0.12)
BUN SERPL-MCNC: 17 MG/DL (ref 8–22)
CALCIUM SERPL-MCNC: 8.3 MG/DL (ref 8.5–10.5)
CHLORIDE SERPL-SCNC: 107 MMOL/L (ref 96–112)
CO2 SERPL-SCNC: 26 MMOL/L (ref 20–33)
CREAT SERPL-MCNC: 0.7 MG/DL (ref 0.5–1.4)
EKG IMPRESSION: NORMAL
EOSINOPHIL # BLD AUTO: 1.6 K/UL (ref 0–0.51)
EOSINOPHIL NFR BLD: 13.5 % (ref 0–6.9)
ERYTHROCYTE [DISTWIDTH] IN BLOOD BY AUTOMATED COUNT: 54.2 FL (ref 35.9–50)
GLUCOSE SERPL-MCNC: 99 MG/DL (ref 65–99)
HCT VFR BLD AUTO: 28.3 % (ref 42–52)
HGB BLD-MCNC: 8.8 G/DL (ref 14–18)
IMM GRANULOCYTES # BLD AUTO: 0.26 K/UL (ref 0–0.11)
IMM GRANULOCYTES NFR BLD AUTO: 2.2 % (ref 0–0.9)
LYMPHOCYTES # BLD AUTO: 1.46 K/UL (ref 1–4.8)
LYMPHOCYTES NFR BLD: 12.3 % (ref 22–41)
MAGNESIUM SERPL-MCNC: 1.6 MG/DL (ref 1.5–2.5)
MCH RBC QN AUTO: 30 PG (ref 27–33)
MCHC RBC AUTO-ENTMCNC: 31.1 G/DL (ref 33.7–35.3)
MCV RBC AUTO: 96.6 FL (ref 81.4–97.8)
MONOCYTES # BLD AUTO: 0.68 K/UL (ref 0–0.85)
MONOCYTES NFR BLD AUTO: 5.7 % (ref 0–13.4)
NEUTROPHILS # BLD AUTO: 7.79 K/UL (ref 1.82–7.42)
NEUTROPHILS NFR BLD: 66 % (ref 44–72)
NRBC # BLD AUTO: 0 K/UL
NRBC BLD-RTO: 0 /100 WBC
PHOSPHATE SERPL-MCNC: 3.1 MG/DL (ref 2.5–4.5)
PLATELET # BLD AUTO: 143 K/UL (ref 164–446)
PMV BLD AUTO: 9 FL (ref 9–12.9)
POTASSIUM SERPL-SCNC: 3.8 MMOL/L (ref 3.6–5.5)
RBC # BLD AUTO: 2.93 M/UL (ref 4.7–6.1)
SODIUM SERPL-SCNC: 141 MMOL/L (ref 135–145)
WBC # BLD AUTO: 11.8 K/UL (ref 4.8–10.8)

## 2019-10-03 PROCEDURE — 700111 HCHG RX REV CODE 636 W/ 250 OVERRIDE (IP): Performed by: SURGERY

## 2019-10-03 PROCEDURE — 700102 HCHG RX REV CODE 250 W/ 637 OVERRIDE(OP): Performed by: SURGERY

## 2019-10-03 PROCEDURE — 97116 GAIT TRAINING THERAPY: CPT

## 2019-10-03 PROCEDURE — A9270 NON-COVERED ITEM OR SERVICE: HCPCS | Performed by: SURGERY

## 2019-10-03 PROCEDURE — 94640 AIRWAY INHALATION TREATMENT: CPT

## 2019-10-03 PROCEDURE — 700101 HCHG RX REV CODE 250: Performed by: SURGERY

## 2019-10-03 PROCEDURE — 70486 CT MAXILLOFACIAL W/O DYE: CPT

## 2019-10-03 PROCEDURE — 93010 ELECTROCARDIOGRAM REPORT: CPT | Performed by: INTERNAL MEDICINE

## 2019-10-03 PROCEDURE — 700102 HCHG RX REV CODE 250 W/ 637 OVERRIDE(OP): Performed by: ORAL & MAXILLOFACIAL SURGERY

## 2019-10-03 PROCEDURE — A9270 NON-COVERED ITEM OR SERVICE: HCPCS | Performed by: ORAL & MAXILLOFACIAL SURGERY

## 2019-10-03 PROCEDURE — 97530 THERAPEUTIC ACTIVITIES: CPT

## 2019-10-03 PROCEDURE — 93005 ELECTROCARDIOGRAM TRACING: CPT | Performed by: SURGERY

## 2019-10-03 PROCEDURE — 80048 BASIC METABOLIC PNL TOTAL CA: CPT

## 2019-10-03 PROCEDURE — 84100 ASSAY OF PHOSPHORUS: CPT

## 2019-10-03 PROCEDURE — 92526 ORAL FUNCTION THERAPY: CPT

## 2019-10-03 PROCEDURE — 770001 HCHG ROOM/CARE - MED/SURG/GYN PRIV*

## 2019-10-03 PROCEDURE — 83735 ASSAY OF MAGNESIUM: CPT

## 2019-10-03 PROCEDURE — 99233 SBSQ HOSP IP/OBS HIGH 50: CPT | Performed by: SURGERY

## 2019-10-03 PROCEDURE — 97168 OT RE-EVAL EST PLAN CARE: CPT

## 2019-10-03 PROCEDURE — 85025 COMPLETE CBC W/AUTO DIFF WBC: CPT

## 2019-10-03 PROCEDURE — 99232 SBSQ HOSP IP/OBS MODERATE 35: CPT | Performed by: INTERNAL MEDICINE

## 2019-10-03 RX ORDER — MAGNESIUM SULFATE HEPTAHYDRATE 40 MG/ML
2 INJECTION, SOLUTION INTRAVENOUS ONCE
Status: COMPLETED | OUTPATIENT
Start: 2019-10-03 | End: 2019-10-03

## 2019-10-03 RX ADMIN — METOPROLOL TARTRATE 75 MG: 25 TABLET, FILM COATED ORAL at 05:05

## 2019-10-03 RX ADMIN — DIVALPROEX SODIUM 250 MG: 125 CAPSULE, COATED PELLETS ORAL at 13:50

## 2019-10-03 RX ADMIN — DIVALPROEX SODIUM 250 MG: 125 CAPSULE, COATED PELLETS ORAL at 05:06

## 2019-10-03 RX ADMIN — METOPROLOL TARTRATE 75 MG: 25 TABLET, FILM COATED ORAL at 18:17

## 2019-10-03 RX ADMIN — POTASSIUM BICARBONATE 25 MEQ: 978 TABLET, EFFERVESCENT ORAL at 11:09

## 2019-10-03 RX ADMIN — DIVALPROEX SODIUM 250 MG: 125 CAPSULE, COATED PELLETS ORAL at 20:26

## 2019-10-03 RX ADMIN — GUAIFENESIN 200 MG: 100 SOLUTION ORAL at 05:05

## 2019-10-03 RX ADMIN — BACITRACIN ZINC: 500 OINTMENT TOPICAL at 05:06

## 2019-10-03 RX ADMIN — QUETIAPINE FUMARATE 75 MG: 25 TABLET ORAL at 05:07

## 2019-10-03 RX ADMIN — BACITRACIN ZINC: 500 OINTMENT TOPICAL at 18:17

## 2019-10-03 RX ADMIN — QUETIAPINE FUMARATE 75 MG: 25 TABLET ORAL at 11:09

## 2019-10-03 RX ADMIN — TRAZODONE HYDROCHLORIDE 50 MG: 50 TABLET ORAL at 20:27

## 2019-10-03 RX ADMIN — QUETIAPINE FUMARATE 75 MG: 25 TABLET ORAL at 18:18

## 2019-10-03 RX ADMIN — CHLORHEXIDINE GLUCONATE 0.12% ORAL RINSE 15 ML: 1.2 LIQUID ORAL at 05:05

## 2019-10-03 RX ADMIN — MAGNESIUM SULFATE 2 G: 2 INJECTION INTRAVENOUS at 11:15

## 2019-10-03 RX ADMIN — ALBUTEROL SULFATE 2.5 MG: 5 SOLUTION RESPIRATORY (INHALATION) at 10:18

## 2019-10-03 RX ADMIN — GUAIFENESIN 200 MG: 100 SOLUTION ORAL at 18:17

## 2019-10-03 RX ADMIN — APIXABAN 5 MG: 5 TABLET, FILM COATED ORAL at 18:18

## 2019-10-03 RX ADMIN — APIXABAN 5 MG: 5 TABLET, FILM COATED ORAL at 05:05

## 2019-10-03 RX ADMIN — ALBUTEROL SULFATE 2.5 MG: 5 SOLUTION RESPIRATORY (INHALATION) at 06:49

## 2019-10-03 RX ADMIN — METOPROLOL TARTRATE 75 MG: 25 TABLET, FILM COATED ORAL at 11:09

## 2019-10-03 RX ADMIN — ALBUTEROL SULFATE 2.5 MG: 5 SOLUTION RESPIRATORY (INHALATION) at 18:42

## 2019-10-03 RX ADMIN — FLUCONAZOLE 200 MG: 200 TABLET ORAL at 05:06

## 2019-10-03 RX ADMIN — ZIPRASIDONE MESYLATE 10 MG: 20 INJECTION, POWDER, LYOPHILIZED, FOR SOLUTION INTRAMUSCULAR at 02:27

## 2019-10-03 RX ADMIN — SODIUM CHLORIDE SOLN NEBU 3% 3 ML: 3 NEBU SOLN at 18:42

## 2019-10-03 RX ADMIN — CHLORHEXIDINE GLUCONATE 0.12% ORAL RINSE 15 ML: 1.2 LIQUID ORAL at 18:17

## 2019-10-03 RX ADMIN — GUAIFENESIN 200 MG: 100 SOLUTION ORAL at 13:50

## 2019-10-03 RX ADMIN — GUAIFENESIN 200 MG: 100 SOLUTION ORAL at 11:09

## 2019-10-03 RX ADMIN — TERAZOSIN HYDROCHLORIDE ANHYDROUS 1 MG: 1 CAPSULE ORAL at 18:18

## 2019-10-03 RX ADMIN — SODIUM CHLORIDE SOLN NEBU 3% 3 ML: 3 NEBU SOLN at 10:18

## 2019-10-03 RX ADMIN — GUAIFENESIN 200 MG: 100 SOLUTION ORAL at 20:27

## 2019-10-03 RX ADMIN — DIGOXIN 125 MCG: 250 TABLET ORAL at 05:05

## 2019-10-03 RX ADMIN — SODIUM CHLORIDE SOLN NEBU 3% 3 ML: 3 NEBU SOLN at 06:49

## 2019-10-03 ASSESSMENT — COGNITIVE AND FUNCTIONAL STATUS - GENERAL
DRESSING REGULAR UPPER BODY CLOTHING: A LITTLE
DRESSING REGULAR LOWER BODY CLOTHING: A LOT
MOBILITY SCORE: 12
PERSONAL GROOMING: A LITTLE
TOILETING: A LITTLE
STANDING UP FROM CHAIR USING ARMS: A LITTLE
DAILY ACTIVITIY SCORE: 14
SUGGESTED CMS G CODE MODIFIER DAILY ACTIVITY: CK
SUGGESTED CMS G CODE MODIFIER MOBILITY: CL
MOVING FROM LYING ON BACK TO SITTING ON SIDE OF FLAT BED: UNABLE
TURNING FROM BACK TO SIDE WHILE IN FLAT BAD: UNABLE
WALKING IN HOSPITAL ROOM: A LITTLE
HELP NEEDED FOR BATHING: A LOT
MOVING TO AND FROM BED TO CHAIR: UNABLE
EATING MEALS: TOTAL
CLIMB 3 TO 5 STEPS WITH RAILING: A LITTLE

## 2019-10-03 ASSESSMENT — GAIT ASSESSMENTS
GAIT LEVEL OF ASSIST: MINIMAL ASSIST
ASSISTIVE DEVICE: FRONT WHEEL WALKER
DISTANCE (FEET): 200
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
## 2019-10-03 NOTE — THERAPY
"Speech Language Therapy dysphagia treatment and speaking valve treatment completed.     Functional Status: Patient was seen on this date for dysphagia and speaking valve treatment. Patient with bilateral wrist restraints due to increased agitation. However, calm for this session. Pt on 28% FiO2 and 4 L via T-piece. Cuff was deflated of 1 cc with min amount of brown tinted fluid in  balloon - RT updated. Speaking valve was placed in line with T-piece and pt tolerated for >30 minutes without increase in respiratory distress or change in vital signs. Vocal quality strong and adequate breath support for speech at the short sentence level. PO trials consisted of ~6 oz NTL via single sips from the straw. Swallow trigger moderately delayed and laryngeal elevation reduced to palpation. Patient had slight increase in audible breath sounds and wheezing ~25% of the time following trials. This may be concerning for possible penetration/aspiration. Pt unable to follow directives for dysphagia exercises or strategies. Speaking valve was not removed due to tolerance - RN and RT aware.     Recommendations: At this time, recommend continue NPO/cortrak with a FEES to further assess oropharyngeal function and determine readiness for some level of PO. OK for speaking valve to be placed during waking hours (as tolerated) by trained RN/RT/SLP. Dr. Green updated regarding results and obtained order for FEES.     Plan of Care: Will benefit from Speech Therapy 3 times per week    Post-Acute Therapy: Recommend inpatient transitional care services for continued speech therapy services.      See \"Rehab Therapy-Acute\" Patient Summary Report for complete documentation.     "

## 2019-10-03 NOTE — THERAPY
"Occupational Therapy Re-Evaluation completed.   Functional Status:    Pt seen fo OT re-eval today after transferring to [facility]. Pt is very distracted, has difficulty following commands, and is impulsive. He was persistent on getting up to walk, but then became distracted with having his nurse come in. Pt eventually got up with Min A, STS SBA. Ambulates with FWW, unsteady and distracted. He was unable to follow single step instructions to come to sink for G/H. When he returned to the bedroom, he attempted to go into a room that he thought was the bathroom, despite cuing. Eventually he was able to make it to [facility]. He had a BM and was able to clean himself with SBA. He then returned to bed with heavy facilitation. Will follow 2x/wk.    Plan of Care: Will benefit from Occupational Therapy 2 times per week  Discharge Recommendations:  Equipment: Will Continue to Assess for Equipment Needs. Post-acute therapy Recommend post-acute placement for additional occupational therapy services prior to discharge home. Patient can tolerate post-acute therapies at a 5x/week frequency.    See \"Rehab Therapy-Acute\" Patient Summary Report for complete documentation.    "
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
## 2019-10-03 NOTE — DISCHARGE PLANNING
LSW received copy of the Order in Response to Request for Court Ordered Involuntary Admission from the court. Pt's legal hold is continues until 10/10 @ 0900

## 2019-10-03 NOTE — CARE PLAN
Tracheostomy Update                 Cough: Productive (10/03/19 0235)  Sputum Amount: Small (10/03/19 0235)  Sputum Color: White;Clear (10/03/19 0235)  Sputum Consistency: Thick;Thin (10/03/19 0235)    FiO2%: 28 % (10/03/19 0235)  O2 (LPM): 4 (10/03/19 0235)      Events/Summary/Plan: speaking valve tolerated well (10/02/19 8728)
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
## 2019-10-03 NOTE — CARE PLAN
Problem: Safety  Goal: Will remain free from injury  Note:   Sitter at bedside, room close to nursing station, hourly rounds completed, bed locked and in lowest position, pt educated to call prior to getting out of bed.     Problem: Skin Integrity  Goal: Risk for impaired skin integrity will decrease  Note:   Skin assessed during each pt encounter paying special attention to under medical equipment and under pressure points.

## 2019-10-03 NOTE — PROGRESS NOTES
APRN notified about pts increased HR in A.fib 120s-150s since 0000. Pt not symptomatic at this time. No new orders at this time. Will re-evaluate pt in the AM. Okay to give geodon for AM CT. Will continue to monitor.

## 2019-10-03 NOTE — CARE PLAN
Problem: Bronchoconstriction:  Goal: Improve in air movement and diminished wheezing  Note:   Albuterol QID     Problem: Bronchopulmonary Hygiene:  Goal: Increase mobilization of retained secretions  Note:   3% QID     Problem: Oxygenation:  Goal: Maintain adequate oxygenation dependent on patient condition  Note:   4lpm and 28% t-piece

## 2019-10-03 NOTE — CARE PLAN
Problem: Communication  Goal: The ability to communicate needs accurately and effectively will improve  10/3/2019 0956 by Sherley Lorenzo R.N.  Outcome: PROGRESSING AS EXPECTED  Note:   Pt presents with hard of hearing, hearing aids available, pt declines use of them at this time because he would like to rest. Pt unable to communicate because jaw is wired shut. Will place speaking valve on patient as tolerated. RN communicates with patient goals for this shift and plan of care. All questions answered and needs met at this time.   10/3/2019 0923 by Sherley Lorenzo R.N.  Outcome: PROGRESSING AS EXPECTED     Problem: Safety  Goal: Will remain free from injury  Outcome: PROGRESSING AS EXPECTED  Note:   Pt in soft wrist restraints due to agitation and pulling/removing equipment. Pt needs reinforcement. Restraints assessed q2 hours, ROM performed, needs met. Wife, Rosana, aware and understands importance.

## 2019-10-04 ENCOUNTER — APPOINTMENT (OUTPATIENT)
Dept: RADIOLOGY | Facility: MEDICAL CENTER | Age: 79
DRG: 003 | End: 2019-10-04
Attending: SURGERY
Payer: MEDICARE

## 2019-10-04 PROBLEM — N28.9 RENAL INSUFFICIENCY: Status: RESOLVED | Noted: 2019-09-30 | Resolved: 2019-10-04

## 2019-10-04 PROBLEM — R21 RASH AND NONSPECIFIC SKIN ERUPTION: Status: RESOLVED | Noted: 2019-09-24 | Resolved: 2019-10-04

## 2019-10-04 LAB
ANION GAP SERPL CALC-SCNC: 5 MMOL/L (ref 0–11.9)
BASOPHILS # BLD AUTO: 0.5 % (ref 0–1.8)
BASOPHILS # BLD: 0.06 K/UL (ref 0–0.12)
BUN SERPL-MCNC: 14 MG/DL (ref 8–22)
CALCIUM SERPL-MCNC: 8.3 MG/DL (ref 8.5–10.5)
CHLORIDE SERPL-SCNC: 106 MMOL/L (ref 96–112)
CO2 SERPL-SCNC: 27 MMOL/L (ref 20–33)
CREAT SERPL-MCNC: 0.7 MG/DL (ref 0.5–1.4)
EOSINOPHIL # BLD AUTO: 1.9 K/UL (ref 0–0.51)
EOSINOPHIL NFR BLD: 15.5 % (ref 0–6.9)
ERYTHROCYTE [DISTWIDTH] IN BLOOD BY AUTOMATED COUNT: 55.4 FL (ref 35.9–50)
FOLATE SERPL-MCNC: >22.4 NG/ML
GLUCOSE SERPL-MCNC: 116 MG/DL (ref 65–99)
HCT VFR BLD AUTO: 30.7 % (ref 42–52)
HGB BLD-MCNC: 9.2 G/DL (ref 14–18)
HIV 1+2 AB+HIV1 P24 AG SERPL QL IA: NON REACTIVE
IMM GRANULOCYTES # BLD AUTO: 0.25 K/UL (ref 0–0.11)
IMM GRANULOCYTES NFR BLD AUTO: 2 % (ref 0–0.9)
LYMPHOCYTES # BLD AUTO: 1.49 K/UL (ref 1–4.8)
LYMPHOCYTES NFR BLD: 12.2 % (ref 22–41)
MAGNESIUM SERPL-MCNC: 1.8 MG/DL (ref 1.5–2.5)
MCH RBC QN AUTO: 29.2 PG (ref 27–33)
MCHC RBC AUTO-ENTMCNC: 30 G/DL (ref 33.7–35.3)
MCV RBC AUTO: 97.5 FL (ref 81.4–97.8)
MONOCYTES # BLD AUTO: 0.73 K/UL (ref 0–0.85)
MONOCYTES NFR BLD AUTO: 6 % (ref 0–13.4)
NEUTROPHILS # BLD AUTO: 7.82 K/UL (ref 1.82–7.42)
NEUTROPHILS NFR BLD: 63.8 % (ref 44–72)
NRBC # BLD AUTO: 0 K/UL
NRBC BLD-RTO: 0 /100 WBC
PHOSPHATE SERPL-MCNC: 3.6 MG/DL (ref 2.5–4.5)
PLATELET # BLD AUTO: 134 K/UL (ref 164–446)
PMV BLD AUTO: 8.9 FL (ref 9–12.9)
POTASSIUM SERPL-SCNC: 3.8 MMOL/L (ref 3.6–5.5)
RBC # BLD AUTO: 3.15 M/UL (ref 4.7–6.1)
SODIUM SERPL-SCNC: 138 MMOL/L (ref 135–145)
TREPONEMA PALLIDUM IGG+IGM AB [PRESENCE] IN SERUM OR PLASMA BY IMMUNOASSAY: NON REACTIVE
TSH SERPL DL<=0.005 MIU/L-ACNC: 8.76 UIU/ML (ref 0.38–5.33)
VIT B12 SERPL-MCNC: 704 PG/ML (ref 211–911)
WBC # BLD AUTO: 12.3 K/UL (ref 4.8–10.8)

## 2019-10-04 PROCEDURE — 700102 HCHG RX REV CODE 250 W/ 637 OVERRIDE(OP): Performed by: ORAL & MAXILLOFACIAL SURGERY

## 2019-10-04 PROCEDURE — 700102 HCHG RX REV CODE 250 W/ 637 OVERRIDE(OP): Performed by: SURGERY

## 2019-10-04 PROCEDURE — 700101 HCHG RX REV CODE 250: Performed by: SURGERY

## 2019-10-04 PROCEDURE — 86780 TREPONEMA PALLIDUM: CPT

## 2019-10-04 PROCEDURE — 700102 HCHG RX REV CODE 250 W/ 637 OVERRIDE(OP): Mod: TB | Performed by: PSYCHIATRY & NEUROLOGY

## 2019-10-04 PROCEDURE — 94640 AIRWAY INHALATION TREATMENT: CPT

## 2019-10-04 PROCEDURE — G0475 HIV COMBINATION ASSAY: HCPCS

## 2019-10-04 PROCEDURE — 84100 ASSAY OF PHOSPHORUS: CPT

## 2019-10-04 PROCEDURE — A9270 NON-COVERED ITEM OR SERVICE: HCPCS | Performed by: SURGERY

## 2019-10-04 PROCEDURE — A9270 NON-COVERED ITEM OR SERVICE: HCPCS | Performed by: NURSE PRACTITIONER

## 2019-10-04 PROCEDURE — 99232 SBSQ HOSP IP/OBS MODERATE 35: CPT | Performed by: SURGERY

## 2019-10-04 PROCEDURE — 82607 VITAMIN B-12: CPT

## 2019-10-04 PROCEDURE — 99233 SBSQ HOSP IP/OBS HIGH 50: CPT | Performed by: PSYCHIATRY & NEUROLOGY

## 2019-10-04 PROCEDURE — 85025 COMPLETE CBC W/AUTO DIFF WBC: CPT

## 2019-10-04 PROCEDURE — 83735 ASSAY OF MAGNESIUM: CPT

## 2019-10-04 PROCEDURE — 700111 HCHG RX REV CODE 636 W/ 250 OVERRIDE (IP): Performed by: SURGERY

## 2019-10-04 PROCEDURE — 99232 SBSQ HOSP IP/OBS MODERATE 35: CPT | Performed by: INTERNAL MEDICINE

## 2019-10-04 PROCEDURE — A9270 NON-COVERED ITEM OR SERVICE: HCPCS | Performed by: ORAL & MAXILLOFACIAL SURGERY

## 2019-10-04 PROCEDURE — 71045 X-RAY EXAM CHEST 1 VIEW: CPT

## 2019-10-04 PROCEDURE — 92612 ENDOSCOPY SWALLOW (FEES) VID: CPT

## 2019-10-04 PROCEDURE — 84443 ASSAY THYROID STIM HORMONE: CPT

## 2019-10-04 PROCEDURE — A9270 NON-COVERED ITEM OR SERVICE: HCPCS | Mod: TB | Performed by: PSYCHIATRY & NEUROLOGY

## 2019-10-04 PROCEDURE — 770001 HCHG ROOM/CARE - MED/SURG/GYN PRIV*

## 2019-10-04 PROCEDURE — 80048 BASIC METABOLIC PNL TOTAL CA: CPT

## 2019-10-04 PROCEDURE — 82746 ASSAY OF FOLIC ACID SERUM: CPT

## 2019-10-04 PROCEDURE — 36415 COLL VENOUS BLD VENIPUNCTURE: CPT

## 2019-10-04 PROCEDURE — 700102 HCHG RX REV CODE 250 W/ 637 OVERRIDE(OP): Performed by: NURSE PRACTITIONER

## 2019-10-04 PROCEDURE — 94760 N-INVAS EAR/PLS OXIMETRY 1: CPT

## 2019-10-04 RX ORDER — RISPERIDONE 0.5 MG/1
0.5 TABLET ORAL 2 TIMES DAILY
Status: DISCONTINUED | OUTPATIENT
Start: 2019-10-04 | End: 2019-10-07

## 2019-10-04 RX ORDER — MAGNESIUM SULFATE HEPTAHYDRATE 40 MG/ML
2 INJECTION, SOLUTION INTRAVENOUS ONCE
Status: COMPLETED | OUTPATIENT
Start: 2019-10-04 | End: 2019-10-04

## 2019-10-04 RX ORDER — TAMSULOSIN HYDROCHLORIDE 0.4 MG/1
0.4 CAPSULE ORAL
Status: DISCONTINUED | OUTPATIENT
Start: 2019-10-04 | End: 2019-10-04

## 2019-10-04 RX ORDER — FLUCONAZOLE 200 MG/1
200 TABLET ORAL DAILY
Status: DISCONTINUED | OUTPATIENT
Start: 2019-10-05 | End: 2019-10-06

## 2019-10-04 RX ORDER — TERAZOSIN 2 MG/1
2 CAPSULE ORAL EVERY EVENING
Status: DISCONTINUED | OUTPATIENT
Start: 2019-10-04 | End: 2019-10-07

## 2019-10-04 RX ADMIN — DIVALPROEX SODIUM 250 MG: 125 CAPSULE, COATED PELLETS ORAL at 05:33

## 2019-10-04 RX ADMIN — ALBUTEROL SULFATE 2.5 MG: 5 SOLUTION RESPIRATORY (INHALATION) at 19:10

## 2019-10-04 RX ADMIN — CHLORHEXIDINE GLUCONATE 0.12% ORAL RINSE 15 ML: 1.2 LIQUID ORAL at 05:34

## 2019-10-04 RX ADMIN — GUAIFENESIN 200 MG: 100 SOLUTION ORAL at 22:45

## 2019-10-04 RX ADMIN — BACITRACIN ZINC: 500 OINTMENT TOPICAL at 05:34

## 2019-10-04 RX ADMIN — ALBUTEROL SULFATE 2.5 MG: 5 SOLUTION RESPIRATORY (INHALATION) at 15:30

## 2019-10-04 RX ADMIN — QUETIAPINE FUMARATE 75 MG: 25 TABLET ORAL at 05:34

## 2019-10-04 RX ADMIN — GUAIFENESIN 200 MG: 100 SOLUTION ORAL at 09:15

## 2019-10-04 RX ADMIN — ALBUTEROL SULFATE 2.5 MG: 5 SOLUTION RESPIRATORY (INHALATION) at 06:45

## 2019-10-04 RX ADMIN — SODIUM CHLORIDE SOLN NEBU 3% 3 ML: 3 NEBU SOLN at 19:10

## 2019-10-04 RX ADMIN — BACITRACIN ZINC: 500 OINTMENT TOPICAL at 18:00

## 2019-10-04 RX ADMIN — METOPROLOL TARTRATE 75 MG: 25 TABLET, FILM COATED ORAL at 05:33

## 2019-10-04 RX ADMIN — ALBUTEROL SULFATE 2.5 MG: 5 SOLUTION RESPIRATORY (INHALATION) at 10:28

## 2019-10-04 RX ADMIN — GUAIFENESIN 200 MG: 100 SOLUTION ORAL at 12:49

## 2019-10-04 RX ADMIN — SODIUM CHLORIDE SOLN NEBU 3% 3 ML: 3 NEBU SOLN at 10:28

## 2019-10-04 RX ADMIN — APIXABAN 5 MG: 5 TABLET, FILM COATED ORAL at 18:41

## 2019-10-04 RX ADMIN — FLUCONAZOLE 200 MG: 200 TABLET ORAL at 05:34

## 2019-10-04 RX ADMIN — RISPERIDONE 0.5 MG: 0.5 TABLET ORAL at 18:41

## 2019-10-04 RX ADMIN — DIVALPROEX SODIUM 250 MG: 125 CAPSULE, COATED PELLETS ORAL at 22:45

## 2019-10-04 RX ADMIN — SODIUM CHLORIDE SOLN NEBU 3% 3 ML: 3 NEBU SOLN at 06:45

## 2019-10-04 RX ADMIN — TERAZOSIN HYDROCHLORIDE ANHYDROUS 1 MG: 2 CAPSULE ORAL at 18:41

## 2019-10-04 RX ADMIN — QUETIAPINE FUMARATE 75 MG: 25 TABLET ORAL at 12:45

## 2019-10-04 RX ADMIN — GUAIFENESIN 200 MG: 100 SOLUTION ORAL at 02:14

## 2019-10-04 RX ADMIN — DIVALPROEX SODIUM 250 MG: 125 CAPSULE, COATED PELLETS ORAL at 18:40

## 2019-10-04 RX ADMIN — METOPROLOL TARTRATE 75 MG: 25 TABLET, FILM COATED ORAL at 12:45

## 2019-10-04 RX ADMIN — GUAIFENESIN 200 MG: 100 SOLUTION ORAL at 05:32

## 2019-10-04 RX ADMIN — METOPROLOL TARTRATE 75 MG: 25 TABLET, FILM COATED ORAL at 18:41

## 2019-10-04 RX ADMIN — APIXABAN 5 MG: 5 TABLET, FILM COATED ORAL at 05:33

## 2019-10-04 RX ADMIN — CHLORHEXIDINE GLUCONATE 0.12% ORAL RINSE 15 ML: 1.2 LIQUID ORAL at 18:42

## 2019-10-04 RX ADMIN — DIGOXIN 125 MCG: 250 TABLET ORAL at 05:34

## 2019-10-04 RX ADMIN — MAGNESIUM SULFATE IN WATER 2 G: 40 INJECTION, SOLUTION INTRAVENOUS at 10:21
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
## 2019-10-04 NOTE — CARE PLAN
Problem: Respiratory:  Goal: Respiratory status will improve  Outcome: PROGRESSING AS EXPECTED  Intervention: Collaborate with respiratory therapist and Interdisciplinary Team on treatment measures to improve respiratory function  Note:   HOB at 30 or greater, suction when needed, oral care preformed Q4 with chlorhexidine, continuous pulse ox in place, collaborating with RT and MD       Problem: Psychosocial Needs:  Goal: Level of anxiety will decrease  Outcome: PROGRESSING AS EXPECTED  Intervention: Collaborate with Interdisciplinary Team including Psychologist/Behavioral Health Team  Note:   Sitter in place, near nurses station, restraints in place, frequent monitoring
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
## 2019-10-04 NOTE — CARE PLAN
Problem: Safety  Goal: Will remain free from injury  Note:   Treaded slipper socks on pt, bed locked and in the lowest position, room close to nursing station, pt educated to call for assistance.     Problem: Respiratory:  Goal: Respiratory status will improve  Note:   Respiratory status assessed during each pt encounter including work of breathing, oxygen saturation, lung sounds, etc.

## 2019-10-04 NOTE — THERAPY
Speech Language Therapy FEES completed.    Functional Status: Patient was seen on this date for a FEES evaluation. Scope was inserted through right nare and patient tolerated procedure without difficulty. SLP in room to assist with written cues given pt's significant hearing loss and AMS. Pt on 4 L and 28% FiO2 via T-piece. PMSV placed in AM by this SLP in SICU. Valve remained donned up until this evaluation and pt noted to have inhalation stridor and audible air emission through trach as well as back pressure with removal of cuff. Half of the FEES evaluation was performed with PMSV donned and valve was doffed half way through session and cuff remained deflated. Upon insertion of the scope, pharynx appeared symmetrical. However, there was reduced movement of L arytenoid to medial position compared to R. Complete vocal cord adduction achieved prior to onset of swallow. Tracheal shelf noted; however, was unable to visualize first tracheal ring concerning for possible tracheal stenosis/edema and may warrant ENT consult.     PO trials were administered and consisted of NTL and thin liquids via straw. Patient is presenting with min-moderate oropharyngeal dysphagia and characterized by: reduced sensation; reduced laryngeal elevation and hyolaryngeal excursion; reduced tongue base retraction; and premature spillage to pyriform sinuses. Trace-min diffuse pharyngeal residue across consistencies tested. Trace amount of high penetration over epiglottic rim with NTL. Deep penetration before the swallow occurred with thin liquids over right aryepiglottic fold; however, residue in laryngeal vestibule cleared with swallow. Trace amount of penetration through interarytenoid space from residue with thin liquids after the swallow. Penetration episodes were SILENT in nature. No aspiration was visualized this evaluation. Was unable to test swallow strategies due to pt not following directives and maintaining eyes closed majority of  "procedure. Pt and SO educated regarding current status and SLP recs.     Recommendations - Diet: At this time, recommend initiation of NTL via single sips from the straw (4 oz portions, 3x/day) given 1:1 feeding. OK to have PMSV on during PO or without as long as cuff is deflated. Given results of FEES, would consider change to cuffless trach to facilitate decannulation process. SLP following closely. OK for PMSV to be placed by trained RN/RT/SLP during waking hours (or as tolerated) given close monitoring. Please remove valve with increase in respiratory distress or with inhalation stridor. DO NOT PULL CORTRAK - tube feeds per RD recs.                             Strategies: Strict 1:1 feeding  and Head of Bed at 90 Degrees                            Medication Administration: Via Gastric Tube    Plan of Care: Will benefit from Speech Therapy 3 times per week    Post-Acute Therapy: Recommend inpatient transitional care services for continued speech therapy services.      See \"Rehab Therapy-Acute\" Patient Summary Report for complete documentation.     "

## 2019-10-04 NOTE — CARE PLAN
Problem: Bronchopulmonary Hygiene:  Goal: Increase mobilization of retained secretions  Outcome: PROGRESSING AS EXPECTED  3%   TPIECE 4 LPM 28%      Problem: Bronchoconstriction:  Goal: Improve in air movement and diminished wheezing  10/4/2019 1625 by Maggy Schultz, RRT  Outcome: PROGRESSING AS EXPECTED  10/4/2019 1623 by Maggy Schultz, RRT  Outcome: PROGRESSING AS EXPECTED   ALB QID

## 2019-10-04 NOTE — THERAPY
"Physical Therapy Treatment completed.   Bed Mobility:  Supine to Sit: Minimal Assist  Transfers: Sit to Stand: Contact Guard Assist  Gait: Level Of Assist: Minimal Assist with Front-Wheel Walker       Plan of Care: Will benefit from Physical Therapy 3 times per week  Discharge Recommendations: Equipment: Will Continue to Assess for Equipment Needs.     Pt with improved activity tolerance, dynamic balance, remains limited by command following and random agitation. Recommend placement. Will follow.     See \"Rehab Therapy-Acute\" Patient Summary Report for complete documentation.       "

## 2019-10-05 PROBLEM — R13.10 DYSPHAGIA: Status: ACTIVE | Noted: 2019-10-05

## 2019-10-05 LAB
ANION GAP SERPL CALC-SCNC: 6 MMOL/L (ref 0–11.9)
BASOPHILS # BLD AUTO: 0.6 % (ref 0–1.8)
BASOPHILS # BLD: 0.07 K/UL (ref 0–0.12)
BUN SERPL-MCNC: 18 MG/DL (ref 8–22)
CALCIUM SERPL-MCNC: 8.2 MG/DL (ref 8.5–10.5)
CHLORIDE SERPL-SCNC: 107 MMOL/L (ref 96–112)
CO2 SERPL-SCNC: 27 MMOL/L (ref 20–33)
CREAT SERPL-MCNC: 0.83 MG/DL (ref 0.5–1.4)
EOSINOPHIL # BLD AUTO: 1.39 K/UL (ref 0–0.51)
EOSINOPHIL NFR BLD: 11.5 % (ref 0–6.9)
ERYTHROCYTE [DISTWIDTH] IN BLOOD BY AUTOMATED COUNT: 55.8 FL (ref 35.9–50)
GLUCOSE SERPL-MCNC: 88 MG/DL (ref 65–99)
HCT VFR BLD AUTO: 29 % (ref 42–52)
HGB BLD-MCNC: 8.8 G/DL (ref 14–18)
IMM GRANULOCYTES # BLD AUTO: 0.29 K/UL (ref 0–0.11)
IMM GRANULOCYTES NFR BLD AUTO: 2.4 % (ref 0–0.9)
LYMPHOCYTES # BLD AUTO: 1.54 K/UL (ref 1–4.8)
LYMPHOCYTES NFR BLD: 12.7 % (ref 22–41)
MAGNESIUM SERPL-MCNC: 1.9 MG/DL (ref 1.5–2.5)
MCH RBC QN AUTO: 29.6 PG (ref 27–33)
MCHC RBC AUTO-ENTMCNC: 30.3 G/DL (ref 33.7–35.3)
MCV RBC AUTO: 97.6 FL (ref 81.4–97.8)
MONOCYTES # BLD AUTO: 0.69 K/UL (ref 0–0.85)
MONOCYTES NFR BLD AUTO: 5.7 % (ref 0–13.4)
NEUTROPHILS # BLD AUTO: 8.13 K/UL (ref 1.82–7.42)
NEUTROPHILS NFR BLD: 67.1 % (ref 44–72)
NRBC # BLD AUTO: 0 K/UL
NRBC BLD-RTO: 0 /100 WBC
PLATELET # BLD AUTO: 140 K/UL (ref 164–446)
PMV BLD AUTO: 9.2 FL (ref 9–12.9)
POTASSIUM SERPL-SCNC: 3.6 MMOL/L (ref 3.6–5.5)
RBC # BLD AUTO: 2.97 M/UL (ref 4.7–6.1)
SODIUM SERPL-SCNC: 140 MMOL/L (ref 135–145)
WBC # BLD AUTO: 12.1 K/UL (ref 4.8–10.8)

## 2019-10-05 PROCEDURE — 36415 COLL VENOUS BLD VENIPUNCTURE: CPT

## 2019-10-05 PROCEDURE — A9270 NON-COVERED ITEM OR SERVICE: HCPCS | Performed by: SURGERY

## 2019-10-05 PROCEDURE — 700102 HCHG RX REV CODE 250 W/ 637 OVERRIDE(OP): Performed by: NURSE PRACTITIONER

## 2019-10-05 PROCEDURE — 700101 HCHG RX REV CODE 250: Performed by: SURGERY

## 2019-10-05 PROCEDURE — A9270 NON-COVERED ITEM OR SERVICE: HCPCS | Mod: TB | Performed by: PSYCHIATRY & NEUROLOGY

## 2019-10-05 PROCEDURE — 700102 HCHG RX REV CODE 250 W/ 637 OVERRIDE(OP): Performed by: SURGERY

## 2019-10-05 PROCEDURE — A9270 NON-COVERED ITEM OR SERVICE: HCPCS | Performed by: NURSE PRACTITIONER

## 2019-10-05 PROCEDURE — 80048 BASIC METABOLIC PNL TOTAL CA: CPT

## 2019-10-05 PROCEDURE — 85025 COMPLETE CBC W/AUTO DIFF WBC: CPT

## 2019-10-05 PROCEDURE — A9270 NON-COVERED ITEM OR SERVICE: HCPCS | Performed by: ORAL & MAXILLOFACIAL SURGERY

## 2019-10-05 PROCEDURE — 84425 ASSAY OF VITAMIN B-1: CPT

## 2019-10-05 PROCEDURE — 94640 AIRWAY INHALATION TREATMENT: CPT

## 2019-10-05 PROCEDURE — 83735 ASSAY OF MAGNESIUM: CPT

## 2019-10-05 PROCEDURE — 700102 HCHG RX REV CODE 250 W/ 637 OVERRIDE(OP): Performed by: ORAL & MAXILLOFACIAL SURGERY

## 2019-10-05 PROCEDURE — 700102 HCHG RX REV CODE 250 W/ 637 OVERRIDE(OP): Mod: TB | Performed by: PSYCHIATRY & NEUROLOGY

## 2019-10-05 PROCEDURE — 92526 ORAL FUNCTION THERAPY: CPT

## 2019-10-05 PROCEDURE — 770001 HCHG ROOM/CARE - MED/SURG/GYN PRIV*

## 2019-10-05 PROCEDURE — 99232 SBSQ HOSP IP/OBS MODERATE 35: CPT | Performed by: PSYCHIATRY & NEUROLOGY

## 2019-10-05 PROCEDURE — 94760 N-INVAS EAR/PLS OXIMETRY 1: CPT

## 2019-10-05 PROCEDURE — 700101 HCHG RX REV CODE 250: Performed by: NURSE PRACTITIONER

## 2019-10-05 RX ORDER — DEXTROSE MONOHYDRATE, SODIUM CHLORIDE, AND POTASSIUM CHLORIDE 50; 1.49; 9 G/1000ML; G/1000ML; G/1000ML
INJECTION, SOLUTION INTRAVENOUS CONTINUOUS
Status: DISCONTINUED | OUTPATIENT
Start: 2019-10-05 | End: 2019-10-08

## 2019-10-05 RX ADMIN — SODIUM CHLORIDE SOLN NEBU 3% 3 ML: 3 NEBU SOLN at 18:53

## 2019-10-05 RX ADMIN — APIXABAN 5 MG: 5 TABLET, FILM COATED ORAL at 04:27

## 2019-10-05 RX ADMIN — RISPERIDONE 0.5 MG: 0.5 TABLET ORAL at 17:47

## 2019-10-05 RX ADMIN — ALBUTEROL SULFATE 2.5 MG: 5 SOLUTION RESPIRATORY (INHALATION) at 16:57

## 2019-10-05 RX ADMIN — ALBUTEROL SULFATE 2.5 MG: 5 SOLUTION RESPIRATORY (INHALATION) at 11:54

## 2019-10-05 RX ADMIN — CHLORHEXIDINE GLUCONATE 0.12% ORAL RINSE 15 ML: 1.2 LIQUID ORAL at 04:27

## 2019-10-05 RX ADMIN — GUAIFENESIN 200 MG: 100 SOLUTION ORAL at 15:16

## 2019-10-05 RX ADMIN — BACITRACIN ZINC: 500 OINTMENT TOPICAL at 04:27

## 2019-10-05 RX ADMIN — TERAZOSIN HYDROCHLORIDE ANHYDROUS 2 MG: 2 CAPSULE ORAL at 17:47

## 2019-10-05 RX ADMIN — ALBUTEROL SULFATE 2.5 MG: 5 SOLUTION RESPIRATORY (INHALATION) at 18:53

## 2019-10-05 RX ADMIN — METOPROLOL TARTRATE 75 MG: 25 TABLET, FILM COATED ORAL at 04:27

## 2019-10-05 RX ADMIN — APIXABAN 5 MG: 5 TABLET, FILM COATED ORAL at 17:47

## 2019-10-05 RX ADMIN — METOPROLOL TARTRATE 75 MG: 25 TABLET, FILM COATED ORAL at 17:47

## 2019-10-05 RX ADMIN — GUAIFENESIN 200 MG: 100 SOLUTION ORAL at 11:00

## 2019-10-05 RX ADMIN — POTASSIUM CHLORIDE, DEXTROSE MONOHYDRATE AND SODIUM CHLORIDE: 150; 5; 900 INJECTION, SOLUTION INTRAVENOUS at 22:24

## 2019-10-05 RX ADMIN — ALBUTEROL SULFATE 2.5 MG: 5 SOLUTION RESPIRATORY (INHALATION) at 07:35

## 2019-10-05 RX ADMIN — SODIUM CHLORIDE SOLN NEBU 3% 3 ML: 3 NEBU SOLN at 11:54

## 2019-10-05 RX ADMIN — GUAIFENESIN 200 MG: 100 SOLUTION ORAL at 04:27

## 2019-10-05 RX ADMIN — SODIUM CHLORIDE SOLN NEBU 3% 3 ML: 3 NEBU SOLN at 16:57

## 2019-10-05 RX ADMIN — FLUCONAZOLE 200 MG: 200 TABLET ORAL at 04:27

## 2019-10-05 RX ADMIN — CHLORHEXIDINE GLUCONATE 0.12% ORAL RINSE 15 ML: 1.2 LIQUID ORAL at 18:00

## 2019-10-05 RX ADMIN — DIVALPROEX SODIUM 250 MG: 125 CAPSULE, COATED PELLETS ORAL at 14:00

## 2019-10-05 RX ADMIN — METOPROLOL TARTRATE 75 MG: 25 TABLET, FILM COATED ORAL at 11:00

## 2019-10-05 RX ADMIN — DIGOXIN 125 MCG: 250 TABLET ORAL at 04:27

## 2019-10-05 RX ADMIN — RISPERIDONE 0.5 MG: 0.5 TABLET ORAL at 04:27

## 2019-10-05 RX ADMIN — ACETAMINOPHEN 650 MG: 325 TABLET, FILM COATED ORAL at 15:15

## 2019-10-05 RX ADMIN — POTASSIUM CHLORIDE, DEXTROSE MONOHYDRATE AND SODIUM CHLORIDE: 150; 5; 900 INJECTION, SOLUTION INTRAVENOUS at 12:15

## 2019-10-05 RX ADMIN — SODIUM CHLORIDE SOLN NEBU 3% 3 ML: 3 NEBU SOLN at 07:35

## 2019-10-05 RX ADMIN — DIVALPROEX SODIUM 250 MG: 125 CAPSULE, COATED PELLETS ORAL at 04:27

## 2019-10-05 SDOH — HEALTH STABILITY: MENTAL HEALTH: HOW MANY STANDARD DRINKS CONTAINING ALCOHOL DO YOU HAVE ON A TYPICAL DAY?: 1 OR 2

## 2019-10-05 SDOH — HEALTH STABILITY: MENTAL HEALTH: HOW OFTEN DO YOU HAVE 6 OR MORE DRINKS ON ONE OCCASION?: NEVER

## 2019-10-05 ASSESSMENT — LIFESTYLE VARIABLES
CONSUMPTION TOTAL: NEGATIVE
ALCOHOL_USE: YES
EVER FELT BAD OR GUILTY ABOUT YOUR DRINKING: NO
AVERAGE NUMBER OF DAYS PER WEEK YOU HAVE A DRINK CONTAINING ALCOHOL: 5
TOTAL SCORE: 0
TOTAL SCORE: 0
HOW MANY TIMES IN THE PAST YEAR HAVE YOU HAD 5 OR MORE DRINKS IN A DAY: 0
TOTAL SCORE: 0
EVER HAD A DRINK FIRST THING IN THE MORNING TO STEADY YOUR NERVES TO GET RID OF A HANGOVER: NO
DOES PATIENT WANT TO STOP DRINKING: NO
HAVE PEOPLE ANNOYED YOU BY CRITICIZING YOUR DRINKING: NO
HAVE YOU EVER FELT YOU SHOULD CUT DOWN ON YOUR DRINKING: NO
ON A TYPICAL DAY WHEN YOU DRINK ALCOHOL HOW MANY DRINKS DO YOU HAVE: 1
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
## 2019-10-05 NOTE — ASSESSMENT & PLAN NOTE
Cortrak with tube feeds  10/4 Removed cortrak, refusing replacement.   10/5 Upgraded to NTFL via straw MAX 1:1A, no purees, PMSV donned.   Tolerating PO diet until surgery on 10/13  10/14 difficulty swallowing after facial reconstruction  10/16  tolerated PMV trial better / Cortrack replaced
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
## 2019-10-05 NOTE — CARE PLAN
Problem: Communication  Goal: The ability to communicate needs accurately and effectively will improve  Outcome: PROGRESSING AS EXPECTED     Problem: Safety  Goal: Will remain free from injury  Outcome: PROGRESSING AS EXPECTED     Problem: Venous Thromboembolism (VTW)/Deep Vein Thrombosis (DVT) Prevention:  Goal: Patient will participate in Venous Thrombosis (VTE)/Deep Vein Thrombosis (DVT)Prevention Measures  Outcome: PROGRESSING AS EXPECTED     Problem: Bowel/Gastric:  Goal: Normal bowel function is maintained or improved  Outcome: PROGRESSING AS EXPECTED     Problem: Bowel/Gastric:  Goal: Will not experience complications related to bowel motility  Outcome: PROGRESSING AS EXPECTED

## 2019-10-05 NOTE — THERAPY
"Speech Language Therapy dysphagia treatment completed.   Functional Status:  Patient was seen this date for dysphagia tx. RN called and pt pulled Cortrak, patient and family refusing replacement of TF. Patient confused impulsive. Patient upright in chair, family present, sitter present. PMSV off and cuff deflated, pt initially refusing placement of valve. However with education patient agreeable to placement. Pt tolerated well.  Patient consumed PO trials of NTL via straw. Pt consumed entire 6oz of NTL via straw with direct 1:1 MAX assist. Patient demonstrated no clinical s/sx of aspiration, however did not follow swallow precautions or strategies. At this time recommend NTFL  via straw ONLY with replacement of Cortrak, however family refused replacement and wanting patient to consume PO alone without supplemental support. Education provided in regards to meeting nutritional needs and family verbalized understanding. At this time recommend NTFL diet via straw MAX 1:1, NTL meds only via straw. NO PUREES NTL liquids only. Direct assistance for all meals, pt needs to be monitored and fed  and via straw only. SLP to continue to follow. HOLD PO with any difficulties, changes, or s/sx of aspiration. Will defer to MD in regards to replacement of Cortrak, pt at risk for not meeting nutritional needs alone via PO given impulsive behavior.   Recommendations: NTFL via straw MAX 1:1A, no purees, PMSV donned.    Plan of Care: Will benefit from Speech Therapy 3 times per week  Post-Acute Therapy: Discharge to a transitional care facility for continued skilled therapy services.    See \"Rehab Therapy-Acute\" Patient Summary Report for complete documentation.     "
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
## 2019-10-05 NOTE — PROGRESS NOTES
Bedside report received.  Assessment complete.  A&O x 3 (can write and mouth words).   1:1 sitter at bedside  Patient ambulates with 1 assist for tube managment. Bed alarm on.   Patient has denies pain  Denies N&V. Cortrak came out yesterday. Family refused, Placement to be re-evaluated today. Speech has approved PO intake temporarly. Refer to sariah Tay for retention   BM 10/4/2019.  Patient denies SOB. Trach collar #6 shiley, 28% FiO2  SCD's off.    Review plan with of care with patient. Call light and personal belongings with in reach. Hourly rounding in place. All needs met at this time.

## 2019-10-06 LAB
ANION GAP SERPL CALC-SCNC: 5 MMOL/L (ref 0–11.9)
BASOPHILS # BLD AUTO: 0.5 % (ref 0–1.8)
BASOPHILS # BLD: 0.05 K/UL (ref 0–0.12)
BUN SERPL-MCNC: 20 MG/DL (ref 8–22)
CALCIUM SERPL-MCNC: 7.8 MG/DL (ref 8.5–10.5)
CHLORIDE SERPL-SCNC: 110 MMOL/L (ref 96–112)
CO2 SERPL-SCNC: 25 MMOL/L (ref 20–33)
CREAT SERPL-MCNC: 0.72 MG/DL (ref 0.5–1.4)
EKG IMPRESSION: NORMAL
EOSINOPHIL # BLD AUTO: 1.29 K/UL (ref 0–0.51)
EOSINOPHIL NFR BLD: 11.9 % (ref 0–6.9)
ERYTHROCYTE [DISTWIDTH] IN BLOOD BY AUTOMATED COUNT: 56.7 FL (ref 35.9–50)
GLUCOSE SERPL-MCNC: 120 MG/DL (ref 65–99)
HCT VFR BLD AUTO: 29 % (ref 42–52)
HGB BLD-MCNC: 8.7 G/DL (ref 14–18)
IMM GRANULOCYTES # BLD AUTO: 0.27 K/UL (ref 0–0.11)
IMM GRANULOCYTES NFR BLD AUTO: 2.5 % (ref 0–0.9)
LYMPHOCYTES # BLD AUTO: 1.35 K/UL (ref 1–4.8)
LYMPHOCYTES NFR BLD: 12.5 % (ref 22–41)
MCH RBC QN AUTO: 29.4 PG (ref 27–33)
MCHC RBC AUTO-ENTMCNC: 30 G/DL (ref 33.7–35.3)
MCV RBC AUTO: 98 FL (ref 81.4–97.8)
MONOCYTES # BLD AUTO: 0.71 K/UL (ref 0–0.85)
MONOCYTES NFR BLD AUTO: 6.5 % (ref 0–13.4)
NEUTROPHILS # BLD AUTO: 7.17 K/UL (ref 1.82–7.42)
NEUTROPHILS NFR BLD: 66.1 % (ref 44–72)
NRBC # BLD AUTO: 0 K/UL
NRBC BLD-RTO: 0 /100 WBC
PLATELET # BLD AUTO: 133 K/UL (ref 164–446)
PMV BLD AUTO: 9.6 FL (ref 9–12.9)
POTASSIUM SERPL-SCNC: 3.8 MMOL/L (ref 3.6–5.5)
RBC # BLD AUTO: 2.96 M/UL (ref 4.7–6.1)
SODIUM SERPL-SCNC: 140 MMOL/L (ref 135–145)
WBC # BLD AUTO: 10.8 K/UL (ref 4.8–10.8)

## 2019-10-06 PROCEDURE — 700101 HCHG RX REV CODE 250: Performed by: SURGERY

## 2019-10-06 PROCEDURE — 94640 AIRWAY INHALATION TREATMENT: CPT

## 2019-10-06 PROCEDURE — A9270 NON-COVERED ITEM OR SERVICE: HCPCS | Performed by: SURGERY

## 2019-10-06 PROCEDURE — 700102 HCHG RX REV CODE 250 W/ 637 OVERRIDE(OP): Performed by: SURGERY

## 2019-10-06 PROCEDURE — 93010 ELECTROCARDIOGRAM REPORT: CPT | Performed by: INTERNAL MEDICINE

## 2019-10-06 PROCEDURE — A9270 NON-COVERED ITEM OR SERVICE: HCPCS | Performed by: NURSE PRACTITIONER

## 2019-10-06 PROCEDURE — 700102 HCHG RX REV CODE 250 W/ 637 OVERRIDE(OP): Performed by: ORAL & MAXILLOFACIAL SURGERY

## 2019-10-06 PROCEDURE — 93005 ELECTROCARDIOGRAM TRACING: CPT | Performed by: NURSE PRACTITIONER

## 2019-10-06 PROCEDURE — 80048 BASIC METABOLIC PNL TOTAL CA: CPT

## 2019-10-06 PROCEDURE — 94760 N-INVAS EAR/PLS OXIMETRY 1: CPT

## 2019-10-06 PROCEDURE — A9270 NON-COVERED ITEM OR SERVICE: HCPCS | Mod: TB | Performed by: PSYCHIATRY & NEUROLOGY

## 2019-10-06 PROCEDURE — 700102 HCHG RX REV CODE 250 W/ 637 OVERRIDE(OP): Performed by: NURSE PRACTITIONER

## 2019-10-06 PROCEDURE — A9270 NON-COVERED ITEM OR SERVICE: HCPCS | Performed by: ORAL & MAXILLOFACIAL SURGERY

## 2019-10-06 PROCEDURE — 700101 HCHG RX REV CODE 250: Performed by: NURSE PRACTITIONER

## 2019-10-06 PROCEDURE — 36415 COLL VENOUS BLD VENIPUNCTURE: CPT

## 2019-10-06 PROCEDURE — 85025 COMPLETE CBC W/AUTO DIFF WBC: CPT

## 2019-10-06 PROCEDURE — 302101 FENESTRATED FOAM: Performed by: SURGERY

## 2019-10-06 PROCEDURE — 770001 HCHG ROOM/CARE - MED/SURG/GYN PRIV*

## 2019-10-06 PROCEDURE — 700102 HCHG RX REV CODE 250 W/ 637 OVERRIDE(OP): Mod: TB | Performed by: PSYCHIATRY & NEUROLOGY

## 2019-10-06 PROCEDURE — 51798 US URINE CAPACITY MEASURE: CPT

## 2019-10-06 RX ADMIN — POTASSIUM CHLORIDE, DEXTROSE MONOHYDRATE AND SODIUM CHLORIDE: 150; 5; 900 INJECTION, SOLUTION INTRAVENOUS at 11:54

## 2019-10-06 RX ADMIN — BACITRACIN ZINC: 500 OINTMENT TOPICAL at 18:00

## 2019-10-06 RX ADMIN — ALBUTEROL SULFATE 2.5 MG: 5 SOLUTION RESPIRATORY (INHALATION) at 07:57

## 2019-10-06 RX ADMIN — TERAZOSIN HYDROCHLORIDE ANHYDROUS 2 MG: 2 CAPSULE ORAL at 18:05

## 2019-10-06 RX ADMIN — FLUCONAZOLE 200 MG: 200 TABLET ORAL at 09:00

## 2019-10-06 RX ADMIN — RISPERIDONE 0.5 MG: 0.5 TABLET ORAL at 09:00

## 2019-10-06 RX ADMIN — DIVALPROEX SODIUM 250 MG: 125 CAPSULE, COATED PELLETS ORAL at 15:32

## 2019-10-06 RX ADMIN — SODIUM CHLORIDE SOLN NEBU 3% 3 ML: 3 NEBU SOLN at 07:57

## 2019-10-06 RX ADMIN — APIXABAN 5 MG: 5 TABLET, FILM COATED ORAL at 09:00

## 2019-10-06 RX ADMIN — GUAIFENESIN 200 MG: 100 SOLUTION ORAL at 20:20

## 2019-10-06 RX ADMIN — METOPROLOL TARTRATE 75 MG: 25 TABLET, FILM COATED ORAL at 09:00

## 2019-10-06 RX ADMIN — BACITRACIN ZINC: 500 OINTMENT TOPICAL at 05:29

## 2019-10-06 RX ADMIN — APIXABAN 5 MG: 5 TABLET, FILM COATED ORAL at 18:04

## 2019-10-06 RX ADMIN — DIGOXIN 125 MCG: 250 TABLET ORAL at 09:00

## 2019-10-06 RX ADMIN — CHLORHEXIDINE GLUCONATE 0.12% ORAL RINSE 15 ML: 1.2 LIQUID ORAL at 05:30

## 2019-10-06 RX ADMIN — POTASSIUM CHLORIDE, DEXTROSE MONOHYDRATE AND SODIUM CHLORIDE: 150; 5; 900 INJECTION, SOLUTION INTRAVENOUS at 21:37

## 2019-10-06 RX ADMIN — METOPROLOL TARTRATE 75 MG: 25 TABLET, FILM COATED ORAL at 18:04

## 2019-10-06 RX ADMIN — DIVALPROEX SODIUM 250 MG: 125 CAPSULE, COATED PELLETS ORAL at 22:36

## 2019-10-06 RX ADMIN — GUAIFENESIN 200 MG: 100 SOLUTION ORAL at 15:32

## 2019-10-06 RX ADMIN — METOPROLOL TARTRATE 75 MG: 25 TABLET, FILM COATED ORAL at 15:31

## 2019-10-06 RX ADMIN — RISPERIDONE 0.5 MG: 0.5 TABLET ORAL at 18:04

## 2019-10-06 RX ADMIN — CHLORHEXIDINE GLUCONATE 0.12% ORAL RINSE 15 ML: 1.2 LIQUID ORAL at 18:06

## 2019-10-06 RX ADMIN — GUAIFENESIN 200 MG: 100 SOLUTION ORAL at 09:00

## 2019-10-06 ASSESSMENT — ENCOUNTER SYMPTOMS
SHORTNESS OF BREATH: 0
VOMITING: 0
FEVER: 0
DEPRESSION: 1
NAUSEA: 0
COUGH: 0
BLURRED VISION: 1
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
## 2019-10-06 NOTE — CARE PLAN
Problem: Oxygenation:  Goal: Maintain adequate oxygenation dependent on patient condition  Outcome: PROGRESSING AS EXPECTED     Problem: Bronchopulmonary Hygiene:  Goal: Increase mobilization of retained secretions  Outcome: PROGRESSING AS EXPECTED   Capped patient at 1615.  Pt tolerating well.  Placed pt on 1L NC to ease transition.

## 2019-10-06 NOTE — CARE PLAN
Problem: Nutritional:  Goal: Achieve adequate nutritional intake  Description  Patient to eat >50% of meals/supplements; meeting estimated needs per Calorie Count.    Outcome: NOT MET
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
## 2019-10-06 NOTE — PROGRESS NOTES
Pt laying in bed, call light within reach, bed lowered and locked, fall education reinforced. Pt IV is clean,dry,intact,and patent and infusing the appropriate fluids. Pt lung sounds are diminished in all lobes, bowel sounds are normoactive in all four quadrants, heart sounds are within defined limits. Pt is up x1 assist with a FWW and has a steady gait. Pt has a tracheostomy in place with T piece attachment and cuff. Pt has a fenestrated foam in place with pressure injury present underneath and tenderness. Pt has a left jaw incision EMRE with staples in place CDI. Pt jaw wired shut but is able to verbalize needs and communicate with staff. Sitter is in the room.

## 2019-10-07 ENCOUNTER — APPOINTMENT (OUTPATIENT)
Dept: RADIOLOGY | Facility: MEDICAL CENTER | Age: 79
DRG: 003 | End: 2019-10-07
Attending: SURGERY
Payer: MEDICARE

## 2019-10-07 LAB
ANION GAP SERPL CALC-SCNC: 7 MMOL/L (ref 0–11.9)
ANISOCYTOSIS BLD QL SMEAR: ABNORMAL
BASOPHILS # BLD AUTO: 0.4 % (ref 0–1.8)
BASOPHILS # BLD: 0.04 K/UL (ref 0–0.12)
BUN SERPL-MCNC: 16 MG/DL (ref 8–22)
CALCIUM SERPL-MCNC: 8 MG/DL (ref 8.5–10.5)
CHLORIDE SERPL-SCNC: 111 MMOL/L (ref 96–112)
CO2 SERPL-SCNC: 24 MMOL/L (ref 20–33)
COMMENT 1642: NORMAL
CREAT SERPL-MCNC: 0.72 MG/DL (ref 0.5–1.4)
CRP SERPL HS-MCNC: 0.51 MG/DL (ref 0–0.75)
EOSINOPHIL # BLD AUTO: 1 K/UL (ref 0–0.51)
EOSINOPHIL NFR BLD: 10.7 % (ref 0–6.9)
ERYTHROCYTE [DISTWIDTH] IN BLOOD BY AUTOMATED COUNT: 59 FL (ref 35.9–50)
GLUCOSE SERPL-MCNC: 114 MG/DL (ref 65–99)
HCT VFR BLD AUTO: 29.6 % (ref 42–52)
HGB BLD-MCNC: 8.7 G/DL (ref 14–18)
IMM GRANULOCYTES # BLD AUTO: 0.2 K/UL (ref 0–0.11)
IMM GRANULOCYTES NFR BLD AUTO: 2.1 % (ref 0–0.9)
LYMPHOCYTES # BLD AUTO: 1.12 K/UL (ref 1–4.8)
LYMPHOCYTES NFR BLD: 12 % (ref 22–41)
MACROCYTES BLD QL SMEAR: ABNORMAL
MAGNESIUM SERPL-MCNC: 1.8 MG/DL (ref 1.5–2.5)
MCH RBC QN AUTO: 29.3 PG (ref 27–33)
MCHC RBC AUTO-ENTMCNC: 29.4 G/DL (ref 33.7–35.3)
MCV RBC AUTO: 99.7 FL (ref 81.4–97.8)
MONOCYTES # BLD AUTO: 0.74 K/UL (ref 0–0.85)
MONOCYTES NFR BLD AUTO: 7.9 % (ref 0–13.4)
MORPHOLOGY BLD-IMP: NORMAL
NEUTROPHILS # BLD AUTO: 6.21 K/UL (ref 1.82–7.42)
NEUTROPHILS NFR BLD: 66.9 % (ref 44–72)
NRBC # BLD AUTO: 0 K/UL
NRBC BLD-RTO: 0 /100 WBC
PHOSPHATE SERPL-MCNC: 3 MG/DL (ref 2.5–4.5)
PLATELET # BLD AUTO: 140 K/UL (ref 164–446)
PLATELET BLD QL SMEAR: NORMAL
PMV BLD AUTO: 9.2 FL (ref 9–12.9)
POTASSIUM SERPL-SCNC: 3.8 MMOL/L (ref 3.6–5.5)
PREALB SERPL-MCNC: 22 MG/DL (ref 18–38)
RBC # BLD AUTO: 2.97 M/UL (ref 4.7–6.1)
RBC BLD AUTO: PRESENT
SODIUM SERPL-SCNC: 142 MMOL/L (ref 135–145)
WBC # BLD AUTO: 9.3 K/UL (ref 4.8–10.8)

## 2019-10-07 PROCEDURE — 700111 HCHG RX REV CODE 636 W/ 250 OVERRIDE (IP): Performed by: NURSE PRACTITIONER

## 2019-10-07 PROCEDURE — 85025 COMPLETE CBC W/AUTO DIFF WBC: CPT

## 2019-10-07 PROCEDURE — 99231 SBSQ HOSP IP/OBS SF/LOW 25: CPT | Performed by: PSYCHIATRY & NEUROLOGY

## 2019-10-07 PROCEDURE — A9270 NON-COVERED ITEM OR SERVICE: HCPCS | Performed by: ORAL & MAXILLOFACIAL SURGERY

## 2019-10-07 PROCEDURE — 700102 HCHG RX REV CODE 250 W/ 637 OVERRIDE(OP): Performed by: ORAL & MAXILLOFACIAL SURGERY

## 2019-10-07 PROCEDURE — A9270 NON-COVERED ITEM OR SERVICE: HCPCS | Performed by: SURGERY

## 2019-10-07 PROCEDURE — 84100 ASSAY OF PHOSPHORUS: CPT

## 2019-10-07 PROCEDURE — 770001 HCHG ROOM/CARE - MED/SURG/GYN PRIV*

## 2019-10-07 PROCEDURE — 94760 N-INVAS EAR/PLS OXIMETRY 1: CPT

## 2019-10-07 PROCEDURE — 700105 HCHG RX REV CODE 258: Performed by: NURSE PRACTITIONER

## 2019-10-07 PROCEDURE — 71045 X-RAY EXAM CHEST 1 VIEW: CPT

## 2019-10-07 PROCEDURE — 84134 ASSAY OF PREALBUMIN: CPT

## 2019-10-07 PROCEDURE — 700102 HCHG RX REV CODE 250 W/ 637 OVERRIDE(OP): Performed by: SURGERY

## 2019-10-07 PROCEDURE — 36415 COLL VENOUS BLD VENIPUNCTURE: CPT

## 2019-10-07 PROCEDURE — 700101 HCHG RX REV CODE 250: Performed by: NURSE PRACTITIONER

## 2019-10-07 PROCEDURE — 83735 ASSAY OF MAGNESIUM: CPT

## 2019-10-07 PROCEDURE — 80048 BASIC METABOLIC PNL TOTAL CA: CPT

## 2019-10-07 PROCEDURE — 86140 C-REACTIVE PROTEIN: CPT

## 2019-10-07 RX ORDER — RISPERIDONE 0.5 MG/1
0.5 TABLET ORAL 2 TIMES DAILY
Status: DISCONTINUED | OUTPATIENT
Start: 2019-10-07 | End: 2019-10-12

## 2019-10-07 RX ORDER — TRAZODONE HYDROCHLORIDE 50 MG/1
50 TABLET ORAL NIGHTLY PRN
Status: DISCONTINUED | OUTPATIENT
Start: 2019-10-07 | End: 2019-10-14

## 2019-10-07 RX ORDER — ACETAMINOPHEN 650 MG/1
650 SUPPOSITORY RECTAL EVERY 4 HOURS PRN
Status: DISCONTINUED | OUTPATIENT
Start: 2019-10-07 | End: 2019-10-14

## 2019-10-07 RX ORDER — TERAZOSIN 2 MG/1
2 CAPSULE ORAL EVERY EVENING
Status: DISCONTINUED | OUTPATIENT
Start: 2019-10-07 | End: 2019-10-14

## 2019-10-07 RX ORDER — ACETAMINOPHEN 325 MG/1
650 TABLET ORAL EVERY 4 HOURS PRN
Status: DISCONTINUED | OUTPATIENT
Start: 2019-10-07 | End: 2019-10-14

## 2019-10-07 RX ORDER — HYDROCODONE BITARTRATE AND ACETAMINOPHEN 5; 325 MG/1; MG/1
1-2 TABLET ORAL EVERY 6 HOURS PRN
Status: DISCONTINUED | OUTPATIENT
Start: 2019-10-07 | End: 2019-10-14

## 2019-10-07 RX ORDER — DIVALPROEX SODIUM 125 MG/1
250 CAPSULE, COATED PELLETS ORAL EVERY 8 HOURS
Status: DISCONTINUED | OUTPATIENT
Start: 2019-10-07 | End: 2019-10-12

## 2019-10-07 RX ORDER — DIGOXIN 250 MCG
125 TABLET ORAL DAILY
Status: DISCONTINUED | OUTPATIENT
Start: 2019-10-07 | End: 2019-10-12

## 2019-10-07 RX ADMIN — METOPROLOL TARTRATE 75 MG: 25 TABLET, FILM COATED ORAL at 18:08

## 2019-10-07 RX ADMIN — GUAIFENESIN 200 MG: 100 SOLUTION ORAL at 20:49

## 2019-10-07 RX ADMIN — BACITRACIN ZINC: 500 OINTMENT TOPICAL at 06:19

## 2019-10-07 RX ADMIN — METOPROLOL TARTRATE 75 MG: 25 TABLET, FILM COATED ORAL at 13:41

## 2019-10-07 RX ADMIN — APIXABAN 5 MG: 5 TABLET, FILM COATED ORAL at 18:09

## 2019-10-07 RX ADMIN — POTASSIUM CHLORIDE, DEXTROSE MONOHYDRATE AND SODIUM CHLORIDE: 150; 5; 900 INJECTION, SOLUTION INTRAVENOUS at 09:00

## 2019-10-07 RX ADMIN — METOPROLOL TARTRATE 75 MG: 25 TABLET, FILM COATED ORAL at 06:07

## 2019-10-07 RX ADMIN — DIGOXIN 125 MCG: 250 TABLET ORAL at 06:07

## 2019-10-07 RX ADMIN — APIXABAN 5 MG: 5 TABLET, FILM COATED ORAL at 06:07

## 2019-10-07 RX ADMIN — RISPERIDONE 0.5 MG: 0.5 TABLET ORAL at 18:09

## 2019-10-07 RX ADMIN — GUAIFENESIN 200 MG: 100 SOLUTION ORAL at 18:09

## 2019-10-07 RX ADMIN — DIVALPROEX SODIUM 250 MG: 125 CAPSULE, COATED PELLETS ORAL at 20:50

## 2019-10-07 RX ADMIN — DIVALPROEX SODIUM 250 MG: 125 CAPSULE, COATED PELLETS ORAL at 13:40

## 2019-10-07 RX ADMIN — BACITRACIN ZINC: 500 OINTMENT TOPICAL at 18:12

## 2019-10-07 RX ADMIN — GUAIFENESIN 200 MG: 100 SOLUTION ORAL at 09:45

## 2019-10-07 RX ADMIN — MICAFUNGIN SODIUM 100 MG: 20 INJECTION, POWDER, LYOPHILIZED, FOR SOLUTION INTRAVENOUS at 06:21

## 2019-10-07 RX ADMIN — DIVALPROEX SODIUM 250 MG: 125 CAPSULE, COATED PELLETS ORAL at 06:14

## 2019-10-07 RX ADMIN — POTASSIUM CHLORIDE, DEXTROSE MONOHYDRATE AND SODIUM CHLORIDE: 150; 5; 900 INJECTION, SOLUTION INTRAVENOUS at 18:45

## 2019-10-07 RX ADMIN — TERAZOSIN HYDROCHLORIDE ANHYDROUS 2 MG: 2 CAPSULE ORAL at 18:09

## 2019-10-07 RX ADMIN — RISPERIDONE 0.5 MG: 0.5 TABLET ORAL at 06:07

## 2019-10-07 RX ADMIN — GUAIFENESIN 200 MG: 100 SOLUTION ORAL at 06:08

## 2019-10-07 RX ADMIN — CHLORHEXIDINE GLUCONATE 0.12% ORAL RINSE 15 ML: 1.2 LIQUID ORAL at 06:08

## 2019-10-07 ASSESSMENT — ENCOUNTER SYMPTOMS
DEPRESSION: 1
FEVER: 0
HEADACHES: 0
BLURRED VISION: 1
COUGH: 0
VOMITING: 0
NAUSEA: 0
SHORTNESS OF BREATH: 0

## 2019-10-07 NOTE — CARE PLAN
Problem: Communication  Goal: The ability to communicate needs accurately and effectively will improve  Outcome: PROGRESSING AS EXPECTED     Problem: Safety  Goal: Will remain free from injury  Outcome: PROGRESSING AS EXPECTED     Pt communicates effectively, 1:1 sitter at bedside, pt unable to void -bladder scan 187 ml, no complaints of bladder discomfort, pt resting comfortably, denies pain, nectar liquids, disoriented to time and place.

## 2019-10-07 NOTE — FLOWSHEET NOTE
10/07/19 0301   Events/Summary/Plan   Events/Summary/Plan Pt remians capped. No distress noted   Trache Weaning and Decannulation   Capping Trial Initiated, Smart Text Complete? Yes   Hours Tolerated 11   Respiratory WDL   Respiratory (WDL) X   Chest Exam   Work Of Breathing / Effort Mild   Respiration 18   Pulse 85   Breath Sounds   Pre/Post Intervention Pre Intervention Assessment   RUL Breath Sounds Diminished   RML Breath Sounds Diminished   RLL Breath Sounds Diminished   DARLENE Breath Sounds Diminished   LLL Breath Sounds Diminished   Secretions   Cough Productive   How Sputum Obtained Tracheal;Suction   Sputum Amount Small   Sputum Color White;Clear   Sputum Consistency Thick;Thin   Suction Frequency Suctioned Once or Twice Per Encounter   Airway Trach Tracheostomy 6.0   Placement Date/Time: 09/14/19 1250   Airway Type: Trach  Brand: PriceTag  Style: Cuffed  Airway Location: Tracheostomy  Airway Size: 6.0  Inserted In: Unit  Inserted by: Respiratory care practitioner   Site Assessment Intact   Airway Tube Secured Velcro attachment   Cuffless No   Cuff Pressure cmH2O (R.C.)   (deflated)   Status Capped   Extra Tracheostomy Tube at Bedside Yes   Oximetry   #Pulse Oximetry (Single Determination) Yes   Oxygen   Pulse Oximetry 95 %   O2 (LPM) 1   O2 Daily Delivery Respiratory  Silicone Nasal Cannula

## 2019-10-07 NOTE — DIETARY
Calorie Count Results Day 10/6- 4 tickets collected  Breakfast 140 kcal, 6 gm protein  Lunch 99 kcal, 1 gm protein  Snack 2 - 270 kcals, 11 gm protein  Dinner 216 kcal, 6 gm protein  Total x 24 hrs ( 725 kcals and 24 gm protein Meals and Snacks).    Total for 10/6: 725 kcals (35-38 % needs), 24 gm protein (23-27% needs)    PLAN -   1. Will continue to monitor calorie count through 10/9 breakfast.    RECOMMEND -   1. Continue diet per SLP with Boost Plus THICK with snacks as tolerated.    RD to follow.
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
## 2019-10-07 NOTE — PROGRESS NOTES
Patient unable to void since vale removed yesterday.  Patient bladder scanned @2150 with a total of 187 ml in the bladder.  Then again this morning with 540mL in the bladder.    Patient educated and will try one more time to void and if not will allow this RN to straight cath patient per active order

## 2019-10-07 NOTE — PROGRESS NOTES
Spoke with Dr Dong, notified of DC of legal hold and requested plan moving forward for discharge anticipation. Stated he would review CT done on 10/03/19 and write a note or call me back.     Notified Mishel Tay of legal hold D/C and continued confusion. Pt tried to pull out IV today. Wandering behavior. Fx reorienting needed.       Bladder scan of 650. Tay catheter placed as ordered. Notified Mishel Tay.

## 2019-10-07 NOTE — PROGRESS NOTES
Assumed Care of patient at this time.      Patient is on a legal hold until 10/10/2019  1:1 observation at bedside

## 2019-10-07 NOTE — CARE PLAN
Patient capped with 6.0 trache on room air tolerating well  Problem: Oxygenation:  Goal: Maintain adequate oxygenation dependent on patient condition  Outcome: PROGRESSING AS EXPECTED     Problem: Bronchopulmonary Hygiene:  Goal: Increase mobilization of retained secretions  Outcome: PROGRESSING AS EXPECTED   Suction prn

## 2019-10-07 NOTE — CARE PLAN
"  Problem: Communication  Goal: The ability to communicate needs accurately and effectively will improve  Outcome: PROGRESSING AS EXPECTED  Note:   Pt able to make needs known despite wired jaw. This RN is able to understand pt. Communication is hindered due to pt is VERY [place]. Bilat hearing aids in place. Pt hears better out of his R ear. The most effective communication is done by writing on pt's white board.   Mentation: pt continues to be confused. Unable to state year, pt states year is \"94 or [birth year].\" When asked what building we were in, pt was unable to recall. When asked what city we were in, pt states \"[facility].\" After reorienting pt fx asks the same questions several time. Therapeutic listening and gentle reorienting provided. Several times where pt became frustrated. Requested to go home. Requested a place to buy wine.      Problem: Infection  Goal: Will remain free from infection  Outcome: PROGRESSING AS EXPECTED  Note:   Insp wheeze noted from lungs. RT following throughout day. Harsh moist cough heard without productive sputum. L chin wound appears to be almost healed. No open areas or redness noted. Bacitracin ordered for application to L chin. Afebrile. VS WINL. WBC 9.3     Problem: Venous Thromboembolism (VTW)/Deep Vein Thrombosis (DVT) Prevention:  Goal: Patient will participate in Venous Thrombosis (VTE)/Deep Vein Thrombosis (DVT)Prevention Measures  Outcome: PROGRESSING AS EXPECTED  Note:   Eliquis on board. Unable to apply SCD's due to impulsive behavior and mentation.      Problem: Bowel/Gastric:  Goal: Normal bowel function is maintained or improved  Outcome: PROGRESSING AS EXPECTED  Note:   No BM noted today. Positive flatulence.      Problem: Knowledge Deficit  Goal: Knowledge of disease process/condition, treatment plan, diagnostic tests, and medications will improve  Outcome: PROGRESSING AS EXPECTED  Note:   Discussed POC and updated wife on today.      Problem: Pain Management  Goal: Pain " level will decrease to patient's comfort goal  Outcome: PROGRESSING AS EXPECTED  Note:   Denies pain throughout shift.      Problem: Respiratory:  Goal: Respiratory status will improve  Outcome: PROGRESSING AS EXPECTED  Note:   O2 sats >90% on Ra. Denies SOB at rest. With longer ambulation pt states SOB. Insp wheeze noted from lungs. RT following throughout day. Harsh moist cough heard without productive sputum. Trach in placed. Currently capped.      Problem: Skin Integrity  Goal: Risk for impaired skin integrity will decrease  Outcome: PROGRESSING AS EXPECTED  Note:   Mobilizing with SBA. Pt appears to need FWW. Unable to direct pt to use assistive devices. Impulsive behavior continued.      Problem: Psychosocial needs  Goal: Anxiety reduction  Outcome: PROGRESSING AS EXPECTED  Note:   Pt states that he no longer feels that way, in regards to SI attempt. Pt states that he didn't know what happened or what was going on that made him want to hurt himself. No current SI this shift.      Problem: Urinary Elimination:  Goal: Ability to reestablish a normal urinary elimination pattern will improve  Outcome: PROGRESSING AS EXPECTED  Note:   Unable to void for x6 hours. Mx attempts by pt to void. All unsuccessful. Bladder scan of 650. Tay catheter placed as ordered.      Problem: Safety  Goal: Will remain free from injury  Note:   1:1 Sitter in place. Legal hold discontinued today. Safety sitter continued due to pt confusion, wandering, and continued impulsive behavior. Sitter continued also r/t pt was attempting to pull at lines. Pt gets OOB sporadically without calling for help and attempts to move things about his room. Unsteady gait and behavior noted. Bed in lowest position, non skid socks on, bed alarm continued, personal items within reach, area clear of clutter.
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
## 2019-10-08 PROBLEM — Z78.9 NO CONTRAINDICATION TO DEEP VEIN THROMBOSIS (DVT) PROPHYLAXIS: Status: ACTIVE | Noted: 2019-08-28

## 2019-10-08 LAB
ANION GAP SERPL CALC-SCNC: 5 MMOL/L (ref 0–11.9)
BASOPHILS # BLD AUTO: 0.5 % (ref 0–1.8)
BASOPHILS # BLD: 0.04 K/UL (ref 0–0.12)
BUN SERPL-MCNC: 9 MG/DL (ref 8–22)
CALCIUM SERPL-MCNC: 7.8 MG/DL (ref 8.5–10.5)
CHLORIDE SERPL-SCNC: 112 MMOL/L (ref 96–112)
CO2 SERPL-SCNC: 25 MMOL/L (ref 20–33)
CREAT SERPL-MCNC: 0.6 MG/DL (ref 0.5–1.4)
EKG IMPRESSION: NORMAL
EOSINOPHIL # BLD AUTO: 0.77 K/UL (ref 0–0.51)
EOSINOPHIL NFR BLD: 8.7 % (ref 0–6.9)
ERYTHROCYTE [DISTWIDTH] IN BLOOD BY AUTOMATED COUNT: 57 FL (ref 35.9–50)
GLUCOSE SERPL-MCNC: 90 MG/DL (ref 65–99)
HCT VFR BLD AUTO: 29.7 % (ref 42–52)
HGB BLD-MCNC: 8.7 G/DL (ref 14–18)
IMM GRANULOCYTES # BLD AUTO: 0.16 K/UL (ref 0–0.11)
IMM GRANULOCYTES NFR BLD AUTO: 1.8 % (ref 0–0.9)
LYMPHOCYTES # BLD AUTO: 1.23 K/UL (ref 1–4.8)
LYMPHOCYTES NFR BLD: 13.9 % (ref 22–41)
MCH RBC QN AUTO: 29.2 PG (ref 27–33)
MCHC RBC AUTO-ENTMCNC: 29.3 G/DL (ref 33.7–35.3)
MCV RBC AUTO: 99.7 FL (ref 81.4–97.8)
MONOCYTES # BLD AUTO: 0.7 K/UL (ref 0–0.85)
MONOCYTES NFR BLD AUTO: 7.9 % (ref 0–13.4)
NEUTROPHILS # BLD AUTO: 5.96 K/UL (ref 1.82–7.42)
NEUTROPHILS NFR BLD: 67.2 % (ref 44–72)
NRBC # BLD AUTO: 0 K/UL
NRBC BLD-RTO: 0 /100 WBC
PLATELET # BLD AUTO: 160 K/UL (ref 164–446)
PMV BLD AUTO: 9.7 FL (ref 9–12.9)
POTASSIUM SERPL-SCNC: 3.7 MMOL/L (ref 3.6–5.5)
RBC # BLD AUTO: 2.98 M/UL (ref 4.7–6.1)
SODIUM SERPL-SCNC: 142 MMOL/L (ref 135–145)
WBC # BLD AUTO: 8.9 K/UL (ref 4.8–10.8)

## 2019-10-08 PROCEDURE — 700101 HCHG RX REV CODE 250: Performed by: NURSE PRACTITIONER

## 2019-10-08 PROCEDURE — 700111 HCHG RX REV CODE 636 W/ 250 OVERRIDE (IP): Performed by: SURGERY

## 2019-10-08 PROCEDURE — 700102 HCHG RX REV CODE 250 W/ 637 OVERRIDE(OP): Performed by: SURGERY

## 2019-10-08 PROCEDURE — 97530 THERAPEUTIC ACTIVITIES: CPT

## 2019-10-08 PROCEDURE — 85025 COMPLETE CBC W/AUTO DIFF WBC: CPT

## 2019-10-08 PROCEDURE — 700105 HCHG RX REV CODE 258: Performed by: NURSE PRACTITIONER

## 2019-10-08 PROCEDURE — 94760 N-INVAS EAR/PLS OXIMETRY 1: CPT

## 2019-10-08 PROCEDURE — 700111 HCHG RX REV CODE 636 W/ 250 OVERRIDE (IP): Performed by: NURSE PRACTITIONER

## 2019-10-08 PROCEDURE — 80048 BASIC METABOLIC PNL TOTAL CA: CPT

## 2019-10-08 PROCEDURE — A9270 NON-COVERED ITEM OR SERVICE: HCPCS | Performed by: ORAL & MAXILLOFACIAL SURGERY

## 2019-10-08 PROCEDURE — 97535 SELF CARE MNGMENT TRAINING: CPT

## 2019-10-08 PROCEDURE — 700102 HCHG RX REV CODE 250 W/ 637 OVERRIDE(OP): Performed by: ORAL & MAXILLOFACIAL SURGERY

## 2019-10-08 PROCEDURE — 92526 ORAL FUNCTION THERAPY: CPT

## 2019-10-08 PROCEDURE — 97116 GAIT TRAINING THERAPY: CPT

## 2019-10-08 PROCEDURE — 36415 COLL VENOUS BLD VENIPUNCTURE: CPT

## 2019-10-08 PROCEDURE — 93005 ELECTROCARDIOGRAM TRACING: CPT | Performed by: NURSE PRACTITIONER

## 2019-10-08 PROCEDURE — A9270 NON-COVERED ITEM OR SERVICE: HCPCS | Performed by: SURGERY

## 2019-10-08 PROCEDURE — 770001 HCHG ROOM/CARE - MED/SURG/GYN PRIV*

## 2019-10-08 PROCEDURE — 99223 1ST HOSP IP/OBS HIGH 75: CPT | Performed by: PHYSICAL MEDICINE & REHABILITATION

## 2019-10-08 PROCEDURE — 93010 ELECTROCARDIOGRAM REPORT: CPT | Performed by: INTERNAL MEDICINE

## 2019-10-08 RX ADMIN — RISPERIDONE 0.5 MG: 0.5 TABLET ORAL at 06:39

## 2019-10-08 RX ADMIN — APIXABAN 5 MG: 5 TABLET, FILM COATED ORAL at 17:18

## 2019-10-08 RX ADMIN — METOPROLOL TARTRATE 75 MG: 25 TABLET, FILM COATED ORAL at 06:38

## 2019-10-08 RX ADMIN — ZIPRASIDONE MESYLATE 10 MG: 20 INJECTION, POWDER, LYOPHILIZED, FOR SOLUTION INTRAMUSCULAR at 20:41

## 2019-10-08 RX ADMIN — BACITRACIN ZINC: 500 OINTMENT TOPICAL at 06:39

## 2019-10-08 RX ADMIN — GUAIFENESIN 200 MG: 100 SOLUTION ORAL at 17:18

## 2019-10-08 RX ADMIN — GUAIFENESIN 200 MG: 100 SOLUTION ORAL at 06:38

## 2019-10-08 RX ADMIN — DIGOXIN 125 MCG: 250 TABLET ORAL at 06:38

## 2019-10-08 RX ADMIN — CHLORHEXIDINE GLUCONATE 0.12% ORAL RINSE 15 ML: 1.2 LIQUID ORAL at 06:38

## 2019-10-08 RX ADMIN — TERAZOSIN HYDROCHLORIDE ANHYDROUS 2 MG: 2 CAPSULE ORAL at 17:28

## 2019-10-08 RX ADMIN — GUAIFENESIN 200 MG: 100 SOLUTION ORAL at 20:21

## 2019-10-08 RX ADMIN — POTASSIUM CHLORIDE, DEXTROSE MONOHYDRATE AND SODIUM CHLORIDE: 150; 5; 900 INJECTION, SOLUTION INTRAVENOUS at 13:22

## 2019-10-08 RX ADMIN — RISPERIDONE 0.5 MG: 0.5 TABLET ORAL at 17:18

## 2019-10-08 RX ADMIN — CHLORHEXIDINE GLUCONATE 0.12% ORAL RINSE 15 ML: 1.2 LIQUID ORAL at 17:18

## 2019-10-08 RX ADMIN — DIVALPROEX SODIUM 250 MG: 125 CAPSULE, COATED PELLETS ORAL at 06:38

## 2019-10-08 RX ADMIN — DIVALPROEX SODIUM 250 MG: 125 CAPSULE, COATED PELLETS ORAL at 13:15

## 2019-10-08 RX ADMIN — METOPROLOL TARTRATE 75 MG: 25 TABLET, FILM COATED ORAL at 17:18

## 2019-10-08 RX ADMIN — APIXABAN 5 MG: 5 TABLET, FILM COATED ORAL at 06:39

## 2019-10-08 RX ADMIN — METOPROLOL TARTRATE 75 MG: 25 TABLET, FILM COATED ORAL at 13:16

## 2019-10-08 RX ADMIN — MICAFUNGIN SODIUM 100 MG: 20 INJECTION, POWDER, LYOPHILIZED, FOR SOLUTION INTRAVENOUS at 10:25

## 2019-10-08 RX ADMIN — GUAIFENESIN 200 MG: 100 SOLUTION ORAL at 10:25

## 2019-10-08 ASSESSMENT — COGNITIVE AND FUNCTIONAL STATUS - GENERAL
TURNING FROM BACK TO SIDE WHILE IN FLAT BAD: A LITTLE
SUGGESTED CMS G CODE MODIFIER MOBILITY: CK
DRESSING REGULAR UPPER BODY CLOTHING: A LITTLE
HELP NEEDED FOR BATHING: A LOT
WALKING IN HOSPITAL ROOM: A LITTLE
CLIMB 3 TO 5 STEPS WITH RAILING: A LOT
MOVING FROM LYING ON BACK TO SITTING ON SIDE OF FLAT BED: A LITTLE
DAILY ACTIVITIY SCORE: 17
DRESSING REGULAR LOWER BODY CLOTHING: A LITTLE
PERSONAL GROOMING: A LITTLE
MOBILITY SCORE: 17
MOVING TO AND FROM BED TO CHAIR: A LITTLE
SUGGESTED CMS G CODE MODIFIER DAILY ACTIVITY: CK
EATING MEALS: A LITTLE
TOILETING: A LITTLE
STANDING UP FROM CHAIR USING ARMS: A LITTLE

## 2019-10-08 ASSESSMENT — ENCOUNTER SYMPTOMS
FEVER: 0
HEADACHES: 0
BLURRED VISION: 1
NAUSEA: 0
DEPRESSION: 1
COUGH: 0
SHORTNESS OF BREATH: 0
VOMITING: 0

## 2019-10-08 ASSESSMENT — GAIT ASSESSMENTS
GAIT LEVEL OF ASSIST: MINIMAL ASSIST
DEVIATION: DECREASED BASE OF SUPPORT;OTHER (COMMENT)
ASSISTIVE DEVICE: FRONT WHEEL WALKER
DISTANCE (FEET): 200

## 2019-10-08 NOTE — DISCHARGE PLANNING
[facility] Transitional Care Coordination     Referral from:  Jaleel ANAND     Facesheet indicates: Medicare   Potential Rehab Diagnosis: Debility     Chart review indicates patient does have on going medical management and does have therapy needs to possibly meet inpatient rehab facility criteria with the goal of returning to community.    D/C support: Spouse      Physiatry consultation forwarded per protocol.        Call out to spouse Rosana discussed specifics of IRF level of care goal to return home with family support. Spouse available for 24/7 support single story home. Spouse agreeable to IRF level of care.     Thank you for referral.

## 2019-10-08 NOTE — PROGRESS NOTES
Pt aox 2. No visible signs of distress. VSS.  Tolerating medication regimen without any signs or symptoms of adverse reactions.  Pt reports 0 /10 pain, medicated per MAR.  Tolerating diet without any n/v. No new BM  Ambulatory with standby assist.   Dressings are CDI.  On RA no complaints of SOB. Trach in place and patent.   Patient removed IV accidentally. ANAMARIA Schrader attempted to start new IV however patient refused. ANAMARIA Schrader notified MD Mishel. MD stated she will determine care.   Bed locked and in low position. Sitter at bedside.   Call light within reach and able to make all needs be known.  Will continue to monitor.
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
## 2019-10-08 NOTE — THERAPY
"Speech Language Therapy dysphagia treatment completed.     Functional Status: Pt was seen on this date for dysphagia treatment. Pt cleared for NTFL diet by SLP over the weekend and was marked as a high follow-up. Per SO, tolerating diet without difficulty. Pt currently capped with mild WOB and stridor noted upon inhalation and exhalation. RT arrived at the end of session who placed pulse ox - patient O2 sats normal at 98%. Pt coughed moderate amount of phlegm through trach which reduced WOB. Speech intact and vocal quality strong and clear. PO consisted of ~10 single sips of NTL via straw. Swallow trigger minimally delayed. No overt s/sx of aspiration appreciated. Vocal quality and breath sounds remained clear. No increase in respiratory distress with PO.  Pt demonstrated good use of swallow strategies given min verbal cues. Pt declined further PO trials. Mentation appears to be improving.     Recommendations: Continue NTFL via straw MAX 1:1A, no purees, PMSV donned if not capped. SLP following to complete orders for cognitive-linguistic evaluation as appropriate.     Plan of Care: Will benefit from Speech Therapy 3 times per week    Post-Acute Therapy: Recommend inpatient transitional care services for continued speech therapy services.      See \"Rehab Therapy-Acute\" Patient Summary Report for complete documentation.     "

## 2019-10-08 NOTE — CARE PLAN
6.0 cuffed trache (deflated) and capped for 48 hours.  Tolerating well on room air. Walking in halls.  Problem: Oxygenation:  Goal: Maintain adequate oxygenation dependent on patient condition  Outcome: PROGRESSING AS EXPECTED     Problem: Bronchopulmonary Hygiene:  Goal: Increase mobilization of retained secretions  Outcome: PROGRESSING AS EXPECTED

## 2019-10-08 NOTE — THERAPY
"Occupational Therapy Treatment completed with focus on ADLs and ADL transfers.  Functional Status: Pt seen for OT session. Progressing with functional mobility and activity tolerance. Seated in chair on arrival. Pt demo'd impulsivity and req max v/cs for safety during session. LB dressing with min A. Sit>stand with min A. Functional ambulation with min A into hallway with v/cs for safety and technique. Toilet txf with min A and v/cs for technique, hand placement, and safety. Declined grooming. Back to chair with min A for v/cs for safety. Left seated in chair with sitter and wife in room. Reports lives in a 1 story apt with a walk-in shower and bench, with wife able to assist PRN. Spent time answering family questions about OT, rehab, and GOC. Continues to be limited by decreased functional mobility, activity tolerance, cognition, strength, coordination, balance, and pain which are currently affecting pt's ability to complete ADLs/IADLs at baseline. Currently recommend acute OT, and Recommend post-acute placement for additional occupational therapy services prior to discharge home. Patient can tolerate post-acute therapies at a 5x/week frequency.    Plan of Care: Will benefit from Occupational Therapy 2 times per week  Discharge Recommendations:  Equipment Will Continue to Assess for Equipment Needs. Post-acute therapy: Recommend post-acute placement for additional occupational therapy services prior to discharge home. Patient can tolerate post-acute therapies at a 5x/week frequency.    See \"Rehab Therapy-Acute\" Patient Summary Report for complete documentation.   "

## 2019-10-08 NOTE — PROGRESS NOTES
Brief geriatrics progress note October 8, 2019  Patient will be transferred to [facility].   Will sign off.     Cyn Robbins M.D.  Geriatric Attending  Please feel free to contact us with any questions.  Extension [phone]   8:00-5:00 Monday - Friday (excluding holidays)
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
## 2019-10-08 NOTE — CARE PLAN
Problem: Communication  Goal: The ability to communicate needs accurately and effectively will improve  Outcome: PROGRESSING AS EXPECTED     Problem: Safety  Goal: Will remain free from injury  Outcome: PROGRESSING AS EXPECTED  Goal: Will remain free from falls  Outcome: PROGRESSING AS EXPECTED     Problem: Infection  Goal: Will remain free from infection  Outcome: PROGRESSING AS EXPECTED

## 2019-10-08 NOTE — DISCHARGE PLANNING
Anticipated Discharge Disposition: Rehab vs SNF    Action: RN CM spoke with pt's wife at bedside regarding discharge plan. Spouse agreeable to pt going to rehab; awaiting physiatry consult. If pt not a candidate for rehab will pursue SNF; choice form obtained.     Barriers to Discharge: Physiatry consult pending.    Plan: Await decision from physiatry to proceed with discharge planning.

## 2019-10-08 NOTE — DIETARY
Calorie Count Results Day 10/8- breakfast and snack 1  Breakfast 335 kcal, 14 gm protein  Snack 1 - 270 kcals, 11 gm protein  Lunch - no ticket  Dinner  - no ticket  Total x 24 hrs ( 1 Meal,1 Snacks).       Total of 605 kcals and 25 gm protein.    Estimated average for the day: 908 kcals for 3 meals (41-48% needs), 38 gm protein (36-43% needs)    PLAN - Continue current diet per SLP recs with thick Boost Plus with snacks. PO intake has increased from previous days menus.    RD to follow.

## 2019-10-08 NOTE — DISCHARGE PLANNING
Received Choice form at [phone]  Agency/Facility Name: [facility]  Referral sent per Choice form @ [phone]

## 2019-10-08 NOTE — PROGRESS NOTES
Bedside report received.  Assessment complete.  A&O to self and hospital. Patient impulsive. 1:1 sitter at bedside for safety.  Patient up with one assist. Bed and chair alarm on.   Patient denies pain at this time.  Denies N&V. Tolerating diet.  Trache in place and capped, patient tolerating.  + void via vale catheter due to urinary retention, last BM 10/6.  Patient denies SOB.  Review plan with of care with patient. Call light and personal belongings with in reach. Hourly rounding in place. All needs met at this time.

## 2019-10-08 NOTE — CARE PLAN
Problem: Safety  Goal: Will remain free from injury  Note:   Patient free from accidental injury at this time. Safety precautions in place, including bed alarm and safety sitter. Hourly rounding in place.      Problem: Pain Management  Goal: Pain level will decrease to patient's comfort goal  Note:   Patient denying pain at this time. Patient encouraged to call if pain worsens. Hourly rounding in place.

## 2019-10-08 NOTE — THERAPY
"Physical Therapy Treatment completed.   Bed Mobility:  Supine to Sit: Minimal Assist  Transfers: Sit to Stand: Contact Guard Assist(cues for saftey due to impulsivity )  Gait: Level Of Assist: Minimal Assist with Front-Wheel Walker       Plan of Care: Will benefit from Physical Therapy 3 times per week  Discharge Recommendations: Equipment: Will Continue to Assess for Equipment Needs.     See \"Rehab Therapy-Acute\" Patient Summary Report for complete documentation.     Pt agreeable for therapy and continues to demonstrae improved activity tolerane and balance. Pt continues to be limited due to cognitive behaviors, impulsivity, and poor saftey awareness. Pt requires frequent cues, faciliation, and redirection during upright activity. Pt presents with poor foot placement and navigation of FWW during turns and requires manual navigation of FWW at times. Pt will continue to benefit from acute skilled PT while in house, with recommendation for post acute rehab prior to d/c home.   "
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
## 2019-10-09 ENCOUNTER — APPOINTMENT (OUTPATIENT)
Dept: RADIOLOGY | Facility: MEDICAL CENTER | Age: 79
DRG: 003 | End: 2019-10-09
Attending: SURGERY
Payer: MEDICARE

## 2019-10-09 LAB
ANION GAP SERPL CALC-SCNC: 6 MMOL/L (ref 0–11.9)
BASOPHILS # BLD AUTO: 0.6 % (ref 0–1.8)
BASOPHILS # BLD: 0.05 K/UL (ref 0–0.12)
BUN SERPL-MCNC: 5 MG/DL (ref 8–22)
CALCIUM SERPL-MCNC: 8 MG/DL (ref 8.5–10.5)
CHLORIDE SERPL-SCNC: 107 MMOL/L (ref 96–112)
CO2 SERPL-SCNC: 27 MMOL/L (ref 20–33)
CREAT SERPL-MCNC: 0.57 MG/DL (ref 0.5–1.4)
EOSINOPHIL # BLD AUTO: 0.5 K/UL (ref 0–0.51)
EOSINOPHIL NFR BLD: 6.1 % (ref 0–6.9)
ERYTHROCYTE [DISTWIDTH] IN BLOOD BY AUTOMATED COUNT: 57.3 FL (ref 35.9–50)
GLUCOSE SERPL-MCNC: 101 MG/DL (ref 65–99)
HCT VFR BLD AUTO: 31.2 % (ref 42–52)
HGB BLD-MCNC: 9.1 G/DL (ref 14–18)
IMM GRANULOCYTES # BLD AUTO: 0.1 K/UL (ref 0–0.11)
IMM GRANULOCYTES NFR BLD AUTO: 1.2 % (ref 0–0.9)
LYMPHOCYTES # BLD AUTO: 0.93 K/UL (ref 1–4.8)
LYMPHOCYTES NFR BLD: 11.4 % (ref 22–41)
MCH RBC QN AUTO: 29 PG (ref 27–33)
MCHC RBC AUTO-ENTMCNC: 29.2 G/DL (ref 33.7–35.3)
MCV RBC AUTO: 99.4 FL (ref 81.4–97.8)
MONOCYTES # BLD AUTO: 0.57 K/UL (ref 0–0.85)
MONOCYTES NFR BLD AUTO: 7 % (ref 0–13.4)
NEUTROPHILS # BLD AUTO: 6.04 K/UL (ref 1.82–7.42)
NEUTROPHILS NFR BLD: 73.7 % (ref 44–72)
NRBC # BLD AUTO: 0 K/UL
NRBC BLD-RTO: 0 /100 WBC
PLATELET # BLD AUTO: 187 K/UL (ref 164–446)
PMV BLD AUTO: 9.5 FL (ref 9–12.9)
POTASSIUM SERPL-SCNC: 3.7 MMOL/L (ref 3.6–5.5)
RBC # BLD AUTO: 3.14 M/UL (ref 4.7–6.1)
SODIUM SERPL-SCNC: 140 MMOL/L (ref 135–145)
VIT B1 BLD-MCNC: 127 NMOL/L (ref 70–180)
WBC # BLD AUTO: 8.2 K/UL (ref 4.8–10.8)

## 2019-10-09 PROCEDURE — 92523 SPEECH SOUND LANG COMPREHEN: CPT

## 2019-10-09 PROCEDURE — A9270 NON-COVERED ITEM OR SERVICE: HCPCS | Performed by: ORAL & MAXILLOFACIAL SURGERY

## 2019-10-09 PROCEDURE — 71045 X-RAY EXAM CHEST 1 VIEW: CPT

## 2019-10-09 PROCEDURE — 94760 N-INVAS EAR/PLS OXIMETRY 1: CPT

## 2019-10-09 PROCEDURE — 700105 HCHG RX REV CODE 258: Performed by: NURSE PRACTITIONER

## 2019-10-09 PROCEDURE — 700105 HCHG RX REV CODE 258

## 2019-10-09 PROCEDURE — 770001 HCHG ROOM/CARE - MED/SURG/GYN PRIV*

## 2019-10-09 PROCEDURE — 700102 HCHG RX REV CODE 250 W/ 637 OVERRIDE(OP): Performed by: SURGERY

## 2019-10-09 PROCEDURE — 36415 COLL VENOUS BLD VENIPUNCTURE: CPT

## 2019-10-09 PROCEDURE — 80048 BASIC METABOLIC PNL TOTAL CA: CPT

## 2019-10-09 PROCEDURE — 85025 COMPLETE CBC W/AUTO DIFF WBC: CPT

## 2019-10-09 PROCEDURE — 700111 HCHG RX REV CODE 636 W/ 250 OVERRIDE (IP): Performed by: NURSE PRACTITIONER

## 2019-10-09 PROCEDURE — A9270 NON-COVERED ITEM OR SERVICE: HCPCS | Performed by: SURGERY

## 2019-10-09 PROCEDURE — 700102 HCHG RX REV CODE 250 W/ 637 OVERRIDE(OP): Performed by: ORAL & MAXILLOFACIAL SURGERY

## 2019-10-09 RX ORDER — SODIUM CHLORIDE 9 MG/ML
INJECTION, SOLUTION INTRAVENOUS
Status: COMPLETED
Start: 2019-10-09 | End: 2019-10-09

## 2019-10-09 RX ADMIN — APIXABAN 5 MG: 5 TABLET, FILM COATED ORAL at 18:09

## 2019-10-09 RX ADMIN — APIXABAN 5 MG: 5 TABLET, FILM COATED ORAL at 06:23

## 2019-10-09 RX ADMIN — METOPROLOL TARTRATE 75 MG: 25 TABLET, FILM COATED ORAL at 18:09

## 2019-10-09 RX ADMIN — METOPROLOL TARTRATE 75 MG: 25 TABLET, FILM COATED ORAL at 12:06

## 2019-10-09 RX ADMIN — MICAFUNGIN SODIUM 100 MG: 20 INJECTION, POWDER, LYOPHILIZED, FOR SOLUTION INTRAVENOUS at 06:23

## 2019-10-09 RX ADMIN — BACITRACIN ZINC: 500 OINTMENT TOPICAL at 06:23

## 2019-10-09 RX ADMIN — SODIUM CHLORIDE 500 ML: 9 INJECTION, SOLUTION INTRAVENOUS at 13:36

## 2019-10-09 RX ADMIN — DIGOXIN 125 MCG: 250 TABLET ORAL at 06:23

## 2019-10-09 RX ADMIN — BACITRACIN ZINC: 500 OINTMENT TOPICAL at 18:10

## 2019-10-09 RX ADMIN — DIVALPROEX SODIUM 250 MG: 125 CAPSULE, COATED PELLETS ORAL at 13:33

## 2019-10-09 RX ADMIN — GUAIFENESIN 200 MG: 100 SOLUTION ORAL at 18:10

## 2019-10-09 RX ADMIN — TERAZOSIN HYDROCHLORIDE ANHYDROUS 2 MG: 2 CAPSULE ORAL at 18:09

## 2019-10-09 RX ADMIN — DIVALPROEX SODIUM 250 MG: 125 CAPSULE, COATED PELLETS ORAL at 20:24

## 2019-10-09 RX ADMIN — GUAIFENESIN 200 MG: 100 SOLUTION ORAL at 06:23

## 2019-10-09 RX ADMIN — CHLORHEXIDINE GLUCONATE 0.12% ORAL RINSE 15 ML: 1.2 LIQUID ORAL at 06:23

## 2019-10-09 RX ADMIN — METOPROLOL TARTRATE 75 MG: 25 TABLET, FILM COATED ORAL at 06:23

## 2019-10-09 RX ADMIN — RISPERIDONE 0.5 MG: 0.5 TABLET ORAL at 06:24

## 2019-10-09 RX ADMIN — TRAZODONE HYDROCHLORIDE 50 MG: 50 TABLET ORAL at 20:24

## 2019-10-09 RX ADMIN — DIVALPROEX SODIUM 250 MG: 125 CAPSULE, COATED PELLETS ORAL at 06:23

## 2019-10-09 RX ADMIN — RISPERIDONE 0.5 MG: 0.5 TABLET ORAL at 18:10

## 2019-10-09 RX ADMIN — GUAIFENESIN 200 MG: 100 SOLUTION ORAL at 20:24

## 2019-10-09 RX ADMIN — GUAIFENESIN 200 MG: 100 SOLUTION ORAL at 12:07

## 2019-10-09 NOTE — PALLIATIVE CARE
"Palliative Care     LSW met with pt's spouse, Rosana and pt.  Pt appeared to be happy was very talkative.  LSW went over the POLST form with Rosana.  Rosana would like the patient remain full code.  She stated that she would like to \"give him a chance\" and would like everything done.  POSLT was not completed, since the patient will remain a full code. Per Rosana, pt has been accepted to [facility].   "

## 2019-10-09 NOTE — PROGRESS NOTES
Assumed care at 0700    Received report from day shift RN.    Reviewed recent lab results, notes, orders, and MAR  POC discussed and updated on care board  Bed is in the lowest and locked position, call light within reach      1:1 sitter at bedside  Patient is on a legal hold for SI

## 2019-10-09 NOTE — CARE PLAN
Problem: Nutritional:  Goal: Achieve adequate nutritional intake  Description  Patient to eat >50 of meals/supplements; meeting estimated needs per Calorie Count.    Outcome: PROGRESSING AS EXPECTED    See RD note.

## 2019-10-09 NOTE — CARE PLAN
Problem: Communication  Goal: The ability to communicate needs accurately and effectively will improve  Outcome: PROGRESSING AS EXPECTED   Participation in POC  Problem: Safety  Goal: Will remain free from injury  Outcome: PROGRESSING AS EXPECTED   Fall precautions in place.   1:1 sitter at bedside for legal hold
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
## 2019-10-10 LAB
ANION GAP SERPL CALC-SCNC: 6 MMOL/L (ref 0–11.9)
BASOPHILS # BLD AUTO: 0.5 % (ref 0–1.8)
BASOPHILS # BLD: 0.04 K/UL (ref 0–0.12)
BUN SERPL-MCNC: 7 MG/DL (ref 8–22)
CALCIUM SERPL-MCNC: 8 MG/DL (ref 8.5–10.5)
CHLORIDE SERPL-SCNC: 107 MMOL/L (ref 96–112)
CO2 SERPL-SCNC: 27 MMOL/L (ref 20–33)
CREAT SERPL-MCNC: 0.56 MG/DL (ref 0.5–1.4)
EOSINOPHIL # BLD AUTO: 0.56 K/UL (ref 0–0.51)
EOSINOPHIL NFR BLD: 6.9 % (ref 0–6.9)
ERYTHROCYTE [DISTWIDTH] IN BLOOD BY AUTOMATED COUNT: 56.9 FL (ref 35.9–50)
GLUCOSE SERPL-MCNC: 92 MG/DL (ref 65–99)
HCT VFR BLD AUTO: 29.5 % (ref 42–52)
HGB BLD-MCNC: 8.9 G/DL (ref 14–18)
IMM GRANULOCYTES # BLD AUTO: 0.08 K/UL (ref 0–0.11)
IMM GRANULOCYTES NFR BLD AUTO: 1 % (ref 0–0.9)
LYMPHOCYTES # BLD AUTO: 1.32 K/UL (ref 1–4.8)
LYMPHOCYTES NFR BLD: 16.3 % (ref 22–41)
MAGNESIUM SERPL-MCNC: 1.5 MG/DL (ref 1.5–2.5)
MCH RBC QN AUTO: 29.7 PG (ref 27–33)
MCHC RBC AUTO-ENTMCNC: 30.2 G/DL (ref 33.7–35.3)
MCV RBC AUTO: 98.3 FL (ref 81.4–97.8)
MONOCYTES # BLD AUTO: 0.71 K/UL (ref 0–0.85)
MONOCYTES NFR BLD AUTO: 8.8 % (ref 0–13.4)
NEUTROPHILS # BLD AUTO: 5.39 K/UL (ref 1.82–7.42)
NEUTROPHILS NFR BLD: 66.5 % (ref 44–72)
NRBC # BLD AUTO: 0 K/UL
NRBC BLD-RTO: 0 /100 WBC
PHOSPHATE SERPL-MCNC: 3.5 MG/DL (ref 2.5–4.5)
PLATELET # BLD AUTO: 165 K/UL (ref 164–446)
PMV BLD AUTO: 9.3 FL (ref 9–12.9)
POTASSIUM SERPL-SCNC: 3.5 MMOL/L (ref 3.6–5.5)
RBC # BLD AUTO: 3 M/UL (ref 4.7–6.1)
SODIUM SERPL-SCNC: 140 MMOL/L (ref 135–145)
WBC # BLD AUTO: 8.1 K/UL (ref 4.8–10.8)

## 2019-10-10 PROCEDURE — 700102 HCHG RX REV CODE 250 W/ 637 OVERRIDE(OP): Performed by: ORAL & MAXILLOFACIAL SURGERY

## 2019-10-10 PROCEDURE — 85025 COMPLETE CBC W/AUTO DIFF WBC: CPT

## 2019-10-10 PROCEDURE — 92526 ORAL FUNCTION THERAPY: CPT

## 2019-10-10 PROCEDURE — 83735 ASSAY OF MAGNESIUM: CPT

## 2019-10-10 PROCEDURE — A9270 NON-COVERED ITEM OR SERVICE: HCPCS | Performed by: SURGERY

## 2019-10-10 PROCEDURE — 700111 HCHG RX REV CODE 636 W/ 250 OVERRIDE (IP): Performed by: NURSE PRACTITIONER

## 2019-10-10 PROCEDURE — 700105 HCHG RX REV CODE 258: Performed by: NURSE PRACTITIONER

## 2019-10-10 PROCEDURE — 36415 COLL VENOUS BLD VENIPUNCTURE: CPT

## 2019-10-10 PROCEDURE — 80048 BASIC METABOLIC PNL TOTAL CA: CPT

## 2019-10-10 PROCEDURE — 700102 HCHG RX REV CODE 250 W/ 637 OVERRIDE(OP): Performed by: SURGERY

## 2019-10-10 PROCEDURE — 84100 ASSAY OF PHOSPHORUS: CPT

## 2019-10-10 PROCEDURE — A9270 NON-COVERED ITEM OR SERVICE: HCPCS | Performed by: ORAL & MAXILLOFACIAL SURGERY

## 2019-10-10 PROCEDURE — 770001 HCHG ROOM/CARE - MED/SURG/GYN PRIV*

## 2019-10-10 RX ADMIN — BACITRACIN ZINC: 500 OINTMENT TOPICAL at 17:33

## 2019-10-10 RX ADMIN — MICAFUNGIN SODIUM 100 MG: 20 INJECTION, POWDER, LYOPHILIZED, FOR SOLUTION INTRAVENOUS at 06:06

## 2019-10-10 RX ADMIN — APIXABAN 5 MG: 5 TABLET, FILM COATED ORAL at 06:07

## 2019-10-10 RX ADMIN — DIGOXIN 125 MCG: 250 TABLET ORAL at 06:07

## 2019-10-10 RX ADMIN — RISPERIDONE 0.5 MG: 0.5 TABLET ORAL at 06:07

## 2019-10-10 RX ADMIN — METOPROLOL TARTRATE 75 MG: 25 TABLET, FILM COATED ORAL at 11:50

## 2019-10-10 RX ADMIN — DIVALPROEX SODIUM 250 MG: 125 CAPSULE, COATED PELLETS ORAL at 06:07

## 2019-10-10 RX ADMIN — DIVALPROEX SODIUM 250 MG: 125 CAPSULE, COATED PELLETS ORAL at 15:08

## 2019-10-10 RX ADMIN — METOPROLOL TARTRATE 75 MG: 25 TABLET, FILM COATED ORAL at 17:33

## 2019-10-10 RX ADMIN — APIXABAN 5 MG: 5 TABLET, FILM COATED ORAL at 17:33

## 2019-10-10 RX ADMIN — GUAIFENESIN 200 MG: 100 SOLUTION ORAL at 11:41

## 2019-10-10 RX ADMIN — GUAIFENESIN 200 MG: 100 SOLUTION ORAL at 21:13

## 2019-10-10 RX ADMIN — TERAZOSIN HYDROCHLORIDE ANHYDROUS 2 MG: 2 CAPSULE ORAL at 19:19

## 2019-10-10 RX ADMIN — BACITRACIN ZINC: 500 OINTMENT TOPICAL at 06:07

## 2019-10-10 RX ADMIN — GUAIFENESIN 200 MG: 100 SOLUTION ORAL at 06:06

## 2019-10-10 RX ADMIN — GUAIFENESIN 200 MG: 100 SOLUTION ORAL at 17:33

## 2019-10-10 RX ADMIN — CHLORHEXIDINE GLUCONATE 0.12% ORAL RINSE 15 ML: 1.2 LIQUID ORAL at 17:33

## 2019-10-10 RX ADMIN — RISPERIDONE 0.5 MG: 0.5 TABLET ORAL at 17:33

## 2019-10-10 RX ADMIN — METOPROLOL TARTRATE 75 MG: 25 TABLET, FILM COATED ORAL at 06:07

## 2019-10-10 RX ADMIN — CHLORHEXIDINE GLUCONATE 0.12% ORAL RINSE 15 ML: 1.2 LIQUID ORAL at 06:06

## 2019-10-10 RX ADMIN — DIVALPROEX SODIUM 250 MG: 125 CAPSULE, COATED PELLETS ORAL at 21:13
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
## 2019-10-10 NOTE — THERAPY
"Speech Language Therapy dysphagia treatment completed.     Functional Status: Patient was seen on this date for dysphagia treatment. Patient sitting out of bed in a chair. Pt capped but continues to have mild audible inhalation. PO trials consisted of NTL via straw from meal tray during breakfast. Mild increase in WOB when taking serial sips at a time. Pt required mod fade to min verbal cues to take single sips at a time. Delayed cough x2; otherwise, vocal quality and breath sounds remained clear.     Recommendations: Pt appears to be tolerating current diet but would not recommend an upgrade at this time. Continue NTFL/jaw wire diet (all PO via single sips from straw) given assistance with tray set up and direct supervision.     Plan of Care: Will benefit from Speech Therapy 3 times per week    Post-Acute Therapy: Recommend inpatient transitional care services for continued speech therapy services.      See \"Rehab Therapy-Acute\" Patient Summary Report for complete documentation.     "

## 2019-10-10 NOTE — CARE PLAN
Problem: Safety  Goal: Will remain free from injury  Note:   Patient free from accidental injury at this time. Safety precautions in place, including bed alarm and safety sitter. Hourly rounding in place.      Problem: Pain Management  Goal: Pain level will decrease to patient's comfort goal  Note:   Patient denying pain at this time. Patient encouraged to call if pain worsens. Hourly rounding in place.     Problem: Respiratory:  Goal: Respiratory status will improve  Outcome: PROGRESSING AS EXPECTED  Note:   Patient trache capped.

## 2019-10-11 ENCOUNTER — APPOINTMENT (OUTPATIENT)
Dept: RADIOLOGY | Facility: MEDICAL CENTER | Age: 79
DRG: 003 | End: 2019-10-11
Attending: SURGERY
Payer: MEDICARE

## 2019-10-11 PROCEDURE — 700102 HCHG RX REV CODE 250 W/ 637 OVERRIDE(OP): Performed by: SURGERY

## 2019-10-11 PROCEDURE — A9270 NON-COVERED ITEM OR SERVICE: HCPCS | Performed by: ORAL & MAXILLOFACIAL SURGERY

## 2019-10-11 PROCEDURE — 770001 HCHG ROOM/CARE - MED/SURG/GYN PRIV*

## 2019-10-11 PROCEDURE — 302118 SHAMPOO,NO RINSE: Performed by: SURGERY

## 2019-10-11 PROCEDURE — A9270 NON-COVERED ITEM OR SERVICE: HCPCS | Performed by: SURGERY

## 2019-10-11 PROCEDURE — 700102 HCHG RX REV CODE 250 W/ 637 OVERRIDE(OP): Performed by: ORAL & MAXILLOFACIAL SURGERY

## 2019-10-11 PROCEDURE — 71045 X-RAY EXAM CHEST 1 VIEW: CPT

## 2019-10-11 PROCEDURE — 700105 HCHG RX REV CODE 258: Performed by: NURSE PRACTITIONER

## 2019-10-11 PROCEDURE — 700111 HCHG RX REV CODE 636 W/ 250 OVERRIDE (IP): Performed by: NURSE PRACTITIONER

## 2019-10-11 RX ADMIN — APIXABAN 5 MG: 5 TABLET, FILM COATED ORAL at 06:09

## 2019-10-11 RX ADMIN — RISPERIDONE 0.5 MG: 0.5 TABLET ORAL at 17:46

## 2019-10-11 RX ADMIN — APIXABAN 5 MG: 5 TABLET, FILM COATED ORAL at 17:46

## 2019-10-11 RX ADMIN — GUAIFENESIN 200 MG: 100 SOLUTION ORAL at 21:41

## 2019-10-11 RX ADMIN — BACITRACIN ZINC: 500 OINTMENT TOPICAL at 06:10

## 2019-10-11 RX ADMIN — GUAIFENESIN 200 MG: 100 SOLUTION ORAL at 17:38

## 2019-10-11 RX ADMIN — DIVALPROEX SODIUM 250 MG: 125 CAPSULE, COATED PELLETS ORAL at 21:42

## 2019-10-11 RX ADMIN — GUAIFENESIN 200 MG: 100 SOLUTION ORAL at 11:18

## 2019-10-11 RX ADMIN — DIGOXIN 125 MCG: 250 TABLET ORAL at 06:09

## 2019-10-11 RX ADMIN — DIVALPROEX SODIUM 250 MG: 125 CAPSULE, COATED PELLETS ORAL at 06:09

## 2019-10-11 RX ADMIN — CHLORHEXIDINE GLUCONATE 0.12% ORAL RINSE 15 ML: 1.2 LIQUID ORAL at 17:38

## 2019-10-11 RX ADMIN — DIVALPROEX SODIUM 250 MG: 125 CAPSULE, COATED PELLETS ORAL at 14:06

## 2019-10-11 RX ADMIN — BACITRACIN ZINC: 500 OINTMENT TOPICAL at 17:40

## 2019-10-11 RX ADMIN — METOPROLOL TARTRATE 75 MG: 25 TABLET, FILM COATED ORAL at 12:35

## 2019-10-11 RX ADMIN — RISPERIDONE 0.5 MG: 0.5 TABLET ORAL at 06:09

## 2019-10-11 RX ADMIN — METOPROLOL TARTRATE 75 MG: 25 TABLET, FILM COATED ORAL at 17:49

## 2019-10-11 RX ADMIN — MICAFUNGIN SODIUM 100 MG: 20 INJECTION, POWDER, LYOPHILIZED, FOR SOLUTION INTRAVENOUS at 06:09

## 2019-10-11 RX ADMIN — CHLORHEXIDINE GLUCONATE 0.12% ORAL RINSE 15 ML: 1.2 LIQUID ORAL at 06:09

## 2019-10-11 RX ADMIN — GUAIFENESIN 200 MG: 100 SOLUTION ORAL at 14:05

## 2019-10-11 RX ADMIN — TERAZOSIN HYDROCHLORIDE ANHYDROUS 2 MG: 2 CAPSULE ORAL at 17:48

## 2019-10-11 RX ADMIN — METOPROLOL TARTRATE 75 MG: 25 TABLET, FILM COATED ORAL at 06:09

## 2019-10-11 RX ADMIN — GUAIFENESIN 200 MG: 100 SOLUTION ORAL at 01:54

## 2019-10-11 RX ADMIN — GUAIFENESIN 200 MG: 100 SOLUTION ORAL at 06:09

## 2019-10-11 NOTE — CARE PLAN
Problem: Communication  Goal: The ability to communicate needs accurately and effectively will improve  Outcome: PROGRESSING AS EXPECTED   Speak slowly and allow patient time to formulate a response.    Problem: Safety  Goal: Will remain free from falls  Outcome: PROGRESSING AS EXPECTED   Educate patient on level of fall risk and ensure 1:1 safety sitter is at bedside.
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
## 2019-10-11 NOTE — CARE PLAN
Problem: Communication  Goal: The ability to communicate needs accurately and effectively will improve  10/11/2019 1638 by Mariah Howard R.N.  Outcome: PROGRESSING AS EXPECTED  10/11/2019 1637 by Mariah Howard R.N.  Note:   Patient confused and only oriented to self. Discussed plan of care for the day with the patient.  All patient's needs met at this time.All patient questions answered at this time.        Problem: Safety  Goal: Will remain free from injury  Outcome: PROGRESSING AS EXPECTED  Note:   Patient has a 1 to 1 safety sitter. This RN reinforced patient using call light appropriately. Patient has bed alarm in place. Patient instructed to use call light if he has any needs. Patient instructed to not pull at lines and airways. Patient wearing non skid socks, patient's bed locked and in lowest position. Call light and belongings in reach. Rounding in place to check on patient's well being

## 2019-10-11 NOTE — PROGRESS NOTES
Report received from Yaniv CONRAD. Pt sitting in bed comfortably and wife at bedside. 1:1 sitter in room.

## 2019-10-11 NOTE — PSYCHIATRY
BRIEF PSYCHIATRIC CONSULT NOTE:  Pt was seen for suicide attempt and legal hold dc'd. Please explain why pt needs a reconsult.    Legal hold for dc can be signed by any physician as noted directly on the legal hold paperwork. If you have any questions in this regard please consult risk management.

## 2019-10-11 NOTE — PROGRESS NOTES
Dr. Huizar at bedside signed discontinuation of legal hold paperwork. RN case manager faxed signed paperwork.

## 2019-10-11 NOTE — CARE PLAN
Problem: Nutritional:  Goal: Achieve adequate nutritional intake  Description  Patient to eat >50 of meals/supplements.   Outcome: PROGRESSING AS EXPECTED    Pt is consuming 50-75% of meals, using supplements.    RD to follow.

## 2019-10-11 NOTE — PROGRESS NOTES
Report received from day shift RN, assumed Care.   Patient is AOx1, oriented to self only, responds appropriately and appears pleasantly confused. 1:1 safety sitter at bedside.     Patient denies any pain at this time.   Patient is tolerating full liquid nectar thick diet, denies nausea/vomiting. + flatus. Bowel sounds hypoactive x4 quads. Trache in place, currently capped. Patient saturating in the mid 90s% O2 on room air. Jaw wired shut - wire cutters at bedside if needed.  Up stand by assist with steady gait.    Plan of care discussed, all questions answered.    Educated on use of call light and importance of calling before getting out of bed. Pt verbalizes understanding.    Call light and belongings within reach, treaded slipper socks on, bed in lowest locked position.  All needs met at this time.

## 2019-10-11 NOTE — PROGRESS NOTES
Received report from night shift nurse.   Assumed care of patient.   Patient A&Ox 1 to self only. Patient disoriented and pleasantly confused.  Patient currently has a safety sitter in the room.   Patient has a capped trache in place. Patient is not showing any signs of respiratory distress.   Patient's respirations are even and mildly labored. Patient oxygen saturation at 91% on RA.   Patient tolerating full liquid nectar thick diet, 1 to 1 feed, denies N/V, + flatulence  , Last BM, Bowel sounds hypoactive x 4 quadrants.   Patient's jaw is wired shut. Wire cutters at bedside if needed.   Patient denies any pain at this time.   Patient's mobility is stand by assist with FWW.   Pulses: 2 + x 4 extremities.       Discussed POC with patient and answered any questions that the patient had.   Reinforced use of call light. Bed locked and in lowest position. Belongings and call light in reach. Hourly rounding in place to check on patient's well being.

## 2019-10-11 NOTE — DISCHARGE PLANNING
"Anticipated Discharge Disposition: TBD    Action: Dr. Huizar signed the \"Certificate of Release\" (Page 3) on the pt's legal hold document.  This RN CM faxed the discontinuation to [facility] [employer].    Barriers to Discharge: n/a    Plan: Per pt's IDT the pt is to have is jaw unwired this weekend and possible transfer to rehab next week.       "

## 2019-10-12 PROCEDURE — 770001 HCHG ROOM/CARE - MED/SURG/GYN PRIV*

## 2019-10-12 PROCEDURE — 700102 HCHG RX REV CODE 250 W/ 637 OVERRIDE(OP): Performed by: SURGERY

## 2019-10-12 PROCEDURE — A9270 NON-COVERED ITEM OR SERVICE: HCPCS | Performed by: SURGERY

## 2019-10-12 RX ORDER — DIVALPROEX SODIUM 125 MG/1
250 CAPSULE, COATED PELLETS ORAL EVERY 8 HOURS
Status: DISCONTINUED | OUTPATIENT
Start: 2019-10-12 | End: 2019-10-14

## 2019-10-12 RX ORDER — CHLORHEXIDINE GLUCONATE ORAL RINSE 1.2 MG/ML
15 SOLUTION DENTAL 2 TIMES DAILY
Status: DISCONTINUED | OUTPATIENT
Start: 2019-10-12 | End: 2019-10-18 | Stop reason: HOSPADM

## 2019-10-12 RX ORDER — DIGOXIN 125 MCG
125 TABLET ORAL DAILY
Status: DISCONTINUED | OUTPATIENT
Start: 2019-10-12 | End: 2019-10-13

## 2019-10-12 RX ORDER — RISPERIDONE 0.5 MG/1
0.5 TABLET ORAL 2 TIMES DAILY
Status: DISCONTINUED | OUTPATIENT
Start: 2019-10-12 | End: 2019-10-14

## 2019-10-12 RX ADMIN — METOPROLOL TARTRATE 75 MG: 25 TABLET, FILM COATED ORAL at 17:44

## 2019-10-12 RX ADMIN — RISPERIDONE 0.5 MG: 0.5 TABLET ORAL at 09:20

## 2019-10-12 RX ADMIN — GUAIFENESIN 200 MG: 100 SOLUTION ORAL at 14:59

## 2019-10-12 RX ADMIN — METOPROLOL TARTRATE 75 MG: 25 TABLET, FILM COATED ORAL at 09:20

## 2019-10-12 RX ADMIN — DIVALPROEX SODIUM 250 MG: 125 CAPSULE, COATED PELLETS ORAL at 20:10

## 2019-10-12 RX ADMIN — DIGOXIN 125 MCG: 250 TABLET ORAL at 09:19

## 2019-10-12 RX ADMIN — CHLORHEXIDINE GLUCONATE 0.12% ORAL RINSE 15 ML: 1.2 LIQUID ORAL at 17:45

## 2019-10-12 RX ADMIN — TRAZODONE HYDROCHLORIDE 50 MG: 50 TABLET ORAL at 20:00

## 2019-10-12 RX ADMIN — DIVALPROEX SODIUM 250 MG: 125 CAPSULE, COATED PELLETS ORAL at 14:59

## 2019-10-12 RX ADMIN — CHLORHEXIDINE GLUCONATE 0.12% ORAL RINSE 15 ML: 1.2 LIQUID ORAL at 09:20

## 2019-10-12 RX ADMIN — BACITRACIN ZINC 1 EACH: 500 OINTMENT TOPICAL at 18:00

## 2019-10-12 RX ADMIN — RISPERIDONE 0.5 MG: 0.5 TABLET ORAL at 17:44

## 2019-10-12 RX ADMIN — GUAIFENESIN 200 MG: 100 SOLUTION ORAL at 09:19

## 2019-10-12 RX ADMIN — TERAZOSIN HYDROCHLORIDE ANHYDROUS 2 MG: 2 CAPSULE ORAL at 17:44

## 2019-10-12 RX ADMIN — GUAIFENESIN 200 MG: 100 SOLUTION ORAL at 20:13

## 2019-10-12 RX ADMIN — DIVALPROEX SODIUM 250 MG: 125 CAPSULE, COATED PELLETS ORAL at 09:21

## 2019-10-12 RX ADMIN — BACITRACIN ZINC: 500 OINTMENT TOPICAL at 06:00

## 2019-10-12 NOTE — PROGRESS NOTES
Report received from day shift RN, assumed Care.   Patient is AOx1, oriented to self only, responds appropriately and appears pleasantly confused. 1:1 safety sitter at bedside.      Patient denies any pain at this time.   Patient is tolerating full liquid nectar thick diet, denies nausea/vomiting. + flatus. Bowel sounds hypoactive x4 quads. Trache in place, currently capped - additional cannulas at bedside. Patient saturating in the mid 90s% O2 on room air. Jaw wired shut - wire cutters at bedside if needed.  Up stand by assist with steady gait.     Plan of care discussed, all questions answered.    Educated on use of call light and importance of calling before getting out of bed. Pt verbalizes understanding.     Call light and belongings within reach, treaded slipper socks on, bed in lowest locked position.  All needs met at this time.

## 2019-10-12 NOTE — PROGRESS NOTES
Had patient in bed elevating feet for about 30 minutes for patient refused any longer .  Patient did walk full lap and back to room refused to elevate legs and went to chair. Patient sitting up watching tv at this time.

## 2019-10-12 NOTE — PROGRESS NOTES
Patient wife and family arrived to visit.  Family visit made patient very happy and patient interacted very well with conversation
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
## 2019-10-12 NOTE — PROGRESS NOTES
Spoke to Dr. Dong, he stated patient will have surgery tomorrow, hold eliquis, okay to have other medications, NPO after midnight.

## 2019-10-12 NOTE — CARE PLAN
Problem: Safety  Goal: Will remain free from injury  Outcome: PROGRESSING AS EXPECTED   Provide safety education to patient and ensure safety sitter is at bedside. Provide frequent rounding.    Problem: Knowledge Deficit  Goal: Knowledge of the prescribed therapeutic regimen will improve  Outcome: PROGRESSING AS EXPECTED   Educate patient on plan of care and encourage to ask questions.

## 2019-10-12 NOTE — PROGRESS NOTES
Received telephone orders from Dr. Dong to hold morning medications as well as orders for strict NPO as the patient is going to surgery to have his oral wires removed this am.     YEIMI Iraheta called and updated on this information.

## 2019-10-13 ENCOUNTER — ANESTHESIA (OUTPATIENT)
Dept: SURGERY | Facility: MEDICAL CENTER | Age: 79
DRG: 003 | End: 2019-10-13
Payer: MEDICARE

## 2019-10-13 ENCOUNTER — ANESTHESIA EVENT (OUTPATIENT)
Dept: SURGERY | Facility: MEDICAL CENTER | Age: 79
DRG: 003 | End: 2019-10-13
Payer: MEDICARE

## 2019-10-13 LAB
ANION GAP SERPL CALC-SCNC: 7 MMOL/L (ref 0–11.9)
BASOPHILS # BLD AUTO: 0.1 % (ref 0–1.8)
BASOPHILS # BLD: 0.02 K/UL (ref 0–0.12)
BUN SERPL-MCNC: 10 MG/DL (ref 8–22)
CALCIUM SERPL-MCNC: 7.8 MG/DL (ref 8.5–10.5)
CHLORIDE SERPL-SCNC: 106 MMOL/L (ref 96–112)
CO2 SERPL-SCNC: 26 MMOL/L (ref 20–33)
CREAT SERPL-MCNC: 0.72 MG/DL (ref 0.5–1.4)
DIGOXIN SERPL-MCNC: 0.3 NG/ML (ref 0.8–2)
EKG IMPRESSION: NORMAL
EOSINOPHIL # BLD AUTO: 0.06 K/UL (ref 0–0.51)
EOSINOPHIL NFR BLD: 0.4 % (ref 0–6.9)
ERYTHROCYTE [DISTWIDTH] IN BLOOD BY AUTOMATED COUNT: 56.9 FL (ref 35.9–50)
GLUCOSE SERPL-MCNC: 129 MG/DL (ref 65–99)
HCT VFR BLD AUTO: 32.1 % (ref 42–52)
HGB BLD-MCNC: 9.9 G/DL (ref 14–18)
IMM GRANULOCYTES # BLD AUTO: 0.07 K/UL (ref 0–0.11)
IMM GRANULOCYTES NFR BLD AUTO: 0.5 % (ref 0–0.9)
LYMPHOCYTES # BLD AUTO: 0.69 K/UL (ref 1–4.8)
LYMPHOCYTES NFR BLD: 5 % (ref 22–41)
MAGNESIUM SERPL-MCNC: 1.5 MG/DL (ref 1.5–2.5)
MCH RBC QN AUTO: 30.5 PG (ref 27–33)
MCHC RBC AUTO-ENTMCNC: 30.8 G/DL (ref 33.7–35.3)
MCV RBC AUTO: 98.8 FL (ref 81.4–97.8)
MONOCYTES # BLD AUTO: 0.83 K/UL (ref 0–0.85)
MONOCYTES NFR BLD AUTO: 6 % (ref 0–13.4)
NEUTROPHILS # BLD AUTO: 12.1 K/UL (ref 1.82–7.42)
NEUTROPHILS NFR BLD: 88 % (ref 44–72)
NRBC # BLD AUTO: 0 K/UL
NRBC BLD-RTO: 0 /100 WBC
PHOSPHATE SERPL-MCNC: 3.7 MG/DL (ref 2.5–4.5)
PLATELET # BLD AUTO: 188 K/UL (ref 164–446)
PMV BLD AUTO: 9.7 FL (ref 9–12.9)
POTASSIUM SERPL-SCNC: 3.6 MMOL/L (ref 3.6–5.5)
RBC # BLD AUTO: 3.25 M/UL (ref 4.7–6.1)
SODIUM SERPL-SCNC: 139 MMOL/L (ref 135–145)
TROPONIN T SERPL-MCNC: 19 NG/L (ref 6–19)
WBC # BLD AUTO: 13.8 K/UL (ref 4.8–10.8)

## 2019-10-13 PROCEDURE — 160009 HCHG ANES TIME/MIN: Performed by: ORAL & MAXILLOFACIAL SURGERY

## 2019-10-13 PROCEDURE — 160048 HCHG OR STATISTICAL LEVEL 1-5: Performed by: ORAL & MAXILLOFACIAL SURGERY

## 2019-10-13 PROCEDURE — 501445 HCHG STAPLER, SKIN DISP: Performed by: ORAL & MAXILLOFACIAL SURGERY

## 2019-10-13 PROCEDURE — 94640 AIRWAY INHALATION TREATMENT: CPT

## 2019-10-13 PROCEDURE — 700111 HCHG RX REV CODE 636 W/ 250 OVERRIDE (IP): Performed by: ANESTHESIOLOGY

## 2019-10-13 PROCEDURE — 160029 HCHG SURGERY MINUTES - 1ST 30 MINS LEVEL 4: Performed by: ORAL & MAXILLOFACIAL SURGERY

## 2019-10-13 PROCEDURE — 700101 HCHG RX REV CODE 250

## 2019-10-13 PROCEDURE — 88300 SURGICAL PATH GROSS: CPT

## 2019-10-13 PROCEDURE — 700105 HCHG RX REV CODE 258: Performed by: ANESTHESIOLOGY

## 2019-10-13 PROCEDURE — 84484 ASSAY OF TROPONIN QUANT: CPT

## 2019-10-13 PROCEDURE — 700102 HCHG RX REV CODE 250 W/ 637 OVERRIDE(OP): Performed by: SURGERY

## 2019-10-13 PROCEDURE — 80162 ASSAY OF DIGOXIN TOTAL: CPT

## 2019-10-13 PROCEDURE — 501838 HCHG SUTURE GENERAL: Performed by: ORAL & MAXILLOFACIAL SURGERY

## 2019-10-13 PROCEDURE — 85025 COMPLETE CBC W/AUTO DIFF WBC: CPT

## 2019-10-13 PROCEDURE — 80048 BASIC METABOLIC PNL TOTAL CA: CPT

## 2019-10-13 PROCEDURE — 99291 CRITICAL CARE FIRST HOUR: CPT | Performed by: SURGERY

## 2019-10-13 PROCEDURE — 160041 HCHG SURGERY MINUTES - EA ADDL 1 MIN LEVEL 4: Performed by: ORAL & MAXILLOFACIAL SURGERY

## 2019-10-13 PROCEDURE — 160002 HCHG RECOVERY MINUTES (STAT): Performed by: ORAL & MAXILLOFACIAL SURGERY

## 2019-10-13 PROCEDURE — 0WQ30ZZ REPAIR ORAL CAVITY AND THROAT, OPEN APPROACH: ICD-10-PCS | Performed by: ORAL & MAXILLOFACIAL SURGERY

## 2019-10-13 PROCEDURE — 93005 ELECTROCARDIOGRAM TRACING: CPT | Performed by: SURGERY

## 2019-10-13 PROCEDURE — 700101 HCHG RX REV CODE 250: Performed by: ORAL & MAXILLOFACIAL SURGERY

## 2019-10-13 PROCEDURE — 0CDXXZ0 EXTRACTION OF LOWER TOOTH, SINGLE, EXTERNAL APPROACH: ICD-10-PCS | Performed by: ORAL & MAXILLOFACIAL SURGERY

## 2019-10-13 PROCEDURE — A9270 NON-COVERED ITEM OR SERVICE: HCPCS | Performed by: SURGERY

## 2019-10-13 PROCEDURE — 160036 HCHG PACU - EA ADDL 30 MINS PHASE I: Performed by: ORAL & MAXILLOFACIAL SURGERY

## 2019-10-13 PROCEDURE — 93010 ELECTROCARDIOGRAM REPORT: CPT | Performed by: INTERNAL MEDICINE

## 2019-10-13 PROCEDURE — 0NSV04Z REPOSITION LEFT MANDIBLE WITH INTERNAL FIXATION DEVICE, OPEN APPROACH: ICD-10-PCS | Performed by: ORAL & MAXILLOFACIAL SURGERY

## 2019-10-13 PROCEDURE — 700101 HCHG RX REV CODE 250: Performed by: ANESTHESIOLOGY

## 2019-10-13 PROCEDURE — 770022 HCHG ROOM/CARE - ICU (200)

## 2019-10-13 PROCEDURE — 502000 HCHG MISC OR IMPLANTS RC 0278: Performed by: ORAL & MAXILLOFACIAL SURGERY

## 2019-10-13 PROCEDURE — 160035 HCHG PACU - 1ST 60 MINS PHASE I: Performed by: ORAL & MAXILLOFACIAL SURGERY

## 2019-10-13 PROCEDURE — 700111 HCHG RX REV CODE 636 W/ 250 OVERRIDE (IP): Performed by: SURGERY

## 2019-10-13 PROCEDURE — 0RPD04Z REMOVAL OF INTERNAL FIXATION DEVICE FROM LEFT TEMPOROMANDIBULAR JOINT, OPEN APPROACH: ICD-10-PCS | Performed by: ORAL & MAXILLOFACIAL SURGERY

## 2019-10-13 PROCEDURE — 700105 HCHG RX REV CODE 258: Performed by: SURGERY

## 2019-10-13 PROCEDURE — 84100 ASSAY OF PHOSPHORUS: CPT

## 2019-10-13 PROCEDURE — C1713 ANCHOR/SCREW BN/BN,TIS/BN: HCPCS | Performed by: ORAL & MAXILLOFACIAL SURGERY

## 2019-10-13 PROCEDURE — 83735 ASSAY OF MAGNESIUM: CPT

## 2019-10-13 DEVICE — IMPLANTABLE DEVICE: Type: IMPLANTABLE DEVICE | Status: FUNCTIONAL

## 2019-10-13 DEVICE — SCREW STRYK UFS 2.3X10MM CROS - (2UFS10+20=40): Type: IMPLANTABLE DEVICE | Status: FUNCTIONAL

## 2019-10-13 DEVICE — SCREW STRYK UFS 2.3X10MM LOCK - (2UFS10+M20=40): Type: IMPLANTABLE DEVICE | Status: FUNCTIONAL

## 2019-10-13 RX ORDER — HALOPERIDOL 5 MG/ML
1 INJECTION INTRAMUSCULAR
Status: DISCONTINUED | OUTPATIENT
Start: 2019-10-13 | End: 2019-10-13 | Stop reason: HOSPADM

## 2019-10-13 RX ORDER — DEXTROSE MONOHYDRATE 50 MG/ML
INJECTION, SOLUTION INTRAVENOUS CONTINUOUS
Status: DISCONTINUED | OUTPATIENT
Start: 2019-10-13 | End: 2019-10-17

## 2019-10-13 RX ORDER — VASOPRESSIN 20 U/ML
INJECTION PARENTERAL PRN
Status: DISCONTINUED | OUTPATIENT
Start: 2019-10-13 | End: 2019-10-13 | Stop reason: SURG

## 2019-10-13 RX ORDER — ENALAPRILAT 1.25 MG/ML
1.25 INJECTION INTRAVENOUS EVERY 6 HOURS PRN
Status: DISCONTINUED | OUTPATIENT
Start: 2019-10-13 | End: 2019-10-18

## 2019-10-13 RX ORDER — HYDROMORPHONE HYDROCHLORIDE 1 MG/ML
0.2 INJECTION, SOLUTION INTRAMUSCULAR; INTRAVENOUS; SUBCUTANEOUS
Status: DISCONTINUED | OUTPATIENT
Start: 2019-10-13 | End: 2019-10-13 | Stop reason: HOSPADM

## 2019-10-13 RX ORDER — HYDRALAZINE HYDROCHLORIDE 20 MG/ML
20 INJECTION INTRAMUSCULAR; INTRAVENOUS EVERY 6 HOURS PRN
Status: DISCONTINUED | OUTPATIENT
Start: 2019-10-13 | End: 2019-10-18

## 2019-10-13 RX ORDER — METOPROLOL TARTRATE 1 MG/ML
INJECTION, SOLUTION INTRAVENOUS PRN
Status: DISCONTINUED | OUTPATIENT
Start: 2019-10-13 | End: 2019-10-13 | Stop reason: SURG

## 2019-10-13 RX ORDER — METOPROLOL TARTRATE 1 MG/ML
INJECTION, SOLUTION INTRAVENOUS
Status: COMPLETED
Start: 2019-10-13 | End: 2019-10-13

## 2019-10-13 RX ORDER — HYDROMORPHONE HYDROCHLORIDE 1 MG/ML
0.4 INJECTION, SOLUTION INTRAMUSCULAR; INTRAVENOUS; SUBCUTANEOUS
Status: DISCONTINUED | OUTPATIENT
Start: 2019-10-13 | End: 2019-10-13 | Stop reason: HOSPADM

## 2019-10-13 RX ORDER — DIGOXIN 125 MCG
125 TABLET ORAL DAILY
Status: DISCONTINUED | OUTPATIENT
Start: 2019-10-15 | End: 2019-10-16

## 2019-10-13 RX ORDER — BACITRACIN ZINC AND POLYMYXIN B SULFATE 500; 1000 [USP'U]/G; [USP'U]/G
OINTMENT TOPICAL
Status: DISCONTINUED | OUTPATIENT
Start: 2019-10-13 | End: 2019-10-13 | Stop reason: HOSPADM

## 2019-10-13 RX ORDER — ROCURONIUM BROMIDE 10 MG/ML
INJECTION, SOLUTION INTRAVENOUS PRN
Status: DISCONTINUED | OUTPATIENT
Start: 2019-10-13 | End: 2019-10-13 | Stop reason: SURG

## 2019-10-13 RX ORDER — METOPROLOL TARTRATE 1 MG/ML
1 INJECTION, SOLUTION INTRAVENOUS
Status: DISCONTINUED | OUTPATIENT
Start: 2019-10-13 | End: 2019-10-13 | Stop reason: HOSPADM

## 2019-10-13 RX ORDER — PHENYLEPHRINE HCL IN 0.9% NACL 0.5 MG/5ML
SYRINGE (ML) INTRAVENOUS PRN
Status: DISCONTINUED | OUTPATIENT
Start: 2019-10-13 | End: 2019-10-13 | Stop reason: SURG

## 2019-10-13 RX ORDER — ONDANSETRON 2 MG/ML
4 INJECTION INTRAMUSCULAR; INTRAVENOUS
Status: DISCONTINUED | OUTPATIENT
Start: 2019-10-13 | End: 2019-10-13 | Stop reason: HOSPADM

## 2019-10-13 RX ORDER — DIGOXIN 0.25 MG/ML
250 INJECTION INTRAMUSCULAR; INTRAVENOUS EVERY 6 HOURS
Status: COMPLETED | OUTPATIENT
Start: 2019-10-13 | End: 2019-10-14

## 2019-10-13 RX ORDER — HYDROMORPHONE HYDROCHLORIDE 1 MG/ML
0.1 INJECTION, SOLUTION INTRAMUSCULAR; INTRAVENOUS; SUBCUTANEOUS
Status: DISCONTINUED | OUTPATIENT
Start: 2019-10-13 | End: 2019-10-13 | Stop reason: HOSPADM

## 2019-10-13 RX ORDER — DIGOXIN 0.25 MG/ML
500 INJECTION INTRAMUSCULAR; INTRAVENOUS ONCE
Status: COMPLETED | OUTPATIENT
Start: 2019-10-13 | End: 2019-10-13

## 2019-10-13 RX ORDER — CLINDAMYCIN PHOSPHATE 150 MG/ML
INJECTION, SOLUTION INTRAVENOUS PRN
Status: DISCONTINUED | OUTPATIENT
Start: 2019-10-13 | End: 2019-10-13 | Stop reason: SURG

## 2019-10-13 RX ORDER — ONDANSETRON 2 MG/ML
INJECTION INTRAMUSCULAR; INTRAVENOUS PRN
Status: DISCONTINUED | OUTPATIENT
Start: 2019-10-13 | End: 2019-10-13 | Stop reason: SURG

## 2019-10-13 RX ORDER — SODIUM CHLORIDE, SODIUM LACTATE, POTASSIUM CHLORIDE, CALCIUM CHLORIDE 600; 310; 30; 20 MG/100ML; MG/100ML; MG/100ML; MG/100ML
INJECTION, SOLUTION INTRAVENOUS
Status: DISCONTINUED | OUTPATIENT
Start: 2019-10-13 | End: 2019-10-13 | Stop reason: SURG

## 2019-10-13 RX ORDER — LIDOCAINE HYDROCHLORIDE AND EPINEPHRINE 10; 10 MG/ML; UG/ML
INJECTION, SOLUTION INFILTRATION; PERINEURAL
Status: DISCONTINUED | OUTPATIENT
Start: 2019-10-13 | End: 2019-10-13 | Stop reason: HOSPADM

## 2019-10-13 RX ORDER — SODIUM CHLORIDE, SODIUM LACTATE, POTASSIUM CHLORIDE, CALCIUM CHLORIDE 600; 310; 30; 20 MG/100ML; MG/100ML; MG/100ML; MG/100ML
INJECTION, SOLUTION INTRAVENOUS CONTINUOUS
Status: DISCONTINUED | OUTPATIENT
Start: 2019-10-13 | End: 2019-10-13 | Stop reason: HOSPADM

## 2019-10-13 RX ADMIN — FENTANYL CITRATE 50 MCG: 50 INJECTION INTRAMUSCULAR; INTRAVENOUS at 17:30

## 2019-10-13 RX ADMIN — FENTANYL CITRATE 50 MCG: 50 INJECTION, SOLUTION INTRAMUSCULAR; INTRAVENOUS at 08:25

## 2019-10-13 RX ADMIN — VASOPRESSIN 1 UNITS: 20 INJECTION INTRAVENOUS at 08:53

## 2019-10-13 RX ADMIN — DIGOXIN 500 MCG: 0.25 INJECTION INTRAMUSCULAR; INTRAVENOUS at 16:12

## 2019-10-13 RX ADMIN — FENTANYL CITRATE 50 MCG: 50 INJECTION INTRAMUSCULAR; INTRAVENOUS at 12:25

## 2019-10-13 RX ADMIN — METOPROLOL TARTRATE 2.5 MG: 5 INJECTION, SOLUTION INTRAVENOUS at 09:56

## 2019-10-13 RX ADMIN — CHLORHEXIDINE GLUCONATE 0.12% ORAL RINSE 15 ML: 1.2 LIQUID ORAL at 17:29

## 2019-10-13 RX ADMIN — BACITRACIN ZINC: 500 OINTMENT TOPICAL at 04:33

## 2019-10-13 RX ADMIN — FENTANYL CITRATE 50 MCG: 50 INJECTION INTRAMUSCULAR; INTRAVENOUS at 23:54

## 2019-10-13 RX ADMIN — FENTANYL CITRATE 50 MCG: 50 INJECTION, SOLUTION INTRAMUSCULAR; INTRAVENOUS at 09:30

## 2019-10-13 RX ADMIN — METOPROLOL TARTRATE 2 MG: 5 INJECTION, SOLUTION INTRAVENOUS at 08:34

## 2019-10-13 RX ADMIN — DIGOXIN 250 MCG: 0.25 INJECTION INTRAMUSCULAR; INTRAVENOUS at 20:55

## 2019-10-13 RX ADMIN — METOPROLOL TARTRATE 3 MG: 5 INJECTION, SOLUTION INTRAVENOUS at 09:32

## 2019-10-13 RX ADMIN — FENTANYL CITRATE 100 MCG: 50 INJECTION, SOLUTION INTRAMUSCULAR; INTRAVENOUS at 08:28

## 2019-10-13 RX ADMIN — METOPROLOL TARTRATE 5 MG: 5 INJECTION, SOLUTION INTRAVENOUS at 11:13

## 2019-10-13 RX ADMIN — ROCURONIUM BROMIDE 20 MG: 10 INJECTION, SOLUTION INTRAVENOUS at 08:57

## 2019-10-13 RX ADMIN — Medication 200 MCG: at 10:11

## 2019-10-13 RX ADMIN — METOPROLOL TARTRATE 1 MG: 5 INJECTION, SOLUTION INTRAVENOUS at 12:34

## 2019-10-13 RX ADMIN — Medication 200 MCG: at 08:14

## 2019-10-13 RX ADMIN — BACITRACIN ZINC: 500 OINTMENT TOPICAL at 18:00

## 2019-10-13 RX ADMIN — AMIODARONE HYDROCHLORIDE 1 MG/MIN: 50 INJECTION, SOLUTION INTRAVENOUS at 15:10

## 2019-10-13 RX ADMIN — SODIUM CHLORIDE, POTASSIUM CHLORIDE, SODIUM LACTATE AND CALCIUM CHLORIDE: 600; 310; 30; 20 INJECTION, SOLUTION INTRAVENOUS at 10:42

## 2019-10-13 RX ADMIN — FENTANYL CITRATE 50 MCG: 50 INJECTION, SOLUTION INTRAMUSCULAR; INTRAVENOUS at 09:10

## 2019-10-13 RX ADMIN — FENTANYL CITRATE 100 MCG: 50 INJECTION, SOLUTION INTRAMUSCULAR; INTRAVENOUS at 08:01

## 2019-10-13 RX ADMIN — CLINDAMYCIN PHOSPHATE 900 MG: 150 INJECTION, SOLUTION INTRAMUSCULAR; INTRAVENOUS at 08:12

## 2019-10-13 RX ADMIN — HYDROMORPHONE HYDROCHLORIDE 0.4 MG: 1 INJECTION, SOLUTION INTRAMUSCULAR; INTRAVENOUS; SUBCUTANEOUS at 11:22

## 2019-10-13 RX ADMIN — ONDANSETRON 4 MG: 2 INJECTION INTRAMUSCULAR; INTRAVENOUS at 10:52

## 2019-10-13 RX ADMIN — FENTANYL CITRATE 50 MCG: 50 INJECTION, SOLUTION INTRAMUSCULAR; INTRAVENOUS at 09:53

## 2019-10-13 RX ADMIN — ROCURONIUM BROMIDE 30 MG: 10 INJECTION, SOLUTION INTRAVENOUS at 08:18

## 2019-10-13 RX ADMIN — HYDROMORPHONE HYDROCHLORIDE 0.2 MG: 1 INJECTION, SOLUTION INTRAMUSCULAR; INTRAVENOUS; SUBCUTANEOUS at 11:30

## 2019-10-13 RX ADMIN — SODIUM CHLORIDE, POTASSIUM CHLORIDE, SODIUM LACTATE AND CALCIUM CHLORIDE: 600; 310; 30; 20 INJECTION, SOLUTION INTRAVENOUS at 11:15

## 2019-10-13 RX ADMIN — HYDROMORPHONE HYDROCHLORIDE 0.4 MG: 1 INJECTION, SOLUTION INTRAMUSCULAR; INTRAVENOUS; SUBCUTANEOUS at 11:27

## 2019-10-13 RX ADMIN — METOPROLOL TARTRATE 1 MG: 5 INJECTION, SOLUTION INTRAVENOUS at 12:24

## 2019-10-13 RX ADMIN — Medication 200 MCG: at 08:19

## 2019-10-13 RX ADMIN — AMIODARONE HYDROCHLORIDE 0.5 MG/MIN: 50 INJECTION, SOLUTION INTRAVENOUS at 22:14

## 2019-10-13 RX ADMIN — METOPROLOL TARTRATE 3 MG: 5 INJECTION, SOLUTION INTRAVENOUS at 08:29

## 2019-10-13 RX ADMIN — Medication 200 MCG: at 08:42

## 2019-10-13 RX ADMIN — FENTANYL CITRATE 50 MCG: 50 INJECTION INTRAMUSCULAR; INTRAVENOUS at 11:47

## 2019-10-13 RX ADMIN — Medication 200 MCG: at 08:22

## 2019-10-13 RX ADMIN — HYDRALAZINE HYDROCHLORIDE 20 MG: 20 INJECTION INTRAMUSCULAR; INTRAVENOUS at 20:55

## 2019-10-13 RX ADMIN — METOPROLOL TARTRATE 2 MG: 5 INJECTION, SOLUTION INTRAVENOUS at 08:06

## 2019-10-13 RX ADMIN — HYDRALAZINE HYDROCHLORIDE 20 MG: 20 INJECTION INTRAMUSCULAR; INTRAVENOUS at 14:17

## 2019-10-13 RX ADMIN — FENTANYL CITRATE 50 MCG: 50 INJECTION INTRAMUSCULAR; INTRAVENOUS at 20:09

## 2019-10-13 RX ADMIN — FENTANYL CITRATE 100 MCG: 50 INJECTION INTRAMUSCULAR; INTRAVENOUS at 14:03

## 2019-10-13 RX ADMIN — SODIUM CHLORIDE, POTASSIUM CHLORIDE, SODIUM LACTATE AND CALCIUM CHLORIDE: 600; 310; 30; 20 INJECTION, SOLUTION INTRAVENOUS at 07:46

## 2019-10-13 RX ADMIN — DEXTROSE MONOHYDRATE: 50 INJECTION, SOLUTION INTRAVENOUS at 15:15

## 2019-10-13 RX ADMIN — METOPROLOL TARTRATE 3 MG: 5 INJECTION, SOLUTION INTRAVENOUS at 12:58

## 2019-10-13 RX ADMIN — AMIODARONE HYDROCHLORIDE 150 MG: 1.5 INJECTION, SOLUTION INTRAVENOUS at 14:59
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
## 2019-10-13 NOTE — CARE PLAN
Problem: Safety  Goal: Will remain free from injury  Outcome: PROGRESSING AS EXPECTED  Note:   Patient free from falls and injuries at this time. Safety sitter at bedside. Bed low locked position. Call light in reach. Patient is A&Ox1, does not use call light appropriately to call for assistance. OOB with assist and FWW. Treaded footwear applied. Personal belongings in reach.        Problem: Knowledge Deficit  Goal: Knowledge of disease process/condition, treatment plan, diagnostic tests, and medications will improve  Outcome: PROGRESSING AS EXPECTED  Note:   Patient updated on plan of care but A&Ox1 - unable to understand. All medications discussed with patient prior to administration. Reinforcement needed.

## 2019-10-13 NOTE — ANESTHESIA QCDR
2019 [facility] Clinical Data Registry (for Quality Improvement)     Postoperative nausea/vomiting risk protocol (Adult = 18 yrs and Pediatric 3-17 yrs)- (430 and 463)  General inhalation anesthetic (NOT TIVA) with PONV risk factors: Yes  Provision of anti-emetic therapy with at least 2 different classes of agents: No   Patient DID NOT receive anti-emetic therapy and reason is documented in Medical Record:        Multimodal Pain Management- (AQI59)  Patient undergoing Elective Surgery (i.e. Outpatient, or ASC, or Prescheduled Surgery prior to Hospital Admission): No  Use of Multimodal Pain Management, two or more drugs and/or interventions, NOT including systemic opioids: N/A  Exception: Documented allergy to multiple classes of analgesics: N/A    PACU assessment of acute postoperative pain prior to Anesthesia Care End- Applies to Patients Age = 18- (ABG7)  Initial PACU pain score is which of the following: Pain not assessed  Patient unable to report pain score:     Post-anesthetic transfer of care checklist/protocol to PACU/ICU- (426 and 427)  Upon conclusion of case, patient transferred to which of the following locations: PACU/Non-ICU  Use of transfer checklist/protocol:   Exclusion: Service Performed in Patient Hospital Room (and thus did not require transfer):     PACU Reintubation- (AQI31)  General anesthesia requiring endotracheal intubation (ETT) along with subsequent extubation in OR or PACU: No  Required reintubation in the PACU: N/A  Extubation was a planned trial documented in the medical record prior to removal of the original airway device: N/A    Unplanned admission to ICU related to anesthesia service up through end of PACU care- (MD51)  Unplanned admission to ICU (not initially anticipated at anesthesia start time): No

## 2019-10-13 NOTE — PROGRESS NOTES
Pt arrived to S111 from PACU. Report received from ANAMARIA Perez. Pt lethargic, not following commands at this time.     HR 's afib  /114  RR 22  Temp 97.3f

## 2019-10-13 NOTE — PROGRESS NOTES
Report received from day shift RN, assumed Care.   Patient is AOx1, oriented to self only, responds appropriately and appears pleasantly confused. 1:1 safety sitter at bedside.      Patient denies any pain at this time.   Patient is tolerating full liquid nectar thick diet, denies nausea/vomiting. + flatus. Bowel sounds normoactive x4 quads. Trache in place, currently capped - additional cannulas at bedside. Patient saturating in the mid 90s% O2 on room air. Jaw wired shut - wire cutters at bedside if needed.  Up stand by assist with steady gait.     Plan of care discussed, all questions answered.    Educated on use of call light and importance of calling before getting out of bed. Pt verbalizes understanding.     Call light and belongings within reach, treaded slipper socks on, bed in lowest locked position.  All needs met at this time.
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
## 2019-10-13 NOTE — OR NURSING
1200-The heart rate remains high. Dr Winston notifiied. The pt was tracheally suctioned for moderate amounts of bloody secretions. Lung sounds clear with scant rhonchi after. The trach cuff was deflaed and trach capped. The pt became agitated and o2 sats dropped. The cuff reinflated and placed back on trach mask 35% humidified o2.    1250-Dr Polanco notified of continuous high heart rate.Uncontrolled a-fib.

## 2019-10-13 NOTE — OR NURSING
New fenestrated 6 tracheostomy placed by Dr. Winston (anesthesia) and Dr. Dong (OMFS) prior to case start.  Obturator placed in specimen bag with wire cutters on front of chart.
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
## 2019-10-13 NOTE — CARE PLAN
Problem: Safety  Goal: Will remain free from falls  Outcome: PROGRESSING AS EXPECTED   Educate patient on level of fall risk and ensure room is well lit and free of obstacles. Utilize bed and chair alarms.    Problem: Bowel/Gastric:  Goal: Normal bowel function is maintained or improved  Outcome: PROGRESSING AS EXPECTED   Perform bowel assessment and administer bowel medications as ordered.

## 2019-10-13 NOTE — CARE PLAN
Problem: Respiratory:  Goal: Respiratory status will improve  Outcome: PROGRESSING AS EXPECTED  Intervention: Educate and encourage coughing and deep breathing  Note:   Oxygen titrated to keep sats above 92%     Problem: Skin Integrity  Goal: Risk for impaired skin integrity will decrease  Outcome: PROGRESSING AS EXPECTED  Intervention: Implement precautions to protect skin integrity in collaboration with the interdisciplinary team  Flowsheets (Taken 10/13/2019 3965)  Skin Preventative Measures: Pillows in Use for Support / Positioning; Pillows in Use to Float Heels

## 2019-10-14 ENCOUNTER — APPOINTMENT (OUTPATIENT)
Dept: RADIOLOGY | Facility: MEDICAL CENTER | Age: 79
DRG: 003 | End: 2019-10-14
Attending: SURGERY
Payer: MEDICARE

## 2019-10-14 LAB
ANION GAP SERPL CALC-SCNC: 6 MMOL/L (ref 0–11.9)
ANISOCYTOSIS BLD QL SMEAR: ABNORMAL
BASOPHILS # BLD AUTO: 0.2 % (ref 0–1.8)
BASOPHILS # BLD: 0.02 K/UL (ref 0–0.12)
BUN SERPL-MCNC: 9 MG/DL (ref 8–22)
CALCIUM SERPL-MCNC: 7.8 MG/DL (ref 8.5–10.5)
CHLORIDE SERPL-SCNC: 104 MMOL/L (ref 96–112)
CO2 SERPL-SCNC: 27 MMOL/L (ref 20–33)
COMMENT 1642: NORMAL
CREAT SERPL-MCNC: 0.58 MG/DL (ref 0.5–1.4)
EOSINOPHIL # BLD AUTO: 0.04 K/UL (ref 0–0.51)
EOSINOPHIL NFR BLD: 0.3 % (ref 0–6.9)
ERYTHROCYTE [DISTWIDTH] IN BLOOD BY AUTOMATED COUNT: 57.5 FL (ref 35.9–50)
GLUCOSE SERPL-MCNC: 114 MG/DL (ref 65–99)
HCT VFR BLD AUTO: 32 % (ref 42–52)
HGB BLD-MCNC: 9.5 G/DL (ref 14–18)
IMM GRANULOCYTES # BLD AUTO: 0.09 K/UL (ref 0–0.11)
IMM GRANULOCYTES NFR BLD AUTO: 0.7 % (ref 0–0.9)
LYMPHOCYTES # BLD AUTO: 0.9 K/UL (ref 1–4.8)
LYMPHOCYTES NFR BLD: 7.1 % (ref 22–41)
MACROCYTES BLD QL SMEAR: ABNORMAL
MCH RBC QN AUTO: 29.3 PG (ref 27–33)
MCHC RBC AUTO-ENTMCNC: 29.7 G/DL (ref 33.7–35.3)
MCV RBC AUTO: 98.8 FL (ref 81.4–97.8)
MONOCYTES # BLD AUTO: 1.06 K/UL (ref 0–0.85)
MONOCYTES NFR BLD AUTO: 8.4 % (ref 0–13.4)
MORPHOLOGY BLD-IMP: NORMAL
NEUTROPHILS # BLD AUTO: 10.49 K/UL (ref 1.82–7.42)
NEUTROPHILS NFR BLD: 83.3 % (ref 44–72)
NRBC # BLD AUTO: 0 K/UL
NRBC BLD-RTO: 0 /100 WBC
PATHOLOGY CONSULT NOTE: NORMAL
PLATELET # BLD AUTO: 201 K/UL (ref 164–446)
PLATELET BLD QL SMEAR: NORMAL
PMV BLD AUTO: 9.3 FL (ref 9–12.9)
POLYCHROMASIA BLD QL SMEAR: NORMAL
POTASSIUM SERPL-SCNC: 3.4 MMOL/L (ref 3.6–5.5)
RBC # BLD AUTO: 3.24 M/UL (ref 4.7–6.1)
RBC BLD AUTO: PRESENT
SODIUM SERPL-SCNC: 137 MMOL/L (ref 135–145)
WBC # BLD AUTO: 12.6 K/UL (ref 4.8–10.8)

## 2019-10-14 PROCEDURE — A9270 NON-COVERED ITEM OR SERVICE: HCPCS | Performed by: SURGERY

## 2019-10-14 PROCEDURE — 770022 HCHG ROOM/CARE - ICU (200)

## 2019-10-14 PROCEDURE — 76937 US GUIDE VASCULAR ACCESS: CPT

## 2019-10-14 PROCEDURE — 71045 X-RAY EXAM CHEST 1 VIEW: CPT

## 2019-10-14 PROCEDURE — 92526 ORAL FUNCTION THERAPY: CPT

## 2019-10-14 PROCEDURE — 99233 SBSQ HOSP IP/OBS HIGH 50: CPT | Performed by: SURGERY

## 2019-10-14 PROCEDURE — 05HY33Z INSERTION OF INFUSION DEVICE INTO UPPER VEIN, PERCUTANEOUS APPROACH: ICD-10-PCS | Performed by: SURGERY

## 2019-10-14 PROCEDURE — 85025 COMPLETE CBC W/AUTO DIFF WBC: CPT

## 2019-10-14 PROCEDURE — 97535 SELF CARE MNGMENT TRAINING: CPT

## 2019-10-14 PROCEDURE — 97116 GAIT TRAINING THERAPY: CPT

## 2019-10-14 PROCEDURE — 700111 HCHG RX REV CODE 636 W/ 250 OVERRIDE (IP): Performed by: SURGERY

## 2019-10-14 PROCEDURE — 97530 THERAPEUTIC ACTIVITIES: CPT

## 2019-10-14 PROCEDURE — 700102 HCHG RX REV CODE 250 W/ 637 OVERRIDE(OP): Performed by: SURGERY

## 2019-10-14 PROCEDURE — 94640 AIRWAY INHALATION TREATMENT: CPT

## 2019-10-14 PROCEDURE — 80048 BASIC METABOLIC PNL TOTAL CA: CPT

## 2019-10-14 PROCEDURE — 700105 HCHG RX REV CODE 258: Performed by: SURGERY

## 2019-10-14 RX ORDER — TERAZOSIN 2 MG/1
2 CAPSULE ORAL EVERY EVENING
Status: DISCONTINUED | OUTPATIENT
Start: 2019-10-14 | End: 2019-10-18 | Stop reason: HOSPADM

## 2019-10-14 RX ORDER — RISPERIDONE 0.5 MG/1
0.5 TABLET ORAL 2 TIMES DAILY
Status: DISCONTINUED | OUTPATIENT
Start: 2019-10-14 | End: 2019-10-18 | Stop reason: HOSPADM

## 2019-10-14 RX ORDER — HYDROCODONE BITARTRATE AND ACETAMINOPHEN 5; 325 MG/1; MG/1
1-2 TABLET ORAL EVERY 6 HOURS PRN
Status: DISCONTINUED | OUTPATIENT
Start: 2019-10-14 | End: 2019-10-18

## 2019-10-14 RX ORDER — ACETAMINOPHEN 325 MG/1
650 TABLET ORAL EVERY 4 HOURS PRN
Status: DISCONTINUED | OUTPATIENT
Start: 2019-10-14 | End: 2019-10-18 | Stop reason: HOSPADM

## 2019-10-14 RX ORDER — DIVALPROEX SODIUM 125 MG/1
250 CAPSULE, COATED PELLETS ORAL EVERY 8 HOURS
Status: DISCONTINUED | OUTPATIENT
Start: 2019-10-14 | End: 2019-10-18 | Stop reason: HOSPADM

## 2019-10-14 RX ORDER — ACETAMINOPHEN 650 MG/1
650 SUPPOSITORY RECTAL EVERY 4 HOURS PRN
Status: DISCONTINUED | OUTPATIENT
Start: 2019-10-14 | End: 2019-10-18

## 2019-10-14 RX ORDER — TRAZODONE HYDROCHLORIDE 50 MG/1
50 TABLET ORAL NIGHTLY PRN
Status: DISCONTINUED | OUTPATIENT
Start: 2019-10-14 | End: 2019-10-17

## 2019-10-14 RX ORDER — POTASSIUM CHLORIDE 7.45 MG/ML
10 INJECTION INTRAVENOUS
Status: COMPLETED | OUTPATIENT
Start: 2019-10-14 | End: 2019-10-14

## 2019-10-14 RX ORDER — MAGNESIUM SULFATE HEPTAHYDRATE 40 MG/ML
2 INJECTION, SOLUTION INTRAVENOUS ONCE
Status: COMPLETED | OUTPATIENT
Start: 2019-10-14 | End: 2019-10-14

## 2019-10-14 RX ADMIN — POTASSIUM CHLORIDE 10 MEQ: 7.46 INJECTION, SOLUTION INTRAVENOUS at 12:39

## 2019-10-14 RX ADMIN — RISPERIDONE 0.5 MG: 0.5 TABLET ORAL at 17:06

## 2019-10-14 RX ADMIN — MAGNESIUM SULFATE IN WATER 2 G: 40 INJECTION, SOLUTION INTRAVENOUS at 12:29

## 2019-10-14 RX ADMIN — CHLORHEXIDINE GLUCONATE 0.12% ORAL RINSE 15 ML: 1.2 LIQUID ORAL at 05:00

## 2019-10-14 RX ADMIN — FENTANYL CITRATE 50 MCG: 50 INJECTION INTRAMUSCULAR; INTRAVENOUS at 14:08

## 2019-10-14 RX ADMIN — POTASSIUM CHLORIDE 10 MEQ: 7.46 INJECTION, SOLUTION INTRAVENOUS at 14:00

## 2019-10-14 RX ADMIN — TERAZOSIN HYDROCHLORIDE ANHYDROUS 2 MG: 2 CAPSULE ORAL at 17:06

## 2019-10-14 RX ADMIN — DIVALPROEX SODIUM 250 MG: 125 CAPSULE, COATED PELLETS ORAL at 22:14

## 2019-10-14 RX ADMIN — BACITRACIN ZINC: 500 OINTMENT TOPICAL at 05:00

## 2019-10-14 RX ADMIN — AMIODARONE HYDROCHLORIDE 0.5 MG/MIN: 50 INJECTION, SOLUTION INTRAVENOUS at 12:29

## 2019-10-14 RX ADMIN — DIGOXIN 250 MCG: 0.25 INJECTION INTRAMUSCULAR; INTRAVENOUS at 03:24

## 2019-10-14 RX ADMIN — POTASSIUM CHLORIDE 10 MEQ: 7.46 INJECTION, SOLUTION INTRAVENOUS at 11:12

## 2019-10-14 RX ADMIN — FENTANYL CITRATE 50 MCG: 50 INJECTION INTRAMUSCULAR; INTRAVENOUS at 08:36

## 2019-10-14 RX ADMIN — APIXABAN 5 MG: 5 TABLET, FILM COATED ORAL at 18:32

## 2019-10-14 RX ADMIN — BACITRACIN ZINC: 500 OINTMENT TOPICAL at 19:00

## 2019-10-14 RX ADMIN — GUAIFENESIN 200 MG: 100 SOLUTION ORAL at 17:06

## 2019-10-14 RX ADMIN — METOPROLOL TARTRATE 75 MG: 25 TABLET, FILM COATED ORAL at 17:06

## 2019-10-14 RX ADMIN — GUAIFENESIN 200 MG: 100 SOLUTION ORAL at 22:14

## 2019-10-14 RX ADMIN — POTASSIUM CHLORIDE 10 MEQ: 7.46 INJECTION, SOLUTION INTRAVENOUS at 13:00

## 2019-10-14 RX ADMIN — CHLORHEXIDINE GLUCONATE 0.12% ORAL RINSE 15 ML: 1.2 LIQUID ORAL at 17:06

## 2019-10-14 ASSESSMENT — COGNITIVE AND FUNCTIONAL STATUS - GENERAL
SUGGESTED CMS G CODE MODIFIER DAILY ACTIVITY: CK
MOBILITY SCORE: 18
MOVING TO AND FROM BED TO CHAIR: A LITTLE
DRESSING REGULAR UPPER BODY CLOTHING: A LITTLE
PERSONAL GROOMING: A LITTLE
MOVING FROM LYING ON BACK TO SITTING ON SIDE OF FLAT BED: A LITTLE
TOILETING: A LITTLE
DAILY ACTIVITIY SCORE: 18
SUGGESTED CMS G CODE MODIFIER MOBILITY: CK
STANDING UP FROM CHAIR USING ARMS: A LITTLE
EATING MEALS: A LITTLE
CLIMB 3 TO 5 STEPS WITH RAILING: A LOT
WALKING IN HOSPITAL ROOM: A LITTLE
HELP NEEDED FOR BATHING: A LITTLE
DRESSING REGULAR LOWER BODY CLOTHING: A LITTLE

## 2019-10-14 ASSESSMENT — GAIT ASSESSMENTS
ASSISTIVE DEVICE: FRONT WHEEL WALKER
DISTANCE (FEET): 75
GAIT LEVEL OF ASSIST: MINIMAL ASSIST
DEVIATION: DECREASED BASE OF SUPPORT

## 2019-10-14 NOTE — PROGRESS NOTES
Report received from PACU RN and Renetta Kumar RN. Pt. Lethargic after OR. In AFIB RVR, hypertensive. Treating ordered parameters as needed.

## 2019-10-14 NOTE — CARE PLAN
Problem: Safety  Goal: Will remain free from injury  Outcome: PROGRESSING AS EXPECTED  Note:   Bed locked and in low position. Safety alarms on. Call light and belongings within reach. Treaded socks on and safety education provided to the patient.     Problem: Infection  Goal: Will remain free from infection  Outcome: PROGRESSING AS EXPECTED  Note:   Implement standard precautions and perform hand washing before and after patient contact. Standard precautions implemented and hand washing performed before and after patient contact and as needed.

## 2019-10-14 NOTE — THERAPY
"Speech Language Therapy dysphagia treatment completed.   Functional Status:  Patient seen this date for dysphagia reassessment and treatment session, s/p ORIF of jaw yesterday. Patient strict NPO pending SLP reassessment. Patient noted to have significant edema of face and neck, increased on left compared to right. Patient still trached and on 40% FiO2 at 10L via T-piece. Patient tolerated in-line suctioning x2 prior to cuff deflation. 6cc's of air removed from cuff during deflation. Speaking valve was placed in-line with T-piece and patient was noted to have moderate amount of back pressure, increase in heart rate, and strained/strangled vocal quality when attempting to phonate. Patient reported \"It feels tight\" and requested speaking valve be removed. Speaking valve was removed and PO trials were given with cuff deflated, as noted on FEES with SLP on 10/4. Patient consumed PO trials of single ice chips and NTL via 1/2 tsp. Patient presented with ABSENT swallow trigger on single ice chips despite max cues. PO trials of NTL via 1/2 tsp were given and patient was noted to have significant wet/gurgly vocal quality and delayed coughing, which is concerning for penetration/aspiration. Patient required oral suctioning via [place] and in-line suctioning after PO trials. No further trials were given d/t concerns for aspiration. At this time, recommend PMSV to be placed with SLP only and recommend patient continue strict NPO/NN pending SLP reassessment tomorrow AM. Consider initiation of steroids to decrease edema if medically appropriate. RN and RT aware. SLP is following.     Addendum 1632: RN reported Cortrak was placed and patient now getting TF. SLP is following for dysphagia tx as able and appropriate.     Recommendations: 1.) At this time, recommend PMSV to be placed with SLP only and recommend patient continue strict NPO/NN pending SLP reassessment tomorrow AM.    2.) Consider initiation of steroids to decrease " "edema if medically appropriate.  Plan of Care: Will benefit from Speech Therapy 5 times per week  Post-Acute Therapy: Recommend inpatient transitional care services for continued speech therapy services.      See \"Rehab Therapy-Acute\" Patient Summary Report for complete documentation.     "

## 2019-10-14 NOTE — CARE PLAN
Tracheostomy Update                 Cough: Productive;Moist (10/14/19 0400)  Sputum Amount: Moderate (10/14/19 0400)  Sputum Color: Bloody (10/14/19 0400)  Sputum Consistency: Thick (10/14/19 0400)    FiO2%: 50 % (10/14/19 0400)  O2 (LPM): 15 (10/14/19 0400)

## 2019-10-14 NOTE — PROCEDURES
Vascular Access Team    Date of Insertion: 10/14/19  Arm Circumference: n/a  Line Length: 8cm  Line Size: 18g  Vein Occupancy %: 30  Reason for Midline: Access  Labs: WBC 12.6, , BUN 9, Cr 0.58, GFR >60, INR n/a    Orders confirmed, vessel patency confirmed with ultrasound. Risks and benefits of procedure explained to patient and education regarding line associated bloodstream infections provided. Questions answered.     PowerGlide Midline placed in RUE per licensed provider order with ultrasound guidance. 18g, 8 cm line placed in basilic vein after 1 attempt(s).  Catheter inserted with brisk blood return. Secured with 0cm external from insertion site.  Line flushed without resistance with 10 mL 0.9% normal saline.  Midline secured with Biopatch and Tegaderm.     Midline placement is confirmed by nurse using ultrasound and ability to flush and draw blood. Midline is appropriate for use at this time.  No X-ray is needed for placement confirmation. Pt tolerated procedure well.  Patient condition relayed to unit RN or ordering physician via this post procedure note in the EMR.    Ultrasound images uploaded to PACS and viewable in the EMR - yes  Ultrasound imaged printed and placed in paper chart - no      BARD PowerGlide Midline ref # [id number], Lot # [id number]
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
## 2019-10-14 NOTE — THERAPY
"Occupational Therapy Treatment completed with focus on ADLs, ADL transfers and patient education.  Functional Status:  Pt laying in bed with pen/paper to communicate.  Pt required Mod A to don hospital socks.  Pt demonstrated increased ability to follow commands.  Pt is very Elem which impacts his ability to follow commands at times.  Pt stood with Min A & had good tolerance for upright standing with FWW while lines & O2 tubing was switched over to portable unit.  Pt did need a lot of verbal & tactile cues for correct placement of FWW as he tends to push it too far forward.  Pt overall is less impulsive.  Plan of Care: Will benefit from Occupational Therapy 3 times per week  Discharge Recommendations:  Equipment Will Continue to Assess for Equipment Needs. Post-acute therapy Discharge to a transitional care facility for continued skilled therapy services.    See \"Rehab Therapy-Acute\" Patient Summary Report for complete documentation.   "

## 2019-10-14 NOTE — THERAPY
"Physical Therapy Treatment completed.   Bed Mobility:  Supine to Sit: Contact Guard Assist  Transfers: Sit to Stand: Contact Guard Assist  Gait: Level Of Assist: Minimal Assist with Front-Wheel Walker     X75' twice  Plan of Care: Will benefit from Physical Therapy 3 times per week  Discharge Recommendations: Equipment: Will Continue to Assess for Equipment Needs. Post-acute therapy Discharge to a transitional care facility for continued skilled therapy services.     See \"Rehab Therapy-Acute\" Patient Summary Report for complete documentation.     PT session completed, Pt able to ambulate with FWW and facilitation to maintain safe distance with AD and to control pacing.  Cuing required frequently as Pt will often attempt to push through therapist inhibition to slow pacing.  PT to continue working with Pt in acute setting to increase functional mobility and safety, recommend placement at NE.   "

## 2019-10-14 NOTE — CARE PLAN
Problem: Nutritional:  Goal: Achieve adequate nutritional intake  Description  Patient to eat >50 of meals/supplements.   Outcome: PROGRESSING AS EXPECTED  Pt NPO following ORIF mandible yesterday. Awaiting diet to resume.
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
## 2019-10-15 PROBLEM — E87.6 HYPOKALEMIA: Status: ACTIVE | Noted: 2019-10-15

## 2019-10-15 LAB
ANION GAP SERPL CALC-SCNC: 4 MMOL/L (ref 0–11.9)
BASOPHILS # BLD AUTO: 0.3 % (ref 0–1.8)
BASOPHILS # BLD: 0.03 K/UL (ref 0–0.12)
BUN SERPL-MCNC: 8 MG/DL (ref 8–22)
CALCIUM SERPL-MCNC: 7.6 MG/DL (ref 8.5–10.5)
CHLORIDE SERPL-SCNC: 103 MMOL/L (ref 96–112)
CO2 SERPL-SCNC: 28 MMOL/L (ref 20–33)
CREAT SERPL-MCNC: 0.58 MG/DL (ref 0.5–1.4)
CRP SERPL HS-MCNC: 5.92 MG/DL (ref 0–0.75)
DIGOXIN SERPL-MCNC: 1 NG/ML (ref 0.8–2)
EOSINOPHIL # BLD AUTO: 0.2 K/UL (ref 0–0.51)
EOSINOPHIL NFR BLD: 1.8 % (ref 0–6.9)
ERYTHROCYTE [DISTWIDTH] IN BLOOD BY AUTOMATED COUNT: 57 FL (ref 35.9–50)
GLUCOSE SERPL-MCNC: 117 MG/DL (ref 65–99)
HCT VFR BLD AUTO: 28.5 % (ref 42–52)
HGB BLD-MCNC: 8.5 G/DL (ref 14–18)
IMM GRANULOCYTES # BLD AUTO: 0.07 K/UL (ref 0–0.11)
IMM GRANULOCYTES NFR BLD AUTO: 0.6 % (ref 0–0.9)
LYMPHOCYTES # BLD AUTO: 0.97 K/UL (ref 1–4.8)
LYMPHOCYTES NFR BLD: 8.9 % (ref 22–41)
MCH RBC QN AUTO: 29 PG (ref 27–33)
MCHC RBC AUTO-ENTMCNC: 29.8 G/DL (ref 33.7–35.3)
MCV RBC AUTO: 97.3 FL (ref 81.4–97.8)
MONOCYTES # BLD AUTO: 0.96 K/UL (ref 0–0.85)
MONOCYTES NFR BLD AUTO: 8.8 % (ref 0–13.4)
NEUTROPHILS # BLD AUTO: 8.7 K/UL (ref 1.82–7.42)
NEUTROPHILS NFR BLD: 79.6 % (ref 44–72)
NRBC # BLD AUTO: 0 K/UL
NRBC BLD-RTO: 0 /100 WBC
PLATELET # BLD AUTO: 189 K/UL (ref 164–446)
PMV BLD AUTO: 9.5 FL (ref 9–12.9)
POTASSIUM SERPL-SCNC: 3.3 MMOL/L (ref 3.6–5.5)
PREALB SERPL-MCNC: 12 MG/DL (ref 18–38)
RBC # BLD AUTO: 2.93 M/UL (ref 4.7–6.1)
SODIUM SERPL-SCNC: 135 MMOL/L (ref 135–145)
WBC # BLD AUTO: 10.9 K/UL (ref 4.8–10.8)

## 2019-10-15 PROCEDURE — 770022 HCHG ROOM/CARE - ICU (200)

## 2019-10-15 PROCEDURE — A9270 NON-COVERED ITEM OR SERVICE: HCPCS | Performed by: SURGERY

## 2019-10-15 PROCEDURE — 92507 TX SP LANG VOICE COMM INDIV: CPT

## 2019-10-15 PROCEDURE — 700102 HCHG RX REV CODE 250 W/ 637 OVERRIDE(OP): Performed by: SURGERY

## 2019-10-15 PROCEDURE — 84134 ASSAY OF PREALBUMIN: CPT

## 2019-10-15 PROCEDURE — 85025 COMPLETE CBC W/AUTO DIFF WBC: CPT

## 2019-10-15 PROCEDURE — 700111 HCHG RX REV CODE 636 W/ 250 OVERRIDE (IP): Performed by: SURGERY

## 2019-10-15 PROCEDURE — 700105 HCHG RX REV CODE 258: Performed by: SURGERY

## 2019-10-15 PROCEDURE — 94640 AIRWAY INHALATION TREATMENT: CPT

## 2019-10-15 PROCEDURE — 86140 C-REACTIVE PROTEIN: CPT

## 2019-10-15 PROCEDURE — 80048 BASIC METABOLIC PNL TOTAL CA: CPT

## 2019-10-15 PROCEDURE — 99233 SBSQ HOSP IP/OBS HIGH 50: CPT | Performed by: SURGERY

## 2019-10-15 PROCEDURE — 80162 ASSAY OF DIGOXIN TOTAL: CPT

## 2019-10-15 RX ORDER — MAGNESIUM SULFATE HEPTAHYDRATE 40 MG/ML
2 INJECTION, SOLUTION INTRAVENOUS ONCE
Status: COMPLETED | OUTPATIENT
Start: 2019-10-15 | End: 2019-10-15

## 2019-10-15 RX ORDER — POTASSIUM CHLORIDE 7.45 MG/ML
10 INJECTION INTRAVENOUS
Status: DISCONTINUED | OUTPATIENT
Start: 2019-10-15 | End: 2019-10-15

## 2019-10-15 RX ADMIN — BACITRACIN ZINC: 500 OINTMENT TOPICAL at 05:32

## 2019-10-15 RX ADMIN — DIVALPROEX SODIUM 250 MG: 125 CAPSULE, COATED PELLETS ORAL at 13:54

## 2019-10-15 RX ADMIN — RISPERIDONE 0.5 MG: 0.5 TABLET ORAL at 05:30

## 2019-10-15 RX ADMIN — RISPERIDONE 0.5 MG: 0.5 TABLET ORAL at 17:07

## 2019-10-15 RX ADMIN — METOPROLOL TARTRATE 75 MG: 25 TABLET, FILM COATED ORAL at 17:07

## 2019-10-15 RX ADMIN — DEXTROSE MONOHYDRATE: 50 INJECTION, SOLUTION INTRAVENOUS at 01:36

## 2019-10-15 RX ADMIN — BACITRACIN ZINC: 500 OINTMENT TOPICAL at 17:04

## 2019-10-15 RX ADMIN — DIVALPROEX SODIUM 250 MG: 125 CAPSULE, COATED PELLETS ORAL at 21:03

## 2019-10-15 RX ADMIN — DEXTROSE MONOHYDRATE: 50 INJECTION, SOLUTION INTRAVENOUS at 19:36

## 2019-10-15 RX ADMIN — APIXABAN 5 MG: 5 TABLET, FILM COATED ORAL at 17:04

## 2019-10-15 RX ADMIN — AMIODARONE HYDROCHLORIDE 0.5 MG/MIN: 50 INJECTION, SOLUTION INTRAVENOUS at 19:06

## 2019-10-15 RX ADMIN — POTASSIUM BICARBONATE 50 MEQ: 978 TABLET, EFFERVESCENT ORAL at 10:50

## 2019-10-15 RX ADMIN — CHLORHEXIDINE GLUCONATE 0.12% ORAL RINSE 15 ML: 1.2 LIQUID ORAL at 18:00

## 2019-10-15 RX ADMIN — GUAIFENESIN 200 MG: 100 SOLUTION ORAL at 02:54

## 2019-10-15 RX ADMIN — TRAZODONE HYDROCHLORIDE 50 MG: 50 TABLET ORAL at 21:03

## 2019-10-15 RX ADMIN — METOPROLOL TARTRATE 75 MG: 25 TABLET, FILM COATED ORAL at 05:30

## 2019-10-15 RX ADMIN — POTASSIUM BICARBONATE 50 MEQ: 978 TABLET, EFFERVESCENT ORAL at 17:07

## 2019-10-15 RX ADMIN — GUAIFENESIN 200 MG: 100 SOLUTION ORAL at 05:31

## 2019-10-15 RX ADMIN — TERAZOSIN HYDROCHLORIDE ANHYDROUS 2 MG: 2 CAPSULE ORAL at 17:04

## 2019-10-15 RX ADMIN — MAGNESIUM SULFATE 2 G: 2 INJECTION INTRAVENOUS at 11:57

## 2019-10-15 RX ADMIN — AMIODARONE HYDROCHLORIDE 0.5 MG/MIN: 50 INJECTION, SOLUTION INTRAVENOUS at 02:54

## 2019-10-15 RX ADMIN — CHLORHEXIDINE GLUCONATE 0.12% ORAL RINSE 15 ML: 1.2 LIQUID ORAL at 05:35

## 2019-10-15 RX ADMIN — DIVALPROEX SODIUM 250 MG: 125 CAPSULE, COATED PELLETS ORAL at 05:30

## 2019-10-15 RX ADMIN — METOPROLOL TARTRATE 75 MG: 25 TABLET, FILM COATED ORAL at 11:57

## 2019-10-15 RX ADMIN — DIGOXIN 125 MCG: 125 TABLET ORAL at 05:32

## 2019-10-15 RX ADMIN — APIXABAN 5 MG: 5 TABLET, FILM COATED ORAL at 05:30

## 2019-10-15 NOTE — CARE PLAN
Problem: Oxygenation:  Goal: Maintain adequate oxygenation dependent on patient condition  Outcome: PROGRESSING AS EXPECTED   Pt remains on T-piece 5L/40%, small amount of secretions overnight.

## 2019-10-15 NOTE — CARE PLAN
Problem: Nutritional:  Goal: Achieve adequate nutritional intake  Description  Patient to eat >50 of meals/supplements.   Outcome: MET   Pt now NPO with tube feedings resumed.      Problem: Nutritional:  Goal: Nutrition support tolerated and meeting greater than 85% of estimated needs  Outcome: NOT MET

## 2019-10-15 NOTE — THERAPY
"Speech Language Therapy speech treatment completed.     Functional Status: Patient was seen on this date for speaking valve treatment. SO at bedside for session. Pt on 4L and 28% FiO2 via T-piece, O2 sats and RR stable. Cuff was deflated of 3 cc of air and tracheal suctioning x2 performed with removal of copious amount of thin clear secretions. Speaking valve was placed in line with T-piece 4 times during session for 2-3 minutes at a time. Vocal quality severely wet/gurgly and pt required mod-max verbal cues for volitional cough to clear. Speech functional at the short phrase level. Oral suctioning with removal of significant amount of coughed up thin secretions/phlegm multiple times during session. Clear vocal quality achieved <20% of the time. Cues for dry swallow with saliva revealed weak laryngeal elevation. Increase in RR and removal of speaking valve revealed significant back pressure concerning for patency of upper airway. PO trials were not administered this session due to excessive amount of secretions and serious concern for aspiration. Speaking valve was removed, tracheal suctioning x2 performed with moderate amount of thin secretions removed, and cuff was re-inflated. Education provided to pt and SO regarding current status and SLP recs.      Recommendations: At this time, patient does not appear at the level for a PO diet and would recommend continue NPO/cortrak. PMSV to be placed with SLP only.      Plan of Care: Will benefit from Speech Therapy 5 times per week    Post-Acute Therapy: Recommend inpatient transitional care services for continued speech therapy services.      See \"Rehab Therapy-Acute\" Patient Summary Report for complete documentation.     "
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
## 2019-10-16 ENCOUNTER — APPOINTMENT (OUTPATIENT)
Dept: RADIOLOGY | Facility: MEDICAL CENTER | Age: 79
DRG: 003 | End: 2019-10-16
Attending: SURGERY
Payer: MEDICARE

## 2019-10-16 PROBLEM — E83.39 HYPOPHOSPHATEMIA: Status: ACTIVE | Noted: 2019-10-16

## 2019-10-16 LAB
ANION GAP SERPL CALC-SCNC: 6 MMOL/L (ref 0–11.9)
BASOPHILS # BLD AUTO: 0.4 % (ref 0–1.8)
BASOPHILS # BLD: 0.04 K/UL (ref 0–0.12)
BUN SERPL-MCNC: 12 MG/DL (ref 8–22)
CALCIUM SERPL-MCNC: 7.4 MG/DL (ref 8.5–10.5)
CHLORIDE SERPL-SCNC: 104 MMOL/L (ref 96–112)
CO2 SERPL-SCNC: 28 MMOL/L (ref 20–33)
CREAT SERPL-MCNC: 0.65 MG/DL (ref 0.5–1.4)
DIGOXIN SERPL-MCNC: 0.8 NG/ML (ref 0.8–2)
EOSINOPHIL # BLD AUTO: 0.28 K/UL (ref 0–0.51)
EOSINOPHIL NFR BLD: 2.8 % (ref 0–6.9)
ERYTHROCYTE [DISTWIDTH] IN BLOOD BY AUTOMATED COUNT: 56.5 FL (ref 35.9–50)
GLUCOSE SERPL-MCNC: 122 MG/DL (ref 65–99)
HCT VFR BLD AUTO: 28.5 % (ref 42–52)
HGB BLD-MCNC: 8.7 G/DL (ref 14–18)
IMM GRANULOCYTES # BLD AUTO: 0.06 K/UL (ref 0–0.11)
IMM GRANULOCYTES NFR BLD AUTO: 0.6 % (ref 0–0.9)
LYMPHOCYTES # BLD AUTO: 0.97 K/UL (ref 1–4.8)
LYMPHOCYTES NFR BLD: 9.7 % (ref 22–41)
MAGNESIUM SERPL-MCNC: 1.9 MG/DL (ref 1.5–2.5)
MCH RBC QN AUTO: 29.5 PG (ref 27–33)
MCHC RBC AUTO-ENTMCNC: 30.5 G/DL (ref 33.7–35.3)
MCV RBC AUTO: 96.6 FL (ref 81.4–97.8)
MONOCYTES # BLD AUTO: 0.71 K/UL (ref 0–0.85)
MONOCYTES NFR BLD AUTO: 7.1 % (ref 0–13.4)
NEUTROPHILS # BLD AUTO: 7.97 K/UL (ref 1.82–7.42)
NEUTROPHILS NFR BLD: 79.4 % (ref 44–72)
NRBC # BLD AUTO: 0 K/UL
NRBC BLD-RTO: 0 /100 WBC
PHOSPHATE SERPL-MCNC: 2.2 MG/DL (ref 2.5–4.5)
PLATELET # BLD AUTO: 199 K/UL (ref 164–446)
PMV BLD AUTO: 9.4 FL (ref 9–12.9)
POTASSIUM SERPL-SCNC: 3.5 MMOL/L (ref 3.6–5.5)
RBC # BLD AUTO: 2.95 M/UL (ref 4.7–6.1)
SODIUM SERPL-SCNC: 138 MMOL/L (ref 135–145)
WBC # BLD AUTO: 10 K/UL (ref 4.8–10.8)

## 2019-10-16 PROCEDURE — 83735 ASSAY OF MAGNESIUM: CPT

## 2019-10-16 PROCEDURE — 700111 HCHG RX REV CODE 636 W/ 250 OVERRIDE (IP): Performed by: SURGERY

## 2019-10-16 PROCEDURE — A9270 NON-COVERED ITEM OR SERVICE: HCPCS | Performed by: SURGERY

## 2019-10-16 PROCEDURE — 84100 ASSAY OF PHOSPHORUS: CPT

## 2019-10-16 PROCEDURE — 770022 HCHG ROOM/CARE - ICU (200)

## 2019-10-16 PROCEDURE — 700105 HCHG RX REV CODE 258: Performed by: SURGERY

## 2019-10-16 PROCEDURE — 94640 AIRWAY INHALATION TREATMENT: CPT

## 2019-10-16 PROCEDURE — 700101 HCHG RX REV CODE 250: Performed by: SURGERY

## 2019-10-16 PROCEDURE — 302101 FENESTRATED FOAM: Performed by: SURGERY

## 2019-10-16 PROCEDURE — 99233 SBSQ HOSP IP/OBS HIGH 50: CPT | Performed by: SURGERY

## 2019-10-16 PROCEDURE — 700102 HCHG RX REV CODE 250 W/ 637 OVERRIDE(OP): Performed by: SURGERY

## 2019-10-16 PROCEDURE — 71045 X-RAY EXAM CHEST 1 VIEW: CPT

## 2019-10-16 PROCEDURE — 80048 BASIC METABOLIC PNL TOTAL CA: CPT

## 2019-10-16 PROCEDURE — 85025 COMPLETE CBC W/AUTO DIFF WBC: CPT

## 2019-10-16 PROCEDURE — 92507 TX SP LANG VOICE COMM INDIV: CPT

## 2019-10-16 PROCEDURE — 80162 ASSAY OF DIGOXIN TOTAL: CPT

## 2019-10-16 RX ORDER — DIGOXIN 125 MCG
125 TABLET ORAL
Status: DISCONTINUED | OUTPATIENT
Start: 2019-10-18 | End: 2019-10-16

## 2019-10-16 RX ORDER — DIGOXIN 250 MCG
250 TABLET ORAL
Status: DISCONTINUED | OUTPATIENT
Start: 2019-10-17 | End: 2019-10-16

## 2019-10-16 RX ORDER — MAGNESIUM SULFATE HEPTAHYDRATE 40 MG/ML
2 INJECTION, SOLUTION INTRAVENOUS ONCE
Status: COMPLETED | OUTPATIENT
Start: 2019-10-16 | End: 2019-10-16

## 2019-10-16 RX ORDER — DIGOXIN 250 MCG
250 TABLET ORAL DAILY
Status: DISCONTINUED | OUTPATIENT
Start: 2019-10-16 | End: 2019-10-18 | Stop reason: HOSPADM

## 2019-10-16 RX ADMIN — DIVALPROEX SODIUM 250 MG: 125 CAPSULE, COATED PELLETS ORAL at 15:11

## 2019-10-16 RX ADMIN — DIVALPROEX SODIUM 250 MG: 125 CAPSULE, COATED PELLETS ORAL at 06:00

## 2019-10-16 RX ADMIN — METOPROLOL TARTRATE 75 MG: 25 TABLET, FILM COATED ORAL at 12:19

## 2019-10-16 RX ADMIN — POTASSIUM BICARBONATE 50 MEQ: 978 TABLET, EFFERVESCENT ORAL at 06:01

## 2019-10-16 RX ADMIN — MAGNESIUM SULFATE IN WATER 2 G: 40 INJECTION, SOLUTION INTRAVENOUS at 10:56

## 2019-10-16 RX ADMIN — APIXABAN 5 MG: 5 TABLET, FILM COATED ORAL at 06:00

## 2019-10-16 RX ADMIN — METOPROLOL TARTRATE 75 MG: 25 TABLET, FILM COATED ORAL at 06:00

## 2019-10-16 RX ADMIN — BACITRACIN ZINC: 500 OINTMENT TOPICAL at 17:12

## 2019-10-16 RX ADMIN — TERAZOSIN HYDROCHLORIDE ANHYDROUS 2 MG: 2 CAPSULE ORAL at 17:12

## 2019-10-16 RX ADMIN — CHLORHEXIDINE GLUCONATE 0.12% ORAL RINSE 15 ML: 1.2 LIQUID ORAL at 17:12

## 2019-10-16 RX ADMIN — AMIODARONE HYDROCHLORIDE 0.5 MG/MIN: 50 INJECTION, SOLUTION INTRAVENOUS at 11:07

## 2019-10-16 RX ADMIN — TRAZODONE HYDROCHLORIDE 50 MG: 50 TABLET ORAL at 21:05

## 2019-10-16 RX ADMIN — DIGOXIN 125 MCG: 125 TABLET ORAL at 06:00

## 2019-10-16 RX ADMIN — POTASSIUM BICARBONATE 50 MEQ: 978 TABLET, EFFERVESCENT ORAL at 17:12

## 2019-10-16 RX ADMIN — DIVALPROEX SODIUM 250 MG: 125 CAPSULE, COATED PELLETS ORAL at 21:06

## 2019-10-16 RX ADMIN — DIGOXIN 250 MCG: 250 TABLET ORAL at 17:12

## 2019-10-16 RX ADMIN — RISPERIDONE 0.5 MG: 0.5 TABLET ORAL at 06:00

## 2019-10-16 RX ADMIN — POTASSIUM PHOSPHATE, MONOBASIC AND POTASSIUM PHOSPHATE, DIBASIC 30 MMOL: 224; 236 INJECTION, SOLUTION, CONCENTRATE INTRAVENOUS at 10:56

## 2019-10-16 RX ADMIN — BACITRACIN ZINC: 500 OINTMENT TOPICAL at 06:00

## 2019-10-16 RX ADMIN — METOPROLOL TARTRATE 75 MG: 25 TABLET, FILM COATED ORAL at 17:12

## 2019-10-16 RX ADMIN — APIXABAN 5 MG: 5 TABLET, FILM COATED ORAL at 17:12

## 2019-10-16 RX ADMIN — RISPERIDONE 0.5 MG: 0.5 TABLET ORAL at 17:12

## 2019-10-16 RX ADMIN — CHLORHEXIDINE GLUCONATE 0.12% ORAL RINSE 15 ML: 1.2 LIQUID ORAL at 06:01

## 2019-10-16 NOTE — CARE PLAN
Problem: Nutritional:  Goal: Nutrition support tolerated and meeting greater than 85% of estimated needs  Outcome: MET
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
## 2019-10-16 NOTE — THERAPY
"Speech Language Therapy speech treatment completed.     Functional Status: Patient was seen on this date for speaking valve placement and dysphagia reassessment. Facial and neck edema reduced compared to yesterday. Pt on 4 L and 28% FiO2 via T-piece. Vitals stable. Cuff deflated of 3 cc and tracheal suctioning x2 performed with moderate amount of thin secretions removed. PMSV placed in-line with T-piece and patient tolerated placement for 20 minutes. Patient with clear vocal quality and wet vocal quality ~25% of the time. Pt followed directives for cued cough given max verbal and visual cues which patient was able to cough up blood tinged secretions that were cleared with yankauer. PO trials of 2 small ice chips resulted in immediate and delayed coughing which is concerning for aspiration. Intermittent removal of speaking valve did not yield back pressure as noted during previous sessions.     Recommendations: Patient with improved tolerance of speaking valve placement and would recommend speaking valve to be placed for 15-20 intervals (or as tolerated) by trained RN/RT/SLP given DIRECT supervision. Continue NPO/cortrak. Pre-feeding trials with SLP only.     Plan of Care: Will benefit from Speech Therapy 5 times per week    Post-Acute Therapy: Recommend inpatient transitional care services for continued speech therapy services.      See \"Rehab Therapy-Acute\" Patient Summary Report for complete documentation.     "

## 2019-10-16 NOTE — CARE PLAN
Problem: Venous Thromboembolism (VTW)/Deep Vein Thrombosis (DVT) Prevention:  Goal: Patient will participate in Venous Thrombosis (VTE)/Deep Vein Thrombosis (DVT)Prevention Measures  Outcome: PROGRESSING AS EXPECTED  Note:   Mechanical and pharmacological interventions in place       Problem: Bowel/Gastric:  Goal: Normal bowel function is maintained or improved  Outcome: PROGRESSING AS EXPECTED  Note:   Tf goal

## 2019-10-16 NOTE — PROGRESS NOTES
Cortrak Placement    Tube Team verified patient name and medical record number prior to tube placement.  Cortrak tube (55 inches, 10 Iraqi) placed at 75 cm in right nare.  Per Cortrak picture, tube appears to be in the stomach.  Nursing Instructions: Awaiting KUB to confirm placement before use for medications or feeding. Once placement confirmed, flush tube with 30 ml of water, and then remove and save stylet, in patient medication drawer.

## 2019-10-16 NOTE — PROGRESS NOTES
12 Hour Chart Check   Partial Purse String (Simple) Text: Given the location of the defect and the characteristics of the surrounding skin a simple purse string closure was deemed most appropriate.  Undermining was performed circumferentially around the surgical defect.  A purse string suture was then placed and tightened. Wound tension of the circular defect prevented complete closure of the wound.

## 2019-10-17 LAB
ANION GAP SERPL CALC-SCNC: 6 MMOL/L (ref 0–11.9)
BASOPHILS # BLD AUTO: 0.3 % (ref 0–1.8)
BASOPHILS # BLD: 0.02 K/UL (ref 0–0.12)
BUN SERPL-MCNC: 11 MG/DL (ref 8–22)
CALCIUM SERPL-MCNC: 7.4 MG/DL (ref 8.5–10.5)
CHLORIDE SERPL-SCNC: 106 MMOL/L (ref 96–112)
CO2 SERPL-SCNC: 26 MMOL/L (ref 20–33)
CREAT SERPL-MCNC: 0.6 MG/DL (ref 0.5–1.4)
DIGOXIN SERPL-MCNC: 1 NG/ML (ref 0.8–2)
EOSINOPHIL # BLD AUTO: 0.42 K/UL (ref 0–0.51)
EOSINOPHIL NFR BLD: 5.3 % (ref 0–6.9)
ERYTHROCYTE [DISTWIDTH] IN BLOOD BY AUTOMATED COUNT: 54.6 FL (ref 35.9–50)
GLUCOSE SERPL-MCNC: 114 MG/DL (ref 65–99)
HCT VFR BLD AUTO: 28.4 % (ref 42–52)
HGB BLD-MCNC: 8.8 G/DL (ref 14–18)
IMM GRANULOCYTES # BLD AUTO: 0.07 K/UL (ref 0–0.11)
IMM GRANULOCYTES NFR BLD AUTO: 0.9 % (ref 0–0.9)
LYMPHOCYTES # BLD AUTO: 0.89 K/UL (ref 1–4.8)
LYMPHOCYTES NFR BLD: 11.2 % (ref 22–41)
MAGNESIUM SERPL-MCNC: 2.1 MG/DL (ref 1.5–2.5)
MCH RBC QN AUTO: 29.7 PG (ref 27–33)
MCHC RBC AUTO-ENTMCNC: 31 G/DL (ref 33.7–35.3)
MCV RBC AUTO: 95.9 FL (ref 81.4–97.8)
MONOCYTES # BLD AUTO: 0.61 K/UL (ref 0–0.85)
MONOCYTES NFR BLD AUTO: 7.7 % (ref 0–13.4)
NEUTROPHILS # BLD AUTO: 5.95 K/UL (ref 1.82–7.42)
NEUTROPHILS NFR BLD: 74.6 % (ref 44–72)
NRBC # BLD AUTO: 0 K/UL
NRBC BLD-RTO: 0 /100 WBC
PHOSPHATE SERPL-MCNC: 3 MG/DL (ref 2.5–4.5)
PLATELET # BLD AUTO: 198 K/UL (ref 164–446)
PMV BLD AUTO: 9.4 FL (ref 9–12.9)
POTASSIUM SERPL-SCNC: 3.8 MMOL/L (ref 3.6–5.5)
RBC # BLD AUTO: 2.96 M/UL (ref 4.7–6.1)
SODIUM SERPL-SCNC: 138 MMOL/L (ref 135–145)
WBC # BLD AUTO: 8 K/UL (ref 4.8–10.8)

## 2019-10-17 PROCEDURE — 99233 SBSQ HOSP IP/OBS HIGH 50: CPT | Performed by: SURGERY

## 2019-10-17 PROCEDURE — 94640 AIRWAY INHALATION TREATMENT: CPT

## 2019-10-17 PROCEDURE — 97535 SELF CARE MNGMENT TRAINING: CPT

## 2019-10-17 PROCEDURE — 770022 HCHG ROOM/CARE - ICU (200)

## 2019-10-17 PROCEDURE — 80162 ASSAY OF DIGOXIN TOTAL: CPT

## 2019-10-17 PROCEDURE — 85025 COMPLETE CBC W/AUTO DIFF WBC: CPT

## 2019-10-17 PROCEDURE — 80048 BASIC METABOLIC PNL TOTAL CA: CPT

## 2019-10-17 PROCEDURE — 97110 THERAPEUTIC EXERCISES: CPT

## 2019-10-17 PROCEDURE — 97530 THERAPEUTIC ACTIVITIES: CPT

## 2019-10-17 PROCEDURE — 700111 HCHG RX REV CODE 636 W/ 250 OVERRIDE (IP): Performed by: SURGERY

## 2019-10-17 PROCEDURE — 700102 HCHG RX REV CODE 250 W/ 637 OVERRIDE(OP): Performed by: SURGERY

## 2019-10-17 PROCEDURE — 700105 HCHG RX REV CODE 258: Performed by: SURGERY

## 2019-10-17 PROCEDURE — A9270 NON-COVERED ITEM OR SERVICE: HCPCS | Performed by: SURGERY

## 2019-10-17 PROCEDURE — 92526 ORAL FUNCTION THERAPY: CPT

## 2019-10-17 PROCEDURE — 83735 ASSAY OF MAGNESIUM: CPT

## 2019-10-17 PROCEDURE — 84100 ASSAY OF PHOSPHORUS: CPT

## 2019-10-17 RX ADMIN — BACITRACIN ZINC: 500 OINTMENT TOPICAL at 16:58

## 2019-10-17 RX ADMIN — AMIODARONE HYDROCHLORIDE 0.5 MG/MIN: 50 INJECTION, SOLUTION INTRAVENOUS at 04:23

## 2019-10-17 RX ADMIN — CHLORHEXIDINE GLUCONATE 0.12% ORAL RINSE 15 ML: 1.2 LIQUID ORAL at 16:58

## 2019-10-17 RX ADMIN — DIVALPROEX SODIUM 250 MG: 125 CAPSULE, COATED PELLETS ORAL at 13:32

## 2019-10-17 RX ADMIN — METOPROLOL TARTRATE 75 MG: 25 TABLET, FILM COATED ORAL at 05:12

## 2019-10-17 RX ADMIN — POTASSIUM BICARBONATE 50 MEQ: 978 TABLET, EFFERVESCENT ORAL at 05:13

## 2019-10-17 RX ADMIN — RISPERIDONE 0.5 MG: 0.5 TABLET ORAL at 05:15

## 2019-10-17 RX ADMIN — DEXTROSE MONOHYDRATE: 50 INJECTION, SOLUTION INTRAVENOUS at 05:13

## 2019-10-17 RX ADMIN — METOPROLOL TARTRATE 75 MG: 25 TABLET, FILM COATED ORAL at 16:58

## 2019-10-17 RX ADMIN — RISPERIDONE 0.5 MG: 0.5 TABLET ORAL at 16:59

## 2019-10-17 RX ADMIN — DIVALPROEX SODIUM 250 MG: 125 CAPSULE, COATED PELLETS ORAL at 05:12

## 2019-10-17 RX ADMIN — BACITRACIN ZINC: 500 OINTMENT TOPICAL at 05:15

## 2019-10-17 RX ADMIN — DIGOXIN 250 MCG: 250 TABLET ORAL at 16:59

## 2019-10-17 RX ADMIN — APIXABAN 5 MG: 5 TABLET, FILM COATED ORAL at 05:12

## 2019-10-17 RX ADMIN — CHLORHEXIDINE GLUCONATE 0.12% ORAL RINSE 15 ML: 1.2 LIQUID ORAL at 05:13

## 2019-10-17 RX ADMIN — APIXABAN 5 MG: 5 TABLET, FILM COATED ORAL at 16:59

## 2019-10-17 RX ADMIN — METOPROLOL TARTRATE 75 MG: 25 TABLET, FILM COATED ORAL at 13:32

## 2019-10-17 RX ADMIN — TERAZOSIN HYDROCHLORIDE ANHYDROUS 2 MG: 2 CAPSULE ORAL at 16:59

## 2019-10-17 RX ADMIN — POTASSIUM BICARBONATE 50 MEQ: 978 TABLET, EFFERVESCENT ORAL at 16:58

## 2019-10-17 RX ADMIN — DIVALPROEX SODIUM 250 MG: 125 CAPSULE, COATED PELLETS ORAL at 22:00

## 2019-10-17 ASSESSMENT — COGNITIVE AND FUNCTIONAL STATUS - GENERAL
PERSONAL GROOMING: A LITTLE
DAILY ACTIVITIY SCORE: 16
STANDING UP FROM CHAIR USING ARMS: A LITTLE
SUGGESTED CMS G CODE MODIFIER DAILY ACTIVITY: CK
EATING MEALS: TOTAL
HELP NEEDED FOR BATHING: A LITTLE
MOVING FROM LYING ON BACK TO SITTING ON SIDE OF FLAT BED: UNABLE
MOVING TO AND FROM BED TO CHAIR: UNABLE
DRESSING REGULAR UPPER BODY CLOTHING: A LITTLE
WALKING IN HOSPITAL ROOM: A LITTLE
MOBILITY SCORE: 15
CLIMB 3 TO 5 STEPS WITH RAILING: A LITTLE
SUGGESTED CMS G CODE MODIFIER MOBILITY: CK
DRESSING REGULAR LOWER BODY CLOTHING: A LITTLE
TOILETING: A LITTLE

## 2019-10-17 ASSESSMENT — GAIT ASSESSMENTS
DEVIATION: DECREASED BASE OF SUPPORT
GAIT LEVEL OF ASSIST: MINIMAL ASSIST
ASSISTIVE DEVICE: FRONT WHEEL WALKER
DISTANCE (FEET): 100

## 2019-10-17 NOTE — CARE PLAN
Problem: Oxygenation:  Goal: Maintain adequate oxygenation dependent on patient condition  Outcome: PROGRESSING AS EXPECTED    Trach t-piece 4L 28%
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
## 2019-10-17 NOTE — CARE PLAN
Problem: Bronchopulmonary Hygiene:  Goal: Increase mobilization of retained secretions  Outcome: PROGRESSING AS EXPECTED   Moderate amount of secretions overnight.       Problem: Oxygenation:  Goal: Maintain adequate oxygenation dependent on patient condition  Outcome: PROGRESSING AS EXPECTED     Pt remains on T-piece 4L/28%

## 2019-10-17 NOTE — CARE PLAN
Problem: Safety  Goal: Will remain free from injury  Outcome: PROGRESSING AS EXPECTED  Note:   Bed in low position with bed alarm on. Call light within reach. Lower bed rails in place. Treaded socks in use. Pt near nurses station.  Educated on fall risk.       Problem: Bowel/Gastric:  Goal: Normal bowel function is maintained or improved  Outcome: PROGRESSING AS EXPECTED  Note:   TF goal. LBM today

## 2019-10-17 NOTE — CARE PLAN
Problem: Communication  Goal: The ability to communicate needs accurately and effectively will improve  Intervention: Develop alternate methods of communication with patient and significant other/support system  Note:   Patient unable to speak and hard of hearing. Communicating with patient using pen and paper.      Problem: Safety  Goal: Will remain free from falls  Intervention: Implement fall precautions  Note:   Patient educated to call for assistance prior to getting out of bed. Call light within reach, bed alarm on, sides rails up x3, treaded slipper socks in place, belongings within reach.

## 2019-10-17 NOTE — THERAPY
"Occupational Therapy Treatment completed with focus on ADLs, ADL transfers and patient education.  Functional Status: Pt seen for OT tx session. Pt min A to doff/don socks with VC for attention and sequencing. Pt required increased time to follow commands, unsure if Tribal vs behavioral. Pt was able to use paper/pen to communicate as needed and for therapists to communicate with pt. Will continue to follow for acute OT services while in-house.   Plan of Care: Will benefit from Occupational Therapy 2 times per week  Discharge Recommendations:  Equipment Will Continue to Assess for Equipment Needs.      See \"Rehab Therapy-Acute\" Patient Summary Report for complete documentation.   "

## 2019-10-18 ENCOUNTER — HOSPITAL ENCOUNTER (INPATIENT)
Facility: REHABILITATION | Age: 79
LOS: 18 days | DRG: 947 | End: 2019-11-05
Attending: PHYSICAL MEDICINE & REHABILITATION | Admitting: PHYSICAL MEDICINE & REHABILITATION
Payer: MEDICARE

## 2019-10-18 ENCOUNTER — APPOINTMENT (OUTPATIENT)
Dept: RADIOLOGY | Facility: MEDICAL CENTER | Age: 79
DRG: 003 | End: 2019-10-18
Attending: SURGERY
Payer: MEDICARE

## 2019-10-18 VITALS
DIASTOLIC BLOOD PRESSURE: 68 MMHG | HEART RATE: 93 BPM | OXYGEN SATURATION: 100 % | WEIGHT: 194.22 LBS | BODY MASS INDEX: 26.31 KG/M2 | TEMPERATURE: 97.8 F | SYSTOLIC BLOOD PRESSURE: 109 MMHG | HEIGHT: 72 IN | RESPIRATION RATE: 18 BRPM

## 2019-10-18 PROBLEM — E87.6 HYPOKALEMIA: Status: RESOLVED | Noted: 2019-10-15 | Resolved: 2019-10-18

## 2019-10-18 PROBLEM — E83.39 HYPOPHOSPHATEMIA: Status: RESOLVED | Noted: 2019-10-16 | Resolved: 2019-10-18

## 2019-10-18 LAB
ANION GAP SERPL CALC-SCNC: 7 MMOL/L (ref 0–11.9)
BASOPHILS # BLD AUTO: 0.4 % (ref 0–1.8)
BASOPHILS # BLD: 0.04 K/UL (ref 0–0.12)
BUN SERPL-MCNC: 13 MG/DL (ref 8–22)
CALCIUM SERPL-MCNC: 7.9 MG/DL (ref 8.5–10.5)
CHLORIDE SERPL-SCNC: 105 MMOL/L (ref 96–112)
CO2 SERPL-SCNC: 25 MMOL/L (ref 20–33)
CREAT SERPL-MCNC: 0.6 MG/DL (ref 0.5–1.4)
DIGOXIN SERPL-MCNC: 1 NG/ML (ref 0.8–2)
EOSINOPHIL # BLD AUTO: 0.53 K/UL (ref 0–0.51)
EOSINOPHIL NFR BLD: 5.4 % (ref 0–6.9)
ERYTHROCYTE [DISTWIDTH] IN BLOOD BY AUTOMATED COUNT: 54.1 FL (ref 35.9–50)
GLUCOSE SERPL-MCNC: 107 MG/DL (ref 65–99)
HCT VFR BLD AUTO: 28.6 % (ref 42–52)
HGB BLD-MCNC: 8.9 G/DL (ref 14–18)
IMM GRANULOCYTES # BLD AUTO: 0.09 K/UL (ref 0–0.11)
IMM GRANULOCYTES NFR BLD AUTO: 0.9 % (ref 0–0.9)
LYMPHOCYTES # BLD AUTO: 1.11 K/UL (ref 1–4.8)
LYMPHOCYTES NFR BLD: 11.2 % (ref 22–41)
MCH RBC QN AUTO: 29.6 PG (ref 27–33)
MCHC RBC AUTO-ENTMCNC: 31.1 G/DL (ref 33.7–35.3)
MCV RBC AUTO: 95 FL (ref 81.4–97.8)
MONOCYTES # BLD AUTO: 0.68 K/UL (ref 0–0.85)
MONOCYTES NFR BLD AUTO: 6.9 % (ref 0–13.4)
NEUTROPHILS # BLD AUTO: 7.44 K/UL (ref 1.82–7.42)
NEUTROPHILS NFR BLD: 75.2 % (ref 44–72)
NRBC # BLD AUTO: 0 K/UL
NRBC BLD-RTO: 0 /100 WBC
PLATELET # BLD AUTO: 220 K/UL (ref 164–446)
PMV BLD AUTO: 9.2 FL (ref 9–12.9)
POTASSIUM SERPL-SCNC: 3.8 MMOL/L (ref 3.6–5.5)
RBC # BLD AUTO: 3.01 M/UL (ref 4.7–6.1)
SODIUM SERPL-SCNC: 137 MMOL/L (ref 135–145)
WBC # BLD AUTO: 9.9 K/UL (ref 4.8–10.8)

## 2019-10-18 PROCEDURE — 700102 HCHG RX REV CODE 250 W/ 637 OVERRIDE(OP): Performed by: SURGERY

## 2019-10-18 PROCEDURE — 94760 N-INVAS EAR/PLS OXIMETRY 1: CPT

## 2019-10-18 PROCEDURE — A6213 FOAM DRG >16<=48 SQ IN W/BDR: HCPCS | Performed by: PHYSICAL MEDICINE & REHABILITATION

## 2019-10-18 PROCEDURE — 770010 HCHG ROOM/CARE - REHAB SEMI PRIVAT*

## 2019-10-18 PROCEDURE — 71045 X-RAY EXAM CHEST 1 VIEW: CPT

## 2019-10-18 PROCEDURE — 80048 BASIC METABOLIC PNL TOTAL CA: CPT

## 2019-10-18 PROCEDURE — 85025 COMPLETE CBC W/AUTO DIFF WBC: CPT

## 2019-10-18 PROCEDURE — 99233 SBSQ HOSP IP/OBS HIGH 50: CPT | Performed by: PHYSICAL MEDICINE & REHABILITATION

## 2019-10-18 PROCEDURE — 94640 AIRWAY INHALATION TREATMENT: CPT

## 2019-10-18 PROCEDURE — A9270 NON-COVERED ITEM OR SERVICE: HCPCS | Performed by: PHYSICAL MEDICINE & REHABILITATION

## 2019-10-18 PROCEDURE — 80162 ASSAY OF DIGOXIN TOTAL: CPT

## 2019-10-18 PROCEDURE — 700101 HCHG RX REV CODE 250: Performed by: PHYSICAL MEDICINE & REHABILITATION

## 2019-10-18 PROCEDURE — 99239 HOSP IP/OBS DSCHRG MGMT >30: CPT | Performed by: SURGERY

## 2019-10-18 PROCEDURE — A9270 NON-COVERED ITEM OR SERVICE: HCPCS | Performed by: SURGERY

## 2019-10-18 PROCEDURE — 700102 HCHG RX REV CODE 250 W/ 637 OVERRIDE(OP): Performed by: PHYSICAL MEDICINE & REHABILITATION

## 2019-10-18 RX ORDER — ECHINACEA PURPUREA EXTRACT 125 MG
2 TABLET ORAL PRN
Status: DISCONTINUED | OUTPATIENT
Start: 2019-10-18 | End: 2019-11-05 | Stop reason: HOSPADM

## 2019-10-18 RX ORDER — HYDRALAZINE HYDROCHLORIDE 25 MG/1
25 TABLET, FILM COATED ORAL EVERY 8 HOURS PRN
Status: DISCONTINUED | OUTPATIENT
Start: 2019-10-18 | End: 2019-11-05 | Stop reason: HOSPADM

## 2019-10-18 RX ORDER — ONDANSETRON 4 MG/1
4 TABLET, ORALLY DISINTEGRATING ORAL 4 TIMES DAILY PRN
Status: DISCONTINUED | OUTPATIENT
Start: 2019-10-18 | End: 2019-11-05 | Stop reason: HOSPADM

## 2019-10-18 RX ORDER — ACETAMINOPHEN 325 MG/1
650 TABLET ORAL EVERY 4 HOURS PRN
Qty: 30 TAB | Refills: 0 | Status: ON HOLD
Start: 2019-10-18 | End: 2019-11-08

## 2019-10-18 RX ORDER — CHLORHEXIDINE GLUCONATE ORAL RINSE 1.2 MG/ML
15 SOLUTION DENTAL 2 TIMES DAILY
Status: CANCELLED | OUTPATIENT
Start: 2019-10-18

## 2019-10-18 RX ORDER — TERAZOSIN 1 MG/1
2 CAPSULE ORAL EVERY EVENING
Status: DISCONTINUED | OUTPATIENT
Start: 2019-10-18 | End: 2019-11-01

## 2019-10-18 RX ORDER — ALUMINA, MAGNESIA, AND SIMETHICONE 2400; 2400; 240 MG/30ML; MG/30ML; MG/30ML
20 SUSPENSION ORAL
Status: DISCONTINUED | OUTPATIENT
Start: 2019-10-18 | End: 2019-11-05 | Stop reason: HOSPADM

## 2019-10-18 RX ORDER — DIVALPROEX SODIUM 125 MG/1
250 CAPSULE, COATED PELLETS ORAL EVERY 8 HOURS
Qty: 120 CAP | Status: ON HOLD
Start: 2019-10-18 | End: 2019-11-08 | Stop reason: SDUPTHER

## 2019-10-18 RX ORDER — GINSENG 100 MG
CAPSULE ORAL
Status: COMPLETED
Start: 2019-10-18 | End: 2019-10-18

## 2019-10-18 RX ORDER — DIVALPROEX SODIUM 125 MG/1
250 CAPSULE, COATED PELLETS ORAL EVERY 8 HOURS
Status: DISCONTINUED | OUTPATIENT
Start: 2019-10-18 | End: 2019-10-18

## 2019-10-18 RX ORDER — POLYVINYL ALCOHOL 14 MG/ML
1 SOLUTION/ DROPS OPHTHALMIC PRN
Status: DISCONTINUED | OUTPATIENT
Start: 2019-10-18 | End: 2019-11-05 | Stop reason: HOSPADM

## 2019-10-18 RX ORDER — BACITRACIN ZINC 500 [USP'U]/G
1 OINTMENT TOPICAL 2 TIMES DAILY
Qty: 1 TUBE | Refills: 0 | Status: ON HOLD
Start: 2019-10-18 | End: 2019-11-08

## 2019-10-18 RX ORDER — DIGOXIN 250 MCG
250 TABLET ORAL DAILY
Qty: 30 TAB | Status: ON HOLD
Start: 2019-10-18 | End: 2019-11-08 | Stop reason: SDUPTHER

## 2019-10-18 RX ORDER — METOPROLOL TARTRATE 75 MG/1
75 TABLET, FILM COATED ORAL 3 TIMES DAILY
Qty: 60 TAB | Status: ON HOLD
Start: 2019-10-18 | End: 2019-11-08 | Stop reason: SDUPTHER

## 2019-10-18 RX ORDER — BACITRACIN ZINC 500 [USP'U]/G
OINTMENT TOPICAL 2 TIMES DAILY
Status: CANCELLED | OUTPATIENT
Start: 2019-10-18

## 2019-10-18 RX ORDER — AMOXICILLIN 250 MG
2 CAPSULE ORAL 2 TIMES DAILY
Status: DISCONTINUED | OUTPATIENT
Start: 2019-10-18 | End: 2019-11-05 | Stop reason: HOSPADM

## 2019-10-18 RX ORDER — POLYETHYLENE GLYCOL 3350 17 G/17G
1 POWDER, FOR SOLUTION ORAL
Status: DISCONTINUED | OUTPATIENT
Start: 2019-10-18 | End: 2019-11-05 | Stop reason: HOSPADM

## 2019-10-18 RX ORDER — ONDANSETRON 2 MG/ML
4 INJECTION INTRAMUSCULAR; INTRAVENOUS 4 TIMES DAILY PRN
Status: DISCONTINUED | OUTPATIENT
Start: 2019-10-18 | End: 2019-11-05 | Stop reason: HOSPADM

## 2019-10-18 RX ORDER — DIVALPROEX SODIUM 125 MG/1
250 CAPSULE, COATED PELLETS ORAL EVERY 8 HOURS
Status: CANCELLED | OUTPATIENT
Start: 2019-10-18

## 2019-10-18 RX ORDER — OXYCODONE HYDROCHLORIDE 5 MG/1
5 TABLET ORAL
Status: DISCONTINUED | OUTPATIENT
Start: 2019-10-18 | End: 2019-11-05 | Stop reason: HOSPADM

## 2019-10-18 RX ORDER — TERAZOSIN 2 MG/1
2 CAPSULE ORAL EVERY EVENING
Qty: 30 CAP | Refills: 0 | Status: ON HOLD
Start: 2019-10-18 | End: 2019-11-08

## 2019-10-18 RX ORDER — CHLORHEXIDINE GLUCONATE ORAL RINSE 1.2 MG/ML
15 SOLUTION DENTAL 2 TIMES DAILY
Status: DISCONTINUED | OUTPATIENT
Start: 2019-10-18 | End: 2019-11-05 | Stop reason: HOSPADM

## 2019-10-18 RX ORDER — BISACODYL 10 MG
10 SUPPOSITORY, RECTAL RECTAL
Status: DISCONTINUED | OUTPATIENT
Start: 2019-10-18 | End: 2019-11-05 | Stop reason: HOSPADM

## 2019-10-18 RX ORDER — DIGOXIN 125 MCG
250 TABLET ORAL DAILY
Status: DISCONTINUED | OUTPATIENT
Start: 2019-10-18 | End: 2019-11-05 | Stop reason: HOSPADM

## 2019-10-18 RX ORDER — RISPERIDONE 0.25 MG/1
0.5 TABLET ORAL 2 TIMES DAILY
Status: DISCONTINUED | OUTPATIENT
Start: 2019-10-18 | End: 2019-11-01

## 2019-10-18 RX ORDER — TERAZOSIN 2 MG/1
2 CAPSULE ORAL EVERY EVENING
Status: CANCELLED | OUTPATIENT
Start: 2019-10-18

## 2019-10-18 RX ORDER — TRAZODONE HYDROCHLORIDE 50 MG/1
50 TABLET ORAL
Status: DISCONTINUED | OUTPATIENT
Start: 2019-10-18 | End: 2019-11-05 | Stop reason: HOSPADM

## 2019-10-18 RX ORDER — CHLORHEXIDINE GLUCONATE ORAL RINSE 1.2 MG/ML
15 SOLUTION DENTAL 2 TIMES DAILY
Qty: 1 BOTTLE | Status: ON HOLD
Start: 2019-10-18 | End: 2019-11-08

## 2019-10-18 RX ORDER — OXYCODONE HYDROCHLORIDE 5 MG/1
5 TABLET ORAL
Status: DISCONTINUED | OUTPATIENT
Start: 2019-10-18 | End: 2019-10-18

## 2019-10-18 RX ORDER — DIGOXIN 250 MCG
250 TABLET ORAL DAILY
Status: CANCELLED | OUTPATIENT
Start: 2019-10-18

## 2019-10-18 RX ORDER — RISPERIDONE 0.5 MG/1
0.5 TABLET ORAL 2 TIMES DAILY
Status: CANCELLED | OUTPATIENT
Start: 2019-10-18

## 2019-10-18 RX ORDER — OXYCODONE HYDROCHLORIDE 5 MG/1
2.5 TABLET ORAL
Status: DISCONTINUED | OUTPATIENT
Start: 2019-10-18 | End: 2019-11-05 | Stop reason: HOSPADM

## 2019-10-18 RX ORDER — RISPERIDONE 0.5 MG/1
0.5 TABLET ORAL 2 TIMES DAILY
Qty: 30 TAB | Refills: 0 | Status: ON HOLD
Start: 2019-10-18 | End: 2019-11-08

## 2019-10-18 RX ORDER — GINSENG 100 MG
1 CAPSULE ORAL 2 TIMES DAILY
Status: DISCONTINUED | OUTPATIENT
Start: 2019-10-18 | End: 2019-11-05 | Stop reason: HOSPADM

## 2019-10-18 RX ORDER — ACETAMINOPHEN 325 MG/1
650 TABLET ORAL EVERY 4 HOURS PRN
Status: DISCONTINUED | OUTPATIENT
Start: 2019-10-18 | End: 2019-11-05 | Stop reason: HOSPADM

## 2019-10-18 RX ADMIN — POTASSIUM BICARBONATE 50 MEQ: 978 TABLET, EFFERVESCENT ORAL at 11:53

## 2019-10-18 RX ADMIN — DIGOXIN 250 MCG: 125 TABLET ORAL at 17:53

## 2019-10-18 RX ADMIN — POTASSIUM BICARBONATE 50 MEQ: 978 TABLET, EFFERVESCENT ORAL at 05:05

## 2019-10-18 RX ADMIN — DIVALPROEX SODIUM 250 MG: 125 CAPSULE, COATED PELLETS ORAL at 14:33

## 2019-10-18 RX ADMIN — BACITRACIN 1 EACH: 500 OINTMENT TOPICAL at 20:55

## 2019-10-18 RX ADMIN — RISPERIDONE 0.5 MG: 0.25 TABLET ORAL at 20:30

## 2019-10-18 RX ADMIN — METOPROLOL TARTRATE 75 MG: 25 TABLET ORAL at 20:31

## 2019-10-18 RX ADMIN — CHLORHEXIDINE GLUCONATE 0.12% ORAL RINSE 15 ML: 1.2 LIQUID ORAL at 20:41

## 2019-10-18 RX ADMIN — DIVALPROEX SODIUM 250 MG: 125 CAPSULE, COATED PELLETS ORAL at 05:04

## 2019-10-18 RX ADMIN — VALPROIC ACID 250 MG: 250 SOLUTION ORAL at 20:34

## 2019-10-18 RX ADMIN — METOPROLOL TARTRATE 75 MG: 25 TABLET, FILM COATED ORAL at 05:04

## 2019-10-18 RX ADMIN — APIXABAN 5 MG: 5 TABLET, FILM COATED ORAL at 05:04

## 2019-10-18 RX ADMIN — METOPROLOL TARTRATE 75 MG: 25 TABLET, FILM COATED ORAL at 11:53

## 2019-10-18 RX ADMIN — POTASSIUM BICARBONATE 50 MEQ: 978 TABLET, EFFERVESCENT ORAL at 20:39

## 2019-10-18 RX ADMIN — APIXABAN 5 MG: 5 TABLET, FILM COATED ORAL at 20:31

## 2019-10-18 RX ADMIN — RISPERIDONE 0.5 MG: 0.5 TABLET ORAL at 05:04

## 2019-10-18 RX ADMIN — SENNOSIDES AND DOCUSATE SODIUM 2 TABLET: 8.6; 5 TABLET ORAL at 20:28

## 2019-10-18 RX ADMIN — TRAZODONE HYDROCHLORIDE 50 MG: 50 TABLET ORAL at 20:59

## 2019-10-18 RX ADMIN — BACITRACIN ZINC: 500 OINTMENT TOPICAL at 05:04

## 2019-10-18 RX ADMIN — CHLORHEXIDINE GLUCONATE 0.12% ORAL RINSE 15 ML: 1.2 LIQUID ORAL at 05:03

## 2019-10-18 RX ADMIN — TERAZOSIN HYDROCHLORIDE 2 MG: 1 CAPSULE ORAL at 20:29

## 2019-10-18 ASSESSMENT — COPD QUESTIONNAIRES
DURING THE PAST 4 WEEKS HOW MUCH DID YOU FEEL SHORT OF BREATH: NONE/LITTLE OF THE TIME
COPD SCREENING SCORE: 2
DO YOU EVER COUGH UP ANY MUCUS OR PHLEGM?: NO/ONLY WITH OCCASIONAL COLDS OR INFECTIONS
HAVE YOU SMOKED AT LEAST 100 CIGARETTES IN YOUR ENTIRE LIFE: NO/DON'T KNOW

## 2019-10-18 ASSESSMENT — ENCOUNTER SYMPTOMS: ROS GI COMMENTS: 10/17 BM

## 2019-10-18 ASSESSMENT — LIFESTYLE VARIABLES
ON A TYPICAL DAY WHEN YOU DRINK ALCOHOL HOW MANY DRINKS DO YOU HAVE: 0
HOW MANY TIMES IN THE PAST YEAR HAVE YOU HAD 5 OR MORE DRINKS IN A DAY: 0
HAVE YOU EVER FELT YOU SHOULD CUT DOWN ON YOUR DRINKING: NO
ALCOHOL_USE: YES
EVER FELT BAD OR GUILTY ABOUT YOUR DRINKING: NO
EVER HAD A DRINK FIRST THING IN THE MORNING TO STEADY YOUR NERVES TO GET RID OF A HANGOVER: NO
TOTAL SCORE: 0
DOES PATIENT WANT TO STOP DRINKING: NO
TOTAL SCORE: 0
EVER_SMOKED: NEVER
HAVE PEOPLE ANNOYED YOU BY CRITICIZING YOUR DRINKING: NO
AVERAGE NUMBER OF DAYS PER WEEK YOU HAVE A DRINK CONTAINING ALCOHOL: 2
TOTAL SCORE: 0
CONSUMPTION TOTAL: NEGATIVE

## 2019-10-18 ASSESSMENT — PATIENT HEALTH QUESTIONNAIRE - PHQ9
1. LITTLE INTEREST OR PLEASURE IN DOING THINGS: NOT AT ALL
SUM OF ALL RESPONSES TO PHQ9 QUESTIONS 1 AND 2: 0
2. FEELING DOWN, DEPRESSED, IRRITABLE, OR HOPELESS: NOT AT ALL
SUM OF ALL RESPONSES TO PHQ9 QUESTIONS 1 AND 2: 0
1. LITTLE INTEREST OR PLEASURE IN DOING THINGS: NOT AT ALL
2. FEELING DOWN, DEPRESSED, IRRITABLE, OR HOPELESS: NOT AT ALL
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
## 2019-10-18 NOTE — PROGRESS NOTES
Dr. Lira gave verbal order to keep vale catheter in if patient is transferred out of unit. This is due to urine retention and previous fails of weaning patient from vale catheter.

## 2019-10-18 NOTE — CARE PLAN
Problem: Oxygenation:  Goal: Maintain adequate oxygenation dependent on patient condition  Outcome: PROGRESSING AS EXPECTED     Problem: Bronchopulmonary Hygiene:  Goal: Increase mobilization of retained secretions  Outcome: PROGRESSING AS EXPECTED     6.0 Shiley; Cuffed (Inflated)    Aerosol T-Piece 4l/28%     I will STOP taking the medications listed below when I get home from the hospital:  None

## 2019-10-18 NOTE — DISCHARGE INSTRUCTIONS
Discharge Instructions    Discharged to other by medical transportation with escort. Discharged via ambulance, hospital escort: Yes.  Special equipment needed:Oxygen- for Tracheostomy    Be sure to schedule a follow-up appointment with your primary care doctor or any specialists as instructed.     Discharge Plan:   Diet Plan: Discussed  Activity Level: Discussed  Confirmed Follow up Appointment: (patient going to Rehab)  Confirmed Symptoms Management: Discussed  Medication Reconciliation Updated: Yes  Influenza Vaccine Indication: Indicated: 65 years and older  Influenza Vaccine Given - only chart on this line when given: Influenza Vaccine Given (See MAR)    I understand that a diet low in cholesterol, fat, and sodium is recommended for good health. Unless I have been given specific instructions below for another diet, I accept this instruction as my diet prescription.   Other diet: Tube feeds; Diabetosource    Special Instructions: None    · Is patient discharged on Warfarin / Coumadin?   No     Depression / Suicide Risk    As you are discharged from this [facility] facility, it is important to learn how to keep safe from harming yourself.    Recognize the warning signs:  · Abrupt changes in personality, positive or negative- including increase in energy   · Giving away possessions  · Change in eating patterns- significant weight changes-  positive or negative  · Change in sleeping patterns- unable to sleep or sleeping all the time   · Unwillingness or inability to communicate  · Depression  · Unusual sadness, discouragement and loneliness  · Talk of wanting to die  · Neglect of personal appearance   · Rebelliousness- reckless behavior  · Withdrawal from people/activities they love  · Confusion- inability to concentrate     If you or a loved one observes any of these behaviors or has concerns about self-harm, here's what you can do:  · Talk about it- your feelings and reasons for harming yourself  · Remove any  means that you might use to hurt yourself (examples: pills, rope, extension cords, firearm)  · Get professional help from the community (Mental Health, Substance Abuse, psychological counseling)  · Do not be alone:Call your Safe Contact- someone whom you trust who will be there for you.  · Call your local CRISIS HOTLINE 279-5638 or 475-778-8444  · Call your local Children's Mobile Crisis Response Team [place] (016) 630-6016 or www[facility]gn[facility]x [facility]  · Call the toll free National Suicide Prevention Hotlines   · National Suicide Prevention Lifeline 287-349-BXDW (5156)  · National Hope Line Network 800-SUICIDE (771-1994)    Apixaban oral tablets  What is this medicine?  APIXABAN (a PIX a ban) is an anticoagulant (blood thinner). It is used to lower the chance of stroke in people with a medical condition called atrial fibrillation. It is also used to treat or prevent blood clots in the lungs or in the veins.  This medicine may be used for other purposes; ask your health care provider or pharmacist if you have questions.  COMMON BRAND NAME(S): Sashaquedi  What should I tell my health care provider before I take this medicine?  They need to know if you have any of these conditions:  -bleeding disorders  -bleeding in the brain  -blood in your stools (black or tarry stools) or if you have blood in your vomit  -history of stomach bleeding  -kidney disease  -liver disease  -mechanical heart valve  -an unusual or allergic reaction to apixaban, other medicines, foods, dyes, or preservatives  -pregnant or trying to get pregnant  -breast-feeding  How should I use this medicine?  Take this medicine by mouth with a glass of water. Follow the directions on the prescription label. You can take it with or without food. If it upsets your stomach, take it with food. Take your medicine at regular intervals. Do not take it more often than directed. Do not stop taking except on your doctor's advice. Stopping this medicine may increase  your risk of a blot clot. Be sure to refill your prescription before you run out of medicine.  Talk to your pediatrician regarding the use of this medicine in children. Special care may be needed.  Overdosage: If you think you have taken too much of this medicine contact a poison control center or emergency room at once.  NOTE: This medicine is only for you. Do not share this medicine with others.  What if I miss a dose?  If you miss a dose, take it as soon as you can. If it is almost time for your next dose, take only that dose. Do not take double or extra doses.  What may interact with this medicine?  This medicine may interact with the following:  -aspirin and aspirin-like medicines  -certain medicines for fungal infections like ketoconazole and itraconazole  -certain medicines for seizures like carbamazepine and phenytoin  -certain medicines that treat or prevent blood clots like warfarin, enoxaparin, and dalteparin  -clarithromycin  -NSAIDs, medicines for pain and inflammation, like ibuprofen or naproxen  -rifampin  -ritonavir  -Wilma's wort  This list may not describe all possible interactions. Give your health care provider a list of all the medicines, herbs, non-prescription drugs, or dietary supplements you use. Also tell them if you smoke, drink alcohol, or use illegal drugs. Some items may interact with your medicine.  What should I watch for while using this medicine?  Visit your doctor or health care professional for regular checks on your progress.  Notify your doctor or health care professional and seek emergency treatment if you develop breathing problems; changes in vision; chest pain; severe, sudden headache; pain, swelling, warmth in the leg; trouble speaking; sudden numbness or weakness of the face, arm or leg. These can be signs that your condition has gotten worse.  If you are going to have surgery or other procedure, tell your doctor that you are taking this medicine.  What side effects may  I notice from receiving this medicine?  Side effects that you should report to your doctor or health care professional as soon as possible:  -allergic reactions like skin rash, itching or hives, swelling of the face, lips, or tongue  -signs and symptoms of bleeding such as bloody or black, tarry stools; red or dark-brown urine; spitting up blood or brown material that looks like coffee grounds; red spots on the skin; unusual bruising or bleeding from the eye, gums, or nose  This list may not describe all possible side effects. Call your doctor for medical advice about side effects. You may report side effects to FDA at 1-685-FDA-5195.  Where should I keep my medicine?  Keep out of the reach of children.  Store at room temperature between 20 and 25 degrees C (68 and 77 degrees F). Throw away any unused medicine after the expiration date.  NOTE: This sheet is a summary. It may not cover all possible information. If you have questions about this medicine, talk to your doctor, pharmacist, or health care provider.  © 2018 Elsevier/Gold Standard (2017-07-10 11:54:23)    Suicidal Feelings: How to Help Yourself  Suicide is the taking of one's own life. If you feel as though life is getting too tough to handle and are thinking about suicide, get help right away. To get help:  · Call your local emergency services (911 in the [place]  · Call a suicide hotline to speak with a trained counselor who understands how you are feeling. The following is a list of suicide hotlines in the [place]. For a list of hotlines in [place], visit www.suicide.org/hotlines/international/pktsyo-eygptgc-fhpqfrpo.html.  ¨ 6-116-553-TALK (1-607.857.2985).  ¨ 9-269-ZVXHZRE (1-823.221.1396).  ¨ 1-785.428.6436. This is a hotline for [place] speakers.  ¨ 5-094-521-4TTY (1-627.878.5821). This is a hotline for TTY users.  ¨ 0-768-4-U-ADONIS (1-263.241.7879). This is a hotline for lesbian, pires, bisexual, transgender, or questioning youth.  · Contact a  crisis center or a local suicide prevention center. To find a crisis center or suicide prevention center:  ¨ Call your local hospital, clinic, community service organization, mental health center, social service provider, or health department. Ask for assistance in connecting to a crisis center.  ¨ Visit www.suicidepreventionlifeline.org/getinvolved/ for a list of crisis centers in the [place], or visit www.suicideprevention.ca/vyyaeriw-jcwbq-ovavznk/find-a-crisis-centre for a list of centers in [place].  · Visit the following websites:  ¨ National Suicide Prevention Lifeline: www.suicidepreventionlifeline.org  ¨ Hopeline: www.hopeline.Hunch  ¨ American Foundation for Suicide Prevention: www.afsp.org  ¨ The Ezra Project (for lesbian, pires, bisexual, transgender, or questioning youth): www.thetrevorproject.org  How can I help myself feel better?  · Promise yourself that you will not do anything drastic when you have suicidal feelings. Remember, there is hope. Many people have gotten through suicidal thoughts and feelings, and you will, too. You may have gotten through them before, and this proves that you can get through them again.  · Let family, friends, teachers, or counselors know how you are feeling. Try not to isolate yourself from those who care about you. Remember, they will want to help you. Talk with someone every day, even if you do not feel sociable. Face-to-face conversation is best.  · Call a mental health professional and see one regularly.  · Visit your primary health care provider every year.  · Eat a well-balanced diet, and space your meals so you eat regularly.  · Get plenty of rest.  · Avoid alcohol and drugs, and remove them from your home. They will only make you feel worse.  · If you are thinking of taking a lot of medicine, give your medicine to someone who can give it to you one day at a time. If you are on antidepressants and are concerned you will overdose, let your health care  provider know so he or she can give you safer medicines. Ask your mental health professional about the possible side effects of any medicines you are taking.  · Remove weapons, poisons, knives, and anything else that could harm you from your home.  · Try to stick to routines. Follow a schedule every day. Put self-care on your schedule.  · Make a list of realistic goals, and cross them off when you achieve them. Accomplishments give a sense of worth.  · Wait until you are feeling better before doing the things you find difficult or unpleasant.  · Exercise if you are able. You will feel better if you exercise for even a half hour each day.  · Go out in the sun or into nature. This will help you recover from depression faster. If you have a favorite place to walk, go there.  · Do the things that have always given you pleasure. Play your favorite music, read a good book, paint a picture, play your favorite instrument, or do anything else that takes your mind off your depression if it is safe to do.  · Keep your living space well lit.  · When you are feeling well, write yourself a letter about tips and support that you can read when you are not feeling well.  · Remember that life’s difficulties can be sorted out with help. Conditions can be treated. You can work on thoughts and strategies that serve you well.  This information is not intended to replace advice given to you by your health care provider. Make sure you discuss any questions you have with your health care provider.  Document Released: 06/23/2004 Document Revised: 08/16/2017 Document Reviewed: 04/14/2015  [employer] Interactive Patient Education © 2017 [employer]    Atrial Fibrillation  Introduction  Atrial fibrillation is a type of heartbeat that is irregular or fast (rapid). If you have this condition, your heart keeps quivering in a weird (chaotic) way. This condition can make it so your heart cannot pump blood normally. Having this condition gives a  person more risk for stroke, heart failure, and other heart problems. There are different types of atrial fibrillation. Talk with your doctor to learn about the type that you have.  Follow these instructions at home:  · Take over-the-counter and prescription medicines only as told by your doctor.  · If your doctor prescribed a blood-thinning medicine, take it exactly as told. Taking too much of it can cause bleeding. If you do not take enough of it, you will not have the protection that you need against stroke and other problems.  · Do not use any tobacco products. These include cigarettes, chewing tobacco, and e-cigarettes. If you need help quitting, ask your doctor.  · If you have apnea (obstructive sleep apnea), manage it as told by your doctor.  · Do not drink alcohol.  · Do not drink beverages that have caffeine. These include coffee, soda, and tea.  · Maintain a healthy weight. Do not use diet pills unless your doctor says they are safe for you. Diet pills may make heart problems worse.  · Follow diet instructions as told by your doctor.  · Exercise regularly as told by your doctor.  · Keep all follow-up visits as told by your doctor. This is important.  Contact a doctor if:  · You notice a change in the speed, rhythm, or strength of your heartbeat.  · You are taking a blood-thinning medicine and you notice more bruising.  · You get tired more easily when you move or exercise.  Get help right away if:  · You have pain in your chest or your belly (abdomen).  · You have sweating or weakness.  · You feel sick to your stomach (nauseous).  · You notice blood in your throw up (vomit), poop (stool), or pee (urine).  · You are short of breath.  · You suddenly have swollen feet and ankles.  · You feel dizzy.  · Your suddenly get weak or numb in your face, arms, or legs, especially if it happens on one side of your body.  · You have trouble talking, trouble understanding, or both.  · Your face or your eyelid droops on  one side.  These symptoms may be an emergency. Do not wait to see if the symptoms will go away. Get medical help right away. Call your local emergency services (911 in the [place] Do not drive yourself to the hospital.   This information is not intended to replace advice given to you by your health care provider. Make sure you discuss any questions you have with your health care provider.  Document Released: 09/26/2009 Document Revised: 05/25/2017 Document Reviewed: 04/13/2016  © 2017 Elsevier

## 2019-10-18 NOTE — CARE PLAN
Problem: Safety  Goal: Will remain free from injury  Note:   Room near nurses station, bed in low locked position, call light and personal belongings within reach.      Problem: Urinary Elimination:  Goal: Ability to reestablish a normal urinary elimination pattern will improve  Note:   Tay catheter in use for retention, daily assessment of rediness to remove

## 2019-10-18 NOTE — DISCHARGE PLANNING
Transitional Care Coordination.    Tc to pt's spouse, Beth who is in agreement w/ transfer to Rehab.   She is aware transport scheduled for 3pm.   She confirms there are NO stairs at their home and she is able to assist w/ pts' transitoin home..   +shower / tub combo w/ grab bars.   PCP Is Maame JALLOH.

## 2019-10-18 NOTE — DISCHARGE PLANNING
Transitional Care Coordination.  Pt is medically cleared for [facility] to [facility] as indicated by conversation with Dr Aguilar and Dr Mariscal.   Tc to Nilda/ NELLA advising her of acceptance and  time. .  Dr. Asher Molina is accepting MD.   RT from [facility] will accompany pt for transport / scheduled for 3pm today.

## 2019-10-18 NOTE — CARE PLAN
Problem: Communication  Goal: The ability to communicate needs accurately and effectively will improve  Outcome: PROGRESSING AS EXPECTED     Problem: Safety  Goal: Will remain free from injury  Outcome: PROGRESSING AS EXPECTED  Goal: Will remain free from falls  Outcome: PROGRESSING AS EXPECTED     Problem: Infection  Goal: Will remain free from infection  Outcome: PROGRESSING AS EXPECTED     Problem: Pain Management  Goal: Pain level will decrease to patient's comfort goal  Outcome: PROGRESSING AS EXPECTED     Problem: Skin Integrity  Goal: Risk for impaired skin integrity will decrease  Outcome: PROGRESSING AS EXPECTED     Problem: Psychosocial Needs:  Goal: Level of anxiety will decrease  Outcome: PROGRESSING AS EXPECTED     Problem: Knowledge Deficit  Goal: Knowledge of disease process/condition, treatment plan, diagnostic tests, and medications will improve  Outcome: PROGRESSING SLOWER THAN EXPECTED  Goal: Knowledge of the prescribed therapeutic regimen will improve  Outcome: PROGRESSING SLOWER THAN EXPECTED     Problem: Respiratory:  Goal: Respiratory status will improve  Outcome: PROGRESSING SLOWER THAN EXPECTED

## 2019-10-18 NOTE — DISCHARGE PLANNING
Anticipated Discharge Disposition: [facility]    Action: LSW was informed by NELLA Dowd  at [facility] that pt has been accepted and transport is scheduled for 1500. LSW called pt's spouse, Mar [phone] and informed her of transport. Mar agrees. LSW completed COBRA form and placed on pt's hard chart. Bedside RN aware.    Barriers to Discharge: none    Plan: Pt set to transfer to [facility] @ 1500.

## 2019-10-18 NOTE — THERAPY
"Physical Therapy Treatment completed.   Bed Mobility:  Supine to Sit: (chair pre/post)  Transfers: Sit to Stand: Contact Guard Assist  Gait: Level Of Assist: Minimal Assist with Front-Wheel Walker       Plan of Care: Will benefit from Physical Therapy 2 times per week  Discharge Recommendations: Equipment: Will Continue to Assess for Equipment Needs.    Pt quality of gait improving, remains limited by behaviors, needs constant cues for focus to task and safety. Continue to recommend placement, will follow.    See \"Rehab Therapy-Acute\" Patient Summary Report for complete documentation.       "
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
## 2019-10-18 NOTE — PROGRESS NOTES
Telephone report given to Melisa CONRAD at [facility]. Awaiting patient transport at 1500 to Rehab
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
## 2019-10-18 NOTE — THERAPY
Speech Language Therapy dysphagia treatment and speech treatment completed.     Functional Status: Patient was seen on this date for PMSV and dysphagia treatment. Patient appeared more distracted this session and more difficult to redirect. Query properly working hearing aids but pt noted to have emotionally salient responses when conversing with SO who was at bedside. Patient sitting out of bed in a chair and continues to be on 4 L and 28% FiO2 via T-piece. Vitals stable. Cuff deflated of 3 cc and tracheal suctioning performed with removal of min-moderate amount of thin clear secretions (improved from yesterday's session). Patient did cough up some phlegm that was barely blood tinged during one instance. Speaking valve placed in-line with T-piece which resulted initially in intermittent coughing and use of yankauer to clear secretions. Pt demonstrated difficulty problem solving and utilizing oral suction instead would reach for the trash can in attempt to expectorate phlegm. PO trials were administered and consisted of 2 small ice chips which resulted in immediate coughing concerning for aspiration. Patient trained on effortful swallow utilizing dry swallow which he performed with fair accuracy in 6/10 trials. Falsetto exercise was performed with fair accuracy in 8/10 opportunities. Removal of PMSV resulted in back pressure in 1/3 trials and coughing of secretions through trach. Patient tolerated speaking valve placement for ~30 minutes before reporting feeling tired and closing eyes. Extensive education provided to patient regarding current status, risk of aspiration, and SLP POC and recs.     Recommendations: Speaking valve to be placed for short intervals at a time (15-30 minutes or as tolerated) by trained RN/RT/SLP given DIRECT supervision. Continue NPO/cortrak. Pre-feeding trials with SLP only.    Plan of Care: Will benefit from Speech Therapy 5 times per week    Post-Acute Therapy: Recommend inpatient  "transitional care services for continued speech therapy services.      See \"Rehab Therapy-Acute\" Patient Summary Report for complete documentation.     "

## 2019-10-19 PROBLEM — D64.9 ANEMIA: Status: ACTIVE | Noted: 2019-10-19

## 2019-10-19 LAB
25(OH)D3 SERPL-MCNC: 42 NG/ML (ref 30–100)
ALBUMIN SERPL BCP-MCNC: 3 G/DL (ref 3.2–4.9)
ALBUMIN/GLOB SERPL: 1.2 G/DL
ALP SERPL-CCNC: 54 U/L (ref 30–99)
ALT SERPL-CCNC: 10 U/L (ref 2–50)
ANION GAP SERPL CALC-SCNC: 3 MMOL/L (ref 0–11.9)
AST SERPL-CCNC: 10 U/L (ref 12–45)
BASOPHILS # BLD AUTO: 0.5 % (ref 0–1.8)
BASOPHILS # BLD: 0.05 K/UL (ref 0–0.12)
BILIRUB SERPL-MCNC: 0.6 MG/DL (ref 0.1–1.5)
BUN SERPL-MCNC: 14 MG/DL (ref 8–22)
CALCIUM SERPL-MCNC: 8.4 MG/DL (ref 8.5–10.5)
CHLORIDE SERPL-SCNC: 105 MMOL/L (ref 96–112)
CHOLEST SERPL-MCNC: 134 MG/DL (ref 100–199)
CO2 SERPL-SCNC: 29 MMOL/L (ref 20–33)
CREAT SERPL-MCNC: 0.65 MG/DL (ref 0.5–1.4)
EOSINOPHIL # BLD AUTO: 0.59 K/UL (ref 0–0.51)
EOSINOPHIL NFR BLD: 6.4 % (ref 0–6.9)
ERYTHROCYTE [DISTWIDTH] IN BLOOD BY AUTOMATED COUNT: 53.4 FL (ref 35.9–50)
EST. AVERAGE GLUCOSE BLD GHB EST-MCNC: 88 MG/DL
GLOBULIN SER CALC-MCNC: 2.5 G/DL (ref 1.9–3.5)
GLUCOSE SERPL-MCNC: 101 MG/DL (ref 65–99)
HBA1C MFR BLD: 4.7 % (ref 0–5.6)
HCT VFR BLD AUTO: 32.2 % (ref 42–52)
HDLC SERPL-MCNC: 29 MG/DL
HGB BLD-MCNC: 9.6 G/DL (ref 14–18)
IMM GRANULOCYTES # BLD AUTO: 0.09 K/UL (ref 0–0.11)
IMM GRANULOCYTES NFR BLD AUTO: 1 % (ref 0–0.9)
LDLC SERPL CALC-MCNC: 89 MG/DL
LYMPHOCYTES # BLD AUTO: 0.95 K/UL (ref 1–4.8)
LYMPHOCYTES NFR BLD: 10.3 % (ref 22–41)
MCH RBC QN AUTO: 28.5 PG (ref 27–33)
MCHC RBC AUTO-ENTMCNC: 29.8 G/DL (ref 33.7–35.3)
MCV RBC AUTO: 95.5 FL (ref 81.4–97.8)
MONOCYTES # BLD AUTO: 0.66 K/UL (ref 0–0.85)
MONOCYTES NFR BLD AUTO: 7.2 % (ref 0–13.4)
NEUTROPHILS # BLD AUTO: 6.85 K/UL (ref 1.82–7.42)
NEUTROPHILS NFR BLD: 74.6 % (ref 44–72)
NRBC # BLD AUTO: 0 K/UL
NRBC BLD-RTO: 0 /100 WBC
PLATELET # BLD AUTO: 240 K/UL (ref 164–446)
PMV BLD AUTO: 9.3 FL (ref 9–12.9)
POTASSIUM SERPL-SCNC: 3.9 MMOL/L (ref 3.6–5.5)
PROT SERPL-MCNC: 5.5 G/DL (ref 6–8.2)
RBC # BLD AUTO: 3.37 M/UL (ref 4.7–6.1)
SODIUM SERPL-SCNC: 137 MMOL/L (ref 135–145)
T4 FREE SERPL-MCNC: 0.79 NG/DL (ref 0.53–1.43)
TRIGL SERPL-MCNC: 82 MG/DL (ref 0–149)
TSH SERPL DL<=0.005 MIU/L-ACNC: 12.19 UIU/ML (ref 0.38–5.33)
WBC # BLD AUTO: 9.2 K/UL (ref 4.8–10.8)

## 2019-10-19 PROCEDURE — 80053 COMPREHEN METABOLIC PANEL: CPT

## 2019-10-19 PROCEDURE — 700101 HCHG RX REV CODE 250: Performed by: PHYSICAL MEDICINE & REHABILITATION

## 2019-10-19 PROCEDURE — 80061 LIPID PANEL: CPT

## 2019-10-19 PROCEDURE — 97530 THERAPEUTIC ACTIVITIES: CPT

## 2019-10-19 PROCEDURE — 700102 HCHG RX REV CODE 250 W/ 637 OVERRIDE(OP): Performed by: PHYSICAL MEDICINE & REHABILITATION

## 2019-10-19 PROCEDURE — 97535 SELF CARE MNGMENT TRAINING: CPT

## 2019-10-19 PROCEDURE — A9270 NON-COVERED ITEM OR SERVICE: HCPCS | Performed by: PHYSICAL MEDICINE & REHABILITATION

## 2019-10-19 PROCEDURE — 84439 ASSAY OF FREE THYROXINE: CPT

## 2019-10-19 PROCEDURE — 97163 PT EVAL HIGH COMPLEX 45 MIN: CPT

## 2019-10-19 PROCEDURE — 85025 COMPLETE CBC W/AUTO DIFF WBC: CPT

## 2019-10-19 PROCEDURE — 83036 HEMOGLOBIN GLYCOSYLATED A1C: CPT

## 2019-10-19 PROCEDURE — 92523 SPEECH SOUND LANG COMPREHEN: CPT

## 2019-10-19 PROCEDURE — 92610 EVALUATE SWALLOWING FUNCTION: CPT

## 2019-10-19 PROCEDURE — 97166 OT EVAL MOD COMPLEX 45 MIN: CPT

## 2019-10-19 PROCEDURE — 36415 COLL VENOUS BLD VENIPUNCTURE: CPT

## 2019-10-19 PROCEDURE — 84443 ASSAY THYROID STIM HORMONE: CPT

## 2019-10-19 PROCEDURE — A9270 NON-COVERED ITEM OR SERVICE: HCPCS

## 2019-10-19 PROCEDURE — 700102 HCHG RX REV CODE 250 W/ 637 OVERRIDE(OP)

## 2019-10-19 PROCEDURE — 82306 VITAMIN D 25 HYDROXY: CPT

## 2019-10-19 PROCEDURE — 770010 HCHG ROOM/CARE - REHAB SEMI PRIVAT*

## 2019-10-19 PROCEDURE — 94640 AIRWAY INHALATION TREATMENT: CPT

## 2019-10-19 PROCEDURE — 99223 1ST HOSP IP/OBS HIGH 75: CPT | Mod: AI | Performed by: HOSPITALIST

## 2019-10-19 PROCEDURE — 94760 N-INVAS EAR/PLS OXIMETRY 1: CPT

## 2019-10-19 PROCEDURE — 99223 1ST HOSP IP/OBS HIGH 75: CPT | Mod: AI | Performed by: PHYSICAL MEDICINE & REHABILITATION

## 2019-10-19 RX ORDER — GINSENG 100 MG
CAPSULE ORAL
Status: COMPLETED
Start: 2019-10-19 | End: 2019-10-19

## 2019-10-19 RX ORDER — HYDROXYZINE HYDROCHLORIDE 25 MG/1
25 TABLET, FILM COATED ORAL 3 TIMES DAILY PRN
Status: DISCONTINUED | OUTPATIENT
Start: 2019-10-19 | End: 2019-10-23

## 2019-10-19 RX ADMIN — BACITRACIN 1 EACH: 500 OINTMENT TOPICAL at 09:15

## 2019-10-19 RX ADMIN — SENNOSIDES AND DOCUSATE SODIUM 2 TABLET: 8.6; 5 TABLET ORAL at 08:53

## 2019-10-19 RX ADMIN — CHLORHEXIDINE GLUCONATE 0.12% ORAL RINSE 15 ML: 1.2 LIQUID ORAL at 20:41

## 2019-10-19 RX ADMIN — BACITRACIN 1 EACH: 500 OINTMENT TOPICAL at 20:46

## 2019-10-19 RX ADMIN — BACITRACIN 1 EACH: 500 OINTMENT TOPICAL at 09:00

## 2019-10-19 RX ADMIN — VALPROIC ACID 250 MG: 250 SOLUTION ORAL at 20:33

## 2019-10-19 RX ADMIN — DIGOXIN 250 MCG: 125 TABLET ORAL at 17:40

## 2019-10-19 RX ADMIN — VALPROIC ACID 250 MG: 250 SOLUTION ORAL at 05:28

## 2019-10-19 RX ADMIN — VALPROIC ACID 250 MG: 250 SOLUTION ORAL at 14:32

## 2019-10-19 RX ADMIN — METOPROLOL TARTRATE 75 MG: 25 TABLET ORAL at 14:32

## 2019-10-19 RX ADMIN — RISPERIDONE 0.5 MG: 0.25 TABLET ORAL at 20:37

## 2019-10-19 RX ADMIN — METOPROLOL TARTRATE 75 MG: 25 TABLET ORAL at 08:52

## 2019-10-19 RX ADMIN — APIXABAN 5 MG: 5 TABLET, FILM COATED ORAL at 20:36

## 2019-10-19 RX ADMIN — APIXABAN 5 MG: 5 TABLET, FILM COATED ORAL at 08:52

## 2019-10-19 RX ADMIN — POTASSIUM BICARBONATE 50 MEQ: 978 TABLET, EFFERVESCENT ORAL at 08:50

## 2019-10-19 RX ADMIN — TRAZODONE HYDROCHLORIDE 50 MG: 50 TABLET ORAL at 20:37

## 2019-10-19 RX ADMIN — TERAZOSIN HYDROCHLORIDE 2 MG: 1 CAPSULE ORAL at 20:34

## 2019-10-19 RX ADMIN — HYDROXYZINE HYDROCHLORIDE 25 MG: 25 TABLET, FILM COATED ORAL at 12:13

## 2019-10-19 RX ADMIN — RISPERIDONE 0.5 MG: 0.25 TABLET ORAL at 08:50

## 2019-10-19 RX ADMIN — CHLORHEXIDINE GLUCONATE 0.12% ORAL RINSE 15 ML: 1.2 LIQUID ORAL at 08:50

## 2019-10-19 RX ADMIN — METOPROLOL TARTRATE 75 MG: 25 TABLET ORAL at 20:37

## 2019-10-19 RX ADMIN — OMEPRAZOLE 40 MG: KIT at 08:54

## 2019-10-19 RX ADMIN — SENNOSIDES AND DOCUSATE SODIUM 2 TABLET: 8.6; 5 TABLET ORAL at 20:40

## 2019-10-19 ASSESSMENT — CHA2DS2 SCORE
VASCULAR DISEASE: NO
CHF OR LEFT VENTRICULAR DYSFUNCTION: YES
PRIOR STROKE OR TIA OR THROMBOEMBOLISM: NO
CHA2DS2 VASC SCORE: 4
AGE 75 OR GREATER: YES
SEX: MALE
DIABETES: NO
HYPERTENSION: YES
AGE 65 TO 74: NO

## 2019-10-19 ASSESSMENT — BRIEF INTERVIEW FOR MENTAL STATUS (BIMS)
GENERAL MEMORY AND RECALL ABILITY: RECOGNIZES APPROPRIATE HEALTHCARE SETTING;CURRENT SEASON
GENERAL MEMORY AND RECALL ABILITY: RECOGNIZES APPROPRIATE HEALTHCARE SETTING

## 2019-10-19 ASSESSMENT — ACTIVITIES OF DAILY LIVING (ADL): TOILETING: INDEPENDENT
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
## 2019-10-19 NOTE — FLOWSHEET NOTE
10/18/19 1610   Events/Summary/Plan   Events/Summary/Plan Heated Aerosol   Education   Education Yes - Pt. / Family has been Instructed in use of Respiratory Equipment   Aerosol Therapy / Airway Management   #Aerosol Therapy / Airway Management T-Piece   Aerosol Humidity Temp (celsius) 36.0   Date Circuit Last Changed 10/18/19   Date Circuit Change Due (Q 7 Days) 10/25/19   Respiratory WDL   Respiratory (WDL) X   Chest Exam   Work Of Breathing / Effort Mild   Respiration 16   Pulse 93   Breath Sounds   Pre/Post Intervention Pre Intervention Assessment   RUL Breath Sounds Clear   RML Breath Sounds Clear   RLL Breath Sounds Rhonchi   DARLENE Breath Sounds Clear   LLL Breath Sounds Rhonchi   Secretions   Cough Productive   Sputum Amount Moderate   Sputum Color White   Sputum Consistency Thick   Oximetry   #Pulse Oximetry (Single Determination) Yes   Oxygen   Home O2 Use Prior To Admission? No   Pulse Oximetry 98 %   FiO2% 28 %   O2 Daily Delivery Respiratory  T-Piece   Room Air Challenge Pass  (Room air SpO2=95%)
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
## 2019-10-19 NOTE — ASSESSMENT & PLAN NOTE
Echo EF 45%  S/P RVR at [facility]  Repeat Digoxin drug level non-toxic  Anticoagulated on Eliquis (but currently on hold for PEG)

## 2019-10-19 NOTE — FLOWSHEET NOTE
10/19/19 1430   Events/Summary/Plan   Events/Summary/Plan TC/02 check   Non-Invasive Resp Device Site Inspection Completed Intact   Education   Education Yes - Pt. / Family has been Instructed in use of Respiratory Equipment   Aerosol Therapy / Airway Management   #Aerosol Therapy / Airway Management Trache Collar   Aerosol Humidity Temp (celsius) 30.0   Date Circuit Last Changed 10/18/19   Date Circuit Change Due (Q 7 Days) 10/25/19   Chest Exam   Respiration 18   Pulse (!) 104   Oxygen   Home O2 Use Prior To Admission? No   Pulse Oximetry 95 %   FiO2% 30 %   O2 Daily Delivery Respiratory  Trache Collar

## 2019-10-19 NOTE — FLOWSHEET NOTE
Trach care completed. Site clean, no drainage around trach. Dressing  and trach ties wet due to shower, changed.  PMV trial with speech at bedside.  Upper airway stridor and secretions noted with ascultation of neck. Audible with with multiple PMV trials,  increase in HR, valve removed- back pressure noted at removal of valve.  Discussed with speech and Dr. Hein at bedside.     10/19/19 0830   Events/Summary/Plan   Events/Summary/Plan TC/02 check   Non-Invasive Resp Device Site Inspection Completed Intact   Education   Education Yes - Pt. / Family has been Instructed in use of Respiratory Equipment   Aerosol Therapy / Airway Management   #Aerosol Therapy / Airway Management Trache Collar   Aerosol Humidity Temp (celsius) 31.0  (31.0)   Date Circuit Last Changed 10/18/19   Date Circuit Change Due (Q 7 Days) 10/25/19   Trache Weaning and Decannulation   Valve Trial Initiated, Smart Text Complete? Yes   Hours Tolerated   (5 minute supervised trials)   Trache/Stoma Care   Trache/Stoma Care Yes   Chest Exam   Respiration 18   Pulse (!) 103   Breath Sounds   RUL Breath Sounds Clear   RML Breath Sounds Clear   RLL Breath Sounds Diminished   DARLENE Breath Sounds Clear   LLL Breath Sounds Diminished   Secretions   Cough Productive   Sputum Amount Large   Sputum Color White;Yellow   Sputum Consistency Thick   Airway Trach Tracheostomy 6.0   Placement Date/Time: 09/14/19 1250   Airway Type: Trach  Brand: Yoanna  Style: Cuffed  Airway Location: Tracheostomy  Airway Size: 6.0  Inserted In: Unit  Inserted by: Respiratory care practitioner   Site Assessment Clean;Dry   Airway Tube Secured Velcro attachment   Date Securing Device changed? 10/19/19   Date Securing Device to be changed (Q 7 days) 10/26/19   Cuff Pressure cmH2O (R.C.)   (deflatted/ 0 pressure)   Tracheostomy/Stoma Care Dressing Change;Inner Cannula Changed   Suctioning Device   (single use sx catheter)   Extra Tracheostomy Tube at Bedside Yes   Oxygen   Home O2 Use  Prior To Admission? No   Pulse Oximetry 96 %   O2 (LPM) 0   FiO2% 31 %   O2 Daily Delivery Respiratory  Trache Collar   Room Air Challenge Pass  (pass/days 95%RA.)   OTHER   Resuscitation Bag Resuscitation Bag and Mask at Bedside and Checked

## 2019-10-19 NOTE — CARE PLAN
Problem: Communication  Goal: The ability to communicate needs accurately and effectively will improve  Outcome: PROGRESSING AS EXPECTED  Intervention: Educate patient and significant other/support system about the plan of care, procedures, treatments, medications and allow for questions  Note:   Patient admitted to facility at 1610 via wc; accompanied by hospital transport.  Patient assisted to room and positioned in bed for comfort and safety; call light within reach.  Patient assisted with stowing belongings and oriented to room and facility.  Admission assessment performed and documented in computer.  Admission paperwork completed; signed copies placed in chart.  Will continue to monitor.       Problem: Respiratory:  Goal: Respiratory status will improve  Outcome: PROGRESSING AS EXPECTED  Note:   Respiratory at bedside to set up pts trach care, pt does not need oxygen and only has the humidification. Will continue to monitor     Problem: Skin Integrity  Goal: Risk for impaired skin integrity will decrease  Outcome: PROGRESSING AS EXPECTED  Intervention: Implement precautions to protect skin integrity in collaboration with the interdisciplinary team  Note:   2 RN skin check completed, wounds documented and photos uploaded.

## 2019-10-19 NOTE — PROGRESS NOTES
Suctioned x1 obtained Moderate amount of secretions, then patient was able to cough and clear large amount of secretions.
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
## 2019-10-19 NOTE — FLOWSHEET NOTE
10/18/19 1610   Type of Assessment   Assessment Yes   Patient History   Pulmonary Diagnosis None   Surgical Procedures Jaw reconstruction   Home O2 No   Home Treatments/Frequency No   COPD Risk Screening   Do you have a history of COPD? No   COPD Population Screener   During the past 4 weeks, how much did you feel short of breath? 0   Do you ever cough up any mucus or phlegm? 0   In the past 12 months, you do less than you used to because of your breathing problems 0   Have you smoked at least 100 cigarettes in your entire life? 0   How old are you? 2   COPD Screening Score 2   Smoking History   Have you ever smoked Never   Level Of Consciousness   Level of Consciousness Alert   Respiratory WDL   Respiratory (WDL) X   Chest Exam   Work Of Breathing / Effort Mild   Respiration 16   Pulse 93   Breath Sounds   Pre/Post Intervention Pre Intervention Assessment   Secretions   Cough Productive   How Sputum Obtained Spontaneous   Sputum Amount Moderate   Sputum Color White   Sputum Consistency Thick   Oximetry   #Pulse Oximetry (Single Determination) Yes   Oxygen   Home O2 Use Prior To Admission? No   Pulse Oximetry 98 %   FiO2% 28 %   O2 Daily Delivery Respiratory  T-Piece   Room Air Challenge Pass  (Room air SpO2=95%)
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
## 2019-10-19 NOTE — PROGRESS NOTES
"Patient is pulling at his [place], he is pulling at me and laughing.  He is constantly clapping his hands and writes \"Bring me bread Now\"  "
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
## 2019-10-19 NOTE — FLOWSHEET NOTE
10/18/19 1700   Airway Trach Tracheostomy 6.0   Placement Date/Time: 09/14/19 1250   Airway Type: Trach  Brand: Yoanna  Style: Cuffed  Airway Location: Tracheostomy  Airway Size: 6.0  Inserted In: Unit  Inserted by: Respiratory care practitioner   Site Assessment Clean;Dry   Airway Tube Secured Velcro attachment   Date Securing Device changed? 10/12/19   Date Securing Device to be changed (Q 7 days) 10/19/19   Cuff Pressure cmH2O (R.C.)   (Deflatted)   Tracheostomy/Cannula Changed (Date) 10/18/19   Next Tracheostomy/Cannula Change Due (Date) 10/19/19   Extra Tracheostomy Tube at Bedside Yes

## 2019-10-19 NOTE — CARE PLAN
Problem: Communication  Goal: The ability to communicate needs accurately and effectively will improve  Note:   Talked with Dr Queen, patient is very restless and agitated at lunchtime. Dr Queen ordered atarax prn. No noticeable change in patient after administration.      Problem: Safety  Goal: Will remain free from injury  Intervention: Educate patient and significant other/support system about adaptive mobility strategies and safe transfers  Note:   Patient is very impulsive, alarms are set on bed and w/c. Patient near nurses station, frequently rounded on to make sure needs are being met. Call light close by, bed in low position, non-skid socks on.

## 2019-10-19 NOTE — PROGRESS NOTES
Patient care assumed. Report received from [facility] SHOLA Irizarry. Patient is alert and calm, resting in bed. Call light and bedside table within reach. Will continue to monitor.
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
## 2019-10-20 PROCEDURE — 700102 HCHG RX REV CODE 250 W/ 637 OVERRIDE(OP)

## 2019-10-20 PROCEDURE — 94760 N-INVAS EAR/PLS OXIMETRY 1: CPT

## 2019-10-20 PROCEDURE — A9270 NON-COVERED ITEM OR SERVICE: HCPCS | Performed by: PHYSICAL MEDICINE & REHABILITATION

## 2019-10-20 PROCEDURE — 94640 AIRWAY INHALATION TREATMENT: CPT

## 2019-10-20 PROCEDURE — 700102 HCHG RX REV CODE 250 W/ 637 OVERRIDE(OP): Performed by: PHYSICAL MEDICINE & REHABILITATION

## 2019-10-20 PROCEDURE — 770010 HCHG ROOM/CARE - REHAB SEMI PRIVAT*

## 2019-10-20 PROCEDURE — 99233 SBSQ HOSP IP/OBS HIGH 50: CPT | Performed by: HOSPITALIST

## 2019-10-20 PROCEDURE — 700101 HCHG RX REV CODE 250: Performed by: PHYSICAL MEDICINE & REHABILITATION

## 2019-10-20 PROCEDURE — 99232 SBSQ HOSP IP/OBS MODERATE 35: CPT | Performed by: PHYSICAL MEDICINE & REHABILITATION

## 2019-10-20 PROCEDURE — A9270 NON-COVERED ITEM OR SERVICE: HCPCS

## 2019-10-20 RX ORDER — GINSENG 100 MG
CAPSULE ORAL
Status: COMPLETED
Start: 2019-10-20 | End: 2019-10-20

## 2019-10-20 RX ADMIN — METOPROLOL TARTRATE 75 MG: 25 TABLET ORAL at 13:41

## 2019-10-20 RX ADMIN — METOPROLOL TARTRATE 75 MG: 25 TABLET ORAL at 20:03

## 2019-10-20 RX ADMIN — METOPROLOL TARTRATE 75 MG: 25 TABLET ORAL at 08:53

## 2019-10-20 RX ADMIN — HYDROXYZINE HYDROCHLORIDE 25 MG: 25 TABLET, FILM COATED ORAL at 13:38

## 2019-10-20 RX ADMIN — APIXABAN 5 MG: 5 TABLET, FILM COATED ORAL at 19:58

## 2019-10-20 RX ADMIN — TRAZODONE HYDROCHLORIDE 50 MG: 50 TABLET ORAL at 20:03

## 2019-10-20 RX ADMIN — CHLORHEXIDINE GLUCONATE 0.12% ORAL RINSE 15 ML: 1.2 LIQUID ORAL at 20:13

## 2019-10-20 RX ADMIN — VALPROIC ACID 250 MG: 250 SOLUTION ORAL at 22:12

## 2019-10-20 RX ADMIN — BACITRACIN 1 EACH: 500 OINTMENT TOPICAL at 09:33

## 2019-10-20 RX ADMIN — DIGOXIN 250 MCG: 125 TABLET ORAL at 17:52

## 2019-10-20 RX ADMIN — RISPERIDONE 0.5 MG: 0.25 TABLET ORAL at 08:53

## 2019-10-20 RX ADMIN — BACITRACIN 1 EACH: 500 OINTMENT TOPICAL at 20:05

## 2019-10-20 RX ADMIN — RISPERIDONE 0.5 MG: 0.25 TABLET ORAL at 19:58

## 2019-10-20 RX ADMIN — OMEPRAZOLE 40 MG: KIT at 08:53

## 2019-10-20 RX ADMIN — CHLORHEXIDINE GLUCONATE 0.12% ORAL RINSE 15 ML: 1.2 LIQUID ORAL at 08:53

## 2019-10-20 RX ADMIN — SENNOSIDES AND DOCUSATE SODIUM 2 TABLET: 8.6; 5 TABLET ORAL at 08:53

## 2019-10-20 RX ADMIN — OXYCODONE HYDROCHLORIDE 5 MG: 5 TABLET ORAL at 10:54

## 2019-10-20 RX ADMIN — VALPROIC ACID 250 MG: 250 SOLUTION ORAL at 05:01

## 2019-10-20 RX ADMIN — APIXABAN 5 MG: 5 TABLET, FILM COATED ORAL at 08:53

## 2019-10-20 RX ADMIN — TERAZOSIN HYDROCHLORIDE 2 MG: 1 CAPSULE ORAL at 19:58

## 2019-10-20 RX ADMIN — VALPROIC ACID 250 MG: 250 SOLUTION ORAL at 13:38

## 2019-10-20 RX ADMIN — SENNOSIDES AND DOCUSATE SODIUM 2 TABLET: 8.6; 5 TABLET ORAL at 20:06

## 2019-10-20 ASSESSMENT — ENCOUNTER SYMPTOMS
COUGH: 0
VOMITING: 0
FEVER: 0
ABDOMINAL PAIN: 0
CHILLS: 0
SHORTNESS OF BREATH: 0
NAUSEA: 0
EYES NEGATIVE: 1
PALPITATIONS: 0
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
## 2019-10-20 NOTE — PROGRESS NOTES
Patient disconnected feeding to and ambulated to chair in his room without using call light. Patient agitated, requesting this writer pull out core track. This writer educated patient to its use and the need for him to gain strength and pass swallow evaluations in order for tube to be removed. Patient shows a thumbs up when this writer asks him if he understands.

## 2019-10-20 NOTE — PROGRESS NOTES
Received patient during shift change, report rec'd from day shift RN. Resting in bed, VS stable w/O2 via trach mask. Per report continent of Bowel, vale cath in place. Contact guard assist for transfers. Alert, unable to speak, writes down questions, able to make needs known. Bed in low position, call light within reach.

## 2019-10-20 NOTE — FLOWSHEET NOTE
10/20/19 0749   Events/Summary/Plan   Events/Summary/Plan Aerosol check   Education   Education   (pt still sleeping)   Aerosol Therapy / Airway Management   #Aerosol Therapy / Airway Management Trache Collar  (on standby)   Aerosol Humidity Temp (celsius) 31   Date Circuit Last Changed 10/18/19   Date Circuit Change Due (Q 7 Days) 10/25/19   Respiratory WDL   Respiratory (WDL) X   Chest Exam   Respiration 18   Pulse 87   Secretions   Cough Productive   How Sputum Obtained Suction   Sputum Amount Small   Sputum Color Tan   Sputum Consistency Thick   Suction Frequency Suctioned Once or Twice Per Encounter   Airway Trach Tracheostomy 6.0   Placement Date/Time: 09/14/19 1250   Airway Type: Trach  Brand: [employer]  Style: Cuffed  Airway Location: Tracheostomy  Airway Size: 6.0  Inserted In: Unit  Inserted by: Respiratory care practitioner   Site Assessment Crusty   Airway Tube Secured Velcro attachment   Cuff Pressure cmH2O (R.C.)   (down)   Extra Tracheostomy Tube at Bedside Yes   Oximetry   #Pulse Oximetry (Single Determination) Yes   Oxygen   Home O2 Use Prior To Admission? No   Pulse Oximetry 95 %   O2 Daily Delivery Respiratory  Room Air with O2 Available   OTHER   Resuscitation Bag Resuscitation Bag and Mask at Bedside and Checked

## 2019-10-20 NOTE — FLOWSHEET NOTE
10/20/19 1615   Events/Summary/Plan   Events/Summary/Plan aerosol check.  Pt does not have it on and he refuses HME.   RN did trach care and pt pulled gauze out.  Monitoring pt closely   Respiratory WDL   Respiratory (WDL) X   Secretions   Cough Congested;Productive   Airway Trach Tracheostomy 6.0   Placement Date/Time: 09/14/19 1250   Airway Type: Trach  Brand: [employer]  Style: Cuffed  Airway Location: Tracheostomy  Airway Size: 6.0  Inserted In: Unit  Inserted by: Respiratory care practitioner   Extra Tracheostomy Tube at Bedside   (obturator taped to wall at [facility])   Oxygen   O2 Daily Delivery Respiratory  Room Air with O2 Available

## 2019-10-20 NOTE — FLOWSHEET NOTE
10/20/19 0818   Type of Assessment   Reassessment Yes  (Protocol correction)   Protocol Pathways   Protocol Pathways Yes   O2 Protocol   O2 Protocol Indications Room Air SpO2 Less Than 90%   PRN oxygen initiated for: SpO2 Greater Than 92% with Documented Desaturation During Activity of Dyspnea   O2 Protocol Goals/Outcome SpO2 greater than 90% with exertion

## 2019-10-20 NOTE — THERAPY
"Speech Language Pathology  Daily Treatment     Patient Name: Pedro Champion  Age:  79 y.o., Sex:  male  Medical Record #: [id number]  Today's Date: 10/20/2019     Subjective  Pt wanting to watch football so required VC to attend to therapy tasks and understand importance of tolerating PMSV or occlusion in effort to get eating.      Objective  Oral care completed at start of session. Pt c/o \"wound scab\" (written down) at back of mouth, trying to suction it off with Yankauer.  Advised pt that it is stitches that still need to heal,no scab present. Pt able to clear copious thick secretions from trach with cues to cough hard. With trach occluded (cuff already deflated when SLP arrived) pt able to move some secretions to mouth and suction out. Max hand-over-hand for pt to occlude himself to attempt cough and to attempt voicing. Min-no voicing with occlusion. Pt with audible voicing functional at 2-3 word level though he prefers to write. Pt c/o dry mouth and perseverating on need to use Yankauer to suction incessantly. Advised this will dry mouth more and encouraged to manage secretions with coughing and expelling as much as he is able to.  Provided education regarding trach and swallow/voice function and ST POC. Pt nodded understanding but participation in therapy did not improve significantly.      10/20/19 1031   Voice   Voice X   Interdisciplinary Plan of Care Collaboration   Patient Position at End of Therapy In Bed;Bed Alarm On;Call Light within Reach;Tray Table within Reach   SLP Total Time Spent   SLP Individual Total Time Spent (Mins) 60   Charge Group   Charges Yes       FIM Eating Score:  1 - Total Assistance  Eating Description:       FIM Comprehension Score:  5 - Stand-by Prompting/Supervision or Set-up  Comprehension Description:  Verbal cues, Glasses    FIM Expression Score:  1 - Total Assistance  Expression Description:       FIM Social Interaction Score:  4 - Minimal Assistance  Social Interaction " Description:  Verbal cues, Increased time    FIM Problem Solving Score:  4 - Minimal Assistance  Problem Solving Description:       FIM Memory Score:  4 - Minimal Assistance  Memory Description:         Assessment  See notes above.  Not able to tolerate occlusion; significant back pressure.    Plan    Continue to target occlusion for tolerating PMSV and for in effort to start PO trials.

## 2019-10-20 NOTE — PROGRESS NOTES
Patient disconnects feeding tube, tube found on floor with feeding on floor. Patient shaking head no when this writer attempts to reattach.

## 2019-10-20 NOTE — CARE PLAN
Problem: Safety  Goal: Will remain free from injury  Note:   Patient educated on importance of utilizing call light to minimize the potential of injury from falls. Patient verbalizes understanding. Patient found standing next to bed this am - patient reminded throughout the day to utilize call light.       Problem: Skin Integrity  Goal: Risk for impaired skin integrity will decrease  Note:   Patient educated to change positions to minimize the potential of skin break down and possible further complications of open wounds due to pressure, when sitting or laying for periods over half of an hour. Patient engaged in education and verbalizes understanding.
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
## 2019-10-20 NOTE — CARE PLAN
Problem: Bowel/Gastric:  Goal: Normal bowel function is maintained or improved  Intervention: Educate patient and significant other/support system about diet, fluid intake, medications and activity to promote bowel function  Note:   Scheduled bowel meds administered at hs. TF in place via coretrack. HOB elevated. No s/s of distress.     Problem: Respiratory:  Goal: Respiratory status will improve  Intervention: Administer and titrate oxygen therapy  Note:   O2 via trach mask, VS stable. Tolerating well.

## 2019-10-20 NOTE — FLOWSHEET NOTE
10/19/19 1730   Events/Summary/Plan   Events/Summary/Plan HMTC/Trach care/02check   Non-Invasive Resp Device Site Inspection Completed Intact   Education   Education Yes - Pt. / Family has been Instructed in use of Respiratory Equipment   Aerosol Therapy / Airway Management   #Aerosol Therapy / Airway Management Trache Collar   Aerosol Humidity Temp (celsius) 31.0   Date Circuit Last Changed 10/18/19   Date Circuit Change Due (Q 7 Days) 10/25/19   Trache/Stoma Care   Trache/Stoma Care Yes  (IC changed,clean trach dressing applied)   Chest Exam   Respiration 18   Pulse (!) 102   Secretions   Cough Productive;Strong   How Sputum Obtained Spontaneous   Sputum Amount Copious   Sputum Color White;Yellow   Sputum Consistency Thick   Suction Frequency   (once in AM. very strong cough-clears secretions)   Airway Trach Tracheostomy 6.0   Placement Date/Time: 09/14/19 1250   Airway Type: Trach  Brand: Yoanna  Style: Cuffed  Airway Location: Tracheostomy  Airway Size: 6.0  Inserted In: Unit  Inserted by: Respiratory care practitioner   Site Assessment Clean;Dry;Intact;No bleeding;No drainage   Airway Tube Secured Velcro attachment   Date Securing Device changed? 10/19/19   Date Securing Device to be changed (Q 7 days) 10/26/19   Cuff Pressure cmH2O (R.C.)   (cuff down)   Tracheostomy/Stoma Care Dressing Change   Extra Tracheostomy Tube at Bedside Yes   Oximetry   #Pulse Oximetry (Single Determination) Yes   Oxygen   Home O2 Use Prior To Admission? No   Pulse Oximetry 94 %   FiO2% 31 %   O2 Daily Delivery Respiratory  Trache Collar   OTHER   Resuscitation Bag Resuscitation Bag and Mask at Bedside and Checked

## 2019-10-20 NOTE — FLOWSHEET NOTE
10/20/19 1232   Events/Summary/Plan   Events/Summary/Plan aerosol check, 02 check   Education   Education   (difficult due to severe [place] and agitation)   Aerosol Therapy / Airway Management   #Aerosol Therapy / Airway Management Trache Collar  (was on then pt pulled it off and refused HME)   Aerosol Humidity Temp (celsius) 31   Respiratory WDL   Respiratory (WDL) X   Chest Exam   Respiration 18   Pulse 79   Secretions   Cough Congested;Productive;Strong   How Sputum Obtained Expectorated   Sputum Amount Small   Sputum Color Tan   Sputum Consistency Thick   Oximetry   #Pulse Oximetry (Single Determination) Yes   Oxygen   Home O2 Use Prior To Admission? No   Pulse Oximetry 96 %   O2 Daily Delivery Respiratory  Room Air with O2 Available

## 2019-10-21 ENCOUNTER — APPOINTMENT (OUTPATIENT)
Dept: RADIOLOGY | Facility: REHABILITATION | Age: 79
DRG: 947 | End: 2019-10-21
Attending: HOSPITALIST
Payer: MEDICARE

## 2019-10-21 LAB
ANION GAP SERPL CALC-SCNC: 7 MMOL/L (ref 0–11.9)
APPEARANCE UR: CLEAR
BACTERIA #/AREA URNS HPF: NEGATIVE /HPF
BILIRUB UR QL STRIP.AUTO: NEGATIVE
BUN SERPL-MCNC: 16 MG/DL (ref 8–22)
CALCIUM SERPL-MCNC: 8.1 MG/DL (ref 8.5–10.5)
CHLORIDE SERPL-SCNC: 103 MMOL/L (ref 96–112)
CO2 SERPL-SCNC: 27 MMOL/L (ref 20–33)
COLOR UR: ABNORMAL
CREAT SERPL-MCNC: 0.66 MG/DL (ref 0.5–1.4)
DIGOXIN SERPL-MCNC: 0.9 NG/ML (ref 0.8–2)
EPI CELLS #/AREA URNS HPF: NEGATIVE /HPF
ERYTHROCYTE [DISTWIDTH] IN BLOOD BY AUTOMATED COUNT: 53.2 FL (ref 35.9–50)
GLUCOSE SERPL-MCNC: 141 MG/DL (ref 65–99)
GLUCOSE UR STRIP.AUTO-MCNC: NEGATIVE MG/DL
HCT VFR BLD AUTO: 31.9 % (ref 42–52)
HGB BLD-MCNC: 9.6 G/DL (ref 14–18)
HYALINE CASTS #/AREA URNS LPF: ABNORMAL /LPF
KETONES UR STRIP.AUTO-MCNC: ABNORMAL MG/DL
LEUKOCYTE ESTERASE UR QL STRIP.AUTO: ABNORMAL
MCH RBC QN AUTO: 28.4 PG (ref 27–33)
MCHC RBC AUTO-ENTMCNC: 30.1 G/DL (ref 33.7–35.3)
MCV RBC AUTO: 94.4 FL (ref 81.4–97.8)
MICRO URNS: ABNORMAL
NITRITE UR QL STRIP.AUTO: NEGATIVE
PH UR STRIP.AUTO: 6.5 [PH] (ref 5–8)
PLATELET # BLD AUTO: 236 K/UL (ref 164–446)
PMV BLD AUTO: 9.5 FL (ref 9–12.9)
POTASSIUM SERPL-SCNC: 3.6 MMOL/L (ref 3.6–5.5)
PROT UR QL STRIP: NEGATIVE MG/DL
RBC # BLD AUTO: 3.38 M/UL (ref 4.7–6.1)
RBC # URNS HPF: ABNORMAL /HPF
RBC UR QL AUTO: NEGATIVE
SODIUM SERPL-SCNC: 137 MMOL/L (ref 135–145)
SP GR UR STRIP.AUTO: 1.02
UROBILINOGEN UR STRIP.AUTO-MCNC: 1 MG/DL
WBC # BLD AUTO: 11.2 K/UL (ref 4.8–10.8)
WBC #/AREA URNS HPF: ABNORMAL /HPF

## 2019-10-21 PROCEDURE — 71045 X-RAY EXAM CHEST 1 VIEW: CPT

## 2019-10-21 PROCEDURE — 81001 URINALYSIS AUTO W/SCOPE: CPT

## 2019-10-21 PROCEDURE — A9270 NON-COVERED ITEM OR SERVICE: HCPCS

## 2019-10-21 PROCEDURE — 80048 BASIC METABOLIC PNL TOTAL CA: CPT

## 2019-10-21 PROCEDURE — 700101 HCHG RX REV CODE 250: Performed by: PHYSICAL MEDICINE & REHABILITATION

## 2019-10-21 PROCEDURE — 85027 COMPLETE CBC AUTOMATED: CPT

## 2019-10-21 PROCEDURE — 94640 AIRWAY INHALATION TREATMENT: CPT

## 2019-10-21 PROCEDURE — 99232 SBSQ HOSP IP/OBS MODERATE 35: CPT | Performed by: HOSPITALIST

## 2019-10-21 PROCEDURE — 700102 HCHG RX REV CODE 250 W/ 637 OVERRIDE(OP)

## 2019-10-21 PROCEDURE — 700102 HCHG RX REV CODE 250 W/ 637 OVERRIDE(OP): Performed by: PHYSICAL MEDICINE & REHABILITATION

## 2019-10-21 PROCEDURE — 700111 HCHG RX REV CODE 636 W/ 250 OVERRIDE (IP): Performed by: PHYSICAL MEDICINE & REHABILITATION

## 2019-10-21 PROCEDURE — 92507 TX SP LANG VOICE COMM INDIV: CPT

## 2019-10-21 PROCEDURE — 99233 SBSQ HOSP IP/OBS HIGH 50: CPT | Performed by: PHYSICAL MEDICINE & REHABILITATION

## 2019-10-21 PROCEDURE — 92526 ORAL FUNCTION THERAPY: CPT

## 2019-10-21 PROCEDURE — A9270 NON-COVERED ITEM OR SERVICE: HCPCS | Performed by: PHYSICAL MEDICINE & REHABILITATION

## 2019-10-21 PROCEDURE — 36415 COLL VENOUS BLD VENIPUNCTURE: CPT

## 2019-10-21 PROCEDURE — 80162 ASSAY OF DIGOXIN TOTAL: CPT

## 2019-10-21 PROCEDURE — 97110 THERAPEUTIC EXERCISES: CPT

## 2019-10-21 PROCEDURE — 97530 THERAPEUTIC ACTIVITIES: CPT

## 2019-10-21 PROCEDURE — 97535 SELF CARE MNGMENT TRAINING: CPT

## 2019-10-21 PROCEDURE — 94760 N-INVAS EAR/PLS OXIMETRY 1: CPT

## 2019-10-21 PROCEDURE — 770010 HCHG ROOM/CARE - REHAB SEMI PRIVAT*

## 2019-10-21 RX ORDER — PREDNISONE 20 MG/1
40 TABLET ORAL DAILY
Status: DISCONTINUED | OUTPATIENT
Start: 2019-10-21 | End: 2019-10-23

## 2019-10-21 RX ORDER — GINSENG 100 MG
CAPSULE ORAL
Status: COMPLETED
Start: 2019-10-21 | End: 2019-10-21

## 2019-10-21 RX ADMIN — BACITRACIN 1 EACH: 500 OINTMENT TOPICAL at 20:23

## 2019-10-21 RX ADMIN — RISPERIDONE 0.5 MG: 0.25 TABLET ORAL at 20:23

## 2019-10-21 RX ADMIN — METOPROLOL TARTRATE 75 MG: 25 TABLET ORAL at 08:40

## 2019-10-21 RX ADMIN — METOPROLOL TARTRATE 75 MG: 25 TABLET ORAL at 20:23

## 2019-10-21 RX ADMIN — RISPERIDONE 0.5 MG: 0.25 TABLET ORAL at 08:40

## 2019-10-21 RX ADMIN — TERAZOSIN HYDROCHLORIDE 2 MG: 1 CAPSULE ORAL at 20:22

## 2019-10-21 RX ADMIN — VALPROIC ACID 250 MG: 250 SOLUTION ORAL at 05:01

## 2019-10-21 RX ADMIN — BACITRACIN 1 EACH: 500 OINTMENT TOPICAL at 10:23

## 2019-10-21 RX ADMIN — HYDROXYZINE HYDROCHLORIDE 25 MG: 25 TABLET, FILM COATED ORAL at 08:26

## 2019-10-21 RX ADMIN — CHLORHEXIDINE GLUCONATE 0.12% ORAL RINSE 15 ML: 1.2 LIQUID ORAL at 20:22

## 2019-10-21 RX ADMIN — APIXABAN 5 MG: 5 TABLET, FILM COATED ORAL at 20:23

## 2019-10-21 RX ADMIN — APIXABAN 5 MG: 5 TABLET, FILM COATED ORAL at 08:40

## 2019-10-21 RX ADMIN — CHLORHEXIDINE GLUCONATE 0.12% ORAL RINSE 15 ML: 1.2 LIQUID ORAL at 08:50

## 2019-10-21 RX ADMIN — OMEPRAZOLE 40 MG: KIT at 08:41

## 2019-10-21 RX ADMIN — METOPROLOL TARTRATE 75 MG: 25 TABLET ORAL at 14:04

## 2019-10-21 RX ADMIN — PREDNISONE 40 MG: 20 TABLET ORAL at 14:00

## 2019-10-21 RX ADMIN — VALPROIC ACID 250 MG: 250 SOLUTION ORAL at 14:02

## 2019-10-21 RX ADMIN — DIGOXIN 250 MCG: 125 TABLET ORAL at 17:48

## 2019-10-21 RX ADMIN — VALPROIC ACID 250 MG: 250 SOLUTION ORAL at 20:23

## 2019-10-21 ASSESSMENT — ENCOUNTER SYMPTOMS
EYES NEGATIVE: 1
ABDOMINAL PAIN: 0
COUGH: 0
NAUSEA: 0
SHORTNESS OF BREATH: 0
FEVER: 0
PALPITATIONS: 0
VOMITING: 0
CHILLS: 0

## 2019-10-21 NOTE — PROGRESS NOTES
Pt is on TF with Diabetisource formula. Facility is out of Diabetisource. Ok per Dr. Echevarria to use a different formula until more is obtained.

## 2019-10-21 NOTE — PROGRESS NOTES
Received patient during shift change, report rec'd from day shift RN. Resting in bed, VS stable on room air, wife at bedside, questions answered. Continent of Bowel, vale cath in place. A&O x 2-3, able to make needs known. Bed in low position, call light within reach.

## 2019-10-21 NOTE — FLOWSHEET NOTE
10/21/19 0751   Events/Summary/Plan   Events/Summary/Plan Aerosol check, standby with SLP   Education   Education Yes - Pt. / Family has been Instructed in use of Respiratory Equipment   Aerosol Therapy / Airway Management   #Aerosol Therapy / Airway Management Trache Collar   Aerosol Humidity Temp (celsius) 31   Trache Weaning and Decannulation   Valve Trial Initiated, Smart Text Complete? Yes   Hours Tolerated   (approx 2 minutes, suctioned prior, stridor and increased WOB)   Respiratory WDL   Respiratory (WDL) X   Chest Exam   Work Of Breathing / Effort Increased Work of Breathing;Tachypnea  (improved after valve removed, RN gave hydroxyzine)   Respiration (!) 22   Pulse (!) 107   Secretions   Cough Congested;Productive   How Sputum Obtained Suction   Sputum Amount Moderate   Sputum Color Tan   Sputum Consistency Tenacious;Thick   Suction Frequency Suctioned Three or Four Times Per Encounter   Airway Trach Tracheostomy 6.0   Placement Date/Time: 09/14/19 1250   Airway Type: Trach  Brand: IEV  Style: Cuffed  Airway Location: Tracheostomy  Airway Size: 6.0  Inserted In: Unit  Inserted by: Respiratory care practitioner   Site Assessment Intact   Airway Tube Secured Velcro attachment   Cuff Pressure cmH2O (R.C.)   (deflated)   Extra Tracheostomy Tube at Bedside Yes   Oximetry   #Pulse Oximetry (Single Determination) Yes   Oxygen   Home O2 Use Prior To Admission? No   Pulse Oximetry 92 %   O2 Daily Delivery Respiratory  Room Air with O2 Available   OTHER   Resuscitation Bag Resuscitation Bag and Mask at Bedside and Checked

## 2019-10-21 NOTE — FLOWSHEET NOTE
10/21/19 1055   Events/Summary/Plan   Events/Summary/Plan standby with speech for PMSV .  Pt tolerates for only a min or two.     Aerosol Therapy / Airway Management   #Aerosol Therapy / Airway Management Trache Collar   Aerosol Humidity Temp (celsius) 31   Trache Weaning and Decannulation   Valve Trial Initiated, Smart Text Complete? Yes   Hours Tolerated   (few minutes, gets distressed)   Respiratory WDL   Respiratory (WDL) X   Chest Exam   Work Of Breathing / Effort Moderate   Respiration 20   Pulse 99   Secretions   Cough Congested;Productive   How Sputum Obtained Suction;Tracheal   Sputum Amount Small   Sputum Color Tan   Sputum Consistency Thick   Suction Frequency Suctioned Once or Twice Per Encounter   Airway Trach Tracheostomy 6.0   Placement Date/Time: 09/14/19 1250   Airway Type: Trach  Brand: Yoanna  Style: Cuffed  Airway Location: Tracheostomy  Airway Size: 6.0  Inserted In: Unit  Inserted by: Respiratory care practitioner   Extra Tracheostomy Tube at Bedside Yes   Oximetry   #Pulse Oximetry (Single Determination) Yes   Oxygen   Pulse Oximetry 92 %   O2 Daily Delivery Respiratory  Room Air with O2 Available

## 2019-10-21 NOTE — THERAPY
"Speech Language Pathology  Daily Treatment     Patient Name: Pedro Champion  Age:  79 y.o., Sex:  male  Medical Record #: [id number]  Today's Date: 10/21/2019     Subjective    Pt cooperative during tx.  RT present to monitor O2 sats and to suction as necessary.       Objective       10/21/19 1031   Speech / Dysarthria   Articulation Training Minimal (4)   Intensity / Loudness Training Moderate (3)   Respiration Control Severe (2)   Dysphagia    Other Treatments volitional swallow   Diet / Liquid Recommendation NPO   Interdisciplinary Plan of Care Collaboration   IDT Collaboration with  Respiratory Therapist   Collaboration Comments regarding PMV and monitoring O2 sats, suctioning.   SLP Total Time Spent   SLP Individual Total Time Spent (Mins) 30   Charge Group   SLP Treatment - Individual Speech Language Treatment - Individual   SLP Swallowing Dysfunction Treatment Swallowing Dysfunction Treatment           Assessment    Pt tolerated Passy-Dora valve for 2 minutes during the session.  Pt vocalized x2.  Pt completed prolonged \"ahh,\" and stated \"more oxygen.\"  Pt completed volitional swallow x2.      Plan    Target tolerance of PMV, oral care, and effortful swallows.     "

## 2019-10-21 NOTE — CARE PLAN
Problem: Other Problem (see comments)  Goal: Other Goal  Description  Monitor/Evaluation: Monitor PO vs TF intake, diet upgrades, weight, labs, medication adjustments, skin integrity, GI function, vitals, I/Os, and overall hydration status.  Adjust nutritional POC pending clinical outcomes.    RD following bi-weekly until EN at a goal and tolerated.   Goal: 1. Tolerance to EN adjustment.  Consider PEG vs G-tube  2. Maintain adequate oral vs TF nutrient/fluid intake to promote nutrition optimization/healing. 3. Transition to PO pending clinical outcomes.         Outcome: PROGRESSING AS EXPECTED

## 2019-10-21 NOTE — REHAB-PHARMACY IDT TEAM NOTE
Pharmacy   Pharmacy  Antibiotics/Antifungals/Antivirals:  Medication:      Active Orders (From admission, onward)    None        Route:        NA  Stop Date:  NA  Reason:      NA  Antihypertensives/Cardiac:  Medication:    Orders (72h ago, onward)     Start     Ordered    10/18/19 2100  terazosin (HYTRIN) capsule 2 mg  EVERY EVENING     Note to Pharmacy:  Per P&T IV to PO Protocol    10/18/19 1605    10/18/19 1800  digoxin (LANOXIN) tablet 250 mcg  DAILY      10/18/19 1605    10/18/19 1733  hydrALAZINE (APRESOLINE) tablet 25 mg  EVERY 8 HOURS PRN      10/18/19 1733    10/18/19 1630  metoprolol (LOPRESSOR) tablet 75 mg  3 TIMES DAILY     Note to Pharmacy:  Re-entered for timing    10/18/19 1605              Patient Vitals for the past 24 hrs:   BP Pulse   10/21/19 1417 -- 93   10/21/19 1408 111/67 88   10/21/19 1055 -- 99   10/21/19 0812 -- 97   10/21/19 0751 -- (!) 107   10/21/19 0700 105/56 92   10/20/19 2003 115/52 98   10/20/19 1958 115/52 --   10/20/19 1857 110/70 98   10/20/19 1752 -- (!) 102     Anticoagulation:  Medication: Eliquis    Other key medications: A review of the medication list reveals no issues at this time. Patient is currently on antihypertensive(s). Recommend home blood pressure monitoring/recording if antihypertensive(s) regimen(s) continue.    Section completed by: Jorge Hutchinson [facility]

## 2019-10-21 NOTE — THERAPY
"Speech Language Pathology  Daily Treatment     Patient Name: Pedro Champion  Age:  79 y.o., Sex:  male  Medical Record #: [id number]  Today's Date: 10/21/2019     Subjective    Pt seated in w/c with three nursing students plus Rn and RT when SLP entered room. Pt visibly overwhelmed, noted to make rapid impulsive movements, avoiding intervention with attempt to remove self from situation.      Objective       10/21/19 1433   SLP Total Time Spent   SLP Individual Total Time Spent (Mins) 30   Charge Group   SLP Treatment - Individual Speech Language Treatment - Individual       Assessment    Pt seated in w/c with three nursing students plus Rn and RT when SLP entered room. Pt visibly overwhelmed, noted to make rapid impulsive movements, avoiding intervention with attempt to remove self from situation. Pt maneuvering w/c quickly resulting in entangling vale requiring replacement at end of session. SLP discussed with charge RN that due to pts anxiety, decreased number of people in pts room may be beneficial - rec: RN students observe from a far. Set schedule in place with RT to be present to assist - scheduling aware. PMSV placed following suction, pt tolerated for 2 min, pt achieved voicing and voiced short spont phrases related to being overwhelmed \"I dont want to do this\" and \"get me out of here.\" Pt then gesturing difficulty breathing while using commode,  PMSV removed at this time. Ongoing eduction provided with example of finger occlusion to help encourage pts ability to express wants and needs  To those involved in care. With example provided pt noted to place his finger on SLPs throat demonstrating impaired comprehension and understanding.     Plan    Set schedule, PMSV tolerance and dysphagia management as appropriate.     "

## 2019-10-21 NOTE — FLOWSHEET NOTE
10/21/19 1417   Events/Summary/Plan   Events/Summary/Plan trach care done   Education   Education Yes - Pt. / Family has been Instructed in Trach Care   Respiratory WDL   Respiratory (WDL) X   Chest Exam   Respiration 18   Pulse 93   Secretions   Cough Congested;Productive   How Sputum Obtained Expectorated;Spontaneous   Sputum Amount Moderate   Sputum Color Tan   Sputum Consistency Thick   Airway Trach Tracheostomy 6.0   Placement Date/Time: 09/14/19 1250   Airway Type: Trach  Brand: Wide[employer] Metrics  Style: Cuffed  Airway Location: Tracheostomy  Airway Size: 6.0  Inserted In: Unit  Inserted by: Respiratory care practitioner   Site Assessment Clean;Dry;Intact   Airway Tube Secured Velcro attachment   Tracheostomy/Stoma Care Dressing Change;Inner Cannula Changed   Tracheostomy/Cannula Changed (Date) 10/21/19   Next Tracheostomy/Cannula Change Due (Date) 10/22/19   Extra Tracheostomy Tube at Bedside Yes

## 2019-10-21 NOTE — ASSESSMENT & PLAN NOTE
CXR w/ left basilar opacity  Sputum Cx +Pseudomonas w/ pending NICOLE  Continue empiric Levaquin given recent h/o pansensitive Pseudomonas/E Coli at [facility]  Has cephalosporin allergy  WBC normalized on abx

## 2019-10-21 NOTE — REHAB-PT IDT TEAM NOTE
Physical Therapy   Mobility  Bed mobility:   SBA- min A   Bed /Chair/Wheelchair Transfer Initial:  4 - Minimal Assistance  Bed /Chair/Wheelchair Transfer Current:  4 - Minimal Assistance   Bed/Chair/Wheelchair Transfer Description:  Increased time, Requires lift, Verbal cueing, Supervision for safety  Walk Initial:  1 - Total Assistance  Walk Current:  1 - Total Assistance   Walk Description:  Two hand rails, Extra time, Safety concerns, Verbal cueing, Supervision for safety(20 ft in // bars, min A for steadying, sc follow for safety)  Wheelchair Initial:  2 - Max Assistance  Wheelchair Current:  4 - Minimal Assistance   Wheelchair Description:  Extra time, Supervision for safety, Verbal cueing(150 ft using UEs and LEs, min A for hand placment and initial set-up, SBA for remainder)  Stairs Initial:  0 - Not tested,unsafe activity  Stairs Current: 0 - Not tested,unsafe activity   Stairs Description:    Patient/Family Training/Education:  TBD   DME/DC Recommendations:  TBD  Strengths:  Adequate strength and Independent PLOF  Barriers:   Confused, Decreased endurance, Impulsive, Poor carryover of learning and Other: tracheostomy, communication barrier  # of short term goals set= 3  # of short term goals met= 0 (evaluation completed 10/20)  Physical Therapy Problems     Problem: Mobility     Dates: Start: 10/19/19       Description:     Goal: STG-Within one week, patient will ambulate household distance     Dates: Start: 10/19/19   Expected End: 10/26/19       Description: 1) Individualized goal:  With LRD at 50 ft at min A in order to progress towards PLOF  2) Interventions:  PT Group Therapy, PT Gait Training, PT Therapeutic Exercises, PT Neuro Re-Ed/Balance, PT Therapeutic Activity, PT Manual Therapy and PT Evaluation       Note:     Goal Note filed on 10/21/19 1616 by Daksha Becker, PT    Evaluation completed 10/20                  Goal: STG-Within one week, patient will ascend and descend four to six stairs     Dates:  Start: 10/19/19   Expected End: 10/26/19       Description: 1) Individualized goal:  With BHR at min A wth step to pattern in order to progress towards navigating stairs at home or in community safely  2) Interventions: PT Group Therapy, PT Gait Training, PT Therapeutic Exercises, PT Neuro Re-Ed/Balance, PT Therapeutic Activity, PT Manual Therapy and PT Evaluation       Note:     Goal Note filed on 10/21/19 1616 by Daksha Becker, PT    Evaluation completed 10/20                         Problem: Mobility Transfers     Dates: Start: 10/19/19       Description:     Goal: STG-Within one week, patient will transfer bed to chair     Dates: Start: 10/19/19   Expected End: 10/26/19       Description: 1) Individualized goal:  At CGA with LRD In order to maximize self mobility  2) Interventions: PT Group Therapy, PT Gait Training, PT Therapeutic Exercises, PT Neuro Re-Ed/Balance, PT Therapeutic Activity, PT Manual Therapy and PT Evaluation       Note:     Goal Note filed on 10/21/19 1616 by Daksha Becker, PT    Evaluation completed 10/20                        Problem: PT-Long Term Goals     Dates: Start: 10/19/19       Description:     Goal: LTG-By discharge, patient will ambulate     Dates: Start: 10/19/19   Expected End: 11/02/19       Description: 1) Individualized goal: With LRD at 150 ft at SPV in order to progress towards PLOF  2) Interventions: PT Group Therapy, PT Gait Training, PT Therapeutic Exercises, PT Neuro Re-Ed/Balance, PT Therapeutic Activity, PT Manual Therapy and PT Evaluation             Goal: LTG-By discharge, patient will transfer one surface to another     Dates: Start: 10/19/19   Expected End: 11/02/19       Description: 1) Individualized goal: At SPV with LRD In order to maximize self mobility  2) Interventions: PT Group Therapy, PT Gait Training, PT Therapeutic Exercises, PT Neuro Re-Ed/Balance, PT Therapeutic Activity, PT Manual Therapy and PT Evaluation             Goal: LTG-By discharge, patient will  ambulate up/down 4-6 stairs     Dates: Start: 10/19/19   Expected End: 11/02/19       Description: 1) Individualized goal: With HR at \[facility]\"" in order to progress towards navigating stairs at home or in community safely  2) Interventions: PT Group Therapy, PT Gait Training, PT Therapeutic Exercises, PT Neuro Re-Ed/Balance, PT Therapeutic Activity, PT Manual Therapy and PT Evaluation             Goal: LTG-By discharge, patient will transfer in/out of a car     Dates: Start: 10/19/19   Expected End: 11/02/19       Description: 1) Individualized goal: At \[facility]\"" with LRD In order to maximize self mobility  2) Interventions: PT Group Therapy, PT Gait Training, PT Therapeutic Exercises, PT Neuro Re-Ed/Balance, PT Therapeutic Activity, PT Manual Therapy and PT Evaluation                         Section completed by:  Daksha Becker, PT, DPT

## 2019-10-21 NOTE — THERAPY
Occupational Therapy  Daily Treatment     Patient Name: Pedro Champion  Age:  79 y.o., Sex:  male  Medical Record #: [id number]  Today's Date: 10/21/2019     Precautions  Precautions: Fall Risk, Nasogastric Tube, Swallow Precautions ( See Comments), Tracheostomy   Comments: NPO, vale, impulsive - use alarms and seat belt,      Safety   ADL Safety : Impulsive, Impaired, Requires Physical Assist for Safety, Impaired Insight into Safety, Requires Cueing for Safety  Bathroom Safety: Impaired, Impulsive, Requires Cuing for Safety, Impaired Insight into Safety, Requires Physical Assist for Safety    Subjective    Pt agreeable to participate in OT. Able to respond via nodding, mouthing and written communication via notepad.      Objective     10/21/19 0931   Precautions   Precautions Fall Risk;Nasogastric Tube;Swallow Precautions ( See Comments);Tracheostomy    Comments NPO, vale, impulsive - use alarms and seat belt,     Safety    ADL Safety  Impulsive;Impaired;Requires Physical Assist for Safety;Impaired Insight into Safety;Requires Cueing for Safety   Bathroom Safety Impaired;Impulsive;Requires Cuing for Safety;Impaired Insight into Safety;Requires Physical Assist for Safety   Vitals   Room Air Oximetry 95   Cognition    Level of Consciousness Alert   Interdisciplinary Plan of Care Collaboration   IDT Collaboration with  Respiratory Therapist;Nursing   Patient Position at End of Therapy Seated;Call Light within Reach   Collaboration Comments feeding tube disconnected; respiratory status    OT Total Time Spent   OT Individual Total Time Spent (Mins) 60   OT Charge Group   OT Self Care / ADL 4       FIM Grooming Score:  5 - Standby Prompting/Supervision or Set-up  Grooming Description:  Increased time, Supervision for safety, Verbal cueing, Set-up of equipment(Patient washed face and shaved with set-up and SBA for safety. )    FIM Upper Body Dressin - Standby Prompting/Supervision or Set-up  Upper Body Dressing  Description:  Supervision for safety, Set-up of equipment, Increased time(Patient donned t-shirt with set-up while seated in w/c)    FIM Lower Body Dressing Score:  3 - Moderate Assistance  Lower Body Dressing Description:  Increased time, Supervision for safety, Verbal cueing, Set-up of equipment(Donned elastic waist pants with mod assist for threading RLE/vale through pants, min A for standing balance during pants over hips pursuit, CGA for donning socks and slippers while seated in w/c)    Assessment    Patient tolerated OT session well with focus on ADLs. Demonstrated increased independence with UB/LB dressing at w/c level (See FIMs above for additional information). Primary barriers to functional performance are impulsivity, poor safety awareness, limited endurance and decreased balance. Patient required multiple cues to lock w/c brakes; attempted to complete STS from w/c 2x without locking brakes prior to activity. Additionally, patient attempted to drink water during grooming task; undersigned therapist emphasized importance of NPO at this time for safety.     Plan    Incorporate safety considerations related to ADLs and functional mobility, endurance training, standing balance

## 2019-10-21 NOTE — THERAPY
Speech Language Pathology  Daily Treatment     Patient Name: Pedro Champion  Age:  79 y.o., Sex:  male  Medical Record #: [id number]  Today's Date: 10/21/2019     Subjective    Pt presented with agitation, demonstrated by frequent movement, and moving arms in frustration.  When given pen and paper, pt reported two episodes of diarrhea.  Nrsing informed.  Respiratory therapist presented during session.   Sp02: 91% on room air, .        Objective       10/21/19 0801   Cognition   Attention to Task Minimal (4)   Speech / Dysarthria   Articulation Training Minimal (4)   Intensity / Loudness Training Moderate (3)   Respiration Control Severe (2)   SLP Total Time Spent   SLP Individual Total Time Spent (Mins) 30   Charge Group   SLP Treatment - Individual Speech Language Treatment - Individual       FIM Eating Score:  1 - Total Assistance  Eating Description:  Set-up of equipment or meal/tube feeding, Staff administers tube feed/parenteral nutrition/IVF    FIM Comprehension Score:  5 - Stand-by Prompting/Supervision or Set-up  Comprehension Description:  Hearing aids/amplifiers, Glasses, Verbal cues    FIM Expression Score:  2 - Max Assistance  Expression Description:  [place] valve for trach, Communication board    FIM Social Interaction Score:  4 - Minimal Assistance  Social Interaction Description:  Increased time, Verbal cues    FIM Problem Solving Score:  4 - Minimal Assistance  Problem Solving Description:  Verbal cueing, Therapy schedule, Bed/chair alarm, Increased time, Seat belt    FIM Memory Score:  3 - Moderate Assistance  Memory Description:  Verbal cueing, Bed/chair alarm, Seat belt      Assessment    Pt tolerated passy-michael valve for 1 minute.  Pt vocalized name, and place of birth.  Valve came off after strong cough.  Valve was placed on trach; however, pt tapped chest, and valve was removed.  Pt reported difficulty breathing with valve.  Oxy mask provided by RT.  Suction toothbrushes provided to  nursing for use during oral care.  Pt attempted to stand x1 without locking brakes.      Plan    Target use of passy-michael valve.  Oral care.
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
## 2019-10-21 NOTE — FLOWSHEET NOTE
10/21/19 0945   Events/Summary/Plan   Events/Summary/Plan Attempted to place HME but pt coughed up moderate thick secretions.   Respiratory WDL   Respiratory (WDL) X   Secretions   Cough Congested;Productive   How Sputum Obtained Expectorated   Sputum Amount Moderate   Sputum Color Tan   Sputum Consistency Thick

## 2019-10-21 NOTE — CARE PLAN
Problem: Safety  Goal: Will remain free from injury  Intervention: Educate patient and significant other/support system about adaptive mobility strategies and safe transfers  Note:   Pt communication limited, Trach in place. Confusion, impulsiveness noted at times. Bed in low position, call light within reach, room near nurses station, bed alarm activated.     Problem: Bowel/Gastric:  Goal: Normal bowel function is maintained or improved  Intervention: Educate patient and significant other/support system about diet, fluid intake, medications and activity to promote bowel function  Note:   Scheduled bowel meds administered at hs. TF in place via coretrack. H2O flushes administered w/meds. No c/o n/v this shift. HOB elevated.

## 2019-10-21 NOTE — FLOWSHEET NOTE
10/21/19 1055   Events/Summary/Plan   Events/Summary/Plan standby with speech for PMSV .  Pt tolerates for only a min or two.     Aerosol Therapy / Airway Management   #Aerosol Therapy / Airway Management Trache Collar   Aerosol Humidity Temp (celsius) 31   Trache Weaning and Decannulation   Valve Trial Initiated, Smart Text Complete? Yes   Hours Tolerated   (few minutes, gets distressed)   Respiratory WDL   Respiratory (WDL) X   Chest Exam   Work Of Breathing / Effort Moderate   Respiration 20   Pulse 99   Secretions   Cough Congested;Productive   How Sputum Obtained Suction;Tracheal   Sputum Amount Small   Sputum Color Tan   Sputum Consistency Thick   Suction Frequency Suctioned Once or Twice Per Encounter   Airway Trach Tracheostomy 6.0   Placement Date/Time: 09/14/19 1250   Airway Type: Trach  Brand: Yoanna  Style: Cuffed  Airway Location: Tracheostomy  Airway Size: 6.0  Inserted In: Unit  Inserted by: Respiratory care practitioner   Extra Tracheostomy Tube at Bedside Yes
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
## 2019-10-21 NOTE — THERAPY
"Physical Therapy   Daily Treatment     Patient Name: Pedro Champion  Age:  79 y.o., Sex:  male  Medical Record #: [id number]  Today's Date: 10/21/2019     Precautions  Precautions: Fall Risk, Nasogastric Tube, Tracheostomy   Comments: NPO, vale, impulsivity    Subjective    Pt seated in room. Agreeable to therapy.      Objective       10/21/19 1301   Precautions   Precautions Fall Risk;Nasogastric Tube;Tracheostomy    Comments NPO, vale, impulsivity   Sitting Lower Body Exercises   Nustep Resistance Level 2  (10 min, 0.25 miles, 1 rest break at 5 min )   Bed Mobility    Sit to Stand Stand by Assist   Interdisciplinary Plan of Care Collaboration   IDT Collaboration with  Speech Therapist;Nursing;Physician;Respiratory Therapist   Patient Position at End of Therapy Seated   Collaboration Comments RN switched vale tubing and bag secondary to leaking. Spoke with SLP re: need for communication board. Dr Molina spoke with pt upon pt request to clear for exercise. Spoke with respiratory therapist re: pt's tolerance for speaking valve   PT Total Time Spent   PT Individual Total Time Spent (Mins) 60   PT Charge Group   PT Therapeutic Exercise 2   PT Therapeutic Activities 2     Pt stood several times during session, ~5x very briefly and 2x 1-2 min for vale management with RN. SBA to attain standing and SBA- CGA to maintain standing     FIM Wheelchair Score:  4 - Minimal Assistance  Wheelchair Description:  Extra time, Supervision for safety, Verbal cueing(150 ft using UEs and LEs, min A for hand placment and initial set-up, SBA for remainder)      Assessment    Pt initially hesitant to cooperate with therapy, able to communicate through writing he was concerned about exercising because of his heart and wanted the doctor to \"check it out\". Pt requested water during session with PT educating on NPO status. Limited by leaking vale bag.       Plan    Trial ambulation with FWW and WC follow, standing tolerance while " monitoring BP, dynamic balance as tolerated, LE strengthening and endurance.

## 2019-10-21 NOTE — FLOWSHEET NOTE
10/21/19 0812   Chest Exam   Work Of Breathing / Effort   (pt's breathing slowing )   Respiration 18   Pulse 97

## 2019-10-22 ENCOUNTER — APPOINTMENT (OUTPATIENT)
Dept: RADIOLOGY | Facility: REHABILITATION | Age: 79
DRG: 947 | End: 2019-10-22
Attending: PHYSICAL MEDICINE & REHABILITATION
Payer: MEDICARE

## 2019-10-22 PROBLEM — R33.9 URINARY RETENTION: Status: ACTIVE | Noted: 2019-09-09

## 2019-10-22 PROCEDURE — 700102 HCHG RX REV CODE 250 W/ 637 OVERRIDE(OP): Performed by: PHYSICAL MEDICINE & REHABILITATION

## 2019-10-22 PROCEDURE — 99232 SBSQ HOSP IP/OBS MODERATE 35: CPT | Performed by: HOSPITALIST

## 2019-10-22 PROCEDURE — 94760 N-INVAS EAR/PLS OXIMETRY 1: CPT

## 2019-10-22 PROCEDURE — 700111 HCHG RX REV CODE 636 W/ 250 OVERRIDE (IP): Performed by: PHYSICAL MEDICINE & REHABILITATION

## 2019-10-22 PROCEDURE — 92507 TX SP LANG VOICE COMM INDIV: CPT

## 2019-10-22 PROCEDURE — A9270 NON-COVERED ITEM OR SERVICE: HCPCS | Performed by: PHYSICAL MEDICINE & REHABILITATION

## 2019-10-22 PROCEDURE — 700105 HCHG RX REV CODE 258: Performed by: PHYSICAL MEDICINE & REHABILITATION

## 2019-10-22 PROCEDURE — 700101 HCHG RX REV CODE 250: Performed by: PHYSICAL MEDICINE & REHABILITATION

## 2019-10-22 PROCEDURE — 770010 HCHG ROOM/CARE - REHAB SEMI PRIVAT*

## 2019-10-22 PROCEDURE — 97535 SELF CARE MNGMENT TRAINING: CPT

## 2019-10-22 PROCEDURE — 71045 X-RAY EXAM CHEST 1 VIEW: CPT

## 2019-10-22 PROCEDURE — 94640 AIRWAY INHALATION TREATMENT: CPT

## 2019-10-22 PROCEDURE — 99233 SBSQ HOSP IP/OBS HIGH 50: CPT | Performed by: PHYSICAL MEDICINE & REHABILITATION

## 2019-10-22 PROCEDURE — 97116 GAIT TRAINING THERAPY: CPT

## 2019-10-22 PROCEDURE — 700111 HCHG RX REV CODE 636 W/ 250 OVERRIDE (IP)

## 2019-10-22 RX ORDER — QUETIAPINE FUMARATE 25 MG/1
50 TABLET, FILM COATED ORAL EVERY EVENING
Status: DISCONTINUED | OUTPATIENT
Start: 2019-10-22 | End: 2019-10-23

## 2019-10-22 RX ORDER — GUAIFENESIN 600 MG/1
600 TABLET, EXTENDED RELEASE ORAL EVERY 12 HOURS
Status: DISCONTINUED | OUTPATIENT
Start: 2019-10-22 | End: 2019-10-23

## 2019-10-22 RX ADMIN — DIGOXIN 250 MCG: 125 TABLET ORAL at 17:37

## 2019-10-22 RX ADMIN — CHLORHEXIDINE GLUCONATE 0.12% ORAL RINSE 15 ML: 1.2 LIQUID ORAL at 09:01

## 2019-10-22 RX ADMIN — DEXTROSE MONOHYDRATE 500 MG: 5 INJECTION, SOLUTION INTRAVENOUS at 23:50

## 2019-10-22 RX ADMIN — METOPROLOL TARTRATE 75 MG: 25 TABLET ORAL at 15:43

## 2019-10-22 RX ADMIN — PREDNISONE 40 MG: 20 TABLET ORAL at 09:02

## 2019-10-22 RX ADMIN — ENOXAPARIN SODIUM 40 MG: 40 INJECTION, SOLUTION INTRAVENOUS; SUBCUTANEOUS at 23:17

## 2019-10-22 RX ADMIN — BACITRACIN 1 EACH: 500 OINTMENT TOPICAL at 09:11

## 2019-10-22 RX ADMIN — GUAIFENESIN 600 MG: 600 TABLET, EXTENDED RELEASE ORAL at 09:02

## 2019-10-22 RX ADMIN — METOPROLOL TARTRATE 75 MG: 25 TABLET ORAL at 09:02

## 2019-10-22 RX ADMIN — VALPROIC ACID 250 MG: 250 SOLUTION ORAL at 05:45

## 2019-10-22 RX ADMIN — VALPROIC ACID 250 MG: 250 SOLUTION ORAL at 13:12

## 2019-10-22 RX ADMIN — HYDROXYZINE HYDROCHLORIDE 25 MG: 25 TABLET, FILM COATED ORAL at 13:12

## 2019-10-22 RX ADMIN — BACITRACIN 1 EACH: 500 OINTMENT TOPICAL at 23:16

## 2019-10-22 RX ADMIN — OMEPRAZOLE 40 MG: KIT at 09:01

## 2019-10-22 RX ADMIN — RISPERIDONE 0.5 MG: 0.25 TABLET ORAL at 09:02

## 2019-10-22 RX ADMIN — APIXABAN 5 MG: 5 TABLET, FILM COATED ORAL at 09:02

## 2019-10-22 ASSESSMENT — ENCOUNTER SYMPTOMS
DIARRHEA: 0
SHORTNESS OF BREATH: 0
NERVOUS/ANXIOUS: 0
CHILLS: 0
NAUSEA: 0
ABDOMINAL PAIN: 0
FEVER: 0
VOMITING: 0

## 2019-10-22 NOTE — THERAPY
Speech Language Pathology  Daily Treatment     Patient Name: Pedro Champion  Age:  79 y.o., Sex:  male  Medical Record #: [id number]  Today's Date: 10/22/2019     Subjective    Pt upright in chair, nursing disconnecting tube feed. Pt agreeable to ST with RT present to monitor sats.      Objective     10/22/19 0904   Receptive Language / Auditory Comprehension   Follows One Unit Commands Moderate (3)   Speech / Dysarthria   Articulation Training Minimal (4)   Intensity / Loudness Training Moderate (3)   Respiration Control Severe (2)   Tracheostomy   Tracheostomy Yes   Interdisciplinary Plan of Care Collaboration   IDT Collaboration with  Nursing;Respiratory Therapist   Patient Position at End of Therapy Seated;Self Releasing Lap Belt Applied;Chair Alarm On   Collaboration Comments nursing disconnecting tube feed. RT present for session monitoring O2 sats   SLP Total Time Spent   SLP Individual Total Time Spent (Mins) 30   Charge Group   Charges Yes   SLP Treatment - Individual Speech Language Treatment - Individual       FIM Eating Score:     Eating Description:       FIM Comprehension Score:     Comprehension Description:       FIM Expression Score:     Expression Description:       FIM Social Interaction Score:     Social Interaction Description:       FIM Problem Solving Score:  3 - Moderate Assistance  Problem Solving Description:  Verbal cueing, Therapy schedule, Increased time, Bed/chair alarm, Seat belt    FIM Memory Score:     Memory Description:         Assessment    Pt tolerated PMSV trials for 2 minutes x2 trials with MAX encouragement and reinforcement from SLP. Pt provided with extensive education on use of valve as well as breathing pattern, however, pt unable to tolerate for longer, demonstrating increased heart rate (up to 117), gesturing to remove valve. Pt instructed on finger occlusion and is able to produce 3-4 word sentences when inhalation, speaking on exhalation with finger occlusion sequenced  properly. Pt required tactile assist to fully occlude trach site, attempting on several occassions to occlude nostrils instead.     Plan    Continue to target PMSV, increased endurance, decrease anxiety with valve.

## 2019-10-22 NOTE — REHAB-RESPIRATORY IDT TEAM NOTE
Respiratory Therapy   Respiratory Therapy    Pt is using heated trach collar at night with 30% oxygen.  He is on RA during the day but not able to use an HME due to secretions which have been thick and a moderate amount.  Pt has a strong cough.  RT working with speech in trach occlusion and speaking valve.  Section completed by:  Divine Gee, RRT      24 y/o  26 y/o  s/p primary c/s (15) complicated by preeclampsia, admitted with postpartum cardiomyopathy.  Stable. (HD#5)

## 2019-10-22 NOTE — THERAPY
Occupational Therapy  Daily Treatment     Patient Name: Pedro Champion  Age:  79 y.o., Sex:  male  Medical Record #: [id number]  Today's Date: 10/22/2019     Precautions  Precautions: Fall Risk, Nasogastric Tube, Tracheostomy   Comments: NPO, vale, impulsivity, use alarms, skin tear L forearm, trach covered for shower, not allowed in RM unsupervised    Safety   ADL Safety : Requires Physical Assist for Safety, Requires Supervision for Safety, Impaired, Impulsive, Impaired Insight into Safety, Requires Cueing for Safety  Bathroom Safety: Impaired, Impulsive, Requires Supervision for Safety, Requires Physical Assist for Safety, Impaired Insight into Safety, Requires Cuing for Safety  Comments: see FIM for ADL routine. Requires heavy cuing and intermittent assist due to impulsivity, impaired balance, and difficulty sequencing    Subjective    Pt expresses being cold and requests heat to be turned up using writing board     Objective       10/22/19 0701   Precautions   Precautions Fall Risk;Nasogastric Tube;Tracheostomy    Comments NPO, vale, impulsivity, use alarms, skin tear L forearm, trach covered for shower, not allowed in RM unsupervised   Safety    ADL Safety  Requires Physical Assist for Safety;Requires Supervision for Safety;Impaired;Impulsive;Impaired Insight into Safety;Requires Cueing for Safety   Bathroom Safety Impaired;Impulsive;Requires Supervision for Safety;Requires Physical Assist for Safety;Impaired Insight into Safety;Requires Cuing for Safety   Comments see FIM for ADL routine. Requires heavy cuing and intermittent assist due to impulsivity, impaired balance, and difficulty sequencing   Interdisciplinary Plan of Care Collaboration   IDT Collaboration with  Nursing   Patient Position at End of Therapy Seated;Chair Alarm On;Self Releasing Lap Belt Applied;Other (Comments)  (by nursing station)   Collaboration Comments informed of skin tear and pt location   OT Total Time Spent   OT Individual Total  Time Spent (Mins) 60   OT Charge Group   OT Self Care / ADL 4       FIM Grooming Score:  5 - Standby Prompting/Supervision or Set-up  Grooming Description:  (set up and heavy verbal and tactile cuing to adhere to swallow precautions during oral care)    FIM Bathing Score:  3 - Moderate Assistance  Bathing Description:       FIM Upper Body Dressin - Standby Prompting/Supervision or Set-up  Upper Body Dressing Description:  (set up and cuing to avoid pulling on NG tube)    FIM Lower Body Dressing Score:  4 - Minimal Assistance  Lower Body Dressing Description:  (assistance for threading vale, set up and cues for orientation, CGA in standing with support of GB for pulling pants up)    FIM Tub/Shower Transfers Score:  4 - Minimal Assistance  Tub/Shower Transfers Description:  Grab bar(CGA and heavy cuing for sequencing for SPT wc <> shower chair)      Assessment    Pt demonstrates only minimal improvement with ADL largely due to impulsivity requiring heavy cuing for appropriate safety and physical assistance when unable to follow through with safety commands.     Plan    Incorporate safety considerations related to ADLs and functional mobility, endurance training, standing balance

## 2019-10-22 NOTE — DISCHARGE PLANNING
NELLA placed call to Rosana to complete assessment, left voicemail. Will continue to attempt meeting for assessment.

## 2019-10-22 NOTE — CARE PLAN
Problem: Communication  Goal: The ability to communicate needs accurately and effectively will improve  Outcome: PROGRESSING SLOWER THAN EXPECTED  Note:   Pt has a trach and can only communicate verbally when trach is capped. Pt is [place] with hearing aids present.      Problem: Safety  Goal: Will remain free from injury  Outcome: PROGRESSING SLOWER THAN EXPECTED  Note:   Pt is impulsive and a high fall risk. Pt in room close to nursing station with alarms in place for safety. Use of call light reinforced with each encounter.      Problem: Bowel/Gastric:  Goal: Normal bowel function is maintained or improved  Outcome: PROGRESSING SLOWER THAN EXPECTED  Note:   Pt had 2 watery BMs this shift. Scheduled pericolace held this evening.      Problem: Pain Management  Goal: Pain level will decrease to patient's comfort goal  Outcome: PROGRESSING AS EXPECTED  Note:   No reports of pain at this time. Will continue to monitor through shift with hourly rounds.

## 2019-10-22 NOTE — THERAPY
Physical Therapy   Daily Treatment     Patient Name: Pedro Champion  Age:  79 y.o., Sex:  male  Medical Record #: [id number]  Today's Date: 10/22/2019     Precautions  Precautions: Fall Risk, Nasogastric Tube, Tracheostomy   Comments: NPO, vale, impulsivity, use alarms, skin tear L forearm, trach covered for shower, not allowed in RM unsupervised    Subjective    Patient agreeable to PT.     Objective       10/22/19 1431   Bed Mobility    Sit to Stand Contact Guard Assist  (gait belt, mod cueing, setup, increased time, Vale manageme)   IDT Conference   IDT Conference I have reviewed and discussed the POC and barriers to discharge for this patient.  I am knowledgeable of the patient's needs and have attended the IDT Conference.   Interdisciplinary Plan of Care Collaboration   IDT Collaboration with  Family / Caregiver  (IDT team)   Patient Position at End of Therapy Seated;Chair Alarm On;Self Releasing Lap Belt Applied;Call Light within Reach;Family / Friend in Room   Collaboration Comments IDT conference: likely will d/c the week of 11/3/19 sometime; impulsive and impaired safety, Vale present, NG present and likely will transition to PEG, working on trach capping and vocalization, pt reports hearing songs while in room, sitter recommended by multiple of IDT team, SO present sometimes during day.  Pt's SO present for this afternoon's session and discussed pt's CLOF, barriers, trach vocalization, and PEG transition.   PT Total Time Spent   PT Individual Total Time Spent (Mins) 30   PT Charge Group   PT Gait Training 2       FIM Walking Score:  1 - Total Assistance  Walking Description:  (Outdoors for 140 feet, 120 ft, and 40 feet.  FWW, CGA at gait belt, 2nd person for w/c follow, Vale management, cueing, setup.  Increased rest breaks.  x25 min)    FIM Wheelchair Score:  1 - Total Assistance  Wheelchair Description:  (due to Vale setup, NG tube, impaired and impulsive safety; chair alarms in place throughout  session)      Assessment    Patient has increased fatigue this afternoon; impulsivity continues but able to be redirected with kapoor external 1-step cues.    Plan    FWW ambulation indoors/outdoors, ther ex in multiple positions for endurance, monitor O2 saturations and BP prn, standing dynamic balance, trach education, safety
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
## 2019-10-22 NOTE — FLOWSHEET NOTE
10/22/19 0934   Events/Summary/Plan   Events/Summary/Plan standby with speech therapy   Trache Weaning and Decannulation   Valve Trial Initiated, Smart Text Complete? Yes   Hours Tolerated   (2 minutes )   Oximetry   #Pulse Oximetry (Single Determination) Yes   Oxygen   Pulse Oximetry 95 %   O2 Daily Delivery Respiratory  Room Air with O2 Available

## 2019-10-22 NOTE — REHAB-SLP IDT TEAM NOTE
Speech Therapy   Cognitive Linquistic Functions  Comprehension Initial:  5 - Stand-by Prompting/Supervision or Set-up  Comprehension Current:  5 - Stand-by Prompting/Supervision or Set-up   Comprehension Description:  Hearing aids/amplifiers, Glasses, Verbal cues  Expression Initial:  2 - Max Assistance  Expression Current:  2 - Max Assistance   Expression Description:  Passe Jen valve for trach, Communication board  Social Interaction Initial:  5 - Stand-by Prompting/Supervision or Set-up  Social Interaction Current:  4 - Minimal Assistance   Social Interaction Description:  Increased time, Verbal cues  Problem Solving Initial:  3 - Moderate Assistance  Problem Solving Current:  3 - Moderate Assistance   Problem Solving Description:  Verbal cueing, Therapy schedule, Increased time, Bed/chair alarm, Seat belt  Memory Initial:  3 - Moderate Assistance  Memory Current:  3 - Moderate Assistance   Memory Description:  Verbal cueing, Bed/chair alarm, Seat belt  Executive Functioning / Organization Initial:     Executive Functioning / Organization Current: 2 - Max Assistance     Executive Functioning Desciption: Not being addressed at this time, secondary to emphasis of therapy on PMSV/speech and swallow  Swallowing  Swallowing Status: NPO with Cortrak in place. Pt with overt s/sx of aspiration per nursing with applesauce. Pt with overt s/sx of pen/asp on ice chip trials at [facility], no trials given as of yet, will address in subsequent sessions with instrumental assessment anticipated to further assess swallow. Pt may require longer term means of nutrition/hydration and may need to consider PEG placement.   Orders Placed This Encounter   Procedures   • Diet NPO     Standing Status:   Standing     Number of Occurrences:   1     Order Specific Question:   Restrict to:     Answer:   With Tube Feed [4]     Behavior Modification Plan  Keep the environment simple to avoid over stimulatiom/agitation, Keep instructions  simple/concrete, Use nonverbal cues (model/demonstrate or gesture), Give clear feedback, Set clear goals, Provide reasonable choices, Decrease the chance of failure by offering activities that are within the patient's abilities, Analyze tasks (break down into smaller steps) and Reinforce participation in desired tasks  Assistive Technology  Hearing amplification or closed captioning, Low/Impaired vision equipment, Low tech: Calendar, planner, schedule, alarms/timers, pill organizer, post-it notes, lists and Other: PMSV  Family Training/Education:  To be completed.   DC Recommendations: TBD  Strengths:  Independent PLOF, Motivated for self care and independence, Pleasant and cooperative and Willingly participates in therapeutic activities  Barriers:  Aspiration risk, Decreased endurance, Impulsive, Poor activity tolerance, Poor carryover of learning, Poor family support, Poor insight/denial of deficits and Other: anxiety with PMSV, sequencing  # of short term goals set=4  # of short term goals met=0  Speech Therapy Problems     Problem: Expression STGs     Dates: Start: 10/19/19       Description:     Goal: STG-Within one week, patient will     Dates: Start: 10/19/19       Description: 1) Individualized goal:  Tolerate PMSV placement for 5 minute trials while maintaining appropriate SPo2 and heart rate.   2) Interventions:  SLP Speech Language Treatment, SLP Self Care / ADL Training , SLP Cognitive Skill Development and SLP Group Treatment             Goal: STG-Within one week, patient will     Dates: Start: 10/19/19       Description: 1) Individualized goal:  Produce single words during PMSV trials on 75% of attempts.    2) Interventions:  SLP Speech Language Treatment, SLP Prosthesis Checkout, SLP Self Care / ADL Training , SLP Cognitive Skill Development and SLP Group Treatment                   Problem: Problem Solving STGs     Dates: Start: 10/19/19       Description:     Goal: STG-Within one week, patient will  solve complex problems     Dates: Start: 10/19/19       Description:           Goal: STG-Within one week, patient will     Dates: Start: 10/19/19       Description: 1) Individualized goal:  Participate in standardized cognitive linguistic evaluation (LAVERN-3 vs SCCAN) as appropriate with goals to follow.   2) Interventions:  SLP Speech Language Treatment, SLP Self Care / ADL Training , SLP Cognitive Skill Development and SLP Group Treatment                   Problem: Speech/Swallowing LTGs     Dates: Start: 10/19/19       Description:     Goal: LTG-By discharge, patient will safely swallow     Dates: Start: 10/19/19       Description: 1) Individualized goal:  Dysphagia III diet and thin liquids without overt s/sx of aspiratrion   2) Interventions:  SLP Swallowing Dysfunction Treatment, SLP Oral Pharyngeal Evaluation, SLP Video Swallow Evaluation, SLP Self Care / ADL Training , SLP Cognitive Skill Development and SLP Group Treatment             Goal: LTG-By discharge, patient will solve basic problems     Dates: Start: 10/19/19       Description: 1) Individualized goal: in order to discharge home with supervision  2) Interventions:  SLP Speech Language Treatment, SLP Self Care / ADL Training , SLP Cognitive Skill Development and SLP Group Treatment                   Problem: Swallowing STGs     Dates: Start: 10/19/19       Description:     Goal: STG-Within one week, patient will     Dates: Start: 10/19/19       Description: 1) Individualized goal: trigger swallows in response to oral stim or prefeeding trials on 85% of attempts.    2) Interventions:  SLP Swallowing Dysfunction Treatment, SLP Oral Pharyngeal Evaluation, SLP Video Swallow Evaluation, SLP Self Care / ADL Training , SLP Cognitive Skill Development and SLP Group Treatment                        Section completed by:  Lorene Reno MS,CCC-SLP

## 2019-10-22 NOTE — REHAB-CM IDT TEAM NOTE
Case Management    DC Planning  DC destination/dispostion:  Anticipate home with wife. Patient has recently moved into a single story home per chart review.     Referrals: None at this time.    DC Needs: F/u with PCP, Dr. Dong (Oral Surgery), Dr. Morrow (Cardiology), possibly psychiatry     Barriers to discharge:  Patient with trach and not tolerating capping trials at this time;     Strengths: Supportive spouse     Section completed by:  Sabine Thakkar
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
## 2019-10-22 NOTE — CARE PLAN
Problem: Communication  Goal: The ability to communicate needs accurately and effectively will improve  Outcome: PROGRESSING SLOWER THAN EXPECTED  Intervention: Educate patient and significant other/support system about the plan of care, procedures, treatments, medications and allow for questions  Note:   Plan of care for the day discussed this morning with pt. Pt very impulsive, has poor safety awareness and is limited due lines ie vale, cortrak and trach. Pt also limited with communication due to trach and decreased speaking valve tolerance.     Pt unsafe to stay up in wc in room alone even with alarms, seat belts, and hourly rounding. Pt does not like sitting at nurses station and does better sitting in the gym but cannot be supervised in the gym. Requested 1:1 sitter for safety. Will continue to monitor       Problem: Safety  Goal: Will remain free from injury  Outcome: PROGRESSING SLOWER THAN EXPECTED

## 2019-10-22 NOTE — FLOWSHEET NOTE
10/22/19 1130   Events/Summary/Plan   Events/Summary/Plan aerosol check   Education   Education Yes - Pt. / Family has been Instructed in use of Respiratory Equipment   Aerosol Therapy / Airway Management   #Aerosol Therapy / Airway Management Trache Collar  (on standby)   Aerosol Humidity Temp (celsius) 31   Respiratory WDL   Respiratory (WDL) X   Chest Exam   Respiration 18   Pulse 84   Breath Sounds   RUL Breath Sounds   (BS are raspy throughout all fields, diminished LLL)   Oxygen   O2 Daily Delivery Respiratory  Room Air with O2 Available

## 2019-10-22 NOTE — FLOWSHEET NOTE
10/22/19 0831   Events/Summary/Plan   Events/Summary/Plan aerosol check, standby with speech therapy. SLP instructed on finger occlusion as pt not tolerating speaking valve.  02 SATS monitored   Education   Education Yes - Pt. / Family has been Instructed in Trach Care;Yes - Pt. / Family has been Instructed in use of Respiratory Equipment   Aerosol Therapy / Airway Management   #Aerosol Therapy / Airway Management Trache Collar   Aerosol Humidity Temp (celsius) 31   Trache Weaning and Decannulation   Valve Trial Initiated, Smart Text Complete? Yes   Hours Tolerated   (1-2 minutes)   Respiratory WDL   Respiratory (WDL) X   Secretions   Cough Congested;Productive   How Sputum Obtained Endotracheal;Suction;Tracheal   Sputum Amount Moderate   Sputum Color Yellow   Sputum Consistency Thick   Suction Frequency Suctioned Once or Twice Per Encounter   Airway Trach Tracheostomy 6.0   Placement Date/Time: 09/14/19 1250   Airway Type: Trach  Brand: [employer]  Style: Cuffed  Airway Location: Tracheostomy  Airway Size: 6.0  Inserted In: Unit  Inserted by: Respiratory care practitioner   Airway Tube Secured Velcro attachment   Cuff Pressure cmH2O (R.C.)   (deflated)   Extra Tracheostomy Tube at Bedside Yes   Oximetry   #Pulse Oximetry (Single Determination) Yes   Oxygen   Home O2 LPM Flow   (dropped to 86% with speaking valve)   Pulse Oximetry 92 %  (dropped to 86 with PMSV on)   O2 (LPM) 5   FiO2% 30 %   O2 Daily Delivery Respiratory  Room Air with O2 Available

## 2019-10-22 NOTE — REHAB-NURSING IDT TEAM NOTE
Nursing   Nursing  Diet/Nutrition:  Tube Feed and NPO  Medication Administration:  Via Gastric Tube  Supplement:  Impact Peptide    Admit Weight:  Weight: 90.2 kg (198 lb 14.4 oz)  Weight Last 7 Days   [90.2 kg (198 lb 14.4 oz)] 90.2 kg (198 lb 14.4 oz) (10/18 1621)    Pain Issues:    Location:  --  --         Severity:  Denies   Scheduled pain medications:  None     PRN pain medications used in last 24 hours:  None   Non Pharmacologic Interventions:  distraction, emotional support, relaxation, repositioned and rest  Effectiveness of pain management plan:  good=patient states acceptable comfort after interventions    Bowel:    Bowel Assist Initial Score:  4 - Minimal Assistance  Bowel Assist Current Score:  2 - Max Assistance  Bowl Accidents in last 7 days:  0  Last bowel movement: 10/21/19  Stool Description: Large, Watery     Usual bowel pattern:  daily  Scheduled bowel medications:  senna-docusate (PERICOLACE or SENOKOT S)   PRN bowel medications used in last 24 hours:  None  Nursing Interventions:  Increased time, Scheduled medication, Supervision, Verbal cueing, Set-up, Brief, Positioning on commode/toilet(pt had small incontinence in brief and finished large BM on toilet; staff assistance with cleaning and brief change)  Effectiveness of bowel program:   fair=occasional unpredictable, incontinent emptying of bowel (less than 1 time day)     Bladder:  HYLTON CATHETER  Bladder Assist Initial Score:  1 - Total Assistance  Bladder Assist Current Score:  1 - Total Assistance  Bladder Accidents in last 7 days:     Medications affecting bladder:  Hytrin    Interventions:  Increased time, Medication, Supervision, Verbal cueing, Emptying of device, Indwelling catheter    [place]:    Type:     Patient Lines/Drains/Airways Status    Active Catheter     Name: Placement date: Placement time: Site: Days:    Urethral Catheter  10/18/19   1827   3              Date placed:    10/18/19      Justification: Retention    Wound:       Patient Lines/Drains/Airways Status    Active Current Wounds     Name: Placement date: Placement time: Site: Days:    Wound 09/07/19 Full Thickness Wound Neck inferior trach related to erosion/moisture   09/07/19   --   --  45    Wound 10/18/19 Pressure Injury Coccyx  10/18/19   1825   --  3                   Interventions:  NURSING TO PROVIDE SKIN CARE TO WOUND BELOW TRACH PRN   Remove previous dressing, cleanse wound with NS and gauze, cover area over and around wound with barrier paste.  Apply fenestrated foam.    Mepilex foam to coccyx.    Effectiveness of intervention:  wound is unchanged     Sleep/wake cycle:   Average hours slept:  Sleeps 3-4 hours without waking  Sleep medication usage:  None    Patient/Family Training/Education:  Aspiration Precautions, Bladder Management/Training, Bowel Management/Training, Diet/Nutrition, Fall Prevention, General Self Care, Medication Management, Pain Management, Respiratory Hygiene, Safe Transfers, Safety, Skin Care, Wound Care and Other:  trach care  Discharge Supply Recommendations:  Catheter Supplies, Blood Pressure Monitor, Wound Care Supplies and Feeding Tube Supplies  Strengths: Supportive family and Manages pain appropriately   Barriers:   Aspiration risk, Unable to follow instructions, Bladder retention, Bowel incontinence, Confused, Fatigue, Generalized weakness, Impulsive, Limited mobility and Other:  Hard of Hearing       Nursing Problems     Problem: Bowel/Gastric:     Description:     Goal: Normal bowel function is maintained or improved     Description:           Goal: Will not experience complications related to bowel motility     Description:                 Problem: Communication     Description:     Goal: The ability to communicate needs accurately and effectively will improve     Description:                 Problem: Discharge Barriers/Planning     Description:     Goal: Patient's continuum of care needs will be met     Description:                  Problem: Infection     Description:     Goal: Will remain free from infection     Description:                 Problem: Knowledge Deficit     Description:     Goal: Knowledge of disease process/condition, treatment plan, diagnostic tests, and medications will improve     Description:           Goal: Knowledge of the prescribed therapeutic regimen will improve     Description:                 Problem: Pain Management     Description:     Goal: Pain level will decrease to patient's comfort goal     Description:                 Problem: Psychosocial Needs:     Description:     Goal: Level of anxiety will decrease     Description:                 Problem: Respiratory:     Description:     Goal: Respiratory status will improve     Description:                 Problem: Safety     Description:     Goal: Will remain free from injury     Description:           Goal: Will remain free from falls     Description:                 Problem: Skin Integrity     Description:     Goal: Risk for impaired skin integrity will decrease     Description:                 Problem: Venous Thromboembolism (VTW)/Deep Vein Thrombosis (DVT) Prevention:     Description:     Goal: Patient will participate in Venous Thrombosis (VTE)/Deep Vein Thrombosis (DVT)Prevention Measures     Description:                        Long Term Goals:   At discharge patient will be able to function safely at home and in the community with support.    Section completed by:  Juan Dhillon R.N.

## 2019-10-22 NOTE — FLOWSHEET NOTE
10/22/19 1525   Events/Summary/Plan   Events/Summary/Plan trach care, aerosol on standby   Education   Education Yes - Pt. / Family has been Instructed in use of Respiratory Equipment   Aerosol Therapy / Airway Management   #Aerosol Therapy / Airway Management Trache Collar   Aerosol Humidity Temp (celsius) 31   Trache Weaning and Decannulation   Hours Tolerated   (speech did another 2 minutes this afternoon)   Respiratory WDL   Respiratory (WDL) X   Chest Exam   Respiration   (18)   Secretions   Cough Congested;Productive   How Sputum Obtained Expectorated;Spontaneous;Suction;Tracheal   Sputum Amount Moderate   Sputum Color Yellow   Sputum Consistency Thick   Suction Frequency Suctioned Once or Twice Per Encounter   Airway Trach Tracheostomy 6.0   Placement Date/Time: 09/14/19 1250   Airway Type: Trach  Brand: [employer]  Style: Cuffed  Airway Location: Tracheostomy  Airway Size: 6.0  Inserted In: Unit  Inserted by: Respiratory care practitioner   Site Assessment Clean;Dry;Intact;No drainage   Airway Tube Secured Velcro attachment   Tracheostomy/Stoma Care Dressing Change   Tracheostomy/Cannula Changed (Date) 10/22/19   Next Tracheostomy/Cannula Change Due (Date) 10/23/19   Extra Tracheostomy Tube at Bedside Yes   Oxygen   O2 Daily Delivery Respiratory  Room Air with O2 Available   OTHER   Resuscitation Bag Resuscitation Bag and Mask at Bedside and Checked

## 2019-10-22 NOTE — REHAB-OT IDT TEAM NOTE
Occupational Therapy   Activities of Daily Living  Eating Initial:  1 - Total Assistance  Eating Current:  1 - Total Assistance   Eating Description:  Set-up of equipment or meal/tube feeding, Staff administers tube feed/parenteral nutrition/IVF  Grooming Initial:  5 - Standby Prompting/Supervision or Set-up  Grooming Current:  5 - Standby Prompting/Supervision or Set-up   Grooming Description:  (set up and heavy verbal and tactile cuing to adhere to swallow precautions during oral care)  Bathing Initial:  3 - Moderate Assistance  Bathing Current:  3 - Moderate Assistance   Bathing Description:  Grab bar, Tub bench, Hand held shower, Supervision for safety, Verbal cueing, Set-up of equipment, Initial preparation for task(assistance for washing and drying 7/10 body parts due to impulsivity (pt unsteady on ft and intermittently stands). Trach waterproofed with shield mask and tape.)  Upper Body Dressing Initial:  4 - Minimal Assistance  Upper Body Dressing Current:  5 - Standby Prompting/Supervision or Set-up   Upper Body Dressing Description:  (set up and cuing to avoid pulling on NG tube)  Lower Body Dressing Initial:  2 - Max Assistance  Lower Body Dressing Current:  4 - Minimal Assistance   Lower Body Dressing Description:  4 - Minimal Assistance  Toileting Initial:  3 - Moderate Assistance  Toileting Current:  3 - Moderate Assistance   Toileting Description:  Grab bar, Supervision for safety, Verbal cueing, Set-up of equipment  Toilet Transfer Initial:  3 - Moderate Assistance  Toilet Transfer Current:  3 - Moderate Assistance   Toilet Transfer Description:  3 - Moderate Assistance  Tub / Shower Transfer Initial:  3 - Moderate Assistance  Tub / Shower Transfer Current:  4 - Minimal Assistance   Tub / Shower Transfer Description:  Grab bar(CGA and heavy cuing for sequencing for SPT wc <> shower chair)  IADL:  Not yet addressed  Family Training/Education:  Spouse has not been present, not yet addressed  DME/DC  Recommendations:  TBD     Strengths:  Impaired but fair- stand balance, pleasant, expresses needs/wants, fair strength  Barriers:  Impulsivity, impaired cog  # of short term goals set= 4   # of short term goals met= 0     Occupational Therapy Goals     Problem: Bathing     Dates: Start: 10/19/19       Description:     Goal: STG-Within one week, patient will bathe     Dates: Start: 10/19/19       Description: 1) Individualized Goal: supervision with AE/DME prn.  2) Interventions: OT Group Therapy, OT Self Care/ADL, OT Cognitive Skill Dev, OT Community Reintegration, OT Manual Ther Technique, OT Neuro Re-Ed/Balance, OT Therapeutic Activity, OT Evaluation and OT Therapeutic Exercise                   Problem: Dressing     Dates: Start: 10/19/19       Description:     Goal: STG-Within one week, patient will dress LB     Dates: Start: 10/19/19       Description: 1) Individualized Goal: supervision with AE prn.  2) Interventions: OT Group Therapy, OT Self Care/ADL, OT Cognitive Skill Dev, OT Community Reintegration, OT Manual Ther Technique, OT Neuro Re-Ed/Balance, OT Therapeutic Activity, OT Evaluation and OT Therapeutic Exercise                   Problem: Functional Transfers     Dates: Start: 10/19/19       Description:     Goal: STG-Within one week, patient will     Dates: Start: 10/19/19       Description: 1) Individualized Goal: transfer to shower and toilet with supervision and AD/DME prn.  2) Interventions: OT Group Therapy, OT Self Care/ADL, OT Cognitive Skill Dev, OT Community Reintegration, OT Manual Ther Technique, OT Neuro Re-Ed/Balance, OT Therapeutic Activity, OT Evaluation and OT Therapeutic Exercise                   Problem: OT Long Term Goals     Dates: Start: 10/19/19       Description:     Goal: LTG-By discharge, patient will complete basic self care tasks     Dates: Start: 10/19/19       Description: 1) Individualized Goal:  Mod I with AE/DME prn.  2) Interventions:  OT Group Therapy, OT Self  Care/ADL, OT Cognitive Skill Dev, OT Community Reintegration, OT Manual Ther Technique, OT Neuro Re-Ed/Balance, OT Therapeutic Activity, OT Evaluation and OT Therapeutic Exercise             Goal: LTG-By discharge, patient will perform bathroom transfers     Dates: Start: 10/19/19       Description: 1) Individualized Goal: Mod I with AD/DME prn.  2) Interventions: OT Group Therapy, OT Self Care/ADL, OT Cognitive Skill Dev, OT Community Reintegration, OT Manual Ther Technique, OT Neuro Re-Ed/Balance, OT Therapeutic Activity, OT Evaluation and OT Therapeutic Exercise                   Problem: Toileting     Dates: Start: 10/19/19       Description:     Goal: STG-Within one week, patient will complete toileting tasks     Dates: Start: 10/19/19       Description: 1) Individualized Goal: supervision with AE/DME prn.  2) Interventions: OT Group Therapy, OT Self Care/ADL, OT Cognitive Skill Dev, OT Community Reintegration, OT Manual Ther Technique, OT Neuro Re-Ed/Balance, OT Therapeutic Activity, OT Evaluation and OT Therapeutic Exercise                         Section completed by:  King Hobbs, OT
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
## 2019-10-22 NOTE — THERAPY
"Speech Language Pathology  Daily Treatment     Patient Name: Pedro Champion  Age:  79 y.o., Sex:  male  Medical Record #: [id number]  Today's Date: 10/22/2019     Subjective    Nursing transferring pt to w/c for ST. Pt continues to demonstrate anxiety with PMSV as well as when staff working in close proximity - pt gesturing and mouthing \"time out\"      Objective     10/22/19 1134   Speech / Dysarthria   Articulation Training Moderate (3)   Intensity / Loudness Training Moderate (3)   Respiration Control Severe (2)   Interdisciplinary Plan of Care Collaboration   IDT Collaboration with  Nursing;Respiratory Therapist   Patient Position at End of Therapy Seated;Self Releasing Lap Belt Applied   SLP Total Time Spent   SLP Individual Total Time Spent (Mins) 30   Charge Group   SLP Treatment - Individual Speech Language Treatment - Individual       Assessment    Trials of PMSV and finger occlusion with use of full length mirror for visual biofeedback with placement of finger over trach site. Pt continues to require verbal, visual and tactile cues for placement, continues to attempt to \"plug\" nose instead of occluding trach site. Is able to produce voicing without occlusion. PMSV tolerated x4 trials ranging from 55 seconds to 1 minute 35 seconds. Sats remaining between 92-94 with HR at 72-74. Pt unable to attain goal of 2 minutes this date despite encouragement, visual feedback with timer, pulse ox.     Plan    Continue PMSV, oral trials with suction and blue dye    "
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
## 2019-10-22 NOTE — CARE PLAN
Problem: Dressing  Goal: STG-Within one week, patient will dress LB  Description  1) Individualized Goal: supervision with AE prn.  2) Interventions: OT Group Therapy, OT Self Care/ADL, OT Cognitive Skill Dev, OT Community Reintegration, OT Manual Ther Technique, OT Neuro Re-Ed/Balance, OT Therapeutic Activity, OT Evaluation and OT Therapeutic Exercise     Outcome: NOT MET     Problem: Toileting  Goal: STG-Within one week, patient will complete toileting tasks  Description  1) Individualized Goal: supervision with AE/DME prn.  2) Interventions: OT Group Therapy, OT Self Care/ADL, OT Cognitive Skill Dev, OT Community Reintegration, OT Manual Ther Technique, OT Neuro Re-Ed/Balance, OT Therapeutic Activity, OT Evaluation and OT Therapeutic Exercise     Outcome: NOT MET     Problem: Functional Transfers  Goal: STG-Within one week, patient will  Description  1) Individualized Goal: transfer to shower and toilet with supervision and AD/DME prn.  2) Interventions: OT Group Therapy, OT Self Care/ADL, OT Cognitive Skill Dev, OT Community Reintegration, OT Manual Ther Technique, OT Neuro Re-Ed/Balance, OT Therapeutic Activity, OT Evaluation and OT Therapeutic Exercise     Outcome: NOT MET     Problem: Bathing  Goal: STG-Within one week, patient will bathe  Description  1) Individualized Goal: supervision with AE/DME prn.  2) Interventions: OT Group Therapy, OT Self Care/ADL, OT Cognitive Skill Dev, OT Community Reintegration, OT Manual Ther Technique, OT Neuro Re-Ed/Balance, OT Therapeutic Activity, OT Evaluation and OT Therapeutic Exercise     Outcome: PROGRESSING SLOWER THAN EXPECTED

## 2019-10-22 NOTE — THERAPY
Speech Language Pathology  Daily Treatment     Patient Name: Pedro Champion  Age:  79 y.o., Sex:  male  Medical Record #: [id number]  Today's Date: 10/22/2019     Subjective    Pt pleasant and cooperative. RT present.     Objective       10/22/19 0803   SLP Total Time Spent   SLP Individual Total Time Spent (Mins) 30   Charge Group   SLP Treatment - Individual Speech Language Treatment - Individual       Assessment    PMSV placed, pt tolerated for 2 min and reported being unable to breathe at this time. O2 dropped to 87 and PMSV was removed. Pt highly anxious at this time. Pt provided education re: PMSV, held in his hand and educated how it works. Pt receptive at this time. Practiced use of finger occlusion with discussion re: pts ability to use at any time to express wants and needs to those around him. Pt able to respond to SLP questions with voicing on 60% of trials indep increased to 100% provided MOD cues.     Plan    PMSV tolerance

## 2019-10-23 ENCOUNTER — APPOINTMENT (OUTPATIENT)
Dept: RADIOLOGY | Facility: REHABILITATION | Age: 79
DRG: 947 | End: 2019-10-23
Attending: PHYSICAL MEDICINE & REHABILITATION
Payer: MEDICARE

## 2019-10-23 PROCEDURE — 700105 HCHG RX REV CODE 258: Performed by: PHYSICAL MEDICINE & REHABILITATION

## 2019-10-23 PROCEDURE — A9270 NON-COVERED ITEM OR SERVICE: HCPCS | Performed by: PHYSICAL MEDICINE & REHABILITATION

## 2019-10-23 PROCEDURE — 94760 N-INVAS EAR/PLS OXIMETRY 1: CPT

## 2019-10-23 PROCEDURE — 700111 HCHG RX REV CODE 636 W/ 250 OVERRIDE (IP): Performed by: PHYSICAL MEDICINE & REHABILITATION

## 2019-10-23 PROCEDURE — 770010 HCHG ROOM/CARE - REHAB SEMI PRIVAT*

## 2019-10-23 PROCEDURE — 700101 HCHG RX REV CODE 250: Performed by: PHYSICAL MEDICINE & REHABILITATION

## 2019-10-23 PROCEDURE — 700111 HCHG RX REV CODE 636 W/ 250 OVERRIDE (IP)

## 2019-10-23 PROCEDURE — 74018 RADEX ABDOMEN 1 VIEW: CPT

## 2019-10-23 PROCEDURE — 92526 ORAL FUNCTION THERAPY: CPT

## 2019-10-23 PROCEDURE — 700102 HCHG RX REV CODE 250 W/ 637 OVERRIDE(OP): Performed by: PHYSICAL MEDICINE & REHABILITATION

## 2019-10-23 PROCEDURE — 71045 X-RAY EXAM CHEST 1 VIEW: CPT

## 2019-10-23 PROCEDURE — 94640 AIRWAY INHALATION TREATMENT: CPT

## 2019-10-23 PROCEDURE — 99232 SBSQ HOSP IP/OBS MODERATE 35: CPT | Performed by: HOSPITALIST

## 2019-10-23 PROCEDURE — 99233 SBSQ HOSP IP/OBS HIGH 50: CPT | Performed by: PHYSICAL MEDICINE & REHABILITATION

## 2019-10-23 RX ORDER — QUETIAPINE FUMARATE 25 MG/1
50 TABLET, FILM COATED ORAL EVERY EVENING
Status: DISCONTINUED | OUTPATIENT
Start: 2019-10-23 | End: 2019-10-24

## 2019-10-23 RX ORDER — SODIUM CHLORIDE 9 MG/ML
1000 INJECTION, SOLUTION INTRAVENOUS CONTINUOUS
Status: DISCONTINUED | OUTPATIENT
Start: 2019-10-23 | End: 2019-10-25

## 2019-10-23 RX ORDER — LORAZEPAM 2 MG/ML
INJECTION INTRAMUSCULAR
Status: COMPLETED
Start: 2019-10-23 | End: 2019-10-23

## 2019-10-23 RX ORDER — PREDNISONE 20 MG/1
40 TABLET ORAL DAILY
Status: DISCONTINUED | OUTPATIENT
Start: 2019-10-23 | End: 2019-10-25

## 2019-10-23 RX ORDER — LORAZEPAM 2 MG/ML
0.5 INJECTION INTRAMUSCULAR ONCE
Status: COMPLETED | OUTPATIENT
Start: 2019-10-23 | End: 2019-10-23

## 2019-10-23 RX ORDER — SODIUM CHLORIDE 9 MG/ML
1 INJECTION, SOLUTION INTRAVENOUS CONTINUOUS
Status: DISCONTINUED | OUTPATIENT
Start: 2019-10-23 | End: 2019-10-23

## 2019-10-23 RX ORDER — HYDROXYZINE HYDROCHLORIDE 25 MG/1
25 TABLET, FILM COATED ORAL 3 TIMES DAILY PRN
Status: DISCONTINUED | OUTPATIENT
Start: 2019-10-23 | End: 2019-11-05 | Stop reason: HOSPADM

## 2019-10-23 RX ADMIN — APIXABAN 5 MG: 5 TABLET, FILM COATED ORAL at 21:04

## 2019-10-23 RX ADMIN — LORAZEPAM 0.5 MG: 2 INJECTION INTRAMUSCULAR at 10:14

## 2019-10-23 RX ADMIN — SODIUM CHLORIDE 1000 ML: 9 INJECTION, SOLUTION INTRAVENOUS at 12:00

## 2019-10-23 RX ADMIN — QUETIAPINE 50 MG: 25 TABLET ORAL at 21:04

## 2019-10-23 RX ADMIN — RISPERIDONE 0.5 MG: 0.25 TABLET ORAL at 21:03

## 2019-10-23 RX ADMIN — LORAZEPAM 0.5 MG: 2 INJECTION INTRAMUSCULAR; INTRAVENOUS at 13:05

## 2019-10-23 RX ADMIN — CHLORHEXIDINE GLUCONATE 0.12% ORAL RINSE 15 ML: 1.2 LIQUID ORAL at 21:04

## 2019-10-23 RX ADMIN — GUAIFENESIN 200 MG: 100 SOLUTION ORAL at 23:57

## 2019-10-23 RX ADMIN — VALPROIC ACID 500 MG: 250 SOLUTION ORAL at 22:18

## 2019-10-23 RX ADMIN — LORAZEPAM 0.5 MG: 2 INJECTION INTRAMUSCULAR at 13:05

## 2019-10-23 RX ADMIN — METOPROLOL TARTRATE 75 MG: 25 TABLET ORAL at 21:03

## 2019-10-23 RX ADMIN — TERAZOSIN HYDROCHLORIDE 2 MG: 1 CAPSULE ORAL at 21:03

## 2019-10-23 RX ADMIN — BACITRACIN 1 EACH: 500 OINTMENT TOPICAL at 22:20

## 2019-10-23 RX ADMIN — DEXTROSE MONOHYDRATE 500 MG: 5 INJECTION, SOLUTION INTRAVENOUS at 06:13

## 2019-10-23 RX ADMIN — METOPROLOL TARTRATE 75 MG: 25 TABLET ORAL at 15:01

## 2019-10-23 RX ADMIN — LORAZEPAM 0.5 MG: 2 INJECTION INTRAMUSCULAR; INTRAVENOUS at 10:14

## 2019-10-23 RX ADMIN — SENNOSIDES AND DOCUSATE SODIUM 2 TABLET: 8.6; 5 TABLET ORAL at 21:03

## 2019-10-23 RX ADMIN — GUAIFENESIN 200 MG: 100 SOLUTION ORAL at 18:12

## 2019-10-23 RX ADMIN — OMEPRAZOLE 40 MG: KIT at 15:00

## 2019-10-23 RX ADMIN — VALPROIC ACID 500 MG: 250 SOLUTION ORAL at 15:00

## 2019-10-23 RX ADMIN — TRAZODONE HYDROCHLORIDE 50 MG: 50 TABLET ORAL at 21:03

## 2019-10-23 RX ADMIN — HYDROXYZINE HYDROCHLORIDE 25 MG: 25 TABLET, FILM COATED ORAL at 22:16

## 2019-10-23 RX ADMIN — DIGOXIN 250 MCG: 125 TABLET ORAL at 18:12

## 2019-10-23 RX ADMIN — PREDNISONE 40 MG: 20 TABLET ORAL at 15:01

## 2019-10-23 ASSESSMENT — ENCOUNTER SYMPTOMS
HALLUCINATIONS: 0
BLURRED VISION: 0
SHORTNESS OF BREATH: 0
PALPITATIONS: 0
VOMITING: 0
DIZZINESS: 0
NAUSEA: 0
FEVER: 0
HEADACHES: 0

## 2019-10-23 NOTE — FLOWSHEET NOTE
10/23/19 1418   Events/Summary/Plan   Events/Summary/Plan standby in pt's room for another hour to monitor with coretrak placement   Aerosol Therapy / Airway Management   #Aerosol Therapy / Airway Management Trache Collar   Aerosol Humidity Temp (celsius) 31  (standby)   Respiratory WDL   Respiratory (WDL) X   Chest Exam   Respiration 16   Pulse 94   Secretions   Cough Congested;Productive;Strong   How Sputum Obtained Expectorated;Spontaneous   Sputum Amount Large   Sputum Color   (pale blue)   Sputum Consistency Thick   Airway Trach Tracheostomy 6.0   Placement Date/Time: 09/14/19 1250   Airway Type: Trach  Brand: AnnaHybrent  Style: Cuffed  Airway Location: Tracheostomy  Airway Size: 6.0  Inserted In: Unit  Inserted by: Respiratory care practitioner   Site Assessment   (wound under lower trach flange)   Airway Tube Secured Velcro attachment   Date Securing Device changed? 10/23/19   Date Securing Device to be changed (Q 7 days) 10/30/19   Extra Tracheostomy Tube at Bedside Yes   Oximetry   #Pulse Oximetry (Single Determination) Yes   Oxygen   Pulse Oximetry 95 %   O2 Daily Delivery Respiratory  Room Air with O2 Available
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
## 2019-10-23 NOTE — THERAPY
Missed Therapy     Patient Name: Pedro Champion  Age:  79 y.o., Sex:  male  Medical Record #: [id number]  Today's Date: 10/23/2019    Discussed missed therapy with therapy  and Dr. Molina.     Pt in procedure for reinsertion of NG tube.

## 2019-10-23 NOTE — CARE PLAN
Problem: Safety  Goal: Will remain free from injury  Intervention: Educate patient and significant other/support system about adaptive mobility strategies and safe transfers  Note:   Patient very impulsive, unsafe to be left alone at this time despite alarms on bed and w/c and being close to nurses station. Patient is 1:1 sitter today with CNA Annie.     Problem: Other Problem (see comments)  Intervention: Other Intervention  Note:   Cortrak pulled out last night by patient. NIGHAT Scott from [facility] came to replace cortrak. Waiting for tube placement verification at this time via x-ray. Unable to give patient tube feed or medications so far today. Wife came to rehab this afternoon and is at bedside.

## 2019-10-23 NOTE — FLOWSHEET NOTE
10/23/19 1134   Events/Summary/Plan   Events/Summary/Plan Standby in pt's room for 2+ hours.  Assisted SLP with blue dye study (pt failed) strong coughing with copious blue secretions.  This was f/b standby for cortrak placement that failed after multiple attempts.  Pt was monitored closely with oxygen as needed and SATS.     Aerosol Therapy / Airway Management   #Aerosol Therapy / Airway Management Trache Collar   Aerosol Humidity Temp (celsius) 31  (standby)   Trache Weaning and Decannulation   Valve Trial Initiated, Smart Text Complete?   (no, using finger occlusion)   Respiratory WDL   Respiratory (WDL) X   Chest Exam   Respiration 20   Pulse (!) 107  (ranging from )   Secretions   Cough Congested;Productive;Strong   How Sputum Obtained Expectorated;Spontaneous;Suction;Tracheal   Sputum Amount Copious   Sputum Color   (blue)   Sputum Consistency Thick   Suction Frequency Suctioned Three or Four Times Per Encounter   Airway Trach Tracheostomy 6.0   Placement Date/Time: 09/14/19 1250   Airway Type: Trach  Brand: Yoanna  Style: Cuffed  Airway Location: Tracheostomy  Airway Size: 6.0  Inserted In: Unit  Inserted by: Respiratory care practitioner   Cuff Pressure cmH2O (R.C.)   (cuff deflated)   Tracheostomy/Stoma Care Clean Stoma Site;Dressing Change   Tracheostomy/Cannula Changed (Date) 10/23/19   Next Tracheostomy/Cannula Change Due (Date) 10/24/19   Extra Tracheostomy Tube at Bedside Yes   Oximetry   #Pulse Oximetry (Single Determination) Yes   Oxygen   Pulse Oximetry 94 %   O2 Daily Delivery Respiratory  Room Air with O2 Available

## 2019-10-23 NOTE — DISCHARGE PLANNING
NELLA met with patient's wife to provide conference updates. She reports patient has pulled his Cortrak and they are trying to replace it now. Due to patient's impaired safety and impulsivity and 1:1 sitter has been recommended. Staff also discussed patient not tolerating capping trials or speaking valve at this time. NELLA l/m with Dr. Dong's office to request f/u. D/c date not yet determined.
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
## 2019-10-23 NOTE — CARE PLAN
Problem: Communication  Goal: The ability to communicate needs accurately and effectively will improve  Outcome: PROGRESSING SLOWER THAN EXPECTED  Note:   Pt is hard of hearing and cannot communicate verbally due to trach. Pt will need extra time to communicate his needs to staff effectively.     Problem: Safety  Goal: Will remain free from injury  Outcome: PROGRESSING SLOWER THAN EXPECTED  Note:   Pt is impulsive and a high fall risk. Pt in room close to nursing station with alarms in place for safety. Use of call light reinforced with each encounter.     **Pt pulled out his cortrak this evening, and is otherwise NPO with Midline IV access. On call Physician Dr. Marie paged and updated with current situation. Medications reviewed with Dr. Maire. Dr. Marie ordered a one time dose of Lovenox 40mg to cover the dose of Apixaban that cannot be given. Dr. Marie also ordered Valproate IVPB to cover the Valproate Sodium solution that cannot currently be given. Dr. Marie wants a swallow evaluation to be performed as soon as SLP can in the AM to determine swallowing status vs. needing to place another NG tube. Pt received Lovenox 40mg sub-Q. Valproate IVPB dose obtained from [facility] and first dose administered.  Please refer to MAR to see what medication was given and what could not be given over the shift.      Problem: Pain Management  Goal: Pain level will decrease to patient's comfort goal  Outcome: PROGRESSING AS EXPECTED  Note:   No reports of pain at this time. Will continue to monitor through shift with hourly rounds.

## 2019-10-23 NOTE — PROGRESS NOTES
Unable to give medications or start tube feed. Patient pulled out cortrak last night. Multiple attempts by Charge Nurse this am to replace, so far unsuccessful. Dr Molina is aware.

## 2019-10-23 NOTE — THERAPY
Speech Language Pathology  Daily Treatment     Patient Name: Pedro Champion  Age:  79 y.o., Sex:  male  Medical Record #: [id number]  Today's Date: 10/23/2019     Subjective    Pt in w/c in room, spouse present. Emphasis of session on family and patient education re: dysphagia and POC.      Objective       10/23/19 1434   Dysphagia    Other Treatments dysphagia education provided to pt and spouse re: results of blue dye test recommendations for PEG. Oral care completed with suction toothbrush   Diet / Liquid Recommendation NPO   Nutritional Liquid Intake Rating Scale 1   Nutritional Food Intake Rating Scale 1   Interdisciplinary Plan of Care Collaboration   IDT Collaboration with  Family / Caregiver;Respiratory Therapist;Certified Nursing Assistant   Patient Position at End of Therapy Seated;Chair Alarm On;Self Releasing Lap Belt Applied;Family / Friend in Room   Collaboration Comments education completed with spouse on pt CLOF as it pertains to dysphagia and POC   SLP Total Time Spent   SLP Individual Total Time Spent (Mins) 30   Charge Group   SLP Swallowing Dysfunction Treatment Swallowing Dysfunction Treatment         Assessment    Education provided to patient and spouse re: results of blue dye test, pt's CLOF as it pertains to swallow function and high risk of aspiration with recommendation for ongoing alternative nutrition/hydration via PEG. Pt and spouse both in agreement with recommendation having talked with RT and physician prior to ST session. Oral care completed with suction toothbrush, pt able to assist, however, requires hand over hand for thoroughness. Pt attempting to voice during session, continues to demonstrate difficulty coordinating breath pattern for speech with finger occlusion, requiring SLP to occlude, however, pt able to produce minimal voicing this date.     Plan  Continue to target PMSV trials, confirm date/time for PEG placement

## 2019-10-23 NOTE — CARE PLAN
Problem: Other Problem (see comments)  Goal: Other Goal  Description  Monitor/Evaluation: Monitor PO vs TF intake, diet upgrades, weight, labs, medication adjustments, skin integrity, GI function, vitals, I/Os, and overall hydration status.  Adjust nutritional POC pending clinical outcomes.    RD following bi-weekly until EN at a goal and tolerated.   Goal: 1. Tolerance to EN adjustment.  Consider PEG vs G-tube  2. Maintain adequate oral vs TF nutrient/fluid intake to promote nutrition optimization/healing. 3. Transition to PO pending clinical outcomes.         Outcome: PROGRESSING SLOWER THAN EXPECTED    Pt pulled out his Cortrak last night. It was replaced this afternoon. Tip of tube is in the stomach. Tube feed on hold until replacement of tube and confirmation of placement.

## 2019-10-23 NOTE — PROGRESS NOTES
Patient care assumed. Report received from Ferny Concepcion. Patient is alert and calm, resting in bed. Call light and bedside table within reach. Will continue to monitor.

## 2019-10-23 NOTE — THERAPY
Recreational Therapy  Daily Treatment     Patient Name: Pedro Champion  AGE:  79 y.o., SEX:  male  Medical Record #: [id number]  Today's Date: 10/22/2019       Subjective    Agreeable to session.     Objective       10/22/19 1801   Functional Ability Status - Cognitive   Attention Span Remains on Task   Comprehension Follows Three Step Commands   Judgment Able to Make Independent Decisions   Functional Ability Status - Emotional    Affect Appropriate   Mood Appropriate   Behavior Appropriate   Skilled Intervention    Skilled Intervention Relaxation / Coping Skills;Group Participation   Skilled Intervention Comments Art group with chalk pastels   Interdisciplinary Plan of Care Collaboration   IDT Collaboration with  Other (See Comments)  (Artist and volunteer)   Patient Position at End of Therapy Seated;Other (Comments)  (At nursing station)   Collaboration Comments Artist and volunteer assisted in facilitating group.    Treatment Time   Total Time Spent (mins) 45   Procedural Tracking   Procedural Tracking Group Treatment;Leisure Skills Development       Assessment    Pt learned how to create a work of art using chalk pastels with his peers. Pt was given information on different art techniques and mediums.    Plan    Follow up with patient to see if they are interested in continuing this leisure activity when they return to their community.

## 2019-10-23 NOTE — THERAPY
Speech Language Pathology  Daily Treatment     Patient Name: Pedro Champion  Age:  79 y.o., Sex:  male  Medical Record #: [id number]  Today's Date: 10/23/2019     Subjective    Pt waiting in hallway with 1:1 CNA for [facility] and cooperative. Pt with nursing, RT at end of session, secondary to fail of blue dye test and requiring reinsertion of Cortrak.      Objective       10/23/19 0904   Dysphagia    Other Treatments completed blue dye test with trials of ice chips, NTL via 5mL, 2.5 mL puree   Diet / Liquid Recommendation NPO   Nutritional Liquid Intake Rating Scale 1   Nutritional Food Intake Rating Scale 1   Tracheostomy   Tracheostomy Yes   Blue Dye Test Fail   Interdisciplinary Plan of Care Collaboration   IDT Collaboration with  Nursing;Physician;Respiratory Therapist;Family / Caregiver   Patient Position at End of Therapy Seated;Other (Comments)  (transfer of care to nursing to reinsert Cortrak)   Collaboration Comments RT present for blue dye test, nursing present to reinsert Cortrak, CLOF and POC with Dr. Molina, l/m for spouse to discuss CLOF and POC   SLP Total Time Spent   SLP Individual Total Time Spent (Mins) 60   Charge Group   SLP Swallowing Dysfunction Treatment Swallowing Dysfunction Treatment     FIM Eating Score:  1 - Total Assistance  Eating Description:  Tube feed bolus, Set-up of equipment or meal/tube feeding, Staff administers tube feed/parenteral nutrition/IVF    FIM Comprehension Score:  5 - Stand-by Prompting/Supervision or Set-up  Comprehension Description:  Verbal cues, Hearing aids/amplifiers    FIM Expression Score:  2 - Max Assistance  Expression Description:  Verbal cueing, Communication board, Passe Jen valve for trach    FIM Social Interaction Score:  4 - Minimal Assistance  Social Interaction Description:  Increased time, Medication, Verbal cues    FIM Problem Solving Score:  3 - Moderate Assistance  Problem Solving Description:  Verbal cueing, Therapy schedule, Increased  time, Seat belt, Bed/chair alarm, 1:1 supervision    FIM Memory Score:  3 - Moderate Assistance  Memory Description:  Verbal cueing, Therapy schedule, Bed/chair alarm, Seat belt, 1:1 supervision      Assessment    This SLP informed by therapy supervisor pt pulled Cortrak - pt seen for blue dye test to determine need for instrumental assessment and immediate reinsertion of Cortrak. Trials of single ice chips x3 trials - suction immediately following - no blue dye noted, very minimal secretions. Trials proceeded with 5mL NTL x3 trials - suction immediately following by RT - no blue dye noted, very minimal secretions suctioned. 2.5 mL puree applesauce x4 trials - suction completed immediately following no blue dye noted. Approximately 5 minutes post trials, pt with coughing episode - roxane signs of aspiration as noted by blue tinged secretions. Coughing and blue dye sputum continued to be expectorated via trach site for several minutes. RT completing suction several times. At this time, pt is not safe for PO intake and will continue to require alternative means of nutrition/hydration. Educated pt on need for longer term means of nutrition/hydration with possibility of PEG placement - pt verbalizing and nodding head, gesturing to stomach to indicate agreement with this recommendation. Pt providing authorization to contact spouse to discuss POC by this SLP. Nursing to reinsert Cortrak at this time, discussion with physician for PEG placement.     Plan    Reinsert Cortrak, confirm with physician recommendation for PEG placement.

## 2019-10-24 PROCEDURE — 97110 THERAPEUTIC EXERCISES: CPT

## 2019-10-24 PROCEDURE — 700102 HCHG RX REV CODE 250 W/ 637 OVERRIDE(OP): Performed by: PHYSICAL MEDICINE & REHABILITATION

## 2019-10-24 PROCEDURE — 94760 N-INVAS EAR/PLS OXIMETRY 1: CPT

## 2019-10-24 PROCEDURE — A9270 NON-COVERED ITEM OR SERVICE: HCPCS | Performed by: PHYSICAL MEDICINE & REHABILITATION

## 2019-10-24 PROCEDURE — 770010 HCHG ROOM/CARE - REHAB SEMI PRIVAT*

## 2019-10-24 PROCEDURE — 92507 TX SP LANG VOICE COMM INDIV: CPT

## 2019-10-24 PROCEDURE — 94640 AIRWAY INHALATION TREATMENT: CPT

## 2019-10-24 PROCEDURE — 700111 HCHG RX REV CODE 636 W/ 250 OVERRIDE (IP): Performed by: PHYSICAL MEDICINE & REHABILITATION

## 2019-10-24 PROCEDURE — 97530 THERAPEUTIC ACTIVITIES: CPT

## 2019-10-24 PROCEDURE — 700101 HCHG RX REV CODE 250: Performed by: PHYSICAL MEDICINE & REHABILITATION

## 2019-10-24 PROCEDURE — 97112 NEUROMUSCULAR REEDUCATION: CPT

## 2019-10-24 PROCEDURE — 99233 SBSQ HOSP IP/OBS HIGH 50: CPT | Performed by: PHYSICAL MEDICINE & REHABILITATION

## 2019-10-24 PROCEDURE — 99232 SBSQ HOSP IP/OBS MODERATE 35: CPT | Performed by: HOSPITALIST

## 2019-10-24 RX ORDER — LORAZEPAM 2 MG/ML
0.5 INJECTION INTRAMUSCULAR ONCE
Status: COMPLETED | OUTPATIENT
Start: 2019-10-24 | End: 2019-10-24

## 2019-10-24 RX ORDER — LORAZEPAM 2 MG/ML
INJECTION INTRAMUSCULAR
Status: ACTIVE
Start: 2019-10-24 | End: 2019-10-24

## 2019-10-24 RX ORDER — QUETIAPINE FUMARATE 100 MG/1
100 TABLET, FILM COATED ORAL EVERY EVENING
Status: DISCONTINUED | OUTPATIENT
Start: 2019-10-24 | End: 2019-11-01

## 2019-10-24 RX ORDER — LORAZEPAM 0.5 MG/1
0.5 TABLET ORAL EVERY 4 HOURS PRN
Status: DISCONTINUED | OUTPATIENT
Start: 2019-10-24 | End: 2019-11-05 | Stop reason: HOSPADM

## 2019-10-24 RX ORDER — ZIPRASIDONE MESYLATE 20 MG/ML
10 INJECTION, POWDER, LYOPHILIZED, FOR SOLUTION INTRAMUSCULAR EVERY 4 HOURS PRN
Status: DISCONTINUED | OUTPATIENT
Start: 2019-10-24 | End: 2019-11-05 | Stop reason: HOSPADM

## 2019-10-24 RX ADMIN — OMEPRAZOLE 40 MG: KIT at 09:07

## 2019-10-24 RX ADMIN — HYDROXYZINE HYDROCHLORIDE 25 MG: 25 TABLET, FILM COATED ORAL at 19:23

## 2019-10-24 RX ADMIN — METOPROLOL TARTRATE 75 MG: 25 TABLET ORAL at 19:23

## 2019-10-24 RX ADMIN — CHLORHEXIDINE GLUCONATE 0.12% ORAL RINSE 15 ML: 1.2 LIQUID ORAL at 09:07

## 2019-10-24 RX ADMIN — METOPROLOL TARTRATE 75 MG: 25 TABLET ORAL at 15:18

## 2019-10-24 RX ADMIN — GUAIFENESIN 200 MG: 100 SOLUTION ORAL at 17:24

## 2019-10-24 RX ADMIN — TERAZOSIN HYDROCHLORIDE 2 MG: 1 CAPSULE ORAL at 19:23

## 2019-10-24 RX ADMIN — GUAIFENESIN 200 MG: 100 SOLUTION ORAL at 05:50

## 2019-10-24 RX ADMIN — LORAZEPAM 0.5 MG: 0.5 TABLET ORAL at 17:25

## 2019-10-24 RX ADMIN — BACITRACIN 1 EACH: 500 OINTMENT TOPICAL at 09:07

## 2019-10-24 RX ADMIN — VALPROIC ACID 500 MG: 250 SOLUTION ORAL at 05:53

## 2019-10-24 RX ADMIN — VALPROIC ACID 500 MG: 250 SOLUTION ORAL at 15:18

## 2019-10-24 RX ADMIN — RISPERIDONE 0.5 MG: 0.25 TABLET ORAL at 19:24

## 2019-10-24 RX ADMIN — GUAIFENESIN 200 MG: 100 SOLUTION ORAL at 12:00

## 2019-10-24 RX ADMIN — SENNOSIDES AND DOCUSATE SODIUM 2 TABLET: 8.6; 5 TABLET ORAL at 19:24

## 2019-10-24 RX ADMIN — SENNOSIDES AND DOCUSATE SODIUM 2 TABLET: 8.6; 5 TABLET ORAL at 09:08

## 2019-10-24 RX ADMIN — APIXABAN 5 MG: 5 TABLET, FILM COATED ORAL at 09:07

## 2019-10-24 RX ADMIN — TRAZODONE HYDROCHLORIDE 50 MG: 50 TABLET ORAL at 19:24

## 2019-10-24 RX ADMIN — QUETIAPINE FUMARATE 100 MG: 100 TABLET ORAL at 19:24

## 2019-10-24 RX ADMIN — APIXABAN 5 MG: 5 TABLET, FILM COATED ORAL at 21:00

## 2019-10-24 RX ADMIN — METOPROLOL TARTRATE 75 MG: 25 TABLET ORAL at 09:07

## 2019-10-24 RX ADMIN — DIGOXIN 250 MCG: 125 TABLET ORAL at 17:24

## 2019-10-24 RX ADMIN — RISPERIDONE 0.5 MG: 0.25 TABLET ORAL at 09:08

## 2019-10-24 RX ADMIN — PREDNISONE 40 MG: 20 TABLET ORAL at 09:08

## 2019-10-24 RX ADMIN — OXYCODONE HYDROCHLORIDE 5 MG: 5 TABLET ORAL at 19:51

## 2019-10-24 ASSESSMENT — ENCOUNTER SYMPTOMS
COUGH: 0
FEVER: 0
BLURRED VISION: 0
NERVOUS/ANXIOUS: 0
DIZZINESS: 0
DIARRHEA: 0

## 2019-10-24 NOTE — THERAPY
Physical Therapy   Daily Treatment     Patient Name: Pedro Champion  Age:  79 y.o., Sex:  male  Medical Record #: [id number]  Today's Date: 10/24/2019     Precautions  Precautions: Fall Risk, Nasogastric Tube, Tracheostomy   Comments: 1:1 spv, NPO, vale, impulsive    Subjective    Pt seated in room with wife present, appears agreeable to PT initially     Objective       10/24/19 1401   Precautions   Precautions Fall Risk;Nasogastric Tube;Tracheostomy    Comments 1:1 spv, NPO, vale, impulsive   Interdisciplinary Plan of Care Collaboration   IDT Collaboration with  Family / Caregiver;Physician;Speech Therapist   Patient Position at End of Therapy In Bed;Call Light within Reach;Tray Table within Reach   Collaboration Comments Pt's wife present for session. Physician notified of pt refusal. Passed pt's wife concern with use of speaking valve onto SLP   Therapy Missed   Missed Therapy (Minutes) 15   Reason For Missed Therapy Non-Medical - Patient Refused   PT Total Time Spent   PT Individual Total Time Spent (Mins) 15   PT Charge Group   PT Therapeutic Activities 1     Attempted seated LE there-ex, pt unable/ unwilling to participate.     FIM Bed/Chair/Wheelchair Transfers Score: 5 - Standby Prompting/Supervision or Set-up  Bed/Chair/Wheelchair Transfers Description:  Increased time, Supervision for safety(WC > bed, SBA without AD)    FIM Wheelchair Score:  2 - Max Assistance  Wheelchair Description:  Extra time, Supervision for safety(gym > room, ~100 ft using LEs, SBA)      Assessment    Pt unwilling to participate for full 30 min session, reporting he was tired and propelled self back to room in WC.     Plan    Strength/ conditioning/ endurance as pt tolerates

## 2019-10-24 NOTE — THERAPY
Physical Therapy   Daily Treatment     Patient Name: Pedro Champion  Age:  79 y.o., Sex:  male  Medical Record #: [id number]  Today's Date: 10/24/2019     Precautions  Precautions: Fall Risk, Nasogastric Tube, Tracheostomy   Comments: 1:1 spv, NPO, vale, impulsive    Subjective    Pt seated in therapy gym with CNA, ready for PT.      Objective       10/24/19 0831   Precautions   Precautions Fall Risk;Nasogastric Tube;Tracheostomy    Comments 1:1 spv, NPO, vale, impulsive   Sitting Lower Body Exercises   Nustep Resistance Level 3  (5 min, 226 steps )   Interdisciplinary Plan of Care Collaboration   IDT Collaboration with  Certified Nursing Assistant   Patient Position at End of Therapy Seated  (in therapy gym with CNA)   Collaboration Comments CNA assisted with spv during PT session    PT Total Time Spent   PT Individual Total Time Spent (Mins) 30   PT Charge Group   PT Therapeutic Exercise 1   PT Therapeutic Activities 1     FIM Walking Score:  1 - Total Assistance  Walking Description:  Extra time, Walker, Verbal cueing, Requires incidental assist, Requires wheelchair follow (260 ft with FWW, CGA, and WC follow)      Assessment    Pt with extreme difficulty following instructions throughout session. Unable to communicate with writing; incomprehensible words written down. SOB with activity and not responding to verbal cuing for rest breaks.     Plan    Strength/ conditioning/ endurance as pt tolerates.

## 2019-10-24 NOTE — CARE PLAN
Problem: Safety  Goal: Will remain free from falls  Note:   Received pt in bed at the start of the shift, impulsive trying to get out of the bed , attempting to pull lines. Reoriented and encouraged not to pull any lines . Needed constant supervision. Bed at lowest level, bedside table within easy reach. Pt does not call for assistance.  Constant supervision was done by RN and CNA. Pt free from fall and injury.     Problem: Bowel/Gastric:  Goal: Will not experience complications related to bowel motility  Intervention: Implement Bowel Protocol, if applicable  Note:   With NG via right nares. Maintained NPO , meds crushed given via enteral tube . Bowel sounds active  .     Problem: Respiratory:  Goal: Respiratory status will improve  Intervention: Assess and monitor pulmonary status  Note:   Pt with trach with  5L O2, occasional productive cough, trach suctioning and oral suctioning done, obtaining thick mucous phlegm. Lung sound diminished. Encouraged to expectorate mucous phlegm, compliant.

## 2019-10-24 NOTE — THERAPY
Speech Language Pathology  Daily Treatment     Patient Name: Pedro Champion  Age:  79 y.o., Sex:  male  Medical Record #: [id number]  Today's Date: 10/24/2019     Subjective    Assumed care of pt in hallway with 1:1 CNA - pt prefers to be in constant movement vs sitting in room for therapy. Will attempt therapy in gym to increase external stimuli as this may decrease anxiety with PMSV.      Objective     10/24/19 1104   Speech / Dysarthria   Articulation Training Severe (2)   Intensity / Loudness Training Severe (2)   Respiration Control Severe (2)   Interdisciplinary Plan of Care Collaboration   IDT Collaboration with  Certified Nursing Assistant   Patient Position at End of Therapy Seated   Collaboration Comments transfer of care to 1:1 CNA   SLP Total Time Spent   SLP Individual Total Time Spent (Mins) 30   Charge Group   SLP Treatment - Individual Speech Language Treatment - Individual       Assessment    Continued PMSV trials with pt tolerating up to 4 minutes 10 seconds at a time this date. Pt required several trials to work up to 4 minute syeda with lowest tolerance being 50 seconds this session. Pt able to cough with valve in place and expectorate secretions into mouth vs trach site. Pt's O2 sats remained between 92-94 with valve in place.     Plan    Continue to increase tolerance of PMSV, conduct therapy in gym to increase external stimuli as distractor to aid in decrease of anxiety when speaking valve in place

## 2019-10-24 NOTE — THERAPY
Speech Language Pathology  Daily Treatment     Patient Name: Pedro Champion  Age:  79 y.o., Sex:  male  Medical Record #: [id number]  Today's Date: 10/24/2019     Subjective    Pt highly restless this session as evidenced by consistently moving in w/c, minimally following commands, participation in structured therapy tasks with PMSV. Pt gesturing to complete morning routine.      Objective     10/24/19 0804   Expressive Language   Expressive Language (WDL) X   Gestures Moderate (3)   AAC / AD Development and Training Severe (2)   Speech / Dysarthria   Articulation Training Profound (1)  (pt refusing PMSV)   Intensity / Loudness Training Profound (1)  (pt refusing PMSV)   Interdisciplinary Plan of Care Collaboration   IDT Collaboration with  Physical Therapist;Occupational Therapist;Respiratory Therapist;Certified Nursing Assistant   Patient Position at End of Therapy Seated;Chair Alarm On;Self Releasing Lap Belt Applied   Collaboration Comments CLOF POC with PT/OT, transfer of care to 1:1   SLP Total Time Spent   SLP Individual Total Time Spent (Mins) 30   Charge Group   SLP Treatment - Individual Speech Language Treatment - Individual       FIM Eating Score:  1 - Total Assistance  Eating Description:       FIM Comprehension Score:  3 - Moderate Assistance  Comprehension Description:  Glasses, Hearing aids/amplifiers, Verbal cues    FIM Expression Score:  2 - Max Assistance  Expression Description:  Verbal cueing, Communication board, Passe Rangeley valve for trach    FIM Social Interaction Score:  3 - Moderate Assistance  Social Interaction Description:  Increased time, Verbal cues, Medication, 1:1 supervision    FIM Problem Solving Score:  2 - Max Assistance  Problem Solving Description:  Verbal cueing, Therapy schedule, Increased time, Bed/chair alarm, Seat belt, 1:1 supervision    FIM Memory Score:  2 - Max Assistance  Memory Description:  Verbal cueing, Therapy schedule, Bed/chair alarm, 1:1 supervision, Seat  belt      Assessment    Pt refusing trials of PMSV as well as finger occlusion - frequently tugging at Cortrak with tactile cues to redirect. Pt insisting on completing morning routine to include changing shirt, washing face, attempting to comb hair. Oral care completed with MIN A using suction toothbrush. Pt required MAX cues for redirection to task, following 1-step commands. Pt would benefit from set schedule, ST not at 0800 so pt has time to complete morning routine prior with OT or nursing.     Plan    Confirm set schedule with PT/OT, continue to target trials of PMSV with increased tolerance using valve.

## 2019-10-24 NOTE — CARE PLAN
Problem: Safety  Goal: Will remain free from falls  Note:   Received pt in bed at the start of the shift, impulsive trying to get out of the bed , attempting to pull lines. Reoriented and encouraged not to pull any lines . Needed constant supervision. Bed at lowest level, bedside table within easy reach. Pt does not call for assistance.  Constant supervision was done by RN and CNA. Pt free from fall and injury.     Problem: Bowel/Gastric:  Goal: Will not experience complications related to bowel motility  Intervention: Implement Bowel Protocol, if applicable  Note:   With NG via right nares. Maintained NPO , meds crushed given via enteral tube . Bowel sounds active  .

## 2019-10-24 NOTE — FLOWSHEET NOTE
10/23/19 0815   Events/Summary/Plan   Events/Summary/Plan aerosol check   Education   Education Yes - Pt. / Family has been Instructed in use of Respiratory Equipment   Aerosol Therapy / Airway Management   #Aerosol Therapy / Airway Management Trache Collar   Aerosol Humidity Temp (celsius) 31   Respiratory WDL   Respiratory (WDL) X   Chest Exam   Respiration 18   Pulse 89   Secretions   Cough Congested;Productive   How Sputum Obtained Suction;Tracheal   Sputum Amount Moderate   Sputum Color Tan;Yellow   Sputum Consistency Thick   Suction Frequency Suctioned Once or Twice Per Encounter   Airway Trach Tracheostomy 6.0   Placement Date/Time: 09/14/19 1250   Airway Type: Trach  Brand: Yoanna  Style: Cuffed  Airway Location: Tracheostomy  Airway Size: 6.0  Inserted In: Unit  Inserted by: Respiratory care practitioner   Extra Tracheostomy Tube at Bedside Yes

## 2019-10-24 NOTE — PROGRESS NOTES
Pt agitated and anxious last night, due meds given, via NG tube, placement verified by aspirating gastric contents and auscultation. Aspiration precautions maintained, head of bed elevated during meds adm. With occasional productive cough, suctioned trach obtaining thick mucous phlegm.Pt maintained NPO. With right midline iv access, covered to prevent pt from pulling lines.Tay cath intact , draining yellow colored urine.Trach with 5l o2. Enteral tube via right nares secured with bridal at 70 cm.  contacted last night Re; pt anxious and impulsive pulling line , Ativan p.o given to no effect . Telephone order taken ,   Ativan 0.5 mg Iv given at 2215 hrs.     Pt refused to stay in bed , Pt insisted to have a shower , RN Violeta Chandler and Leticia Clark assisted pt. Pt continued to be anxious and impulsive, RN's and CNA took turns in watching him 1:1.  Pt was wheeled down the hallway 3-4 times. Pt transferred into a cardiac chair and kept him close to the nurse station for safety . Pt was able to sleep at around 3 am. Pt resting .No untoward events occurred . Pt safe and free from injury.

## 2019-10-24 NOTE — THERAPY
Occupational Therapy  Daily Treatment     Patient Name: Pedro Champion  Age:  79 y.o., Sex:  male  Medical Record #: [id number]  Today's Date: 10/24/2019     Precautions  Precautions: (P) Fall Risk, Nasogastric Tube, Tracheostomy   Comments: (P) 1:1 spv, NPO, vale, impulsive    Safety   ADL Safety : Requires Physical Assist for Safety, Requires Supervision for Safety, Impaired, Impulsive, Impaired Insight into Safety, Requires Cueing for Safety  Bathroom Safety: Impaired, Impulsive, Requires Supervision for Safety, Requires Physical Assist for Safety, Impaired Insight into Safety, Requires Cuing for Safety  Comments: see FIM for ADL routine. Requires heavy cuing and intermittent assist due to impulsivity, impaired balance, and difficulty sequencing    Subjective    Pt agreeable to participate in OT. Continues to present non-verbal. Communicated via nodding, mouthing and writing.     Objective       10/24/19 1301   Precautions   Precautions Fall Risk;Nasogastric Tube;Tracheostomy    Comments 1:1 spv, NPO, vale, impulsive   Sitting Lower Body Exercises   Nustep Resistance Level 6  (Total time 4:56, 161 steps/0.08 miles)   Interdisciplinary Plan of Care Collaboration   IDT Collaboration with  Certified Nursing Assistant   Patient Position at End of Therapy Seated;Self Releasing Lap Belt Applied;Family / Friend in Room   Collaboration Comments W/c follow during walking activity   OT Total Time Spent   OT Individual Total Time Spent (Mins) 30   OT Charge Group   OT Neuromuscular Re-education / Balance 3       FIM Walking Score:     Walking Description:  Assist device/equipment, Walker, Extra time, Safety concerns, Verbal cueing, Supervision for safety(Patient performed indoor functional mobility with min to CGA for safety, balance, and cues to attend to tasks  (250' x2 and 150'x2))      Assessment    Patient tolerated OT session well with focus on functional mobility with FWW and endurance. Improved functional mobility  noted during this session. Benefits from rest breaks between walking activity. Patient attempted to communicate via writing on notepad however messages are often nonsensical.     Plan    Incorporate safety considerations related to ADLs and functional mobility, endurance training, standing balance
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
## 2019-10-24 NOTE — FLOWSHEET NOTE
10/24/19 0710   Events/Summary/Plan   Events/Summary/Plan Aerosol check   Aerosol Therapy / Airway Management   #Aerosol Therapy / Airway Management Trache Collar   Aerosol Humidity Temp (celsius) 30   Chest Exam   Respiration 18   Pulse 94   Breath Sounds   RUL Breath Sounds Clear   RML Breath Sounds Clear   RLL Breath Sounds Diminished   DARLENE Breath Sounds Clear   LLL Breath Sounds Diminished   Secretions   Cough Congested   How Sputum Obtained Suction   Sputum Amount Moderate   Sputum Color White   Sputum Consistency Thick   Suction Frequency Suctioned Once or Twice Per Encounter   Airway Trach Tracheostomy 6.0   Placement Date/Time: 09/14/19 1250   Airway Type: Trach  Brand: Yoanna  Style: Cuffed  Airway Location: Tracheostomy  Airway Size: 6.0  Inserted In: Unit  Inserted by: Respiratory care practitioner   Site Assessment Clean;Dry   Airway Tube Secured Velcro attachment   Cuffless No   Cuff Pressure cmH2O (R.C.)   (Cuff Deflated)   Oximetry   #Pulse Oximetry (Single Determination) Yes   Oxygen   Home O2 Use Prior To Admission? No   Pulse Oximetry 98 %   O2 (LPM) 5   FiO2% 30 %   O2 Daily Delivery Respiratory  Trache Collar   OTHER   Resuscitation Bag   (Resuscitator bag and extra trach tube at bedside.)

## 2019-10-24 NOTE — FLOWSHEET NOTE
10/24/19 1505   Events/Summary/Plan   Events/Summary/Plan Aerosol check, Trach stoma care, speaking valve trial-20 min   Education   Education Yes - Pt. / Family has been Instructed in use of Respiratory Equipment   Aerosol Therapy / Airway Management   #Aerosol Therapy / Airway Management Trache Collar   Aerosol Humidity Temp (celsius) 30   Trache Weaning and Decannulation   Valve Trial Initiated, Smart Text Complete? Yes   Hours Tolerated 20 min   Trache/Stoma Care   Trache/Stoma Care Yes   Chest Exam   Respiration 18   Pulse 98   Secretions   Cough Strong;Productive   How Sputum Obtained Spontaneous   Sputum Amount Moderate   Sputum Color White;Tan   Sputum Consistency Thick   Airway Trach Tracheostomy 6.0   Placement Date/Time: 09/14/19 1250   Airway Type: Trach  Brand: [employer]  Style: Cuffed  Airway Location: Tracheostomy  Airway Size: 6.0  Inserted In: Unit  Inserted by: Respiratory care practitioner   Site Assessment Clean;Dry;No bleeding;No drainage   Airway Tube Secured Velcro attachment   Cuffless No   Cuff Pressure cmH2O (R.C.)   (Verified cuff fully deflated)   Status Speaking valve (Add duration in comment)  (20 min)   Tracheostomy/Stoma Care Clean Stoma Site;Dressing Change;Inner Cannula Changed   Tracheostomy/Cannula Changed (Date) 10/24/19   Next Tracheostomy/Cannula Change Due (Date) 10/25/19   Extra Tracheostomy Tube at Bedside Yes   Oximetry   #Pulse Oximetry (Single Determination) Yes   Oxygen   Home O2 Use Prior To Admission? No   Pulse Oximetry 98 %   O2 (LPM) 5   FiO2% 30 %   O2 Daily Delivery Respiratory  Trache Collar   OTHER   Resuscitation Bag Resuscitation Bag and Mask at Bedside and Checked

## 2019-10-24 NOTE — CARE PLAN
Problem: Communication  Goal: The ability to communicate needs accurately and effectively will improve  Outcome: PROGRESSING AS EXPECTED  Note:   Patient trach capped with speaking valve and pt able to communicate needs and wants.      Problem: Skin Integrity  Goal: Risk for impaired skin integrity will decrease  Outcome: PROGRESSING AS EXPECTED  Note:   Patient is able to reposition self and pillows used to off set pressure points. Mepilex in place per wound care,  Patient's skin remains intact and free from new or accidental injury this shift.
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
## 2019-10-25 PROBLEM — R79.89 AZOTEMIA: Status: ACTIVE | Noted: 2019-10-25

## 2019-10-25 PROBLEM — E87.6 HYPOKALEMIA: Status: ACTIVE | Noted: 2019-10-25

## 2019-10-25 LAB
ANION GAP SERPL CALC-SCNC: 4 MMOL/L (ref 0–11.9)
BASOPHILS # BLD AUTO: 0.4 % (ref 0–1.8)
BASOPHILS # BLD: 0.04 K/UL (ref 0–0.12)
BUN SERPL-MCNC: 29 MG/DL (ref 8–22)
CALCIUM SERPL-MCNC: 8.2 MG/DL (ref 8.5–10.5)
CHLORIDE SERPL-SCNC: 104 MMOL/L (ref 96–112)
CO2 SERPL-SCNC: 29 MMOL/L (ref 20–33)
CREAT SERPL-MCNC: 0.54 MG/DL (ref 0.5–1.4)
EOSINOPHIL # BLD AUTO: 0.07 K/UL (ref 0–0.51)
EOSINOPHIL NFR BLD: 0.6 % (ref 0–6.9)
ERYTHROCYTE [DISTWIDTH] IN BLOOD BY AUTOMATED COUNT: 53 FL (ref 35.9–50)
GLUCOSE SERPL-MCNC: 102 MG/DL (ref 65–99)
HCT VFR BLD AUTO: 30.9 % (ref 42–52)
HGB BLD-MCNC: 9.3 G/DL (ref 14–18)
IMM GRANULOCYTES # BLD AUTO: 0.14 K/UL (ref 0–0.11)
IMM GRANULOCYTES NFR BLD AUTO: 1.3 % (ref 0–0.9)
LYMPHOCYTES # BLD AUTO: 1.56 K/UL (ref 1–4.8)
LYMPHOCYTES NFR BLD: 14.1 % (ref 22–41)
MAGNESIUM SERPL-MCNC: 1.9 MG/DL (ref 1.5–2.5)
MCH RBC QN AUTO: 28.4 PG (ref 27–33)
MCHC RBC AUTO-ENTMCNC: 30.1 G/DL (ref 33.7–35.3)
MCV RBC AUTO: 94.5 FL (ref 81.4–97.8)
MONOCYTES # BLD AUTO: 0.85 K/UL (ref 0–0.85)
MONOCYTES NFR BLD AUTO: 7.7 % (ref 0–13.4)
NEUTROPHILS # BLD AUTO: 8.37 K/UL (ref 1.82–7.42)
NEUTROPHILS NFR BLD: 75.9 % (ref 44–72)
NRBC # BLD AUTO: 0 K/UL
NRBC BLD-RTO: 0 /100 WBC
PHOSPHATE SERPL-MCNC: 2.8 MG/DL (ref 2.5–4.5)
PLATELET # BLD AUTO: 239 K/UL (ref 164–446)
PMV BLD AUTO: 9.5 FL (ref 9–12.9)
POTASSIUM SERPL-SCNC: 3.4 MMOL/L (ref 3.6–5.5)
RBC # BLD AUTO: 3.27 M/UL (ref 4.7–6.1)
SODIUM SERPL-SCNC: 137 MMOL/L (ref 135–145)
WBC # BLD AUTO: 11 K/UL (ref 4.8–10.8)

## 2019-10-25 PROCEDURE — 92507 TX SP LANG VOICE COMM INDIV: CPT

## 2019-10-25 PROCEDURE — 700111 HCHG RX REV CODE 636 W/ 250 OVERRIDE (IP): Performed by: PHYSICAL MEDICINE & REHABILITATION

## 2019-10-25 PROCEDURE — 97116 GAIT TRAINING THERAPY: CPT

## 2019-10-25 PROCEDURE — 94640 AIRWAY INHALATION TREATMENT: CPT

## 2019-10-25 PROCEDURE — A9270 NON-COVERED ITEM OR SERVICE: HCPCS | Performed by: HOSPITALIST

## 2019-10-25 PROCEDURE — A9270 NON-COVERED ITEM OR SERVICE: HCPCS | Performed by: PHYSICAL MEDICINE & REHABILITATION

## 2019-10-25 PROCEDURE — 97530 THERAPEUTIC ACTIVITIES: CPT

## 2019-10-25 PROCEDURE — 84100 ASSAY OF PHOSPHORUS: CPT

## 2019-10-25 PROCEDURE — 83735 ASSAY OF MAGNESIUM: CPT

## 2019-10-25 PROCEDURE — 700102 HCHG RX REV CODE 250 W/ 637 OVERRIDE(OP): Performed by: HOSPITALIST

## 2019-10-25 PROCEDURE — 97535 SELF CARE MNGMENT TRAINING: CPT

## 2019-10-25 PROCEDURE — 99232 SBSQ HOSP IP/OBS MODERATE 35: CPT | Performed by: PHYSICAL MEDICINE & REHABILITATION

## 2019-10-25 PROCEDURE — 770010 HCHG ROOM/CARE - REHAB SEMI PRIVAT*

## 2019-10-25 PROCEDURE — 80048 BASIC METABOLIC PNL TOTAL CA: CPT

## 2019-10-25 PROCEDURE — 99232 SBSQ HOSP IP/OBS MODERATE 35: CPT | Performed by: HOSPITALIST

## 2019-10-25 PROCEDURE — 700101 HCHG RX REV CODE 250: Performed by: PHYSICAL MEDICINE & REHABILITATION

## 2019-10-25 PROCEDURE — 700102 HCHG RX REV CODE 250 W/ 637 OVERRIDE(OP): Performed by: PHYSICAL MEDICINE & REHABILITATION

## 2019-10-25 PROCEDURE — 85025 COMPLETE CBC W/AUTO DIFF WBC: CPT

## 2019-10-25 PROCEDURE — 36415 COLL VENOUS BLD VENIPUNCTURE: CPT

## 2019-10-25 PROCEDURE — 94760 N-INVAS EAR/PLS OXIMETRY 1: CPT

## 2019-10-25 PROCEDURE — 97110 THERAPEUTIC EXERCISES: CPT

## 2019-10-25 RX ORDER — POTASSIUM CHLORIDE 20 MEQ/1
40 TABLET, EXTENDED RELEASE ORAL ONCE
Status: DISCONTINUED | OUTPATIENT
Start: 2019-10-25 | End: 2019-10-25

## 2019-10-25 RX ORDER — QUETIAPINE FUMARATE 25 MG/1
50 TABLET, FILM COATED ORAL DAILY
Status: DISCONTINUED | OUTPATIENT
Start: 2019-10-25 | End: 2019-10-28

## 2019-10-25 RX ADMIN — LORAZEPAM 0.5 MG: 0.5 TABLET ORAL at 19:52

## 2019-10-25 RX ADMIN — VALPROIC ACID 500 MG: 250 SOLUTION ORAL at 15:08

## 2019-10-25 RX ADMIN — APIXABAN 5 MG: 5 TABLET, FILM COATED ORAL at 09:18

## 2019-10-25 RX ADMIN — RISPERIDONE 0.5 MG: 0.25 TABLET ORAL at 09:18

## 2019-10-25 RX ADMIN — APIXABAN 5 MG: 5 TABLET, FILM COATED ORAL at 19:51

## 2019-10-25 RX ADMIN — PREDNISONE 40 MG: 20 TABLET ORAL at 09:17

## 2019-10-25 RX ADMIN — METOPROLOL TARTRATE 75 MG: 25 TABLET ORAL at 15:08

## 2019-10-25 RX ADMIN — QUETIAPINE FUMARATE 100 MG: 100 TABLET ORAL at 19:52

## 2019-10-25 RX ADMIN — GUAIFENESIN 200 MG: 100 SOLUTION ORAL at 05:50

## 2019-10-25 RX ADMIN — OMEPRAZOLE 40 MG: KIT at 09:17

## 2019-10-25 RX ADMIN — OXYCODONE HYDROCHLORIDE 5 MG: 5 TABLET ORAL at 19:52

## 2019-10-25 RX ADMIN — POTASSIUM BICARBONATE 25 MEQ: 978 TABLET, EFFERVESCENT ORAL at 11:59

## 2019-10-25 RX ADMIN — DIGOXIN 250 MCG: 125 TABLET ORAL at 17:36

## 2019-10-25 RX ADMIN — SENNOSIDES AND DOCUSATE SODIUM 2 TABLET: 8.6; 5 TABLET ORAL at 09:17

## 2019-10-25 RX ADMIN — SENNOSIDES AND DOCUSATE SODIUM 2 TABLET: 8.6; 5 TABLET ORAL at 19:52

## 2019-10-25 RX ADMIN — METOPROLOL TARTRATE 75 MG: 25 TABLET ORAL at 09:18

## 2019-10-25 RX ADMIN — HYDROXYZINE HYDROCHLORIDE 25 MG: 25 TABLET, FILM COATED ORAL at 19:50

## 2019-10-25 RX ADMIN — GUAIFENESIN 200 MG: 100 SOLUTION ORAL at 17:35

## 2019-10-25 RX ADMIN — RISPERIDONE 0.5 MG: 0.25 TABLET ORAL at 19:50

## 2019-10-25 RX ADMIN — GUAIFENESIN 200 MG: 100 SOLUTION ORAL at 11:52

## 2019-10-25 RX ADMIN — QUETIAPINE 50 MG: 25 TABLET ORAL at 17:37

## 2019-10-25 RX ADMIN — TRAZODONE HYDROCHLORIDE 50 MG: 50 TABLET ORAL at 19:52

## 2019-10-25 RX ADMIN — VALPROIC ACID 500 MG: 250 SOLUTION ORAL at 05:50

## 2019-10-25 RX ADMIN — METOPROLOL TARTRATE 75 MG: 25 TABLET ORAL at 19:49

## 2019-10-25 RX ADMIN — VALPROIC ACID 500 MG: 250 SOLUTION ORAL at 22:00

## 2019-10-25 ASSESSMENT — ENCOUNTER SYMPTOMS
FEVER: 0
DIARRHEA: 0
SHORTNESS OF BREATH: 0
NERVOUS/ANXIOUS: 0
NAUSEA: 0
ABDOMINAL PAIN: 0
CHILLS: 0
VOMITING: 0

## 2019-10-25 NOTE — THERAPY
"Speech Language Pathology  Daily Treatment     Patient Name: Pedro Champion  Age:  79 y.o., Sex:  male  Medical Record #: [id number]  Today's Date: 10/25/2019     Subjective    \"I want this out, now!\" (referring to NG tube)     Objective     10/25/19 0834   Speech / Dysarthria   Articulation Training Severe (2)   Intensity / Loudness Training Severe (2)   Respiration Control Severe (2)   Airway Trach Tracheostomy 6.0   Placement Date/Time: 09/14/19 1250   Airway Type: Trach  Brand: Yoanna  Style: Cuffed  Airway Location: Tracheostomy  Airway Size: 6.0  Inserted In: Unit  Inserted by: Respiratory care practitioner   Status Speaking valve (Add duration in comment)  (6 minutes x3 trials)   Interdisciplinary Plan of Care Collaboration   IDT Collaboration with  Respiratory Therapist;Certified Nursing Assistant   Patient Position at End of Therapy Seated;Self Releasing Lap Belt Applied;Chair Alarm On   Collaboration Comments CLOF with RT, transfer of care to 1:1   SLP Total Time Spent   SLP Individual Total Time Spent (Mins) 30   Charge Group   SLP Treatment - Individual Speech Language Treatment - Individual           Assessment    Pt tolerated PMSV for 6 minutes x3 trials for total of 18 minutes during session. Pt able to cough secretions with valve in place to oral cavity and expectorate. Pt highly perseverative on removing NG tube, the only words uttered during speaking valve trials this session were related to removing NG tube. \"get this out, I want this out, take this off\"    Plan    Continue to increase tolerance of PMSV    "
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
## 2019-10-25 NOTE — FLOWSHEET NOTE
10/25/19 0710   Events/Summary/Plan   Events/Summary/Plan Aerosol check   Education   Education Yes - Pt. / Family has been Instructed in use of Respiratory Equipment   Aerosol Therapy / Airway Management   #Aerosol Therapy / Airway Management Trache Collar   Aerosol Humidity Temp (celsius) 30   Chest Exam   Respiration 18   Pulse 70   Secretions   Cough Strong;Productive   How Sputum Obtained Cough on Request   Sputum Amount Moderate   Sputum Color White;Tan   Sputum Consistency Thick   Oximetry   #Pulse Oximetry (Single Determination) Yes   Oxygen   Home O2 Use Prior To Admission? No   Pulse Oximetry 100 %   FiO2% 30 %   O2 Daily Delivery Respiratory  Trache Collar

## 2019-10-25 NOTE — THERAPY
"Speech Language Pathology  Daily Treatment     Patient Name: Pedro Champion  Age:  79 y.o., Sex:  male  Medical Record #: [id number]  Today's Date: 10/25/2019     Subjective    Pt initially agreeable to therapy, met [facility]. Anxiety and distractibility limited participation this date - pt distracted by Cortrak, Tay tubing, w/c brakes     Objective     10/25/19 1104   Speech / Dysarthria   Articulation Training Severe (2)   Intensity / Loudness Training Severe (2)   Respiration Control Severe (2)   Airway Trach Tracheostomy 6.0   Placement Date/Time: 09/14/19 1250   Airway Type: Trach  Brand: [employer]  Style: Cuffed  Airway Location: Tracheostomy  Airway Size: 6.0  Inserted In: Unit  Inserted by: Respiratory care practitioner   Status Speaking valve (Add duration in comment)  (pt anxiety limited tolerance of PMSV to <1 minute)   Interdisciplinary Plan of Care Collaboration   IDT Collaboration with  Certified Nursing Assistant   Patient Position at End of Therapy Seated   Collaboration Comments transfer of care to 1:1   SLP Total Time Spent   SLP Individual Total Time Spent (Mins) 30   Charge Group   SLP Treatment - Individual Speech Language Treatment - Individual         Assessment    Pt with decline in performance from session 30 minutes prior as pt was only able to tolerate PMSV for <1 minute secondary to high anxiety \"I can't breathe, take it off\" despite showing pt pulse ox with sats remaining in low 90s (91-94). Pt expressed desire to go to the gym, however, highly distractible with Cortrak, Tay tubing and brakes on w/c. Pt provided with education and reinforcement of performance from session prior that he is able to tolerate the PMSV for longer periods of time and needs to attempt to increase time in order to work towards decannulation. Pt nodding head in agreement.     Plan    Continue trials of PMSV    "
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
## 2019-10-25 NOTE — CARE PLAN
Problem: Other Problem (see comments)  Goal: Other Goal  Description  Monitor/Evaluation: Monitor PO vs TF intake, diet upgrades, weight, labs, medication adjustments, skin integrity, GI function, vitals, I/Os, and overall hydration status.  Adjust nutritional POC pending clinical outcomes.    RD following bi-weekly until EN at a goal and tolerated.   Goal: 1. Tolerance to EN adjustment.  Consider PEG vs G-tube  2. Maintain adequate oral vs TF nutrient/fluid intake to promote nutrition optimization/healing. 3. Transition to PO pending clinical outcomes.         Outcome: PROGRESSING SLOWER THAN EXPECTED
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
## 2019-10-25 NOTE — CARE PLAN
Problem: Safety  Goal: Will remain free from injury  10/25/2019 0325 by Meli Mcdermott R.N.  Note:   Pt impulsive and intermittently violent. PRN agitation medication given. Will continue to monitor.     Problem: Bowel/Gastric:  Goal: Normal bowel function is maintained or improved  Outcome: PROGRESSING SLOWER THAN EXPECTED  Note:   Patient having regular bowel movements; last BM 10/22. Pt to receive PRN bowel medication in Am.      Problem: Respiratory:  Goal: Respiratory status will improve  Outcome: PROGRESSING SLOWER THAN EXPECTED  Note:   Pt refused to wear oxygen mask. Oxygen saturation remained above 90. Will continue to monitor.      Problem: Urinary Elimination:  Goal: Ability to reestablish a normal urinary elimination pattern will improve  Outcome: PROGRESSING AS EXPECTED  Note:   Patient voiding adequate amounts of clear yellow urine.  Denies flank pain or dysuria; afebrile.  Will continue to monitor.

## 2019-10-25 NOTE — THERAPY
Physical Therapy   Daily Treatment     Patient Name: Pedro Champion  Age:  79 y.o., Sex:  male  Medical Record #: [id number]  Today's Date: 10/25/2019     Precautions  Precautions: Fall Risk, Nasogastric Tube, Tracheostomy   Comments: 1:1 spv, NPO, vale, impulsive    Subjective    Pt in bed resting, CNA and wife in room, agreeable to PT      Objective       10/25/19 1401   Precautions   Precautions Fall Risk;Nasogastric Tube;Tracheostomy    Comments 1:1 spv, NPO, vale, impulsive   Sitting Lower Body Exercises   Nustep Resistance Level 4  (3:20, 0.09 miles )   Other Exercises LE shuttle with 4-6 bands resistance: 15-20 x3 with tactile and verbal cuing for full knee extension    Interdisciplinary Plan of Care Collaboration   IDT Collaboration with  Family / Caregiver;Certified Nursing Assistant   Patient Position at End of Therapy In Bed;Call Light within Reach;Tray Table within Reach;Family / Friend in Room   Collaboration Comments CNA assisted with session. Wife present during session   PT Total Time Spent   PT Individual Total Time Spent (Mins) 60   PT Charge Group   PT Gait Training 1   PT Therapeutic Exercise 1   PT Therapeutic Activities 2       FIM Bed/Chair/Wheelchair Transfers Score: 4 - Minimal Assistance  Bed/Chair/Wheelchair Transfers Description:  Set-up of equipment, Verbal cueing(bed <> WC, CGA without AD)    FIM Walking Score:  1 - Total Assistance  Walking Description:  Requires wheelchair follow, Extra time, Walker, Requires incidental assist(150 ft, 250 ft, and 575 ft indoors and outdoors with FWW and CGA, WC follow for safety)      Assessment    Pt led session with PT encouraging to choose what he would like to do. Participatory throughout session.     Plan    Strength/ conditioning/ endurance as pt tolerates

## 2019-10-25 NOTE — FLOWSHEET NOTE
10/25/19 1055   Events/Summary/Plan   Events/Summary/Plan Aerosol check   Aerosol Therapy / Airway Management   #Aerosol Therapy / Airway Management Trache Collar   Aerosol Humidity Temp (celsius) 31   Chest Exam   Respiration 18   Pulse 85   Secretions   Cough Strong;Non Productive   Oximetry   #Pulse Oximetry (Single Determination) Yes   Oxygen   Home O2 Use Prior To Admission? No   Pulse Oximetry 99 %   FiO2% 30 %   O2 Daily Delivery Respiratory  Trache Collar

## 2019-10-25 NOTE — THERAPY
Speech Language Pathology  Daily Treatment     Patient Name: Pedro Champion  Age:  79 y.o., Sex:  male  Medical Record #: [id number]  Today's Date: 10/25/2019     Subjective    Pt resting in bed at start of session. Agreeable to participate given low stim environment - pt remained in bed, lights dimmed.      Objective     10/25/19 1004   Speech / Dysarthria   Articulation Training Severe (2)   Intensity / Loudness Training Severe (2)   Respiration Control Moderate (3)   Airway Trach Tracheostomy 6.0   Placement Date/Time: 09/14/19 1250   Airway Type: Trach  Brand: Shiley  Style: Cuffed  Airway Location: Tracheostomy  Airway Size: 6.0  Inserted In: Unit  Inserted by: Respiratory care practitioner   Status Speaking valve (Add duration in comment)  (17 minutes )   Interdisciplinary Plan of Care Collaboration   IDT Collaboration with  Physician;Respiratory Therapist;Certified Nursing Assistant   Patient Position at End of Therapy Seated   Collaboration Comments transfer of care to 1:1 for bathroom, pt indicating need for BM   SLP Total Time Spent   SLP Individual Total Time Spent (Mins) 30   Charge Group   SLP Treatment - Individual Speech Language Treatment - Individual         Assessment    Valve placed while pt in bed, low stim environment (lights dimmed, TV muted). Pt tolerated valve for 17 minutes consecutively - with O2 sats remaining 92-97. Pt receiving supplemental O2 via nasal canula per RT as sats slightly lower prior to placement of valve. Pt able to produce 3-5 word phrases related only to request to remove Cortrak- continues to be perseverative on removing NG tube - grabbing at tube several times, requiring tactile cues to prevent pt tugging on NG. PMSV trials discontinued as pt gesturing need for BM, transfer of care to 1:1 for toileting.     Plan    Continue PMSV trials
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
## 2019-10-25 NOTE — THERAPY
"Occupational Therapy  Daily Treatment     Patient Name: Pedro Champion  Age:  79 y.o., Sex:  male  Medical Record #: [id number]  Today's Date: 10/25/2019     Precautions  Precautions: Fall Risk, Nasogastric Tube, Tracheostomy   Comments: 1:1 spv, NPO, vale, impulsive    Safety   ADL Safety : Requires Physical Assist for Safety, Requires Supervision for Safety, Impaired, Impulsive, Impaired Insight into Safety, Requires Cueing for Safety  Bathroom Safety: Impaired, Impulsive, Requires Supervision for Safety, Requires Physical Assist for Safety, Impaired Insight into Safety, Requires Cuing for Safety  Comments: see FIM for ADL routine. Requires heavy cuing and intermittent assist due to impulsivity, impaired balance, and difficulty sequencing    Subjective    \"Take it out\" patient stated re: NGT when passy michael valve used.      Objective       10/25/19 0701   Precautions   Precautions Fall Risk;Nasogastric Tube;Tracheostomy    Comments 1:1 spv, NPO, vale, impulsive   Interdisciplinary Plan of Care Collaboration   IDT Collaboration with  Respiratory Therapist;Certified Nursing Assistant;Nursing   Patient Position at End of Therapy Seated;Self Releasing Lap Belt Applied   Collaboration Comments re: trach care/ O2 sats   OT Total Time Spent   OT Individual Total Time Spent (Mins) 60   OT Charge Group   OT Self Care / ADL 4       FIM Bathing Score:  3 - Moderate Assistance  Bathing Description:  Patient requires mod assist to complete bathing tasks due to impulsivity, high fall risk, trach precautions, NPO. Patient able to wash all body parts however requires assist with drying 5/10 parts (for throoughness)    FIM Upper Body Dressin - Standby Prompting/Supervision or Set-up  Upper Body Dressing Description:  (Set-up for donning t-shirt while seated in w/c. )    FIM Lower Body Dressing Score:  4 - Minimal Assistance  Lower Body Dressing Description:  Increased time, Supervision for safety, Verbal cueing, Set-up of " equipment(Min assist to don elastic waist pants, total assist for threading vale bag through pants, min assist for standing balance due to impulsivity/restlessness )    FIM Tub/Shower Transfers Score:  4 - Minimal Assistance  Tub/Shower Transfers Description:  Grab bar, Increased time, Supervision for safety, Verbal cueing, Set-up of equipment, Initial preparation for task(Min assist transfer (stand pivot) with heavy cuing for safety)    Patient in bed when undersigned therapist arrived. Agreeable to participate in shower/ADL activities (with redirection to task).     Assessment    Patient with good participation during OT session today. Making progress toward ADL d/c goals. Limiters to functional performance are impulsivity, restlessness, and poor attention. Requires redirection and verbal/tactile cues to not pull NGT out. Limited tolerance of passy michael valve during OT session.     Plan    Incorporate safety considerations related to ADLs and functional mobility, endurance training, standing balance

## 2019-10-25 NOTE — CARE PLAN
Problem: Communication  Goal: The ability to communicate needs accurately and effectively will improve  Outcome: PROGRESSING AS EXPECTED  Note:   Patient has been able to express needs and wants when speaking valve is in place or can use paper and pencil for writing, much more calm this shift.      Problem: Safety  Goal: Will remain free from injury  Outcome: PROGRESSING SLOWER THAN EXPECTED  Note:   Patient still impulsive d/t brain injury, but has been able to wait and follow direction better this shift.

## 2019-10-26 PROCEDURE — 99232 SBSQ HOSP IP/OBS MODERATE 35: CPT | Performed by: HOSPITALIST

## 2019-10-26 PROCEDURE — 700102 HCHG RX REV CODE 250 W/ 637 OVERRIDE(OP): Performed by: PHYSICAL MEDICINE & REHABILITATION

## 2019-10-26 PROCEDURE — 92507 TX SP LANG VOICE COMM INDIV: CPT

## 2019-10-26 PROCEDURE — 770010 HCHG ROOM/CARE - REHAB SEMI PRIVAT*

## 2019-10-26 PROCEDURE — 94640 AIRWAY INHALATION TREATMENT: CPT

## 2019-10-26 PROCEDURE — A9270 NON-COVERED ITEM OR SERVICE: HCPCS | Performed by: PHYSICAL MEDICINE & REHABILITATION

## 2019-10-26 PROCEDURE — A9270 NON-COVERED ITEM OR SERVICE: HCPCS

## 2019-10-26 PROCEDURE — 94760 N-INVAS EAR/PLS OXIMETRY 1: CPT

## 2019-10-26 PROCEDURE — 700102 HCHG RX REV CODE 250 W/ 637 OVERRIDE(OP)

## 2019-10-26 PROCEDURE — 700101 HCHG RX REV CODE 250: Performed by: PHYSICAL MEDICINE & REHABILITATION

## 2019-10-26 PROCEDURE — 92526 ORAL FUNCTION THERAPY: CPT

## 2019-10-26 RX ORDER — QUETIAPINE FUMARATE 100 MG/1
TABLET, FILM COATED ORAL
Status: COMPLETED
Start: 2019-10-26 | End: 2019-10-26

## 2019-10-26 RX ADMIN — METOPROLOL TARTRATE 75 MG: 25 TABLET ORAL at 20:17

## 2019-10-26 RX ADMIN — CHLORHEXIDINE GLUCONATE 0.12% ORAL RINSE 15 ML: 1.2 LIQUID ORAL at 20:15

## 2019-10-26 RX ADMIN — VALPROIC ACID 500 MG: 250 SOLUTION ORAL at 20:15

## 2019-10-26 RX ADMIN — RISPERIDONE 0.5 MG: 0.25 TABLET ORAL at 09:31

## 2019-10-26 RX ADMIN — OMEPRAZOLE 40 MG: KIT at 09:31

## 2019-10-26 RX ADMIN — QUETIAPINE FUMARATE 100 MG: 100 TABLET ORAL at 20:58

## 2019-10-26 RX ADMIN — DIGOXIN 250 MCG: 125 TABLET ORAL at 18:26

## 2019-10-26 RX ADMIN — VALPROIC ACID 500 MG: 250 SOLUTION ORAL at 14:30

## 2019-10-26 RX ADMIN — APIXABAN 5 MG: 5 TABLET, FILM COATED ORAL at 09:31

## 2019-10-26 RX ADMIN — GUAIFENESIN 200 MG: 100 SOLUTION ORAL at 12:48

## 2019-10-26 RX ADMIN — METOPROLOL TARTRATE 75 MG: 25 TABLET ORAL at 14:30

## 2019-10-26 RX ADMIN — GUAIFENESIN 200 MG: 100 SOLUTION ORAL at 18:26

## 2019-10-26 RX ADMIN — RISPERIDONE 0.5 MG: 0.25 TABLET ORAL at 20:17

## 2019-10-26 RX ADMIN — QUETIAPINE 50 MG: 25 TABLET ORAL at 18:27

## 2019-10-26 RX ADMIN — BACITRACIN 1 EACH: 500 OINTMENT TOPICAL at 09:30

## 2019-10-26 RX ADMIN — BACITRACIN 1 EACH: 500 OINTMENT TOPICAL at 20:18

## 2019-10-26 RX ADMIN — TRAZODONE HYDROCHLORIDE 50 MG: 50 TABLET ORAL at 21:00

## 2019-10-26 RX ADMIN — SENNOSIDES AND DOCUSATE SODIUM 2 TABLET: 8.6; 5 TABLET ORAL at 09:31

## 2019-10-26 RX ADMIN — CHLORHEXIDINE GLUCONATE 0.12% ORAL RINSE 15 ML: 1.2 LIQUID ORAL at 09:31

## 2019-10-26 RX ADMIN — VALPROIC ACID 500 MG: 250 SOLUTION ORAL at 05:41

## 2019-10-26 RX ADMIN — SENNOSIDES AND DOCUSATE SODIUM 2 TABLET: 8.6; 5 TABLET ORAL at 20:17

## 2019-10-26 RX ADMIN — TRAZODONE HYDROCHLORIDE 50 MG: 50 TABLET ORAL at 21:01

## 2019-10-26 RX ADMIN — METOPROLOL TARTRATE 75 MG: 25 TABLET ORAL at 09:31

## 2019-10-26 RX ADMIN — GUAIFENESIN 200 MG: 100 SOLUTION ORAL at 05:41

## 2019-10-26 RX ADMIN — TERAZOSIN HYDROCHLORIDE 2 MG: 1 CAPSULE ORAL at 20:16

## 2019-10-26 RX ADMIN — APIXABAN 5 MG: 5 TABLET, FILM COATED ORAL at 20:17

## 2019-10-26 NOTE — FLOWSHEET NOTE
10/25/19 1840   Events/Summary/Plan   Events/Summary/Plan Aerosol check, trach stoma care   Aerosol Therapy / Airway Management   #Aerosol Therapy / Airway Management Trache Collar   Aerosol Humidity Temp (celsius) 34   Date Circuit Last Changed 10/25/19   Date Circuit Change Due (Q 7 Days) 11/01/19   Trache/Stoma Care   Trache/Stoma Care Yes   Chest Exam   Respiration 18   Pulse 80   Secretions   Cough Strong;Congested;Productive   How Sputum Obtained Cough on Request   Sputum Amount Moderate   Sputum Color White;Tan   Sputum Consistency Thick   Airway Trach Tracheostomy 6.0   Placement Date/Time: 09/14/19 1250   Airway Type: Trach  Brand: AnnaProxama  Style: Cuffed  Airway Location: Tracheostomy  Airway Size: 6.0  Inserted In: Unit  Inserted by: Respiratory care practitioner   Site Assessment Clean;Dry;Intact;No bleeding;No drainage   Airway Tube Secured Velcro attachment   Cuffless No  (cuff deflated)   Tracheostomy/Stoma Care Clean Stoma Site;Dressing Change;Inner Cannula Changed   Tracheostomy/Cannula Changed (Date) 10/25/19   Next Tracheostomy/Cannula Change Due (Date) 10/26/19   Extra Tracheostomy Tube at Bedside Yes   Oximetry   #Pulse Oximetry (Single Determination) Yes   Oxygen   Home O2 Use Prior To Admission? No   Pulse Oximetry 95 %   O2 Daily Delivery Respiratory  Room Air with O2 Available   OTHER   Resuscitation Bag   (Resuscitator at bedside)

## 2019-10-26 NOTE — CARE PLAN
Problem: Safety  Goal: Will remain free from injury  Note:   Patient redirected many times to use call light when in need of assistance, patient is not using call light and is very impulsive. Sitter present in view of the patient.     Problem: Infection  Goal: Will remain free from infection  Note:   Patient redirected to wash hands after toileting and throughout the day. Patient in need of of continual redirection.

## 2019-10-26 NOTE — CARE PLAN
Problem: Communication  Goal: The ability to communicate needs accurately and effectively will improve  Outcome: PROGRESSING AS EXPECTED  Note:   Patient is a 1 on 1 d/t confusion and memory loss, pt is able to follow simple commands, but can be impulsive.      Problem: Urinary Elimination:  Goal: Ability to reestablish a normal urinary elimination pattern will improve  Note:   Patient has indwelling catheter draining clear, yellow urine.

## 2019-10-26 NOTE — FLOWSHEET NOTE
10/26/19 0850   Events/Summary/Plan   Events/Summary/Plan aerosol check   Non-Invasive Resp Device Site Inspection Completed Intact   Education   Education Yes - Pt. / Family has been Instructed in use of Respiratory Equipment   Aerosol Therapy / Airway Management   #Aerosol Therapy / Airway Management Trache Collar   Aerosol Humidity Temp (celsius) 33   Date Circuit Last Changed 10/25/19   Date Circuit Change Due (Q 7 Days) 11/01/19   Chest Exam   Respiration 18   Pulse 90   Breath Sounds   RUL Breath Sounds Clear   RML Breath Sounds Clear;Diminished   RLL Breath Sounds Diminished   DARLENE Breath Sounds Clear   LLL Breath Sounds Diminished   Airway Trach Tracheostomy 6.0   Placement Date/Time: 09/14/19 1250   Airway Type: Trach  Brand: Yoanna  Style: Cuffed  Airway Location: Tracheostomy  Airway Size: 6.0  Inserted In: Unit  Inserted by: Respiratory care practitioner   Airway Tube Secured Velcro attachment   Date Securing Device changed? 10/24/19   Date Securing Device to be changed (Q 7 days) 10/30/19   Cuffless No   Extra Tracheostomy Tube at Bedside Yes   Oximetry   #Pulse Oximetry (Single Determination) Yes   Oxygen   Home O2 Use Prior To Admission? No   Pulse Oximetry 90 %   FiO2% 30 %

## 2019-10-26 NOTE — FLOWSHEET NOTE
Ascultation of neck reveals upper airway      10/26/19 1030   Events/Summary/Plan   Events/Summary/Plan aerosol check   Non-Invasive Resp Device Site Inspection Completed Intact   Education   Education Yes - Pt. / Family has been Instructed in use of Respiratory Equipment   Aerosol Therapy / Airway Management   #Aerosol Therapy / Airway Management Trache Collar   Aerosol Humidity Temp (celsius) 33   Date Circuit Last Changed 10/25/19   Date Circuit Change Due (Q 7 Days) 11/01/19   Trache Weaning and Decannulation   Valve Trial Initiated, Smart Text Complete? Yes   Hours Tolerated   (5 min)   Trache/Stoma Care   Trache/Stoma Care Yes  (site without drainage,swelling or redness. Clean dressing on)   Chest Exam   Respiration 18   Pulse 66   Breath Sounds   RUL Breath Sounds Clear   RML Breath Sounds Diminished   RLL Breath Sounds Diminished   DARLENE Breath Sounds Clear   LLL Breath Sounds Diminished   Secretions   Cough Productive;Strong   How Sputum Obtained Spontaneous   Sputum Amount Copious   Sputum Color Brown;Tan   Sputum Consistency Thick   Airway Trach Tracheostomy 6.0   Placement Date/Time: 09/14/19 1250   Airway Type: Trach  Brand: Yoanna  Style: Cuffed  Airway Location: Tracheostomy  Airway Size: 6.0  Inserted In: Unit  Inserted by: Respiratory care practitioner   Site Assessment Clean;Dry   Airway Tube Secured Velcro attachment   Date Securing Device changed? 10/24/19   Date Securing Device to be changed (Q 7 days) 10/30/19   Tracheostomy/Stoma Care Inner Cannula Changed   Extra Tracheostomy Tube at Bedside Yes   Oxygen   Home O2 Use Prior To Admission? No   Pulse Oximetry 94 %  (up in chair on RA)   O2 (LPM) 0   stridor with placement of PMV.  Back pressure (air stacking) with removal of valve.

## 2019-10-26 NOTE — RESPIRATORY CARE
At bedside for Speech Therapy session. Pt in wheelchair, actively engaged. RA Sp02 96%.  IC with secretion build up- changed.  Pt has strong cough, dabbing trach to clean secretions.  PMV placed by Speech.  Neck ascultated.  Stridor, coarse rhonchi noted upper airway.  Back pressure noted on removal of PMV.  On finger occlusion of trach, audible insp and exsp stridor noted.  Cuff was inflated and deflated to remove secretions above or around balloon.  Pt coughed up moderate amount thick tan secretions.  On deep inspiration, patient is able to phonate clear words, 2-3 max., with trach open.   Lungs mostly clear, good aeration.

## 2019-10-26 NOTE — THERAPY
"Speech Language Pathology  Daily Treatment     Patient Name: Pedro Champion  Age:  79 y.o., Sex:  male  Medical Record #: [id number]  Today's Date: 10/26/2019     Subjective    Pt pleasant and cooperative during tx.  Wife and RT present during session.       Objective       10/26/19 1431   Speech / Dysarthria   Articulation Training Moderate (3)   Intensity / Loudness Training Severe (2)   Respiration Control Severe (2)   Dysphagia    Other Treatments Effortful swallow   Interdisciplinary Plan of Care Collaboration   IDT Collaboration with  Family / Caregiver;Respiratory Therapist   Collaboration Comments Wife and RT present during session.    SLP Total Time Spent   SLP Individual Total Time Spent (Mins) 60   Charge Group   SLP Treatment - Individual Speech Language Treatment - Individual   SLP Swallowing Dysfunction Treatment Swallowing Dysfunction Treatment       FIM Comprehension Score:  3 - Moderate Assistance  Comprehension Description:  Glasses, Hearing aids/amplifiers, Verbal cues    FIM Expression Score:  3 - Moderate Assistance  Expression Description:  Verbal cueing    FIM Social Interaction Score:  4 - Minimal Assistance  Social Interaction Description:  Increased time, Verbal cues, Medication    FIM Problem Solving Score:  2 - Max Assistance  Problem Solving Description:  Verbal cueing, Therapy schedule, Increased time, 1:1 supervision, Bed/chair alarm, Seat belt    FIM Memory Score:  2 - Max Assistance  Memory Description:  Verbal cueing, Therapy schedule, 1:1 supervision, Bed/chair alarm, Seat belt      Assessment    PMSV trial: 30 seconds.  Per RT, upper airway compromised, and unsafe for speaking valve at this time.  As swelling decreases pt may be more appropriate.  Pt demonstrated ability to \"speak around the trach.\"  Pt completed 35 effortful swallows given mod verbal cues.      Plan    Target effortful swallows; Trial PMSV if deemed appropriate.     "
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
## 2019-10-27 PROCEDURE — A9270 NON-COVERED ITEM OR SERVICE: HCPCS | Performed by: PHYSICAL MEDICINE & REHABILITATION

## 2019-10-27 PROCEDURE — 700102 HCHG RX REV CODE 250 W/ 637 OVERRIDE(OP): Performed by: PHYSICAL MEDICINE & REHABILITATION

## 2019-10-27 PROCEDURE — 92507 TX SP LANG VOICE COMM INDIV: CPT

## 2019-10-27 PROCEDURE — 700101 HCHG RX REV CODE 250: Performed by: PHYSICAL MEDICINE & REHABILITATION

## 2019-10-27 PROCEDURE — 92526 ORAL FUNCTION THERAPY: CPT

## 2019-10-27 PROCEDURE — 94760 N-INVAS EAR/PLS OXIMETRY 1: CPT

## 2019-10-27 PROCEDURE — 770010 HCHG ROOM/CARE - REHAB SEMI PRIVAT*

## 2019-10-27 PROCEDURE — 94640 AIRWAY INHALATION TREATMENT: CPT

## 2019-10-27 PROCEDURE — 99232 SBSQ HOSP IP/OBS MODERATE 35: CPT | Performed by: HOSPITALIST

## 2019-10-27 RX ADMIN — RISPERIDONE 0.5 MG: 0.25 TABLET ORAL at 20:36

## 2019-10-27 RX ADMIN — GUAIFENESIN 200 MG: 100 SOLUTION ORAL at 05:28

## 2019-10-27 RX ADMIN — QUETIAPINE 50 MG: 25 TABLET ORAL at 17:05

## 2019-10-27 RX ADMIN — LORAZEPAM 0.5 MG: 0.5 TABLET ORAL at 01:20

## 2019-10-27 RX ADMIN — BACITRACIN 1 EACH: 500 OINTMENT TOPICAL at 09:00

## 2019-10-27 RX ADMIN — APIXABAN 5 MG: 5 TABLET, FILM COATED ORAL at 08:40

## 2019-10-27 RX ADMIN — LORAZEPAM 0.5 MG: 0.5 TABLET ORAL at 22:01

## 2019-10-27 RX ADMIN — RISPERIDONE 0.5 MG: 0.25 TABLET ORAL at 08:40

## 2019-10-27 RX ADMIN — METOPROLOL TARTRATE 75 MG: 25 TABLET ORAL at 20:36

## 2019-10-27 RX ADMIN — GUAIFENESIN 200 MG: 100 SOLUTION ORAL at 17:04

## 2019-10-27 RX ADMIN — TRAZODONE HYDROCHLORIDE 50 MG: 50 TABLET ORAL at 20:35

## 2019-10-27 RX ADMIN — VALPROIC ACID 500 MG: 250 SOLUTION ORAL at 05:28

## 2019-10-27 RX ADMIN — GUAIFENESIN 200 MG: 100 SOLUTION ORAL at 01:20

## 2019-10-27 RX ADMIN — OXYCODONE HYDROCHLORIDE 5 MG: 5 TABLET ORAL at 02:47

## 2019-10-27 RX ADMIN — TERAZOSIN HYDROCHLORIDE 2 MG: 1 CAPSULE ORAL at 20:35

## 2019-10-27 RX ADMIN — GUAIFENESIN 200 MG: 100 SOLUTION ORAL at 23:23

## 2019-10-27 RX ADMIN — DIGOXIN 250 MCG: 125 TABLET ORAL at 17:05

## 2019-10-27 RX ADMIN — METOPROLOL TARTRATE 75 MG: 25 TABLET ORAL at 15:10

## 2019-10-27 RX ADMIN — VALPROIC ACID 500 MG: 250 SOLUTION ORAL at 20:35

## 2019-10-27 RX ADMIN — OXYCODONE HYDROCHLORIDE 5 MG: 5 TABLET ORAL at 23:32

## 2019-10-27 RX ADMIN — APIXABAN 5 MG: 5 TABLET, FILM COATED ORAL at 20:37

## 2019-10-27 RX ADMIN — CHLORHEXIDINE GLUCONATE 0.12% ORAL RINSE 15 ML: 1.2 LIQUID ORAL at 20:24

## 2019-10-27 RX ADMIN — BACITRACIN 1 EACH: 500 OINTMENT TOPICAL at 20:34

## 2019-10-27 RX ADMIN — GUAIFENESIN 200 MG: 100 SOLUTION ORAL at 11:50

## 2019-10-27 RX ADMIN — VALPROIC ACID 500 MG: 250 SOLUTION ORAL at 15:10

## 2019-10-27 RX ADMIN — SENNOSIDES AND DOCUSATE SODIUM 2 TABLET: 8.6; 5 TABLET ORAL at 20:36

## 2019-10-27 RX ADMIN — METOPROLOL TARTRATE 75 MG: 25 TABLET ORAL at 08:40

## 2019-10-27 RX ADMIN — SENNOSIDES AND DOCUSATE SODIUM 2 TABLET: 8.6; 5 TABLET ORAL at 08:40

## 2019-10-27 RX ADMIN — CHLORHEXIDINE GLUCONATE 0.12% ORAL RINSE 15 ML: 1.2 LIQUID ORAL at 08:41

## 2019-10-27 RX ADMIN — QUETIAPINE FUMARATE 100 MG: 100 TABLET ORAL at 20:36

## 2019-10-27 RX ADMIN — OMEPRAZOLE 40 MG: KIT at 08:41

## 2019-10-27 NOTE — FLOWSHEET NOTE
10/26/19 1700   Events/Summary/Plan   Events/Summary/Plan aerosol check   Non-Invasive Resp Device Site Inspection Completed Intact   Education   Education Yes - Pt. / Family has been Instructed in use of Respiratory Equipment   Aerosol Therapy / Airway Management   #Aerosol Therapy / Airway Management Trache Collar   Aerosol Humidity Temp (celsius) 33   Date Circuit Last Changed 10/25/19   Date Circuit Change Due (Q 7 Days) 11/01/19   Trache Weaning and Decannulation   Valve Trial Initiated, Smart Text Complete? Yes   Hours Tolerated   (20 min. See Respiratory Note.)   Trache/Stoma Care   Trache/Stoma Care Yes   Chest Exam   Respiration 18   Pulse 76   Breath Sounds   RUL Breath Sounds Clear   RML Breath Sounds Diminished   RLL Breath Sounds Diminished   DARLENE Breath Sounds Clear   LLL Breath Sounds Diminished   Secretions   Cough Productive;Strong   How Sputum Obtained Spontaneous   Sputum Amount Moderate   Sputum Color Tan   Sputum Consistency Thick   Suction Frequency Suctioned Once or Twice Per Encounter  (only once, no return)   Airway Trach Tracheostomy 6.0   Placement Date/Time: 09/14/19 1250   Airway Type: Trach  Brand: Yoanna  Style: Cuffed;Fenestrated  Airway Location: Tracheostomy  Airway Size: 6.0  Inserted In: Unit  Inserted by: Respiratory care practitioner   Site Assessment Clean;Dry   Airway Tube Secured Velcro attachment   Date Securing Device changed? 10/24/19   Date Securing Device to be changed (Q 7 days) 10/30/19   Cuffless No   Cuff Pressure cmH2O (R.C.)   (cuff to remain down)   Tracheostomy/Stoma Care Inner Cannula Changed   Tracheostomy/Cannula Changed (Date) 10/26/19   Next Tracheostomy/Cannula Change Due (Date) 10/27/19   Suctioning Device   (single use sx catheter)   Extra Tracheostomy Tube at Bedside Yes  (#6, cuffless, non-fen)   Oximetry   #Pulse Oximetry (Single Determination) Yes   Oxygen   Home O2 Use Prior To Admission? No   Pulse Oximetry 96 %   O2 (LPM) 0   OTHER    Resuscitation Bag Resuscitation Bag and Mask at Bedside and Checked

## 2019-10-27 NOTE — CARE PLAN
Problem: Safety  Goal: Will remain free from falls  Intervention: Assess risk factors for falls  Note:   Safety measures enforced , pt with period of impulsiveness at times., trying to pull his cortrak , medicated with Ativan 0.5 mg p.o given . Bed at lowest level, bedside table within reach. Pt constantly monitored to prevent pt from pulling lines.      Problem: Respiratory:  Goal: Respiratory status will improve  Intervention: Assess and monitor pulmonary status  Note:   With trach connected to 5 L o2, with occasional productive cough, trach suctioned obtaining thick mucous phlegm. Pt desats to 85 without 02.     Problem: Urinary Elimination:  Goal: Ability to reestablish a normal urinary elimination pattern will improve  Intervention: Evaluate need to continue indwelling urinary catheter  Note:   Tay cath intact draining to a yellow colored urine.

## 2019-10-27 NOTE — THERAPY
Speech Language Pathology  Daily Treatment     Patient Name: Pedro Champion  Age:  79 y.o., Sex:  male  Medical Record #: [id number]  Today's Date: 10/27/2019     Subjective    Pt cooperative. RT present at beginning of session. Cuff deflated upon SLP entry. SLP inflated cuff at end of session.      Objective       10/27/19 1001   Speech / Dysarthria   Speech / Dysarthria X   Intensity / Loudness Training Severe (2)   Respiration Control Severe (2)   Dysphagia    Other Treatments Effortful Swallow   Diet / Liquid Recommendation NPO   Nutritional Liquid Intake Rating Scale 1   Nutritional Food Intake Rating Scale 1   Interdisciplinary Plan of Care Collaboration   IDT Collaboration with  Respiratory Therapist   Patient Position at End of Therapy Seated  (CNA in room )   Collaboration Comments Cuff inflated, pt tolerated PMSV for ~3 minutes.   SLP Total Time Spent   SLP Individual Total Time Spent (Mins) 60   Charge Group   SLP Treatment - Individual Speech Language Treatment - Individual   SLP Swallowing Dysfunction Treatment Swallowing Dysfunction Treatment       FIM Eating Score:  1 - Total Assistance  Eating Description:  Tube feed bolus, Staff administers tube feed/parenteral nutrition/IVF, Set-up of equipment or meal/tube feeding    FIM Expression Score:  3 - Moderate Assistance  Expression Description:  Verbal cueing, Passe Jen valve for trach      Assessment    Pt tolerated PMSV for ~3 minutes and was able to produce clear speech- counted 1-5 in one breath, then able to produce 3/breath. O2 sats w/ valve on between 95-98%. Pt became uncomfortable with valve and it was removed. Pt denied trying valve during the rest of ST. Pt educated re: taking more frequent breaths when valve is placed and only attempted one-two words per breath in order to maintain adequate respiration and comfort. Pt initiated 6/13 swallows following lemon glycerin swabs.     Plan    Continue PMSV trials and swallow initiation.

## 2019-10-27 NOTE — PROGRESS NOTES
Pt was agitated this morning and pulled at his trach collar and broke the strap. Notified RT and strap was replaced.

## 2019-10-27 NOTE — RESPIRATORY CARE
Pt has fenestrated trach tube.  While changing IC, trach was finger occluded and pt was able to move are without audible stridor or visual discomfort.  On exam noticed tube was fenestrated.  Fenestrated IC was obtained from RT office and inserted. PMV placed.  Pt with strong, clear voice.  He reported no discomfort with his breathing.  Explained to pt and family that at this point he cannot be unsupervised and speaking valve would have to be removed. PMV trials with fenestrated IC recommended for further trials.
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
## 2019-10-27 NOTE — CARE PLAN
Problem: Safety  Goal: Will remain free from injury  Outcome: PROGRESSING AS EXPECTED  Patient unsteady with trach, FC & ngt, assisted with transfers to prevent injury.     Problem: Urinary Elimination:  Goal: Ability to reestablish a normal urinary elimination pattern will improve  Outcome: PROGRESSING AS EXPECTED Patient remains with FC draining well to roque urine, will continue to monitor.

## 2019-10-27 NOTE — FLOWSHEET NOTE
Respiratory Therapy Update    Interdisciplinary Plan of Care-Goals (Indications)  Bronchopulmonary Hygiene Indications: Difficulty with Secretion Clearance (10/27/19 1121)  Interdisciplinary Plan of Care-Outcomes   Bronchopulmonary Hygiene Outcome: Optimal Hydration with Moderate or Less Sputum Production (10/27/19 1121)               Cough: Moist;Productive;Strong (10/27/19 1121)  Sputum Amount: Small;Moderate (10/27/19 1121)  Sputum Color: Tan;White (10/27/19 1121)  Sputum Consistency: Thick (10/27/19 1121)               FiO2%: 30 % (10/27/19 0750)  O2 (LPM): 5 (10/27/19 0750)  O2 Daily Delivery Respiratory : T-Piece (10/27/19 0750)    Breath Sounds  Pre/Post Intervention: Pre Intervention Assessment (10/18/19 1610)  RUL Breath Sounds: Clear After Suction (10/27/19 1121)  RML Breath Sounds: Clear After Suction;Diminished (10/27/19 1121)  RLL Breath Sounds: Diminished (10/27/19 1121)  DARLENE Breath Sounds: Clear After Suction (10/27/19 1121)  LLL Breath Sounds: Diminished (10/27/19 1121)        Events/Summary/Plan: Trach care done.  Gauze replaced and site looks good.  Pt. in a chair, alert and tolerated all well. (10/27/19 1121)

## 2019-10-28 PROCEDURE — 94760 N-INVAS EAR/PLS OXIMETRY 1: CPT

## 2019-10-28 PROCEDURE — 770010 HCHG ROOM/CARE - REHAB SEMI PRIVAT*

## 2019-10-28 PROCEDURE — 97112 NEUROMUSCULAR REEDUCATION: CPT

## 2019-10-28 PROCEDURE — 97110 THERAPEUTIC EXERCISES: CPT

## 2019-10-28 PROCEDURE — 700101 HCHG RX REV CODE 250: Performed by: PHYSICAL MEDICINE & REHABILITATION

## 2019-10-28 PROCEDURE — 700102 HCHG RX REV CODE 250 W/ 637 OVERRIDE(OP): Performed by: PHYSICAL MEDICINE & REHABILITATION

## 2019-10-28 PROCEDURE — 97530 THERAPEUTIC ACTIVITIES: CPT

## 2019-10-28 PROCEDURE — 99232 SBSQ HOSP IP/OBS MODERATE 35: CPT | Performed by: HOSPITALIST

## 2019-10-28 PROCEDURE — 99232 SBSQ HOSP IP/OBS MODERATE 35: CPT | Performed by: PHYSICAL MEDICINE & REHABILITATION

## 2019-10-28 PROCEDURE — 92507 TX SP LANG VOICE COMM INDIV: CPT

## 2019-10-28 PROCEDURE — A9270 NON-COVERED ITEM OR SERVICE: HCPCS | Performed by: PHYSICAL MEDICINE & REHABILITATION

## 2019-10-28 PROCEDURE — 94640 AIRWAY INHALATION TREATMENT: CPT

## 2019-10-28 RX ORDER — QUETIAPINE FUMARATE 100 MG/1
100 TABLET, FILM COATED ORAL DAILY
Status: DISCONTINUED | OUTPATIENT
Start: 2019-10-28 | End: 2019-11-04

## 2019-10-28 RX ADMIN — RISPERIDONE 0.5 MG: 0.25 TABLET ORAL at 08:11

## 2019-10-28 RX ADMIN — QUETIAPINE FUMARATE 100 MG: 100 TABLET ORAL at 20:37

## 2019-10-28 RX ADMIN — CHLORHEXIDINE GLUCONATE 0.12% ORAL RINSE 15 ML: 1.2 LIQUID ORAL at 20:36

## 2019-10-28 RX ADMIN — OMEPRAZOLE 40 MG: KIT at 08:13

## 2019-10-28 RX ADMIN — VALPROIC ACID 500 MG: 250 SOLUTION ORAL at 15:09

## 2019-10-28 RX ADMIN — VALPROIC ACID 500 MG: 250 SOLUTION ORAL at 20:36

## 2019-10-28 RX ADMIN — GUAIFENESIN 200 MG: 100 SOLUTION ORAL at 23:35

## 2019-10-28 RX ADMIN — TERAZOSIN HYDROCHLORIDE 2 MG: 1 CAPSULE ORAL at 20:36

## 2019-10-28 RX ADMIN — BACITRACIN 1 EACH: 500 OINTMENT TOPICAL at 08:11

## 2019-10-28 RX ADMIN — METOPROLOL TARTRATE 75 MG: 25 TABLET ORAL at 08:12

## 2019-10-28 RX ADMIN — RISPERIDONE 0.5 MG: 0.25 TABLET ORAL at 20:36

## 2019-10-28 RX ADMIN — METOPROLOL TARTRATE 75 MG: 25 TABLET ORAL at 20:37

## 2019-10-28 RX ADMIN — HYDROXYZINE HYDROCHLORIDE 25 MG: 25 TABLET, FILM COATED ORAL at 01:25

## 2019-10-28 RX ADMIN — GUAIFENESIN 200 MG: 100 SOLUTION ORAL at 11:39

## 2019-10-28 RX ADMIN — SENNOSIDES AND DOCUSATE SODIUM 2 TABLET: 8.6; 5 TABLET ORAL at 08:11

## 2019-10-28 RX ADMIN — VALPROIC ACID 500 MG: 250 SOLUTION ORAL at 05:48

## 2019-10-28 RX ADMIN — GUAIFENESIN 200 MG: 100 SOLUTION ORAL at 17:00

## 2019-10-28 RX ADMIN — GUAIFENESIN 200 MG: 100 SOLUTION ORAL at 05:47

## 2019-10-28 RX ADMIN — APIXABAN 5 MG: 5 TABLET, FILM COATED ORAL at 20:36

## 2019-10-28 RX ADMIN — APIXABAN 5 MG: 5 TABLET, FILM COATED ORAL at 08:12

## 2019-10-28 RX ADMIN — CHLORHEXIDINE GLUCONATE 0.12% ORAL RINSE 15 ML: 1.2 LIQUID ORAL at 08:12

## 2019-10-28 RX ADMIN — BACITRACIN 1 EACH: 500 OINTMENT TOPICAL at 20:36

## 2019-10-28 RX ADMIN — QUETIAPINE FUMARATE 100 MG: 100 TABLET ORAL at 16:36

## 2019-10-28 RX ADMIN — DIGOXIN 250 MCG: 125 TABLET ORAL at 17:30

## 2019-10-28 RX ADMIN — HYDROXYZINE HYDROCHLORIDE 25 MG: 25 TABLET, FILM COATED ORAL at 20:36

## 2019-10-28 RX ADMIN — SENNOSIDES AND DOCUSATE SODIUM 2 TABLET: 8.6; 5 TABLET ORAL at 20:37

## 2019-10-28 RX ADMIN — METOPROLOL TARTRATE 75 MG: 25 TABLET ORAL at 15:10

## 2019-10-28 ASSESSMENT — ENCOUNTER SYMPTOMS
ABDOMINAL PAIN: 0
FEVER: 0
CHILLS: 0
VOMITING: 0
NAUSEA: 0
SHORTNESS OF BREATH: 0
NERVOUS/ANXIOUS: 0
DIARRHEA: 0

## 2019-10-28 NOTE — REHAB-PHARMACY IDT TEAM NOTE
Pharmacy   Pharmacy  Antibiotics/Antifungals/Antivirals:  Medication:      Active Orders (From admission, onward)    None        Route:        NA  Stop Date:  NA  Reason:      NA  Antihypertensives/Cardiac:  Medication:    Orders (72h ago, onward)     Start     Ordered    10/18/19 2100  terazosin (HYTRIN) capsule 2 mg  EVERY EVENING     Note to Pharmacy:  Per P&T IV to PO Protocol    10/18/19 1605    10/18/19 1800  digoxin (LANOXIN) tablet 250 mcg  DAILY      10/18/19 1605    10/18/19 1733  hydrALAZINE (APRESOLINE) tablet 25 mg  EVERY 8 HOURS PRN      10/18/19 1733    10/18/19 1630  metoprolol (LOPRESSOR) tablet 75 mg  3 TIMES DAILY     Note to Pharmacy:  Re-entered for timing    10/18/19 1605              Patient Vitals for the past 24 hrs:   BP Pulse   10/28/19 1400 100/51 61   10/28/19 1231 -- 74   10/28/19 0810 -- 72   10/28/19 0750 117/60 78   10/27/19 2011 128/72 76   10/27/19 1845 122/81 76     Anticoagulation:  Medication: Eliquis    Other key medications: A review of the medication list reveals no issues at this time. Patient is currently on antihypertensive(s). Recommend home blood pressure monitoring/recording if antihypertensive(s) regimen(s) continue.    Section completed by: Jorge Hutchinson [facility]

## 2019-10-28 NOTE — CARE PLAN
Problem: Safety  Goal: Will remain free from injury  Outcome: PROGRESSING AS EXPECTED  Patient confused, remains on 1:1 supervision by sitter to prevent falls/injury.     Problem: Respiratory:  Goal: Respiratory status will improve  Outcome: PROGRESSING AS EXPECTED  Patient with trach & O2 mist, coughs productively, suctioned prn Bby RT.
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
## 2019-10-28 NOTE — THERAPY
Speech Language Pathology  Daily Treatment     Patient Name: Pedro Champion  Age:  79 y.o., Sex:  male  Medical Record #: [id number]  Today's Date: 10/28/2019     Subjective    Pt pleasant and cooperative during tx.       Objective       10/28/19 0871   Speech / Dysarthria   Articulation Training Supervision (5)   Intensity / Loudness Training Moderate (3)   Respiration Control Moderate (3)   Interdisciplinary Plan of Care Collaboration   IDT Collaboration with  Recreation Therapist;Certified Nursing Assistant   Collaboration Comments to assess 02 sat; trach collar; deflate cuff   SLP Total Time Spent   SLP Individual Total Time Spent (Mins) 60   Charge Group   SLP Treatment - Individual Speech Language Treatment - Individual       FIM Expression Score:  3 - Moderate Assistance  Expression Description:  Verbal cueing, Passe Jen valve for trach      Assessment    Pt tolerated PMSV for 30 consecutive minutes this day, with trach collar at 5L.  SpO2 level: 91-95% throughout the 30 minutes.  Valve released after large cough with productive cough through trach.  Pt demonstrated 100% intelligibility at the sentence level given mod verbal cues.  Oral care completed via toothette.      Plan    Continue effortful swallow, oral care, and tolerance of PMSV.

## 2019-10-28 NOTE — THERAPY
Speech Language Pathology  Daily Treatment     Patient Name: Pedro Champion  Age:  79 y.o., Sex:  male  Medical Record #: [id number]  Today's Date: 10/28/2019     Subjective    Pt in chair in room with spouse and 1:1, RT placed PMSV following trach care immediately prior to session.      Objective     10/28/19 1304   Speech / Dysarthria   Articulation Training Supervision (5)   Intensity / Loudness Training Minimal (4)   Respiration Control Minimal (4)   Airway Trach Tracheostomy 6.0   Placement Date/Time: 09/14/19 1250   Airway Type: Trach  Brand: Shiley  Style: Cuffed;Fenestrated  Airway Location: Tracheostomy  Airway Size: 6.0  Inserted In: Unit  Inserted by: Respiratory care practitioner   Status Speaking valve (Add duration in comment)  (30 minutes - ongoing at end of session)   Interdisciplinary Plan of Care Collaboration   IDT Collaboration with  Respiratory Therapist;Physical Therapist;Certified Nursing Assistant;Family / Caregiver   Patient Position at End of Therapy Seated;Family / Friend in Room  (transfer of care to 1:1)   SLP Total Time Spent   SLP Individual Total Time Spent (Mins) 30   Charge Group   SLP Treatment - Individual Speech Language Treatment - Individual       FIM Eating Score:  1 - Total Assistance  Eating Description:       FIM Comprehension Score:  5 - Stand-by Prompting/Supervision or Set-up  Comprehension Description:  Verbal cues, Hearing aids/amplifiers, Glasses(repetitions needed secondary to [place])    FIM Expression Score:  4 - Minimal Assistance  Expression Description:  Verbal cueing    FIM Social Interaction Score:  4 - Minimal Assistance  Social Interaction Description:  Increased time, Verbal cues, Medication    FIM Problem Solving Score:  4 - Minimal Assistance  Problem Solving Description:  Verbal cueing, Therapy schedule, Increased time, Seat belt, Bed/chair alarm    FIM Memory Score:  2 - Max Assistance  Memory Description:  Verbal cueing, Therapy schedule, Bed/chair alarm,  "1:1 supervision, Seat belt      Assessment    Pt tolerated PMSV for 25 minutes this session - valve removed for few minutes while pt having bowel movement, due to anxiety and \"need a shot of oxygen\" - once returned to chair, valve and O2 replaced, pt tolerating while O2 sats remained at 97. Informed CNA, spouse and RT family will remain in place at this time. RT in agreement and will check on patient at 1400 prior to PT. S/w PT to continue usage of valve during session today. Pt able to voice at audible level in structured conversation without issue.  Spouse wanting to know \"when can he have ice chips\" ongoing education provided on results of blue dye test and pt's high risk for aspiration at this time. Spouse confirmed understanding and verbalized agreement for PEG.     Plan    Continue PMSV trials    "
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
## 2019-10-28 NOTE — THERAPY
Occupational Therapy  Daily Treatment     Patient Name: Pedro Champion  Age:  79 y.o., Sex:  male  Medical Record #: [id number]  Today's Date: 10/28/2019     Precautions  Precautions: Fall Risk, Nasogastric Tube, Tracheostomy   Comments: 1:1 supervision, NPO, vale catheter, impulsive    Safety   ADL Safety : Requires Physical Assist for Safety, Requires Supervision for Safety, Impaired, Impulsive, Impaired Insight into Safety, Requires Cueing for Safety  Bathroom Safety: Impaired, Impulsive, Requires Supervision for Safety, Requires Physical Assist for Safety, Impaired Insight into Safety, Requires Cuing for Safety  Comments: see FIM for ADL routine. Requires heavy cuing and intermittent assist due to impulsivity, impaired balance, and difficulty sequencing    Subjective    Patient agreeable to participate in OT.      Objective       10/28/19 0931   Precautions   Precautions Fall Risk;Nasogastric Tube;Tracheostomy    Comments 1:1 supervision, NPO, vale catheter, impulsive   Supine Upper Body Exercises   Chest Press 1 set of 10   Front Arm Raise 1 set of 10   Bicep Curl 1 set of 10   Comments Completed UE exerices with 3# and 4# weighted dowel while seated EOM, SBA   Sitting Lower Body Exercises   Nustep Resistance Level 2  (Total time: 6:20, Distance: 0.11 miles)   Interdisciplinary Plan of Care Collaboration   IDT Collaboration with  Certified Nursing Assistant;Respiratory Therapist;Nursing   Patient Position at End of Therapy Seated;Self Releasing Lap Belt Applied   Collaboration Comments Trach care, CNA SBA assist during functional mobility tasks/ followed with tube feedings    OT Total Time Spent   OT Individual Total Time Spent (Mins) 60   OT Charge Group   OT Neuromuscular Re-education / Balance 2   OT Therapy Activity 1   OT Therapeutic Exercise  1     Patient performed functional mobility with FWW (~ 150' x 3) with CGA and use of GB for safety/balance.     Assessment    Patient tolerated OT session well with  focus on functional mobility with FWW, endurance, and safety awareness. Patient HME for majority of session and O2 sats remained 95-99% throughout session. No LOB noted during functional mobility activity.     Plan    Incorporate safety considerations related to ADLs and functional mobility, endurance training, standing balance

## 2019-10-28 NOTE — FLOWSHEET NOTE
Respiratory Therapy Update    Interdisciplinary Plan of Care-Goals (Indications)  Bronchopulmonary Hygiene Indications: Difficulty with Secretion Clearance (10/27/19 1607)  Interdisciplinary Plan of Care-Outcomes   Bronchopulmonary Hygiene Outcome: Optimal Hydration with Moderate or Less Sputum Production (10/27/19 1607)               Cough: Moist;Productive;Strong (10/27/19 1607)  Sputum Amount: Small;Moderate (10/27/19 1607)  Sputum Color: Tan;White (10/27/19 1607)  Sputum Consistency: Thick;Thin (10/27/19 1607)               FiO2%: 30 % (10/27/19 1607)  O2 (LPM): 5 (10/27/19 1607)  O2 Daily Delivery Respiratory : Trache Collar (10/27/19 1607)    Breath Sounds  Pre/Post Intervention: Pre Intervention Assessment (10/18/19 1610)  RUL Breath Sounds: Clear After Suction (10/27/19 1607)  RML Breath Sounds: Clear After Suction (10/27/19 1607)  RLL Breath Sounds: Diminished (10/27/19 1607)  DARLENE Breath Sounds: Clear After Suction (10/27/19 1607)  LLL Breath Sounds: Diminished (10/27/19 1607)        Events/Summary/Plan: Trach care done.  Inner cannula replaced.  Pt. tolerated all well sitting in a wheelchair. (10/27/19 1607)

## 2019-10-28 NOTE — CARE PLAN
Problem: Safety  Goal: Will remain free from injury  Outcome: PROGRESSING AS EXPECTED    Bed and chair alarms in place and functioning properly.  Patient in room with observation camera, close to the nurses station.  He is impulsive and climbs from bed very quickly.      Problem: Pain Management  Goal: Pain level will decrease to patient's comfort goal  10/28/2019 0351 by Tiffanie Yuen, L.P.N.  Outcome: PROGRESSING AS EXPECTED  10/28/2019 0351 by Tiffanie Yuen, L.P.N.  Outcome: PROGRESSING AS EXPECTED    2332 Patient was seeming to be agitated, had climbed from bed and was now in wheelchair.  CNA and nurse in the room.   Patient nods his head and gestures yes when asked if he is in pain.    Medicated with Roxicodone 5 mg per prn order.       Problem: Respiratory:  Goal: Respiratory status will improve  Outcome: PROGRESSING AS EXPECTED    Trach in place with Oxygen at 5 LPM with mist on via tracheal mask.  Patient has been pulling at his trach and taking off his oxygen.  With oxygen off he de sats to 84% when he dosed off to sleep.      Helped patient up to chair, put in his hearing aids and explained to him that he has to keep his oxygen on when sleeping.        Problem: Skin Integrity  Goal: Risk for impaired skin integrity will decrease  Outcome: PROGRESSING AS EXPECTED  Sutures in place to the roof of patients mouth.    Chin/neck incision healing well.

## 2019-10-28 NOTE — FLOWSHEET NOTE
Respiratory Therapy Update    Interdisciplinary Plan of Care-Goals (Indications)  Bronchopulmonary Hygiene Indications: Difficulty with Secretion Clearance (10/28/19 1231)  Interdisciplinary Plan of Care-Outcomes   Bronchopulmonary Hygiene Outcome: Optimal Hydration with Moderate or Less Sputum Production (10/28/19 1231)               Cough: Moist;Productive;Strong (10/28/19 1231)  Sputum Amount: Small;Moderate (10/28/19 1231)  Sputum Color: Tan;White (10/28/19 1231)  Sputum Consistency: Thick (10/28/19 1231)               FiO2%: 30 % (10/28/19 1231)  O2 (LPM): 5 (10/28/19 1231)  O2 Daily Delivery Respiratory : Trache Collar (10/28/19 1231)    Breath Sounds  Pre/Post Intervention: Pre Intervention Assessment (10/28/19 1231)  RUL Breath Sounds: Clear After Suction;Fine Crackles (10/28/19 1231)  RML Breath Sounds: Clear After Suction (10/28/19 1231)  RLL Breath Sounds: Diminished (10/28/19 1231)  DARLENE Breath Sounds: Clear After Suction (10/28/19 1231)  LLL Breath Sounds: Diminished (10/28/19 1231)        Events/Summary/Plan: Pt/aerosol checked.  H2O bag replaced and trach care done.  Innercannula replaced.  All tolerated well sitting in a chair and wife with pt.  Speech is coming in and DrBetsy is in room as well.  PMV in place and pt. speaking clearly. (10/28/19 1231)

## 2019-10-28 NOTE — THERAPY
Physical Therapy   Daily Treatment     Patient Name: Pedro Champion  Age:  79 y.o., Sex:  male  Medical Record #: [id number]  Today's Date: 10/28/2019     Precautions  Precautions: Fall Risk, Nasogastric Tube, Tracheostomy   Comments: 1:1 supervision, NPO, vale catheter, impulsive    Subjective    Pt seated in room, wife and CNA present, agreeable to PT     Objective       10/28/19 1401   Precautions   Precautions Fall Risk;Nasogastric Tube;Tracheostomy    Comments 1:1 supervision, NPO, vale catheter, impulsive   Sitting Lower Body Exercises   Nustep Resistance Level 1  (10 min, 0.26 miles)   Interdisciplinary Plan of Care Collaboration   IDT Collaboration with  Certified Nursing Assistant;Respiratory Therapist   Patient Position at End of Therapy Seated;Call Light within Reach;Tray Table within Reach   Collaboration Comments Respiratory therapist assisted PT with set-up of humidifier with PMSV in place and instructed PT in use during exercise. CNA with pt after PT session    PT Total Time Spent   PT Individual Total Time Spent (Mins) 60   PT Charge Group   PT Therapeutic Exercise 2   PT Therapeutic Activities 2     Pt education along with collaboration from respiratory therapist regarding speaking valve, humidifier, and oxygen use.     FIM Walking Score:  4 - Minimal Assistance  Walking Description:  Extra time, Supervision for safety, Requires incidental assist, Walker(~1000 ft with FWW and SBA- CGA, intermittent standing rest breaks only)      Assessment    Pt tolerated wearing speaking valve for full PT session and was able to speak without valve with PT and RT at end of session. Decreased impulsivity noted compared to previous PT sessions.     Plan    Continue gait training with FWW, general strength and conditioning, balance activities as pt tolerates, assess stairs as able.

## 2019-10-28 NOTE — CARE PLAN
Patient became restless, agitated and combative when I was disconnecting his feeding tube from his HS bolus and flush. He was climbing from the bed and pulling at his cortrak.    Could not calm him down.    Ativan  0.5 mg given per prn order.

## 2019-10-29 LAB
ANION GAP SERPL CALC-SCNC: 4 MMOL/L (ref 0–11.9)
BUN SERPL-MCNC: 28 MG/DL (ref 8–22)
CALCIUM SERPL-MCNC: 8 MG/DL (ref 8.5–10.5)
CHLORIDE SERPL-SCNC: 106 MMOL/L (ref 96–112)
CO2 SERPL-SCNC: 29 MMOL/L (ref 20–33)
CREAT SERPL-MCNC: 0.56 MG/DL (ref 0.5–1.4)
ERYTHROCYTE [DISTWIDTH] IN BLOOD BY AUTOMATED COUNT: 53.8 FL (ref 35.9–50)
GLUCOSE SERPL-MCNC: 76 MG/DL (ref 65–99)
HCT VFR BLD AUTO: 30.3 % (ref 42–52)
HGB BLD-MCNC: 9.1 G/DL (ref 14–18)
MCH RBC QN AUTO: 28.6 PG (ref 27–33)
MCHC RBC AUTO-ENTMCNC: 30 G/DL (ref 33.7–35.3)
MCV RBC AUTO: 95.3 FL (ref 81.4–97.8)
PLATELET # BLD AUTO: 210 K/UL (ref 164–446)
PMV BLD AUTO: 9.9 FL (ref 9–12.9)
POTASSIUM SERPL-SCNC: 3.5 MMOL/L (ref 3.6–5.5)
RBC # BLD AUTO: 3.18 M/UL (ref 4.7–6.1)
SODIUM SERPL-SCNC: 139 MMOL/L (ref 135–145)
T4 FREE SERPL-MCNC: 0.87 NG/DL (ref 0.53–1.43)
TSH SERPL DL<=0.005 MIU/L-ACNC: 8.72 UIU/ML (ref 0.38–5.33)
WBC # BLD AUTO: 12.1 K/UL (ref 4.8–10.8)

## 2019-10-29 PROCEDURE — 97110 THERAPEUTIC EXERCISES: CPT

## 2019-10-29 PROCEDURE — 94640 AIRWAY INHALATION TREATMENT: CPT

## 2019-10-29 PROCEDURE — 85027 COMPLETE CBC AUTOMATED: CPT

## 2019-10-29 PROCEDURE — 80048 BASIC METABOLIC PNL TOTAL CA: CPT

## 2019-10-29 PROCEDURE — 99233 SBSQ HOSP IP/OBS HIGH 50: CPT | Performed by: PHYSICAL MEDICINE & REHABILITATION

## 2019-10-29 PROCEDURE — 99232 SBSQ HOSP IP/OBS MODERATE 35: CPT | Performed by: HOSPITALIST

## 2019-10-29 PROCEDURE — 84439 ASSAY OF FREE THYROXINE: CPT

## 2019-10-29 PROCEDURE — A9270 NON-COVERED ITEM OR SERVICE: HCPCS | Performed by: PHYSICAL MEDICINE & REHABILITATION

## 2019-10-29 PROCEDURE — 770010 HCHG ROOM/CARE - REHAB SEMI PRIVAT*

## 2019-10-29 PROCEDURE — 700102 HCHG RX REV CODE 250 W/ 637 OVERRIDE(OP)

## 2019-10-29 PROCEDURE — 97530 THERAPEUTIC ACTIVITIES: CPT

## 2019-10-29 PROCEDURE — 81001 URINALYSIS AUTO W/SCOPE: CPT

## 2019-10-29 PROCEDURE — 92507 TX SP LANG VOICE COMM INDIV: CPT

## 2019-10-29 PROCEDURE — 700102 HCHG RX REV CODE 250 W/ 637 OVERRIDE(OP): Performed by: HOSPITALIST

## 2019-10-29 PROCEDURE — A9270 NON-COVERED ITEM OR SERVICE: HCPCS

## 2019-10-29 PROCEDURE — 84443 ASSAY THYROID STIM HORMONE: CPT

## 2019-10-29 PROCEDURE — 97535 SELF CARE MNGMENT TRAINING: CPT

## 2019-10-29 PROCEDURE — 700101 HCHG RX REV CODE 250: Performed by: PHYSICAL MEDICINE & REHABILITATION

## 2019-10-29 PROCEDURE — 94760 N-INVAS EAR/PLS OXIMETRY 1: CPT

## 2019-10-29 PROCEDURE — A9270 NON-COVERED ITEM OR SERVICE: HCPCS | Performed by: HOSPITALIST

## 2019-10-29 PROCEDURE — 700102 HCHG RX REV CODE 250 W/ 637 OVERRIDE(OP): Performed by: PHYSICAL MEDICINE & REHABILITATION

## 2019-10-29 RX ORDER — LEVOTHYROXINE SODIUM 0.03 MG/1
25 TABLET ORAL
Status: DISCONTINUED | OUTPATIENT
Start: 2019-10-30 | End: 2019-11-05 | Stop reason: HOSPADM

## 2019-10-29 RX ORDER — POTASSIUM CHLORIDE 20 MEQ/1
40 TABLET, EXTENDED RELEASE ORAL ONCE
Status: DISCONTINUED | OUTPATIENT
Start: 2019-10-29 | End: 2019-10-29

## 2019-10-29 RX ADMIN — GUAIFENESIN 200 MG: 100 SOLUTION ORAL at 23:43

## 2019-10-29 RX ADMIN — APIXABAN 5 MG: 5 TABLET, FILM COATED ORAL at 20:36

## 2019-10-29 RX ADMIN — METOPROLOL TARTRATE 75 MG: 25 TABLET ORAL at 20:35

## 2019-10-29 RX ADMIN — VALPROIC ACID 500 MG: 250 SOLUTION ORAL at 14:58

## 2019-10-29 RX ADMIN — DIGOXIN 250 MCG: 125 TABLET ORAL at 18:07

## 2019-10-29 RX ADMIN — TRAZODONE HYDROCHLORIDE 50 MG: 50 TABLET ORAL at 00:59

## 2019-10-29 RX ADMIN — POTASSIUM BICARBONATE 25 MEQ: 978 TABLET, EFFERVESCENT ORAL at 20:30

## 2019-10-29 RX ADMIN — CHLORHEXIDINE GLUCONATE 0.12% ORAL RINSE 15 ML: 1.2 LIQUID ORAL at 08:17

## 2019-10-29 RX ADMIN — GUAIFENESIN 200 MG: 100 SOLUTION ORAL at 05:05

## 2019-10-29 RX ADMIN — TRAZODONE HYDROCHLORIDE 50 MG: 50 TABLET ORAL at 21:59

## 2019-10-29 RX ADMIN — MAGNESIUM HYDROXIDE 30 ML: 400 SUSPENSION ORAL at 05:34

## 2019-10-29 RX ADMIN — BACITRACIN 1 EACH: 500 OINTMENT TOPICAL at 08:17

## 2019-10-29 RX ADMIN — QUETIAPINE FUMARATE 100 MG: 100 TABLET ORAL at 16:20

## 2019-10-29 RX ADMIN — SENNOSIDES AND DOCUSATE SODIUM 2 TABLET: 8.6; 5 TABLET ORAL at 08:17

## 2019-10-29 RX ADMIN — OMEPRAZOLE 40 MG: KIT at 08:17

## 2019-10-29 RX ADMIN — BACITRACIN 1 EACH: 500 OINTMENT TOPICAL at 20:37

## 2019-10-29 RX ADMIN — RISPERIDONE 0.5 MG: 0.25 TABLET ORAL at 20:37

## 2019-10-29 RX ADMIN — APIXABAN 5 MG: 5 TABLET, FILM COATED ORAL at 08:17

## 2019-10-29 RX ADMIN — METOPROLOL TARTRATE 75 MG: 25 TABLET ORAL at 08:17

## 2019-10-29 RX ADMIN — GUAIFENESIN 200 MG: 100 SOLUTION ORAL at 18:07

## 2019-10-29 RX ADMIN — GUAIFENESIN 200 MG: 100 SOLUTION ORAL at 12:03

## 2019-10-29 RX ADMIN — POTASSIUM BICARBONATE 25 MEQ: 978 TABLET, EFFERVESCENT ORAL at 18:13

## 2019-10-29 RX ADMIN — RISPERIDONE 0.5 MG: 0.25 TABLET ORAL at 08:17

## 2019-10-29 RX ADMIN — VALPROIC ACID 500 MG: 250 SOLUTION ORAL at 05:05

## 2019-10-29 RX ADMIN — VALPROIC ACID 500 MG: 250 SOLUTION ORAL at 20:33

## 2019-10-29 RX ADMIN — CHLORHEXIDINE GLUCONATE 0.12% ORAL RINSE 15 ML: 1.2 LIQUID ORAL at 20:33

## 2019-10-29 RX ADMIN — METOPROLOL TARTRATE 75 MG: 25 TABLET ORAL at 14:58

## 2019-10-29 RX ADMIN — TERAZOSIN HYDROCHLORIDE 2 MG: 1 CAPSULE ORAL at 20:33

## 2019-10-29 RX ADMIN — SENNOSIDES AND DOCUSATE SODIUM 2 TABLET: 8.6; 5 TABLET ORAL at 20:35

## 2019-10-29 RX ADMIN — QUETIAPINE FUMARATE 100 MG: 100 TABLET ORAL at 20:40

## 2019-10-29 ASSESSMENT — ENCOUNTER SYMPTOMS
CHILLS: 0
VOMITING: 0
SHORTNESS OF BREATH: 0
COUGH: 0
NAUSEA: 0
FEVER: 0
EYES NEGATIVE: 1
PALPITATIONS: 0
ABDOMINAL PAIN: 0

## 2019-10-29 NOTE — DISCHARGE PLANNING
Met with patient's wife in room.  Patient was in therapy.  Reviewed team conference discussion to include d/c target, peg tube placement, trach status and mobility.  Also, discussed dme needs and home health.  Wife is hopeful that he will not need tube feedings at home but I also reviewed this process with her in the event that he does.  Will follow.

## 2019-10-29 NOTE — REHAB-RESPIRATORY IDT TEAM NOTE
Respiratory Therapy   Respiratory Therapy    Pt continues to tolerate RA during the day and using a heated trach collar at night with 30% oxygen.   His secretions have improved in quantity and he is tolerating the speaking valve for slightly longer periods of time.  Section completed by:  Divine Gee, RRT

## 2019-10-29 NOTE — CARE PLAN
Problem: Communication  Goal: The ability to communicate needs accurately and effectively will improve  Outcome: PROGRESSING SLOWER THAN EXPECTED  Intervention: Educate patient and significant other/support system about the plan of care, procedures, treatments, medications and allow for questions  Note:   1620 - pt starting to get increased confusion and starting to become more restless than he had been throughout the day. Pt giving scheduled dose of seroquel. Wife at bedside right now and educated her that this is about the time that he starts his confusion/reslessness/agetation. Wife has been short with the pt and is easily agitated by him. Will continue to monitor for escalation. 1:1 CNA close by pt

## 2019-10-29 NOTE — REHAB-COLLABORATIVE ONGOING IDT TEAM NOTE
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 pressure monitoring/recording if antihypertensive(s) regimen(s) continue.    Section completed by: Jorge Hutchinson [facility][AW.1]     Attribution Key     AW.1 - Jorge Hutchinson [facility] on 10/28/2019  4:11 PM                    DC Planning  DC destination/dispostion:  Anticipate home with wife. Patient has recently moved into a single story home per chart review.      Referrals: None at this time.     DC Needs: F/u with PCP, Dr. Dong (Oral Surgery), Dr. Morrow (Cardiology), possibly psychiatry. Patient has no dme from prior.  Will need peg placement and therapy follow up.  I will follow trach status for d/c needs.     Barriers to discharge:  Patient with trach and not tolerating capping trials at this time;      Strengths: Supportive spouse      Section completed by: Chandrika Ramirez RN [facility]         Physician Summary  ENRIQUE Molina MD  participated and led team conference discussion.

## 2019-10-29 NOTE — THERAPY
Physical Therapy   Daily Treatment     Patient Name: Pedro Champion  Age:  79 y.o., Sex:  male  Medical Record #: [id number]  Today's Date: 10/29/2019     Precautions  Precautions: Fall Risk, Nasogastric Tube, Tracheostomy   Comments: 1:1 supervision, NPO, vale catheter, impulsive    Subjective    Pt seated in room with wife, agreeable to PT.      Objective       10/29/19 1401   Precautions   Precautions Fall Risk;Nasogastric Tube;Tracheostomy    Comments 1:1 supervision, NPO, vale catheter, impulsive   Sitting Lower Body Exercises   Nustep Resistance Level 1  (11 min, 0.18 miles )   Interdisciplinary Plan of Care Collaboration   IDT Collaboration with  Nursing   Patient Position at End of Therapy Seated;Call Light within Reach;Tray Table within Reach;Family / Friend in Room   Collaboration Comments RN with pt after PT session to administer medications    PT Total Time Spent   PT Individual Total Time Spent (Mins) 60   PT Charge Group   PT Therapeutic Exercise 1   PT Therapeutic Activities 3     Attempted standing there-ex in // bars, pt completed 5 heel lifts then sat in chair and wanted to return to room.     FIM Walking Score:  5 - Standby Prompting/Supervision or Set-up  Walking Description:  Extra time, Supervision for safety, Walker(150 ft with FWW and close SBA)    FIM Wheelchair Score:  5 - Standby Prompting/Supervision or Set-up  Wheelchair Description:  Extra time, Supervision for safety, Verbal cueing(~300 ft using UEs and LEs, SBA)    FIM Stairs Score:  4 - Minimal Assistance  Stairs Description:  Extra time, Verbal cueing, Hand rails, Requires incidental assist(20 steps x1 with 2 HRs, 20 steps x2 with 1 HR and lateral approach, 10 steps 1 HR with lateral approach )      Assessment    Pt motivated for stair training during first half of PT session, however did not want to continue after 30 min and required verbal encouragement from both PT and wife to finish session. Good functional strength demonstrated  with stairs.     Plan    Continue gait training with FWW, general strength and conditioning, balance activities as pt tolerates.

## 2019-10-29 NOTE — REHAB-OT IDT TEAM NOTE
Occupational Therapy   Activities of Daily Living  Eating Initial:  1 - Total Assistance  Eating Current:  1 - Total Assistance   Eating Description:  Tube feed bolus, Staff administers tube feed/parenteral nutrition/IVF, Set-up of equipment or meal/tube feeding  Grooming Initial:  5 - Standby Prompting/Supervision or Set-up  Grooming Current:  5 - Standby Prompting/Supervision or Set-up   Grooming Description:  Increased time, Supervision for safety, Verbal cueing, Set-up of equipment, Initial preparation for task(Patient brushed teeth using suction brush with set-up and SBA while seated EOB.)  Bathing Initial:  3 - Moderate Assistance  Bathing Current:  4 - Minimal Assistance   Bathing Description:  Grab bar, Tub bench, Hand held shower, Increased time, Set-up of equipment, Verbal cueing, Supervision for safety, Requires minimal contact(Patient washed, rinsed and dried all body parts with CGA for safety and balance while incorporating sitting and standing (with use of grab bar). Mod cues to be cautions around trach  and NGT. )  Upper Body Dressing Initial:  4 - Minimal Assistance  Upper Body Dressing Current:  5 - Standby Prompting/Supervision or Set-up   Upper Body Dressing Description:  Increased time, Supervision for safety, Verbal cueing, Set-up of equipment(Patient donned long-sleeved t-shirt with set-up while seated  tall-back chair in room. )  Lower Body Dressing Initial:  2 - Max Assistance  Lower Body Dressing Current:  5 - Standby Prompting/Supervsion or Set-up   Lower Body Dressing Description:  5 - Standby Prompting/Supervsion or Set-up  Toileting Initial:  3 - Moderate Assistance  Toileting Current:  5 - Standby Prompting/Supervision or Set-up   Toileting Description:  Grab bar, Adaptive equipment, Increased time, Supervision for safety, Verbal cueing, Set-up of equipment(Patient demonstrated ability to perform toileting tasks with SBA for safety and balance)  Toilet Transfer Initial:  3 - Moderate  Assistance  Toilet Transfer Current:  5 - Standby Prompting/Supervision or Set-up   Toilet Transfer Description:  5 - Standby Prompting/Supervision or Set-up  Tub / Shower Transfer Initial:  3 - Moderate Assistance  Tub / Shower Transfer Current:  5 - Standby Prompting/Supervision or Set-up   Tub / Shower Transfer Description:  Adaptive equipment, Increased time, Supervision for safety, Verbal cueing, Set-up of equipment, Initial preparation for task, Grab bar(Patient performed bathing txfr with SBA and FWW /grab bar for safety/standing balance. )  IADL:  Not yet addressed; focus thus far has been on ADLs, functional transfers, endurance  Family Training/Education:  Not yet completed   DME/DC Recommendations:  24 hour supervision, shower bench for safety, grab bars     Strengths:  Able to follow instructions, Adequate strength, Independent PLOF, Making steady progress towards goals, Supportive family and Willingly participates in therapeutic activities  Barriers:  Aspiration risk, Confused, Decreased endurance, Impulsive and Other: Decreased attention     # of short term goals set= 4   # of short term goals met=3    Occupational Therapy Goals     Problem: Bathing     Dates: Start: 10/19/19       Description:     Goal: STG-Within one week, patient will bathe     Dates: Start: 10/19/19       Description: 1) Individualized Goal: supervision with AE/DME prn.  2) Interventions: OT Group Therapy, OT Self Care/ADL, OT Cognitive Skill Dev, OT Community Reintegration, OT Manual Ther Technique, OT Neuro Re-Ed/Balance, OT Therapeutic Activity, OT Evaluation and OT Therapeutic Exercise                   Problem: OT Long Term Goals     Dates: Start: 10/19/19       Description:     Goal: LTG-By discharge, patient will complete basic self care tasks     Dates: Start: 10/19/19       Description: 1) Individualized Goal:  Mod I with AE/DME prn.  2) Interventions:  OT Group Therapy, OT Self Care/ADL, OT Cognitive Skill Dev, OT  Community Reintegration, OT Manual Ther Technique, OT Neuro Re-Ed/Balance, OT Therapeutic Activity, OT Evaluation and OT Therapeutic Exercise             Goal: LTG-By discharge, patient will perform bathroom transfers     Dates: Start: 10/19/19       Description: 1) Individualized Goal: Mod I with AD/DME prn.  2) Interventions: OT Group Therapy, OT Self Care/ADL, OT Cognitive Skill Dev, OT Community Reintegration, OT Manual Ther Technique, OT Neuro Re-Ed/Balance, OT Therapeutic Activity, OT Evaluation and OT Therapeutic Exercise                         Section completed by:  Celi Dos Santos MS,OTR/L

## 2019-10-29 NOTE — THERAPY
"Speech Language Pathology  Daily Treatment     Patient Name: Pedro Champion  Age:  79 y.o., Sex:  male  Medical Record #: [id number]  Today's Date: 10/29/2019     Subjective    Pt sleeping at start of ST. Slow to rouse and participate this session. Pt reports he \"didn't have a good night     Objective     10/29/19 0834   Speech / Dysarthria   Articulation Training Minimal (4)   Intensity / Loudness Training Moderate (3)   Respiration Control Moderate (3)   Airway Trach Tracheostomy 6.0   Placement Date/Time: 09/14/19 1250   Airway Type: Trach  Brand: Plixi  Style: Cuffed;Fenestrated  Airway Location: Tracheostomy  Airway Size: 6.0  Inserted In: Unit  Inserted by: Respiratory care practitioner   Status Speaking valve (Add duration in comment)  (20 minutes)   Interdisciplinary Plan of Care Collaboration   IDT Collaboration with  Respiratory Therapist;Certified Nursing Assistant   Patient Position at End of Therapy In Bed;Call Light within Reach   Collaboration Comments CLOF with RT   SLP Total Time Spent   SLP Individual Total Time Spent (Mins) 30   Charge Group   SLP Treatment - Individual Speech Language Treatment - Individual       FIM Eating Score:  1 - Total Assistance  Eating Description:  Tube feed bolus, Set-up of equipment or meal/tube feeding, Staff administers tube feed/parenteral nutrition/IVF    FIM Comprehension Score:  5 - Stand-by Prompting/Supervision or Set-up  Comprehension Description:  Glasses, Hearing aids/amplifiers, Verbal cues    FIM Expression Score:  3 - Moderate Assistance  Expression Description:  Verbal cueing, Passe Jen valve for trach    FIM Social Interaction Score:  4 - Minimal Assistance  Social Interaction Description:  Increased time, Low stim environment, Medication, Verbal cues    FIM Problem Solving Score:  3 - Moderate Assistance  Problem Solving Description:  Verbal cueing, Therapy schedule, Increased time, Seat belt, Bed/chair alarm, 1:1 supervision    FIM Memory Score:  2 - " Aren Assistance  Memory Description:  Verbal cueing, Therapy schedule, Bed/chair alarm, 1:1 supervision, Seat belt      Assessment    Pt tolerated PMSV for 20 minutes. Pt with productive cough, coughing off valve requiring downtime for cleaning, air dry before replacing for another 5 minutes. Initiated outcome assessment using CLQT as pt is able to attend for longer periods of time at this point. Will continue is subsequent therapy session.     Plan    Continue trials of PMSV, finish CLQT

## 2019-10-29 NOTE — WOUND TEAM
"[facility]  Inpatient Services  Wound and Skin Care Progress Note    HPI, PMH, SH: Reviewed    WOUND TEAM FOLLOW UP: Scheduled follow up on sacral pressure injuries.  Unit where seen by Wound Team: 20-2      SUBJECTIVE: \"ok I can do that.\"    Self Report / Pain Level: No pain at wounds.    OBJECTIVE: Wounds open to air. Patient very pleasant, wife at bedside.     WOUND TYPE, LOCATION, CHARACTERISTICS:     Pressure Injury 10/24/19 Sacrum medial lower sacrum (Active)   Pressure Injury Stage 2    State of Healing Fully granulated;Healing ridge    Site Assessment Pink;Red    Carla-wound Assessment Blanchable erythema    Margins Undefined edges;Attached edges    Wound Length (cm) 1.2 cm    Wound Width (cm) 1 cm    Wound Depth (cm) 0 cm    Wound Surface Area (cm^2) 1.2 cm^2    Tunneling 0 cm    Undermining 0 cm    Closure None    Drainage Amount None    Non-staged Wound Description Not applicable    Treatments Site care    Cleansing Normal Saline Irrigation    Periwound Protectant Not Applicable    Dressing Options Mepilex    Dressing Cleansing/Solutions Not Applicable    Dressing Changed Changed    Dressing Status Removed    Dressing Change Frequency Every 72 hrs    NEXT Dressing Change  11/01/19    NEXT Weekly Photo (Inpatient Only) 11/05/19    WOUND NURSE ONLY - Odor None    WOUND NURSE ONLY - Exposed Structures None    WOUND NURSE ONLY - Tissue Type and Percentage 100% pink/red         INTERVENTIONS BY WOUND TEAM: CNA assisted patient back to bed. Hand hygiene performed and gloves donned. Patient able to turn with minimal assistance. Sacral wounds assessed and they have improved greatly; much smaller and with no depth. New photo and measurements taken. CNA repositioned patient.     Interdisciplinary consultation: Patient, patient's wife Allegra Manuel RN    EVALUATION AND PROGRESS OF WOUND(S): Wounds have improved. Continue with current wound therapy of sacral mepilex    Rationale for changes in " Plan of Care: No changes    Factors affecting wound healing: Dementia, pressure, was critically ill.   Goals: Wounds will continue to decrease in size. Anticipate resolution in 1.5 weeks    NURSING PLAN OF CARE:    Dressing changes: Continue previous Dressing Care orders:     X   See new Dressing Care orders:       Skin care: See Skin Care orders:     X   Rectal tube care: See Rectal Tube Care orders:      Other orders:           WOUND TEAM PLAN OF CARE (X):   NPWT change 3 x week:        Dressing changes:       Follow up as needed:    X weekly for sacral wounds   Other:

## 2019-10-29 NOTE — FLOWSHEET NOTE
10/29/19 1259   Events/Summary/Plan   Events/Summary/Plan aerosol, pt check   Aerosol Therapy / Airway Management   #Aerosol Therapy / Airway Management Trache Collar  (on standby)   Aerosol Humidity Temp (celsius) 32   Respiratory WDL   Respiratory (WDL) X   Chest Exam   Respiration 20   Secretions   Cough Non Productive   How Sputum Obtained Cough on Request   Sputum Amount Scant   Sputum Color Tan   Sputum Consistency Thin

## 2019-10-29 NOTE — REHAB-NURSING IDT TEAM NOTE
Nursing   Nursing  Diet/Nutrition:  Tube Feed and NPO  Medication Administration:  Via Gastric Tube  % consumed at meals in last 24 hours:  Consumed on tube feeding  No data found.  Snack schedule:  None  Supplement:  Other: tube feeding  Fluid Intake/Output in past 24 hours: In: 1215 [Other:60]  Out: 800   Admit Weight:  Weight: 90.2 kg (198 lb 14.4 oz)  Weight Last 7 Days   [89 kg (196 lb 3.4 oz)-89.4 kg (197 lb 1.5 oz)] 89 kg (196 lb 3.4 oz) (10/27 1355)    Pain Issues:    Location:  Jaw (10/27 2332)  Left (10/27 2332)         Severity:  Moderate   Scheduled pain medications: none  PRN pain medications used in last 24 hours:  oxycodone immediate release (ROXICODONE)    Non Pharmacologic Interventions:  warm blanket, distraction, emotional support, environmental changes, relaxation and repositioned  Effectiveness of pain management plan:  good=patient states acceptable comfort after interventions    Bowel:    Bowel Assist Initial Score:  4 - Minimal Assistance  Bowel Assist Current Score:  2 - Max Assistance  Bowl Accidents in last 7 days:  0  Last bowel movement: 10/26/19  Stool Description: Brown, Small(pt flushed before I could see)     Usual bowel pattern:  every other day  Scheduled bowel medications:  senna-docusate (PERICOLACE or SENOKOT S)   PRN bowel medications used in last 24 hours:  magnesium hydroxide (MILK OF MAGNESIA)   Nursing Interventions:  Scheduled medication, Positioning on commode/toilet  Effectiveness of bowel program:   fair=sometimes needs prn bowel meds for constipation  Bladder:    Bladder Assist Initial Score:  1 - Total Assistance  Bladder Assist Current Score:  1 - Total Assistance  Bladder Accidents in last 7 days:     PVR range for past 24-48 hours: vale catheter  Intermittent Catheterization: vale catheter  Medications affecting bladder:  Hytrin    Interventions:  Emptying of device, Indwelling catheter, Brief      Vale:    Type:     Patient Lines/Drains/Airways Status    Active  Catheter     Name: Placement date: Placement time: Site: Days:    Urethral Catheter  10/18/19   1827   10              Date placed:          Justification:    Wound:         Patient Lines/Drains/Airways Status    Active Current Wounds     None                   I    Nursing Problems     Problem: Bowel/Gastric:     Description:     Goal: Normal bowel function is maintained or improved     Description:           Goal: Will not experience complications related to bowel motility     Description:                 Problem: Communication     Description:     Goal: The ability to communicate needs accurately and effectively will improve     Description:                 Problem: Discharge Barriers/Planning     Description:     Goal: Patient's continuum of care needs will be met     Description:                 Problem: Infection     Description:     Goal: Will remain free from infection     Description:                 Problem: Knowledge Deficit     Description:     Goal: Knowledge of disease process/condition, treatment plan, diagnostic tests, and medications will improve     Description:           Goal: Knowledge of the prescribed therapeutic regimen will improve     Description:                 Problem: Pain Management     Description:     Goal: Pain level will decrease to patient's comfort goal     Description:                 Problem: Psychosocial Needs:     Description:     Goal: Level of anxiety will decrease     Description:                 Problem: Respiratory:     Description:     Goal: Respiratory status will improve     Description:                 Problem: Safety     Description:     Goal: Will remain free from injury     Description:           Goal: Will remain free from falls     Description:                 Problem: Skin Integrity     Description:     Goal: Risk for impaired skin integrity will decrease     Description:                 Problem: Urinary Elimination:     Description:     Goal: Ability to  reestablish a normal urinary elimination pattern will improve     Description:                 Problem: Venous Thromboembolism (VTW)/Deep Vein Thrombosis (DVT) Prevention:     Description:     Goal: Patient will participate in Venous Thrombosis (VTE)/Deep Vein Thrombosis (DVT)Prevention Measures     Description:                        Long Term Goals:   At discharge patient will be able to function safely at home with support.    Section completed by:  Rosamaria Pedraza R.N.

## 2019-10-29 NOTE — PROGRESS NOTES
Pt given scheduled meds, denies pain at time of assessment, pt tried to climb over siderails at one point, pt redirected, pt also given Atarax 25 mg via ng tube to help relax/sleep, dayshift JACQUELYN Guerin currently watching patient. No other concerns, will continue to monitor.

## 2019-10-29 NOTE — FLOWSHEET NOTE
10/29/19 0706   Events/Summary/Plan   Events/Summary/Plan Pt awakened for shower with OT at bedside   Interdisciplinary Plan of Care-Goals (Indications)   Bronchopulmonary Hygiene Indications Difficulty with Secretion Clearance   Interdisciplinary Plan of Care-Outcomes    Bronchopulmonary Hygiene Outcome Optimal Hydration with Moderate or Less Sputum Production   Education   Education Yes - Pt. / Family has been Instructed in use of Respiratory Equipment   Aerosol Therapy / Airway Management   #Aerosol Therapy / Airway Management Trache Collar   Aerosol Humidity Temp (celsius) 33   Trache Weaning and Decannulation   Valve Trial Initiated, Smart Text Complete? Yes   Hours Tolerated   (approx 20 min while in shower, PMSV cleaned after)   Respiratory WDL   Respiratory (WDL) X   Chest Exam   Respiration 18   Pulse 82   Secretions   Cough Productive   How Sputum Obtained Suction;Tracheal   Sputum Amount Small   Sputum Color Tan   Sputum Consistency Thick   Suction Frequency Suctioned Once or Twice Per Encounter   Airway Trach Tracheostomy 6.0   Placement Date/Time: 09/14/19 1250   Airway Type: Trach  Brand: Yoanna  Style: Cuffed;Fenestrated  Airway Location: Tracheostomy  Airway Size: 6.0  Inserted In: Unit  Inserted by: Respiratory care practitioner   Cuff Pressure cmH2O (R.C.)   (down at all times)   Extra Tracheostomy Tube at Bedside Yes   Oximetry   #Pulse Oximetry (Single Determination) Yes   Oxygen   Home O2 Use Prior To Admission? No   Pulse Oximetry 95 %   O2 (LPM) 5   FiO2% 30 %   O2 Daily Delivery Respiratory  Trache Collar

## 2019-10-29 NOTE — THERAPY
"Speech Language Pathology  Daily Treatment     Patient Name: Pedro Champion  Age:  79 y.o., Sex:  male  Medical Record #: [id number]  Today's Date: 10/29/2019     Subjective    Pt sleeping at start of ST. Slow to rouse and participate this session. Pt reports he \"didn't have a good night.\"      Objective       10/29/19 1004   Speech / Dysarthria   Articulation Training Minimal (4)   Intensity / Loudness Training Moderate (3)   Respiration Control Moderate (3)   Airway Trach Tracheostomy 6.0   Placement Date/Time: 09/14/19 1250   Airway Type: Trach  Brand: Yoanna  Style: Cuffed;Fenestrated  Airway Location: Tracheostomy  Airway Size: 6.0  Inserted In: Unit  Inserted by: Respiratory care practitioner   Status Speaking valve (Add duration in comment)  (20 minutes)   Interdisciplinary Plan of Care Collaboration   IDT Collaboration with  Respiratory Therapist;Certified Nursing Assistant   Patient Position at End of Therapy In Bed;Call Light within Reach   Collaboration Comments CLOF with RT   SLP Total Time Spent   SLP Individual Total Time Spent (Mins) 30   Charge Group   SLP Treatment - Individual Speech Language Treatment - Individual       FIM Eating Score:  1 - Total Assistance  Eating Description:  Tube feed bolus, Set-up of equipment or meal/tube feeding, Staff administers tube feed/parenteral nutrition/IVF    FIM Comprehension Score:  5 - Stand-by Prompting/Supervision or Set-up  Comprehension Description:  Glasses, Hearing aids/amplifiers, Verbal cues    FIM Expression Score:  3 - Moderate Assistance  Expression Description:  Verbal cueing, Passe Jen valve for trach    FIM Social Interaction Score:  4 - Minimal Assistance  Social Interaction Description:  Increased time, Low stim environment, Medication, Verbal cues    FIM Problem Solving Score:  3 - Moderate Assistance  Problem Solving Description:  Verbal cueing, Therapy schedule, Increased time, Seat belt, Bed/chair alarm, 1:1 supervision    FIM Memory Score:  " 2 - Max Assistance  Memory Description:  Verbal cueing, Therapy schedule, Bed/chair alarm, 1:1 supervision, Seat belt      Assessment    Placement of PMSV for 20 minutes with pt requiring MOD cues to produce increased volume. Pt with strong productive cough, coughing valve off - requiring cleaning, time to air dry before placing again for an additional 5 minutes.     Plan    Continue PMSV trials
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
## 2019-10-29 NOTE — REHAB-CM IDT TEAM NOTE
Case Management      DC Planning  DC destination/dispostion:  Anticipate home with wife. Patient has recently moved into a single story home per chart review.      Referrals: None at this time.     DC Needs: F/u with PCP, Dr. Dong (Oral Surgery), Dr. Morrow (Cardiology), possibly psychiatry. Patient has no dme from prior.  Will need peg placement and therapy follow up.  I will follow trach status for d/c needs.     Barriers to discharge:  Patient with trach and not tolerating capping trials at this time;      Strengths: Supportive spouse      Section completed by: Chandrika Ramirez RN CCM

## 2019-10-29 NOTE — REHAB-PT IDT TEAM NOTE
Physical Therapy   Mobility  Bed mobility:   SBA- CGA  Bed /Chair/Wheelchair Transfer Initial:  4 - Minimal Assistance  Bed /Chair/Wheelchair Transfer Current:  4 - Minimal Assistance   Bed/Chair/Wheelchair Transfer Description:  Set-up of equipment, Verbal cueing(bed <> WC, CGA without AD)  Walk Initial:  1 - Total Assistance  Walk Current:  4 - Minimal Assistance   Walk Description:  Extra time, Supervision for safety, Requires incidental assist, Walker(~1000 ft with FWW and SBA- CGA, intermittent standing rest breaks only)  Wheelchair Initial:  2 - Max Assistance  Wheelchair Current:  2 - Max Assistance   Wheelchair Description:  Extra time, Supervision for safety(gym > room, ~100 ft using LEs, SBA)  Stairs Initial:  0 - Not tested,unsafe activity  Stairs Current: 0 - Not tested,unsafe activity   Stairs Description:    Patient/Family Training/Education:  Wife present intermittently   DME/DC Recommendations:  FWW, home health PT   Strengths:  Adequate strength, Good endurance, Independent PLOF and Supportive family  Barriers:   Agitation, Impulsive, Poor activity tolerance and Other: anxiety, SOB (maintains O2 sats >90%)  # of short term goals set= 3  # of short term goals met= 2  Physical Therapy Problems     Problem: Mobility     Dates: Start: 10/19/19       Description:     Goal: STG-Within one week, patient will ascend and descend four to six stairs     Dates: Start: 10/19/19   Expected End: 10/26/19       Description: 1) Individualized goal:  With BHR at min A wth step to pattern in order to progress towards navigating stairs at home or in community safely  2) Interventions: PT Group Therapy, PT Gait Training, PT Therapeutic Exercises, PT Neuro Re-Ed/Balance, PT Therapeutic Activity, PT Manual Therapy and PT Evaluation       Note:     Goal Note filed on 10/29/19 0821 by Daksha Becker, PT    To be assessed                        Problem: PT-Long Term Goals     Dates: Start: 10/19/19       Description:     Goal:  LTG-By discharge, patient will ambulate     Dates: Start: 10/19/19   Expected End: 11/02/19       Description: 1) Individualized goal: With LRD at 150 ft at [facility] in order to progress towards PLOF  2) Interventions: PT Group Therapy, PT Gait Training, PT Therapeutic Exercises, PT Neuro Re-Ed/Balance, PT Therapeutic Activity, PT Manual Therapy and PT Evaluation             Goal: LTG-By discharge, patient will transfer one surface to another     Dates: Start: 10/19/19   Expected End: 11/02/19       Description: 1) Individualized goal: At [facility] with LRD In order to maximize self mobility  2) Interventions: PT Group Therapy, PT Gait Training, PT Therapeutic Exercises, PT Neuro Re-Ed/Balance, PT Therapeutic Activity, PT Manual Therapy and PT Evaluation             Goal: LTG-By discharge, patient will ambulate up/down 4-6 stairs     Dates: Start: 10/19/19   Expected End: 11/02/19       Description: 1) Individualized goal: With HR at [facility] in order to progress towards navigating stairs at home or in community safely  2) Interventions: PT Group Therapy, PT Gait Training, PT Therapeutic Exercises, PT Neuro Re-Ed/Balance, PT Therapeutic Activity, PT Manual Therapy and PT Evaluation             Goal: LTG-By discharge, patient will transfer in/out of a car     Dates: Start: 10/19/19   Expected End: 11/02/19       Description: 1) Individualized goal: At [facility] with LRD In order to maximize self mobility  2) Interventions: PT Group Therapy, PT Gait Training, PT Therapeutic Exercises, PT Neuro Re-Ed/Balance, PT Therapeutic Activity, PT Manual Therapy and PT Evaluation                         Section completed by:  Daksha Becker, PT, DPT

## 2019-10-29 NOTE — THERAPY
Speech Language Pathology  Daily Treatment     Patient Name: Pedro Champion  Age:  79 y.o., Sex:  male  Medical Record #: [id number]  Today's Date: 10/29/2019     Subjective    Pt agreeable to sitting up in chair for ST. Continues to be fatigued, however, redirectable this session.      Objective     10/29/19 1004   Speech / Dysarthria   Articulation Training Minimal (4)   Intensity / Loudness Training Moderate (3)   Respiration Control Moderate (3)   Airway Trach Tracheostomy 6.0   Placement Date/Time: 09/14/19 1250   Airway Type: Trach  Brand: [email or web address]  Style: Cuffed;Fenestrated  Airway Location: Tracheostomy  Airway Size: 6.0  Inserted In: Unit  Inserted by: Respiratory care practitioner   Status Speaking valve (Add duration in comment)  (20 minutes)   Outcome Measures   Outcome Measures Utilized CLQT   CLQT (Cognitive Linguistics Quick Test)   Attention 2   Memory 2   Executive Function 4   Language 2   Visuospatial 3   Total 2.6   Composite Severity Rating Mild   Clock Drawing Severity Rating Severe   Interdisciplinary Plan of Care Collaboration   IDT Collaboration with  Respiratory Therapist;Certified Nursing Assistant   Patient Position at End of Therapy In Bed;Call Light within Reach   Collaboration Comments CLOF with RT   SLP Total Time Spent   SLP Individual Total Time Spent (Mins) 30   Charge Group   SLP Treatment - Individual Speech Language Treatment - Individual       FIM Eating Score:  1 - Total Assistance  Eating Description:  Tube feed bolus, Set-up of equipment or meal/tube feeding, Staff administers tube feed/parenteral nutrition/IVF    FIM Comprehension Score:  5 - Stand-by Prompting/Supervision or Set-up  Comprehension Description:  Glasses, Hearing aids/amplifiers, Verbal cues    FIM Expression Score:  3 - Moderate Assistance  Expression Description:  Verbal cueing, Passe Jen valve for trach    FIM Social Interaction Score:  4 - Minimal Assistance  Social Interaction Description:  Increased time,  Low stim environment, Medication, Verbal cues    FIM Problem Solving Score:  3 - Moderate Assistance  Problem Solving Description:  Verbal cueing, Therapy schedule, Increased time, Seat belt, Bed/chair alarm, 1:1 supervision    FIM Memory Score:  2 - Max Assistance  Memory Description:  Verbal cueing, Therapy schedule, Bed/chair alarm, 1:1 supervision, Seat belt      Assessment    Pt tolerated speaking valve for 15 minutes x2 trials. Pt initially refusing therapy in chair, agreeable with encouragement from SLP. Standardized cognitive assessment completed using CLQT. Pt performed as follows: Attention: 83 (40-99) - moderate, Memory: 91 () - moderate, Exec Fx: 19 (19-40) -WNL, Language: 21 (16-24) - moderate, Visuospatial Skills: 49 (37-69) - Mild. Overall score of 2.6 falls within MILD severity rating.     Plan    Continue PMSV working towards capping.

## 2019-10-29 NOTE — CARE PLAN
Problem: Bathing  Goal: STG-Within one week, patient will bathe  Description  1) Individualized Goal: supervision with AE/DME prn.  2) Interventions: OT Group Therapy, OT Self Care/ADL, OT Cognitive Skill Dev, OT Community Reintegration, OT Manual Ther Technique, OT Neuro Re-Ed/Balance, OT Therapeutic Activity, OT Evaluation and OT Therapeutic Exercise     Outcome: NOT MET     Problem: Dressing  Goal: STG-Within one week, patient will dress LB  Description  1) Individualized Goal: supervision with AE prn.  2) Interventions: OT Group Therapy, OT Self Care/ADL, OT Cognitive Skill Dev, OT Community Reintegration, OT Manual Ther Technique, OT Neuro Re-Ed/Balance, OT Therapeutic Activity, OT Evaluation and OT Therapeutic Exercise     Outcome: MET     Problem: Toileting  Goal: STG-Within one week, patient will complete toileting tasks  Description  1) Individualized Goal: supervision with AE/DME prn.  2) Interventions: OT Group Therapy, OT Self Care/ADL, OT Cognitive Skill Dev, OT Community Reintegration, OT Manual Ther Technique, OT Neuro Re-Ed/Balance, OT Therapeutic Activity, OT Evaluation and OT Therapeutic Exercise     Outcome: MET     Problem: Functional Transfers  Goal: STG-Within one week, patient will  Description  1) Individualized Goal: transfer to shower and toilet with supervision and AD/DME prn.  2) Interventions: OT Group Therapy, OT Self Care/ADL, OT Cognitive Skill Dev, OT Community Reintegration, OT Manual Ther Technique, OT Neuro Re-Ed/Balance, OT Therapeutic Activity, OT Evaluation and OT Therapeutic Exercise     Outcome: MET

## 2019-10-29 NOTE — THERAPY
"Occupational Therapy  Daily Treatment     Patient Name: Pedro Champion  Age:  79 y.o., Sex:  male  Medical Record #: [id number]  Today's Date: 10/29/2019     Precautions  Precautions: (P) Fall Risk, Nasogastric Tube, Tracheostomy   Comments: (P) 1:1 supervision, NPO, vale catheter, impulsive    Safety   ADL Safety : Requires Physical Assist for Safety, Requires Supervision for Safety, Impaired, Impulsive, Impaired Insight into Safety, Requires Cueing for Safety  Bathroom Safety: (P) Requires Physical Assist for Safety, Impaired, Impulsive  Comments: (P) See FIM report for ADL routine.     Subjective    \"I forgot my deodorant\" patient stated when seated in chair to perform dressing tasks.    \"I had a rough night. I didn't sleep well\"     Objective       10/29/19 0701   Precautions   Precautions Fall Risk;Nasogastric Tube;Tracheostomy    Comments 1:1 supervision, NPO, vale catheter, impulsive   Safety    Bathroom Safety Requires Physical Assist for Safety;Impaired;Impulsive   Comments See FIM report for ADL routine.    Vitals   Room Air Oximetry 95   Cognition    Level of Consciousness Alert   Interdisciplinary Plan of Care Collaboration   IDT Collaboration with  Respiratory Therapist;Nursing   Patient Position at End of Therapy In Bed;Call Light within Reach   Collaboration Comments RT performed suction at start of session; RN notified re: mild nose bleed at end of session   OT Total Time Spent   OT Individual Total Time Spent (Mins) 60   OT Charge Group   OT Self Care / ADL 4       FIM Grooming Score:  5 - Standby Prompting/Supervision or Set-up  Grooming Description:  Increased time, Supervision for safety, Verbal cueing, Set-up of equipment, Initial preparation for task(Patient brushed teeth using suction brush with set-up and SBA while seated EOB.)    FIM Bathing Score:  4 - Minimal Assistance  Bathing Description:   FIM Bathing Score:  4 - Minimal Assistance  Bathing Description:  Grab bar, Tub bench, Hand held " shower, Increased time, Set-up of equipment, Verbal cueing, Supervision for safety, Requires minimal contact(Patient washed, rinsed and dried all body parts with CGA for safety and balance while incorporating sitting and standing (with use of grab bar). Mod cues to be cautions around trach  and NGT. )    FIM Upper Body Dressin - Standby Prompting/Supervision or Set-up  Upper Body Dressing Description:  Increased time, Supervision for safety, Verbal cueing, Set-up of equipment(Patient donned long-sleeved t-shirt with set-up while seated  tall-back chair in room. )    FIM Lower Body Dressing Score:  5 - Standby Prompting/Supervsion or Set-up  Lower Body Dressing Description:  Increased time, Supervision for safety, Verbal cueing, Set-up of equipment, Initial preparation for task(Patient donned elastic waist pants and non-skid socks with set-up and SBA; requires total assist to thread vale bag through pants.  )    FIM Toiletin - Standby Prompting/Supervision or Set-up  Toileting Description:  Grab bar, Adaptive equipment, Increased time, Supervision for safety, Verbal cueing, Set-up of equipment(Patient demonstrated ability to perform toileting tasks with SBA for safety and balance)    FIM Toilet Transfer Score:  5 - Standby Prompting/Supervision or Set-up  Toilet Transfer Description:  Increased time, Grab bar, Verbal cueing, Supervision for safety, Set-up of equipment, Adaptive equipment(Patient performed toilet txfr with SBA and FWW)    FIM Tub/Shower Transfers Score:  5 - Standby Prompting/Supervision or Set-up  Tub/Shower Transfers Description:  Adaptive equipment, Increased time, Supervision for safety, Verbal cueing, Set-up of equipment, Initial preparation for task, Grab bar(Patient performed bathing txfr with SBA and FWW /grab bar for safety/standing balance. )    Assessment    Patient tolerated OT session well with focus on ADLs. Pt tolerated Passy Jen Valve x20 mins and x 30 mins with O2 sats  stable entire session (93-96%). Patient demonstrates improvement with ADL independence and decreased impulsivity within tasks. No LOB noted when performing functional mobility with FWW room <>bathroom (toilet/shower bench).     Plan    Incorporate safety considerations related to ADLs and functional mobility, endurance training, standing balance

## 2019-10-29 NOTE — FLOWSHEET NOTE
10/29/19 1604   Events/Summary/Plan   Events/Summary/Plan pt check, trach care   Aerosol Therapy / Airway Management   #Aerosol Therapy / Airway Management Trache Collar  (on standby)   Aerosol Humidity Temp (celsius) 32   Trache Weaning and Decannulation   Valve Trial Initiated, Smart Text Complete? Yes   Hours Tolerated   (ongoing after SLP, pt tolerating w/out distress, 1:1 sitter)   Respiratory WDL   Respiratory (WDL) X   Chest Exam   Respiration 18   Secretions   Cough Productive   How Sputum Obtained Expectorated;Spontaneous   Sputum Amount Small   Sputum Color Yellow   Sputum Consistency Thick   Airway Trach Tracheostomy 6.0   Placement Date/Time: 09/14/19 1250   Airway Type: Trach  Brand: [employer]  Style: Cuffed;Fenestrated  Airway Location: Tracheostomy  Airway Size: 6.0  Inserted In: Unit  Inserted by: Respiratory care practitioner   Site Assessment Clean;Dry;No drainage   Airway Tube Secured Velcro attachment   Cuffless No   Status   (ongoing)   Tracheostomy/Stoma Care Clean Stoma Site;Dressing Change;Inner Cannula Changed   Tracheostomy/Cannula Changed (Date) 10/29/19   Next Tracheostomy/Cannula Change Due (Date) 10/30/19   Extra Tracheostomy Tube at Bedside Yes   Oximetry   #Pulse Oximetry (Single Determination) Yes   Oxygen   Pulse Oximetry 95 %   O2 Daily Delivery Respiratory  Room Air with O2 Available

## 2019-10-29 NOTE — THERAPY
"Speech Language Pathology  Daily Treatment     Patient Name: Pedro Champion  Age:  79 y.o., Sex:  male  Medical Record #: [id number]  Today's Date: 10/29/2019     Subjective    Pt upright in chair with spouse present. Assumed care from PT. Pt pleasant and cooperative. Appeared in good spirits. \"What do I need to do to get out of here.\" Pt expressing high motivation to return home.      Objective     10/29/19 1504   Speech / Dysarthria   Articulation Training Supervision (5)   Intensity / Loudness Training Minimal (4)   Respiration Control Minimal (4)   Airway Trach Tracheostomy 6.0   Placement Date/Time: 09/14/19 1250   Airway Type: Trach  Brand: [employer]  Style: Cuffed;Fenestrated  Airway Location: Tracheostomy  Airway Size: 6.0  Inserted In: Unit  Inserted by: Respiratory care practitioner   Status Speaking valve (Add duration in comment)  (30 minutes +)   Interdisciplinary Plan of Care Collaboration   IDT Collaboration with  Family / Caregiver;Respiratory Therapist   Patient Position at End of Therapy Seated;Family / Friend in Room   Collaboration Comments spouse present during session, informed RT PMSV continuing - RT will check on patient in another 30 minutes    SLP Total Time Spent   SLP Individual Total Time Spent (Mins) 30   Charge Group   SLP Treatment - Individual Speech Language Treatment - Individual       Assessment    Pt tolerating PMSV for 30 minutes with valve remaining in place at conclusion of ST - RT aware and will follow up with patient after another 30 minutes to increase wear time of valve to 60 minutes this afternoon. Education and information provided to patient from IDTC and POC as it pertains to decannulation and placement of PEG. Pt in agreement and eager to \"get rid of all these tubes.\" Pt engaging in structured conversation with valve in place with no difficulty. Spouse requesting repetitions from pt when unable to understand which pt was able to repeat without difficulty. "     Plan    Continue PMSV trials and initiate capping trials.

## 2019-10-29 NOTE — REHAB-SLP IDT TEAM NOTE
"Speech Therapy   Cognitive Linquistic Functions  Comprehension Initial:  5 - Stand-by Prompting/Supervision or Set-up  Comprehension Current:  5 - Stand-by Prompting/Supervision or Set-up   Comprehension Description:  Glasses, Hearing aids/amplifiers, Verbal cues  Expression Initial:  2 - Max Assistance  Expression Current:  3 - Moderate Assistance   Expression Description:  Verbal cueing, [place] valve for trach  Social Interaction Initial:  5 - Stand-by Prompting/Supervision or Set-up  Social Interaction Current:  4 - Minimal Assistance   Social Interaction Description:  Increased time, Low stim environment, Medication, Verbal cues  Problem Solving Initial:  3 - Moderate Assistance  Problem Solving Current:  3 - Moderate Assistance   Problem Solving Description:  Verbal cueing, Therapy schedule, Increased time, Seat belt, Bed/chair alarm, 1:1 supervision  Memory Initial:  3 - Moderate Assistance  Memory Current:  2 - Max Assistance   Memory Description:  Verbal cueing, Therapy schedule, Bed/chair alarm, 1:1 supervision, Seat belt  Executive Functioning / Organization Initial:     Executive Functioning / Organization Current: 2 - Max Assistance     Executive Functioning Desciption: Pt independent prior.   Swallowing  Swallowing Status: NPO, pt did not pass blue dye test, overt roxane aspiration of trials as evidenced by blue dye secretions through trach site. Pt will require longer term means of nutrition/hydration at this time with recommendation for PEG. Pt frequently tugging at Cortrak asking \"when can I get this out.\" Pt and spouse both in agreement for PEG placement.   Orders Placed This Encounter   Procedures   • Diet NPO     Standing Status:   Standing     Number of Occurrences:   1     Order Specific Question:   Restrict to:     Answer:   With Tube Feed [4]     Behavior Modification Plan  Keep the environment simple to avoid over stimulatiom/agitation, Allow for rest time, Give clear feedback, Redirect to " task/topic, Provide reasonable choices, Decrease the chance of failure by offering activities that are within the patient's abilities, Analyze tasks (break down into smaller steps) and Reinforce participation in desired tasks  Assistive Technology  Hearing amplification or closed captioning, Low/Impaired vision equipment, Low tech: Calendar, planner, schedule, alarms/timers, pill organizer, post-it notes, lists and Other: PMSV  Family Training/Education:  Ongoing with spouse  DC Recommendations: Pt will require ongoing SLP services upon d/c for cog and dysphagia.   Strengths:  Independent PLOF, Making steady progress towards goals and Supportive family  Barriers:  Aspiration risk, Confused, Decreased endurance and Other: anxiety with PMSV, however, this is decreasing with more frequent use and longer duration  # of short term goals set=4  # of short term goals met=3  Speech Therapy Problems     Problem: Speech/Swallowing LTGs     Dates: Start: 10/19/19       Description:     Goal: LTG-By discharge, patient will safely swallow     Dates: Start: 10/19/19       Description: 1) Individualized goal:  Dysphagia III diet and thin liquids without overt s/sx of aspiratrion   2) Interventions:  SLP Swallowing Dysfunction Treatment, SLP Oral Pharyngeal Evaluation, SLP Video Swallow Evaluation, SLP Self Care / ADL Training , SLP Cognitive Skill Development and SLP Group Treatment             Goal: LTG-By discharge, patient will solve basic problems     Dates: Start: 10/19/19       Description: 1) Individualized goal: in order to discharge home with supervision  2) Interventions:  SLP Speech Language Treatment, SLP Self Care / ADL Training , SLP Cognitive Skill Development and SLP Group Treatment                   Problem: Swallowing STGs     Dates: Start: 10/19/19       Description:     Goal: STG-Within one week, patient will     Dates: Start: 10/19/19       Description: 1) Individualized goal: trigger swallows in response to  oral stim or prefeeding trials on 85% of attempts.    2) Interventions:  SLP Swallowing Dysfunction Treatment, SLP Oral Pharyngeal Evaluation, SLP Video Swallow Evaluation, SLP Self Care / ADL Training , SLP Cognitive Skill Development and SLP Group Treatment                        Section completed by:  Lorene Reno MS,CCC-SLP
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
## 2019-10-29 NOTE — CARE PLAN
Problem: Other Problem (see comments)  Goal: Other Goal  Description  Monitor/Evaluation: Monitor PO vs TF intake, diet upgrades, weight, labs, medication adjustments, skin integrity, GI function, vitals, I/Os, and overall hydration status.  Adjust nutritional POC pending clinical outcomes.    RD following bi-weekly until EN at a goal and tolerated.   Goal: 1. Tolerance to EN adjustment.  Consider PEG vs G-tube  2. Maintain adequate oral vs TF nutrient/fluid intake to promote nutrition optimization/healing. 3. Transition to PO pending clinical outcomes.         Outcome: MET

## 2019-10-30 ENCOUNTER — APPOINTMENT (OUTPATIENT)
Dept: RADIOLOGY | Facility: MEDICAL CENTER | Age: 79
End: 2019-10-30
Attending: PHYSICAL MEDICINE & REHABILITATION
Payer: COMMERCIAL

## 2019-10-30 ENCOUNTER — APPOINTMENT (OUTPATIENT)
Dept: RADIOLOGY | Facility: REHABILITATION | Age: 79
DRG: 947 | End: 2019-10-30
Attending: HOSPITALIST
Payer: MEDICARE

## 2019-10-30 LAB
APPEARANCE UR: CLEAR
BACTERIA #/AREA URNS HPF: NEGATIVE /HPF
BILIRUB UR QL STRIP.AUTO: NEGATIVE
COLOR UR: YELLOW
EPI CELLS #/AREA URNS HPF: NEGATIVE /HPF
GLUCOSE UR STRIP.AUTO-MCNC: NEGATIVE MG/DL
HYALINE CASTS #/AREA URNS LPF: ABNORMAL /LPF
KETONES UR STRIP.AUTO-MCNC: ABNORMAL MG/DL
LEUKOCYTE ESTERASE UR QL STRIP.AUTO: ABNORMAL
MICRO URNS: ABNORMAL
NITRITE UR QL STRIP.AUTO: NEGATIVE
PH UR STRIP.AUTO: 8 [PH] (ref 5–8)
PROT UR QL STRIP: NEGATIVE MG/DL
RBC # URNS HPF: ABNORMAL /HPF
RBC UR QL AUTO: NEGATIVE
SP GR UR STRIP.AUTO: 1.02
UROBILINOGEN UR STRIP.AUTO-MCNC: 1 MG/DL
WBC #/AREA URNS HPF: ABNORMAL /HPF

## 2019-10-30 PROCEDURE — 770010 HCHG ROOM/CARE - REHAB SEMI PRIVAT*

## 2019-10-30 PROCEDURE — 700102 HCHG RX REV CODE 250 W/ 637 OVERRIDE(OP): Performed by: HOSPITALIST

## 2019-10-30 PROCEDURE — 97530 THERAPEUTIC ACTIVITIES: CPT

## 2019-10-30 PROCEDURE — 71045 X-RAY EXAM CHEST 1 VIEW: CPT

## 2019-10-30 PROCEDURE — 99232 SBSQ HOSP IP/OBS MODERATE 35: CPT | Performed by: HOSPITALIST

## 2019-10-30 PROCEDURE — 92507 TX SP LANG VOICE COMM INDIV: CPT

## 2019-10-30 PROCEDURE — 74150 CT ABDOMEN W/O CONTRAST: CPT

## 2019-10-30 PROCEDURE — 94640 AIRWAY INHALATION TREATMENT: CPT

## 2019-10-30 PROCEDURE — 700101 HCHG RX REV CODE 250: Performed by: PHYSICAL MEDICINE & REHABILITATION

## 2019-10-30 PROCEDURE — A9270 NON-COVERED ITEM OR SERVICE: HCPCS | Performed by: HOSPITALIST

## 2019-10-30 PROCEDURE — 97110 THERAPEUTIC EXERCISES: CPT

## 2019-10-30 PROCEDURE — 97116 GAIT TRAINING THERAPY: CPT

## 2019-10-30 PROCEDURE — 99233 SBSQ HOSP IP/OBS HIGH 50: CPT | Performed by: PHYSICAL MEDICINE & REHABILITATION

## 2019-10-30 PROCEDURE — 700102 HCHG RX REV CODE 250 W/ 637 OVERRIDE(OP): Performed by: PHYSICAL MEDICINE & REHABILITATION

## 2019-10-30 PROCEDURE — A9270 NON-COVERED ITEM OR SERVICE: HCPCS | Performed by: PHYSICAL MEDICINE & REHABILITATION

## 2019-10-30 PROCEDURE — 94760 N-INVAS EAR/PLS OXIMETRY 1: CPT

## 2019-10-30 PROCEDURE — 97535 SELF CARE MNGMENT TRAINING: CPT

## 2019-10-30 RX ORDER — LEVOFLOXACIN 250 MG/1
500 TABLET, FILM COATED ORAL EVERY 24 HOURS
Status: COMPLETED | OUTPATIENT
Start: 2019-10-30 | End: 2019-11-05

## 2019-10-30 RX ADMIN — POLYETHYLENE GLYCOL 3350 1 PACKET: 17 POWDER, FOR SOLUTION ORAL at 05:16

## 2019-10-30 RX ADMIN — GUAIFENESIN 200 MG: 100 SOLUTION ORAL at 19:18

## 2019-10-30 RX ADMIN — LEVOTHYROXINE SODIUM 25 MCG: 25 TABLET ORAL at 05:17

## 2019-10-30 RX ADMIN — QUETIAPINE FUMARATE 100 MG: 100 TABLET ORAL at 21:22

## 2019-10-30 RX ADMIN — LEVOFLOXACIN 500 MG: 250 TABLET, FILM COATED ORAL at 15:16

## 2019-10-30 RX ADMIN — TRAZODONE HYDROCHLORIDE 50 MG: 50 TABLET ORAL at 21:22

## 2019-10-30 RX ADMIN — SENNOSIDES AND DOCUSATE SODIUM 2 TABLET: 8.6; 5 TABLET ORAL at 08:23

## 2019-10-30 RX ADMIN — GUAIFENESIN 200 MG: 100 SOLUTION ORAL at 15:16

## 2019-10-30 RX ADMIN — TERAZOSIN HYDROCHLORIDE 2 MG: 1 CAPSULE ORAL at 21:22

## 2019-10-30 RX ADMIN — VALPROIC ACID 500 MG: 250 SOLUTION ORAL at 05:17

## 2019-10-30 RX ADMIN — RISPERIDONE 0.5 MG: 0.25 TABLET ORAL at 21:21

## 2019-10-30 RX ADMIN — VALPROIC ACID 500 MG: 250 SOLUTION ORAL at 21:20

## 2019-10-30 RX ADMIN — RISPERIDONE 0.5 MG: 0.25 TABLET ORAL at 08:23

## 2019-10-30 RX ADMIN — HYDROXYZINE HYDROCHLORIDE 25 MG: 25 TABLET, FILM COATED ORAL at 10:16

## 2019-10-30 RX ADMIN — VALPROIC ACID 500 MG: 250 SOLUTION ORAL at 15:16

## 2019-10-30 RX ADMIN — GUAIFENESIN 200 MG: 100 SOLUTION ORAL at 05:17

## 2019-10-30 RX ADMIN — METOPROLOL TARTRATE 75 MG: 25 TABLET ORAL at 21:21

## 2019-10-30 RX ADMIN — QUETIAPINE FUMARATE 100 MG: 100 TABLET ORAL at 15:27

## 2019-10-30 RX ADMIN — METOPROLOL TARTRATE 75 MG: 25 TABLET ORAL at 08:23

## 2019-10-30 RX ADMIN — DIGOXIN 250 MCG: 125 TABLET ORAL at 19:18

## 2019-10-30 RX ADMIN — BACITRACIN 1 EACH: 500 OINTMENT TOPICAL at 08:23

## 2019-10-30 RX ADMIN — CHLORHEXIDINE GLUCONATE 0.12% ORAL RINSE 15 ML: 1.2 LIQUID ORAL at 08:24

## 2019-10-30 RX ADMIN — APIXABAN 5 MG: 5 TABLET, FILM COATED ORAL at 21:22

## 2019-10-30 RX ADMIN — METOPROLOL TARTRATE 75 MG: 25 TABLET ORAL at 15:15

## 2019-10-30 RX ADMIN — OMEPRAZOLE 40 MG: KIT at 08:24

## 2019-10-30 ASSESSMENT — ENCOUNTER SYMPTOMS
NAUSEA: 0
PALPITATIONS: 0
CHILLS: 0
FEVER: 0
EYES NEGATIVE: 1
SHORTNESS OF BREATH: 0
VOMITING: 0
ABDOMINAL PAIN: 0
COUGH: 0

## 2019-10-30 NOTE — PROGRESS NOTES
Pt a/o x 2, SR up x 3, bed on lowest position, non skid socks when OOB, pt given scheduled meds and Trazodone 50 mg via NG tube to help sleep. Pt cooperative, denies pain, no other concerns.

## 2019-10-30 NOTE — FLOWSHEET NOTE
10/30/19 1033   Events/Summary/Plan   Events/Summary/Plan Pt placed back on trach mask with heated aerosol.  Back in bed and resting.   Aerosol Therapy / Airway Management   #Aerosol Therapy / Airway Management Trache Collar   Aerosol Humidity Temp (celsius) 32   Chest Exam   Work Of Breathing / Effort   (back to normal)

## 2019-10-30 NOTE — FLOWSHEET NOTE
10/30/19 1429   Events/Summary/Plan   Events/Summary/Plan speaking valve placed on pt while in gym   Respiratory WDL   Respiratory (WDL) X   Airway Trach Tracheostomy 6.0   Placement Date/Time: 09/14/19 1250   Airway Type: Trach  Brand: Yoanna  Style: Cuffed;Fenestrated  Airway Location: Tracheostomy  Airway Size: 6.0  Inserted In: Unit  Inserted by: Respiratory care practitioner   Status Speaking valve (Add duration in comment)  (start)

## 2019-10-30 NOTE — FLOWSHEET NOTE
10/30/19 1633   Events/Summary/Plan   Events/Summary/Plan speaking valve removed and cap placed.     Aerosol Therapy / Airway Management   #Aerosol Therapy / Airway Management Trache Collar  (standby)   Aerosol Humidity Temp (celsius) 32   Trache Weaning and Decannulation   Capping Trial Initiated, Smart Text Complete? Yes   Hours Tolerated 2   Respiratory WDL   Respiratory (WDL) X   Chest Exam   Respiration 20   Secretions   Cough Congested;Productive;Strong   How Sputum Obtained Expectorated;Spontaneous   Sputum Amount Large   Sputum Color Yellow   Sputum Consistency Thick   Airway Trach Tracheostomy 6.0   Placement Date/Time: 09/14/19 1250   Airway Type: Trach  Brand: [employer]  Style: Cuffed;Fenestrated  Airway Location: Tracheostomy  Airway Size: 6.0  Inserted In: Unit  Inserted by: Respiratory care practitioner   Site Assessment Clean;Dry;Intact   Airway Tube Secured Velcro attachment   Date Securing Device changed? 10/30/19   Date Securing Device to be changed (Q 7 days) 11/06/19   Status Capped  (2 hrs speaking valve)   Tracheostomy/Stoma Care Inner Cannula Changed   Extra Tracheostomy Tube at Bedside Yes

## 2019-10-30 NOTE — FLOWSHEET NOTE
10/30/19 0701   Events/Summary/Plan   Events/Summary/Plan Pt check, suctioned prior to getting in shower, PMSV in place   Interdisciplinary Plan of Care-Goals (Indications)   Bronchopulmonary Hygiene Indications Difficulty with Secretion Clearance   Interdisciplinary Plan of Care-Outcomes    Bronchopulmonary Hygiene Outcome Optimal Hydration with Moderate or Less Sputum Production   Education   Education Yes - Pt. / Family has been Instructed in use of Respiratory Equipment   Aerosol Therapy / Airway Management   #Aerosol Therapy / Airway Management Trache Collar  (on standby)   Aerosol Humidity Temp (celsius) 32   Trache Weaning and Decannulation   Valve Trial Initiated, Smart Text Complete? Yes   Respiratory WDL   Respiratory (WDL) X   Chest Exam   Respiration 18   Pulse 80   Secretions   Cough Productive   How Sputum Obtained Cough on Request;Suction;Tracheal   Sputum Amount Scant   Sputum Color Tan;White   Sputum Consistency Thick   Suction Frequency Suctioned Once or Twice Per Encounter   Airway Trach Tracheostomy 6.0   Placement Date/Time: 09/14/19 1250   Airway Type: Trach  Brand: Yoanna  Style: Cuffed;Fenestrated  Airway Location: Tracheostomy  Airway Size: 6.0  Inserted In: Unit  Inserted by: Respiratory care practitioner   Cuffless No   Cuff Pressure cmH2O (R.C.)   (deflated at all times)   Suctioning Device   (single use)   Extra Tracheostomy Tube at Bedside Yes   Oximetry   #Pulse Oximetry (Single Determination) Yes   Oxygen   Home O2 Use Prior To Admission? No   Pulse Oximetry 93 %   O2 Daily Delivery Respiratory  Room Air with O2 Available   OTHER   Resuscitation Bag Resuscitation Bag and Mask at Bedside and Checked
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
## 2019-10-30 NOTE — THERAPY
"Occupational Therapy  Daily Treatment     Patient Name: Pedro Champion  Age:  79 y.o., Sex:  male  Medical Record #: [id number]  Today's Date: 10/30/2019     Precautions  Precautions: Fall Risk, Nasogastric Tube, Tracheostomy   Comments: 1:1 supervision, vale, NPO , impulsive    Safety   ADL Safety : Requires Physical Assist for Safety, Requires Supervision for Safety, Impaired, Impulsive, Impaired Insight into Safety, Requires Cueing for Safety  Bathroom Safety: Requires Physical Assist for Safety, Impaired, Impulsive  Comments: See FIM report for ADL routine.     Subjective    \"Take it out! It's hard to breathe\" patient stated referring to NGT. Perseverative on removing NGT at start of session. O2 sats stable throughout session.      Objective       10/30/19 0702   Precautions   Precautions Fall Risk;Nasogastric Tube;Tracheostomy    Comments 1:1 supervision, vale, NPO , impulsive   Vitals   Room Air Oximetry 93   O2 Delivery None (Room Air)   Vitals Comments with PMV on   Cognition    Level of Consciousness Alert   Comments Initially more agitated/restless however calmer as session progressed   Interdisciplinary Plan of Care Collaboration   IDT Collaboration with  Respiratory Therapist;Nursing   Patient Position at End of Therapy Seated;Self Releasing Lap Belt Applied   Collaboration Comments Transferred care to CNA/sitter, RN assisted with re-attaching vale as pt stepped on it during shower; RT performed suctioning at start of session   OT Total Time Spent   OT Individual Total Time Spent (Mins) 60   OT Charge Group   OT Self Care / ADL 4       FIM Grooming Score:  5 - Standby Prompting/Supervision or Set-up  Grooming Description:  Increased time, Supervision for safety, Set-up of equipment(Patient combed hair with set-up and SBA while seated in w/c)    FIM Bathing Score:  4 - Minimal Assistance  Bathing Description:   FIM Bathing Score:  4 - Minimal Assistance  Bathing Description:  Tub bench, Hand held shower, " Increased time, Supervision for safety, Verbal cueing, Set-up of equipment, Initial preparation for task(Patient requires min assist to perform bathing tasks due to increased impulsivity/restlessness today, for safety and standing balance. Max cues for caution around trach/NGT.)    FIM Upper Body Dressin - Standby Prompting/Supervision or Set-up  Upper Body Dressing Description:  Increased time, Supervision for safety, Verbal cueing, Set-up of equipment(Patient donned t-shirt with set-up while seated in w/c)    FIM Lower Body Dressing Score:  5 - Standby Prompting/Supervsion or Set-up  Lower Body Dressing Description:  Increased time, Supervision for safety, Verbal cueing, Set-up of equipment(Patient donned elastic waist pants and lace shoes with set-up and SBA; requires total assist to thread vale bag through pants and for donning anti-embolism stockings)    FIM Tub/Shower Transfers Score:  4 - Minimal Assistance  Tub/Shower Transfers Description:  Grab bar, Increased time, Supervision for safety, Verbal cueing, Set-up of equipment(CGA assist for safety/standing balance due to increased impulsivity/restlessness today)    Patient seated in w/c, retrieving clothing from closet when undersigned therapist arrived. Expressed he would like to take shower this AM. Shortly after patient transferred to shower, preservative on having NGT removed. Undersigned therapist explained that plan is for NGT to be removed (however not at this moment) and attempted to redirect patient's focus to shower activity.    Assessment    Patient tolerated OT session well with focus on ADLs. Increased restlessness/impulsivity today (compared to yesterday's session) as patient perseverative on getting NGT removed. Decreased anxiety noted as session progressed. Patient tolerated wearing speaking valve during this session ~15 mins and ~30 mins with brief removal due to increased anxiety.     Plan    Incorporate safety considerations related  to ADLs and functional mobility, endurance training, standing balance

## 2019-10-30 NOTE — FLOWSHEET NOTE
10/30/19 1022   Oximetry   #Pulse Oximetry (Single Determination) Yes   Oxygen   Pulse Oximetry 97 %   O2 (LPM) 4   O2 Daily Delivery Respiratory  Silicone Nasal Cannula
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
## 2019-10-30 NOTE — FLOWSHEET NOTE
10/30/19 0835   Events/Summary/Plan   Events/Summary/Plan Capping trial started   Trache Weaning and Decannulation   Capping Trial Initiated, Smart Text Complete? Yes   Airway Trach Tracheostomy 6.0   Placement Date/Time: 09/14/19 1250   Airway Type: Trach  Brand: Yoanna  Style: Cuffed;Fenestrated  Airway Location: Tracheostomy  Airway Size: 6.0  Inserted In: Unit  Inserted by: Respiratory care practitioner   Status Capped

## 2019-10-30 NOTE — THERAPY
"Speech Language Pathology  Daily Treatment     Patient Name: Pedro Champion  Age:  79 y.o., Sex:  male  Medical Record #: [id number]  Today's Date: 10/30/2019     Subjective    Pt with 1:1 up in chair at start of session. Per OT, pt more anxious this date re: PMSV during a.m. ADL session.      Objective     10/30/19 0834   Speech / Dysarthria   Articulation Training Supervision (5)   Intensity / Loudness Training Supervision (5)   Respiration Control Minimal (4)   Airway Trach Tracheostomy 6.0   Placement Date/Time: 09/14/19 1250   Airway Type: Trach  Brand: [employer]  Style: Cuffed;Fenestrated  Airway Location: Tracheostomy  Airway Size: 6.0  Inserted In: Unit  Inserted by: Respiratory care practitioner   Status Capped  (60+ minutes)   Interdisciplinary Plan of Care Collaboration   IDT Collaboration with  Respiratory Therapist;Certified Nursing Assistant   Patient Position at End of Therapy Seated   Collaboration Comments transfer of care to 1:1 CNA, RT placing cap at beginning of session, discussed progress at end of session.    SLP Total Time Spent   SLP Individual Total Time Spent (Mins) 60   Charge Group   SLP Treatment - Individual Speech Language Treatment - Individual       Assessment    Initiated capping trials this session. RT placing cap over trach site with pt tolerating for 60+ minutes. Pt with audible air escaping trach site when attempting deep inhalation/exhalation and coughing. Pt required pressure over trach site from SLP to forcefully cough to expel secretions. Education attempted on pt providing pressure over trach site, however, no carryover demonstrated - increased anxiety noted when pt instructed to apply pressure himself. Pt frequently stating during session, \"get this thing off and I'll tell you a story.\" Re-education throughout session and redirection needed. Pt SP02 remained between 94-98 with spot checks throughout 60 minute session.     Plan    Continue capping trials, confirm PEG placement " date.
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
## 2019-10-31 PROBLEM — J18.9 PNEUMONIA: Status: ACTIVE | Noted: 2019-09-20

## 2019-10-31 LAB
ANION GAP SERPL CALC-SCNC: 6 MMOL/L (ref 0–11.9)
BUN SERPL-MCNC: 26 MG/DL (ref 8–22)
CALCIUM SERPL-MCNC: 8 MG/DL (ref 8.5–10.5)
CHLORIDE SERPL-SCNC: 105 MMOL/L (ref 96–112)
CO2 SERPL-SCNC: 28 MMOL/L (ref 20–33)
CREAT SERPL-MCNC: 0.6 MG/DL (ref 0.5–1.4)
ERYTHROCYTE [DISTWIDTH] IN BLOOD BY AUTOMATED COUNT: 53 FL (ref 35.9–50)
GLUCOSE SERPL-MCNC: 111 MG/DL (ref 65–99)
HCT VFR BLD AUTO: 29.7 % (ref 42–52)
HGB BLD-MCNC: 9.1 G/DL (ref 14–18)
MAGNESIUM SERPL-MCNC: 1.9 MG/DL (ref 1.5–2.5)
MCH RBC QN AUTO: 28.7 PG (ref 27–33)
MCHC RBC AUTO-ENTMCNC: 30.6 G/DL (ref 33.7–35.3)
MCV RBC AUTO: 93.7 FL (ref 81.4–97.8)
NT-PROBNP SERPL IA-MCNC: 1476 PG/ML (ref 0–125)
PHOSPHATE SERPL-MCNC: 3.3 MG/DL (ref 2.5–4.5)
PLATELET # BLD AUTO: 193 K/UL (ref 164–446)
PMV BLD AUTO: 9.8 FL (ref 9–12.9)
POTASSIUM SERPL-SCNC: 3.7 MMOL/L (ref 3.6–5.5)
RBC # BLD AUTO: 3.17 M/UL (ref 4.7–6.1)
SODIUM SERPL-SCNC: 139 MMOL/L (ref 135–145)
WBC # BLD AUTO: 9.5 K/UL (ref 4.8–10.8)

## 2019-10-31 PROCEDURE — 770010 HCHG ROOM/CARE - REHAB SEMI PRIVAT*

## 2019-10-31 PROCEDURE — 97110 THERAPEUTIC EXERCISES: CPT

## 2019-10-31 PROCEDURE — A9270 NON-COVERED ITEM OR SERVICE: HCPCS | Performed by: PHYSICAL MEDICINE & REHABILITATION

## 2019-10-31 PROCEDURE — 94760 N-INVAS EAR/PLS OXIMETRY 1: CPT

## 2019-10-31 PROCEDURE — 94640 AIRWAY INHALATION TREATMENT: CPT

## 2019-10-31 PROCEDURE — 83880 ASSAY OF NATRIURETIC PEPTIDE: CPT

## 2019-10-31 PROCEDURE — 99232 SBSQ HOSP IP/OBS MODERATE 35: CPT | Performed by: PHYSICAL MEDICINE & REHABILITATION

## 2019-10-31 PROCEDURE — 97530 THERAPEUTIC ACTIVITIES: CPT

## 2019-10-31 PROCEDURE — 84100 ASSAY OF PHOSPHORUS: CPT

## 2019-10-31 PROCEDURE — 36415 COLL VENOUS BLD VENIPUNCTURE: CPT

## 2019-10-31 PROCEDURE — 85027 COMPLETE CBC AUTOMATED: CPT

## 2019-10-31 PROCEDURE — 700102 HCHG RX REV CODE 250 W/ 637 OVERRIDE(OP): Performed by: PHYSICAL MEDICINE & REHABILITATION

## 2019-10-31 PROCEDURE — 92507 TX SP LANG VOICE COMM INDIV: CPT

## 2019-10-31 PROCEDURE — 99232 SBSQ HOSP IP/OBS MODERATE 35: CPT | Performed by: HOSPITALIST

## 2019-10-31 PROCEDURE — 97535 SELF CARE MNGMENT TRAINING: CPT

## 2019-10-31 PROCEDURE — 80048 BASIC METABOLIC PNL TOTAL CA: CPT

## 2019-10-31 PROCEDURE — 83735 ASSAY OF MAGNESIUM: CPT

## 2019-10-31 PROCEDURE — 700102 HCHG RX REV CODE 250 W/ 637 OVERRIDE(OP): Performed by: HOSPITALIST

## 2019-10-31 PROCEDURE — A9270 NON-COVERED ITEM OR SERVICE: HCPCS | Performed by: HOSPITALIST

## 2019-10-31 PROCEDURE — 700101 HCHG RX REV CODE 250: Performed by: PHYSICAL MEDICINE & REHABILITATION

## 2019-10-31 RX ORDER — SODIUM CHLORIDE, SODIUM LACTATE, POTASSIUM CHLORIDE, CALCIUM CHLORIDE 600; 310; 30; 20 MG/100ML; MG/100ML; MG/100ML; MG/100ML
INJECTION, SOLUTION INTRAVENOUS CONTINUOUS
Status: CANCELLED | OUTPATIENT
Start: 2019-11-05 | End: 2019-11-06

## 2019-10-31 RX ADMIN — METOPROLOL TARTRATE 75 MG: 25 TABLET ORAL at 13:45

## 2019-10-31 RX ADMIN — RISPERIDONE 0.5 MG: 0.25 TABLET ORAL at 21:03

## 2019-10-31 RX ADMIN — RISPERIDONE 0.5 MG: 0.25 TABLET ORAL at 07:55

## 2019-10-31 RX ADMIN — CHLORHEXIDINE GLUCONATE 0.12% ORAL RINSE 15 ML: 1.2 LIQUID ORAL at 07:54

## 2019-10-31 RX ADMIN — VALPROIC ACID 500 MG: 250 SOLUTION ORAL at 13:45

## 2019-10-31 RX ADMIN — GUAIFENESIN 200 MG: 100 SOLUTION ORAL at 17:42

## 2019-10-31 RX ADMIN — VALPROIC ACID 500 MG: 250 SOLUTION ORAL at 05:16

## 2019-10-31 RX ADMIN — QUETIAPINE FUMARATE 100 MG: 100 TABLET ORAL at 21:09

## 2019-10-31 RX ADMIN — DIGOXIN 250 MCG: 125 TABLET ORAL at 17:39

## 2019-10-31 RX ADMIN — VALPROIC ACID 500 MG: 250 SOLUTION ORAL at 21:19

## 2019-10-31 RX ADMIN — LEVOTHYROXINE SODIUM 25 MCG: 25 TABLET ORAL at 05:16

## 2019-10-31 RX ADMIN — SENNOSIDES AND DOCUSATE SODIUM 2 TABLET: 8.6; 5 TABLET ORAL at 07:54

## 2019-10-31 RX ADMIN — TRAZODONE HYDROCHLORIDE 50 MG: 50 TABLET ORAL at 21:04

## 2019-10-31 RX ADMIN — GUAIFENESIN 200 MG: 100 SOLUTION ORAL at 05:16

## 2019-10-31 RX ADMIN — GUAIFENESIN 200 MG: 100 SOLUTION ORAL at 00:07

## 2019-10-31 RX ADMIN — GUAIFENESIN 200 MG: 100 SOLUTION ORAL at 11:02

## 2019-10-31 RX ADMIN — CHLORHEXIDINE GLUCONATE 0.12% ORAL RINSE 15 ML: 1.2 LIQUID ORAL at 21:02

## 2019-10-31 RX ADMIN — TERAZOSIN HYDROCHLORIDE 2 MG: 1 CAPSULE ORAL at 21:03

## 2019-10-31 RX ADMIN — LEVOFLOXACIN 500 MG: 250 TABLET, FILM COATED ORAL at 07:55

## 2019-10-31 RX ADMIN — BACITRACIN 1 EACH: 500 OINTMENT TOPICAL at 07:54

## 2019-10-31 RX ADMIN — OMEPRAZOLE 40 MG: KIT at 07:57

## 2019-10-31 RX ADMIN — METOPROLOL TARTRATE 75 MG: 25 TABLET ORAL at 07:54

## 2019-10-31 RX ADMIN — QUETIAPINE FUMARATE 100 MG: 100 TABLET ORAL at 17:39

## 2019-10-31 RX ADMIN — METOPROLOL TARTRATE 75 MG: 25 TABLET ORAL at 21:04

## 2019-10-31 ASSESSMENT — ENCOUNTER SYMPTOMS
NAUSEA: 0
CHILLS: 0
COUGH: 0
FEVER: 0
VOMITING: 0
SHORTNESS OF BREATH: 0
PALPITATIONS: 0
ABDOMINAL PAIN: 0
EYES NEGATIVE: 1

## 2019-10-31 NOTE — THERAPY
Speech Language Pathology  Daily Treatment     Patient Name: Pedro Champion  Age:  79 y.o., Sex:  male  Medical Record #: [id number]  Today's Date: 10/31/2019     Subjective    Patient seen at bedside.  Collaborated with RT at beginning of session.     Objective       10/31/19 0831   Speech / Dysarthria   Articulation Training Supervision (5)   Intensity / Loudness Training Supervision (5)   Respiration Control Minimal (4)   Airway Trach Tracheostomy 6.0   Placement Date/Time: 09/14/19 1250   Airway Type: Trach  Brand: Yoanna  Style: Cuffed;Fenestrated  Airway Location: Tracheostomy  Airway Size: 6.0  Inserted In: Unit  Inserted by: Respiratory care practitioner   Site Assessment Clean;Dry   Airway Tube Secured Velcro attachment   Cuffless No   Cuff Pressure cmH2O (R.C.)   (deflated)   Status Speaking valve (Add duration in comment)  (55 min)   Tracheostomy/Stoma Care Clean Stoma Site   Extra Tracheostomy Tube at Bedside Yes   Interdisciplinary Plan of Care Collaboration   IDT Collaboration with  Respiratory Therapist   Collaboration Comments RT completing session.  Had performed suctioning.  Discussed with patient and RT continuing cap trials.  Patient refused to trial cap today, offered multiple times during tx and declined with each offer.    SLP Total Time Spent   SLP Individual Total Time Spent (Mins) 60   Charge Group   SLP Treatment - Individual Speech Language Treatment - Individual     FIM Eating Score:  1 - Total Assistance  Eating Description:  Tube feed bolus, Staff administers tube feed/parenteral nutrition/IVF    FIM Comprehension Score:  5 - Stand-by Prompting/Supervision or Set-up  Comprehension Description:  Glasses, Verbal cues    FIM Expression Score:  4 - Minimal Assistance  Expression Description:  (Passey Jen valve in place during session)    FIM Social Interaction Score:  4 - Minimal Assistance  Social Interaction Description:  Increased time, Verbal cues    FIM Problem Solving Score:  3 -  Moderate Assistance  Problem Solving Description:  Verbal cueing, Therapy schedule, Increased time, Bed/chair alarm, Seat belt    FIM Memory Score:  2 - Max Assistance  Memory Description:  Verbal cueing, Therapy schedule, Bed/chair alarm, Supervision, 1:1 supervision    Assessment    Patient refused cap today. He did accept Passey Jen Speaking valve for 55 min.  O2 sats maintained at 94-96% on room air throughout session, in periodic spot checks throughout 60 min session..  Patient was able to sustain voice taking turns in structured conversation with connected speech. for for 40 min before asking for a break, at which time he did show some mild anxiety.   He was able to perform productive coughing with valve in place with verbal feedback and cues for breath support.  However, has difficulty coordinating guarding valve and did blow it off once, during cough ( caught by ST).  He also had difficulty coordinating covering open stoma to cough but did achieve effective coughs with assist.  He improved duration to sustain phonation /a/ from 2 sec to 5 sec within session.      Plan    Continue capping trials, PEG is planned to be inserted on  11/5/19 to leave at 11:00

## 2019-10-31 NOTE — THERAPY
Physical Therapy   Daily Treatment     Patient Name: Pedro Champion  Age:  79 y.o., Sex:  male  Medical Record #: [id number]  Today's Date: 10/31/2019     Precautions  Precautions: Fall Risk, Nasogastric Tube, Tracheostomy   Comments: 1:1 supervision, NPO, impulsive    Subjective    Pt seated up in wc, seated with his SO, agreeable to PT     Objective       10/30/19 1401   Precautions   Precautions Fall Risk;Nasogastric Tube;Tracheostomy    Comments 1:1 supervision, NPO, vale catheter, impulsive   Vitals   Pulse (!) 105   Patient BP Position Sitting   Room Air Oximetry 96   O2 Delivery None (Room Air)   Vitals Comments monitored during nustep session   Sitting Lower Body Exercises   Nustep Resistance Level 1;Time (See Comments)  (5' and 2' with rest break)   Standing Lower Body Exercises   Standing Lower Body Exercises Yes   Marching 1 set of 10   Heel Rise 1 set of 10   Toe Rise 1 set of 10   Bed Mobility    Sit to Stand Stand by Assist  (incidental touching)   Interdisciplinary Plan of Care Collaboration   IDT Collaboration with  Respiratory Therapist;Nursing;Family / Caregiver   Patient Position at End of Therapy Seated;Self Releasing Lap Belt Applied;Family / Friend in Room   Collaboration Comments RT placed Trach cap/speaking cap during session, RN: disconnected feeding tube, SO present in room at beginning/end of session   PT Total Time Spent   PT Individual Total Time Spent (Mins) 60   PT Charge Group   PT Gait Training 1   PT Therapeutic Exercise 2   PT Therapeutic Activities 1   Supervising Physical Therapist Daksha Becker       Pt self propelled wc using B LE's for 300' with SPV and vc for way finding    Pt also amb with FWW x 300' with SBA, VC for upright posture and safe proximity to the walker, pt required vc for obstacle management and safe negotiation of the environment due to impaired safety    Assessment    Pt tolerated a total of 7 minutes on the nustep with 5 minutes and a break followed by 2  additional minutes. Pt required vc throughout to breath in through nose and out through mouth, O2 saturations 96% on RA. Pt cont to require vc for safety during gait training due to poor proximity to the walker and poor safety awareness.     Plan    Continue gait training with FWW, general strength and conditioning, balance activities as pt tolerates.

## 2019-10-31 NOTE — PROGRESS NOTES
1 on 1 sitter with patient, sitter reports to this writer that patient was drinking water in the bathroom and was coughing when she entered.

## 2019-10-31 NOTE — CARE PLAN
Problem: Safety  Goal: Will remain free from injury  Note:   Pt is impulsive. Reinforced safety precautions to patient, currently in a room near nurses' station and has a 1:1 sitter. Pt is wearing non skid socks, bed is locked and at low position, HOB elevated for aspiration precautions.      Problem: Infection  Goal: Will remain free from infection  Note:   Pt is on oral Levaquin for possible PNA.      Problem: Urinary Elimination:  Goal: Ability to reestablish a normal urinary elimination pattern will improve  Note:   ICP done on pt at 1200 due to inability to void. Urine is yellow clear in color.

## 2019-10-31 NOTE — FLOWSHEET NOTE
10/31/19 1324   Events/Summary/Plan   Events/Summary/Plan Aerosol check   Aerosol Therapy / Airway Management   #Aerosol Therapy / Airway Management Trache Collar   Aerosol Humidity Temp (celsius) 34   Chest Exam   Respiration 18   Pulse 74   Secretions   Cough Non Productive   Oximetry   #Pulse Oximetry (Single Determination) Yes   Oxygen   Home O2 Use Prior To Admission? No   Pulse Oximetry 95 %   O2 Daily Delivery Respiratory  Room Air with O2 Available   OTHER   Resuscitation Bag Resuscitation Bag and Mask at Bedside and Checked

## 2019-10-31 NOTE — CARE PLAN
Problem: Safety  Goal: Will remain free from injury  Note:   Patient educated on importance of utilizing call light to minimize the potential of injury from falls. Patient verbalizes understanding. 1 on 1 sitter present at this time, patient continues to be educated to using call light and waiting for assistance to prepare for when sitter is d/c'd.       Problem: Infection  Goal: Will remain free from infection  Note:   Education reinforced to wash hands thoroughly after using the restroom, prior to eating and throughout the day to minimize the potential of acquiring germs while in a facility setting. Patient engaged in conversation and verbalizes understanding.

## 2019-10-31 NOTE — THERAPY
"Physical Therapy   Daily Treatment     Patient Name: Pedro Champion  Age:  79 y.o., Sex:  male  Medical Record #: [id number]  Today's Date: 10/31/2019     Precautions  Precautions: Fall Risk, Nasogastric Tube, Tracheostomy   Comments: 1:1 supervision, NPO, impulsive    Subjective    Pt seated in room with wife present, agreeable to PT, requests to remove speaking valve for now.      Objective          10/31/19 1401   Precautions   Precautions Fall Risk;Nasogastric Tube;Tracheostomy    Comments 1:1 supervision, NPO, impulsive   Sitting Lower Body Exercises   Nustep Resistance Level 2  (10 min, 0.19 miles)   Other Exercises [employer] for BUEs: gear 3 >0, 5 min, 0.35 miles   Interdisciplinary Plan of Care Collaboration   IDT Collaboration with  Family / Caregiver;Physician   Patient Position at End of Therapy Seated;Call Light within Reach;Tray Table within Reach;Family / Friend in Room   Collaboration Comments spoke with pt's wife regarding CLOF, notified Dr Molina pt and wife requesting to speak with him   PT Total Time Spent   PT Individual Total Time Spent (Mins) 60   PT Charge Group   PT Therapeutic Exercise 2   PT Therapeutic Activities 2       FIM Walking Score:  5 - Standby Prompting/Supervision or Set-up  Walking Description:  Walker, Extra time, Supervision for safety, Verbal cueing(~500 ft and ~800 ft with FWW and SBA)    FIM Stairs Score:  5 - Standby Prompting/Supervision or Set-up  Stairs Description:  Hand rails, Extra time, Verbal cueing, Supervision for safety(20 steps (4\" and 6\") with close SBA and 2 HRs )      Assessment    Pt attempted to end session early 2x, however PT and wife able to redirect.     Plan    Continue gait training with FWW, general strength and conditioning, balance activities as pt tolerates    "

## 2019-10-31 NOTE — PROGRESS NOTES
Pt has not voided since cath removal at 1330, he has attempted twice. Last bladder scan was 388 at 1715. He is refusing bladder scan and/or straight cath at this time. Night shift RN will attempt when he gets into bed.

## 2019-10-31 NOTE — FLOWSHEET NOTE
10/31/19 0710   Events/Summary/Plan   Events/Summary/Plan Pt suctioned prior to working with OT. Aerosol check,    Interdisciplinary Plan of Care-Goals (Indications)   Bronchopulmonary Hygiene Indications   (Trach pt needing assistance with secretion removal)   Interdisciplinary Plan of Care-Outcomes    Bronchopulmonary Hygiene Outcome Optimal Hydration with Moderate or Less Sputum Production   Education   Education Yes - Pt. / Family has been Instructed in use of Respiratory Equipment   Aerosol Therapy / Airway Management   #Aerosol Therapy / Airway Management Trache Collar   Aerosol Humidity Temp (celsius) 34   Chest Exam   Respiration 18   Pulse 72   Secretions   Cough Productive   How Sputum Obtained Suction   Sputum Amount Moderate   Sputum Color Tan;White   Sputum Consistency Thick   Suction Frequency Suctioned Once or Twice Per Encounter   Airway Trach Tracheostomy 6.0   Placement Date/Time: 09/14/19 1250   Airway Type: Trach  Brand: Yoanna  Style: Cuffed;Fenestrated  Airway Location: Tracheostomy  Airway Size: 6.0  Inserted In: Unit  Inserted by: Respiratory care practitioner   Site Assessment Clean;Dry;Intact   Airway Tube Secured Velcro attachment   Cuffless No   Extra Tracheostomy Tube at Bedside Yes   Oximetry   #Pulse Oximetry (Single Determination) Yes   Oxygen   Home O2 Use Prior To Admission? No   Pulse Oximetry 96 %   FiO2% 30 %   O2 Daily Delivery Respiratory  Trache Collar

## 2019-10-31 NOTE — THERAPY
"Occupational Therapy  Daily Treatment     Patient Name: Pedro Champion  Age:  79 y.o., Sex:  male  Medical Record #: [id number]  Today's Date: 10/31/2019     Precautions  Precautions: Fall Risk, Nasogastric Tube, Tracheostomy   Comments: 1:1 supervision, NPO, impulsive    Safety   ADL Safety : Requires Supervision for Safety  Bathroom Safety: Requires Supervision for Safety  Comments: See FIM report for ADL routine.     Subjective    \"It's Halloween today\"    \"I hope so. I want to get out of here,\" patient responded when undersigned therapist said \"You've made some great progress here, Pedro\"     Objective     10/31/19 0701   Precautions   Precautions Fall Risk;Nasogastric Tube;Tracheostomy    Comments 1:1 supervision, NPO, impulsive   Safety    ADL Safety  Requires Supervision for Safety   Bathroom Safety Requires Supervision for Safety   Cognition    Level of Consciousness Alert   Comments Partially oriented to date, \"it's Halloween today,\" not oriented to year   Balance   Sitting Balance (Static) Good   Sitting Balance (Dynamic) Fair -   Standing Balance (Static) Fair   Standing Balance (Dynamic) Fair -   Interdisciplinary Plan of Care Collaboration   IDT Collaboration with  Nursing;Respiratory Therapist   Patient Position at End of Therapy Seated;Other (Comments)   Collaboration Comments Transferred care to nursing; RT performed suction at start of session   OT Total Time Spent   OT Individual Total Time Spent (Mins) 60   OT Charge Group   OT Self Care / ADL 4       FIM Grooming Score:  5 - Standby Prompting/Supervision or Set-up  Grooming Description:  Increased time, Supervision for safety, Set-up of equipment, Verbal cueing(Patient brushed teeth and washed hands and face with set-up and SBA in standing with FWW. )    FIM Upper Body Dressin - Standby Prompting/Supervision or Set-up  Upper Body Dressing Description:  Verbal cueing, Supervision for safety, Set-up of equipment(Patient donned t-shirt with " set-up and SBA while seated EOB)    FIM Lower Body Dressing Score:  5 - Standby Prompting/Supervsion or Set-up  Lower Body Dressing Description:  Set-up of equipment, Supervision for safety, Increased time(Patient donned elastic waist pants and lace shoes with set-up and SBA while seated EOB. )    Patient performed functional mobility with FWW and SBA (~200'x2) for safety and balance.     Assessment    Patient tolerated OT session well with focus on ADLs and functional mobility with FWW. No LOB noted during functional mobility tasks with FWW. Demonstrates ability to perform dressing and grooming tasks with SBA and set-up while incorporating sitting and standing with FWW. No episodes of agitation or impulsivity noted during this session.     Plan    Incorporate safety considerations related to ADLs and functional mobility, endurance training, standing balance/tolerance

## 2019-10-31 NOTE — FLOWSHEET NOTE
10/30/19 1723   Events/Summary/Plan   Events/Summary/Plan cap off; pt diaphoretic and tachypneic even with lots of reassurance   Trache Weaning and Decannulation   Hours Tolerated   (50 mins)   Respiratory WDL   Respiratory (WDL) X   Chest Exam   Work Of Breathing / Effort Accessory Muscle Use;Increased Work of Breathing   Respiration (!) 22   Airway Trach Tracheostomy 6.0   Placement Date/Time: 09/14/19 1250   Airway Type: Trach  Brand: Yoanna  Style: Cuffed;Fenestrated  Airway Location: Tracheostomy  Airway Size: 6.0  Inserted In: Unit  Inserted by: Respiratory care practitioner   Status   (cap off)

## 2019-10-31 NOTE — THERAPY
10/31/19 0831   Speech / Dysarthria   Articulation Training Supervision (5)   Intensity / Loudness Training Supervision (5)   Respiration Control Minimal (4)   Airway Trach Tracheostomy 6.0   Placement Date/Time: 09/14/19 1250   Airway Type: Trach  Brand: Yoanna  Style: Cuffed;Fenestrated  Airway Location: Tracheostomy  Airway Size: 6.0  Inserted In: Unit  Inserted by: Respiratory care practitioner   Site Assessment Clean;Dry   Airway Tube Secured Velcro attachment   Cuffless No   Cuff Pressure cmH2O (R.C.)   (deflated)   Status Speaking valve (Add duration in comment)  (55 min)   Tracheostomy/Stoma Care Clean Stoma Site   Extra Tracheostomy Tube at Bedside Yes   Interdisciplinary Plan of Care Collaboration   IDT Collaboration with  Respiratory Therapist   Collaboration Comments RT completing session.  Had performed suctioning.  Discussed with patient and RT continuing cap trials.  Patient refused to trial cap today, offered multiple times during tx and declined with each offer.    SLP Total Time Spent   SLP Individual Total Time Spent (Mins) 60   Charge Group   SLP Treatment - Individual Speech Language Treatment - Individual
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
## 2019-11-01 PROCEDURE — 97535 SELF CARE MNGMENT TRAINING: CPT

## 2019-11-01 PROCEDURE — 700102 HCHG RX REV CODE 250 W/ 637 OVERRIDE(OP): Performed by: PHYSICAL MEDICINE & REHABILITATION

## 2019-11-01 PROCEDURE — 99232 SBSQ HOSP IP/OBS MODERATE 35: CPT | Performed by: HOSPITALIST

## 2019-11-01 PROCEDURE — A9270 NON-COVERED ITEM OR SERVICE: HCPCS | Performed by: HOSPITALIST

## 2019-11-01 PROCEDURE — 97110 THERAPEUTIC EXERCISES: CPT

## 2019-11-01 PROCEDURE — A9270 NON-COVERED ITEM OR SERVICE: HCPCS | Performed by: PHYSICAL MEDICINE & REHABILITATION

## 2019-11-01 PROCEDURE — 94760 N-INVAS EAR/PLS OXIMETRY 1: CPT

## 2019-11-01 PROCEDURE — 92507 TX SP LANG VOICE COMM INDIV: CPT

## 2019-11-01 PROCEDURE — 97530 THERAPEUTIC ACTIVITIES: CPT

## 2019-11-01 PROCEDURE — 700101 HCHG RX REV CODE 250: Performed by: PHYSICAL MEDICINE & REHABILITATION

## 2019-11-01 PROCEDURE — 99232 SBSQ HOSP IP/OBS MODERATE 35: CPT | Performed by: PHYSICAL MEDICINE & REHABILITATION

## 2019-11-01 PROCEDURE — 700102 HCHG RX REV CODE 250 W/ 637 OVERRIDE(OP): Performed by: HOSPITALIST

## 2019-11-01 PROCEDURE — 94640 AIRWAY INHALATION TREATMENT: CPT

## 2019-11-01 PROCEDURE — 770010 HCHG ROOM/CARE - REHAB SEMI PRIVAT*

## 2019-11-01 RX ORDER — TERAZOSIN 5 MG/1
5 CAPSULE ORAL EVERY EVENING
Status: DISCONTINUED | OUTPATIENT
Start: 2019-11-01 | End: 2019-11-05 | Stop reason: HOSPADM

## 2019-11-01 RX ORDER — QUETIAPINE FUMARATE 25 MG/1
50 TABLET, FILM COATED ORAL EVERY EVENING
Status: DISCONTINUED | OUTPATIENT
Start: 2019-11-01 | End: 2019-11-05 | Stop reason: HOSPADM

## 2019-11-01 RX ADMIN — VALPROIC ACID 500 MG: 250 SOLUTION ORAL at 21:28

## 2019-11-01 RX ADMIN — GUAIFENESIN 200 MG: 100 SOLUTION ORAL at 00:26

## 2019-11-01 RX ADMIN — DIGOXIN 250 MCG: 125 TABLET ORAL at 18:19

## 2019-11-01 RX ADMIN — GUAIFENESIN 200 MG: 100 SOLUTION ORAL at 13:20

## 2019-11-01 RX ADMIN — METOPROLOL TARTRATE 75 MG: 25 TABLET ORAL at 21:29

## 2019-11-01 RX ADMIN — APIXABAN 5 MG: 5 TABLET, FILM COATED ORAL at 09:00

## 2019-11-01 RX ADMIN — RISPERIDONE 0.5 MG: 0.25 TABLET ORAL at 08:54

## 2019-11-01 RX ADMIN — QUETIAPINE FUMARATE 100 MG: 100 TABLET ORAL at 16:32

## 2019-11-01 RX ADMIN — VALPROIC ACID 500 MG: 250 SOLUTION ORAL at 05:30

## 2019-11-01 RX ADMIN — APIXABAN 5 MG: 5 TABLET, FILM COATED ORAL at 20:25

## 2019-11-01 RX ADMIN — GUAIFENESIN 200 MG: 100 SOLUTION ORAL at 18:19

## 2019-11-01 RX ADMIN — CHLORHEXIDINE GLUCONATE 0.12% ORAL RINSE 15 ML: 1.2 LIQUID ORAL at 20:23

## 2019-11-01 RX ADMIN — LEVOTHYROXINE SODIUM 25 MCG: 25 TABLET ORAL at 06:38

## 2019-11-01 RX ADMIN — METOPROLOL TARTRATE 75 MG: 25 TABLET ORAL at 08:52

## 2019-11-01 RX ADMIN — OMEPRAZOLE 40 MG: KIT at 08:54

## 2019-11-01 RX ADMIN — GUAIFENESIN 200 MG: 100 SOLUTION ORAL at 05:30

## 2019-11-01 RX ADMIN — LEVOFLOXACIN 500 MG: 250 TABLET, FILM COATED ORAL at 08:52

## 2019-11-01 RX ADMIN — CHLORHEXIDINE GLUCONATE 0.12% ORAL RINSE 15 ML: 1.2 LIQUID ORAL at 08:53

## 2019-11-01 RX ADMIN — VALPROIC ACID 500 MG: 250 SOLUTION ORAL at 13:20

## 2019-11-01 RX ADMIN — SENNOSIDES AND DOCUSATE SODIUM 2 TABLET: 8.6; 5 TABLET ORAL at 08:55

## 2019-11-01 RX ADMIN — TERAZOSIN HYDROCHLORIDE 5 MG: 5 CAPSULE ORAL at 20:26

## 2019-11-01 RX ADMIN — METOPROLOL TARTRATE 75 MG: 25 TABLET ORAL at 16:31

## 2019-11-01 RX ADMIN — BACITRACIN 1 EACH: 500 OINTMENT TOPICAL at 08:52

## 2019-11-01 RX ADMIN — SENNOSIDES AND DOCUSATE SODIUM 2 TABLET: 8.6; 5 TABLET ORAL at 20:25

## 2019-11-01 RX ADMIN — QUETIAPINE 50 MG: 25 TABLET ORAL at 21:29

## 2019-11-01 RX ADMIN — BACITRACIN 1 EACH: 500 OINTMENT TOPICAL at 20:39

## 2019-11-01 ASSESSMENT — ENCOUNTER SYMPTOMS
CHILLS: 0
FEVER: 0
EYES NEGATIVE: 1
COUGH: 0
PALPITATIONS: 0
NAUSEA: 0
SHORTNESS OF BREATH: 0
VOMITING: 0
ABDOMINAL PAIN: 0
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
## 2019-11-01 NOTE — THERAPY
"Speech Language Pathology  Daily Treatment     Patient Name: Pedro Champion  Age:  79 y.o., Sex:  male  Medical Record #: [id number]  Today's Date: 11/1/2019     Subjective    Pt up in chair at start of ST. Pleasant and cooperative. Appeared in good spirits.      Objective     11/01/19 0804   Speech / Dysarthria   Articulation Training Supervision (5)   Intensity / Loudness Training Supervision (5)   Respiration Control Supervision (5)   Airway Trach Tracheostomy 6.0   Placement Date/Time: 09/14/19 1250   Airway Type: Trach  Brand: Cegal  Style: Cuffed;Fenestrated  Airway Location: Tracheostomy  Airway Size: 6.0  Inserted In: Unit  Inserted by: Respiratory care practitioner   Status Speaking valve (Add duration in comment)  (60+ minutes)   Interdisciplinary Plan of Care Collaboration   IDT Collaboration with  Certified Nursing Assistant;Physician   Patient Position at End of Therapy Seated   Collaboration Comments Dr. Echevarria examining patient. Transfer of care to 1:1 CNA   SLP Total Time Spent   SLP Individual Total Time Spent (Mins) 60   Charge Group   SLP Treatment - Individual Speech Language Treatment - Individual           Assessment    Pt refusing capping trials, when SLP showed pt cap he stated \"I'm not doing that thing!\" Pt agreeable to PMSV without complaint. Pt tolerated valve for entire 60 minute session, SP02 in high 90s (97-98). Pt able to converse with SLP without issue, no cues needed for intelligibility, breath support. Pt able to cover valve and trach site with pressure when needing to cough in order to productively clear secretions into oral cavity. Pt educated on importance of continued capping trials to work towards decannulation.     Plan    Continue capping trials     "

## 2019-11-01 NOTE — FLOWSHEET NOTE
11/01/19 1310   Events/Summary/Plan   Events/Summary/Plan Speaking valve removed   Airway Trach Tracheostomy 6.0   Placement Date/Time: 09/14/19 1250   Airway Type: Trach  Brand: Yoanna  Style: Cuffed;Fenestrated  Airway Location: Tracheostomy  Airway Size: 6.0  Inserted In: Unit  Inserted by: Respiratory care practitioner   Status Speaking valve (Add duration in comment)  (Pt barbara speaking valve approx 3 hrs.)
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
## 2019-11-01 NOTE — THERAPY
"Occupational Therapy  Daily Treatment     Patient Name: Pedro Champion  Age:  79 y.o., Sex:  male  Medical Record #: [id number]  Today's Date: 11/1/2019     Precautions  Precautions: (P) Nasogastric Tube, Tracheostomy , Fall Risk  Comments: (P) 1:1 supevision, NPO, can be impulsive (althought significantly improved since admit)    Safety   ADL Safety : (P) Requires Supervision for Safety  Bathroom Safety: (P) Requires Supervision for Safety, Impaired  Comments: See FIM report for ADL routine.     Subjective    \"Please take this off for now. It's hard to breathe!\"    \"Those showers take a lot out of you!\" patient stated during functional mobility task with FWW following shower/dressing tasks.      Objective     11/01/19 0701   Precautions   Precautions Nasogastric Tube;Tracheostomy ;Fall Risk   Comments 1:1 supevision, NPO, can be impulsive (althought significantly improved since admit)   Safety    ADL Safety  Requires Supervision for Safety   Bathroom Safety Requires Supervision for Safety;Impaired   Vitals   Room Air Oximetry 96   O2 Delivery None (Room Air)   Cognition    Level of Consciousness Alert   Balance   Sitting Balance (Static) Good   Sitting Balance (Dynamic) Fair +   Standing Balance (Static) Fair   Standing Balance (Dynamic) Fair -   Interdisciplinary Plan of Care Collaboration   IDT Collaboration with  Certified Nursing Assistant   Patient Position at End of Therapy Seated;Other (Comments)   Collaboration Comments Transferred care to sitter/CNA   OT Total Time Spent   OT Individual Total Time Spent (Mins) 60   OT Charge Group   OT Self Care / ADL 4     FIM Grooming Score:  5 - Standby Prompting/Supervision or Set-up  Grooming Description:  Supervision for safety, Set-up of equipment(Patient combed hair, washed face and brushed teeth (using suction brush) with set-up and SBA for safety and balance (while incorporating sitting and standing with FWW))    FIM Bathing Score:  4 - Minimal Assistance  Bathing " Description:   FIM Bathing Score:  4 - Minimal Assistance  Bathing Description:  Grab bar, Tub bench, Hand held shower, Increased time, Supervision for safety, Verbal cueing, Set-up of equipment, Requires minimal contact(Patient washed, rinsed, and dried all body parts while incorporating sitting and standing with use of grab bar. CGA for safety, standing balance, and cues for caution around trach/NGT areas.)    FIM Upper Body Dressin - Standby Prompting/Supervision or Set-up  Upper Body Dressing Description:  Increased time, Set-up of equipment(Patient donned t-shirt with set-up while seated EOB)    FIM Lower Body Dressing Score:  5 - Standby Prompting/Supervsion or Set-up  Lower Body Dressing Description:  Increased time, Supervision for safety, Set-up of equipment(Patient donned underwear, elastic waist pants, non skid socks and slippers with set-up and SBA for safety and balance.  )    FIM Toiletin - Standby Prompting/Supervision or Set-up  Toileting Description:  Grab bar, Increased time, Supervision for safety(Patient performed toileting tasks with SBA for safety, standing balance and use of GB/FWW.)    FIM Toilet Transfer Score:  5 - Standby Prompting/Supervision or Set-up  Toilet Transfer Description:  Supervision for safety, Increased time    FIM Tub/Shower Transfers Score:  5 - Standby Prompting/Supervision or Set-up  Tub/Shower Transfers Description:  Increased time, Grab bar, Supervision for safety, Set-up of equipment(Patient performed tub shower txfr with SBA for safety and balance with FWW)    Patient performed functional mobility with FWW (~75' x 3) with SBA and increased time/rest breaks between walking sets (due to fatigue and decreased endurance).     Assessment    Patient tolerated OT session well with focus on ADLs and functional mobility with FWW. Decreased functional mobility tolerance this AM due to decreased endurance/fatigue following ADL performance. O2 sats stable throughout  entire session on room air (94%-98%); patient tolerated speaking valve during second half of session however requested to remove it during shower due to c/o difficulty breathing.     Plan    Incorporate safety considerations related to ADLs and functional mobility, endurance training, standing balance

## 2019-11-01 NOTE — CARE PLAN
Problem: Safety  Goal: Will remain free from injury  Note:   Education reinforced continually for patient to wait to assistance. Patient verbalizes understanding, impulsive at times.     Problem: Knowledge Deficit  Goal: Knowledge of disease process/condition, treatment plan, diagnostic tests, and medications will improve  Note:   Patient educated on the need to take extra time to urinate, vale discontinued yesterday. Patient educated to time voiding and goals of patient urinating on his own.

## 2019-11-01 NOTE — THERAPY
Physical Therapy   Daily Treatment     Patient Name: Pedro Champion  Age:  79 y.o., Sex:  male  Medical Record #: [id number]  Today's Date: 11/1/2019     Precautions  Precautions: Nasogastric Tube, Tracheostomy , Fall Risk  Comments: 1:1 supervision, NPO, intermittent impulsivity    Subjective    Pt in bed sleeping, agreeable to PT upon waking.      Objective       11/01/19 1401   Precautions   Precautions Nasogastric Tube;Tracheostomy ;Fall Risk   Comments 1:1 supervision, NPO, intermittent impulsivity   Sitting Lower Body Exercises   Bicep Curls 3 sets of 15  (with 6 lb weighted dowel)   Nustep Resistance Level 4  (7 min, 326 steps and 7 min at level 1, 0.14 miles)   Other Exercises Motomed for BLEs: 1.5 min at gear 3   Interdisciplinary Plan of Care Collaboration   IDT Collaboration with  Family / Caregiver;Nursing   Patient Position at End of Therapy Seated;Call Light within Reach;Family / Friend in Room   Collaboration Comments pt's wife in room after session. RN disconnected feeding tube during PT session    PT Total Time Spent   PT Individual Total Time Spent (Mins) 60   PT Charge Group   PT Therapeutic Exercise 3   PT Therapeutic Activities 1       FIM Bed/Chair/Wheelchair Transfers Score: 5 - Standby Prompting/Supervision or Set-up  Bed/Chair/Wheelchair Transfers Description:  Supervision for safety, Set-up of equipment, Increased time(bed > WC, SBA without AD)    FIM Wheelchair Score:  5 - Standby Prompting/Supervision or Set-up  Wheelchair Description:  Extra time, Supervision for safety(Pt propelled WC room <> gym and throughout therapy gy during session, spv using LEs)      Assessment    Pt with poor attention this session, self directed session with viji twice and exploring all machines in the gym.     Plan    Continue gait training with FWW, general strength and conditioning, balance activities as pt tolerates

## 2019-11-01 NOTE — FLOWSHEET NOTE
10/31/19 1515   Events/Summary/Plan   Events/Summary/Plan Trach & stoma care, aerosol check   Aerosol Therapy / Airway Management   #Aerosol Therapy / Airway Management Trache Collar   Aerosol Humidity Temp (celsius) 34   Trache/Stoma Care   Trache/Stoma Care Yes   Chest Exam   Work Of Breathing / Effort Mild   Respiration 18   Pulse 70   Secretions   Cough Strong;Productive   How Sputum Obtained Spontaneous   Sputum Amount Large   Sputum Color Tan;White   Sputum Consistency Thick   Airway Trach Tracheostomy 6.0   Placement Date/Time: 09/14/19 1250   Airway Type: Trach  Brand: Yoanna  Style: Cuffed;Fenestrated  Airway Location: Tracheostomy  Airway Size: 6.0  Inserted In: Unit  Inserted by: Respiratory care practitioner   Site Assessment Clean;Dry;Intact;No bleeding;No drainage   Airway Tube Secured Velcro attachment   Cuffless No   Cuff Pressure cmH2O (R.C.)   (Cuff fully deflated)   Tracheostomy/Stoma Care Clean Stoma Site;Dressing Change;Inner Cannula Changed   Tracheostomy/Cannula Changed (Date) 10/31/19   Next Tracheostomy/Cannula Change Due (Date) 11/01/19   Extra Tracheostomy Tube at Bedside Yes   Oximetry   #Pulse Oximetry (Single Determination) Yes   Oxygen   Home O2 Use Prior To Admission? No   Pulse Oximetry 94 %   O2 Daily Delivery Respiratory  Room Air with O2 Available   OTHER   Resuscitation Bag Resuscitation Bag and Mask at Bedside and Checked

## 2019-11-02 PROCEDURE — A9270 NON-COVERED ITEM OR SERVICE: HCPCS | Performed by: PHYSICAL MEDICINE & REHABILITATION

## 2019-11-02 PROCEDURE — 94760 N-INVAS EAR/PLS OXIMETRY 1: CPT

## 2019-11-02 PROCEDURE — 87077 CULTURE AEROBIC IDENTIFY: CPT

## 2019-11-02 PROCEDURE — 87186 SC STD MICRODIL/AGAR DIL: CPT

## 2019-11-02 PROCEDURE — 700102 HCHG RX REV CODE 250 W/ 637 OVERRIDE(OP): Performed by: HOSPITALIST

## 2019-11-02 PROCEDURE — 99232 SBSQ HOSP IP/OBS MODERATE 35: CPT | Performed by: HOSPITALIST

## 2019-11-02 PROCEDURE — 770010 HCHG ROOM/CARE - REHAB SEMI PRIVAT*

## 2019-11-02 PROCEDURE — A9270 NON-COVERED ITEM OR SERVICE: HCPCS | Performed by: HOSPITALIST

## 2019-11-02 PROCEDURE — 94640 AIRWAY INHALATION TREATMENT: CPT

## 2019-11-02 PROCEDURE — 87070 CULTURE OTHR SPECIMN AEROBIC: CPT

## 2019-11-02 PROCEDURE — 700102 HCHG RX REV CODE 250 W/ 637 OVERRIDE(OP): Performed by: PHYSICAL MEDICINE & REHABILITATION

## 2019-11-02 PROCEDURE — 87205 SMEAR GRAM STAIN: CPT

## 2019-11-02 RX ADMIN — CHLORHEXIDINE GLUCONATE 0.12% ORAL RINSE 15 ML: 1.2 LIQUID ORAL at 09:25

## 2019-11-02 RX ADMIN — LEVOTHYROXINE SODIUM 25 MCG: 25 TABLET ORAL at 05:17

## 2019-11-02 RX ADMIN — METOPROLOL TARTRATE 75 MG: 25 TABLET ORAL at 15:47

## 2019-11-02 RX ADMIN — BACITRACIN 1 EACH: 500 OINTMENT TOPICAL at 09:26

## 2019-11-02 RX ADMIN — VALPROIC ACID 500 MG: 250 SOLUTION ORAL at 05:16

## 2019-11-02 RX ADMIN — CHLORHEXIDINE GLUCONATE 0.12% ORAL RINSE 15 ML: 1.2 LIQUID ORAL at 21:10

## 2019-11-02 RX ADMIN — GUAIFENESIN 200 MG: 100 SOLUTION ORAL at 22:58

## 2019-11-02 RX ADMIN — SENNOSIDES AND DOCUSATE SODIUM 2 TABLET: 8.6; 5 TABLET ORAL at 09:25

## 2019-11-02 RX ADMIN — VALPROIC ACID 500 MG: 250 SOLUTION ORAL at 21:10

## 2019-11-02 RX ADMIN — BACITRACIN 1 EACH: 500 OINTMENT TOPICAL at 21:10

## 2019-11-02 RX ADMIN — GUAIFENESIN 200 MG: 100 SOLUTION ORAL at 17:31

## 2019-11-02 RX ADMIN — OMEPRAZOLE 40 MG: KIT at 09:25

## 2019-11-02 RX ADMIN — APIXABAN 5 MG: 5 TABLET, FILM COATED ORAL at 09:26

## 2019-11-02 RX ADMIN — GUAIFENESIN 200 MG: 100 SOLUTION ORAL at 05:15

## 2019-11-02 RX ADMIN — METOPROLOL TARTRATE 75 MG: 25 TABLET ORAL at 09:26

## 2019-11-02 RX ADMIN — QUETIAPINE FUMARATE 100 MG: 100 TABLET ORAL at 17:05

## 2019-11-02 RX ADMIN — DIGOXIN 250 MCG: 125 TABLET ORAL at 17:31

## 2019-11-02 RX ADMIN — GUAIFENESIN 200 MG: 100 SOLUTION ORAL at 12:03

## 2019-11-02 RX ADMIN — SENNOSIDES AND DOCUSATE SODIUM 2 TABLET: 8.6; 5 TABLET ORAL at 21:11

## 2019-11-02 RX ADMIN — TERAZOSIN HYDROCHLORIDE 5 MG: 5 CAPSULE ORAL at 21:09

## 2019-11-02 RX ADMIN — GUAIFENESIN 200 MG: 100 SOLUTION ORAL at 01:09

## 2019-11-02 RX ADMIN — APIXABAN 5 MG: 5 TABLET, FILM COATED ORAL at 21:09

## 2019-11-02 RX ADMIN — METOPROLOL TARTRATE 75 MG: 25 TABLET ORAL at 21:11

## 2019-11-02 RX ADMIN — VALPROIC ACID 500 MG: 250 SOLUTION ORAL at 15:47

## 2019-11-02 RX ADMIN — LEVOFLOXACIN 500 MG: 250 TABLET, FILM COATED ORAL at 09:25

## 2019-11-02 RX ADMIN — QUETIAPINE 50 MG: 25 TABLET ORAL at 21:11

## 2019-11-02 ASSESSMENT — ENCOUNTER SYMPTOMS
VOMITING: 0
PALPITATIONS: 0
COUGH: 0
NAUSEA: 0
SHORTNESS OF BREATH: 0
FEVER: 0
EYES NEGATIVE: 1
ABDOMINAL PAIN: 0
CHILLS: 0

## 2019-11-02 NOTE — FLOWSHEET NOTE
11/02/19 1000   Events/Summary/Plan   Events/Summary/Plan Trach care, Aerosol check   Interdisciplinary Plan of Care-Outcomes    Bronchopulmonary Hygiene Outcome Optimal Hydration with Moderate or Less Sputum Production   Aerosol Therapy / Airway Management   #Aerosol Therapy / Airway Management Trache Collar   Aerosol Humidity Temp (celsius) 34   Date Circuit Last Changed 11/01/19   Date Circuit Change Due (Q 7 Days) 11/08/19   Trache/Stoma Care   Trache/Stoma Care Yes   Chest Exam   Respiration 18   Pulse 94   Breath Sounds   RUL Breath Sounds Clear   RML Breath Sounds   (Clear in basis, rhonchi in lg airways)   RLL Breath Sounds   (Clear in bases, rhonchi in lg airways)   DARLENE Breath Sounds Clear   LLL Breath Sounds   (Clear in bases, rhonchi in lg airways)   Secretions   Cough Productive   How Sputum Obtained Spontaneous   Sputum Amount Small   Sputum Color Tan;White   Sputum Consistency Tenacious   Suction Frequency Suctioned Once or Twice Per Encounter   Airway Trach Tracheostomy 6.0   Placement Date/Time: 09/14/19 1250   Airway Type: Trach  Brand: [employer]  Style: Cuffed;Fenestrated  Airway Location: Tracheostomy  Airway Size: 6.0  Inserted In: Unit  Inserted by: Respiratory care practitioner   Site Assessment Clean;Dry;Intact   Airway Tube Secured Velcro attachment   Cuffless No   Status Other (Comment)  (uncapped fenestrated inner cannula)   Tracheostomy/Stoma Care Clean Stoma Site;Dressing Change;Inner Cannula Changed   Tracheostomy/Cannula Changed (Date) 11/02/19   Next Tracheostomy/Cannula Change Due (Date) 11/03/19   Extra Tracheostomy Tube at Bedside Yes   Oximetry   #Pulse Oximetry (Single Determination) Yes   Oxygen   Home O2 Use Prior To Admission? No   Pulse Oximetry 98 %   FiO2% 28 %   O2 Daily Delivery Respiratory  Trache Collar   OTHER   Resuscitation Bag Resuscitation Bag and Mask at Bedside and Checked

## 2019-11-02 NOTE — CARE PLAN
Problem: Communication  Goal: The ability to communicate needs accurately and effectively will improve  Outcome: PROGRESSING AS EXPECTED  Intervention: Educate patient and significant other/support system about the plan of care, procedures, treatments, medications and allow for questions  Note:   Plan of care for the day discussed this morning with pt. All questions and concerns answered at this time. Pt reports sleeping well last night. Pt being more verbal with needs. Will continue to monitor       Problem: Urinary Elimination:  Goal: Ability to reestablish a normal urinary elimination pattern will improve  Outcome: PROGRESSING SLOWER THAN EXPECTED  Intervention: Assess and monitor for signs and symptoms of urinary retention  Note:   Pt has been unable to void but does really want to void and keeps trying but is unable to have any success. Pt toileted q4 hr and scanned if unable to void
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
## 2019-11-03 LAB
ANION GAP SERPL CALC-SCNC: 7 MMOL/L (ref 0–11.9)
BUN SERPL-MCNC: 26 MG/DL (ref 8–22)
CALCIUM SERPL-MCNC: 8.2 MG/DL (ref 8.5–10.5)
CHLORIDE SERPL-SCNC: 107 MMOL/L (ref 96–112)
CO2 SERPL-SCNC: 27 MMOL/L (ref 20–33)
CREAT SERPL-MCNC: 0.6 MG/DL (ref 0.5–1.4)
DIGOXIN SERPL-MCNC: 1 NG/ML (ref 0.8–2)
ERYTHROCYTE [DISTWIDTH] IN BLOOD BY AUTOMATED COUNT: 53.9 FL (ref 35.9–50)
GLUCOSE SERPL-MCNC: 80 MG/DL (ref 65–99)
GRAM STN SPEC: NORMAL
HCT VFR BLD AUTO: 31.7 % (ref 42–52)
HGB BLD-MCNC: 9.6 G/DL (ref 14–18)
MCH RBC QN AUTO: 28.3 PG (ref 27–33)
MCHC RBC AUTO-ENTMCNC: 30.3 G/DL (ref 33.7–35.3)
MCV RBC AUTO: 93.5 FL (ref 81.4–97.8)
NT-PROBNP SERPL IA-MCNC: 1586 PG/ML (ref 0–125)
PLATELET # BLD AUTO: 177 K/UL (ref 164–446)
PMV BLD AUTO: 9.7 FL (ref 9–12.9)
POTASSIUM SERPL-SCNC: 3.6 MMOL/L (ref 3.6–5.5)
RBC # BLD AUTO: 3.39 M/UL (ref 4.7–6.1)
SIGNIFICANT IND 70042: NORMAL
SITE SITE: NORMAL
SODIUM SERPL-SCNC: 141 MMOL/L (ref 135–145)
SOURCE SOURCE: NORMAL
WBC # BLD AUTO: 9.2 K/UL (ref 4.8–10.8)

## 2019-11-03 PROCEDURE — 83880 ASSAY OF NATRIURETIC PEPTIDE: CPT

## 2019-11-03 PROCEDURE — A9270 NON-COVERED ITEM OR SERVICE: HCPCS | Performed by: PHYSICAL MEDICINE & REHABILITATION

## 2019-11-03 PROCEDURE — 99232 SBSQ HOSP IP/OBS MODERATE 35: CPT | Performed by: HOSPITALIST

## 2019-11-03 PROCEDURE — 94640 AIRWAY INHALATION TREATMENT: CPT

## 2019-11-03 PROCEDURE — 92507 TX SP LANG VOICE COMM INDIV: CPT

## 2019-11-03 PROCEDURE — 80162 ASSAY OF DIGOXIN TOTAL: CPT

## 2019-11-03 PROCEDURE — 700102 HCHG RX REV CODE 250 W/ 637 OVERRIDE(OP): Performed by: PHYSICAL MEDICINE & REHABILITATION

## 2019-11-03 PROCEDURE — 80048 BASIC METABOLIC PNL TOTAL CA: CPT

## 2019-11-03 PROCEDURE — 85027 COMPLETE CBC AUTOMATED: CPT

## 2019-11-03 PROCEDURE — A9270 NON-COVERED ITEM OR SERVICE: HCPCS | Performed by: HOSPITALIST

## 2019-11-03 PROCEDURE — 36415 COLL VENOUS BLD VENIPUNCTURE: CPT

## 2019-11-03 PROCEDURE — 700102 HCHG RX REV CODE 250 W/ 637 OVERRIDE(OP): Performed by: HOSPITALIST

## 2019-11-03 PROCEDURE — 770010 HCHG ROOM/CARE - REHAB SEMI PRIVAT*

## 2019-11-03 RX ADMIN — BACITRACIN 1 EACH: 500 OINTMENT TOPICAL at 20:27

## 2019-11-03 RX ADMIN — GUAIFENESIN 200 MG: 100 SOLUTION ORAL at 11:44

## 2019-11-03 RX ADMIN — CHLORHEXIDINE GLUCONATE 0.12% ORAL RINSE 15 ML: 1.2 LIQUID ORAL at 08:00

## 2019-11-03 RX ADMIN — BACITRACIN 1 EACH: 500 OINTMENT TOPICAL at 07:59

## 2019-11-03 RX ADMIN — SENNOSIDES AND DOCUSATE SODIUM 2 TABLET: 8.6; 5 TABLET ORAL at 07:59

## 2019-11-03 RX ADMIN — DIGOXIN 250 MCG: 125 TABLET ORAL at 17:33

## 2019-11-03 RX ADMIN — QUETIAPINE FUMARATE 100 MG: 100 TABLET ORAL at 17:34

## 2019-11-03 RX ADMIN — CHLORHEXIDINE GLUCONATE 0.12% ORAL RINSE 15 ML: 1.2 LIQUID ORAL at 20:27

## 2019-11-03 RX ADMIN — TERAZOSIN HYDROCHLORIDE 5 MG: 5 CAPSULE ORAL at 20:27

## 2019-11-03 RX ADMIN — LEVOTHYROXINE SODIUM 25 MCG: 25 TABLET ORAL at 05:16

## 2019-11-03 RX ADMIN — METOPROLOL TARTRATE 75 MG: 25 TABLET ORAL at 20:29

## 2019-11-03 RX ADMIN — OMEPRAZOLE 40 MG: KIT at 08:00

## 2019-11-03 RX ADMIN — TRAZODONE HYDROCHLORIDE 50 MG: 50 TABLET ORAL at 20:29

## 2019-11-03 RX ADMIN — SENNOSIDES AND DOCUSATE SODIUM 2 TABLET: 8.6; 5 TABLET ORAL at 20:27

## 2019-11-03 RX ADMIN — APIXABAN 5 MG: 5 TABLET, FILM COATED ORAL at 07:59

## 2019-11-03 RX ADMIN — HYDROXYZINE HYDROCHLORIDE 25 MG: 25 TABLET, FILM COATED ORAL at 20:29

## 2019-11-03 RX ADMIN — VALPROIC ACID 500 MG: 250 SOLUTION ORAL at 20:26

## 2019-11-03 RX ADMIN — VALPROIC ACID 500 MG: 250 SOLUTION ORAL at 05:16

## 2019-11-03 RX ADMIN — VALPROIC ACID 500 MG: 250 SOLUTION ORAL at 14:42

## 2019-11-03 RX ADMIN — QUETIAPINE 50 MG: 25 TABLET ORAL at 20:28

## 2019-11-03 RX ADMIN — METOPROLOL TARTRATE 75 MG: 25 TABLET ORAL at 07:59

## 2019-11-03 RX ADMIN — GUAIFENESIN 200 MG: 100 SOLUTION ORAL at 17:33

## 2019-11-03 RX ADMIN — METOPROLOL TARTRATE 75 MG: 25 TABLET ORAL at 14:42

## 2019-11-03 RX ADMIN — LEVOFLOXACIN 500 MG: 250 TABLET, FILM COATED ORAL at 08:00

## 2019-11-03 RX ADMIN — GUAIFENESIN 200 MG: 100 SOLUTION ORAL at 05:16

## 2019-11-03 ASSESSMENT — ENCOUNTER SYMPTOMS
EYES NEGATIVE: 1
SHORTNESS OF BREATH: 0
COUGH: 0
PALPITATIONS: 0
NAUSEA: 0
VOMITING: 0
FEVER: 0
CHILLS: 0
ABDOMINAL PAIN: 0

## 2019-11-03 NOTE — CARE PLAN
Problem: Safety  Goal: Will remain free from falls  Intervention: Implement fall precautions  Flowsheets (Taken 11/3/2019 0435)  Bedrails: Bedrails Closest to Bathroom Down  Note:   Safety precautions observed , bed at lowest level, call light reach, bedside table and tv within easy reach.  Trach site care done, connected to 5 L O2 in use on and off, O2 sat 90-93%.  Sitter at bedside. Pt free from fall and injury.     Problem: Bowel/Gastric:  Goal: Will not experience complications related to bowel motility  Intervention: Assess baseline bowel pattern  Note:   Pt with active bowel sounds. Aspiration precautions observed, Head of bed elevated at fowlers during tube feeding With  cortrak / Tube Feeding of Impact Peptide at 385 ml bolus with water flush of 300 ml.Well tolerated . No residuals noted. Pt scheduled for PEG placement on Tuesday Nov. 11,2019. To hold Eliquis in preparation for PEG placement on Tuesday .     Problem: Urinary Elimination:  Goal: Ability to reestablish a normal urinary elimination pattern will improve  Intervention: Record post-void residual volumes  Note:   Pt voiding in small amount , bladder scan done = 999 , straight cath done drained 1,000 ml yellow colored urine.

## 2019-11-03 NOTE — FLOWSHEET NOTE
11/03/19 1108   Events/Summary/Plan   Events/Summary/Plan Called to room because pt insisting on holding aerosol trach mask up to his mouth.  Assured him it is okay for now while he is adjusting to have his trach capped.   Aerosol Therapy / Airway Management   #Aerosol Therapy / Airway Management Trache Collar   Aerosol Humidity Temp (celsius) 34   Trache Weaning and Decannulation   Hours Tolerated   (still tolerating: 3 hours so far)

## 2019-11-03 NOTE — PROGRESS NOTES
Pt assisted oob to bathroom , has not voided, bladder scan done at 0630 hrs- , more than 600 ml, straight cath done drained 700 ml yellow colored urine.

## 2019-11-03 NOTE — FLOWSHEET NOTE
11/03/19 0811   Events/Summary/Plan   Events/Summary/Plan aerosol check, pt assess, trach capped   Education   Education Yes - Pt. / Family has been Instructed in use of Respiratory Equipment   Trache Weaning and Decannulation   Capping Trial Initiated, Smart Text Complete? Yes   Respiratory WDL   Respiratory (WDL) X   Chest Exam   Respiration 18   Pulse 82   Secretions   Cough Productive   How Sputum Obtained Spontaneous;Suction;Tracheal   Sputum Amount Small;Moderate   Sputum Color Tan   Sputum Consistency Thick   Suction Frequency Suctioned Once or Twice Per Encounter   Airway Trach Tracheostomy 6.0   Placement Date/Time: 09/14/19 1250   Airway Type: Trach  Brand: Ad Summos  Style: Cuffed;Fenestrated  Airway Location: Tracheostomy  Airway Size: 6.0  Inserted In: Unit  Inserted by: Respiratory care practitioner   Site Assessment Clean;Dry   Airway Tube Secured Velcro attachment   Cuffless No   Cuff Pressure cmH2O (R.C.)   (down/deflated)   Tracheostomy/Cannula Changed (Date) 11/03/19   Next Tracheostomy/Cannula Change Due (Date) 11/04/19   Suctioning Device   (single use)   Extra Tracheostomy Tube at Bedside Yes

## 2019-11-03 NOTE — WOUND TEAM
Received report from Frida Garcia that during quarterly prevalence patient was surveyed and pressure injury was found to be resolved. LDA completed. No further follow up indicated at this time.

## 2019-11-03 NOTE — CARE PLAN
Problem: Safety  Goal: Will remain free from injury  Outcome: PROGRESSING AS EXPECTED  Patient confused/impulsive, remains on 1:1 supervision for safety.     Problem: Urinary Elimination:  Goal: Ability to reestablish a normal urinary elimination pattern will improve  Outcome: PROGRESSING AS EXPECTED  Patient retains urine, PVR checked, ICP will be done as needed.
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
## 2019-11-03 NOTE — FLOWSHEET NOTE
11/02/19 7614   Events/Summary/Plan   Events/Summary/Plan Aerosol check, Inner cannula change   Airway Trach Tracheostomy 6.0   Placement Date/Time: 09/14/19 1250   Airway Type: Trach  Brand: Yoanna  Style: Cuffed;Fenestrated  Airway Location: Tracheostomy  Airway Size: 6.0  Inserted In: Unit  Inserted by: Respiratory care practitioner   Tracheostomy/Cannula Changed (Date)   (Inner cannula changed)   Oximetry   #Pulse Oximetry (Single Determination) Yes   Oxygen   Home O2 Use Prior To Admission? No   Pulse Oximetry 95 %   O2 Daily Delivery Respiratory    (Currently on Room air, not trach collar)

## 2019-11-03 NOTE — FLOWSHEET NOTE
11/02/19 1530   Events/Summary/Plan   Events/Summary/Plan PM speaking valve trial   Trache Weaning and Decannulation   Valve Trial Initiated, Smart Text Complete? Yes  (Valve trial from 12:30 to 15:30, 3 hrs)   Hours Tolerated 3.0

## 2019-11-04 PROCEDURE — 770010 HCHG ROOM/CARE - REHAB SEMI PRIVAT*

## 2019-11-04 PROCEDURE — 97530 THERAPEUTIC ACTIVITIES: CPT

## 2019-11-04 PROCEDURE — 99232 SBSQ HOSP IP/OBS MODERATE 35: CPT | Performed by: PHYSICAL MEDICINE & REHABILITATION

## 2019-11-04 PROCEDURE — 99232 SBSQ HOSP IP/OBS MODERATE 35: CPT | Performed by: HOSPITALIST

## 2019-11-04 PROCEDURE — 94760 N-INVAS EAR/PLS OXIMETRY 1: CPT

## 2019-11-04 PROCEDURE — 92526 ORAL FUNCTION THERAPY: CPT

## 2019-11-04 PROCEDURE — 92507 TX SP LANG VOICE COMM INDIV: CPT

## 2019-11-04 PROCEDURE — 700102 HCHG RX REV CODE 250 W/ 637 OVERRIDE(OP): Performed by: HOSPITALIST

## 2019-11-04 PROCEDURE — 97535 SELF CARE MNGMENT TRAINING: CPT

## 2019-11-04 PROCEDURE — A9270 NON-COVERED ITEM OR SERVICE: HCPCS | Performed by: PHYSICAL MEDICINE & REHABILITATION

## 2019-11-04 PROCEDURE — 94640 AIRWAY INHALATION TREATMENT: CPT

## 2019-11-04 PROCEDURE — A9270 NON-COVERED ITEM OR SERVICE: HCPCS | Performed by: HOSPITALIST

## 2019-11-04 PROCEDURE — 700102 HCHG RX REV CODE 250 W/ 637 OVERRIDE(OP): Performed by: PHYSICAL MEDICINE & REHABILITATION

## 2019-11-04 PROCEDURE — 97110 THERAPEUTIC EXERCISES: CPT

## 2019-11-04 PROCEDURE — 97112 NEUROMUSCULAR REEDUCATION: CPT

## 2019-11-04 RX ADMIN — DIGOXIN 250 MCG: 125 TABLET ORAL at 17:35

## 2019-11-04 RX ADMIN — OMEPRAZOLE 40 MG: KIT at 09:22

## 2019-11-04 RX ADMIN — QUETIAPINE FUMARATE 100 MG: 100 TABLET ORAL at 16:51

## 2019-11-04 RX ADMIN — VALPROIC ACID 500 MG: 250 SOLUTION ORAL at 15:11

## 2019-11-04 RX ADMIN — GUAIFENESIN 200 MG: 100 SOLUTION ORAL at 17:35

## 2019-11-04 RX ADMIN — BACITRACIN 1 EACH: 500 OINTMENT TOPICAL at 21:03

## 2019-11-04 RX ADMIN — GUAIFENESIN 200 MG: 100 SOLUTION ORAL at 23:03

## 2019-11-04 RX ADMIN — LEVOTHYROXINE SODIUM 25 MCG: 25 TABLET ORAL at 05:40

## 2019-11-04 RX ADMIN — METOPROLOL TARTRATE 75 MG: 25 TABLET ORAL at 09:21

## 2019-11-04 RX ADMIN — CHLORHEXIDINE GLUCONATE 0.12% ORAL RINSE 15 ML: 1.2 LIQUID ORAL at 09:22

## 2019-11-04 RX ADMIN — GUAIFENESIN 200 MG: 100 SOLUTION ORAL at 00:03

## 2019-11-04 RX ADMIN — SENNOSIDES AND DOCUSATE SODIUM 2 TABLET: 8.6; 5 TABLET ORAL at 21:04

## 2019-11-04 RX ADMIN — VALPROIC ACID 500 MG: 250 SOLUTION ORAL at 21:02

## 2019-11-04 RX ADMIN — VALPROIC ACID 500 MG: 250 SOLUTION ORAL at 05:40

## 2019-11-04 RX ADMIN — GUAIFENESIN 200 MG: 100 SOLUTION ORAL at 11:56

## 2019-11-04 RX ADMIN — LEVOFLOXACIN 500 MG: 250 TABLET, FILM COATED ORAL at 09:21

## 2019-11-04 RX ADMIN — QUETIAPINE 50 MG: 25 TABLET ORAL at 21:03

## 2019-11-04 RX ADMIN — TRAZODONE HYDROCHLORIDE 50 MG: 50 TABLET ORAL at 21:04

## 2019-11-04 RX ADMIN — SENNOSIDES AND DOCUSATE SODIUM 2 TABLET: 8.6; 5 TABLET ORAL at 09:22

## 2019-11-04 RX ADMIN — BACITRACIN 1 EACH: 500 OINTMENT TOPICAL at 09:21

## 2019-11-04 RX ADMIN — CHLORHEXIDINE GLUCONATE 0.12% ORAL RINSE 15 ML: 1.2 LIQUID ORAL at 21:02

## 2019-11-04 RX ADMIN — GUAIFENESIN 200 MG: 100 SOLUTION ORAL at 05:40

## 2019-11-04 RX ADMIN — TERAZOSIN HYDROCHLORIDE 5 MG: 5 CAPSULE ORAL at 21:04

## 2019-11-04 RX ADMIN — METOPROLOL TARTRATE 75 MG: 25 TABLET ORAL at 21:03

## 2019-11-04 RX ADMIN — METOPROLOL TARTRATE 75 MG: 25 TABLET ORAL at 15:11

## 2019-11-04 ASSESSMENT — ENCOUNTER SYMPTOMS
PALPITATIONS: 0
VOMITING: 0
EYES NEGATIVE: 1
NAUSEA: 0
COUGH: 0
CHILLS: 0
ABDOMINAL PAIN: 0
FEVER: 0
SHORTNESS OF BREATH: 0

## 2019-11-04 ASSESSMENT — PATIENT HEALTH QUESTIONNAIRE - PHQ9
2. FEELING DOWN, DEPRESSED, IRRITABLE, OR HOPELESS: NOT AT ALL
SUM OF ALL RESPONSES TO PHQ9 QUESTIONS 1 AND 2: 0
1. LITTLE INTEREST OR PLEASURE IN DOING THINGS: NOT AT ALL

## 2019-11-04 NOTE — THERAPY
"Occupational Therapy  Daily Treatment     Patient Name: Pedro Champion  Age:  79 y.o., Sex:  male  Medical Record #: [id number]  Today's Date: 11/4/2019     Precautions  Precautions: Nasogastric Tube, Tracheostomy , Fall Risk  Comments: 1:1 supervision, NPO    Safety   ADL Safety : Requires Supervision for Safety  Bathroom Safety: Requires Supervision for Safety, Impaired  Comments: See FIM report for ADL routine.     Subjective    \"I just need a little bit of water. I'm dry\" patient stated. Undersigned therapist explained unable to provide water at this time due to NPO/for safety; provided patient with oral care swab for moisture\"     Patient declined to participate in shower this AM, stating he had one yesterday (partial bed bath per chart).      Objective     11/04/19 0701   Precautions   Precautions Nasogastric Tube;Tracheostomy ;Fall Risk   Comments 1:1 supervision, NPO   Safety    ADL Safety  Requires Supervision for Safety   Cognition    Orientation Level Not Oriented to Year;Not Oriented to Month   Level of Consciousness Alert   Sitting Lower Body Exercises   Nustep Resistance Level 4  (Total time: 7:09, Distance: 0.16 miles)   Interdisciplinary Plan of Care Collaboration   IDT Collaboration with  Respiratory Therapist;Certified Nursing Assistant   Patient Position at End of Therapy Seated;Self Releasing Lap Belt Applied   Collaboration Comments Transferred care to 1:1 sitter/CNA; RT suctioned patient at start of session     FIM Grooming Score:  5 - Standby Prompting/Supervision or Set-up  Grooming Description:  Increased time, Supervision for safety, Set-up of equipment, Initial preparation for task(Patient brushed teeth with set-up and SBA while seated EOB and using suction toothbrush. )    FIM Lower Body Dressing Score:  5 - Standby Prompting/Supervsion or Set-up  Lower Body Dressing Description:  Increased time, Supervision for safety, Set-up of equipment(Patient donned underwear, elastic waist pants and " lace shoes with set-up and supervision while incorporating sitting EOB and standing with FWW)    Patient performed functional mobility (~150' x3) with FWW and SBA for safety.     Assessment    Patient tolerated OT session well with focus on ADLs, functional mobility with FWW, and endurance. No LOB noted during functional mobility activity (with FWW). No episodes of impulsivity during session however patient continues to present with decreased safety awareness AEB asking to drink water x2 (despite NPO precautions).     Plan    Incorporate safety considerations related to ADLs and functional mobility, endurance training
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
## 2019-11-04 NOTE — THERAPY
"Speech Language Pathology  Daily Treatment     Patient Name: Pedro Champion  Age:  79 y.o., Sex:  male  Medical Record #: [id number]  Today's Date: 11/4/2019     Subjective    Pt up in w/c waiting for SLP at start of session. Cap placed at approximately 0705 by RT.      Objective     11/04/19 0834   Speech / Dysarthria   Articulation Training Supervision (5)   Intensity / Loudness Training Supervision (5)   Respiration Control Supervision (5)   Dysphagia    Other Treatments trials single ice chips   Diet / Liquid Recommendation NPO   Nutritional Liquid Intake Rating Scale 1   Nutritional Food Intake Rating Scale 1   Interdisciplinary Plan of Care Collaboration   IDT Collaboration with  Respiratory Therapist;Physician;Certified Nursing Assistant   Patient Position at End of Therapy Seated;Call Light within Reach   Collaboration Comments CLOF with RT re: capping trials, transfer of care to nursing, 1:1 CNA   SLP Total Time Spent   SLP Individual Total Time Spent (Mins) 60   Charge Group   SLP Treatment - Individual Speech Language Treatment - Individual   SLP Swallowing Dysfunction Treatment Swallowing Dysfunction Treatment         Assessment    Pt already capped when ST session started this a.m. (according to RT, pt capped at approx 0705). Did not wear cap overnight as pt had initially thought. Pt demonstrated no difficulty/change in SP02 during 60 minute session. Better coordination of inhalation exhalation with no cues needed this session. Pt able to hold spontaneous conversation with staff members no issues. Trials of blue dye single ice chips x10 - no spontaneous cough response demonstrated, however, with cued cough, slight blue tinged mucous x2 attempts. No blue dye with 3rd cued cough. Cold, lemon swabs to elicit saliva swallows x10 trials with pt able to complete timely swallow for 10/10 attempts. Pt is impulsive and attempting to \"suck\" liquid off lemon swab.     Plan    Pt to have PEG placed 11/15/19. Work on " capping trials overnight this evening with goal of decannulation this week. Complete MBSS following PEG placement.
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
## 2019-11-04 NOTE — THERAPY
Speech Language Pathology  Daily Treatment     Patient Name: Pedro Champion  Age:  79 y.o., Sex:  male  Medical Record #: [id number]  Today's Date: 11/3/2019     Subjective    Pt in room, cap has been in place since 0804 session time. Pt agreeable to session.      Objective       11/03/19 1504   Speech / Dysarthria   Articulation Training Supervision (5)   Intensity / Loudness Training Supervision (5)   Respiration Control Supervision (5)   Airway Trach Tracheostomy 6.0   Placement Date/Time: 09/14/19 1250   Airway Type: Trach  Brand: Wipster  Style: Cuffed;Fenestrated  Airway Location: Tracheostomy  Airway Size: 6.0  Inserted In: Unit  Inserted by: Respiratory care practitioner   Status Capped   Interdisciplinary Plan of Care Collaboration   IDT Collaboration with  Certified Nursing Assistant;Respiratory Therapist   Patient Position at End of Therapy Seated   Collaboration Comments CLOF and POC with RT for capping trials. 1:1 remained in room during session.    SLP Total Time Spent   SLP Individual Total Time Spent (Mins) 30   Charge Group   SLP Treatment - Individual Speech Language Treatment - Individual     Assessment    Pt continuing to tolerate capping trials since 0804 placement. Pt engaged in conversational speech tasks with SLP for 30 minutes, no distress. Increased performance in coordination of inhalation/exhalation through nose and mouth as compared to a.m. session. Pt continues to benefit from cues to apply pressure to trach site when coughing or attempting to clear secretions.     Plan    Continue capping trials.

## 2019-11-04 NOTE — THERAPY
Speech Language Pathology  Daily Treatment     Patient Name: Pedro Champion  Age:  79 y.o., Sex:  male  Medical Record #: [id number]  Today's Date: 11/4/2019     Subjective    Patient tolerating his cap.  Patient pleasant, cooperative, relaxed.     Objective       11/04/19 1001   Dysphagia    Other Treatments Therapeutic trials of single ice chips and 5 ml trials of nectar thick water via teaspoon.   Diet / Liquid Recommendation NPO;Other (Comments)  (Pt is nearing readiness for MBSS.)   SLP Total Time Spent   SLP Individual Total Time Spent (Mins) 30   Charge Group   SLP Swallowing Dysfunction Treatment Swallowing Dysfunction Treatment           Assessment    Patient tolerated therapeutic trials of single ice chips with no cough or wet voice post swallow. Patient able to perform 2nd and 3rd saliva swallows to direction.  Swallows appeared slightly delayed but with strong excursion to palpation.  Patient also participated in therapeutic trials of 5 ml amounts of NTL water via teaspoon ( 10 trials). He displayed a cough x first trial, but there after showed no cough, wet voice distress.  O2 sat 95%.    Plan    Recommend MBSS.  Recommend continue therapeutic trials of ice chips and 5 ml amounts of NTL

## 2019-11-04 NOTE — FLOWSHEET NOTE
11/04/19 0711   Events/Summary/Plan   Events/Summary/Plan Pt awakened.  Slept with aerosol last night.  Trach cap placed after clearing secretions   Education   Education Yes - Pt. / Family has been Instructed in use of Respiratory Equipment   Aerosol Therapy / Airway Management   #Aerosol Therapy / Airway Management Trache Collar   Aerosol Humidity Temp (celsius) 32  (standby)   Trache Weaning and Decannulation   Capping Trial Initiated, Smart Text Complete?   (tolerated from 0810 yesterday to bedtime)   Respiratory WDL   Respiratory (WDL) X   Chest Exam   Respiration 18   Pulse 74   Secretions   Cough Congested;Productive   How Sputum Obtained Expectorated;Spontaneous   Sputum Amount Small;Moderate   Sputum Color Tan   Sputum Consistency Thick   Airway Trach Tracheostomy 6.0   Placement Date/Time: 09/14/19 1250   Airway Type: Trach  Brand: Yoanna  Style: Cuffed;Fenestrated  Airway Location: Tracheostomy  Airway Size: 6.0  Inserted In: Unit  Inserted by: Respiratory care practitioner   Airway Tube Secured Velcro attachment   Cuffless No   Cuff Pressure cmH2O (R.C.)   (deflated)   Status Capped   Tracheostomy/Cannula Changed (Date) 11/04/19   Next Tracheostomy/Cannula Change Due (Date) 11/05/19   Extra Tracheostomy Tube at Bedside Yes

## 2019-11-04 NOTE — CARE PLAN
Problem: Safety  Goal: Will remain free from injury  Outcome: PROGRESSING AS EXPECTED  Intervention: Provide assistance with mobility  Note:   Pt remains to be impulsive tonight. Bed alarm and 1:1 sitter at the bedside throughout the night for safety. Will continue to monitor.     Problem: Respiratory:  Goal: Respiratory status will improve  Outcome: PROGRESSING AS EXPECTED  Intervention: Assess and monitor pulmonary status  Note:   Removed pt's  and replaced with inner cannula at bedtime. Suctioned twice and connected pt to tracheal O2 mask. Saturations at 95-97%. No distress noted at this time. Will continue to monitor.     Problem: Other Problem (see comments)  Intervention: Modify rate, concentration and composition of schedule of Nutrition Support  Note:   HS bolus given via right nare cortrak. Impact Peptide 1.5 @ 385ml with 300ml water flush q 4hrs. Pt tolerated the bolus well. No c/o nausea/vomiting noted. HOB @ 30 degrees. Will continue to monitor.     Problem: Urinary Elimination:  Goal: Ability to reestablish a normal urinary elimination pattern will improve  Outcome: PROGRESSING SLOWER THAN EXPECTED  Intervention: Assess and monitor for signs and symptoms of urinary retention  Note:   Pt unable to void. Bladder scan at midnight >400ml. Straight cath'd pt for 800ml clear yellow output. Will continue to monitor.

## 2019-11-04 NOTE — CARE PLAN
Problem: Safety  Goal: Will remain free from injury  Outcome: PROGRESSING AS EXPECTED  Patient remains on 1:1 supervision by sitter due to impulsiveness to prevent falls/injury.     Problem: Urinary Elimination:  Goal: Ability to reestablish a normal urinary elimination pattern will improve  Outcome: PROGRESSING AS EXPECTED  Patient retains urine, PVR's checked & ICP's as needed.

## 2019-11-04 NOTE — THERAPY
Physical Therapy   Daily Treatment     Patient Name: Pedro Champion  Age:  79 y.o., Sex:  male  Medical Record #: [id number]  Today's Date: 11/4/2019     Precautions  Precautions: Nasogastric Tube, Tracheostomy , Fall Risk  Comments: 1:1 supervision, NPO    Subjective    Pt seated in room with CNA, agreeable to PT.      Objective       11/04/19 1401   Precautions   Precautions Nasogastric Tube;Tracheostomy ;Fall Risk   Comments 1:1 supervision, NPO   Sitting Lower Body Exercises   Nustep Resistance Level 5  (11 min, 549 steps)   Interdisciplinary Plan of Care Collaboration   IDT Collaboration with  Certified Nursing Assistant   Patient Position at End of Therapy Seated;Call Light within Reach;Tray Table within Reach   Collaboration Comments CNA 1:1 with pt before and after session   PT Total Time Spent   PT Individual Total Time Spent (Mins) 60   PT Charge Group   PT Therapeutic Exercise 2   PT Therapeutic Activities 2     Pt education on safety with 4WW and differences between FWW.     FIM Walking Score:  5 - Standby Prompting/Supervision or Set-up  Walking Description:  Walker, Extra time, Verbal cueing, Supervision for safety(150 ft and 300 ft outdoors with 4WW and SBA )    FIM Wheelchair Score:  5 - Standby Prompting/Supervision or Set-up  Wheelchair Description:  Extra time, Supervision for safety, Verbal cueing(room <> back gym, spv)      Assessment    Pt requires extra time for attention to task and multiple environments. Requested LE shuttle and stairs, then refused both at set-up reporting fatigue.     Plan    General strength and conditioning, balance training as tolerated.

## 2019-11-04 NOTE — REHAB-PHARMACY IDT TEAM NOTE
Pharmacy   Pharmacy  Antibiotics/Antifungals/Antivirals:  Medication:      Active Orders (From admission, onward)    Ordered     Ordering Provider       Wed Oct 30, 2019 12:42 PM    10/30/19 1242  levoFLOXacin (LEVAQUIN) tablet 500 mg  EVERY 24 HOURS      Lindsay Echevarria M.D.        Route:         po  Stop Date:  11/5/2019  Reason: Pneumonia  Antihypertensives/Cardiac:  Medication:    Orders (72h ago, onward)     Start     Ordered    11/01/19 2100  terazosin (HYTRIN) capsule 5 mg  EVERY EVENING     Note to Pharmacy:  Per P&T IV to PO Protocol    11/01/19 0931    10/18/19 1800  digoxin (LANOXIN) tablet 250 mcg  DAILY      10/18/19 1605    10/18/19 1733  hydrALAZINE (APRESOLINE) tablet 25 mg  EVERY 8 HOURS PRN      10/18/19 1733    10/18/19 1630  metoprolol (LOPRESSOR) tablet 75 mg  3 TIMES DAILY     Note to Pharmacy:  Re-entered for timing    10/18/19 1605              Patient Vitals for the past 24 hrs:   BP Pulse   11/04/19 1400 120/68 --   11/04/19 0711 -- 74   11/04/19 0631 107/63 79   11/03/19 2051 -- 85   11/03/19 1900 116/64 70     Anticoagulation:  Medication:  Eliquis (but currently on hold for PEG)    Other key medications: A review of the medication list reveals no issues at this time. Patient is currently on antihypertensive(s). Recommend home blood pressure monitoring/recording if antihypertensive(s) regimen(s) continue.    Section completed by: Jorge Hutchinson [facility]

## 2019-11-04 NOTE — THERAPY
Speech Language Pathology  Daily Treatment     Patient Name: Pedro Champion  Age:  79 y.o., Sex:  male  Medical Record #: [id number]  Today's Date: 11/3/2019     Subjective    Pt in chair at start of ST. Agreeable to therapy, appeared in good spirits.      Objective     11/03/19 0804   Speech / Dysarthria   Articulation Training Supervision (5)   Intensity / Loudness Training Supervision (5)   Respiration Control Moderate (3)   Airway Trach Tracheostomy 6.0   Placement Date/Time: 09/14/19 1250   Airway Type: Trach  Brand: Sqeeqee  Style: Cuffed;Fenestrated  Airway Location: Tracheostomy  Airway Size: 6.0  Inserted In: Unit  Inserted by: Respiratory care practitioner   Status Capped   Interdisciplinary Plan of Care Collaboration   IDT Collaboration with  Respiratory Therapist;Certified Nursing Assistant   Patient Position at End of Therapy Seated   Collaboration Comments RT suctioning, placing cap, checking SP02. Transfer of care to 1:1   SLP Total Time Spent   SLP Individual Total Time Spent (Mins) 60   Charge Group   SLP Treatment - Individual Speech Language Treatment - Individual       FIM Eating Score:  1 - Total Assistance  Eating Description:       FIM Comprehension Score:  5 - Stand-by Prompting/Supervision or Set-up  Comprehension Description:  Glasses, Hearing aids/amplifiers, Verbal cues    FIM Expression Score:  5 - Stand-by Prompting/Supervision or Set-up  Expression Description:  Verbal cueing    FIM Social Interaction Score:  5 - Stand-by Prompting/Supervision or Set-up  Social Interaction Description:  Increased time, Verbal cues, Medication    FIM Problem Solving Score:  4 - Minimal Assistance  Problem Solving Description:  Verbal cueing, Therapy schedule, Increased time, Bed/chair alarm, 1:1 supervision    FIM Memory Score:  3 - Moderate Assistance  Memory Description:  Verbal cueing, 1:1 supervision, Seat belt, Bed/chair alarm, Therapy schedule      Assessment    RT initiated suction, placing of cap  "for trials this session. Pt initially demonstrating forceful inspiratory and expiratory effort through trach site vs breathing through nose/mouth. Pt required verbal, visual, gestural cues to coordinate inhalation/exhalation through nose/mouth. Use of paper paddle and to cue patient to \"blow out a candle slowly\" for visual feedback helpful in pt coordinating breath. Paper paddle held in front of pt's mouth or under nose for pt to visualize movement of paddle to verify adequate exhalation. Pt continued to demonstration instances of inhalation/exhalation through trach site, however, was improved by end of session. Pt able to converse with SLP for entire 60 minutes. Cap remained in place at conclusion of session, RT aware and following.     Plan    Continue capping trials today.     "

## 2019-11-04 NOTE — FLOWSHEET NOTE
11/04/19 1011   Events/Summary/Plan   Events/Summary/Plan Pt in room with SLP for ice chip trial.  Still tolerating having trach capped without distress   Aerosol Therapy / Airway Management   #Aerosol Therapy / Airway Management Trache Collar   Aerosol Humidity Temp (celsius)   (standby)   Trache Weaning and Decannulation   Hours Tolerated   (3 hours ongoing)   Respiratory WDL   Respiratory (WDL) X   Chest Exam   Respiration 18   Airway Trach Tracheostomy 6.0   Placement Date/Time: 09/14/19 1250   Airway Type: Trach  Brand: Yoanna  Style: Cuffed;Fenestrated  Airway Location: Tracheostomy  Airway Size: 6.0  Inserted In: Unit  Inserted by: Respiratory care practitioner   Cuffless No   Status Capped

## 2019-11-05 ENCOUNTER — ANESTHESIA (OUTPATIENT)
Dept: RADIOLOGY | Facility: MEDICAL CENTER | Age: 79
End: 2019-11-05
Payer: COMMERCIAL

## 2019-11-05 ENCOUNTER — HOSPITAL ENCOUNTER (OUTPATIENT)
Facility: MEDICAL CENTER | Age: 79
End: 2019-11-05
Attending: PHYSICAL MEDICINE & REHABILITATION | Admitting: PHYSICAL MEDICINE & REHABILITATION
Payer: COMMERCIAL

## 2019-11-05 ENCOUNTER — APPOINTMENT (OUTPATIENT)
Dept: RADIOLOGY | Facility: MEDICAL CENTER | Age: 79
End: 2019-11-05
Attending: PHYSICAL MEDICINE & REHABILITATION
Payer: COMMERCIAL

## 2019-11-05 ENCOUNTER — ANESTHESIA EVENT (OUTPATIENT)
Dept: RADIOLOGY | Facility: MEDICAL CENTER | Age: 79
End: 2019-11-05
Payer: COMMERCIAL

## 2019-11-05 ENCOUNTER — HOSPITAL ENCOUNTER (INPATIENT)
Facility: REHABILITATION | Age: 79
LOS: 4 days | DRG: 947 | End: 2019-11-09
Attending: PHYSICAL MEDICINE & REHABILITATION | Admitting: PHYSICAL MEDICINE & REHABILITATION
Payer: MEDICARE

## 2019-11-05 VITALS
OXYGEN SATURATION: 97 % | RESPIRATION RATE: 18 BRPM | HEIGHT: 76 IN | TEMPERATURE: 97.8 F | SYSTOLIC BLOOD PRESSURE: 123 MMHG | WEIGHT: 196.43 LBS | DIASTOLIC BLOOD PRESSURE: 64 MMHG | HEART RATE: 65 BPM | BODY MASS INDEX: 23.92 KG/M2

## 2019-11-05 VITALS
TEMPERATURE: 98.7 F | SYSTOLIC BLOOD PRESSURE: 138 MMHG | DIASTOLIC BLOOD PRESSURE: 84 MMHG | HEART RATE: 100 BPM | WEIGHT: 181 LBS | HEIGHT: 74 IN | RESPIRATION RATE: 16 BRPM | BODY MASS INDEX: 23.23 KG/M2 | OXYGEN SATURATION: 96 %

## 2019-11-05 LAB
BACTERIA SPEC RESP CULT: ABNORMAL
BACTERIA SPEC RESP CULT: ABNORMAL
GRAM STN SPEC: ABNORMAL
INR PPP: 1.1 (ref 0.87–1.13)
PROTHROMBIN TIME: 14.4 SEC (ref 12–14.6)
SIGNIFICANT IND 70042: ABNORMAL
SITE SITE: ABNORMAL
SOURCE SOURCE: ABNORMAL

## 2019-11-05 PROCEDURE — 700102 HCHG RX REV CODE 250 W/ 637 OVERRIDE(OP): Performed by: PHYSICAL MEDICINE & REHABILITATION

## 2019-11-05 PROCEDURE — 700101 HCHG RX REV CODE 250: Performed by: ANESTHESIOLOGY

## 2019-11-05 PROCEDURE — 770010 HCHG ROOM/CARE - REHAB SEMI PRIVAT*

## 2019-11-05 PROCEDURE — 700102 HCHG RX REV CODE 250 W/ 637 OVERRIDE(OP): Performed by: HOSPITALIST

## 2019-11-05 PROCEDURE — 85610 PROTHROMBIN TIME: CPT

## 2019-11-05 PROCEDURE — A9270 NON-COVERED ITEM OR SERVICE: HCPCS | Performed by: HOSPITALIST

## 2019-11-05 PROCEDURE — 99233 SBSQ HOSP IP/OBS HIGH 50: CPT | Performed by: PHYSICAL MEDICINE & REHABILITATION

## 2019-11-05 PROCEDURE — 99232 SBSQ HOSP IP/OBS MODERATE 35: CPT | Performed by: HOSPITALIST

## 2019-11-05 PROCEDURE — 49440 PLACE GASTROSTOMY TUBE PERC: CPT

## 2019-11-05 PROCEDURE — 97110 THERAPEUTIC EXERCISES: CPT

## 2019-11-05 PROCEDURE — 97530 THERAPEUTIC ACTIVITIES: CPT

## 2019-11-05 PROCEDURE — 700105 HCHG RX REV CODE 258: Performed by: ANESTHESIOLOGY

## 2019-11-05 PROCEDURE — 160002 HCHG RECOVERY MINUTES (STAT)

## 2019-11-05 PROCEDURE — 92507 TX SP LANG VOICE COMM INDIV: CPT

## 2019-11-05 PROCEDURE — A9270 NON-COVERED ITEM OR SERVICE: HCPCS | Performed by: PHYSICAL MEDICINE & REHABILITATION

## 2019-11-05 PROCEDURE — 700111 HCHG RX REV CODE 636 W/ 250 OVERRIDE (IP): Performed by: ANESTHESIOLOGY

## 2019-11-05 PROCEDURE — 97535 SELF CARE MNGMENT TRAINING: CPT

## 2019-11-05 PROCEDURE — 94640 AIRWAY INHALATION TREATMENT: CPT

## 2019-11-05 PROCEDURE — 700105 HCHG RX REV CODE 258: Performed by: NURSE PRACTITIONER

## 2019-11-05 PROCEDURE — G0515 COGNITIVE SKILLS DEVELOPMENT: HCPCS

## 2019-11-05 PROCEDURE — A6213 FOAM DRG >16<=48 SQ IN W/BDR: HCPCS | Performed by: PHYSICAL MEDICINE & REHABILITATION

## 2019-11-05 PROCEDURE — 700111 HCHG RX REV CODE 636 W/ 250 OVERRIDE (IP)

## 2019-11-05 RX ORDER — LEVOTHYROXINE SODIUM 0.03 MG/1
25 TABLET ORAL
Status: DISCONTINUED | OUTPATIENT
Start: 2019-11-06 | End: 2019-11-08

## 2019-11-05 RX ORDER — ACETAMINOPHEN 325 MG/1
650 TABLET ORAL EVERY 4 HOURS PRN
Status: DISCONTINUED | OUTPATIENT
Start: 2019-11-05 | End: 2019-11-08

## 2019-11-05 RX ORDER — TRAZODONE HYDROCHLORIDE 50 MG/1
50 TABLET ORAL
Status: CANCELLED | OUTPATIENT
Start: 2019-11-05

## 2019-11-05 RX ORDER — GINSENG 100 MG
1 CAPSULE ORAL 2 TIMES DAILY
Status: DISCONTINUED | OUTPATIENT
Start: 2019-11-05 | End: 2019-11-09 | Stop reason: HOSPADM

## 2019-11-05 RX ORDER — OXYCODONE HYDROCHLORIDE 5 MG/1
2.5 TABLET ORAL
Status: DISCONTINUED | OUTPATIENT
Start: 2019-11-05 | End: 2019-11-08

## 2019-11-05 RX ORDER — QUETIAPINE FUMARATE 25 MG/1
50 TABLET, FILM COATED ORAL EVERY EVENING
Status: DISCONTINUED | OUTPATIENT
Start: 2019-11-05 | End: 2019-11-08

## 2019-11-05 RX ORDER — DIGOXIN 125 MCG
250 TABLET ORAL DAILY
Status: CANCELLED | OUTPATIENT
Start: 2019-11-05

## 2019-11-05 RX ORDER — CHLORHEXIDINE GLUCONATE ORAL RINSE 1.2 MG/ML
15 SOLUTION DENTAL 2 TIMES DAILY
Status: DISCONTINUED | OUTPATIENT
Start: 2019-11-05 | End: 2019-11-07

## 2019-11-05 RX ORDER — DIPHENHYDRAMINE HYDROCHLORIDE 50 MG/ML
12.5 INJECTION INTRAMUSCULAR; INTRAVENOUS
Status: DISCONTINUED | OUTPATIENT
Start: 2019-11-05 | End: 2019-11-05 | Stop reason: HOSPADM

## 2019-11-05 RX ORDER — DIGOXIN 125 MCG
250 TABLET ORAL DAILY
Status: DISCONTINUED | OUTPATIENT
Start: 2019-11-05 | End: 2019-11-08

## 2019-11-05 RX ORDER — HYDROXYZINE HYDROCHLORIDE 25 MG/1
25 TABLET, FILM COATED ORAL 3 TIMES DAILY PRN
Status: CANCELLED | OUTPATIENT
Start: 2019-11-05

## 2019-11-05 RX ORDER — ONDANSETRON 2 MG/ML
4 INJECTION INTRAMUSCULAR; INTRAVENOUS 4 TIMES DAILY PRN
Status: DISCONTINUED | OUTPATIENT
Start: 2019-11-05 | End: 2019-11-08

## 2019-11-05 RX ORDER — ONDANSETRON 2 MG/ML
4 INJECTION INTRAMUSCULAR; INTRAVENOUS 4 TIMES DAILY PRN
Status: CANCELLED | OUTPATIENT
Start: 2019-11-05

## 2019-11-05 RX ORDER — TERAZOSIN 5 MG/1
5 CAPSULE ORAL EVERY EVENING
Status: CANCELLED | OUTPATIENT
Start: 2019-11-05

## 2019-11-05 RX ORDER — ALUMINA, MAGNESIA, AND SIMETHICONE 2400; 2400; 240 MG/30ML; MG/30ML; MG/30ML
20 SUSPENSION ORAL
Status: DISCONTINUED | OUTPATIENT
Start: 2019-11-05 | End: 2019-11-08

## 2019-11-05 RX ORDER — AMOXICILLIN 250 MG
2 CAPSULE ORAL 2 TIMES DAILY
Status: CANCELLED | OUTPATIENT
Start: 2019-11-05

## 2019-11-05 RX ORDER — OXYCODONE HYDROCHLORIDE 5 MG/1
5 TABLET ORAL
Status: CANCELLED | OUTPATIENT
Start: 2019-11-05

## 2019-11-05 RX ORDER — SODIUM CHLORIDE, SODIUM LACTATE, POTASSIUM CHLORIDE, CALCIUM CHLORIDE 600; 310; 30; 20 MG/100ML; MG/100ML; MG/100ML; MG/100ML
INJECTION, SOLUTION INTRAVENOUS CONTINUOUS
Status: DISCONTINUED | OUTPATIENT
Start: 2019-11-05 | End: 2019-11-05 | Stop reason: HOSPADM

## 2019-11-05 RX ORDER — HYDRALAZINE HYDROCHLORIDE 25 MG/1
25 TABLET, FILM COATED ORAL EVERY 8 HOURS PRN
Status: CANCELLED | OUTPATIENT
Start: 2019-11-05

## 2019-11-05 RX ORDER — BISACODYL 10 MG
10 SUPPOSITORY, RECTAL RECTAL
Status: DISCONTINUED | OUTPATIENT
Start: 2019-11-05 | End: 2019-11-08

## 2019-11-05 RX ORDER — HYDRALAZINE HYDROCHLORIDE 25 MG/1
25 TABLET, FILM COATED ORAL EVERY 8 HOURS PRN
Status: DISCONTINUED | OUTPATIENT
Start: 2019-11-05 | End: 2019-11-08

## 2019-11-05 RX ORDER — LORAZEPAM 0.5 MG/1
0.5 TABLET ORAL EVERY 4 HOURS PRN
Status: DISCONTINUED | OUTPATIENT
Start: 2019-11-05 | End: 2019-11-08

## 2019-11-05 RX ORDER — CIPROFLOXACIN 500 MG/1
500 TABLET, FILM COATED ORAL EVERY 12 HOURS
Status: DISCONTINUED | OUTPATIENT
Start: 2019-11-05 | End: 2019-11-08

## 2019-11-05 RX ORDER — ZIPRASIDONE MESYLATE 20 MG/ML
10 INJECTION, POWDER, LYOPHILIZED, FOR SOLUTION INTRAMUSCULAR EVERY 4 HOURS PRN
Status: DISCONTINUED | OUTPATIENT
Start: 2019-11-05 | End: 2019-11-09 | Stop reason: HOSPADM

## 2019-11-05 RX ORDER — LEVOTHYROXINE SODIUM 0.03 MG/1
25 TABLET ORAL
Status: CANCELLED | OUTPATIENT
Start: 2019-11-06

## 2019-11-05 RX ORDER — CHLORHEXIDINE GLUCONATE ORAL RINSE 1.2 MG/ML
15 SOLUTION DENTAL 2 TIMES DAILY
Status: CANCELLED | OUTPATIENT
Start: 2019-11-05

## 2019-11-05 RX ORDER — POLYETHYLENE GLYCOL 3350 17 G/17G
1 POWDER, FOR SOLUTION ORAL
Status: DISCONTINUED | OUTPATIENT
Start: 2019-11-05 | End: 2019-11-08

## 2019-11-05 RX ORDER — ACETAMINOPHEN 325 MG/1
650 TABLET ORAL EVERY 4 HOURS PRN
Status: CANCELLED | OUTPATIENT
Start: 2019-11-05

## 2019-11-05 RX ORDER — ALUMINA, MAGNESIA, AND SIMETHICONE 2400; 2400; 240 MG/30ML; MG/30ML; MG/30ML
20 SUSPENSION ORAL
Status: CANCELLED | OUTPATIENT
Start: 2019-11-05

## 2019-11-05 RX ORDER — POLYVINYL ALCOHOL 14 MG/ML
1 SOLUTION/ DROPS OPHTHALMIC PRN
Status: CANCELLED | OUTPATIENT
Start: 2019-11-05

## 2019-11-05 RX ORDER — BISACODYL 10 MG
10 SUPPOSITORY, RECTAL RECTAL
Status: CANCELLED | OUTPATIENT
Start: 2019-11-05

## 2019-11-05 RX ORDER — HYDROXYZINE HYDROCHLORIDE 25 MG/1
25 TABLET, FILM COATED ORAL 3 TIMES DAILY PRN
Status: DISCONTINUED | OUTPATIENT
Start: 2019-11-05 | End: 2019-11-08

## 2019-11-05 RX ORDER — LORAZEPAM 0.5 MG/1
0.5 TABLET ORAL EVERY 4 HOURS PRN
Status: CANCELLED | OUTPATIENT
Start: 2019-11-05

## 2019-11-05 RX ORDER — ONDANSETRON 4 MG/1
4 TABLET, ORALLY DISINTEGRATING ORAL 4 TIMES DAILY PRN
Status: CANCELLED | OUTPATIENT
Start: 2019-11-05

## 2019-11-05 RX ORDER — ZIPRASIDONE MESYLATE 20 MG/ML
10 INJECTION, POWDER, LYOPHILIZED, FOR SOLUTION INTRAMUSCULAR EVERY 4 HOURS PRN
Status: CANCELLED | OUTPATIENT
Start: 2019-11-05

## 2019-11-05 RX ORDER — ONDANSETRON 2 MG/ML
4 INJECTION INTRAMUSCULAR; INTRAVENOUS
Status: DISCONTINUED | OUTPATIENT
Start: 2019-11-05 | End: 2019-11-05 | Stop reason: HOSPADM

## 2019-11-05 RX ORDER — ECHINACEA PURPUREA EXTRACT 125 MG
2 TABLET ORAL PRN
Status: DISCONTINUED | OUTPATIENT
Start: 2019-11-05 | End: 2019-11-09 | Stop reason: HOSPADM

## 2019-11-05 RX ORDER — OXYCODONE HYDROCHLORIDE 5 MG/1
2.5 TABLET ORAL
Status: CANCELLED | OUTPATIENT
Start: 2019-11-05

## 2019-11-05 RX ORDER — HALOPERIDOL 5 MG/ML
1 INJECTION INTRAMUSCULAR
Status: DISCONTINUED | OUTPATIENT
Start: 2019-11-05 | End: 2019-11-05 | Stop reason: HOSPADM

## 2019-11-05 RX ORDER — AMOXICILLIN 250 MG
2 CAPSULE ORAL 2 TIMES DAILY
Status: DISCONTINUED | OUTPATIENT
Start: 2019-11-05 | End: 2019-11-08

## 2019-11-05 RX ORDER — GINSENG 100 MG
1 CAPSULE ORAL 2 TIMES DAILY
Status: CANCELLED | OUTPATIENT
Start: 2019-11-05

## 2019-11-05 RX ORDER — ONDANSETRON 4 MG/1
4 TABLET, ORALLY DISINTEGRATING ORAL 4 TIMES DAILY PRN
Status: DISCONTINUED | OUTPATIENT
Start: 2019-11-05 | End: 2019-11-08

## 2019-11-05 RX ORDER — ECHINACEA PURPUREA EXTRACT 125 MG
2 TABLET ORAL PRN
Status: CANCELLED | OUTPATIENT
Start: 2019-11-05

## 2019-11-05 RX ORDER — POLYVINYL ALCOHOL 14 MG/ML
1 SOLUTION/ DROPS OPHTHALMIC PRN
Status: DISCONTINUED | OUTPATIENT
Start: 2019-11-05 | End: 2019-11-09 | Stop reason: HOSPADM

## 2019-11-05 RX ORDER — TRAZODONE HYDROCHLORIDE 50 MG/1
50 TABLET ORAL
Status: DISCONTINUED | OUTPATIENT
Start: 2019-11-05 | End: 2019-11-08

## 2019-11-05 RX ORDER — OXYCODONE HYDROCHLORIDE 5 MG/1
5 TABLET ORAL
Status: DISCONTINUED | OUTPATIENT
Start: 2019-11-05 | End: 2019-11-08

## 2019-11-05 RX ORDER — POLYETHYLENE GLYCOL 3350 17 G/17G
1 POWDER, FOR SOLUTION ORAL
Status: CANCELLED | OUTPATIENT
Start: 2019-11-05

## 2019-11-05 RX ORDER — QUETIAPINE FUMARATE 25 MG/1
50 TABLET, FILM COATED ORAL EVERY EVENING
Status: CANCELLED | OUTPATIENT
Start: 2019-11-05

## 2019-11-05 RX ORDER — TERAZOSIN 5 MG/1
5 CAPSULE ORAL EVERY EVENING
Status: DISCONTINUED | OUTPATIENT
Start: 2019-11-05 | End: 2019-11-07

## 2019-11-05 RX ADMIN — GUAIFENESIN 200 MG: 100 SOLUTION ORAL at 16:52

## 2019-11-05 RX ADMIN — FENTANYL CITRATE 25 MCG: 50 INJECTION INTRAMUSCULAR; INTRAVENOUS at 15:20

## 2019-11-05 RX ADMIN — EPHEDRINE SULFATE 10 MG: 50 INJECTION INTRAMUSCULAR; INTRAVENOUS; SUBCUTANEOUS at 14:25

## 2019-11-05 RX ADMIN — METOPROLOL TARTRATE 75 MG: 25 TABLET ORAL at 20:42

## 2019-11-05 RX ADMIN — VALPROIC ACID 500 MG: 250 SOLUTION ORAL at 17:45

## 2019-11-05 RX ADMIN — QUETIAPINE 50 MG: 25 TABLET ORAL at 20:42

## 2019-11-05 RX ADMIN — GUAIFENESIN 200 MG: 100 SOLUTION ORAL at 05:49

## 2019-11-05 RX ADMIN — TERAZOSIN HYDROCHLORIDE 5 MG: 5 CAPSULE ORAL at 20:42

## 2019-11-05 RX ADMIN — CHLORHEXIDINE GLUCONATE 0.12% ORAL RINSE 15 ML: 1.2 LIQUID ORAL at 09:04

## 2019-11-05 RX ADMIN — LEVOTHYROXINE SODIUM 25 MCG: 25 TABLET ORAL at 05:49

## 2019-11-05 RX ADMIN — METOPROLOL TARTRATE 75 MG: 25 TABLET ORAL at 09:03

## 2019-11-05 RX ADMIN — DIGOXIN 250 MCG: 125 TABLET ORAL at 16:52

## 2019-11-05 RX ADMIN — CIPROFLOXACIN HYDROCHLORIDE 500 MG: 500 TABLET, FILM COATED ORAL at 20:43

## 2019-11-05 RX ADMIN — BACITRACIN 1 EACH: 500 OINTMENT TOPICAL at 09:03

## 2019-11-05 RX ADMIN — VALPROIC ACID 500 MG: 250 SOLUTION ORAL at 05:49

## 2019-11-05 RX ADMIN — BACITRACIN 1 EACH: 500 OINTMENT TOPICAL at 20:31

## 2019-11-05 RX ADMIN — SUGAMMADEX 200 MG: 100 INJECTION, SOLUTION INTRAVENOUS at 14:33

## 2019-11-05 RX ADMIN — SODIUM CHLORIDE, POTASSIUM CHLORIDE, SODIUM LACTATE AND CALCIUM CHLORIDE: 600; 310; 30; 20 INJECTION, SOLUTION INTRAVENOUS at 13:43

## 2019-11-05 RX ADMIN — LORAZEPAM 0.5 MG: 0.5 TABLET ORAL at 11:02

## 2019-11-05 RX ADMIN — HYDROXYZINE HYDROCHLORIDE 25 MG: 25 TABLET, FILM COATED ORAL at 00:51

## 2019-11-05 RX ADMIN — PROPOFOL 150 MG: 10 INJECTION, EMULSION INTRAVENOUS at 14:23

## 2019-11-05 RX ADMIN — LEVOFLOXACIN 500 MG: 250 TABLET, FILM COATED ORAL at 09:03

## 2019-11-05 RX ADMIN — CHLORHEXIDINE GLUCONATE 0.12% ORAL RINSE 15 ML: 1.2 LIQUID ORAL at 20:32

## 2019-11-05 RX ADMIN — OXYCODONE HYDROCHLORIDE 5 MG: 5 TABLET ORAL at 16:52

## 2019-11-05 RX ADMIN — SODIUM CHLORIDE, POTASSIUM CHLORIDE, SODIUM LACTATE AND CALCIUM CHLORIDE: 600; 310; 30; 20 INJECTION, SOLUTION INTRAVENOUS at 15:21

## 2019-11-05 RX ADMIN — SENNOSIDES AND DOCUSATE SODIUM 2 TABLET: 8.6; 5 TABLET ORAL at 20:40

## 2019-11-05 ASSESSMENT — ENCOUNTER SYMPTOMS
ABDOMINAL PAIN: 0
DIARRHEA: 0
FEVER: 0
SHORTNESS OF BREATH: 0
NAUSEA: 0
NERVOUS/ANXIOUS: 0
CHILLS: 0
VOMITING: 0

## 2019-11-05 ASSESSMENT — CHA2DS2 SCORE
VASCULAR DISEASE: NO
PRIOR STROKE OR TIA OR THROMBOEMBOLISM: NO
AGE 75 OR GREATER: YES
CHA2DS2 VASC SCORE: 4
SEX: MALE
DIABETES: NO
CHF OR LEFT VENTRICULAR DYSFUNCTION: YES
HYPERTENSION: YES
AGE 65 TO 74: NO

## 2019-11-05 ASSESSMENT — PATIENT HEALTH QUESTIONNAIRE - PHQ9
SUM OF ALL RESPONSES TO PHQ9 QUESTIONS 1 AND 2: 0
2. FEELING DOWN, DEPRESSED, IRRITABLE, OR HOPELESS: NOT AT ALL
1. LITTLE INTEREST OR PLEASURE IN DOING THINGS: NOT AT ALL

## 2019-11-05 ASSESSMENT — PAIN SCALES - GENERAL: PAIN_LEVEL: 2

## 2019-11-05 NOTE — CARE PLAN
Problem: Safety  Goal: Will remain free from falls  Intervention: Implement fall precautions  Note:   Use of call light reinforced to make needs known.Bed in low position, non skid socks/shoes on when out of bed.Alarms in place for safety.Pt's room is close to the nursing station.Call light within reach.Will continue to monitor and assess needs and safety.     Problem: Urinary Elimination:  Goal: Ability to reestablish a normal urinary elimination pattern will improve  Intervention: Assess and monitor for signs and symptoms of urinary retention  Note:   Pt assisted to the bathroom, voided 25 ml.Bladder scan in the high 900's.Cathed for 800 ml.Will continue to monitor and assess for urinary retention.

## 2019-11-05 NOTE — ANESTHESIA QCDR
2019 [facility] Clinical Data Registry (for Quality Improvement)     Postoperative nausea/vomiting risk protocol (Adult = 18 yrs and Pediatric 3-17 yrs)- (430 and 463)  General inhalation anesthetic (NOT TIVA) with PONV risk factors: Yes  Provision of anti-emetic therapy with at least 2 different classes of agents: Yes   Patient DID NOT receive anti-emetic therapy and reason is documented in Medical Record:  N/A    Multimodal Pain Management- (AQI59)  Patient undergoing Elective Surgery (i.e. Outpatient, or ASC, or Prescheduled Surgery prior to Hospital Admission): Yes  Use of Multimodal Pain Management, two or more drugs and/or interventions, NOT including systemic opioids: No   Exception: Documented allergy to multiple classes of analgesics:         PACU assessment of acute postoperative pain prior to Anesthesia Care End- Applies to Patients Age = 18- (ABG7)  Initial PACU pain score is which of the following: < 7/10  Patient unable to report pain score: N/A    Post-anesthetic transfer of care checklist/protocol to PACU/ICU- (426 and 427)  Upon conclusion of case, patient transferred to which of the following locations: PACU/Non-ICU  Use of transfer checklist/protocol: No  Exclusion: Service Performed in Patient Hospital Room (and thus did not require transfer): No     PACU Reintubation- (AQI31)  General anesthesia requiring endotracheal intubation (ETT) along with subsequent extubation in OR or PACU: No  Required reintubation in the PACU: N/A  Extubation was a planned trial documented in the medical record prior to removal of the original airway device: N/A    Unplanned admission to ICU related to anesthesia service up through end of PACU care- (MD51)  Unplanned admission to ICU (not initially anticipated at anesthesia start time): No

## 2019-11-05 NOTE — REHAB-COLLABORATIVE ONGOING IDT TEAM NOTE
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 antihypertensive(s) regimen(s) continue.    Section completed by: Jorge Hutchinson [facility][[email or web address]]     Attribution Key     AW.1 - Jorge Hutchinson [facility] on 11/4/2019  3:27 PM                  DC Planning  DC destination/dispostion:  Anticipate home with wife. Patient has recently moved into a single story home.        DC Needs: F/u with PCP, Dr. Dong (Oral Surgery), Dr. Morrow (Cardiology), possibly psychiatry. Patient has no dme from prior.  Will need peg placement and therapy follow up.  I will follow trach status for d/c needs.     Barriers to discharge:  Patient with trach.     Strengths: Supportive spouse      Section completed by: Chandrika Ramirez RN [facility]         Physician Summary  ENRIQUE Molina MD  participated and led team conference discussion.

## 2019-11-05 NOTE — OR NURSING
1450 Pt arrived to PPU with IR RN. AAOx2. VSS. Denies pain and nausea. Pt requiring in-line suction, RT called for assistance. G-tube site in LLQ, mild shadowing but otherwise CDI and soft. Belongings returned to pt bedside. Unable to contact family via telephone.    1545 Discharge instructions reviewed with pt. Still unable to get a hold of wife.     1555 Report called to ANAMARIA Dinh at [facility].     1612 Discharge criteria met. PIV removed. Pt dressed. Pt transported via wheelchair to [facility] with transport, discharge instructions, and all belongings with pt.

## 2019-11-05 NOTE — ANESTHESIA POSTPROCEDURE EVALUATION
Patient: Pedro Champion    Procedure Summary     Date:  11/05/19 Room / Location:  [facility] - INTERVENTIONAL - [facility]    Anesthesia Start:  1343 Anesthesia Stop:  1456    Procedure:  IR-GASTROSTOMY PLACEMENT Diagnosis:       Gastrostomy status      Gastrostomy status      (Dysphagia)    Scheduled Providers:  Dharmesh Payan M.D. Responsible Provider:  Dharmesh Edwards M.D.    Anesthesia Type:  general ASA Status:  3          Final Anesthesia Type: general  Last vitals  BP   Blood Pressure : (!) 85/63    Temp   36.9 °C (98.4 °F)    Pulse   Pulse: (!) 113   Resp   18    SpO2   96 %      Anesthesia Post Evaluation    Patient location during evaluation: PACU  Patient participation: complete - patient participated  Level of consciousness: awake  Pain score: 2    Airway patency: patent  Anesthetic complications: no  Cardiovascular status: adequate  Respiratory status: acceptable  Hydration status: acceptable    PONV: none           Nurse Pain Score: 0 (NPRS)

## 2019-11-05 NOTE — ANESTHESIA PREPROCEDURE EVALUATION
Relevant Problems   PULMONARY   (+) Pneumonia      CARDIAC   (+) A-fib (HCC)   (+) Benign hypertension      ENDO   (+) Hypothyroid       Physical Exam    Airway - unable to assess  TM distance: >3 FB  Neck ROM: full  Patient is intubated/trached     Cardiovascular   Rhythm: irregular  Rate: normal     Dental     Unable to assess dental     Pulmonary   Breath sounds clear to auscultation     Abdominal - normal exam     Neurological - normal exam                 Anesthesia Plan    ASA 3   ASA physical status 3 criteria: moderate reduction of ejection fraction    Plan - general       Airway plan will be Tracheostomy      Plan Factors:   Patient was not previously instructed to abstain from smoking on day of procedure.  Patient did not smoke on day of procedure.      Induction: inhalational          Informed Consent:    Anesthetic plan and risks discussed with patient.

## 2019-11-05 NOTE — PROGRESS NOTES
Pt left at 11:10a to go to apt to get peg placed. Pt ICP'd just before leaving after pt attempted to void. Pt unable to void prior to transport. Pt did have morning metoprolol and levaquin with small amount of water. Attempted to call to give receiving facility report with no answer.     Pt has been cooperating but does not use call light and was found in the bathroom on the toilet after unbuckling seatbelt, transferring self, and turning off chair alarm. Pt educated to use call light.

## 2019-11-05 NOTE — REHAB-PT IDT TEAM NOTE
"Physical Therapy   Mobility  Bed mobility:   Mod I - spv   Bed /Chair/Wheelchair Transfer Initial:  4 - Minimal Assistance  Bed /Chair/Wheelchair Transfer Current:  4 - Minimal Assistance   Bed/Chair/Wheelchair Transfer Description:  Adaptive equipment, Increased time, Supervision for safety, Verbal cueing, Set-up of equipment, Initial preparation for task  Walk Initial:  1 - Total Assistance  Walk Current:  5 - Standby Prompting/Supervision or Set-up   Walk Description:  Walker, Extra time, Verbal cueing, Supervision for safety(150 ft and 300 ft outdoors with 4WW and SBA )  Wheelchair Initial:  2 - Max Assistance  Wheelchair Current:  5 - Standby Prompting/Supervision or Set-up   Wheelchair Description:  Extra time, Supervision for safety, Verbal cueing(room <> back gym, spv)  Stairs Initial:  0 - Not tested,unsafe activity  Stairs Current: 5 - Standby Prompting/Supervision or Set-up   Stairs Description: Extra time, Supervision for safety(20 6\" steps, 2HRs and SBA)  Patient/Family Training/Education:  Wife present intermittently   DME/DC Recommendations:  Outpatient PT, FWW   Strengths:  Adequate strength, Independent PLOF, Making steady progress towards goals and Supportive family  Barriers:   Poor balance and Other: Impulsivity (improving since admit)   # of short term goals set= 1  # of short term goals met=1  Physical Therapy Problems     Problem: PT-Long Term Goals     Dates: Start: 10/19/19       Description:     Goal: LTG-By discharge, patient will ambulate     Dates: Start: 10/19/19   Expected End: 11/02/19       Description: 1) Individualized goal: With LRD at 150 ft at SPV in order to progress towards PLOF  2) Interventions: PT Group Therapy, PT Gait Training, PT Therapeutic Exercises, PT Neuro Re-Ed/Balance, PT Therapeutic Activity, PT Manual Therapy and PT Evaluation             Goal: LTG-By discharge, patient will transfer one surface to another     Dates: Start: 10/19/19   Expected End: 11/02/19    "    Description: 1) Individualized goal: At [facility] with LRD In order to maximize self mobility  2) Interventions: PT Group Therapy, PT Gait Training, PT Therapeutic Exercises, PT Neuro Re-Ed/Balance, PT Therapeutic Activity, PT Manual Therapy and PT Evaluation             Goal: LTG-By discharge, patient will ambulate up/down 4-6 stairs     Dates: Start: 10/19/19   Expected End: 11/02/19       Description: 1) Individualized goal: With HR at [facility] in order to progress towards navigating stairs at home or in community safely  2) Interventions: PT Group Therapy, PT Gait Training, PT Therapeutic Exercises, PT Neuro Re-Ed/Balance, PT Therapeutic Activity, PT Manual Therapy and PT Evaluation             Goal: LTG-By discharge, patient will transfer in/out of a car     Dates: Start: 10/19/19   Expected End: 11/02/19       Description: 1) Individualized goal: At [facility] with LRD In order to maximize self mobility  2) Interventions: PT Group Therapy, PT Gait Training, PT Therapeutic Exercises, PT Neuro Re-Ed/Balance, PT Therapeutic Activity, PT Manual Therapy and PT Evaluation                         Section completed by:  Daksha Becker, PT, DPT

## 2019-11-05 NOTE — REHAB-SLP IDT TEAM NOTE
Speech Therapy   Cognitive Linquistic Functions  Comprehension Initial:  5 - Stand-by Prompting/Supervision or Set-up  Comprehension Current:  6 - Modified Independent   Comprehension Description:  Verbal cues, Glasses, Hearing aids/amplifiers  Expression Initial:  2 - Max Assistance  Expression Current:  5 - Stand-by Prompting/Supervision or Set-up   Expression Description:  Verbal cueing  Social Interaction Initial:  5 - Stand-by Prompting/Supervision or Set-up  Social Interaction Current:  5 - Stand-by Prompting/Supervision or Set-up   Social Interaction Description:  Increased time, Verbal cues, Medication  Problem Solving Initial:  3 - Moderate Assistance  Problem Solving Current:  4 - Minimal Assistance   Problem Solving Description:  Verbal cueing, Therapy schedule, 1:1 supervision, Bed/chair alarm, Seat belt  Memory Initial:  3 - Moderate Assistance  Memory Current:  4 - Minimal Assistance   Memory Description:  Verbal cueing, Therapy schedule, Bed/chair alarm, Seat belt, 1:1 supervision  Executive Functioning / Organization Initial:     Executive Functioning / Organization Current: not being addressed at this time. Emphasis of therapy on PMSV, capping trials and dysphagia      Executive Functioning Desciption: spouse able to assist with all IADLs at home.   Swallowing  Swallowing Status: NPO, pt to receive PEG placement today. Will complete MBSS following PEG placement this week. Pt tolerating limited trials with timely swallow, ice chips/5mL NTL  Orders Placed This Encounter   Procedures   • Diet NPO     Standing Status:   Standing     Number of Occurrences:   1     Order Specific Question:   Restrict to:     Answer:   With Tube Feed [4]     Behavior Modification Plan  Allow for rest time, Keep instructions simple/concrete, Give clear feedback, Set clear goals, Provide reasonable choices, Decrease the chance of failure by offering activities that are within the patient's abilities, Analyze tasks (break  down into smaller steps) and Reinforce participation in desired tasks  Assistive Technology  Hearing amplification or closed captioning, Low/Impaired vision equipment and Low tech: Calendar, planner, schedule, alarms/timers, pill organizer, post-it notes, lists  Family Training/Education:  Ongoing with spouse  DC Recommendations: Pt will require ongoing SLP services upon d/c for cognition and dysphagia.   Strengths:  Able to follow instructions, Alert and oriented, Effective communication skills, Independent PLOF, Making steady progress towards goals, Manages pain appropriately, Motivated for self care and independence, Pleasant and cooperative, Supportive family and Willingly participates in therapeutic activities  Barriers:  Aspiration risk, Impulsive and Poor insight/denial of deficits  # of short term goals set=3  # of short term goals met=3  Speech Therapy Problems     Problem: Speech/Swallowing LTGs     Dates: Start: 10/19/19       Description:     Goal: LTG-By discharge, patient will safely swallow     Dates: Start: 10/19/19       Description: 1) Individualized goal:  Dysphagia III diet and thin liquids without overt s/sx of aspiratrion   2) Interventions:  SLP Swallowing Dysfunction Treatment, SLP Oral Pharyngeal Evaluation, SLP Video Swallow Evaluation, SLP Self Care / ADL Training , SLP Cognitive Skill Development and SLP Group Treatment             Goal: LTG-By discharge, patient will solve basic problems     Dates: Start: 10/19/19       Description: 1) Individualized goal: in order to discharge home with supervision  2) Interventions:  SLP Speech Language Treatment, SLP Self Care / ADL Training , SLP Cognitive Skill Development and SLP Group Treatment                          Section completed by:  Lorene Reno MS,CCC-SLP

## 2019-11-05 NOTE — REHAB-OT IDT TEAM NOTE
Occupational Therapy   Activities of Daily Living  Eating Initial:  1 - Total Assistance  Eating Current:  1 - Total Assistance   Eating Description:  Set-up of equipment or meal/tube feeding, Tube feed bolus  Grooming Initial:  5 - Standby Prompting/Supervision or Set-up  Grooming Current:  5 - Standby Prompting/Supervision or Set-up   Grooming Description:  Increased time, Supervision for safety, Set-up of equipment, Initial preparation for task(Patient brushed teeth with set-up and SBA while seated EOB and using suction toothbrush. )  Bathing Initial:  3 - Moderate Assistance  Bathing Current:  4 - Minimal Assistance   Bathing Description:  Grab bar, Tub bench, Hand held shower, Increased time, Supervision for safety, Verbal cueing, Set-up of equipment, Requires minimal contact(Patient washed, rinsed, and dried all body parts while incorporating sitting and standing with use of grab bar. CGA for safety, standing balance, and cues for caution around trach/NGT areas.)  Upper Body Dressing Initial:  4 - Minimal Assistance  Upper Body Dressing Current:  5 - Standby Prompting/Supervision or Set-up   Upper Body Dressing Description:  Increased time, Set-up of equipment(Patient donned t-shirt with set-up while seated EOB)  Lower Body Dressing Initial:  2 - Max Assistance  Lower Body Dressing Current:  5 - Standby Prompting/Supervsion or Set-up   Lower Body Dressing Description:  5 - Standby Prompting/Supervsion or Set-up  Toileting Initial:  3 - Moderate Assistance  Toileting Current:  5 - Standby Prompting/Supervision or Set-up   Toileting Description:  Grab bar, Increased time, Supervision for safety(Patient performed toileting tasks with SBA for safety, standing balance and use of GB/FWW.)  Toilet Transfer Initial:  3 - Moderate Assistance  Toilet Transfer Current:  5 - Standby Prompting/Supervision or Set-up   Toilet Transfer Description:  5 - Standby Prompting/Supervision or Set-up  Tub / Shower Transfer Initial:   3 - Moderate Assistance  Tub / Shower Transfer Current:  5 - Standby Prompting/Supervision or Set-up   Tub / Shower Transfer Description:  Increased time, Grab bar, Supervision for safety, Set-up of equipment(Patient performed tub shower txfr with SBA for safety and balance with FWW)  IADL:  OT focus has been on ADLs, functional transfers, and functional mobility   Family Training/Education:  Not completed    DME/DC Recommendations:  24 hour supervision, No OT f/u, grab bars/shower chair in shower, grab bars by toilet     Strengths:  Adequate strength, Independent PLOF, Making steady progress towards goals, Manages pain appropriately, Pleasant and cooperative, Supportive family and Willingly participates in therapeutic activities  Barriers:  Decreased endurance, Generalized weakness and Other: intermittent impulsivity, decreased safety awareness/insight     # of short term goals set= 1   # of short term goals met= 0      Occupational Therapy Goals     Problem: Bathing     Dates: Start: 10/19/19       Description:     Goal: STG-Within one week, patient will bathe     Dates: Start: 10/19/19       Description: 1) Individualized Goal: supervision with AE/DME prn.  2) Interventions: OT Group Therapy, OT Self Care/ADL, OT Cognitive Skill Dev, OT Community Reintegration, OT Manual Ther Technique, OT Neuro Re-Ed/Balance, OT Therapeutic Activity, OT Evaluation and OT Therapeutic Exercise                   Problem: OT Long Term Goals     Dates: Start: 10/19/19       Description:     Goal: LTG-By discharge, patient will complete basic self care tasks     Dates: Start: 10/19/19       Description: 1) Individualized Goal:  Mod I with AE/DME prn.  2) Interventions:  OT Group Therapy, OT Self Care/ADL, OT Cognitive Skill Dev, OT Community Reintegration, OT Manual Ther Technique, OT Neuro Re-Ed/Balance, OT Therapeutic Activity, OT Evaluation and OT Therapeutic Exercise             Goal: LTG-By discharge, patient will perform  bathroom transfers     Dates: Start: 10/19/19       Description: 1) Individualized Goal: Mod I with AD/DME prn.  2) Interventions: OT Group Therapy, OT Self Care/ADL, OT Cognitive Skill Dev, OT Community Reintegration, OT Manual Ther Technique, OT Neuro Re-Ed/Balance, OT Therapeutic Activity, OT Evaluation and OT Therapeutic Exercise                         Section completed by:  Celi Dos Santos MS,OTR/L

## 2019-11-05 NOTE — OR SURGEON
Immediate Post- Operative Note      PostOp Diagnosis: TUBE FEEDS REQUIRED FOR NUTRITION      Procedure(s): PERCUTANEOUS GASTROSTOMY TUBE PLACEMENT WITH US AND FLUOROSCOPIC GUIDANCE    SUTURE ANCHORS PLACED X 2    16F NICOLE BALLOON G-TUBE PLACED UNDER GENL ANESTHESIA    BALLOON FILLED WITH 10 ML STERILE SALINE      Estimated Blood Loss: Less than 1 ml        Complications: None            11/5/2019  2:42 PM  Dharmesh Payan

## 2019-11-05 NOTE — DISCHARGE INSTRUCTIONS
ACTIVITY: Rest and take it easy for the first 24 hours.  A responsible adult is recommended to remain with you during that time.  It is normal to feel sleepy.  We encourage you to not do anything that requires balance, judgment or coordination.    MILD FLU-LIKE SYMPTOMS ARE NORMAL. YOU MAY EXPERIENCE GENERALIZED MUSCLE ACHES, THROAT IRRITATION, HEADACHE AND/OR SOME NAUSEA.    FOR 24 HOURS DO NOT:  Drive, operate machinery or run household appliances.  Drink beer or alcoholic beverages.   Make important decisions or sign legal documents.    DIET: To avoid nausea, slowly advance diet as tolerated, avoiding spicy or greasy foods for the first day.  Add more substantial food to your diet according to your physician's instructions.  Babies can be fed formula or breast milk as soon as they are hungry.  INCREASE FLUIDS AND FIBER TO AVOID CONSTIPATION.    FOLLOW-UP APPOINTMENT:  A follow-up appointment should be arranged with your doctor; call to schedule.    You should CALL YOUR PHYSICIAN if you develop:  Fever greater than 101 degrees F.  Pain not relieved by medication, or persistent nausea or vomiting.  Excessive bleeding (blood soaking through dressing) or unexpected drainage from the wound.  Extreme redness or swelling around the incision site, drainage of pus or foul smelling drainage.  Inability to urinate or empty your bladder within 8 hours.  Problems with breathing or chest pain.    You should call 911 if you develop problems with breathing or chest pain.  If you are unable to contact your doctor or surgical center, you should go to the nearest emergency room or urgent care center.  Physician's telephone #: [phone]    If any questions arise, call your doctor.  If your doctor is not available, please feel free to call the Surgical Center at [phone].  The Center is open Monday through Friday from 7AM to 7PM.  You can also call the [facility] HOTLINE open 24 hours/day, 7 days/week and speak to a nurse at  [phone], or toll free at [phone].    A registered nurse may call you a few days after your surgery to see how you are doing after your procedure.    MEDICATIONS: Resume taking daily medication.  Take prescribed pain medication with food.  If no medication is prescribed, you may take non-aspirin pain medication if needed.  PAIN MEDICATION CAN BE VERY CONSTIPATING.  Take a stool softener or laxative such as senokot, pericolace, or milk of magnesia if needed.    If your physician has prescribed pain medication that includes Acetaminophen (Tylenol), do not take additional Acetaminophen (Tylenol) while taking the prescribed medication.    Depression / Suicide Risk    As you are discharged from this [facility] facility, it is important to learn how to keep safe from harming yourself.    Recognize the warning signs:  · Abrupt changes in personality, positive or negative- including increase in energy   · Giving away possessions  · Change in eating patterns- significant weight changes-  positive or negative  · Change in sleeping patterns- unable to sleep or sleeping all the time   · Unwillingness or inability to communicate  · Depression  · Unusual sadness, discouragement and loneliness  · Talk of wanting to die  · Neglect of personal appearance   · Rebelliousness- reckless behavior  · Withdrawal from people/activities they love  · Confusion- inability to concentrate     If you or a loved one observes any of these behaviors or has concerns about self-harm, here's what you can do:  · Talk about it- your feelings and reasons for harming yourself  · Remove any means that you might use to hurt yourself (examples: pills, rope, extension cords, firearm)  · Get professional help from the community (Mental Health, Substance Abuse, psychological counseling)  · Do not be alone:Call your Safe Contact- someone whom you trust who will be there for you.  · Call your local CRISIS HOTLINE [phone] or [phone]  · Call  your local Children's Mobile Crisis Response Team [place] (544) 503-2176 or www.[employer].com  · Call the toll free National Suicide Prevention Hotlines   · National Suicide Prevention Lifeline 791-726-AXPQ (5231)  · National Hope Line Network 800-SUICIDE (490-4819)          Gastrostomy (g-tube): Inserted through skin into or out of  the stomach with tip ending in the stomach by GI, IR, or surgeon. The feeding end will be located in the upper middle quadrant of the abdomen.  Continuous or bolus feedings can be used depending on nutrition status and patients' tolerance.    Clean daily  Use warm water and mild soap followed by a clean water rinse and thorough drying. Three days after your feeding tube has been inserted you may leave off your gauze dressing, unless otherwise instructed, and simply cleanse the area while taking a shower.    Tube Management and Feeding  Daily care of the feeding tube will keep it functioning properly and allow your body to heal. Care immediate post procedure:  1.      The feeding tube will be capped or attached to a drainage bag until the next day.  2.      If you are comfortable and able to eat by mouth after the procedure, you may eat light meals. Avoid heavy foods (steak, fried food, etc.) for at least 3 days.  3.      Your feeding tube should not be used for the first 6 hours after placement.  4.      If you go home the day of procedure, you should have a follow-up appointment for a site evaluation and/or suture removal.    Decompression Bag (if applicable)  Removing Draining Bag (if applicable)  1.    Wash hands well with soap and water for 30 seconds  2.    Pinch or clamp off feeding tube  3.    Gently pull on the tip of the drainage bag that is connected to the feeding tube until it slides out of the hub  4.    Flush feeding tube with 30 mL of water (you will need to unclamp the tube before flushing)  5.    Replace feeding tube cap.    Shower  Patients are typically able  to shower after 72 hours. Please check with their provider for dressing specifications.    Activity  · You should remain at bedrest for at least 2 - 4 hours after the procedure.  · Please wait 14 days to allow the site to heal before sleeping on your stomach.  · After the tube site has healed, most people are quite comfortable on their stomach.    Signs and Symptoms of Infection  •    Increased tenderness or pain  •    Increased redness or swelling  •    Drainage that is yellow or green in color or bad smelling  •    Sutures (stitches) that become reddened or tender (or loosen in a J tube)   •    Discolored areas, rashes or open sores    Bleeding Around the Tube  If you notice more than a few drops of blood, call your doctor or nurse.    Keep the tube taped or secured to your skin in a fashion that avoids accidental pulling on the tube which might cause injury.    Flushing to Prevent a Blocked Tube  1.    Gently flush the tube using 30mL of warm water.  2.    Prevent blockage by always flushing tube with 15 mL of water after feedings, before and after medicines    Excessive Leakage  Call your doctor or nurse.    Redness Around the Tube  1.    Keep the skin around the tube clean and dry. Some redness is normal at the incision site, but moisture can irritate the skin and lead to an infection.  2.    Clean the skin around the site more often using plain soap and water.  3.    Keep inflamed areas open to air if you can.  4.    Call the nurse or doctor if you see signs of infection (redness, swelling rash, greenish drainage, reddened indentations in the skin, or discomfort)    Internal Bleeding  If you cough up blood, see blood or blood clots coming from the feeding tube, or see blood in your stool (black or tarry stools), call your doctor or go to the ER.    Tube Falls Out  If the tube has been in place for less than one month, do not try to push it back into the opening. Secure the tube with tape and call your doctor.  If after hours, go to ER.   If the tube has been in place for more than one month, call the doctor as soon as possible for further instructions.     Vomiting  • Because frequent vomiting causes the loss of body fluids, salts, and nutrients, and can displace the tube, call the doctor or nurse if it doesn't stop or causes abdominal pain  • Give the feeding sitting up or with the head of the bed raised 45°.   • Try smaller feedings more often at a slower rate.  Make sure total amount for the day is the same.  The strength of the formula or the contents may need to be changed.   • Infection may cause vomiting.  Be sure the supplies are cleaned well and rinsed between feedings.  Wash your hands after contact with persons who are ill.   • Other causes might include food intolerance, side effects of medicine, feeding too much at one time, giving the feeding too fast, or reflux.   • If vomiting is a persistent problem, please discuss this with your doctor.    When the Tube is No Longer Needed  Your doctor can explain the reasons you need the tube and when it is no longer needed and arrange for its removal.  Most often after the tube is removed, the hole will heal and close in a few days.     Travel Information  Remember to take all the supplies needed for feeding:  syringe, formula, tubing, bottled water, tape, etc.  Opened formula can be stored in a cooler in the summer to prevent spoilage.  Some families have a small canvas bag that is always filled with supplies needed for travel.    WHEN TO SEEK MEDICAL CARE FOR YOUR FEEDING TUBE:  • If your tube doesn’t flush.  • If the flush solution or feeding solution leaks out around the tube.  • If you experience pain when you flush the tube.  • If you have new nausea or vomiting.   • If you have unusual pain  or redness around your tube.   • If you have unexplained abdominal or back pain.   • If you have unexplained fever.    Supplies  Review supplies such as formula (type), mild  "soap, 4 x 4 split gauze, tape, cotton swabs, and 60 mL syringe. \"Chest\" size spandage may be used to secure the tube instead of tape to prevent skin irritation    Contact Information  If you have a problem with the tube contact your provider.  • Interventional Radiology (if tube placed in Radiology) [phone]   • Gastroenterology Physician (if placed by GI physician endoscopically)   • [facility]: [phone] [facility]: [phone]  • [facility] [place] [phone]    Someone to talk to:  • Cancer Support Group [phone]  • Peer to peer support [phone]    Initial Follow-up Appointment  Have or make follow up appointment with placement provider within one week.    Suture [place] Removal  7-10 Days for a gastrostomy tube.  "

## 2019-11-05 NOTE — FLOWSHEET NOTE
11/05/19 0747   Events/Summary/Plan   Events/Summary/Plan Pt slept without cap last night.  No inner cannula placed and trach was occluded with dried plug.  Removed plug and placed cap.   Education   Education Yes - Pt. / Family has been Instructed in use of Respiratory Equipment   Aerosol Therapy / Airway Management   #Aerosol Therapy / Airway Management Trache Collar   Aerosol Humidity Temp (celsius)   (on standby)   Trache Weaning and Decannulation   Capping Trial Initiated, Smart Text Complete? Yes   Hours Tolerated 16  (Per RN, cap removed at 2300)   Respiratory WDL   Respiratory (WDL) X   Chest Exam   Respiration 18   Pulse 65   Secretions   Cough Congested;Productive   How Sputum Obtained Expectorated   Sputum Amount Small   Sputum Color Brown;Tan   Sputum Consistency Thick   Airway Trach Tracheostomy 6.0   Placement Date/Time: 09/14/19 1250   Airway Type: Trach  Brand: Yoanna  Style: Cuffed;Fenestrated  Airway Location: Tracheostomy  Airway Size: 6.0  Inserted In: Unit  Inserted by: Respiratory care practitioner   Site Assessment Clean;Dry;Intact   Airway Tube Secured Velcro attachment   Cuffless No   Status Capped   Extra Tracheostomy Tube at Bedside Yes

## 2019-11-05 NOTE — CARE PLAN
Problem: Bathing  Goal: STG-Within one week, patient will bathe  Description  1) Individualized Goal: supervision with AE/DME prn.  2) Interventions: OT Group Therapy, OT Self Care/ADL, OT Cognitive Skill Dev, OT Community Reintegration, OT Manual Ther Technique, OT Neuro Re-Ed/Balance, OT Therapeutic Activity, OT Evaluation and OT Therapeutic Exercise     Outcome: NOT MET

## 2019-11-05 NOTE — REHAB-NURSING IDT TEAM NOTE
Nursing   Nursing  Diet/Nutrition:  Tube Feed and NPO  Medication Administration:  Via Gastric Tube  % consumed at meals in last 24 hours:  Consumed % of meals per documentation.  Meal/Snack Consumption for the past 24 hrs:   Oral Nutrition   11/04/19 1600 NPO at this Time   11/04/19 1300 NPO at this Time   11/04/19 0711 NPO at this Time     Snack schedule:  None  Fluid Intake/Output in past 24 hours: In: 2980 [Other:840; Enteral:600]  Out: 1500   Admit Weight:  Weight: 90.2 kg (198 lb 14.4 oz)  Weight Last 7 Days   [89.1 kg (196 lb 6.9 oz)] 89.1 kg (196 lb 6.9 oz) (11/03 1430)    Pain Issues:    Location:  --  --         Severity:  Denies   Scheduled pain medications:  None     PRN pain medications used in last 24 hours:  None   Non Pharmacologic Interventions:  emotional support, repositioned and rest  Effectiveness of pain management plan:  good=patient states acceptable comfort after interventions    Bowel:    Bowel Assist Initial Score:  4 - Minimal Assistance  Bowel Assist Current Score:  4 - Minimal Assistance  Bowl Accidents in last 7 days:  0  Last bowel movement: 11/03/19  Stool Description: Small, Soft, Brown(reported by SLT)     Usual bowel pattern:  every other day  Scheduled bowel medications:  senna-docusate (PERICOLACE or SENOKOT S)   PRN bowel medications used in last 24 hours:  None  Nursing Interventions:  Increased time, Scheduled medication, Supervision, Verbal cueing, Positioning on commode/toilet  Effectiveness of bowel program:   fair=sometimes needs prn bowel meds for constipation  Bladder:    Bladder Assist Initial Score:  1 - Total Assistance  Bladder Assist Current Score:  1 - Total Assistance  Bladder Accidents in last 7 days:     PVR range for past 24-48 hours:  [200-999]  ()  Intermittent Catheterization: 300 ml - 800 ml  Medications affecting bladder:  Hytrin    Time void schedule/voiding pattern:  Time void every 3 hours during the day and every 4 hours at night  Interventions:   Increased time, Verbal cueing, Emptying of device, Intermittent straight catheterization, High PVR  Effectiveness of bladder training:  Poor=PVR > 200 cc      Wound: Jaw     Patient Lines/Drains/Airways Status    Active Current Wounds     None                   Interventions:  Bacitracin ointment  Effectiveness of intervention:  wound is unchanged     Sleep/wake cycle:   Average hours slept:  Sleeps 3-4 hours without waking  Sleep medication usage:  Other Trazodone 50 mg    Patient/Family Training/Education:  Aspiration Precautions, Bladder Management/Training, Bowel Management/Training, Diet/Nutrition, Fall Prevention, General Self Care, Medication Management, Pain Management, Respiratory Hygiene, Safe Transfers and Wound Care    Strengths: Supportive family and Manages pain appropriately   Barriers:   Aspiration risk, Bladder retention, Impulsive and Tube feeding       Nursing Problems     Problem: Bowel/Gastric:     Description:     Goal: Normal bowel function is maintained or improved     Description:           Goal: Will not experience complications related to bowel motility     Description:                 Problem: Communication     Description:     Goal: The ability to communicate needs accurately and effectively will improve     Description:                 Problem: Discharge Barriers/Planning     Description:     Goal: Patient's continuum of care needs will be met     Description:                 Problem: Infection     Description:     Goal: Will remain free from infection     Description:                 Problem: Knowledge Deficit     Description:     Goal: Knowledge of disease process/condition, treatment plan, diagnostic tests, and medications will improve     Description:           Goal: Knowledge of the prescribed therapeutic regimen will improve     Description:                 Problem: Pain Management     Description:     Goal: Pain level will decrease to patient's comfort goal     Description:                  Problem: Psychosocial Needs:     Description:     Goal: Level of anxiety will decrease     Description:                 Problem: Respiratory:     Description:     Goal: Respiratory status will improve     Description:                 Problem: Safety     Description:     Goal: Will remain free from injury     Description:           Goal: Will remain free from falls     Description:                 Problem: Skin Integrity     Description:     Goal: Risk for impaired skin integrity will decrease     Description:                 Problem: Urinary Elimination:     Description:     Goal: Ability to reestablish a normal urinary elimination pattern will improve     Description:                 Problem: Venous Thromboembolism (VTW)/Deep Vein Thrombosis (DVT) Prevention:     Description:     Goal: Patient will participate in Venous Thrombosis (VTE)/Deep Vein Thrombosis (DVT)Prevention Measures     Description:                        Long Term Goals:   At discharge patient will be able to function safely at home and in the community with support.    Section completed by:  Soraida Montemayor R.N.

## 2019-11-05 NOTE — THERAPY
"Physical Therapy   Daily Treatment     Patient Name: Pedro Champino  Age:  79 y.o., Sex:  male  Medical Record #: [id number]  Today's Date: 11/5/2019     Precautions  Precautions: Nasogastric Tube, Tracheostomy , Fall Risk  Comments: NPO, 1:1 supervision has been d/c'd per nursing    Subjective    \"I just want to rest before my surgery\"      Objective       11/05/19 0931   Precautions   Precautions Nasogastric Tube;Tracheostomy ;Fall Risk   Comments NPO, 1:1 supervision has been d/c'd per nursing   Sitting Lower Body Exercises   Nustep Resistance Level 4  (6:15, 0.08 miles )   Other Exercises Lat pull at Equalizer 15 lbs, 2x15    Interdisciplinary Plan of Care Collaboration   IDT Collaboration with  Physician   Patient Position at End of Therapy Seated;Call Light within Reach;Tray Table within Reach   Collaboration Comments re: therapy hold before PEG placement procedure today    Therapy Missed   Missed Therapy (Minutes) 30   Reason For Missed Therapy Medical - Patient on Hold from Therapy  (PEG placement procedure later today )   PT Total Time Spent   PT Individual Total Time Spent (Mins) 30   PT Charge Group   PT Therapeutic Exercise 1   PT Therapeutic Activities 1       FIM Wheelchair Score:  5 - Standby Prompting/Supervision or Set-up  Wheelchair Description:  Extra time, Supervision for safety(room > cafeteria > gym > room, spv using LEs )      Assessment    Pt difficult to motivate this session. Able to participate for half of scheduled session time.     Plan    General strength and conditioning, LE strengthening, endurance, and balance.     "

## 2019-11-05 NOTE — ANESTHESIA TIME REPORT
Anesthesia Start and Stop Event Times     Date Time Event    11/5/2019 1343 Anesthesia Start     1456 Anesthesia Stop        Responsible Staff  11/05/19    Name Role Begin End    Dharmesh Edwards M.D. Anesth 1343 1456        Preop Diagnosis (Free Text):  Pre-op Diagnosis             Preop Diagnosis (Codes):    Post op Diagnosis  Dysphagia      Premium Reason  Non-Premium    Comments:
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
## 2019-11-05 NOTE — REHAB-CM IDT TEAM NOTE
Case Management    DC Planning  DC destination/dispostion:  Anticipate home with wife. Patient has recently moved into a single story home.        DC Needs: F/u with PCP, Dr. Dong (Oral Surgery), Dr. Morrow (Cardiology), possibly psychiatry. Patient has no dme from prior.  Will need peg placement and therapy follow up.  I will follow trach status for d/c needs.     Barriers to discharge:  Patient with trach.     Strengths: Supportive spouse      Section completed by: Chandrika Ramirez RN CCM

## 2019-11-05 NOTE — FLOWSHEET NOTE
11/04/19 1535   Events/Summary/Plan   Events/Summary/Plan Pt still tolerating capped trach   Education   Education Yes - Pt. / Family has been Instructed in use of Respiratory Equipment   Aerosol Therapy / Airway Management   #Aerosol Therapy / Airway Management Trache Collar   Aerosol Humidity Temp (celsius)   (on standby)   Trache Weaning and Decannulation   Hours Tolerated 8.25   Respiratory WDL   Respiratory (WDL) X   Chest Exam   Respiration 18   Airway Trach Tracheostomy 6.0   Placement Date/Time: 09/14/19 1250   Airway Type: Trach  Brand: Yoanna  Style: Cuffed;Fenestrated  Airway Location: Tracheostomy  Airway Size: 6.0  Inserted In: Unit  Inserted by: Respiratory care practitioner   Site Assessment Clean;Dry   Airway Tube Secured Velcro attachment   Date Securing Device changed? 11/04/19   Date Securing Device to be changed (Q 7 days) 11/11/19   Cuffless No   Status Capped   Extra Tracheostomy Tube at Bedside Yes

## 2019-11-06 ENCOUNTER — APPOINTMENT (OUTPATIENT)
Dept: RADIOLOGY | Facility: REHABILITATION | Age: 79
DRG: 947 | End: 2019-11-06
Attending: PHYSICAL MEDICINE & REHABILITATION
Payer: MEDICARE

## 2019-11-06 ENCOUNTER — APPOINTMENT (OUTPATIENT)
Dept: PAIN MANAGEMENT | Facility: REHABILITATION | Age: 79
DRG: 947 | End: 2019-11-06
Attending: PHYSICAL MEDICINE & REHABILITATION
Payer: MEDICARE

## 2019-11-06 PROCEDURE — 770010 HCHG ROOM/CARE - REHAB SEMI PRIVAT*

## 2019-11-06 PROCEDURE — 97110 THERAPEUTIC EXERCISES: CPT

## 2019-11-06 PROCEDURE — 97530 THERAPEUTIC ACTIVITIES: CPT

## 2019-11-06 PROCEDURE — 92611 MOTION FLUOROSCOPY/SWALLOW: CPT

## 2019-11-06 PROCEDURE — 31502 CHANGE OF WINDPIPE AIRWAY: CPT

## 2019-11-06 PROCEDURE — 74230 X-RAY XM SWLNG FUNCJ C+: CPT

## 2019-11-06 PROCEDURE — 99233 SBSQ HOSP IP/OBS HIGH 50: CPT | Performed by: PHYSICAL MEDICINE & REHABILITATION

## 2019-11-06 PROCEDURE — A9270 NON-COVERED ITEM OR SERVICE: HCPCS | Performed by: PHYSICAL MEDICINE & REHABILITATION

## 2019-11-06 PROCEDURE — 700102 HCHG RX REV CODE 250 W/ 637 OVERRIDE(OP): Performed by: HOSPITALIST

## 2019-11-06 PROCEDURE — 97112 NEUROMUSCULAR REEDUCATION: CPT

## 2019-11-06 PROCEDURE — 99232 SBSQ HOSP IP/OBS MODERATE 35: CPT | Performed by: HOSPITALIST

## 2019-11-06 PROCEDURE — 92526 ORAL FUNCTION THERAPY: CPT

## 2019-11-06 PROCEDURE — 92507 TX SP LANG VOICE COMM INDIV: CPT

## 2019-11-06 PROCEDURE — 700102 HCHG RX REV CODE 250 W/ 637 OVERRIDE(OP): Performed by: PHYSICAL MEDICINE & REHABILITATION

## 2019-11-06 PROCEDURE — 94760 N-INVAS EAR/PLS OXIMETRY 1: CPT

## 2019-11-06 PROCEDURE — A9270 NON-COVERED ITEM OR SERVICE: HCPCS | Performed by: HOSPITALIST

## 2019-11-06 PROCEDURE — 97535 SELF CARE MNGMENT TRAINING: CPT

## 2019-11-06 RX ADMIN — METOPROLOL TARTRATE 75 MG: 25 TABLET ORAL at 20:16

## 2019-11-06 RX ADMIN — SENNOSIDES AND DOCUSATE SODIUM 2 TABLET: 8.6; 5 TABLET ORAL at 08:46

## 2019-11-06 RX ADMIN — CHLORHEXIDINE GLUCONATE 0.12% ORAL RINSE 15 ML: 1.2 LIQUID ORAL at 20:15

## 2019-11-06 RX ADMIN — CHLORHEXIDINE GLUCONATE 0.12% ORAL RINSE 15 ML: 1.2 LIQUID ORAL at 08:46

## 2019-11-06 RX ADMIN — OXYCODONE HYDROCHLORIDE 5 MG: 5 TABLET ORAL at 00:28

## 2019-11-06 RX ADMIN — CIPROFLOXACIN HYDROCHLORIDE 500 MG: 500 TABLET, FILM COATED ORAL at 08:46

## 2019-11-06 RX ADMIN — VALPROIC ACID 500 MG: 250 SOLUTION ORAL at 20:15

## 2019-11-06 RX ADMIN — VALPROIC ACID 500 MG: 250 SOLUTION ORAL at 15:05

## 2019-11-06 RX ADMIN — QUETIAPINE 50 MG: 25 TABLET ORAL at 20:16

## 2019-11-06 RX ADMIN — TRAZODONE HYDROCHLORIDE 50 MG: 50 TABLET ORAL at 21:09

## 2019-11-06 RX ADMIN — METOPROLOL TARTRATE 75 MG: 25 TABLET ORAL at 15:05

## 2019-11-06 RX ADMIN — BACITRACIN 1 EACH: 500 OINTMENT TOPICAL at 08:46

## 2019-11-06 RX ADMIN — TERAZOSIN HYDROCHLORIDE 5 MG: 5 CAPSULE ORAL at 20:15

## 2019-11-06 RX ADMIN — METOPROLOL TARTRATE 75 MG: 25 TABLET ORAL at 08:46

## 2019-11-06 RX ADMIN — VALPROIC ACID 500 MG: 250 SOLUTION ORAL at 00:24

## 2019-11-06 RX ADMIN — CIPROFLOXACIN HYDROCHLORIDE 500 MG: 500 TABLET, FILM COATED ORAL at 20:17

## 2019-11-06 RX ADMIN — SENNOSIDES AND DOCUSATE SODIUM 2 TABLET: 8.6; 5 TABLET ORAL at 20:16

## 2019-11-06 RX ADMIN — DIGOXIN 250 MCG: 125 TABLET ORAL at 17:27

## 2019-11-06 RX ADMIN — GUAIFENESIN 200 MG: 100 SOLUTION ORAL at 12:02

## 2019-11-06 RX ADMIN — GUAIFENESIN 200 MG: 100 SOLUTION ORAL at 17:27

## 2019-11-06 RX ADMIN — LEVOTHYROXINE SODIUM 25 MCG: 25 TABLET ORAL at 06:09

## 2019-11-06 RX ADMIN — GUAIFENESIN 200 MG: 100 SOLUTION ORAL at 06:07

## 2019-11-06 RX ADMIN — OMEPRAZOLE 40 MG: KIT at 08:46

## 2019-11-06 RX ADMIN — GUAIFENESIN 200 MG: 100 SOLUTION ORAL at 00:27

## 2019-11-06 RX ADMIN — VALPROIC ACID 500 MG: 250 SOLUTION ORAL at 06:07

## 2019-11-06 RX ADMIN — APIXABAN 5 MG: 5 TABLET, FILM COATED ORAL at 20:17

## 2019-11-06 ASSESSMENT — ENCOUNTER SYMPTOMS
DIZZINESS: 0
VOMITING: 0
HALLUCINATIONS: 0
NAUSEA: 0
BLURRED VISION: 0
PALPITATIONS: 0
HEADACHES: 0
FEVER: 0
SHORTNESS OF BREATH: 0

## 2019-11-06 NOTE — PROGRESS NOTES
Patient had a large mucous plug.  O2 sat 86%.  Removed .  Suctioned large amount of thick mucous. O2 back up to 97%.  Patient refuses  to be replaced.  Inner cannula placed and trach mask with oxygen mist placed.

## 2019-11-06 NOTE — ASSESSMENT & PLAN NOTE
Bun: 26 (11/3)  On free water thru NGT: 300 cc q4 hours  Note: transitioned to an oral diet (11/6)  Monitor

## 2019-11-06 NOTE — PROGRESS NOTES
Gastrostomy performed by Dr Payan via mid epigastric access. Procedure BARs explained to patient and wife by physicians and consents obtained. Patient to IR2 and assisted to table. General anesthesia per Dr Edwards. Procedure completed without complication. Mid abdomen puncture site CDI; sterile guaze/medipore dressing applied. Patient tolerated procedure well.  Patient transferred to PPU by anesthesiologist and IR RN in stable condition.    [facility] NICOLE bolus gastrostomy #18F ref 0110-18 lot [id number]

## 2019-11-06 NOTE — FLOWSHEET NOTE
11/06/19 0722   Events/Summary/Plan   Events/Summary/Plan pt capped after sleeping without last night.  Aerosol on standby for planned decannulation tomorrow
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
## 2019-11-06 NOTE — FLOWSHEET NOTE
11/06/19 1118   Events/Summary/Plan   Events/Summary/Plan Pt decannulated.  Tolerated without distress.   Education   Education Yes - Pt. / Family has been Instructed in Trach Care;Yes - Pt. / Family has been Instructed in use of Respiratory Equipment   Trache Weaning and Decannulation   Trache Weaning and Decannulation Protocol Initiated? Yes   #Trache Change/Decannulation Yes   Capping Trial Initiated, Smart Text Complete?   (decannulated)   Trache/Stoma Care   Trache/Stoma Care Yes   Respiratory WDL   Respiratory (WDL) X  (trach stoma cleaned and covered with gauze and tape)   Oxygen   O2 Daily Delivery Respiratory  Room Air with O2 Available

## 2019-11-06 NOTE — CARE PLAN
Problem: Other Problem (see comments)  Goal: Other Goal  Description  Tube feed will provide > 85% of estimated nutrition needs. Pt will not display any s/sx of intolerance to tube feed, aspiration or inadequate fluid intake.    Outcome: MET
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
## 2019-11-06 NOTE — THERAPY
"Speech Language Pathology  Daily Treatment     Patient Name: Pedro Champion  Age:  79 y.o., Sex:  male  Medical Record #: [id number]  Today's Date: 11/6/2019     Subjective    Pt in room with 1:1 CNA - eager to remove Cortrak - pt frustrated this was not done as initially anticipated after PEG placement. Awaiting physician orders for removal at this time.      Objective     11/06/19 0834   Speech / Dysarthria   Articulation Training Supervision (5)   Intensity / Loudness Training Supervision (5)   Respiration Control Supervision (5)   Dysphagia    Other Treatments trials of ice chips and 5mL NTL   Diet / Liquid Recommendation NPO   Nutritional Liquid Intake Rating Scale 1   Nutritional Food Intake Rating Scale 1   [REMOVED] Airway Trach Tracheostomy 6.0   Removal Date/Time: 11/05/19 1324  Placement Date/Time: 09/14/19 1250   Airway Type: Trach  Brand: Annaley  Style: Cuffed;Fenestrated  Airway Location: Tracheostomy  Airway Size: 6.0  Inserted In: Unit  Inserted by: Respiratory care practitioner   Status Capped   Interdisciplinary Plan of Care Collaboration   IDT Collaboration with  Certified Nursing Assistant;Nursing;Respiratory Therapist   Patient Position at End of Therapy Seated   Collaboration Comments CLOF and POC with nurse/RT for removal of Cortrak and decannulation   SLP Total Time Spent   SLP Individual Total Time Spent (Mins) 60   Charge Group   SLP Treatment - Individual Speech Language Treatment - Individual   SLP Swallowing Dysfunction Treatment Swallowing Dysfunction Treatment       FIM Eating Score:  1 - Total Assistance  Eating Description:  Staff administers tube feed/parenteral nutrition/IVF, Set-up of equipment or meal/tube feeding, Tube feed bolus      Assessment    Pt initially agreeable to participate in pre-feeding trials of single ice chips and 5mL NTL - pt taking minimal trials before refusing - pt eager to move, go to the gym to \"workout.\" Education attempted with pt on need to focus on " dysphagia goals at this time, however, pt continued to refuse further trials. Consumed 5 ice chips, 5 tsp NTL. Frequent redirection to structured tasks with decreased attention and perseveration on propelling w/c around facility and working in gym. Confirmed scheduling availability for MBSS today with special procedures. Pt in agreement.     Plan  D/c Cortrak, confirm decannulation today and schedule MBSS for this afternoon.

## 2019-11-06 NOTE — THERAPY
"Occupational Therapy  Daily Treatment     Patient Name: Pedro Champion  Age:  79 y.o., Sex:  male  Medical Record #: [id number]  Today's Date: 2019     Precautions  Precautions: Nasogastric Tube, Tracheostomy , Fall Risk, PEG Tube  Comments: NPO    Safety   ADL Safety : Requires Supervision for Safety  Bathroom Safety: Requires Supervision for Safety    Subjective    \"Almost there\" patient stated when approaching his room at end of session (during functional mobility activity with FWW)     Objective     19 0701   Precautions   Precautions Nasogastric Tube;Tracheostomy ;Fall Risk;PEG Tube   Comments NPO   Safety    ADL Safety  Requires Supervision for Safety   Bathroom Safety Requires Supervision for Safety   Vitals   Pulse 76   Patient BP Position Sitting   Blood Pressure  121/78   Room Air Oximetry 95   Cognition    Orientation Level Not Oriented to Year;Not Oriented to Month  (Oriented to day of week \"Wednesday\")   Sitting Lower Body Exercises   Nustep Resistance Level 4  (Total time: 10:08, Distance: 0.24 miles/444 steps)   Interdisciplinary Plan of Care Collaboration   IDT Collaboration with  Certified Nursing Assistant;Respiratory Therapist   Patient Position at End of Therapy Seated;Self Releasing Lap Belt Applied;Other (Comments)   Collaboration Comments RT red capped patient at start of session; Transferred care to [facility]/1:1 sitter   OT Total Time Spent   OT Individual Total Time Spent (Mins) 60   OT Charge Group   OT Self Care / ADL 2   OT Neuromuscular Re-education / Balance 1   OT Therapeutic Exercise  1     FIM Grooming Score:  5 - Standby Prompting/Supervision or Set-up  Grooming Description:  Increased time, Supervision for safety, Verbal cueing, Set-up of equipment(Patient brushed teeth and washed face/hands with set-up and SBA while standing at sink. ) *Required 1 VC to spit water out after brushing teeth.      FIM Upper Body Dressin - Standby Prompting/Supervision or Set-up  Upper Body " Dressing Description:  Increased time, Supervision for safety(Patient retrieved shirt from closet and donned  it with supervision while seated EOB)     FIM Lower Body Dressing Score:  5 - Standby Prompting/Supervsion or Set-up  Lower Body Dressing Description:  Increased time, Supervision for safety, Verbal cueing, Set-up of equipment(Patient donned elastic waist pants with set-up and SBA while incorporating sitting EOB and standing)     FIM Toiletin - Standby Prompting/Supervision or Set-up  Toileting Description:  Supervision for safety, Increased time     FIM Toilet Transfer Score:  5 - Standby Prompting/Supervision or Set-up  Toilet Transfer Description:  Grab bar, Increased time, Supervision for safety(Patient performed toilet txfr with SBA for safety)     Functional mobility with FWW: ~250' x 2 and ~150'x2 with SBA and FWW.     Assessment    Patient tolerated OT session well with focus on ADLs, endurance and functional mobility (with FWW). No LOB noted during functional mobility tasks. Continues to benefit from rest breaks following ~250' of walking activity due to decreased endurance. Required verbal cue to spit water out after brushing teeth.     Plan    Decrease OT time to 30 mins/day, increase ST daily therapy. D/C  (Saturday)   Incorporate safety considerations related to ADLs and functional mobility, endurance training

## 2019-11-06 NOTE — THERAPY
Physical Therapy   Daily Treatment     Patient Name: Pedro Champion  Age:  79 y.o., Sex:  male  Medical Record #: [id number]  Today's Date: 11/6/2019     Precautions  Precautions: Fall Risk, PEG Tube  Comments: 1:1 supervision, NPO    Subjective    Pt seated in room with CNA and wife present, agreeable to PT      Objective       11/06/19 1431   Precautions   Precautions Fall Risk;PEG Tube   Comments 1:1 supervision, NPO   Sitting Lower Body Exercises   Nustep Resistance Level 4  (10 min, 492 steps)   Standing Lower Body Exercises   Marching 1 set of 10   Heel Rise 1 set of 10   Toe Rise 1 set of 10   Other Exercises pt reported pain at surgical site (from PEG tube) with marching, requested to discontinue standing ther-ex    Interdisciplinary Plan of Care Collaboration   Patient Position at End of Therapy Seated;Call Light within Reach;Tray Table within Reach;Family / Friend in Room   PT Total Time Spent   PT Individual Total Time Spent (Mins) 30   PT Charge Group   PT Therapeutic Exercise 1   PT Therapeutic Activities 1       FIM Wheelchair Score:  5 - Standby Prompting/Supervision or Set-up  Wheelchair Description:  Extra time, Supervision for safety(room <> gym, spv using LEs)      Assessment    Pt reported discomfort at surgical site from PEG tube during standing there-ex this session. Reported present but tolerable during nustep.     Plan    General strength and conditioning, LE strengthening, endurance, and balance

## 2019-11-06 NOTE — ASSESSMENT & PLAN NOTE
HR ok  S/P RVR (at [facility] [place])  Echo: EF 45%  BNP: 1476 (10/31) --> 1586 (11/3) --> 974 (11/8)  On Digoxin  On Lopressor: 75 mg bid  On Eliquis  Monitor
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
## 2019-11-06 NOTE — PROGRESS NOTES
0030 Patient medicated with Roxicodone 5mg per complaint of abdominal pain.  Patient wore abdominal binder for a short while earlier in the shift but has since removed it and refuses to put it back in place.

## 2019-11-06 NOTE — ASSESSMENT & PLAN NOTE
TSH: 12.19 (10/19) --> 8.72 (10/29)  FT4: 0.79 (10/19) --> 0.87 (10/29)  Note: TSH has been fluctuating with normal FT4  Note: TSH was ok in Aug 2019  ? Euthyroid sick syndrome  Now on Synthroid: 25 mcg daily  Needs repeat TFT's when stable as an out patient

## 2019-11-06 NOTE — ASSESSMENT & PLAN NOTE
CXR: left basilar opacity  Sputum Cx: Pseudomonas   Afebrile  No leukocytosis  On Cipro (thru 11/12)

## 2019-11-07 ENCOUNTER — HOME HEALTH ADMISSION (OUTPATIENT)
Dept: HOME HEALTH SERVICES | Facility: HOME HEALTHCARE | Age: 79
End: 2019-11-07
Payer: MEDICARE

## 2019-11-07 PROCEDURE — 99232 SBSQ HOSP IP/OBS MODERATE 35: CPT | Performed by: HOSPITALIST

## 2019-11-07 PROCEDURE — 97530 THERAPEUTIC ACTIVITIES: CPT

## 2019-11-07 PROCEDURE — A9270 NON-COVERED ITEM OR SERVICE: HCPCS | Performed by: PHYSICAL MEDICINE & REHABILITATION

## 2019-11-07 PROCEDURE — 700102 HCHG RX REV CODE 250 W/ 637 OVERRIDE(OP): Performed by: PHYSICAL MEDICINE & REHABILITATION

## 2019-11-07 PROCEDURE — 92526 ORAL FUNCTION THERAPY: CPT

## 2019-11-07 PROCEDURE — 97110 THERAPEUTIC EXERCISES: CPT

## 2019-11-07 PROCEDURE — 770010 HCHG ROOM/CARE - REHAB SEMI PRIVAT*

## 2019-11-07 PROCEDURE — 97112 NEUROMUSCULAR REEDUCATION: CPT

## 2019-11-07 PROCEDURE — 99233 SBSQ HOSP IP/OBS HIGH 50: CPT | Performed by: PHYSICAL MEDICINE & REHABILITATION

## 2019-11-07 PROCEDURE — A9270 NON-COVERED ITEM OR SERVICE: HCPCS | Performed by: HOSPITALIST

## 2019-11-07 PROCEDURE — 700102 HCHG RX REV CODE 250 W/ 637 OVERRIDE(OP): Performed by: HOSPITALIST

## 2019-11-07 RX ORDER — OMEPRAZOLE 20 MG/1
20 CAPSULE, DELAYED RELEASE ORAL DAILY
Status: DISCONTINUED | OUTPATIENT
Start: 2019-11-08 | End: 2019-11-09 | Stop reason: HOSPADM

## 2019-11-07 RX ORDER — TAMSULOSIN HYDROCHLORIDE 0.4 MG/1
0.8 CAPSULE ORAL
Status: DISCONTINUED | OUTPATIENT
Start: 2019-11-07 | End: 2019-11-09 | Stop reason: HOSPADM

## 2019-11-07 RX ADMIN — CIPROFLOXACIN HYDROCHLORIDE 500 MG: 500 TABLET, FILM COATED ORAL at 08:29

## 2019-11-07 RX ADMIN — VALPROIC ACID 500 MG: 250 SOLUTION ORAL at 21:27

## 2019-11-07 RX ADMIN — BACITRACIN 1 EACH: 500 OINTMENT TOPICAL at 21:00

## 2019-11-07 RX ADMIN — SENNOSIDES AND DOCUSATE SODIUM 2 TABLET: 8.6; 5 TABLET ORAL at 08:29

## 2019-11-07 RX ADMIN — QUETIAPINE 50 MG: 25 TABLET ORAL at 21:28

## 2019-11-07 RX ADMIN — OMEPRAZOLE 40 MG: KIT at 08:30

## 2019-11-07 RX ADMIN — METOPROLOL TARTRATE 75 MG: 25 TABLET ORAL at 14:50

## 2019-11-07 RX ADMIN — DIGOXIN 250 MCG: 125 TABLET ORAL at 17:59

## 2019-11-07 RX ADMIN — APIXABAN 5 MG: 5 TABLET, FILM COATED ORAL at 08:29

## 2019-11-07 RX ADMIN — GUAIFENESIN 200 MG: 100 SOLUTION ORAL at 00:45

## 2019-11-07 RX ADMIN — VALPROIC ACID 500 MG: 250 SOLUTION ORAL at 05:33

## 2019-11-07 RX ADMIN — TAMSULOSIN HYDROCHLORIDE 0.8 MG: 0.4 CAPSULE ORAL at 11:33

## 2019-11-07 RX ADMIN — GUAIFENESIN 200 MG: 100 SOLUTION ORAL at 05:33

## 2019-11-07 RX ADMIN — VALPROIC ACID 500 MG: 250 SOLUTION ORAL at 14:50

## 2019-11-07 RX ADMIN — MAGNESIUM HYDROXIDE 30 ML: 400 SUSPENSION ORAL at 05:38

## 2019-11-07 RX ADMIN — METOPROLOL TARTRATE 75 MG: 25 TABLET ORAL at 21:29

## 2019-11-07 RX ADMIN — BACITRACIN 1 EACH: 500 OINTMENT TOPICAL at 08:30

## 2019-11-07 RX ADMIN — CIPROFLOXACIN HYDROCHLORIDE 500 MG: 500 TABLET, FILM COATED ORAL at 21:29

## 2019-11-07 RX ADMIN — CHLORHEXIDINE GLUCONATE 0.12% ORAL RINSE 15 ML: 1.2 LIQUID ORAL at 08:30

## 2019-11-07 RX ADMIN — SENNOSIDES AND DOCUSATE SODIUM 2 TABLET: 8.6; 5 TABLET ORAL at 21:29

## 2019-11-07 RX ADMIN — METOPROLOL TARTRATE 75 MG: 25 TABLET ORAL at 08:29

## 2019-11-07 RX ADMIN — LEVOTHYROXINE SODIUM 25 MCG: 25 TABLET ORAL at 05:33

## 2019-11-07 RX ADMIN — APIXABAN 5 MG: 5 TABLET, FILM COATED ORAL at 21:28

## 2019-11-07 ASSESSMENT — PATIENT HEALTH QUESTIONNAIRE - PHQ9
2. FEELING DOWN, DEPRESSED, IRRITABLE, OR HOPELESS: NOT AT ALL
1. LITTLE INTEREST OR PLEASURE IN DOING THINGS: NOT AT ALL
SUM OF ALL RESPONSES TO PHQ9 QUESTIONS 1 AND 2: 0

## 2019-11-07 ASSESSMENT — ENCOUNTER SYMPTOMS
BLURRED VISION: 0
FEVER: 0
DIARRHEA: 0
COUGH: 0
NERVOUS/ANXIOUS: 0
DIZZINESS: 0

## 2019-11-07 NOTE — THERAPY
Speech Language Pathology  Daily Treatment     Patient Name: Pedro Champion  Age:  79 y.o., Sex:  male  Medical Record #: [id number]  Today's Date: 11/7/2019     Subjective    Patient arrived on time to ST with assistance.      Objective       11/07/19 0832   Dysphagia    Diet / Liquid Recommendation Thin Liquid;Dysphagia II   Nutritional Liquid Intake Rating Scale 3   Nutritional Food Intake Rating Scale 5   SLP Total Time Spent   SLP Individual Total Time Spent (Mins) 60   Charge Group   SLP Swallowing Dysfunction Treatment Swallowing Dysfunction Treatment         Assessment    Patient was assessed with current diet textures. No overt s/sx of aspiration were noted. Min-mod cues for double swallows. Reviewed MBSS with patient, discussed rationale for safe swallow strategies and diet recommendations. Patient would benefit from ongoing education and reinforcement.     Plan    Continue dysphagia management. Trials as appropriate.
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
## 2019-11-07 NOTE — THERAPY
"Speech Language Pathology  Daily Treatment     Patient Name: Pedro Champion  Age:  79 y.o., Sex:  male  Medical Record #: [id number]  Today's Date: 11/7/2019     Subjective    Pt pleasant and cooperative, requesting \"I want to go outside\"     Objective       11/07/19 1303   SLP Total Time Spent   SLP Individual Total Time Spent (Mins) 30   Charge Group   SLP Swallowing Dysfunction Treatment Swallowing Dysfunction Treatment       Assessment    Pt assessed with [facility] soft trial with thin liquids. Pt tolerated well with no overt s/sx of asp/pen noted. Pt provided with verbal prompts prior to initiation of PO intake for use of small bites and sips as well as alternating liquids and solids. Pt receptive to strategies and implementing with SPV-MIN cues.     Plan    Cont [facility] soft texture trials  "
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
## 2019-11-07 NOTE — THERAPY
"Occupational Therapy  Daily Treatment     Patient Name: Pedro Champion  Age:  79 y.o., Sex:  male  Medical Record #: [id number]  Today's Date: 11/7/2019     Precautions  Precautions: (P) Fall Risk, Other (See Comments)  Comments: (P) 1:1 supervision, Modified diet     Safety   ADL Safety : Requires Supervision for Safety  Bathroom Safety: Requires Supervision for Safety    Subjective    Patient pleasant upon approach, agreeable to participate in therapy. Expressed he is glad to have trach/NGT removed.    Perseverative on somebody \"taking [his] scissors.\" Patient was informed patients are not to have sharps in room for safety reasons and redirected to next activity.       Objective     11/07/19 1031   Precautions   Precautions Fall Risk;Other (See Comments)   Comments 1:1 supervision, Modified diet    Sitting Lower Body Exercises   Nustep Resistance Level 4  (Total time: 6 mins 15 seconds, 270 steps)   Interdisciplinary Plan of Care Collaboration   IDT Collaboration with  Certified Nursing Assistant   Patient Position at End of Therapy Seated;Other (Comments)   Collaboration Comments Transferred care to [facility]/1:1 sitter   OT Total Time Spent   OT Individual Total Time Spent (Mins) 30   OT Charge Group   OT Neuromuscular Re-education / Balance 1   OT Therapeutic Exercise  1     FIM Walking Score:  5 - Standby Prompting/Supervision or Set-up  Walking Description:  Walker, Safety concerns, Extra time, Supervision for safety, Verbal cueing(Patient performed functional mobility with FWW (~500') with SBA and FWW. Occasional cues provided to keep walker close to self)    Assessment    Patient tolerated OT session well with focus on functional mobility with FWW and endurance. No LOB noted during functional mobility tasks however continues to benefit from supervision for safety.     Plan    D/C 11/9 (Saturday)  Incorporate safety considerations related to ADLs and functional mobility, endurance training    "

## 2019-11-07 NOTE — CARE PLAN
Problem: Safety  Goal: Will remain free from injury  Note:   Pt remains on 1:1 supervision for poor safety awareness, remains free of accidental injury.     Problem: Infection  Goal: Will remain free from infection  Note:   Patient remains free from s/s infection; afebrile.  Will continue to monitor.      Problem: Respiratory:  Goal: Respiratory status will improve  Note:   Pt remains on room air  currently with no signs of resp distress, trach site with gauze dressing c/d/I, lung sounds diminished , will continue to assess and monitor.

## 2019-11-08 PROBLEM — J18.9 PNEUMONIA: Status: RESOLVED | Noted: 2019-09-20 | Resolved: 2019-11-08

## 2019-11-08 PROBLEM — R79.89 AZOTEMIA: Status: RESOLVED | Noted: 2019-10-25 | Resolved: 2019-11-08

## 2019-11-08 PROBLEM — J96.90 RESPIRATORY FAILURE FOLLOWING TRAUMA (HCC): Status: RESOLVED | Noted: 2019-08-28 | Resolved: 2019-11-08

## 2019-11-08 PROBLEM — T14.91XA SUICIDAL BEHAVIOR WITH ATTEMPTED SELF-INJURY (HCC): Status: RESOLVED | Noted: 2019-08-28 | Resolved: 2019-11-08

## 2019-11-08 LAB
ANION GAP SERPL CALC-SCNC: 7 MMOL/L (ref 0–11.9)
BUN SERPL-MCNC: 16 MG/DL (ref 8–22)
CALCIUM SERPL-MCNC: 8.4 MG/DL (ref 8.5–10.5)
CHLORIDE SERPL-SCNC: 104 MMOL/L (ref 96–112)
CO2 SERPL-SCNC: 26 MMOL/L (ref 20–33)
CREAT SERPL-MCNC: 0.56 MG/DL (ref 0.5–1.4)
ERYTHROCYTE [DISTWIDTH] IN BLOOD BY AUTOMATED COUNT: 54.9 FL (ref 35.9–50)
GLUCOSE SERPL-MCNC: 84 MG/DL (ref 65–99)
HCT VFR BLD AUTO: 32.9 % (ref 42–52)
HGB BLD-MCNC: 9.9 G/DL (ref 14–18)
MAGNESIUM SERPL-MCNC: 2.1 MG/DL (ref 1.5–2.5)
MCH RBC QN AUTO: 28.5 PG (ref 27–33)
MCHC RBC AUTO-ENTMCNC: 30.1 G/DL (ref 33.7–35.3)
MCV RBC AUTO: 94.8 FL (ref 81.4–97.8)
NT-PROBNP SERPL IA-MCNC: 974 PG/ML (ref 0–125)
PHOSPHATE SERPL-MCNC: 3.6 MG/DL (ref 2.5–4.5)
PLATELET # BLD AUTO: 134 K/UL (ref 164–446)
PMV BLD AUTO: 10 FL (ref 9–12.9)
POTASSIUM SERPL-SCNC: 3.2 MMOL/L (ref 3.6–5.5)
RBC # BLD AUTO: 3.47 M/UL (ref 4.7–6.1)
SODIUM SERPL-SCNC: 137 MMOL/L (ref 135–145)
WBC # BLD AUTO: 7.9 K/UL (ref 4.8–10.8)

## 2019-11-08 PROCEDURE — 700102 HCHG RX REV CODE 250 W/ 637 OVERRIDE(OP): Performed by: PHYSICAL MEDICINE & REHABILITATION

## 2019-11-08 PROCEDURE — 83880 ASSAY OF NATRIURETIC PEPTIDE: CPT

## 2019-11-08 PROCEDURE — A9270 NON-COVERED ITEM OR SERVICE: HCPCS | Performed by: PHYSICAL MEDICINE & REHABILITATION

## 2019-11-08 PROCEDURE — 80048 BASIC METABOLIC PNL TOTAL CA: CPT

## 2019-11-08 PROCEDURE — 99232 SBSQ HOSP IP/OBS MODERATE 35: CPT | Performed by: HOSPITALIST

## 2019-11-08 PROCEDURE — A9270 NON-COVERED ITEM OR SERVICE: HCPCS | Performed by: HOSPITALIST

## 2019-11-08 PROCEDURE — 97535 SELF CARE MNGMENT TRAINING: CPT

## 2019-11-08 PROCEDURE — 97110 THERAPEUTIC EXERCISES: CPT

## 2019-11-08 PROCEDURE — 83735 ASSAY OF MAGNESIUM: CPT

## 2019-11-08 PROCEDURE — 85027 COMPLETE CBC AUTOMATED: CPT

## 2019-11-08 PROCEDURE — 94760 N-INVAS EAR/PLS OXIMETRY 1: CPT

## 2019-11-08 PROCEDURE — 700102 HCHG RX REV CODE 250 W/ 637 OVERRIDE(OP): Performed by: HOSPITALIST

## 2019-11-08 PROCEDURE — 36415 COLL VENOUS BLD VENIPUNCTURE: CPT

## 2019-11-08 PROCEDURE — 97530 THERAPEUTIC ACTIVITIES: CPT

## 2019-11-08 PROCEDURE — G0515 COGNITIVE SKILLS DEVELOPMENT: HCPCS

## 2019-11-08 PROCEDURE — 99233 SBSQ HOSP IP/OBS HIGH 50: CPT | Performed by: PHYSICAL MEDICINE & REHABILITATION

## 2019-11-08 PROCEDURE — 306782 CATH,FOLEY 16FR LATEX FREE: Performed by: PHYSICAL MEDICINE & REHABILITATION

## 2019-11-08 PROCEDURE — 84100 ASSAY OF PHOSPHORUS: CPT

## 2019-11-08 PROCEDURE — 770010 HCHG ROOM/CARE - REHAB SEMI PRIVAT*

## 2019-11-08 RX ORDER — AMOXICILLIN 250 MG
2 CAPSULE ORAL 2 TIMES DAILY
Status: DISCONTINUED | OUTPATIENT
Start: 2019-11-08 | End: 2019-11-09 | Stop reason: HOSPADM

## 2019-11-08 RX ORDER — TAMSULOSIN HYDROCHLORIDE 0.4 MG/1
0.8 CAPSULE ORAL
Qty: 60 CAP | Refills: 2 | Status: SHIPPED | OUTPATIENT
Start: 2019-11-09 | End: 2020-01-08

## 2019-11-08 RX ORDER — ONDANSETRON 2 MG/ML
4 INJECTION INTRAMUSCULAR; INTRAVENOUS 4 TIMES DAILY PRN
Status: DISCONTINUED | OUTPATIENT
Start: 2019-11-08 | End: 2019-11-09 | Stop reason: HOSPADM

## 2019-11-08 RX ORDER — CIPROFLOXACIN 500 MG/1
500 TABLET, FILM COATED ORAL EVERY 12 HOURS
Status: DISCONTINUED | OUTPATIENT
Start: 2019-11-08 | End: 2019-11-09 | Stop reason: HOSPADM

## 2019-11-08 RX ORDER — QUETIAPINE FUMARATE 50 MG/1
50 TABLET, FILM COATED ORAL EVERY EVENING
Qty: 30 TAB | Refills: 2 | Status: SHIPPED | OUTPATIENT
Start: 2019-11-08 | End: 2020-03-05 | Stop reason: SDUPTHER

## 2019-11-08 RX ORDER — POLYETHYLENE GLYCOL 3350 17 G/17G
1 POWDER, FOR SOLUTION ORAL
Status: DISCONTINUED | OUTPATIENT
Start: 2019-11-08 | End: 2019-11-09 | Stop reason: HOSPADM

## 2019-11-08 RX ORDER — QUETIAPINE FUMARATE 25 MG/1
50 TABLET, FILM COATED ORAL EVERY EVENING
Status: DISCONTINUED | OUTPATIENT
Start: 2019-11-08 | End: 2019-11-09 | Stop reason: HOSPADM

## 2019-11-08 RX ORDER — CIPROFLOXACIN 500 MG/1
500 TABLET, FILM COATED ORAL EVERY 12 HOURS
Qty: 6 TAB | Refills: 0 | Status: SHIPPED | OUTPATIENT
Start: 2019-11-08 | End: 2020-01-08

## 2019-11-08 RX ORDER — LORAZEPAM 0.5 MG/1
0.5 TABLET ORAL EVERY 4 HOURS PRN
Status: DISCONTINUED | OUTPATIENT
Start: 2019-11-08 | End: 2019-11-09 | Stop reason: HOSPADM

## 2019-11-08 RX ORDER — ACETAMINOPHEN 325 MG/1
650 TABLET ORAL EVERY 4 HOURS PRN
Status: DISCONTINUED | OUTPATIENT
Start: 2019-11-08 | End: 2019-11-09 | Stop reason: HOSPADM

## 2019-11-08 RX ORDER — OXYCODONE HYDROCHLORIDE 5 MG/1
2.5 TABLET ORAL
Status: DISCONTINUED | OUTPATIENT
Start: 2019-11-08 | End: 2019-11-09 | Stop reason: HOSPADM

## 2019-11-08 RX ORDER — DIVALPROEX SODIUM 125 MG/1
250 CAPSULE, COATED PELLETS ORAL 2 TIMES DAILY
Qty: 120 CAP | Refills: 2 | Status: SHIPPED | OUTPATIENT
Start: 2019-11-08 | End: 2020-03-05 | Stop reason: SDUPTHER

## 2019-11-08 RX ORDER — ONDANSETRON 4 MG/1
4 TABLET, ORALLY DISINTEGRATING ORAL 4 TIMES DAILY PRN
Status: DISCONTINUED | OUTPATIENT
Start: 2019-11-08 | End: 2019-11-09 | Stop reason: HOSPADM

## 2019-11-08 RX ORDER — METOPROLOL TARTRATE 75 MG/1
75 TABLET, FILM COATED ORAL 3 TIMES DAILY
Qty: 90 TAB | Refills: 2 | Status: SHIPPED | OUTPATIENT
Start: 2019-11-08 | End: 2020-01-09 | Stop reason: SDUPTHER

## 2019-11-08 RX ORDER — TAMSULOSIN HYDROCHLORIDE 0.4 MG/1
0.8 CAPSULE ORAL ONCE
Status: COMPLETED | OUTPATIENT
Start: 2019-11-08 | End: 2019-11-08

## 2019-11-08 RX ORDER — HYDRALAZINE HYDROCHLORIDE 25 MG/1
25 TABLET, FILM COATED ORAL EVERY 8 HOURS PRN
Status: DISCONTINUED | OUTPATIENT
Start: 2019-11-08 | End: 2019-11-09 | Stop reason: HOSPADM

## 2019-11-08 RX ORDER — LEVOTHYROXINE SODIUM 0.03 MG/1
25 TABLET ORAL
Qty: 30 TAB | Refills: 2 | Status: SHIPPED | OUTPATIENT
Start: 2019-11-09 | End: 2022-01-26 | Stop reason: SDUPTHER

## 2019-11-08 RX ORDER — TRAZODONE HYDROCHLORIDE 50 MG/1
50 TABLET ORAL
Status: DISCONTINUED | OUTPATIENT
Start: 2019-11-08 | End: 2019-11-09 | Stop reason: HOSPADM

## 2019-11-08 RX ORDER — HYDROXYZINE HYDROCHLORIDE 25 MG/1
25 TABLET, FILM COATED ORAL 3 TIMES DAILY PRN
Status: DISCONTINUED | OUTPATIENT
Start: 2019-11-08 | End: 2019-11-09 | Stop reason: HOSPADM

## 2019-11-08 RX ORDER — DIGOXIN 125 MCG
250 TABLET ORAL DAILY
Status: DISCONTINUED | OUTPATIENT
Start: 2019-11-08 | End: 2019-11-09 | Stop reason: HOSPADM

## 2019-11-08 RX ORDER — OXYCODONE HYDROCHLORIDE 5 MG/1
5 TABLET ORAL
Status: DISCONTINUED | OUTPATIENT
Start: 2019-11-08 | End: 2019-11-09 | Stop reason: HOSPADM

## 2019-11-08 RX ORDER — DIVALPROEX SODIUM 250 MG/1
250 TABLET, DELAYED RELEASE ORAL EVERY 8 HOURS
Status: DISCONTINUED | OUTPATIENT
Start: 2019-11-08 | End: 2019-11-09 | Stop reason: HOSPADM

## 2019-11-08 RX ORDER — ALUMINA, MAGNESIA, AND SIMETHICONE 2400; 2400; 240 MG/30ML; MG/30ML; MG/30ML
20 SUSPENSION ORAL
Status: DISCONTINUED | OUTPATIENT
Start: 2019-11-08 | End: 2019-11-09 | Stop reason: HOSPADM

## 2019-11-08 RX ORDER — DIGOXIN 250 MCG
250 TABLET ORAL DAILY
Qty: 30 TAB | Refills: 2 | Status: SHIPPED | OUTPATIENT
Start: 2019-11-08 | End: 2020-01-27 | Stop reason: SDUPTHER

## 2019-11-08 RX ORDER — LEVOTHYROXINE SODIUM 0.03 MG/1
25 TABLET ORAL
Status: DISCONTINUED | OUTPATIENT
Start: 2019-11-09 | End: 2019-11-09 | Stop reason: HOSPADM

## 2019-11-08 RX ORDER — BISACODYL 10 MG
10 SUPPOSITORY, RECTAL RECTAL
Status: DISCONTINUED | OUTPATIENT
Start: 2019-11-08 | End: 2019-11-09 | Stop reason: HOSPADM

## 2019-11-08 RX ADMIN — QUETIAPINE 50 MG: 25 TABLET ORAL at 21:10

## 2019-11-08 RX ADMIN — METOPROLOL TARTRATE 75 MG: 25 TABLET ORAL at 21:09

## 2019-11-08 RX ADMIN — SENNOSIDES AND DOCUSATE SODIUM 2 TABLET: 8.6; 5 TABLET ORAL at 08:31

## 2019-11-08 RX ADMIN — TAMSULOSIN HYDROCHLORIDE 0.8 MG: 0.4 CAPSULE ORAL at 09:15

## 2019-11-08 RX ADMIN — APIXABAN 5 MG: 5 TABLET, FILM COATED ORAL at 21:10

## 2019-11-08 RX ADMIN — SENNOSIDES AND DOCUSATE SODIUM 2 TABLET: 8.6; 5 TABLET ORAL at 21:08

## 2019-11-08 RX ADMIN — OMEPRAZOLE 20 MG: 20 CAPSULE, DELAYED RELEASE ORAL at 08:31

## 2019-11-08 RX ADMIN — LEVOTHYROXINE SODIUM 25 MCG: 25 TABLET ORAL at 05:46

## 2019-11-08 RX ADMIN — APIXABAN 5 MG: 5 TABLET, FILM COATED ORAL at 08:31

## 2019-11-08 RX ADMIN — VALPROIC ACID 500 MG: 250 SOLUTION ORAL at 05:46

## 2019-11-08 RX ADMIN — DIVALPROEX SODIUM 250 MG: 250 TABLET, DELAYED RELEASE ORAL at 21:10

## 2019-11-08 RX ADMIN — METOPROLOL TARTRATE 75 MG: 25 TABLET ORAL at 15:27

## 2019-11-08 RX ADMIN — VALPROIC ACID 500 MG: 250 SOLUTION ORAL at 15:26

## 2019-11-08 RX ADMIN — BACITRACIN 1 EACH: 500 OINTMENT TOPICAL at 21:10

## 2019-11-08 RX ADMIN — TAMSULOSIN HYDROCHLORIDE 0.8 MG: 0.4 CAPSULE ORAL at 08:50

## 2019-11-08 RX ADMIN — BACITRACIN 1 EACH: 500 OINTMENT TOPICAL at 08:30

## 2019-11-08 RX ADMIN — CIPROFLOXACIN HYDROCHLORIDE 500 MG: 500 TABLET, FILM COATED ORAL at 08:31

## 2019-11-08 RX ADMIN — CIPROFLOXACIN HYDROCHLORIDE 500 MG: 500 TABLET, FILM COATED ORAL at 21:10

## 2019-11-08 RX ADMIN — METOPROLOL TARTRATE 75 MG: 25 TABLET ORAL at 08:31

## 2019-11-08 RX ADMIN — DIGOXIN 250 MCG: 125 TABLET ORAL at 17:37

## 2019-11-08 ASSESSMENT — ACTIVITIES OF DAILY LIVING (ADL)
TOILET_TRANSFER_LEVEL_OF_ASSIST: REQUIRES SUPERVISION WITH TOILET TRANSFER
TOILETING_LEVEL_OF_ASSIST: REQUIRES SUPERVISION WITH TOILETING
SHOWER_TRANSFER_LEVEL_OF_ASSIST: REQUIRES SUPERVISION WITH SHOWER TRANSFER

## 2019-11-08 ASSESSMENT — ENCOUNTER SYMPTOMS
NERVOUS/ANXIOUS: 0
DIARRHEA: 0
ABDOMINAL PAIN: 0
FEVER: 0
CHILLS: 0
SHORTNESS OF BREATH: 0
NAUSEA: 0
VOMITING: 0

## 2019-11-08 NOTE — CARE PLAN
Problem: Communication  Goal: The ability to communicate needs accurately and effectively will improve  Outcome: PROGRESSING AS EXPECTED     Problem: Safety  Goal: Will remain free from injury  Outcome: PROGRESSING AS EXPECTED  Goal: Will remain free from falls  Outcome: PROGRESSING AS EXPECTED     Problem: Infection  Goal: Will remain free from infection  Outcome: PROGRESSING AS EXPECTED     Problem: Venous Thromboembolism (VTW)/Deep Vein Thrombosis (DVT) Prevention:  Goal: Patient will participate in Venous Thrombosis (VTE)/Deep Vein Thrombosis (DVT)Prevention Measures  Outcome: PROGRESSING AS EXPECTED     Problem: Bowel/Gastric:  Goal: Normal bowel function is maintained or improved  Outcome: PROGRESSING AS EXPECTED  Goal: Will not experience complications related to bowel motility  Outcome: PROGRESSING AS EXPECTED     Problem: Knowledge Deficit  Goal: Knowledge of disease process/condition, treatment plan, diagnostic tests, and medications will improve  Outcome: PROGRESSING AS EXPECTED  Goal: Knowledge of the prescribed therapeutic regimen will improve  Outcome: PROGRESSING AS EXPECTED     Problem: Discharge Barriers/Planning  Goal: Patient's continuum of care needs will be met  Outcome: PROGRESSING AS EXPECTED     Problem: Pain Management  Goal: Pain level will decrease to patient's comfort goal  Outcome: PROGRESSING AS EXPECTED     Problem: Respiratory:  Goal: Respiratory status will improve  Outcome: PROGRESSING AS EXPECTED     Problem: Skin Integrity  Goal: Risk for impaired skin integrity will decrease  Outcome: PROGRESSING AS EXPECTED
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
## 2019-11-08 NOTE — CARE PLAN
Problem: Safety  Goal: Will remain free from injury  11/8/2019 1301 by Gildardo Hernandez R.N.  Outcome: PROGRESSING AS EXPECTED  11/8/2019 1301 by Gildardo Hernandez R.N.  Outcome: PROGRESSING AS EXPECTED  Goal: Will remain free from falls  11/8/2019 1301 by Gildardo Hernandez R.N.  Outcome: PROGRESSING AS EXPECTED  11/8/2019 1301 by Gildardo Hernandez R.N.  Outcome: PROGRESSING AS EXPECTED

## 2019-11-08 NOTE — THERAPY
Physical Therapy   Daily Treatment     Patient Name: Pedro Champion  Age:  79 y.o., Sex:  male  Medical Record #: [id number]  Today's Date: 11/7/2019     Precautions  Precautions: Fall Risk, Other (See Comments)  Comments: 1:1 supervision, Modified diet, wander guard on WC    Subjective    Pt seated in hallway outside of room, agreeable to PT      Objective       11/07/19 1401   Precautions   Precautions Fall Risk;Other (See Comments)   Comments 1:1 supervision, Modified diet    Sitting Lower Body Exercises   Nustep Resistance Level 1  (11:15, 0.38 miles)   Interdisciplinary Plan of Care Collaboration   IDT Collaboration with  ;Family / Caregiver   Patient Position at End of Therapy Seated;Call Light within Reach;Tray Table within Reach;Family / Friend in Room   Collaboration Comments spoke with wife and Chandrika SARA regarding follow up appointments and DC planning    PT Total Time Spent   PT Individual Total Time Spent (Mins) 60   PT Charge Group   PT Therapeutic Exercise 1   PT Therapeutic Activities 3       FIM Walking Score:  5 - Standby Prompting/Supervision or Set-up  Walking Description:  Walker, Extra time, Supervision for safety(~500 ft x2 and ~1000 ft indoors and outdoors, SBA with FWW)    FIM Wheelchair Score:  5 - Standby Prompting/Supervision or Set-up  Wheelchair Description:  Supervision for safety(200 ft indoors and outdoors using LEs, spv )      Assessment    Pt self directs therapy session with high distractibility and poor attention to task.     Plan    Continue gait training with FWW, general strength and conditioning, balance training as able.

## 2019-11-08 NOTE — CARE PLAN
"  Problem: Communication  Goal: The ability to communicate needs accurately and effectively will improve  Outcome: PROGRESSING AS EXPECTED  Intervention: Educate patient and significant other/support system about the plan of care, procedures, treatments, medications and allow for questions  Note:   Pt room moved today to get him where he could have a wander guard. Pt refused multiple times to let us put the wander guard on him. Wander guard on the wc. Pt states \"I know the rules\" pt still impulsive and has poor safety awareness. Pt has been less agitated and anxious since having trach and coretrak out. Will continue to monitor     Problem: Safety  Goal: Will remain free from injury  Outcome: PROGRESSING AS EXPECTED  Note:   Pt has been eating 50% of meals and drinks a lot of fluids during those meals. Peg tube flushing well. Had not needed feedings today.      "

## 2019-11-08 NOTE — DISCHARGE PLANNING
Case Management Discharge Instructions        Discharge Location: Home with Home Health     Agency Name / Address / Phone: [facility] at [phone] (will call you to schedule visits)     Home Health: Physical Therapist, Occupational Therapist, Registered Nurse, Speech Therapist     Medical Equipment Provider / Phone: [facility] at [phone]     Medical Equipment Ordered: Front Wheel Walker     Comments: PEG supplies sent home with patient.         Follow-up Information:     Troy Dong D.D.S.  [address] [facility] [address]  [phone]     On 11/14/2019 Thursday at 4:00pm (records will be sent)       LUAN RobertsonNBetsy (Urology)  [address] [facility] [address]  [phone]     On 11/19/2019 Tuesday at 10:30am (please check in at 10:10) (records will be sent)       Hugo Magdaleno D.O. ([facility])  [address] [id number] [phone]     On 12/10/2019  Wednesday at 10:00am (please check in at 9:30am) (records will be sent)
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
## 2019-11-08 NOTE — THERAPY
"Occupational Therapy  Daily Treatment     Patient Name: Pedro Champion  Age:  79 y.o., Sex:  male  Medical Record #: [id number]  Today's Date: 2019     Precautions  Precautions: (P) Fall Risk, Other (See Comments)  Comments: (P) 1:1 supervision, poor insight/safety awareness, PEG tube    Safety   ADL Safety : Requires Supervision for Safety  Bathroom Safety: Requires Supervision for Safety    Subjective    \"I want to take a shower but lets do the bike first\" patient stated at start of session.      Objective     19 1001   Precautions   Precautions Fall Risk;Other (See Comments)   Comments 1:1 supervision, poor insight/safety awareness, PEG tube   Cognition    Level of Consciousness Alert   Sitting Lower Body Exercises   Nustep Resistance Level 4  (10 mins, 503 steps)   Interdisciplinary Plan of Care Collaboration   IDT Collaboration with  Certified Nursing Assistant   Patient Position at End of Therapy Seated;Other (Comments)   Collaboration Comments Transferred care to 1:1 sitter/CNA   OT Total Time Spent   OT Individual Total Time Spent (Mins) 30   OT Charge Group   OT Self Care / ADL 1   OT Therapeutic Exercise  1       FIM Bathing Score:  5 - Standby Prompting/Supervision or Set-up  Bathing Description:  Grab bar, Verbal cueing, Supervision for safety, Increased time, Hand held shower, Set-up of equipment(SBA and VCs needed for safety during bathing tasks. Patient demonstrates ability to wash, rinse and dry all body parts while incorporating sitting and standing with use of grab bar,)    FIM Upper Body Dressin - Standby Prompting/Supervision or Set-up  Upper Body Dressing Description:  Supervision for safety    FIM Lower Body Dressing Score:  5 - Standby Prompting/Supervsion or Set-up  Lower Body Dressing Description:  Supervision for safety, Verbal cueing, Set-up of equipment, Increased time(Patient demonstrates ability to perform LB dressing tasks with SBA , set-up and VCs for safety and adaptive " strategies (due to PEG placement/ limited ability to bend forward))    FIM Tub/Shower Transfers Score:  5 - Standby Prompting/Supervision or Set-up  Tub/Shower Transfers Description:  Grab bar, Increased time, Supervision for safety, Verbal cueing, Set-up of equipment    Assessment    Pt tolerated OT session well with focus on endurance and ADLs. Demonstrates ability to perform ADLs at set-up/SBA level. Continues to present with poor safety awareness/insight AEB attempting to stand x3 without locking w/c brakes.     Plan    Pt to d/c home tomorrow (11/9) with spouse support

## 2019-11-08 NOTE — DISCHARGE PLANNING
ATTN: Case Management  RE: Referral for Home Health    As of 11/7/19, we have accepted the Home Health referral for the patient listed above.    A [facility] clinician will be out to see the patient within 48 hours. If you have any questions or concerns regarding the patient's transition to Home Health, please do not hesitate to contact us at [phone].      We look forward to collaborating with you,  [facility] Team
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
## 2019-11-08 NOTE — PROGRESS NOTES
Report received from previous Nurse at shift change. Patient in wheelchair in room, denies pain at this time. Patient was cooperative with medication administration, and education. He was also cooperative with his trach dressing change, and his free water flush in his PEG tube, but he would not allow dressing change for the PEG. Patient encouraged to attempt to void as he had not urinated in several hours. Patient declined, despite teaching. He was scanned for >400 by CNA and was catheterized for 400ml of clear, yellow urine. Patient offered no further complaints. Will continue to monitor.

## 2019-11-09 VITALS
BODY MASS INDEX: 23.24 KG/M2 | SYSTOLIC BLOOD PRESSURE: 132 MMHG | TEMPERATURE: 98 F | HEART RATE: 94 BPM | RESPIRATION RATE: 16 BRPM | WEIGHT: 181 LBS | OXYGEN SATURATION: 97 % | DIASTOLIC BLOOD PRESSURE: 81 MMHG

## 2019-11-09 PROCEDURE — 94760 N-INVAS EAR/PLS OXIMETRY 1: CPT

## 2019-11-09 PROCEDURE — A9270 NON-COVERED ITEM OR SERVICE: HCPCS | Performed by: PHYSICAL MEDICINE & REHABILITATION

## 2019-11-09 PROCEDURE — 700102 HCHG RX REV CODE 250 W/ 637 OVERRIDE(OP): Performed by: PHYSICAL MEDICINE & REHABILITATION

## 2019-11-09 PROCEDURE — 99232 SBSQ HOSP IP/OBS MODERATE 35: CPT | Performed by: HOSPITALIST

## 2019-11-09 PROCEDURE — 99239 HOSP IP/OBS DSCHRG MGMT >30: CPT | Performed by: PHYSICAL MEDICINE & REHABILITATION

## 2019-11-09 RX ADMIN — METOPROLOL TARTRATE 75 MG: 25 TABLET ORAL at 08:16

## 2019-11-09 RX ADMIN — TAMSULOSIN HYDROCHLORIDE 0.8 MG: 0.4 CAPSULE ORAL at 08:15

## 2019-11-09 RX ADMIN — BACITRACIN 1 EACH: 500 OINTMENT TOPICAL at 08:15

## 2019-11-09 RX ADMIN — CIPROFLOXACIN HYDROCHLORIDE 500 MG: 500 TABLET, FILM COATED ORAL at 08:15

## 2019-11-09 RX ADMIN — OMEPRAZOLE 20 MG: 20 CAPSULE, DELAYED RELEASE ORAL at 08:15

## 2019-11-09 RX ADMIN — DIVALPROEX SODIUM 250 MG: 250 TABLET, DELAYED RELEASE ORAL at 05:54

## 2019-11-09 RX ADMIN — LEVOTHYROXINE SODIUM 25 MCG: 25 TABLET ORAL at 05:53

## 2019-11-09 RX ADMIN — ALUMINUM HYDROXIDE, MAGNESIUM HYDROXIDE, AND DIMETHICONE 20 ML: 400; 400; 40 SUSPENSION ORAL at 10:24

## 2019-11-09 RX ADMIN — APIXABAN 5 MG: 5 TABLET, FILM COATED ORAL at 08:15

## 2019-11-09 RX ADMIN — SENNOSIDES AND DOCUSATE SODIUM 2 TABLET: 8.6; 5 TABLET ORAL at 08:15

## 2019-11-09 ASSESSMENT — ENCOUNTER SYMPTOMS
SHORTNESS OF BREATH: 0
HEADACHES: 0
PALPITATIONS: 0
NAUSEA: 0
VOMITING: 0
HALLUCINATIONS: 0
FEVER: 0
BLURRED VISION: 0
DIZZINESS: 0

## 2019-11-09 NOTE — FLOWSHEET NOTE
Stoma care. Small scab on site, bandaid applied.     11/09/19 0935   Events/Summary/Plan   Events/Summary/Plan Stoma care   Education   Education Yes - Pt. / Family has been Instructed in use of Respiratory Equipment   Trache/Stoma Care   Trache/Stoma Care Yes  (Pinpoint drainage on dressing. No leak appreciated. Site bentley)   Chest Exam   Respiration 16   Pulse 94   Oximetry   #Pulse Oximetry (Single Determination) Yes   Oxygen   Home O2 Use Prior To Admission? No   Pulse Oximetry 97 %   O2 (LPM) 0

## 2019-11-09 NOTE — DISCHARGE PLANNING
Case management Summary:   Met with patient and wife prior to discharge.   Reviewed all follow up appointments. Patient will go home with vale and PEG tube. I have alerted nursing for teaching.  Referral made to [facility] and they are have accepted referral and are ready to follow.    [employer] will deliver front wheel walker to patient.    During hospitalization, I have provided support and education and have been available for questions and information during hours of operation, communicated with therapy team and MD along with providing links/resources  to outside services.    Patient verbalizes agreement with all plans and has an understanding of the next steps within the post acute services.     CASE MANAGEMENT PLAN OF CARE     Individualized Goals:   1. Patient will gain strength back to return to prior level of function.   2. Rosana is hopeful that patient will begin tolerating capping and is hopeful for decannulation.   3. Rosnaa is hopeful for diet advancement.    Outcome:   1. Met- patient is ambulating independently with a front wheel walker  2. Met- patient has been decannulated.  3. Met- patient is tolerating oral diet and PEG tube will require only water flush.

## 2019-11-09 NOTE — CARE PLAN
Problem: Communication  Goal: The ability to communicate needs accurately and effectively will improve  Outcome: PROGRESSING AS EXPECTED     Problem: Safety  Goal: Will remain free from injury  Outcome: PROGRESSING AS EXPECTED  Goal: Will remain free from falls  Outcome: PROGRESSING AS EXPECTED     Problem: Infection  Goal: Will remain free from infection  Outcome: PROGRESSING AS EXPECTED     Problem: Venous Thromboembolism (VTW)/Deep Vein Thrombosis (DVT) Prevention:  Goal: Patient will participate in Venous Thrombosis (VTE)/Deep Vein Thrombosis (DVT)Prevention Measures  Outcome: PROGRESSING AS EXPECTED     Problem: Bowel/Gastric:  Goal: Normal bowel function is maintained or improved  Outcome: PROGRESSING AS EXPECTED  Goal: Will not experience complications related to bowel motility  Outcome: PROGRESSING AS EXPECTED     Problem: Knowledge Deficit  Goal: Knowledge of disease process/condition, treatment plan, diagnostic tests, and medications will improve  Outcome: PROGRESSING AS EXPECTED  Goal: Knowledge of the prescribed therapeutic regimen will improve  Outcome: PROGRESSING AS EXPECTED     Problem: Discharge Barriers/Planning  Goal: Patient's continuum of care needs will be met  Outcome: PROGRESSING AS EXPECTED     Problem: Pain Management  Goal: Pain level will decrease to patient's comfort goal  Outcome: PROGRESSING AS EXPECTED     Problem: Respiratory:  Goal: Respiratory status will improve  Outcome: PROGRESSING AS EXPECTED     Problem: Skin Integrity  Goal: Risk for impaired skin integrity will decrease  Outcome: PROGRESSING AS EXPECTED     Problem: Psychosocial Needs:  Goal: Level of anxiety will decrease  Outcome: PROGRESSING AS EXPECTED

## 2019-11-09 NOTE — THERAPY
Physical Therapy   Daily Treatment     Patient Name: Pedro Champion  Age:  79 y.o., Sex:  male  Medical Record #: [id number]  Today's Date: 11/8/2019     Precautions  Precautions: Fall Risk, Other (See Comments)  Comments: 1:1 supervision, poor insight/safety awareness, PEG tube    Subjective    Pt seated in room with wife present, agreeable to PT      Objective       11/08/19 1431   IRF-BRYSNO:  Roll Left and Right   Assistance Needed Independent   CARE Score 6   IRF-BRYSON:  Sit to Lying   Assistance Needed Independent   CARE Score 6   IRF-BRYSON:  Lying to Sitting on Side of Bed   Assistance Needed Independent   CARE Score 6   IRF-BRYSON:  Sit to Stand   Assistance Needed Supervision   CARE Score 4   IRF-BRYSON:  Chair/Bed-to-Chair Transfer   Assistance Needed Supervision   CARE Score 4   IRF-BRYSON:  Car Transfer   Assistance Needed Supervision;Adaptive equipment   CARE Score 4   IRF BRYSON:  Walking   Does the Patient Walk? Yes   IRF BRYSON:  Walk 10 Feet   Assistance Needed Supervision;Adaptive equipment   CARE Score 4   IRF-BRYSON:  Walk 50 Feet with Two Turns   Assistance Needed Supervision;Adaptive equipment   CARE Score 4   IRF-BRYSON:  Walk 150 Feet   Assistance Needed Supervision;Adaptive equipment   CARE Score 4   IRF BRYSON:  Walking 10 Feet on Uneven Surfaces   Assistance Needed Supervision;Adaptive equipment   CARE Score 4   IRF BRYSON:  1 Step (Curb)   Assistance Needed Supervision;Adaptive equipment   CARE Score 4   IRF-BRYSON:  4 Steps   Assistance Needed Supervision;Adaptive equipment   CARE Score 4   IRF BRYSON:  12 Steps   Assistance Needed Supervision;Adaptive equipment   CARE Score 4   IRF BRYSON:  Picking Up Object   Assistance Needed Supervision;Adaptive equipment   CARE Score 4   IRF-BRYSON:  Wheel 50 Feet with Two Turns   Indicate the Type of Wheelchair or Scooter Used Manual   Assistance Needed Supervision   CARE Score 4   IRF-BRYSON:  Wheel 150 Feet   Indicate the Type of Wheelchair or Scooter Used Manual   Assistance Needed Supervision    CARE Score 4   P.T. Discharge Summary   Discharge Location Home   Patient Discharging with Assist of Spouse / Significant Other   Level of Supervision Required Upon Discharge Twenty Four Hour Supervision   Recommended Equipment for Discharge Front-Wheeled Walker   Recommeded Services Upon Discharge Home Health Physical Therapy   Criteria for Termination of Services Maximum Function Achieved for Inpatient Rehabilitation   Discharge Instructions to Patient   Level of Assist Required for Ambulation Supervision on Flat Surfaces;Supervision on Curbs;Supervision on Stairs   Device Recommended for Ambulation Front-Wheeled Walker   Level of Assist Required to Propel Wheelchair Requires No Assist   Level of Assist Required for Transfers Supervision   Device Recommended for Transfers None   Home Exercise Program Refer to Home Exercise Program Handout for Details     FIM Walking Score:  5 - Standby Prompting/Supervision or Set-up  Walking Description:  Walker, Extra time, Supervision for safety(~800 ft outdoors and ~1000 ft indoors with FWW and spv )    FIM Wheelchair Score:  5 - Standby Prompting/Supervision or Set-up  Wheelchair Description:  Extra time(room > gym, spv using LEs)    FIM Stairs Score:  5 - Standby Prompting/Supervision or Set-up  Stairs Description:  Hand rails, Extra time, Supervision for safety(30 steps with 2 HRs and spv )    FIM Bed/Chair/Wheelchair Transfers Score: 5 - Standby Prompting/Supervision or Set-up  Bed/Chair/Wheelchair Transfers Description:  Supervision for safety(WC <> flat full size bed, spv)    Pt issued hand-out for LE HEP and fall information packet.        Assessment    Pt cooperative with session completed all above functional activities with supervision for safety. Ready to transition home tomorrow with assist from wife     Plan    DC home tomorrow

## 2019-11-09 NOTE — PROGRESS NOTES
Report received from previous Nurse at shift change. Patient in wheelchair in room, denies pain at this time. Patient was cooperative with medication administration, and education. He was also cooperative with his PEG dressing change, and his free water flush in his PEG tube. Patient 's vale is patent and draining clear, yellow urine. Patient offered no complaints. Will continue to monitor.

## 2019-11-09 NOTE — THERAPY
Speech Language Pathology  Daily Treatment     Patient Name: Pedro Champion  Age:  79 y.o., Sex:  male  Medical Record #: [id number]  Today's Date: 11/8/2019     Subjective    Pt concerned with malocclusion of bite and mandible at this time. Encouraged to discuss with physician.      Objective       11/08/19 1334   SCCAN (Scales of Cognitive and Communicative Ability for Neurorehabilitation)   Oral Expression - Raw Score 15   Oral Expression - Scale Performance Score 79   Orientation - Raw Score 7   Orientation - Scale Performance Score 58   Memory - Raw Score 10   Memory - Scale Performance Score 53   Speech Comprehension - Raw Score 9   Speech Comprehension - Scale Performance Score 69   Reading Comprehension - Raw Score 7   Reading Comprehension - Scale Performance Score 58   Writing - Raw Score 6   Writing - Scale Performance Score 86   Attention - Raw Score 7   Attention - Scale Performance Score 44   Problem Solving - Raw Score 12   Problem Solving - Scale Performance Score 52   SCCAN Total Raw Score 59   SCCAN Degree of Severity Moderate Impairment   Interdisciplinary Plan of Care Collaboration   IDT Collaboration with  Physician   Patient Position at End of Therapy Seated;Self Releasing Lap Belt Applied   Collaboration Comments CLOF re: cognitive status and ability to self cath   SLP Total Time Spent   SLP Individual Total Time Spent (Mins) 30   Charge Group   SLP Cognitive Skill Development 2       FIM Eating Score:  1 - Total Assistance  Eating Description:       FIM Comprehension Score:  5 - Stand-by Prompting/Supervision or Set-up  Comprehension Description:  Glasses, Hearing aids/amplifiers, Verbal cues    FIM Expression Score:  6 - Modified Independent  Expression Description:  Verbal cueing    FIM Social Interaction Score:  6 - Modified Independent  Social Interaction Description:  Medication, Verbal cues    FIM Problem Solving Score:  4 - Minimal Assistance  Problem Solving Description:  Verbal cueing,  Therapy schedule, Increased time    FIM Memory Score:  4 - Minimal Assistance  Memory Description:  Verbal cueing, Therapy schedule      Assessment    Pt assessed with mechanical soft/regular textures at lunch with SLP supervision. (this SLP working at the same table with another patient). Pt consumed roast beef sandwich and chips. In this afternoon session, pt remarked how sandwich was difficult to chew secondary to dental occlusion misaligned at this time. It is recommended pt remain on softer foods for ease of mastication with ongoing trials with out patient ST. Completed administration of cognitive assessment for follow up prior to d/c. Pt scored in overall MODERATE severity range with scores in orientation: 58%, memory: 53%, attention: 44% and problem solving 52%. Pt will require consistent supervision for safety at home secondary to the above mentioned deficits.     Plan    Anticipated d/c for 11/9/19

## 2019-11-09 NOTE — PROGRESS NOTES
Patient discharged to home per order.  Reviewed all discharge instructions, appointments, discharge medications, and wound care instructions with patient and spouse; they verbalize understanding.  Education provided in discharge instructions about peg tube care, vale care, and Home Safety and Fall Prevention. Discharge paperwork completed; signed copies in chart.  Patient has education binder and all belongings; signed copy in chart.  Pt alert, calm, stable; no change in status from morning assessment.  Patient left facility at 1415 via wheelchair to private vehicle accompanied by spouse; escorted to car by staff.  Have enjoyed working with this pleasant patient.

## 2019-11-09 NOTE — FLOWSHEET NOTE
Respiratory Therapy Update                       Cough: Non Productive;Strong (11/08/19 1430)  Sputum Amount: Unable to Evaluate (11/08/19 1430)  Sputum Color: Unable to Evaluate (11/08/19 1430)  Sputum Consistency: Thick (11/06/19 2000)                  O2 (LPM): 0 (11/08/19 1430)  O2 Daily Delivery Respiratory : Room Air with O2 Available (11/08/19 1430)    Breath Sounds  Pre/Post Intervention: Pre Intervention Assessment (11/06/19 2000)  RUL Breath Sounds: Clear (11/08/19 1430)  RML Breath Sounds: Clear (11/08/19 1430)  RLL Breath Sounds: Clear;Diminished (11/08/19 1430)  DARLENE Breath Sounds: Clear (11/08/19 1430)  LLL Breath Sounds: Clear;Diminished (11/08/19 1430)        Events/Summary/Plan: Trach care done with pt. sitting in a wheelchair.  Site was cleaned, dried and a new bandage applied.  Site looks healthy and no redness.  Pt. tolerated all well. (11/08/19 1430)

## 2019-11-09 NOTE — DISCHARGE INSTRUCTIONS
Speech Therapy Discharge Instructions for Pedro Champion    11/9/2019    Diet: Dysphagia 2 - cohesive, moist, semisolid food, requiring some chewing such as soft, well cooked foods, well moistened ground meats, potatoes, noodles and dumplings, Thin Liquids - any liquid like water, coffee, tea, juices, or clear fluids like Gatorade, etc.  Swallow Strategies: small bites/small sips, no talking while eating, alternate between solids and liquids   Other Diet Instructions: meds whole with thin liquids   Supervision: 24 hour supervision is recommended for safety at this time  Cognition / Communication: Pedro, thank you for working so hard here at Rehab! You have done a great job. Keep up the great work at home.   Executive function is a set of mental skills that help you get things done. These skills are controlled by an area of the brain called the frontal lobe.    Executive function helps you:  • Manage time  • Pay attention  • Switch focus  • Plan and organize  • Remember details  • Avoid saying or doing the wrong thing  • Do things based on your experience  • Multitask    When executive function isn’t working as it should, your behavior is less controlled. This can affect your ability to:  • Work or go to school  • Do things independently  • Maintain relationships    How to Manage Executive Function Problems  • Take a step-by-step approach to work.  • Rely on visual organizational aids.  • Use tools like time organizers, computers, or watches with alarms.  • Make schedules and look at them several times a day.  • Ask for written and oral instructions whenever possible.  • Plan for transition times and shifts in activities.  • Allow and budget extra time to get tasks done.    • When you are about to try a task that you haven’t done in a while (or struggled with the last time), think about the steps involved, try to anticipate possible challenges and identify what might give you trouble, come up with a plan to  compensate of those challenges, and evaluate after the fact - to determine if your plan worked or if it needs adjustment.  •  If you experience an outcome that you didn’t expect, think back to what may have contributed to the problem.   • Break complex problems down into smaller parts.   Sometimes a complicated task can be overwhelming, but if you break it down into components, you will be able to find a place where you can cope with the information.    •  Be patient and persistent.    • Develop tools and strategies that will help organize, filter and narrow down information so that you can deal with it effectively.    Pedro would benefit from assistance managing their medications.  It is recommended that you purchase a pill organizer to sort medications in on a weekly basis.  Implementing a system to help remind you to take your medications will also be helpful (Ex: setting alarms, having a friend/family member call as a reminder).       It is recommended that Pedro has assistance when managing any financial tasks.     Best of luck in your continued recovery, Pedro! ~ Lorene Reno MS, CCC-SLP             [facility] NURSING DISCHARGE INSTRUCTIONS    Blood Pressure :   Weight: 82.1 kg (181 lb)  Nursing recommendations for Pedro Champion at time of discharge are as follows:  Client verbalized understanding of all discharge instructions and prescriptions.     Review all your home medications and newly ordered medications with your doctor and/or pharmacist. Follow medication instructions as directed by your doctor and/or pharmacist.    Pain Management:   Discharge Pain Medication Instructions:  Comfort Goal: Comfort with Movement, Sleep Comfortably, Comfort at Rest  Notify your primary care provider if pain is unrelieved with these measures, if the pain is new, or increased in intensity.    Discharge Skin Characteristics:    Discharge Skin Exam:       Skin / Wound Care Instructions: Please  contact your primary care physician for any change in skin integrity.     If You Have Surgical Incisions / Wounds:  Monitor surgical site(s) for signs of increased swelling, redness or symptoms of drainage from the site or fever as this could indicate signs and symptoms of infection. If these symptoms are noted, notifiy your primary care provider.      Discharge Safety Instructions: Should Have ADULT SUPERVISION     Discharge Safety Concerns: Unsteady Gait, Balance Problems (Dizziness, Light Headedness), Weakness, Wandering  The interdisciplinary team has made recommendation that you should have adult supervision in the house due to impaired judgment  Anti-embolic stockings are required during the day and off at night to increase circulation to the lower extremities.    Discharge Diet: Regular     Discharge Liquids: Thin  Discharge Bowel Function: Continent  Please contact your primary care physician for any changes in bowel habits.  Discharge Bowel Program:    Discharge Bladder Function:    Discharge Urinary Devices: Indwelling Catheter (Document Size / Last Changed in Comment)      Nursing Discharge Plan:      Suicidal Feelings: How to Help Yourself  Suicide is the taking of one's own life. If you feel as though life is getting too tough to handle and are thinking about suicide, get help right away. To get help:  · Call your local emergency services (911 in the [place]  · Call a suicide hotline to speak with a trained counselor who understands how you are feeling. The following is a list of suicide hotlines in the [place]. For a list of hotlines in [place], visit www.suicide.org/hotlines/international/nlcerq-eyxvszz-srgqoghe.html.  ¨ 4-405-188-TALK (1-565.746.9844).  ¨ 6-061-UZIHNCI (1-435.402.5870).  ¨ 1-635.322.9053. This is a hotline for Telugu speakers.  ¨ 2-288-508-4TTY (1-825.393.9846). This is a hotline for TTY users.  ¨ 2-956-7-U-ADONIS (1-921.583.1752). This is a hotline for lesbian, pires, bisexual,  transgender, or questioning youth.  · Contact a crisis center or a local suicide prevention center. To find a crisis center or suicide prevention center:  ¨ Call your local hospital, clinic, community service organization, mental health center, social service provider, or health department. Ask for assistance in connecting to a crisis center.  ¨ Visit www.suicidepreventionlifeline.org/getinvolved/ for a list of crisis centers in the [place], or visit www.suicideprevention.ca/ieahtclm-oijyn-bjufbei/find-a-crisis-centre for a list of centers in [place].  · Visit the following websites:  ¨ National Suicide Prevention Lifeline: www.suicidepreventionlifeline.org  ¨ Hopeline: www.Glassful.Tigerspike  ¨ American Foundation for Suicide Prevention: www.afsp.org  ¨ The Ezra Project (for lesbian, pires, bisexual, transgender, or questioning youth): www.thetrevorproject.org  How can I help myself feel better?  · Promise yourself that you will not do anything drastic when you have suicidal feelings. Remember, there is hope. Many people have gotten through suicidal thoughts and feelings, and you will, too. You may have gotten through them before, and this proves that you can get through them again.  · Let family, friends, teachers, or counselors know how you are feeling. Try not to isolate yourself from those who care about you. Remember, they will want to help you. Talk with someone every day, even if you do not feel sociable. Face-to-face conversation is best.  · Call a mental health professional and see one regularly.  · Visit your primary health care provider every year.  · Eat a well-balanced diet, and space your meals so you eat regularly.  · Get plenty of rest.  · Avoid alcohol and drugs, and remove them from your home. They will only make you feel worse.  · If you are thinking of taking a lot of medicine, give your medicine to someone who can give it to you one day at a time. If you are on antidepressants and are  concerned you will overdose, let your health care provider know so he or she can give you safer medicines. Ask your mental health professional about the possible side effects of any medicines you are taking.  · Remove weapons, poisons, knives, and anything else that could harm you from your home.  · Try to stick to routines. Follow a schedule every day. Put self-care on your schedule.  · Make a list of realistic goals, and cross them off when you achieve them. Accomplishments give a sense of worth.  · Wait until you are feeling better before doing the things you find difficult or unpleasant.  · Exercise if you are able. You will feel better if you exercise for even a half hour each day.  · Go out in the sun or into nature. This will help you recover from depression faster. If you have a favorite place to walk, go there.  · Do the things that have always given you pleasure. Play your favorite music, read a good book, paint a picture, play your favorite instrument, or do anything else that takes your mind off your depression if it is safe to do.  · Keep your living space well lit.  · When you are feeling well, write yourself a letter about tips and support that you can read when you are not feeling well.  · Remember that life’s difficulties can be sorted out with help. Conditions can be treated. You can work on thoughts and strategies that serve you well.  This information is not intended to replace advice given to you by your health care provider. Make sure you discuss any questions you have with your health care provider.  Document Released: 06/23/2004 Document Revised: 08/16/2017 Document Reviewed: 04/14/2015  Elsevier Interactive Patient Education © 2017 Elsevier Inc.    Fall Prevention in the Home  Introduction  Falls can cause injuries. They can happen to people of all ages. There are many things you can do to make your home safe and to help prevent falls.  What can I do on the outside of my home?  · Regularly  fix the edges of walkways and driveways and fix any cracks.  · Remove anything that might make you trip as you walk through a door, such as a raised step or threshold.  · Trim any bushes or trees on the path to your home.  · Use bright outdoor lighting.  · Clear any walking paths of anything that might make someone trip, such as rocks or tools.  · Regularly check to see if handrails are loose or broken. Make sure that both sides of any steps have handrails.  · Any raised decks and porches should have guardrails on the edges.  · Have any leaves, snow, or ice cleared regularly.  · Use sand or salt on walking paths during winter.  · Clean up any spills in your garage right away. This includes oil or grease spills.  What can I do in the bathroom?  · Use night lights.  · Install grab bars by the toilet and in the tub and shower. Do not use towel bars as grab bars.  · Use non-skid mats or decals in the tub or shower.  · If you need to sit down in the shower, use a plastic, non-slip stool.  · Keep the floor dry. Clean up any water that spills on the floor as soon as it happens.  · Remove soap buildup in the tub or shower regularly.  · Attach bath mats securely with double-sided non-slip rug tape.  · Do not have throw rugs and other things on the floor that can make you trip.  What can I do in the bedroom?  · Use night lights.  · Make sure that you have a light by your bed that is easy to reach.  · Do not use any sheets or blankets that are too big for your bed. They should not hang down onto the floor.  · Have a firm chair that has side arms. You can use this for support while you get dressed.  · Do not have throw rugs and other things on the floor that can make you trip.  What can I do in the kitchen?  · Clean up any spills right away.  · Avoid walking on wet floors.  · Keep items that you use a lot in easy-to-reach places.  · If you need to reach something above you, use a strong step stool that has a grab bar.  · Keep  electrical cords out of the way.  · Do not use floor polish or wax that makes floors slippery. If you must use wax, use non-skid floor wax.  · Do not have throw rugs and other things on the floor that can make you trip.  What can I do with my stairs?  · Do not leave any items on the stairs.  · Make sure that there are handrails on both sides of the stairs and use them. Fix handrails that are broken or loose. Make sure that handrails are as long as the stairways.  · Check any carpeting to make sure that it is firmly attached to the stairs. Fix any carpet that is loose or worn.  · Avoid having throw rugs at the top or bottom of the stairs. If you do have throw rugs, attach them to the floor with carpet tape.  · Make sure that you have a light switch at the top of the stairs and the bottom of the stairs. If you do not have them, ask someone to add them for you.  What else can I do to help prevent falls?  · Wear shoes that:  ¨ Do not have high heels.  ¨ Have rubber bottoms.  ¨ Are comfortable and fit you well.  ¨ Are closed at the toe. Do not wear sandals.  · If you use a stepladder:  ¨ Make sure that it is fully opened. Do not climb a closed stepladder.  ¨ Make sure that both sides of the stepladder are locked into place.  ¨ Ask someone to hold it for you, if possible.  · Clearly syeda and make sure that you can see:  ¨ Any grab bars or handrails.  ¨ First and last steps.  ¨ Where the edge of each step is.  · Use tools that help you move around (mobility aids) if they are needed. These include:  ¨ Canes.  ¨ Walkers.  ¨ Scooters.  ¨ Crutches.  · Turn on the lights when you go into a dark area. Replace any light bulbs as soon as they burn out.  · Set up your furniture so you have a clear path. Avoid moving your furniture around.  · If any of your floors are uneven, fix them.  · If there are any pets around you, be aware of where they are.  · Review your medicines with your doctor. Some medicines can make you feel dizzy.  This can increase your chance of falling.  Ask your doctor what other things that you can do to help prevent falls.  This information is not intended to replace advice given to you by your health care provider. Make sure you discuss any questions you have with your health care provider.  Document Released: 10/14/2010 Document Revised: 05/25/2017 Document Reviewed: 01/22/2016  © 2017 Elsevier      Case Management Discharge Instructions:   Discharge Location: Home with Home Health  Agency Name/Address/Phone: [facility] at [phone] (will call you to schedule visits)  Home Health: Physical Therapist, Occupational Therapist, Registered Nurse, Speech Therapist  Outpatient Services:    DME Provider/Phone: [facility] at [phone]  Medical Equipment Ordered: Front Wheel Walker  Prescription Faxed to:        Discharge Medication Instructions:  Below are the medications your physician expects you to take upon discharge:      Physical Therapy Discharge Instructions for Pedro Champion    11/8/2019    Level of Assist Required for Ambulation: Supervision on Flat Surfaces, Supervision on Curbs, Supervision on Stairs  Device Recommended for Ambulation: Front-Wheeled Walker  Level of Assist Required to Propel Wheelchair: Requires No Assist  Level of Assist Required for Transfers: Supervision  Device Recommended for Transfers: None  Home Exercise Program: Refer to Home Exercise Program Handout for Details    Floresita Vasquez for all the hard work you put in here at rehab! Best of luck to you on your return home and continued recovery!    -Daksha Becker, PT, DPT
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
## 2019-11-10 ENCOUNTER — HOME CARE VISIT (OUTPATIENT)
Dept: HOME HEALTH SERVICES | Facility: HOME HEALTHCARE | Age: 79
End: 2019-11-10

## 2019-11-10 ENCOUNTER — HOME CARE VISIT (OUTPATIENT)
Dept: HOME HEALTH SERVICES | Facility: HOME HEALTHCARE | Age: 79
End: 2019-11-10
Payer: MEDICARE

## 2020-01-08 ENCOUNTER — OFFICE VISIT (OUTPATIENT)
Dept: CARDIOLOGY | Facility: MEDICAL CENTER | Age: 80
End: 2020-01-08
Payer: MEDICARE

## 2020-01-08 ENCOUNTER — TELEPHONE (OUTPATIENT)
Dept: CARDIOLOGY | Facility: MEDICAL CENTER | Age: 80
End: 2020-01-08

## 2020-01-08 VITALS
SYSTOLIC BLOOD PRESSURE: 142 MMHG | BODY MASS INDEX: 27.21 KG/M2 | WEIGHT: 212 LBS | OXYGEN SATURATION: 97 % | HEIGHT: 74 IN | HEART RATE: 80 BPM | DIASTOLIC BLOOD PRESSURE: 72 MMHG

## 2020-01-08 DIAGNOSIS — I42.9 CARDIOMYOPATHY, UNSPECIFIED TYPE (HCC): ICD-10-CM

## 2020-01-08 DIAGNOSIS — I48.19 OTHER PERSISTENT ATRIAL FIBRILLATION (HCC): ICD-10-CM

## 2020-01-08 DIAGNOSIS — Z79.899 ENCOUNTER FOR LONG-TERM (CURRENT) USE OF HIGH-RISK MEDICATION: ICD-10-CM

## 2020-01-08 DIAGNOSIS — I10 ESSENTIAL HYPERTENSION, BENIGN: ICD-10-CM

## 2020-01-08 DIAGNOSIS — R60.0 LOWER EXTREMITY EDEMA: ICD-10-CM

## 2020-01-08 LAB — EKG IMPRESSION: NORMAL

## 2020-01-08 PROCEDURE — 93000 ELECTROCARDIOGRAM COMPLETE: CPT | Performed by: INTERNAL MEDICINE

## 2020-01-08 PROCEDURE — 99358 PROLONG SERVICE W/O CONTACT: CPT | Performed by: INTERNAL MEDICINE

## 2020-01-08 PROCEDURE — 99215 OFFICE O/P EST HI 40 MIN: CPT | Performed by: INTERNAL MEDICINE

## 2020-01-08 RX ORDER — FUROSEMIDE 20 MG/1
20 TABLET ORAL DAILY
Qty: 30 TAB | Refills: 11 | Status: SHIPPED | OUTPATIENT
Start: 2020-01-08 | End: 2020-02-12 | Stop reason: SDUPTHER
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
## 2020-01-08 NOTE — TELEPHONE ENCOUNTER
AA    [facility] @ [facility] called re: furosemide/digoxin drug interaction. She wants to know if [facility] is aware of this. [phone]
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
## 2020-01-08 NOTE — PATIENT INSTRUCTIONS
Please start furosemide 20 mg once daily.  For the first 3 days only (1/9-1/11), take 40 mg (2 tablets) once daily.  In about 10 days, get a nonfasting blood test.

## 2020-01-09 RX ORDER — METOPROLOL TARTRATE 75 MG/1
75 TABLET, FILM COATED ORAL 2 TIMES DAILY
Qty: 180 TAB | Refills: 3 | Status: SHIPPED | OUTPATIENT
Start: 2020-01-09 | End: 2020-01-14

## 2020-01-09 ASSESSMENT — ENCOUNTER SYMPTOMS
PALPITATIONS: 0
FALLS: 0
SHORTNESS OF BREATH: 0
DIZZINESS: 0
ORTHOPNEA: 0
DEPRESSION: 0
PND: 0
ABDOMINAL PAIN: 0
LOSS OF CONSCIOUSNESS: 0

## 2020-01-14 ENCOUNTER — HOSPITAL ENCOUNTER (OUTPATIENT)
Dept: RADIOLOGY | Facility: MEDICAL CENTER | Age: 80
End: 2020-01-14
Attending: ORAL & MAXILLOFACIAL SURGERY
Payer: MEDICARE

## 2020-01-14 ENCOUNTER — TELEPHONE (OUTPATIENT)
Dept: CARDIOLOGY | Facility: MEDICAL CENTER | Age: 80
End: 2020-01-14

## 2020-01-14 DIAGNOSIS — H44.709 RETAINED INTRAOCULAR FOREIGN BODY, UNSPECIFIED LATERALITY: ICD-10-CM

## 2020-01-14 DIAGNOSIS — Z18.9 RETAINED INTRAOCULAR FOREIGN BODY, UNSPECIFIED LATERALITY: ICD-10-CM

## 2020-01-14 PROCEDURE — 70487 CT MAXILLOFACIAL W/DYE: CPT

## 2020-01-14 PROCEDURE — 700117 HCHG RX CONTRAST REV CODE 255: Performed by: ORAL & MAXILLOFACIAL SURGERY

## 2020-01-14 RX ADMIN — IOHEXOL 100 ML: 350 INJECTION, SOLUTION INTRAVENOUS at 12:55

## 2020-01-14 NOTE — TELEPHONE ENCOUNTER
Julienne,       Patient's wife called and said she spoke to the pharmacy, [facility] in [place]. They told her there was a confusion if the patient should be taking 50 MGs or 75 MGs on the Metoprolol and they needed clarification. Pharmacy #[phone]

## 2020-01-15 ENCOUNTER — TELEPHONE (OUTPATIENT)
Dept: CARDIOLOGY | Facility: MEDICAL CENTER | Age: 80
End: 2020-01-15

## 2020-01-15 NOTE — TELEPHONE ENCOUNTER
AA    Pt's wife [facility] pharmacy told her to call this office re: drug interaction with water pill. #[phone]

## 2020-01-17 NOTE — TELEPHONE ENCOUNTER
Called [facility] pharmacist Chantale, reports drug-to-drug interaction which are increased risk for arrhythmias, digoxin toxicity, and side effects. Would like to confirm if AA is aware. Call back to confirm.    To Dr. Dowell - please advise and confirm. Thanks!

## 2020-01-20 NOTE — TELEPHONE ENCOUNTER
Nicci Dowell M.D.  You 20 minutes ago (2:28 PM)      Okay to fill and take.          Called [facility] pharmacy, confirmed ok to fill and for pt to take Lasix. Verbalized understanding. Called pt's spouse Rosana and confirmed ok for pt to take Lasix. Advised medication discussed with [facility] pharmacy and prescription is available. Advised for pt to monitor BP and for s/s, verbalized understanding. Confirmed pt's FV with AA on 2/12/20. Appreciative of call.

## 2020-01-22 LAB
BUN SERPL-MCNC: 10 MG/DL (ref 8–27)
BUN/CREAT SERPL: 14 (ref 10–24)
CALCIUM SERPL-MCNC: 8.8 MG/DL (ref 8.6–10.2)
CHLORIDE SERPL-SCNC: 104 MMOL/L (ref 96–106)
CO2 SERPL-SCNC: 23 MMOL/L (ref 20–29)
CREAT SERPL-MCNC: 0.73 MG/DL (ref 0.76–1.27)
DIGOXIN SERPL-MCNC: 0.7 NG/ML (ref 0.5–0.9)
ERYTHROCYTE [DISTWIDTH] IN BLOOD BY AUTOMATED COUNT: 16.3 % (ref 11.6–15.4)
GLUCOSE SERPL-MCNC: 93 MG/DL (ref 65–99)
HCT VFR BLD AUTO: 35.2 % (ref 37.5–51)
HGB BLD-MCNC: 10.8 G/DL (ref 13–17.7)
MCH RBC QN AUTO: 23.8 PG (ref 26.6–33)
MCHC RBC AUTO-ENTMCNC: 30.7 G/DL (ref 31.5–35.7)
MCV RBC AUTO: 78 FL (ref 79–97)
NRBC BLD AUTO-RTO: ABNORMAL %
PLATELET # BLD AUTO: 217 X10E3/UL (ref 150–450)
POTASSIUM SERPL-SCNC: 4 MMOL/L (ref 3.5–5.2)
RBC # BLD AUTO: 4.53 X10E6/UL (ref 4.14–5.8)
SODIUM SERPL-SCNC: 143 MMOL/L (ref 134–144)
WBC # BLD AUTO: 7.5 X10E3/UL (ref 3.4–10.8)

## 2020-01-24 ENCOUNTER — TELEPHONE (OUTPATIENT)
Dept: CARDIOLOGY | Facility: MEDICAL CENTER | Age: 80
End: 2020-01-24

## 2020-01-24 NOTE — TELEPHONE ENCOUNTER
Nicci Dowell M.D.  Fernanda Jensen R.N.             Labs reviewed.  No major abnormalities.   No changes in plan for now.   Thank you   AA      Called pt, s/w wife (Rosana), per Rosana, pt is [place] and is unable to talk on the phone, discussed labs per Dr Dowell, pt verbalizes understanding

## 2020-01-27 ENCOUNTER — TELEPHONE (OUTPATIENT)
Dept: CARDIOLOGY | Facility: MEDICAL CENTER | Age: 80
End: 2020-01-27

## 2020-01-27 DIAGNOSIS — I48.19 OTHER PERSISTENT ATRIAL FIBRILLATION (HCC): Primary | ICD-10-CM

## 2020-01-27 RX ORDER — DIGOXIN 250 MCG
250 TABLET ORAL DAILY
Qty: 90 TAB | Refills: 3 | Status: SHIPPED | OUTPATIENT
Start: 2020-01-27 | End: 2020-02-12 | Stop reason: SDUPTHER

## 2020-01-27 NOTE — TELEPHONE ENCOUNTER
Pt of AA. His wife is requestingt refill for digoxin sent to [facility] . If question's can be reached at [phone].    Thank You

## 2020-02-03 ENCOUNTER — HOSPITAL ENCOUNTER (OUTPATIENT)
Dept: CARDIOLOGY | Facility: MEDICAL CENTER | Age: 80
End: 2020-02-03
Attending: INTERNAL MEDICINE
Payer: MEDICARE

## 2020-02-03 ENCOUNTER — HOSPITAL ENCOUNTER (OUTPATIENT)
Dept: RADIOLOGY | Facility: MEDICAL CENTER | Age: 80
End: 2020-02-03
Attending: INTERNAL MEDICINE
Payer: MEDICARE

## 2020-02-03 DIAGNOSIS — I42.9 CARDIOMYOPATHY, UNSPECIFIED TYPE (HCC): ICD-10-CM

## 2020-02-03 DIAGNOSIS — I48.19 OTHER PERSISTENT ATRIAL FIBRILLATION (HCC): ICD-10-CM

## 2020-02-03 LAB
LV EJECT FRACT MOD 2C 99903: 34.39
LV EJECT FRACT MOD 4C 99902: 39.31
LV EJECT FRACT MOD BP 99901: 34.17

## 2020-02-03 PROCEDURE — 93325 DOPPLER ECHO COLOR FLOW MAPG: CPT | Mod: 26 | Performed by: INTERNAL MEDICINE

## 2020-02-03 PROCEDURE — 93321 DOPPLER ECHO F-UP/LMTD STD: CPT | Mod: 26 | Performed by: INTERNAL MEDICINE

## 2020-02-03 PROCEDURE — 93018 CV STRESS TEST I&R ONLY: CPT | Performed by: INTERNAL MEDICINE

## 2020-02-03 PROCEDURE — 93321 DOPPLER ECHO F-UP/LMTD STD: CPT

## 2020-02-03 PROCEDURE — 93308 TTE F-UP OR LMTD: CPT | Mod: 26 | Performed by: INTERNAL MEDICINE

## 2020-02-03 PROCEDURE — A9502 TC99M TETROFOSMIN: HCPCS

## 2020-02-03 PROCEDURE — 78452 HT MUSCLE IMAGE SPECT MULT: CPT | Mod: 26 | Performed by: INTERNAL MEDICINE

## 2020-02-03 PROCEDURE — 700111 HCHG RX REV CODE 636 W/ 250 OVERRIDE (IP)

## 2020-02-03 RX ORDER — REGADENOSON 0.08 MG/ML
INJECTION, SOLUTION INTRAVENOUS
Status: COMPLETED
Start: 2020-02-03 | End: 2020-02-03

## 2020-02-03 RX ORDER — REGADENOSON 0.08 MG/ML
0.4 INJECTION, SOLUTION INTRAVENOUS ONCE
Status: COMPLETED | OUTPATIENT
Start: 2020-02-03 | End: 2020-02-03

## 2020-02-03 RX ADMIN — REGADENOSON 0.4 MG: 0.08 INJECTION, SOLUTION INTRAVENOUS at 15:45

## 2020-02-07 ENCOUNTER — TELEPHONE (OUTPATIENT)
Dept: CARDIOLOGY | Facility: MEDICAL CENTER | Age: 80
End: 2020-02-07

## 2020-02-07 NOTE — LETTER
February 7, 2020        Pedor Champion  [id number]   [place] [address]        Dear Pedro,      Dr. Dowell reviewed your Echocardiogram results and found no major abnormalities. Your ejection fraction has improved. No recommendations are being made at this time.    If you have any questions, please give us a call at [phone].      Thank you,    Fernanda CONRAD for Dr. Dowell

## 2020-02-10 DIAGNOSIS — I10 ESSENTIAL HYPERTENSION, BENIGN: ICD-10-CM

## 2020-02-10 DIAGNOSIS — I48.19 OTHER PERSISTENT ATRIAL FIBRILLATION (HCC): ICD-10-CM

## 2020-02-10 RX ORDER — METOPROLOL TARTRATE 50 MG/1
75 TABLET, FILM COATED ORAL 2 TIMES DAILY
Qty: 270 TAB | Refills: 0 | Status: SHIPPED
Start: 2020-02-10 | End: 2020-02-12

## 2020-02-12 ENCOUNTER — OFFICE VISIT (OUTPATIENT)
Dept: CARDIOLOGY | Facility: MEDICAL CENTER | Age: 80
End: 2020-02-12
Payer: MEDICARE

## 2020-02-12 VITALS
BODY MASS INDEX: 27.34 KG/M2 | DIASTOLIC BLOOD PRESSURE: 70 MMHG | WEIGHT: 213 LBS | OXYGEN SATURATION: 98 % | HEIGHT: 74 IN | HEART RATE: 66 BPM | SYSTOLIC BLOOD PRESSURE: 142 MMHG

## 2020-02-12 DIAGNOSIS — I42.9 CARDIOMYOPATHY, UNSPECIFIED TYPE (HCC): ICD-10-CM

## 2020-02-12 DIAGNOSIS — R60.0 LOWER EXTREMITY EDEMA: ICD-10-CM

## 2020-02-12 DIAGNOSIS — Z79.899 ENCOUNTER FOR LONG-TERM (CURRENT) USE OF HIGH-RISK MEDICATION: ICD-10-CM

## 2020-02-12 DIAGNOSIS — I48.19 OTHER PERSISTENT ATRIAL FIBRILLATION (HCC): ICD-10-CM

## 2020-02-12 DIAGNOSIS — I10 BENIGN HYPERTENSION: ICD-10-CM

## 2020-02-12 PROCEDURE — 99215 OFFICE O/P EST HI 40 MIN: CPT | Performed by: INTERNAL MEDICINE

## 2020-02-12 RX ORDER — FUROSEMIDE 20 MG/1
20 TABLET ORAL
Qty: 130 TAB | Refills: 3 | Status: SHIPPED | OUTPATIENT
Start: 2020-02-12 | End: 2021-01-29

## 2020-02-12 RX ORDER — DIGOXIN 125 MCG
125 TABLET ORAL DAILY
Qty: 90 TAB | Refills: 3 | Status: SHIPPED | OUTPATIENT
Start: 2020-02-12 | End: 2021-02-08

## 2020-02-12 RX ORDER — METOPROLOL SUCCINATE 200 MG/1
200 TABLET, EXTENDED RELEASE ORAL DAILY
Qty: 90 TAB | Refills: 3 | Status: SHIPPED | OUTPATIENT
Start: 2020-02-12 | End: 2021-02-08

## 2020-02-12 RX ORDER — LOSARTAN POTASSIUM 25 MG/1
25 TABLET ORAL DAILY
Qty: 30 TAB | Refills: 11 | Status: SHIPPED | OUTPATIENT
Start: 2020-02-12 | End: 2021-02-12

## 2020-02-12 ASSESSMENT — ENCOUNTER SYMPTOMS
LOSS OF CONSCIOUSNESS: 0
ABDOMINAL PAIN: 0
PALPITATIONS: 0
SHORTNESS OF BREATH: 0
PND: 0
FALLS: 0
ORTHOPNEA: 0
DEPRESSION: 0
DIZZINESS: 0
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
## 2020-02-12 NOTE — PATIENT INSTRUCTIONS
- Please increase Lasix (water pill) to 40 mg (2 tablets) every Monday, Wednesday and Friday.  Take 20 mg (1 tablet) all the other days.  - Please decrease digoxin to 125 mcg every day.  For now you can take a half a tablet of your current prescription till you receive the new one in the mail.  - Please stop taking metoprolol tartrate.  Instead start taking metoprolol succinate 200mg once daily.  This prescription will arrive in the mail.  - Start taking losartan 25 mg once daily.  - Get a nonfasting blood test in about 2 weeks.
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
## 2020-02-27 LAB
BUN SERPL-MCNC: 17 MG/DL (ref 8–27)
BUN/CREAT SERPL: 19 (ref 10–24)
CHLORIDE SERPL-SCNC: 101 MMOL/L (ref 96–106)
CO2 SERPL-SCNC: 24 MMOL/L (ref 20–29)
CREAT SERPL-MCNC: 0.9 MG/DL (ref 0.76–1.27)
GLUCOSE SERPL-MCNC: 90 MG/DL (ref 65–99)
POTASSIUM SERPL-SCNC: 3.8 MMOL/L (ref 3.5–5.2)
SODIUM SERPL-SCNC: 140 MMOL/L (ref 134–144)

## 2020-03-05 ENCOUNTER — OFFICE VISIT (OUTPATIENT)
Dept: BEHAVIORAL HEALTH | Facility: CLINIC | Age: 80
End: 2020-03-05
Payer: MEDICARE

## 2020-03-05 VITALS
SYSTOLIC BLOOD PRESSURE: 156 MMHG | HEART RATE: 80 BPM | HEIGHT: 74 IN | WEIGHT: 208 LBS | DIASTOLIC BLOOD PRESSURE: 99 MMHG | BODY MASS INDEX: 26.69 KG/M2

## 2020-03-05 DIAGNOSIS — F32.A DEPRESSION, UNSPECIFIED DEPRESSION TYPE: ICD-10-CM

## 2020-03-05 PROCEDURE — 99999 PR NO CHARGE: CPT | Performed by: PSYCHIATRY & NEUROLOGY

## 2020-03-05 PROCEDURE — 90833 PSYTX W PT W E/M 30 MIN: CPT | Performed by: PSYCHIATRY & NEUROLOGY

## 2020-03-05 PROCEDURE — 99204 OFFICE O/P NEW MOD 45 MIN: CPT | Performed by: PSYCHIATRY & NEUROLOGY

## 2020-03-05 RX ORDER — QUETIAPINE FUMARATE 50 MG/1
50 TABLET, FILM COATED ORAL EVERY EVENING
Qty: 90 TAB | Refills: 0 | Status: SHIPPED | OUTPATIENT
Start: 2020-03-05 | End: 2020-06-29

## 2020-03-05 RX ORDER — DIVALPROEX SODIUM 125 MG/1
CAPSULE, COATED PELLETS ORAL
Qty: 75 CAP | Refills: 0 | Status: SHIPPED | OUTPATIENT
Start: 2020-03-05 | End: 2021-01-11

## 2020-03-05 ASSESSMENT — PATIENT HEALTH QUESTIONNAIRE - PHQ9
6. FEELING BAD ABOUT YOURSELF - OR THAT YOU ARE A FAILURE OR HAVE LET YOURSELF OR YOUR FAMILY DOWN: SEVERAL DAYS
SUM OF ALL RESPONSES TO PHQ9 QUESTIONS 1 AND 2: 0
4. FEELING TIRED OR HAVING LITTLE ENERGY: SEVERAL DAYS
3. TROUBLE FALLING OR STAYING ASLEEP OR SLEEPING TOO MUCH: NOT AT ALL
SUM OF ALL RESPONSES TO PHQ QUESTIONS 1-9: 2
1. LITTLE INTEREST OR PLEASURE IN DOING THINGS: NOT AT ALL
8. MOVING OR SPEAKING SO SLOWLY THAT OTHER PEOPLE COULD HAVE NOTICED. OR THE OPPOSITE, BEING SO FIGETY OR RESTLESS THAT YOU HAVE BEEN MOVING AROUND A LOT MORE THAN USUAL: NOT AT ALL
5. POOR APPETITE OR OVEREATING: NOT AT ALL
CLINICAL INTERPRETATION OF PHQ2 SCORE: 0
9. THOUGHTS THAT YOU WOULD BE BETTER OFF DEAD, OR OF HURTING YOURSELF: NOT AT ALL
7. TROUBLE CONCENTRATING ON THINGS, SUCH AS READING THE NEWSPAPER OR WATCHING TELEVISION: NOT AT ALL
2. FEELING DOWN, DEPRESSED, IRRITABLE, OR HOPELESS: NOT AT ALL

## 2020-03-05 ASSESSMENT — ANXIETY QUESTIONNAIRES
5. BEING SO RESTLESS THAT IT IS HARD TO SIT STILL: NOT AT ALL
1. FEELING NERVOUS, ANXIOUS, OR ON EDGE: NOT AT ALL
2. NOT BEING ABLE TO STOP OR CONTROL WORRYING: NEARLY EVERY DAY
3. WORRYING TOO MUCH ABOUT DIFFERENT THINGS: NOT AT ALL
GAD7 TOTAL SCORE: 3
7. FEELING AFRAID AS IF SOMETHING AWFUL MIGHT HAPPEN: NOT AT ALL
6. BECOMING EASILY ANNOYED OR IRRITABLE: NOT AT ALL
4. TROUBLE RELAXING: NOT AT ALL

## 2020-03-05 ASSESSMENT — FIBROSIS 4 INDEX: FIB4 SCORE: 1.18

## 2020-03-05 NOTE — PATIENT INSTRUCTIONS
Pedro,    We are going to see how you do lowering the dose of the Divalproex Sodium.  You are currently taking 2 capsules by mouth twice a day.  For the next 30 days and 1 to have you take just 1 capsule twice a day.  If you feel like your mood is worsening, you can always go back up to the regular dose of taking 2 capsules by mouth twice a day.  If you tolerate the decrease after 30 days at 1 capsule twice a day, reduce the frequency to just 1 capsule at bedtime and do this for 15 days.    At the same time let us keep the Seroquel where it is, so that we are not making too many changes at one time.  The next time we meet we can see about reducing the dose of the Seroquel and possibly tapering you off of it also.    Thank you,  Dr. Brown
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
## 2020-03-12 ENCOUNTER — OFFICE VISIT (OUTPATIENT)
Dept: CARDIOLOGY | Facility: MEDICAL CENTER | Age: 80
End: 2020-03-12
Payer: MEDICARE

## 2020-03-12 VITALS
DIASTOLIC BLOOD PRESSURE: 82 MMHG | OXYGEN SATURATION: 97 % | BODY MASS INDEX: 26.28 KG/M2 | WEIGHT: 204.8 LBS | SYSTOLIC BLOOD PRESSURE: 122 MMHG | HEART RATE: 78 BPM | HEIGHT: 74 IN

## 2020-03-12 DIAGNOSIS — Z79.01 ANTICOAGULANT LONG-TERM USE: ICD-10-CM

## 2020-03-12 DIAGNOSIS — I50.1 HEART FAILURE, LEFT, WITH LVEF <=30% (HCC): ICD-10-CM

## 2020-03-12 DIAGNOSIS — I48.19 OTHER PERSISTENT ATRIAL FIBRILLATION (HCC): ICD-10-CM

## 2020-03-12 DIAGNOSIS — I10 ESSENTIAL HYPERTENSION, BENIGN: ICD-10-CM

## 2020-03-12 PROBLEM — E87.6 HYPOKALEMIA: Status: RESOLVED | Noted: 2019-10-25 | Resolved: 2020-03-12

## 2020-03-12 PROBLEM — T14.90XA TRAUMA: Status: RESOLVED | Noted: 2019-08-28 | Resolved: 2020-03-12

## 2020-03-12 PROBLEM — I49.3 PVC (PREMATURE VENTRICULAR CONTRACTION): Status: RESOLVED | Noted: 2017-04-13 | Resolved: 2020-03-12

## 2020-03-12 PROBLEM — D64.9 ANEMIA: Status: RESOLVED | Noted: 2019-10-19 | Resolved: 2020-03-12

## 2020-03-12 PROBLEM — Z78.9 NO CONTRAINDICATION TO DEEP VEIN THROMBOSIS (DVT) PROPHYLAXIS: Status: RESOLVED | Noted: 2019-08-28 | Resolved: 2020-03-12

## 2020-03-12 PROCEDURE — 99214 OFFICE O/P EST MOD 30 MIN: CPT | Performed by: NURSE PRACTITIONER

## 2020-03-12 RX ORDER — LISINOPRIL 20 MG/1
20 TABLET ORAL
COMMUNITY
Start: 2020-03-05 | End: 2020-03-12

## 2020-03-12 RX ORDER — ATORVASTATIN CALCIUM 20 MG/1
20 TABLET, FILM COATED ORAL DAILY
COMMUNITY
Start: 2020-02-25 | End: 2021-12-07 | Stop reason: SDUPTHER

## 2020-03-12 ASSESSMENT — ENCOUNTER SYMPTOMS
WEAKNESS: 0
ORTHOPNEA: 0
PND: 0
CLAUDICATION: 0
DIZZINESS: 0
SHORTNESS OF BREATH: 0
PALPITATIONS: 0
WEIGHT LOSS: 0

## 2020-03-12 ASSESSMENT — FIBROSIS 4 INDEX: FIB4 SCORE: 1.18

## 2020-04-16 ENCOUNTER — TELEMEDICINE (OUTPATIENT)
Dept: BEHAVIORAL HEALTH | Facility: CLINIC | Age: 80
End: 2020-04-16
Payer: MEDICARE

## 2020-04-16 VITALS — HEIGHT: 74 IN | BODY MASS INDEX: 26.18 KG/M2 | WEIGHT: 204 LBS

## 2020-04-16 DIAGNOSIS — F32.A DEPRESSION, UNSPECIFIED DEPRESSION TYPE: ICD-10-CM

## 2020-04-16 PROCEDURE — 99443 PR PHYSICIAN TELEPHONE EVALUATION 21-30 MIN: CPT | Performed by: PSYCHIATRY & NEUROLOGY

## 2020-04-16 ASSESSMENT — FIBROSIS 4 INDEX: FIB4 SCORE: 1.18

## 2020-04-16 NOTE — PROGRESS NOTES
Telephone Appointment Visit   As a means of avoiding spread of COVID-19, this visit is being conducted by telephone. This telephone visit was initiated by the patient and they verbally consented.    Time at start of call: 11:30    Reason for Call:  Medication Follow-up    Patient Comments / History:   The patient and his wife Rosana participated in the phone call.  They were not able to get zoom up and running.  He and his wife say he is doing better.  He is forward-looking and planning.  He has started hanging pictures up and there garage.  He likes to make it look like another room in the house.  He has gone to the grocery store with his wife and previously would hold onto the cart to stabilize him.  He has been walking without assistance.  He plans to get out and walk more.  He has tolerated the reduced dose of the Depakote well.  He is now at 1 dose at bedtime.  Discussed trying to taper down the dose of the Seroquel but if he is not sleeping well to increase the dose back up.  They thought this sounded good.  He denies any SI, and says his mood is been good.    Labs / Images Reviewed   None    Assessment and Plan:     1. Depression, unspecified depression type    Continue Depakote taper.  Reduce Seroquel from 50 mg to one half tab to 25 mg.  If needed okay to increase the dose back to 50 mg  His wife has a therapy appointment in the near future.  Encouraged her to call the tech-support phone number to figure out how to get up and running on same.    Follow-up: No follow-ups on file. 6 weeks or call sooner PRN    Time at end of call: 11: 55  Total Time Spent: 21-30 minutes    Violeta Brown M.D.

## 2020-05-07 DIAGNOSIS — I48.19 OTHER PERSISTENT ATRIAL FIBRILLATION (HCC): ICD-10-CM

## 2020-05-07 RX ORDER — APIXABAN 5 MG/1
TABLET, FILM COATED ORAL
Qty: 180 TAB | Refills: 3 | Status: SHIPPED
Start: 2020-05-07 | End: 2020-12-03

## 2020-05-26 ENCOUNTER — OFFICE VISIT (OUTPATIENT)
Dept: BEHAVIORAL HEALTH | Facility: CLINIC | Age: 80
End: 2020-05-26
Payer: MEDICARE

## 2020-05-26 DIAGNOSIS — F32.A DEPRESSION, UNSPECIFIED DEPRESSION TYPE: ICD-10-CM

## 2020-05-26 DIAGNOSIS — G47.09 OTHER INSOMNIA: ICD-10-CM

## 2020-05-26 PROCEDURE — 99441 PR PHYSICIAN TELEPHONE EVALUATION 5-10 MIN: CPT | Mod: 95 | Performed by: PSYCHIATRY & NEUROLOGY

## 2020-05-26 NOTE — PROGRESS NOTES
"  Telephone Appointment Visit   As a means of avoiding spread of COVID-19, this visit is being conducted by telephone. This telephone visit was initiated by the patient and they verbally consented.    Time at start of call: 10:34 am    Reason for Call:  Medication Follow-up    Patient Comments / History:   The patient reported that he has continued to do well.  He says he is sleeping \"great.\"  He is trying to be as active as he can.  He is going to be 95 degrees outside today and he says he is going to put on his shorts and flip-flops and go for a walk.  He is continuing to do social distancing but wishes he could socialize more, otherwise he is doing really well.  He denies any medication side effects.  He seemed to be a little bit hard of hearing, as this MD had to repeat questions and speak much more loudly.    Labs / Images Reviewed   None     Assessment and Plan:     1. Depression, unspecified depression type    2. Other insomnia  Continue Seroquel 50 mg nightly, noted at his last appointment we had discussed reducing it down to 25 mg by cutting it in half.  It is unclear whether the patient continued at 50 mg or is only taking 25 mg.  Let the patient know that at this clinic may continue for some time doing video appointments and encouraged him to get his MyChart and Zoom up and running.  However because he was having difficulty hearing and understanding, it is unclear whether he understood.    Follow-up: No follow-ups on file.8 weeks or call sooner PRN    Time at end of call: 10:44 am  Total Time Spent: 5-10 minutes    Violeta Brown M.D.  "

## 2020-05-29 ENCOUNTER — OFFICE VISIT (OUTPATIENT)
Dept: CARDIOLOGY | Facility: MEDICAL CENTER | Age: 80
End: 2020-05-29
Payer: MEDICARE

## 2020-05-29 VITALS
HEART RATE: 98 BPM | OXYGEN SATURATION: 96 % | DIASTOLIC BLOOD PRESSURE: 80 MMHG | WEIGHT: 204 LBS | BODY MASS INDEX: 26.18 KG/M2 | SYSTOLIC BLOOD PRESSURE: 132 MMHG | HEIGHT: 74 IN

## 2020-05-29 DIAGNOSIS — I42.9 CARDIOMYOPATHY, UNSPECIFIED TYPE (HCC): ICD-10-CM

## 2020-05-29 DIAGNOSIS — I48.19 OTHER PERSISTENT ATRIAL FIBRILLATION (HCC): ICD-10-CM

## 2020-05-29 PROCEDURE — 99213 OFFICE O/P EST LOW 20 MIN: CPT | Performed by: INTERNAL MEDICINE

## 2020-05-29 ASSESSMENT — ENCOUNTER SYMPTOMS
ORTHOPNEA: 0
PALPITATIONS: 0
SHORTNESS OF BREATH: 0
DIZZINESS: 0
DEPRESSION: 0
PND: 0
ABDOMINAL PAIN: 0
FALLS: 0
LOSS OF CONSCIOUSNESS: 0

## 2020-05-29 ASSESSMENT — FIBROSIS 4 INDEX: FIB4 SCORE: 1.18
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
## 2020-06-29 RX ORDER — QUETIAPINE FUMARATE 50 MG/1
TABLET, FILM COATED ORAL
Qty: 30 TAB | Refills: 2 | Status: SHIPPED | OUTPATIENT
Start: 2020-06-29 | End: 2020-09-25

## 2020-09-25 RX ORDER — QUETIAPINE FUMARATE 50 MG/1
TABLET, FILM COATED ORAL
Qty: 30 TAB | Refills: 2 | Status: SHIPPED | OUTPATIENT
Start: 2020-09-25 | End: 2020-12-30

## 2020-12-03 ENCOUNTER — OFFICE VISIT (OUTPATIENT)
Dept: CARDIOLOGY | Facility: MEDICAL CENTER | Age: 80
End: 2020-12-03
Payer: MEDICARE

## 2020-12-03 VITALS
HEART RATE: 86 BPM | DIASTOLIC BLOOD PRESSURE: 88 MMHG | WEIGHT: 214 LBS | SYSTOLIC BLOOD PRESSURE: 132 MMHG | OXYGEN SATURATION: 96 % | HEIGHT: 74 IN | BODY MASS INDEX: 27.46 KG/M2

## 2020-12-03 DIAGNOSIS — I48.19 OTHER PERSISTENT ATRIAL FIBRILLATION (HCC): ICD-10-CM

## 2020-12-03 DIAGNOSIS — I42.9 CARDIOMYOPATHY, UNSPECIFIED TYPE (HCC): ICD-10-CM

## 2020-12-03 DIAGNOSIS — I10 ESSENTIAL HYPERTENSION, BENIGN: ICD-10-CM

## 2020-12-03 DIAGNOSIS — E78.00 HYPERCHOLESTEREMIA: ICD-10-CM

## 2020-12-03 DIAGNOSIS — Z79.899 ENCOUNTER FOR LONG-TERM (CURRENT) USE OF HIGH-RISK MEDICATION: ICD-10-CM

## 2020-12-03 PROCEDURE — 99215 OFFICE O/P EST HI 40 MIN: CPT | Performed by: INTERNAL MEDICINE

## 2020-12-03 ASSESSMENT — ENCOUNTER SYMPTOMS
PND: 0
DIZZINESS: 0
LOSS OF CONSCIOUSNESS: 0
PALPITATIONS: 0
ABDOMINAL PAIN: 0
FALLS: 0
ORTHOPNEA: 0
SHORTNESS OF BREATH: 0
DEPRESSION: 0

## 2020-12-03 ASSESSMENT — FIBROSIS 4 INDEX: FIB4 SCORE: 1.19
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
## 2020-12-07 ENCOUNTER — TELEPHONE (OUTPATIENT)
Dept: CARDIOLOGY | Facility: MEDICAL CENTER | Age: 80
End: 2020-12-07

## 2020-12-08 NOTE — TELEPHONE ENCOUNTER
Called pt's spouse Mar to discuss. She received a refill from pt's local pharmacy for metoprolol tartrate and wanted to know if this is what the pt should be taking. Pt has a supply of Toprol from mail order pharmacy that is current. Advised metoprolol tartrate was discontinued in February and pt should not take this medication. Per MAR, pt should stay on Toprol-XL 200mg daily. Mar verbalized understanding of instructions and appreciative of call back.

## 2020-12-09 ENCOUNTER — HOSPITAL ENCOUNTER (OUTPATIENT)
Dept: LAB | Facility: MEDICAL CENTER | Age: 80
End: 2020-12-09
Attending: FAMILY MEDICINE
Payer: MEDICARE

## 2020-12-09 LAB
ALBUMIN SERPL BCP-MCNC: 4.4 G/DL (ref 3.2–4.9)
ALBUMIN/GLOB SERPL: 1.8 G/DL
ALP SERPL-CCNC: 57 U/L (ref 30–99)
ALT SERPL-CCNC: 19 U/L (ref 2–50)
ANION GAP SERPL CALC-SCNC: 10 MMOL/L (ref 7–16)
AST SERPL-CCNC: 15 U/L (ref 12–45)
BASOPHILS # BLD AUTO: 0.4 % (ref 0–1.8)
BASOPHILS # BLD: 0.04 K/UL (ref 0–0.12)
BILIRUB SERPL-MCNC: 1.8 MG/DL (ref 0.1–1.5)
BUN SERPL-MCNC: 11 MG/DL (ref 8–22)
CALCIUM SERPL-MCNC: 9.1 MG/DL (ref 8.5–10.5)
CHLORIDE SERPL-SCNC: 100 MMOL/L (ref 96–112)
CHOLEST SERPL-MCNC: 167 MG/DL (ref 100–199)
CO2 SERPL-SCNC: 26 MMOL/L (ref 20–33)
CREAT SERPL-MCNC: 0.64 MG/DL (ref 0.5–1.4)
EOSINOPHIL # BLD AUTO: 0.07 K/UL (ref 0–0.51)
EOSINOPHIL NFR BLD: 0.7 % (ref 0–6.9)
ERYTHROCYTE [DISTWIDTH] IN BLOOD BY AUTOMATED COUNT: 47.7 FL (ref 35.9–50)
FASTING STATUS PATIENT QL REPORTED: NORMAL
GLOBULIN SER CALC-MCNC: 2.5 G/DL (ref 1.9–3.5)
GLUCOSE SERPL-MCNC: 117 MG/DL (ref 65–99)
HCT VFR BLD AUTO: 47.6 % (ref 42–52)
HDLC SERPL-MCNC: 47 MG/DL
HGB BLD-MCNC: 16.4 G/DL (ref 14–18)
IMM GRANULOCYTES # BLD AUTO: 0.09 K/UL (ref 0–0.11)
IMM GRANULOCYTES NFR BLD AUTO: 0.9 % (ref 0–0.9)
LDLC SERPL CALC-MCNC: 102 MG/DL
LYMPHOCYTES # BLD AUTO: 1.44 K/UL (ref 1–4.8)
LYMPHOCYTES NFR BLD: 13.8 % (ref 22–41)
MCH RBC QN AUTO: 32.5 PG (ref 27–33)
MCHC RBC AUTO-ENTMCNC: 34.5 G/DL (ref 33.7–35.3)
MCV RBC AUTO: 94.4 FL (ref 81.4–97.8)
MONOCYTES # BLD AUTO: 0.68 K/UL (ref 0–0.85)
MONOCYTES NFR BLD AUTO: 6.5 % (ref 0–13.4)
NEUTROPHILS # BLD AUTO: 8.13 K/UL (ref 1.82–7.42)
NEUTROPHILS NFR BLD: 77.7 % (ref 44–72)
NRBC # BLD AUTO: 0 K/UL
NRBC BLD-RTO: 0 /100 WBC
PLATELET # BLD AUTO: 197 K/UL (ref 164–446)
PMV BLD AUTO: 9.7 FL (ref 9–12.9)
POTASSIUM SERPL-SCNC: 3.8 MMOL/L (ref 3.6–5.5)
PROT SERPL-MCNC: 6.9 G/DL (ref 6–8.2)
RBC # BLD AUTO: 5.04 M/UL (ref 4.7–6.1)
SODIUM SERPL-SCNC: 136 MMOL/L (ref 135–145)
TRIGL SERPL-MCNC: 90 MG/DL (ref 0–149)
TSH SERPL DL<=0.005 MIU/L-ACNC: 3.33 UIU/ML (ref 0.38–5.33)
WBC # BLD AUTO: 10.5 K/UL (ref 4.8–10.8)

## 2020-12-09 PROCEDURE — 80061 LIPID PANEL: CPT

## 2020-12-09 PROCEDURE — 85025 COMPLETE CBC W/AUTO DIFF WBC: CPT

## 2020-12-09 PROCEDURE — 83036 HEMOGLOBIN GLYCOSYLATED A1C: CPT

## 2020-12-09 PROCEDURE — 84443 ASSAY THYROID STIM HORMONE: CPT

## 2020-12-09 PROCEDURE — 80053 COMPREHEN METABOLIC PANEL: CPT

## 2020-12-09 PROCEDURE — 36415 COLL VENOUS BLD VENIPUNCTURE: CPT

## 2020-12-10 LAB
EST. AVERAGE GLUCOSE BLD GHB EST-MCNC: 103 MG/DL
HBA1C MFR BLD: 5.2 % (ref 0–5.6)

## 2020-12-30 RX ORDER — QUETIAPINE FUMARATE 50 MG/1
TABLET, FILM COATED ORAL
Qty: 30 TAB | Refills: 2 | Status: SHIPPED | OUTPATIENT
Start: 2020-12-30 | End: 2021-03-25

## 2021-01-11 RX ORDER — DIVALPROEX SODIUM 125 MG/1
CAPSULE, COATED PELLETS ORAL
Qty: 60 CAP | Refills: 1 | Status: SHIPPED | OUTPATIENT
Start: 2021-01-11 | End: 2021-05-04

## 2021-01-14 DIAGNOSIS — Z23 NEED FOR VACCINATION: ICD-10-CM

## 2021-01-25 ENCOUNTER — TELEPHONE (OUTPATIENT)
Dept: BEHAVIORAL HEALTH | Facility: CLINIC | Age: 81
End: 2021-01-25

## 2021-01-25 NOTE — TELEPHONE ENCOUNTER
Patient's wife called, pt gave a verbal okay for us to speak with his wife.     Wife was concerned because they requested a refill and received a notice that a f/u was needed for additional refills. The patient's wife's concern was that to her knowledge divalproex was used for seizures and the patient has never had seizures. She would like to know if her can stop the medication all together. Please advise.

## 2021-01-29 DIAGNOSIS — I10 ESSENTIAL HYPERTENSION, BENIGN: ICD-10-CM

## 2021-01-29 DIAGNOSIS — I50.1 HEART FAILURE, LEFT, WITH LVEF <=30% (HCC): ICD-10-CM

## 2021-01-29 RX ORDER — FUROSEMIDE 20 MG/1
TABLET ORAL
Qty: 30 TAB | Refills: 6 | Status: SHIPPED | OUTPATIENT
Start: 2021-01-29 | End: 2021-06-07

## 2021-02-01 ENCOUNTER — TELEPHONE (OUTPATIENT)
Dept: CARDIOLOGY | Facility: MEDICAL CENTER | Age: 81
End: 2021-02-01

## 2021-02-01 DIAGNOSIS — I48.19 OTHER PERSISTENT ATRIAL FIBRILLATION (HCC): ICD-10-CM

## 2021-02-01 DIAGNOSIS — Z79.899 ENCOUNTER FOR LONG-TERM (CURRENT) USE OF HIGH-RISK MEDICATION: ICD-10-CM

## 2021-02-01 NOTE — TELEPHONE ENCOUNTER
AA    Pts wife Rosana called stating Pt needs a refill of Eliquis sent to [facility]. The pharmacy told Pt that AA took Pt off of this medication. Please call Rosana to discuss at [phone].

## 2021-02-07 DIAGNOSIS — I10 ESSENTIAL HYPERTENSION, BENIGN: ICD-10-CM

## 2021-02-07 DIAGNOSIS — I48.19 OTHER PERSISTENT ATRIAL FIBRILLATION (HCC): ICD-10-CM

## 2021-02-10 RX ORDER — METOPROLOL SUCCINATE 200 MG
TABLET, EXTENDED RELEASE 24 HR ORAL
Qty: 90 TABLET | Refills: 1 | Status: SHIPPED
Start: 2021-02-10 | End: 2021-06-23

## 2021-02-10 RX ORDER — DIGOXIN 0.12 MG/1
TABLET ORAL
Qty: 90 TABLET | Refills: 1 | Status: SHIPPED | OUTPATIENT
Start: 2021-02-10 | End: 2021-08-11

## 2021-02-11 DIAGNOSIS — I10 BENIGN HYPERTENSION: ICD-10-CM

## 2021-02-11 DIAGNOSIS — I10 ESSENTIAL HYPERTENSION: ICD-10-CM

## 2021-02-19 RX ORDER — LOSARTAN POTASSIUM 25 MG/1
TABLET ORAL
Qty: 90 TABLET | Refills: 2 | Status: SHIPPED | OUTPATIENT
Start: 2021-02-19 | End: 2021-05-04

## 2021-03-25 RX ORDER — QUETIAPINE FUMARATE 50 MG/1
TABLET, FILM COATED ORAL
Qty: 30 TABLET | Refills: 1 | Status: SHIPPED | OUTPATIENT
Start: 2021-03-25 | End: 2021-06-07

## 2021-04-27 ENCOUNTER — TELEPHONE (OUTPATIENT)
Dept: CARDIOLOGY | Facility: MEDICAL CENTER | Age: 81
End: 2021-04-27

## 2021-04-27 NOTE — TELEPHONE ENCOUNTER
AA    Pts wife Mar called stating Pt went to get his teeth cleaned and they wouldn't do it because his blood pressure is too high at 183/126. Tried reaching nurse, but unavailable.  Please call aMr back at [phone] or cell# [phone].    Thank you

## 2021-04-27 NOTE — TELEPHONE ENCOUNTER
"Called pt's spouse Mar back. She confirms note by Karly. She provided the most recent BP readings below:    04/25 176/98    04/24 166/97  04/23 134/84    She was not able to write down HR readings. She reports the pt's only significant symptoms is that he is \"sleepy a lot and tired.\" She states he goes to bed around 10-10:30 at night and wakes up early. He often takes a nap during the day. No other cardiac symptoms reported. She confirms no recent med changes to MAR. Advised hydration with water and continue to monitor BP for now. Advised will reach out to AA for any med recommendations and give them a call back. Confirmed pt's [facility] pharmacy on [address]. Verbalized understanding and appreciative of call back.  "

## 2021-04-30 NOTE — TELEPHONE ENCOUNTER
I called and LM for her to call back with newest BP's and schedule a sooner appt with AA or YENIFER.

## 2021-05-04 ENCOUNTER — OFFICE VISIT (OUTPATIENT)
Dept: CARDIOLOGY | Facility: MEDICAL CENTER | Age: 81
End: 2021-05-04
Payer: MEDICARE

## 2021-05-04 VITALS
WEIGHT: 210 LBS | BODY MASS INDEX: 26.95 KG/M2 | HEIGHT: 74 IN | HEART RATE: 92 BPM | DIASTOLIC BLOOD PRESSURE: 82 MMHG | SYSTOLIC BLOOD PRESSURE: 138 MMHG | OXYGEN SATURATION: 95 %

## 2021-05-04 DIAGNOSIS — I10 ESSENTIAL HYPERTENSION: ICD-10-CM

## 2021-05-04 DIAGNOSIS — Z79.01 ANTICOAGULANT LONG-TERM USE: ICD-10-CM

## 2021-05-04 DIAGNOSIS — E78.5 DYSLIPIDEMIA: ICD-10-CM

## 2021-05-04 DIAGNOSIS — I48.19 OTHER PERSISTENT ATRIAL FIBRILLATION (HCC): ICD-10-CM

## 2021-05-04 DIAGNOSIS — I10 ESSENTIAL HYPERTENSION, BENIGN: ICD-10-CM

## 2021-05-04 DIAGNOSIS — E03.9 HYPOTHYROIDISM, UNSPECIFIED TYPE: ICD-10-CM

## 2021-05-04 PROBLEM — C43.9 MALIGNANT MELANOMA (HCC): Status: ACTIVE | Noted: 2021-05-04

## 2021-05-04 PROCEDURE — 99214 OFFICE O/P EST MOD 30 MIN: CPT | Performed by: NURSE PRACTITIONER

## 2021-05-04 RX ORDER — LOSARTAN POTASSIUM 50 MG/1
25 TABLET ORAL DAILY
Qty: 90 TABLET | Refills: 3 | Status: SHIPPED | OUTPATIENT
Start: 2021-05-04 | End: 2021-05-04 | Stop reason: SDUPTHER

## 2021-05-04 RX ORDER — LOSARTAN POTASSIUM 50 MG/1
50 TABLET ORAL DAILY
Qty: 90 TABLET | Refills: 3 | Status: SHIPPED | OUTPATIENT
Start: 2021-05-04 | End: 2021-06-07 | Stop reason: SDUPTHER

## 2021-05-04 ASSESSMENT — ENCOUNTER SYMPTOMS
LOSS OF CONSCIOUSNESS: 0
PND: 0
INSOMNIA: 1
DEPRESSION: 1
CHILLS: 0
ORTHOPNEA: 0
SHORTNESS OF BREATH: 0
PALPITATIONS: 0
NAUSEA: 0
BRUISES/BLEEDS EASILY: 0
MYALGIAS: 0
COUGH: 0
FEVER: 0
DIZZINESS: 0
ABDOMINAL PAIN: 0
HEADACHES: 0

## 2021-05-04 ASSESSMENT — FIBROSIS 4 INDEX: FIB4 SCORE: 1.43
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
## 2021-05-12 ENCOUNTER — HOSPITAL ENCOUNTER (OUTPATIENT)
Dept: LAB | Facility: MEDICAL CENTER | Age: 81
End: 2021-05-12
Attending: NURSE PRACTITIONER
Payer: MEDICARE

## 2021-05-12 DIAGNOSIS — I10 ESSENTIAL HYPERTENSION: ICD-10-CM

## 2021-05-12 LAB
ANION GAP SERPL CALC-SCNC: 10 MMOL/L (ref 7–16)
BUN SERPL-MCNC: 10 MG/DL (ref 8–22)
CALCIUM SERPL-MCNC: 9.2 MG/DL (ref 8.5–10.5)
CHLORIDE SERPL-SCNC: 107 MMOL/L (ref 96–112)
CO2 SERPL-SCNC: 28 MMOL/L (ref 20–33)
CREAT SERPL-MCNC: 0.64 MG/DL (ref 0.5–1.4)
FASTING STATUS PATIENT QL REPORTED: NORMAL
GLUCOSE SERPL-MCNC: 91 MG/DL (ref 65–99)
POTASSIUM SERPL-SCNC: 4.4 MMOL/L (ref 3.6–5.5)
SODIUM SERPL-SCNC: 145 MMOL/L (ref 135–145)

## 2021-05-12 PROCEDURE — 80048 BASIC METABOLIC PNL TOTAL CA: CPT

## 2021-05-12 PROCEDURE — 36415 COLL VENOUS BLD VENIPUNCTURE: CPT

## 2021-05-13 ENCOUNTER — TELEPHONE (OUTPATIENT)
Dept: CARDIOLOGY | Facility: MEDICAL CENTER | Age: 81
End: 2021-05-13

## 2021-05-13 NOTE — TELEPHONE ENCOUNTER
----- Message from TORREY Duffy sent at 5/13/2021 12:29 PM PDT -----  BMP totally normal - good news. Continue same meds/doses, and keep FU with AA on 6/7.  Thanks, AB

## 2021-06-02 ENCOUNTER — HOSPITAL ENCOUNTER (EMERGENCY)
Facility: MEDICAL CENTER | Age: 81
End: 2021-06-02
Attending: EMERGENCY MEDICINE
Payer: MEDICARE

## 2021-06-02 VITALS
RESPIRATION RATE: 18 BRPM | HEIGHT: 74 IN | SYSTOLIC BLOOD PRESSURE: 119 MMHG | BODY MASS INDEX: 27.36 KG/M2 | HEART RATE: 82 BPM | OXYGEN SATURATION: 97 % | DIASTOLIC BLOOD PRESSURE: 70 MMHG | TEMPERATURE: 97.4 F | WEIGHT: 213.19 LBS

## 2021-06-02 DIAGNOSIS — Z51.89 ENCOUNTER FOR WOUND RE-CHECK: ICD-10-CM

## 2021-06-02 DIAGNOSIS — T81.30XA WOUND DEHISCENCE: ICD-10-CM

## 2021-06-02 PROCEDURE — 99282 EMERGENCY DEPT VISIT SF MDM: CPT

## 2021-06-02 ASSESSMENT — FIBROSIS 4 INDEX: FIB4 SCORE: 1.43

## 2021-06-03 NOTE — ED NOTES
Pt wound maintains adequate coverage with surgi-foam. Pt sent home with spare surgi-foam for future use. Pt will follow up with dermatology tomorrow. Pt acknowledges DC instructions and ambulates out of ED with wife without difficulty.

## 2021-06-03 NOTE — ED PROVIDER NOTES
"ED Provider Note    ED Provider Note    Primary care provider: Maame Ruelas M.D.  Means of arrival: Private vehicle  History obtained from: Patient  History limited by: None    CHIEF COMPLAINT  Chief Complaint   Patient presents with   • Wound Check     Reports he had a basal cell carcinoma removed from his L face, and \"they took the stiches out but it keeps bleeding\".        HPI  Pedro Champion is a 80 y.o. male who presents to the Emergency Department p/w CC of left sided facial bleeding.   Received call re: basal cell carcinoma.   2 months ago dx w/ carcinoma.   Prior hx of hematoma.  Required stitching 1 month ago.   No improvement in continous bleeding since.  [facility]: luis mcdonald.   Follow up appointment unknown at this time.     Pt reports might have nicked scab with wash cloth.   On Eliquis for Afib.     REVIEW OF SYSTEMS  Pertinent negatives include no fever, swelling, blood in stool or bloody nose.      PAST MEDICAL HISTORY   has a past medical history of Arrhythmia, Basal cell carcinoma, Cardiomyopathy (HCC) (02/2020), Chronic anticoagulation, Depression, Hearing loss, Hypertension, and Insomnia.    SURGICAL HISTORY   has a past surgical history that includes inguinal hernia repair bilateral (1960's); lumbar decompression (1988); parotidectomy superficial (7/19/2013); submandible abscess incision and drainage (N/A, 8/31/2019); intermaxillary fixatation (N/A, 8/31/2019); dental surgery procedure (Left, 10/13/2019); mandible fracture orif (Bilateral, 10/13/2019); and oral fistula repair (N/A, 10/13/2019).    SOCIAL HISTORY  Social History     Tobacco Use   • Smoking status: Never Smoker   • Smokeless tobacco: Never Used   Vaping Use   • Vaping Use: Never used   Substance Use Topics   • Alcohol use: Yes     Alcohol/week: 8.4 oz     Types: 14 Glasses of wine per week     Comment: 2 glasses of wine a day   • Drug use: Never      Social History     Substance and Sexual Activity   Drug " "Use Never       FAMILY HISTORY  Family History   Problem Relation Age of Onset   • Heart Disease Father         Sudden cardiac death probably coronary       CURRENT MEDICATIONS  Home Medications    **Home medications have not yet been reviewed for this encounter**         ALLERGIES  Allergies   Allergen Reactions   • Cefepime Hcl Rash     Hives, eosinophilia        PHYSICAL EXAM    VITAL SIGNS: /70   Pulse 82   Temp 36.3 °C (97.4 °F) (Temporal)   Resp 18   Ht 1.88 m (6' 2\")   Wt 96.7 kg (213 lb 3 oz)   SpO2 97%   BMI 27.37 kg/m²  @ILEANA[[id number]::@  Pulse ox interpretation: I interpret this pulse ox as normal.  Constitutional: Alert in no apparent distress.  HENT: Normocephalic, Atraumatic, Bilateral external ears normal. Nose normal. Left facial wound with 0.4cm area of prior biopsy. At time of exam surgifoam on wound.  No ongoing bleeding at this time. No hematoma. Uvula midline.   Eyes: Pupils are equal and reactive. Conjunctiva normal, non-icteric.   Heart: Regular rate and rythm, no murmurs.    Lungs: Clear to auscultation bilaterally.  Skin: Warm, Dry, No erythema, No rash.   Neurologic: Alert, Grossly non-focal.   Psychiatric: Affect normal, Judgment normal, Mood normal, Appears appropriate and not intoxicated.     LABS  Labs Reviewed - No data to display   All labs reviewed by me.    RADIOLOGY  No orders to display     The radiologist's interpretation of all radiological studies have been reviewed by me.    COURSE & MEDICAL DECISION MAKING  Nursing notes, VS, PMSFHx reviewed in chart.  I verified that the patient was wearing a mask and I was wearing appropriate PPE every time I entered the room. The patient's mask was on the patient at all times during my encounter except for a brief view of the oropharynx.     10:02 PM Patient seen and examined at bedside.     80 y.o. male p/w CC of bleeding from prior bx site in pt on eloquis for Afib    Differential diagnosis includes is not limited to:  Patient " reports bleeding from site which sutures have been removed for the last 3 weeks  He reports intermittent ongoing oozing  He reports more bleeding from wound tonight prior to arrival and prior to surgifoam.  Shared decision making had with patient and wife who agree to keep surgicel on wound and to follow-up with dermatologist office tomorrow.      Today I offered to washout wound thoroughly and consider gluing wound or suturing wound closed however given availability of their dermatologist and plan to call to schedule close follow-up appointment they opted for close follow up tomorrow with their dermatologist.  Patient also agrees to discuss ongoing Eliquis usage with primary care physician.    Given surgicel packet to take home in case of dehiscence      Patient has had high blood pressure while in the emergency department, felt likely secondary to medical condition. Counseled patient to monitor blood pressure at home and follow up with primary care physician.      DISPOSITION:  Patient will be discharged home in stable condition.    FINAL IMPRESSION  1. Encounter for wound re-check    2. Wound dehiscence         Electronically signed by: Cameron Salamanca M.D., 6/2/2021 10:02 PM

## 2021-06-07 ENCOUNTER — OFFICE VISIT (OUTPATIENT)
Dept: CARDIOLOGY | Facility: MEDICAL CENTER | Age: 81
End: 2021-06-07
Payer: MEDICARE

## 2021-06-07 VITALS
HEART RATE: 100 BPM | DIASTOLIC BLOOD PRESSURE: 100 MMHG | BODY MASS INDEX: 26.44 KG/M2 | WEIGHT: 206 LBS | OXYGEN SATURATION: 96 % | SYSTOLIC BLOOD PRESSURE: 144 MMHG | HEIGHT: 74 IN

## 2021-06-07 DIAGNOSIS — R53.83 FATIGUE, UNSPECIFIED TYPE: ICD-10-CM

## 2021-06-07 DIAGNOSIS — E78.5 DYSLIPIDEMIA: ICD-10-CM

## 2021-06-07 DIAGNOSIS — I42.9 CARDIOMYOPATHY, UNSPECIFIED TYPE (HCC): ICD-10-CM

## 2021-06-07 DIAGNOSIS — Z79.899 ENCOUNTER FOR LONG-TERM (CURRENT) USE OF HIGH-RISK MEDICATION: ICD-10-CM

## 2021-06-07 DIAGNOSIS — I48.19 OTHER PERSISTENT ATRIAL FIBRILLATION (HCC): ICD-10-CM

## 2021-06-07 DIAGNOSIS — I10 ESSENTIAL HYPERTENSION, BENIGN: ICD-10-CM

## 2021-06-07 PROCEDURE — 99214 OFFICE O/P EST MOD 30 MIN: CPT | Performed by: INTERNAL MEDICINE

## 2021-06-07 RX ORDER — LOSARTAN POTASSIUM 50 MG/1
75 TABLET ORAL DAILY
Qty: 135 TABLET | Refills: 3 | Status: SHIPPED | OUTPATIENT
Start: 2021-06-07 | End: 2021-06-08 | Stop reason: SDUPTHER

## 2021-06-07 ASSESSMENT — ENCOUNTER SYMPTOMS
DIZZINESS: 0
DEPRESSION: 0
ABDOMINAL PAIN: 0
LOSS OF CONSCIOUSNESS: 0
PND: 0
SHORTNESS OF BREATH: 0
PALPITATIONS: 0
ORTHOPNEA: 0
FALLS: 0

## 2021-06-07 ASSESSMENT — FIBROSIS 4 INDEX: FIB4 SCORE: 1.43
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
## 2021-06-08 ENCOUNTER — TELEPHONE (OUTPATIENT)
Dept: CARDIOLOGY | Facility: MEDICAL CENTER | Age: 81
End: 2021-06-08

## 2021-06-08 RX ORDER — LOSARTAN POTASSIUM 50 MG/1
75 TABLET ORAL DAILY
Qty: 135 TABLET | Refills: 3 | Status: SHIPPED | OUTPATIENT
Start: 2021-06-08 | End: 2021-06-22 | Stop reason: SDUPTHER

## 2021-06-08 NOTE — TELEPHONE ENCOUNTER
AA    Pts wife Mar called stating [facility] on [address] did not receive Pts prescription of losartan. Mar is asking if it could be sent again.    Thank you

## 2021-06-10 RX ORDER — QUETIAPINE FUMARATE 50 MG/1
50 TABLET, FILM COATED ORAL 2 TIMES DAILY
COMMUNITY
End: 2021-06-24

## 2021-06-10 RX ORDER — QUETIAPINE FUMARATE 50 MG/1
50 TABLET, FILM COATED ORAL 2 TIMES DAILY
Qty: 60 TABLET | OUTPATIENT
Start: 2021-06-10

## 2021-06-18 ENCOUNTER — HOSPITAL ENCOUNTER (OUTPATIENT)
Dept: LAB | Facility: MEDICAL CENTER | Age: 81
End: 2021-06-18
Attending: INTERNAL MEDICINE
Payer: MEDICARE

## 2021-06-18 DIAGNOSIS — I10 ESSENTIAL HYPERTENSION, BENIGN: ICD-10-CM

## 2021-06-18 LAB
ANION GAP SERPL CALC-SCNC: 12 MMOL/L (ref 7–16)
BUN SERPL-MCNC: 10 MG/DL (ref 8–22)
CALCIUM SERPL-MCNC: 8.8 MG/DL (ref 8.5–10.5)
CHLORIDE SERPL-SCNC: 102 MMOL/L (ref 96–112)
CO2 SERPL-SCNC: 22 MMOL/L (ref 20–33)
CREAT SERPL-MCNC: 0.63 MG/DL (ref 0.5–1.4)
GLUCOSE SERPL-MCNC: 123 MG/DL (ref 65–99)
POTASSIUM SERPL-SCNC: 3.8 MMOL/L (ref 3.6–5.5)
SODIUM SERPL-SCNC: 136 MMOL/L (ref 135–145)

## 2021-06-18 PROCEDURE — 36415 COLL VENOUS BLD VENIPUNCTURE: CPT

## 2021-06-18 PROCEDURE — 80048 BASIC METABOLIC PNL TOTAL CA: CPT

## 2021-06-21 ENCOUNTER — NON-PROVIDER VISIT (OUTPATIENT)
Dept: CARDIOLOGY | Facility: MEDICAL CENTER | Age: 81
End: 2021-06-21
Payer: MEDICARE

## 2021-06-21 ENCOUNTER — TELEPHONE (OUTPATIENT)
Dept: CARDIOLOGY | Facility: MEDICAL CENTER | Age: 81
End: 2021-06-21

## 2021-06-21 VITALS
BODY MASS INDEX: 26.44 KG/M2 | DIASTOLIC BLOOD PRESSURE: 80 MMHG | WEIGHT: 206 LBS | OXYGEN SATURATION: 100 % | SYSTOLIC BLOOD PRESSURE: 150 MMHG | HEART RATE: 98 BPM | HEIGHT: 74 IN

## 2021-06-21 ASSESSMENT — FIBROSIS 4 INDEX: FIB4 SCORE: 1.43

## 2021-06-21 NOTE — TELEPHONE ENCOUNTER
AA    Pts wife Rosana called to check on the medical clearance form for Pt to get a dental cleaning. Please call Rosana back at [phone].    Thank you

## 2021-06-22 ENCOUNTER — HOSPITAL ENCOUNTER (OUTPATIENT)
Facility: MEDICAL CENTER | Age: 81
End: 2021-06-22
Attending: FAMILY MEDICINE
Payer: MEDICARE

## 2021-06-22 LAB
ALBUMIN SERPL BCP-MCNC: 4.4 G/DL (ref 3.2–4.9)
ALBUMIN/GLOB SERPL: 2.1 G/DL
ALP SERPL-CCNC: 51 U/L (ref 30–99)
ALT SERPL-CCNC: 17 U/L (ref 2–50)
ANION GAP SERPL CALC-SCNC: 9 MMOL/L (ref 7–16)
AST SERPL-CCNC: 21 U/L (ref 12–45)
BILIRUB SERPL-MCNC: 2 MG/DL (ref 0.1–1.5)
BUN SERPL-MCNC: 7 MG/DL (ref 8–22)
CALCIUM SERPL-MCNC: 8.9 MG/DL (ref 8.5–10.5)
CHLORIDE SERPL-SCNC: 101 MMOL/L (ref 96–112)
CO2 SERPL-SCNC: 25 MMOL/L (ref 20–33)
CREAT SERPL-MCNC: 0.67 MG/DL (ref 0.5–1.4)
GLOBULIN SER CALC-MCNC: 2.1 G/DL (ref 1.9–3.5)
GLUCOSE SERPL-MCNC: 135 MG/DL (ref 65–99)
POTASSIUM SERPL-SCNC: 4 MMOL/L (ref 3.6–5.5)
PROT SERPL-MCNC: 6.5 G/DL (ref 6–8.2)
SODIUM SERPL-SCNC: 135 MMOL/L (ref 135–145)

## 2021-06-22 PROCEDURE — 80053 COMPREHEN METABOLIC PANEL: CPT

## 2021-06-22 RX ORDER — LOSARTAN POTASSIUM 50 MG/1
75 TABLET ORAL DAILY
Qty: 135 TABLET | Refills: 3 | Status: SHIPPED | OUTPATIENT
Start: 2021-06-22 | End: 2021-06-23

## 2021-06-22 NOTE — TELEPHONE ENCOUNTER
Anastasia Mosqueda, [facility]  Nicci Dowell M.D.; Ashley Betancur R.N.  Good Afternoon, I just did a blood pressure check on this patient it was 150/80. He states his dentist will not see him as he has high blood pressure he would like to know if he can be cleared to get his teeth clean. He also states that his losartan was not sent to [facility] on [address] and would like that to be sent. I scanned in his blood pressure logs and also his clearance for the dentist.   ----------------------------------------------------------------------------------------------------------------    Losartan rx resent.

## 2021-06-23 ENCOUNTER — TELEPHONE (OUTPATIENT)
Dept: CARDIOLOGY | Facility: MEDICAL CENTER | Age: 81
End: 2021-06-23

## 2021-06-23 DIAGNOSIS — I10 ESSENTIAL HYPERTENSION, BENIGN: ICD-10-CM

## 2021-06-23 RX ORDER — LOSARTAN POTASSIUM 100 MG/1
100 TABLET ORAL DAILY
Qty: 90 TABLET | Refills: 3 | Status: SHIPPED | OUTPATIENT
Start: 2021-06-23 | End: 2022-05-25 | Stop reason: SDUPTHER

## 2021-06-23 RX ORDER — CARVEDILOL 3.12 MG/1
3.12 TABLET ORAL 2 TIMES DAILY WITH MEALS
Qty: 180 TABLET | Refills: 3 | Status: SHIPPED | OUTPATIENT
Start: 2021-06-23 | End: 2021-09-27 | Stop reason: SDUPTHER

## 2021-06-23 NOTE — TELEPHONE ENCOUNTER
iNcci Dowell M.D.  Anastasia Mosqueda, Med Ass't; Ashley Betancur, NIGHAT.  Thank you Anastasia    Ashley - he cannot be cleared for dental work with such high blood pressures.  His blood pressure cuff does not correlate with ours.  Please increase losartan to 100 mg daily.  Please also discontinue metoprolol XL and instead start him on Coreg 3.125 mg twice daily.   He should get a repeat Chem-7 and a blood pressure check in about 2 weeks or so.   Thank you   ----------------------------------------------------------------------------------------------------------------    [facility]

## 2021-06-23 NOTE — TELEPHONE ENCOUNTER
Nicci Dowell M.D.  Anastasia Mosqueda, Med Ass't; Ashley Betancur, NIGHAT.  Thank you Anastasia    Ashley - he cannot be cleared for dental work with such high blood pressures.  His blood pressure cuff does not correlate with ours.  Please increase losartan to 100 mg daily.  Please also discontinue metoprolol XL and instead start him on Coreg 3.125 mg twice daily.   He should get a repeat Chem-7 and a blood pressure check in about 2 weeks or so.   Thank you   ------------------------------------------------------------------------------------------------------------    [facility] message

## 2021-06-24 ENCOUNTER — TELEMEDICINE (OUTPATIENT)
Dept: BEHAVIORAL HEALTH | Facility: CLINIC | Age: 81
End: 2021-06-24
Payer: MEDICARE

## 2021-06-24 DIAGNOSIS — G47.09 OTHER INSOMNIA: ICD-10-CM

## 2021-06-24 DIAGNOSIS — F32.A DEPRESSION, UNSPECIFIED DEPRESSION TYPE: ICD-10-CM

## 2021-06-24 PROCEDURE — 99214 OFFICE O/P EST MOD 30 MIN: CPT | Mod: 95 | Performed by: PSYCHIATRY & NEUROLOGY

## 2021-06-24 RX ORDER — QUETIAPINE FUMARATE 25 MG/1
TABLET, FILM COATED ORAL
Qty: 60 TABLET | Refills: 2 | Status: SHIPPED | OUTPATIENT
Start: 2021-06-24 | End: 2021-09-23

## 2021-06-24 RX ORDER — CEPHALEXIN 500 MG/1
CAPSULE ORAL
COMMUNITY
End: 2021-09-27
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
## 2021-07-06 ENCOUNTER — PATIENT MESSAGE (OUTPATIENT)
Dept: CARDIOLOGY | Facility: MEDICAL CENTER | Age: 81
End: 2021-07-06

## 2021-07-06 ENCOUNTER — HOSPITAL ENCOUNTER (OUTPATIENT)
Dept: LAB | Facility: MEDICAL CENTER | Age: 81
End: 2021-07-06
Attending: INTERNAL MEDICINE
Payer: MEDICARE

## 2021-07-06 ENCOUNTER — NON-PROVIDER VISIT (OUTPATIENT)
Dept: CARDIOLOGY | Facility: MEDICAL CENTER | Age: 81
End: 2021-07-06
Payer: MEDICARE

## 2021-07-06 VITALS — DIASTOLIC BLOOD PRESSURE: 86 MMHG | SYSTOLIC BLOOD PRESSURE: 138 MMHG | HEART RATE: 55 BPM

## 2021-07-06 DIAGNOSIS — I10 ESSENTIAL HYPERTENSION, BENIGN: ICD-10-CM

## 2021-07-06 LAB
ANION GAP SERPL CALC-SCNC: 14 MMOL/L (ref 7–16)
BUN SERPL-MCNC: 10 MG/DL (ref 8–22)
CALCIUM SERPL-MCNC: 9.3 MG/DL (ref 8.5–10.5)
CHLORIDE SERPL-SCNC: 101 MMOL/L (ref 96–112)
CO2 SERPL-SCNC: 22 MMOL/L (ref 20–33)
CREAT SERPL-MCNC: 0.75 MG/DL (ref 0.5–1.4)
GLUCOSE SERPL-MCNC: 182 MG/DL (ref 65–99)
POTASSIUM SERPL-SCNC: 4.1 MMOL/L (ref 3.6–5.5)
SODIUM SERPL-SCNC: 137 MMOL/L (ref 135–145)

## 2021-07-06 PROCEDURE — 99999 PR NO CHARGE: CPT | Performed by: INTERNAL MEDICINE

## 2021-07-06 PROCEDURE — 80048 BASIC METABOLIC PNL TOTAL CA: CPT

## 2021-07-06 PROCEDURE — 36415 COLL VENOUS BLD VENIPUNCTURE: CPT

## 2021-07-06 NOTE — TELEPHONE ENCOUNTER
To AA, pt would like to know if he can be cleared for dental work now? Recent BP's listed above.

## 2021-07-06 NOTE — TELEPHONE ENCOUNTER
PT wife, Mar, called back to see if the Pt can be cleared now that their BP has be on track. They can be reached at [phone]. Okay to leave a detailed message.    Thank you,  Rashmi HUNTLEY

## 2021-07-09 NOTE — TELEPHONE ENCOUNTER
New message from Dr. Dowell:  Ok to clear since BP is better.    Please advise:    1- Can patient stop Eliquis for 3 days if needed and resume ASAP?  2- Does patient need antibiotic prophylaxis?  If so, Amoxicillin ok?  3- Is patient ok to proceed with anesthetics  4- Is Epinephrine ok?  5- Any restrictions of pain meds if needed?    Thanks, Nataliia

## 2021-08-11 DIAGNOSIS — I48.19 OTHER PERSISTENT ATRIAL FIBRILLATION (HCC): ICD-10-CM

## 2021-08-11 DIAGNOSIS — Z79.899 ENCOUNTER FOR LONG-TERM (CURRENT) USE OF HIGH-RISK MEDICATION: ICD-10-CM

## 2021-08-12 RX ORDER — APIXABAN 5 MG/1
TABLET, FILM COATED ORAL
Qty: 180 TABLET | Refills: 3 | Status: SHIPPED | OUTPATIENT
Start: 2021-08-12 | End: 2022-08-15 | Stop reason: SDUPTHER

## 2021-08-12 RX ORDER — DIGOXIN 0.12 MG/1
TABLET ORAL
Qty: 90 TABLET | Refills: 3 | Status: SHIPPED | OUTPATIENT
Start: 2021-08-12 | End: 2022-07-24 | Stop reason: SDUPTHER

## 2021-09-23 DIAGNOSIS — G47.09 OTHER INSOMNIA: ICD-10-CM

## 2021-09-23 RX ORDER — QUETIAPINE FUMARATE 25 MG/1
TABLET, FILM COATED ORAL
Qty: 60 TABLET | Refills: 2 | Status: SHIPPED | OUTPATIENT
Start: 2021-09-23 | End: 2021-11-10 | Stop reason: SDUPTHER

## 2021-09-27 ENCOUNTER — OFFICE VISIT (OUTPATIENT)
Dept: CARDIOLOGY | Facility: MEDICAL CENTER | Age: 81
End: 2021-09-27
Payer: MEDICARE

## 2021-09-27 VITALS
WEIGHT: 207 LBS | HEIGHT: 74 IN | SYSTOLIC BLOOD PRESSURE: 132 MMHG | HEART RATE: 94 BPM | DIASTOLIC BLOOD PRESSURE: 68 MMHG | OXYGEN SATURATION: 96 % | BODY MASS INDEX: 26.56 KG/M2 | RESPIRATION RATE: 14 BRPM

## 2021-09-27 DIAGNOSIS — I48.19 OTHER PERSISTENT ATRIAL FIBRILLATION (HCC): ICD-10-CM

## 2021-09-27 DIAGNOSIS — I10 ESSENTIAL HYPERTENSION, BENIGN: ICD-10-CM

## 2021-09-27 DIAGNOSIS — Z79.01 ANTICOAGULANT LONG-TERM USE: ICD-10-CM

## 2021-09-27 DIAGNOSIS — E78.5 DYSLIPIDEMIA: ICD-10-CM

## 2021-09-27 PROBLEM — E55.9 VITAMIN D DEFICIENCY: Status: ACTIVE | Noted: 2021-09-27

## 2021-09-27 PROCEDURE — 99214 OFFICE O/P EST MOD 30 MIN: CPT | Performed by: NURSE PRACTITIONER

## 2021-09-27 RX ORDER — CARVEDILOL 6.25 MG/1
6.25 TABLET ORAL 2 TIMES DAILY WITH MEALS
Qty: 60 TABLET | Refills: 6 | Status: SHIPPED | OUTPATIENT
Start: 2021-09-27 | End: 2022-05-03

## 2021-09-27 ASSESSMENT — ENCOUNTER SYMPTOMS
DIZZINESS: 0
FEVER: 0
PALPITATIONS: 0
CHILLS: 0
INSOMNIA: 1
DEPRESSION: 1
BRUISES/BLEEDS EASILY: 0
HEADACHES: 0
ORTHOPNEA: 0
ABDOMINAL PAIN: 0
COUGH: 0
MYALGIAS: 0
NAUSEA: 0
SHORTNESS OF BREATH: 0
PND: 0
LOSS OF CONSCIOUSNESS: 0

## 2021-09-27 ASSESSMENT — FIBROSIS 4 INDEX: FIB4 SCORE: 2.15
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
## 2021-10-05 ENCOUNTER — TELEPHONE (OUTPATIENT)
Dept: SCHEDULING | Facility: IMAGING CENTER | Age: 81
End: 2021-10-05

## 2021-11-10 ENCOUNTER — TELEMEDICINE (OUTPATIENT)
Dept: BEHAVIORAL HEALTH | Facility: CLINIC | Age: 81
End: 2021-11-10
Payer: MEDICARE

## 2021-11-10 DIAGNOSIS — F41.1 GAD (GENERALIZED ANXIETY DISORDER): ICD-10-CM

## 2021-11-10 DIAGNOSIS — G31.84 MCI (MILD COGNITIVE IMPAIRMENT): ICD-10-CM

## 2021-11-10 DIAGNOSIS — Z79.899 HIGH RISK MEDICATION USE: ICD-10-CM

## 2021-11-10 DIAGNOSIS — G47.09 OTHER INSOMNIA: ICD-10-CM

## 2021-11-10 DIAGNOSIS — R53.83 OTHER FATIGUE: ICD-10-CM

## 2021-11-10 DIAGNOSIS — F33.1 MODERATE EPISODE OF RECURRENT MAJOR DEPRESSIVE DISORDER (HCC): ICD-10-CM

## 2021-11-10 PROCEDURE — 99214 OFFICE O/P EST MOD 30 MIN: CPT | Mod: 95 | Performed by: PSYCHIATRY & NEUROLOGY

## 2021-11-10 PROCEDURE — 90833 PSYTX W PT W E/M 30 MIN: CPT | Mod: 95 | Performed by: PSYCHIATRY & NEUROLOGY

## 2021-11-10 RX ORDER — SERTRALINE HYDROCHLORIDE 25 MG/1
TABLET, FILM COATED ORAL
Qty: 180 TABLET | Refills: 0 | Status: SHIPPED | OUTPATIENT
Start: 2021-11-10 | End: 2022-02-22 | Stop reason: SDUPTHER

## 2021-11-10 RX ORDER — QUETIAPINE FUMARATE 25 MG/1
TABLET, FILM COATED ORAL
Qty: 180 TABLET | Refills: 0 | Status: SHIPPED | OUTPATIENT
Start: 2021-11-10 | End: 2022-03-08

## 2021-11-10 ASSESSMENT — PATIENT HEALTH QUESTIONNAIRE - PHQ9
CLINICAL INTERPRETATION OF PHQ2 SCORE: 4
SUM OF ALL RESPONSES TO PHQ QUESTIONS 1-9: 10
5. POOR APPETITE OR OVEREATING: 0 - NOT AT ALL
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
## 2021-11-16 ENCOUNTER — HOSPITAL ENCOUNTER (OUTPATIENT)
Dept: LAB | Facility: MEDICAL CENTER | Age: 81
End: 2021-11-16
Attending: PSYCHIATRY & NEUROLOGY
Payer: MEDICARE

## 2021-11-16 DIAGNOSIS — R53.83 OTHER FATIGUE: ICD-10-CM

## 2021-11-16 DIAGNOSIS — Z79.899 HIGH RISK MEDICATION USE: ICD-10-CM

## 2021-11-16 LAB
ALBUMIN SERPL BCP-MCNC: 4.1 G/DL (ref 3.2–4.9)
ALBUMIN/GLOB SERPL: 1.7 G/DL
ALP SERPL-CCNC: 60 U/L (ref 30–99)
ALT SERPL-CCNC: 29 U/L (ref 2–50)
ANION GAP SERPL CALC-SCNC: 10 MMOL/L (ref 7–16)
AST SERPL-CCNC: 20 U/L (ref 12–45)
BASOPHILS # BLD AUTO: 0.5 % (ref 0–1.8)
BASOPHILS # BLD: 0.05 K/UL (ref 0–0.12)
BILIRUB SERPL-MCNC: 1.2 MG/DL (ref 0.1–1.5)
BUN SERPL-MCNC: 10 MG/DL (ref 8–22)
CALCIUM SERPL-MCNC: 8.7 MG/DL (ref 8.5–10.5)
CHLORIDE SERPL-SCNC: 101 MMOL/L (ref 96–112)
CHOLEST SERPL-MCNC: 159 MG/DL (ref 100–199)
CO2 SERPL-SCNC: 25 MMOL/L (ref 20–33)
CREAT SERPL-MCNC: 0.56 MG/DL (ref 0.5–1.4)
EOSINOPHIL # BLD AUTO: 0.08 K/UL (ref 0–0.51)
EOSINOPHIL NFR BLD: 0.8 % (ref 0–6.9)
ERYTHROCYTE [DISTWIDTH] IN BLOOD BY AUTOMATED COUNT: 47.4 FL (ref 35.9–50)
EST. AVERAGE GLUCOSE BLD GHB EST-MCNC: 105 MG/DL
GLOBULIN SER CALC-MCNC: 2.4 G/DL (ref 1.9–3.5)
GLUCOSE SERPL-MCNC: 146 MG/DL (ref 65–99)
HBA1C MFR BLD: 5.3 % (ref 4–5.6)
HCT VFR BLD AUTO: 46.6 % (ref 42–52)
HDLC SERPL-MCNC: 53 MG/DL
HGB BLD-MCNC: 16.1 G/DL (ref 14–18)
IMM GRANULOCYTES # BLD AUTO: 0.08 K/UL (ref 0–0.11)
IMM GRANULOCYTES NFR BLD AUTO: 0.8 % (ref 0–0.9)
LDLC SERPL CALC-MCNC: 90 MG/DL
LYMPHOCYTES # BLD AUTO: 1.08 K/UL (ref 1–4.8)
LYMPHOCYTES NFR BLD: 10.3 % (ref 22–41)
MCH RBC QN AUTO: 33.8 PG (ref 27–33)
MCHC RBC AUTO-ENTMCNC: 34.5 G/DL (ref 33.7–35.3)
MCV RBC AUTO: 97.9 FL (ref 81.4–97.8)
MONOCYTES # BLD AUTO: 0.54 K/UL (ref 0–0.85)
MONOCYTES NFR BLD AUTO: 5.2 % (ref 0–13.4)
NEUTROPHILS # BLD AUTO: 8.63 K/UL (ref 1.82–7.42)
NEUTROPHILS NFR BLD: 82.4 % (ref 44–72)
NRBC # BLD AUTO: 0 K/UL
NRBC BLD-RTO: 0 /100 WBC
PLATELET # BLD AUTO: 192 K/UL (ref 164–446)
PMV BLD AUTO: 9.9 FL (ref 9–12.9)
POTASSIUM SERPL-SCNC: 4.1 MMOL/L (ref 3.6–5.5)
PROT SERPL-MCNC: 6.5 G/DL (ref 6–8.2)
RBC # BLD AUTO: 4.76 M/UL (ref 4.7–6.1)
SODIUM SERPL-SCNC: 136 MMOL/L (ref 135–145)
T4 FREE SERPL-MCNC: 1.06 NG/DL (ref 0.93–1.7)
TRIGL SERPL-MCNC: 79 MG/DL (ref 0–149)
TSH SERPL DL<=0.005 MIU/L-ACNC: 3.06 UIU/ML (ref 0.38–5.33)
VIT B12 SERPL-MCNC: 288 PG/ML (ref 211–911)
WBC # BLD AUTO: 10.5 K/UL (ref 4.8–10.8)

## 2021-11-16 PROCEDURE — 84443 ASSAY THYROID STIM HORMONE: CPT

## 2021-11-16 PROCEDURE — 82607 VITAMIN B-12: CPT

## 2021-11-16 PROCEDURE — 83036 HEMOGLOBIN GLYCOSYLATED A1C: CPT | Mod: GA

## 2021-11-16 PROCEDURE — 80053 COMPREHEN METABOLIC PANEL: CPT

## 2021-11-16 PROCEDURE — 82306 VITAMIN D 25 HYDROXY: CPT

## 2021-11-16 PROCEDURE — 84439 ASSAY OF FREE THYROXINE: CPT

## 2021-11-16 PROCEDURE — 36415 COLL VENOUS BLD VENIPUNCTURE: CPT

## 2021-11-16 PROCEDURE — 80061 LIPID PANEL: CPT

## 2021-11-16 PROCEDURE — 85025 COMPLETE CBC W/AUTO DIFF WBC: CPT

## 2021-11-18 LAB — 25(OH)D3 SERPL-MCNC: 22 NG/ML (ref 30–80)

## 2021-11-22 ENCOUNTER — OFFICE VISIT (OUTPATIENT)
Dept: MEDICAL GROUP | Facility: MEDICAL CENTER | Age: 81
End: 2021-11-22
Payer: MEDICARE

## 2021-11-22 VITALS
WEIGHT: 209 LBS | DIASTOLIC BLOOD PRESSURE: 78 MMHG | HEART RATE: 105 BPM | BODY MASS INDEX: 26.82 KG/M2 | SYSTOLIC BLOOD PRESSURE: 132 MMHG | OXYGEN SATURATION: 97 % | TEMPERATURE: 97.7 F | HEIGHT: 74 IN

## 2021-11-22 DIAGNOSIS — T14.91XA SUICIDE ATTEMPT (HCC): ICD-10-CM

## 2021-11-22 DIAGNOSIS — K11.8 PAROTID NODULE: ICD-10-CM

## 2021-11-22 DIAGNOSIS — R53.83 FATIGUE, UNSPECIFIED TYPE: ICD-10-CM

## 2021-11-22 DIAGNOSIS — R06.02 SOB (SHORTNESS OF BREATH) ON EXERTION: ICD-10-CM

## 2021-11-22 DIAGNOSIS — E55.9 VITAMIN D DEFICIENCY: ICD-10-CM

## 2021-11-22 DIAGNOSIS — R41.3 MEMORY LOSS: ICD-10-CM

## 2021-11-22 DIAGNOSIS — I10 ESSENTIAL HYPERTENSION, BENIGN: ICD-10-CM

## 2021-11-22 DIAGNOSIS — E03.9 ACQUIRED HYPOTHYROIDISM: ICD-10-CM

## 2021-11-22 DIAGNOSIS — I50.1 HEART FAILURE, LEFT, WITH LVEF <=30% (HCC): ICD-10-CM

## 2021-11-22 DIAGNOSIS — I48.19 OTHER PERSISTENT ATRIAL FIBRILLATION (HCC): ICD-10-CM

## 2021-11-22 DIAGNOSIS — F33.1 MODERATE EPISODE OF RECURRENT MAJOR DEPRESSIVE DISORDER (HCC): ICD-10-CM

## 2021-11-22 PROBLEM — I42.9 CARDIOMYOPATHY (HCC): Status: ACTIVE | Noted: 2020-02-01

## 2021-11-22 PROCEDURE — 99204 OFFICE O/P NEW MOD 45 MIN: CPT | Performed by: NURSE PRACTITIONER

## 2021-11-22 RX ORDER — ERGOCALCIFEROL 1.25 MG/1
50000 CAPSULE ORAL
Qty: 12 CAPSULE | Refills: 0 | Status: SHIPPED | OUTPATIENT
Start: 2021-11-22 | End: 2022-11-07

## 2021-11-22 ASSESSMENT — FIBROSIS 4 INDEX: FIB4 SCORE: 1.61

## 2021-11-22 NOTE — ASSESSMENT & PLAN NOTE
Reviewed lab with pt and wife.  GFR is wnl.    Vitamin d is low at 22.  Not on supplement  B12 is low normal.  Not on otc supplement.   TSH, T4 is wnl.  a1c is wnl.  CMP is wnl except glucose 146.  Not on meds for DM.  CBC shows mildly abn neutrophils and lymphs.  No recent or current sx of infection.    LP is wnl

## 2021-11-22 NOTE — PATIENT INSTRUCTIONS
1. SOB (shortness of breath) on exertion  DX-CHEST-2 VIEWS    prob combo of heart and lungs.  do CXR to assess SOB   2. Fatigue, unspecified type  DX-CHEST-2 VIEWS   3. Vitamin D deficiency  vitamin D2, Ergocalciferol, (DRISDOL) 1.25 MG (08150 UT) Cap capsule    ergocalciferol x 12 wks then vitamin D3 2000 units daily.  b12 is low  normal.  start b12 1000 mcg daily   4. Essential hypertension, benign     5. Parotid nodule  US-SOFT TISSUES OF HEAD - NECK    prob had OR to removed.  poss hx of thyroid nodule vs parathyroid adenoma.  do neck us to assess.  plan:  f/u 6 mo.  email for lab slip   6. Moderate episode of recurrent major depressive disorder (HCC)     7. Acquired hypothyroidism     8. 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 Other persistent atrial fibrillation (HCC)     10. Suicide attempt (HCC)     11. Memory loss
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
## 2021-11-22 NOTE — ASSESSMENT & PLAN NOTE
A parathyroid scan done in past showed poss thyroid and parathyroid nodule.  F/u was recommended but not done.

## 2021-11-22 NOTE — ASSESSMENT & PLAN NOTE
Was seeing Kali but she left.  Just saw Rashmi santillan.  She inc coreg d/t elevated bp.  Will see nancy lu soon.

## 2021-12-07 ENCOUNTER — OFFICE VISIT (OUTPATIENT)
Dept: CARDIOLOGY | Facility: MEDICAL CENTER | Age: 81
End: 2021-12-07
Payer: MEDICARE

## 2021-12-07 VITALS
SYSTOLIC BLOOD PRESSURE: 124 MMHG | OXYGEN SATURATION: 95 % | HEIGHT: 74 IN | RESPIRATION RATE: 16 BRPM | DIASTOLIC BLOOD PRESSURE: 76 MMHG | BODY MASS INDEX: 27.05 KG/M2 | WEIGHT: 210.8 LBS | HEART RATE: 67 BPM

## 2021-12-07 DIAGNOSIS — Z79.01 ANTICOAGULANT LONG-TERM USE: ICD-10-CM

## 2021-12-07 DIAGNOSIS — I10 ESSENTIAL HYPERTENSION, BENIGN: ICD-10-CM

## 2021-12-07 DIAGNOSIS — I48.19 OTHER PERSISTENT ATRIAL FIBRILLATION (HCC): ICD-10-CM

## 2021-12-07 DIAGNOSIS — I50.1 HEART FAILURE, LEFT, WITH LVEF <=30% (HCC): ICD-10-CM

## 2021-12-07 PROCEDURE — 99214 OFFICE O/P EST MOD 30 MIN: CPT | Performed by: INTERNAL MEDICINE

## 2021-12-07 RX ORDER — ATORVASTATIN CALCIUM 20 MG/1
20 TABLET, FILM COATED ORAL DAILY
Qty: 90 TABLET | Refills: 3 | Status: SHIPPED | OUTPATIENT
Start: 2021-12-07 | End: 2023-02-28 | Stop reason: SDUPTHER

## 2021-12-07 ASSESSMENT — FIBROSIS 4 INDEX: FIB4 SCORE: 1.61

## 2021-12-07 NOTE — PROGRESS NOTES
Chief Complaint   Patient presents with   • Atrial Fibrillation     F/V Dx: Other persistent atrial fibrillation (HCC)   • Dyslipidemia       Subjective     Pedro Champion is a 81 y.o. male who presents today for follow-up of his history of chronic atrial fibrillation with history of reduced ejection fraction and normalized on rate control agents chronic anticoagulation    Has been doing over the past year weight has been stable no heart failure    Past Medical History:   Diagnosis Date   • Basal cell carcinoma    • Cardiomyopathy ([facility]) 02/2020    Echocardiogram with LVEF 40-45%   • Chronic anticoagulation    • Depression    • Dyslipidemia 4/13/2016   • Dysphagia 10/5/2019   • REBECCA (generalized anxiety disorder) 11/10/2021   • Hearing loss    • Heart failure, left, 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% ([facility]) 9/23/2019   • Hypertension    • Hypothyroid 9/23/2019   • Insomnia    • Malignant melanoma ([facility]) 5/4/2021   • Mandibular fracture, open ([facility]) 8/28/2019   • Memory loss 11/22/2021   • Other persistent atrial fibrillation ([facility]) 8/28/2019   • Parotid nodule 7/19/2013   • Urinary retention 9/9/2019     IMO load March 2020   • Vitamin D deficiency 9/27/2021     Past Surgical History:   Procedure Laterality Date   • NE DENTAL SURGERY PROCEDURE Left 10/13/2019    Procedure: EXTRACTION, TOOTH;  Surgeon: Troy Dong D.D.SBetsy;  Location: [facility];  Service: Oral Surgery   • MANDIBLE FRACTURE ORIF Bilateral 10/13/2019    Procedure: ORIF, FRACTURE, MANDIBLE - FOR HARDWARE REMOVAL MANDIBLE, POSS ORIF;  Surgeon: Troy Dong D.D.SBetsy;  Location: [facility];  Service: Oral Surgery   • ORAL FISTULA REPAIR N/A 10/13/2019    Procedure: CLOSURE, FISTULA, MOUTH;  Surgeon: Troy Dong D.D.SBetsy;  Location: [facility];  Service: Oral Surgery   • SUBMANDIBLE ABSCESS INCISION AND DRAINAGE N/A 8/31/2019    Procedure: INCISION AND DRAINAGE FACIAL WOUND;  Surgeon: Troy Dong  THADDEUS;  Location: SURGERY [facility];  Service: Oral Surgery   • INTERMAXILLARY FIXATATION N/A 8/31/2019    Procedure: FIXATION, MAXILLOMANDIBULAR. ORIF and MMF mandible fracture debridement of facial wounds extracton tooth #8;  Surgeon: Troy Dong D.D.S.;  Location: SURGERY [facility];  Service: Oral Surgery   • PAROTIDECTOMY SUPERFICIAL  7/19/2013    Performed by Mendy Stern M.D. at SURGERY [facility]   • LUMBAR DECOMPRESSION  1988   • INGUINAL HERNIA REPAIR BILATERAL  1960's     Family History   Problem Relation Age of Onset   • Heart Disease Father 81        Sudden cardiac death probably coronary   • Stroke Mother      Social History     Socioeconomic History   • Marital status:      Spouse name: Not on file   • Number of children: Not on file   • Years of education: Not on file   • Highest education level: Not on file   Occupational History   • Not on file   Tobacco Use   • Smoking status: Never Smoker   • Smokeless tobacco: Never Used   Vaping Use   • Vaping Use: Never used   Substance and Sexual Activity   • Alcohol use: Yes     Alcohol/week: 8.4 oz     Types: 14 Glasses of wine per week     Comment: 2 glasses of wine a day   • Drug use: Never   • Sexual activity: Yes     Partners: Female   Other Topics Concern   • Not on file   Social History Narrative    ** Merged History Encounter **          Social Determinants of Health     Financial Resource Strain:    • Difficulty of Paying Living Expenses: Not on file   Food Insecurity:    • Worried About Running Out of Food in the Last Year: Not on file   • Ran Out of Food in the Last Year: Not on file   Transportation Needs:    • Lack of Transportation (Medical): Not on file   • Lack of Transportation (Non-Medical): Not on file   Physical Activity:    • Days of Exercise per Week: Not on file   • Minutes of Exercise per Session: Not on file   Stress:    • Feeling of Stress : Not on file   Social Connections:    • Frequency of  Communication with Friends and Family: Not on file   • Frequency of Social Gatherings with Friends and Family: Not on file   • Attends Caodaism Services: Not on file   • Active Member of Clubs or Organizations: Not on file   • Attends Club or Organization Meetings: Not on file   • Marital Status: Not on file   Intimate Partner Violence:    • Fear of Current or Ex-Partner: Not on file   • Emotionally Abused: Not on file   • Physically Abused: Not on file   • Sexually Abused: Not on file   Housing Stability:    • Unable to Pay for Housing in the Last Year: Not on file   • Number of Places Lived in the Last Year: Not on file   • Unstable Housing in the Last Year: Not on file     Allergies   Allergen Reactions   • Cefepime Hcl Rash     Hives, eosinophilia      Outpatient Encounter Medications as of 12/7/2021   Medication Sig Dispense Refill   • vitamin D2, Ergocalciferol, (DRISDOL) 1.25 MG (10473 UT) Cap capsule Take 1 Capsule by mouth every 7 days. 12 Capsule 0   • sertraline (ZOLOFT) 25 MG tablet Take 1/2 tablet by mouth once a day for 2 days, then take 1 tablet by mouth once a day for 14 days, then take 2 tablets by mouth once a day 180 Tablet 0   • QUEtiapine (SEROQUEL) 25 MG Tab TAKE 1/2 TO 2 TABLETS BY MOUTH AT BEDTIME AS NEEDED FOR INSOMNIA 180 Tablet 0   • carvedilol (COREG) 6.25 MG Tab Take 1 Tablet by mouth 2 times a day with meals. 60 Tablet 6   • ELIQUIS 5 MG Tab TAKE 1 TABLET 2 TIMES DAILY 180 Tablet 3   • LANOXIN 125 MCG Tab TAKE 1 TABLET EVERY DAY 90 Tablet 3   • losartan (COZAAR) 100 MG Tab Take 1 tablet by mouth every day. 90 tablet 3   • atorvastatin (LIPITOR) 20 MG Tab Take 20 mg by mouth every day.     • levothyroxine (SYNTHROID) 25 MCG Tab Take 1 Tab by mouth Every morning on an empty stomach. 30 Tab 2   • tamsulosin (FLOMAX) 0.4 MG capsule Take 0.4 mg by mouth every day.  0   • finasteride (PROSCAR) 5 MG Tab Take 5 mg by mouth every day.  0     No facility-administered encounter medications on  "file as of 12/7/2021.     ROS           Objective     /76 (BP Location: Left arm, Patient Position: Sitting, BP Cuff Size: Adult)   Pulse 67   Resp 16   Ht 1.88 m (6' 2\")   Wt 95.6 kg (210 lb 12.8 oz)   SpO2 95%   BMI 27.07 kg/m²     Physical Exam  Constitutional:       General: He is not in acute distress.     Appearance: He is not diaphoretic.   Eyes:      General: No scleral icterus.  Neck:      Vascular: No JVD.   Cardiovascular:      Rate and Rhythm: Normal rate. Rhythm irregular.      Heart sounds: Normal heart sounds. No murmur heard.  No friction rub. No gallop.    Pulmonary:      Effort: No respiratory distress.      Breath sounds: No wheezing or rales.   Abdominal:      General: Bowel sounds are normal.      Palpations: Abdomen is soft.   Skin:     Findings: No rash.   Neurological:      Mental Status: He is alert.            We reviewed in person the most recent labs  Recent Results (from the past 5040 hour(s))   Basic Metabolic Panel    Collection Time: 05/12/21 12:14 PM   Result Value Ref Range    Sodium 145 135 - 145 mmol/L    Potassium 4.4 3.6 - 5.5 mmol/L    Chloride 107 96 - 112 mmol/L    Co2 28 20 - 33 mmol/L    Glucose 91 65 - 99 mg/dL    Bun 10 8 - 22 mg/dL    Creatinine 0.64 0.50 - 1.40 mg/dL    Calcium 9.2 8.5 - 10.5 mg/dL    Anion Gap 10.0 7.0 - 16.0   ESTIMATED GFR    Collection Time: 05/12/21 12:14 PM   Result Value Ref Range    GFR If African American >60 >60 mL/min/1.73 m 2    GFR If Non African American >60 >60 mL/min/1.73 m 2   FASTING STATUS    Collection Time: 05/12/21 12:44 PM   Result Value Ref Range    Fasting Status Non-Fasting    Basic Metabolic Panel    Collection Time: 06/18/21  8:13 AM   Result Value Ref Range    Sodium 136 135 - 145 mmol/L    Potassium 3.8 3.6 - 5.5 mmol/L    Chloride 102 96 - 112 mmol/L    Co2 22 20 - 33 mmol/L    Glucose 123 (H) 65 - 99 mg/dL    Bun 10 8 - 22 mg/dL    Creatinine 0.63 0.50 - 1.40 mg/dL    Calcium 8.8 8.5 - 10.5 mg/dL    Anion Gap " 12.0 7.0 - 16.0   ESTIMATED GFR    Collection Time: 06/18/21  8:13 AM   Result Value Ref Range    GFR If African American >60 >60 mL/min/1.73 m 2    GFR If Non African American >60 >60 mL/min/1.73 m 2   Comp Metabolic Panel    Collection Time: 06/22/21  9:03 AM   Result Value Ref Range    Sodium 135 135 - 145 mmol/L    Potassium 4.0 3.6 - 5.5 mmol/L    Chloride 101 96 - 112 mmol/L    Co2 25 20 - 33 mmol/L    Anion Gap 9.0 7.0 - 16.0    Glucose 135 (H) 65 - 99 mg/dL    Bun 7 (L) 8 - 22 mg/dL    Creatinine 0.67 0.50 - 1.40 mg/dL    Calcium 8.9 8.5 - 10.5 mg/dL    AST(SGOT) 21 12 - 45 U/L    ALT(SGPT) 17 2 - 50 U/L    Alkaline Phosphatase 51 30 - 99 U/L    Total Bilirubin 2.0 (H) 0.1 - 1.5 mg/dL    Albumin 4.4 3.2 - 4.9 g/dL    Total Protein 6.5 6.0 - 8.2 g/dL    Globulin 2.1 1.9 - 3.5 g/dL    A-G Ratio 2.1 g/dL   ESTIMATED GFR    Collection Time: 06/22/21  9:03 AM   Result Value Ref Range    GFR If African American >60 >60 mL/min/1.73 m 2    GFR If Non African American >60 >60 mL/min/1.73 m 2   Basic Metabolic Panel    Collection Time: 07/06/21  8:31 AM   Result Value Ref Range    Sodium 137 135 - 145 mmol/L    Potassium 4.1 3.6 - 5.5 mmol/L    Chloride 101 96 - 112 mmol/L    Co2 22 20 - 33 mmol/L    Glucose 182 (H) 65 - 99 mg/dL    Bun 10 8 - 22 mg/dL    Creatinine 0.75 0.50 - 1.40 mg/dL    Calcium 9.3 8.5 - 10.5 mg/dL    Anion Gap 14.0 7.0 - 16.0   ESTIMATED GFR    Collection Time: 07/06/21  8:31 AM   Result Value Ref Range    GFR If African American >60 >60 mL/min/1.73 m 2    GFR If Non African American >60 >60 mL/min/1.73 m 2   Comp Metabolic Panel    Collection Time: 11/16/21  8:04 AM   Result Value Ref Range    Sodium 136 135 - 145 mmol/L    Potassium 4.1 3.6 - 5.5 mmol/L    Chloride 101 96 - 112 mmol/L    Co2 25 20 - 33 mmol/L    Anion Gap 10.0 7.0 - 16.0    Glucose 146 (H) 65 - 99 mg/dL    Bun 10 8 - 22 mg/dL    Creatinine 0.56 0.50 - 1.40 mg/dL    Calcium 8.7 8.5 - 10.5 mg/dL    AST(SGOT) 20 12 - 45 U/L     ALT(SGPT) 29 2 - 50 U/L    Alkaline Phosphatase 60 30 - 99 U/L    Total Bilirubin 1.2 0.1 - 1.5 mg/dL    Albumin 4.1 3.2 - 4.9 g/dL    Total Protein 6.5 6.0 - 8.2 g/dL    Globulin 2.4 1.9 - 3.5 g/dL    A-G Ratio 1.7 g/dL   CBC WITH DIFFERENTIAL    Collection Time: 11/16/21  8:04 AM   Result Value Ref Range    WBC 10.5 4.8 - 10.8 K/uL    RBC 4.76 4.70 - 6.10 M/uL    Hemoglobin 16.1 14.0 - 18.0 g/dL    Hematocrit 46.6 42.0 - 52.0 %    MCV 97.9 (H) 81.4 - 97.8 fL    MCH 33.8 (H) 27.0 - 33.0 pg    MCHC 34.5 33.7 - 35.3 g/dL    RDW 47.4 35.9 - 50.0 fL    Platelet Count 192 164 - 446 K/uL    MPV 9.9 9.0 - 12.9 fL    Neutrophils-Polys 82.40 (H) 44.00 - 72.00 %    Lymphocytes 10.30 (L) 22.00 - 41.00 %    Monocytes 5.20 0.00 - 13.40 %    Eosinophils 0.80 0.00 - 6.90 %    Basophils 0.50 0.00 - 1.80 %    Immature Granulocytes 0.80 0.00 - 0.90 %    Nucleated RBC 0.00 /100 WBC    Neutrophils (Absolute) 8.63 (H) 1.82 - 7.42 K/uL    Lymphs (Absolute) 1.08 1.00 - 4.80 K/uL    Monos (Absolute) 0.54 0.00 - 0.85 K/uL    Eos (Absolute) 0.08 0.00 - 0.51 K/uL    Baso (Absolute) 0.05 0.00 - 0.12 K/uL    Immature Granulocytes (abs) 0.08 0.00 - 0.11 K/uL    NRBC (Absolute) 0.00 K/uL   FREE THYROXINE    Collection Time: 11/16/21  8:04 AM   Result Value Ref Range    Free T-4 1.06 0.93 - 1.70 ng/dL   TSH    Collection Time: 11/16/21  8:04 AM   Result Value Ref Range    TSH 3.060 0.380 - 5.330 uIU/mL   VITAMIN D,25 HYDROXY    Collection Time: 11/16/21  8:04 AM   Result Value Ref Range    25-Hydroxy   Vitamin D 25 22 (L) 30 - 80 ng/mL   Lipid Profile    Collection Time: 11/16/21  8:04 AM   Result Value Ref Range    Cholesterol,Tot 159 100 - 199 mg/dL    Triglycerides 79 0 - 149 mg/dL    HDL 53 >=40 mg/dL    LDL 90 <100 mg/dL   HEMOGLOBIN A1C    Collection Time: 11/16/21  8:04 AM   Result Value Ref Range    Glycohemoglobin 5.3 4.0 - 5.6 %    Est Avg Glucose 105 mg/dL   VITAMIN B12    Collection Time: 11/16/21  8:04 AM   Result Value Ref Range     Vitamin B12 -True Cobalamin 288 211 - 911 pg/mL   ESTIMATED GFR    Collection Time: 11/16/21  8:04 AM   Result Value Ref Range    GFR If African American >60 >60 mL/min/1.73 m 2    GFR If Non African American >60 >60 mL/min/1.73 m 2         Assessment & Plan     1. Other persistent atrial fibrillation (HCC)     2. Anticoagulant long-term use     3. Essential hypertension, benign     4. 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 Champion.    We addressed the management of hypertension at today's visit. Blood pressure is well controlled.  We specifically assessed the labs on hypertension treatment    We addressed the management of chronic anticoagulation at today's visit. He understands the risks and benefits of chronic anticoagulation.  We reviewed and verified the results of annual testing for anemia and kidney function.    Repeat echo every 3 years or so or sooner for heart failure    I will see Mr. Champion back in 6 months time and encouraged him to follow up with us over the phone or electronically using my MyChart as issues arise.    It is my pleasure to participate in the care of Mr. Champion.  Please do not hesitate to contact me with questions or concerns.    Troy Lorenzo MD PhD FAC  Cardiologist [facility]    Please note that this dictation was created using voice recognition software. There may be errors I did not discover before finalizing the note.

## 2021-12-07 NOTE — PATIENT INSTRUCTIONS
Work on at least 1.5 - 5 hours a week of moderate exercise (typical brisk walking or similar activity)    If you have had a heart attack, stent, bypass or reduced heart strength (EF <35%): cardiac rehab may reduce your risk of dying by 13-24% and need to go to the hospital by 30% within the first year (1)    Please look into the following diets and incorporate them into your diet    LOW SALT DIET   KEEP YOUR SODIUM EQUAL TO CALORIES AND NO MORE THAN DOUBLE THE CALORIES FOR A LOW SALT DIET    Cardiosmart.org - great resource for American College of Cardiology on heart disease prevention and treatment    FOR TREATMENT OR PREVENTION OF CORONARY ARTERY DISEASE  These three programs are approved by Medicare/Insurers for those with heart disease  [facility]  [facility] for Reversing Heart Disease - Eric Bravo's Cardiologist vegetarian-based  [facility] - [place]-based mind-body Program    This is a commonly referenced Program  Dr Gan - [place] [place] (book and documentary) - vegetarian-based    FOR TREATMENT OF BLOOD PRESSURE  DASH DIET - American Heart Association for treatment of HYPERTENSION    FOR TREATMENT OF BAD CHOLESTEROL/FATS  REDUCE PROCESSED SUGAR AS MUCH AS POSSIBLE  INCREASE WHOLE GRAINS/VEGETABLES  INCREASE FIBER    Lowering total cholesterol and LDL (bad) cholesterol:  - Eat leaner cuts of meat, or eliminate altogether if possible red meat, and frequently substitute fish or chicken.  - Limit saturated fat to no more than 7-10% of total calories no more than 10 g per day is recommended. Some sources of saturated fat include butter, animal fats, hydrogenated vegetable fats and oils, many desserts, whole milk dairy products.  - Replaced saturated fats with polyunsaturated fats and monounsaturated fats. Foods high in monounsaturated fat include nuts, canola oil, avocados, and olives.  - Limit trans fat (processed foods)  and replaced with fresh fruits and vegetables  - Recommend nonfat dairy products  - Increase substantially the amount of soluble fiber intake (legumes such as beans, fruit, whole grains).  - Consider nutritional supplements: plant sterile spreads such as Benecol, fish oil,  flaxseed oil, omega-3 acids capsules 1000 mg twice a day, or viscous fiber such as Metamucil  - Attain ideal weight and regular exercise (at least 30 minutes per day of moderate exercise)  ASK ABOUT STATIN OR NON STATIN MEDICATION TO REDUCE YOUR LDL AND HEART RISK    Lowering triglycerides:  - Reduce intake of simple sugar: Desserts, candy, pastries, honey, sodas, sugared cereals, yogurt, Gatorade, sports bars, canned fruit, smoothies, fruit juice, coffee drinks  - Reduced intake of refined starches: Refined Pasta, most bread  - Reduce or abstain from alcohol  - Increase omega-3 fatty acids: [place], Trout, Mackerel, [place], [place] tuna and supplements  - Attain ideal weight and regular exercise (at least 30 minutes per day of moderate exercise)  ASK ABOUT PURIFIED OMEGA 3 EPA or FISH OIL TO REDUCE YOUR TG AND HEART RISK    Elevating HDL (good) cholesterol:  - Increase physical activity  - Increase omega-3 fatty acids and supplements as listed above  - Incorporating appropriate amounts of monounsaturated fats such as nuts, olive oil, canola oil, avocados, olives  - Stop smoking  - Attain ideal weight and regular exercise (at least 30 minutes per day of moderate exercise)

## 2021-12-08 ENCOUNTER — HOSPITAL ENCOUNTER (OUTPATIENT)
Dept: RADIOLOGY | Facility: MEDICAL CENTER | Age: 81
End: 2021-12-08
Attending: NURSE PRACTITIONER
Payer: MEDICARE

## 2021-12-08 ENCOUNTER — TELEPHONE (OUTPATIENT)
Dept: MEDICAL GROUP | Facility: MEDICAL CENTER | Age: 81
End: 2021-12-08

## 2021-12-08 DIAGNOSIS — J98.11 ATELECTASIS OF BOTH LUNGS: ICD-10-CM

## 2021-12-08 DIAGNOSIS — R53.83 FATIGUE, UNSPECIFIED TYPE: ICD-10-CM

## 2021-12-08 DIAGNOSIS — J98.4 SCARRING OF LUNG: ICD-10-CM

## 2021-12-08 DIAGNOSIS — R06.02 SOB (SHORTNESS OF BREATH): ICD-10-CM

## 2021-12-08 DIAGNOSIS — K11.8 PAROTID NODULE: ICD-10-CM

## 2021-12-08 DIAGNOSIS — R06.02 SOB (SHORTNESS OF BREATH) ON EXERTION: ICD-10-CM

## 2021-12-08 PROCEDURE — 76536 US EXAM OF HEAD AND NECK: CPT

## 2021-12-08 PROCEDURE — 71046 X-RAY EXAM CHEST 2 VIEWS: CPT

## 2021-12-08 NOTE — TELEPHONE ENCOUNTER
Please let pt know that the CXR shows either scarring or secretions in lung bases.  D/t his SOB and these findings it would be beneficial to do CT chest and PFT's.  Let me know if he is ok with doing these tests and i will order.  Also his neck us is wnl.  No further testing needed.

## 2021-12-09 NOTE — TELEPHONE ENCOUNTER
Patient notified and agreeable with plan. Please order CT chest and PFTs. Patient's wife requests these orders be mailed once they are put in system.

## 2021-12-21 ENCOUNTER — TELEPHONE (OUTPATIENT)
Dept: MEDICAL GROUP | Facility: MEDICAL CENTER | Age: 81
End: 2021-12-21

## 2021-12-21 ENCOUNTER — HOSPITAL ENCOUNTER (OUTPATIENT)
Dept: RADIOLOGY | Facility: MEDICAL CENTER | Age: 81
End: 2021-12-21
Attending: NURSE PRACTITIONER
Payer: MEDICARE

## 2021-12-21 DIAGNOSIS — J98.11 ATELECTASIS OF BOTH LUNGS: ICD-10-CM

## 2021-12-21 DIAGNOSIS — J98.4 SCARRING OF LUNG: ICD-10-CM

## 2021-12-21 DIAGNOSIS — R06.02 SOB (SHORTNESS OF BREATH): ICD-10-CM

## 2021-12-21 PROCEDURE — 71250 CT THORAX DX C-: CPT | Mod: ME

## 2021-12-22 ENCOUNTER — TELEPHONE (OUTPATIENT)
Dept: MEDICAL GROUP | Facility: MEDICAL CENTER | Age: 81
End: 2021-12-22

## 2021-12-22 NOTE — TELEPHONE ENCOUNTER
Rob Fernandez M.D.  CATHIE JessicaP.CLOTILDE.; WILIAN May.  Jolynn is on vacation for a couple of weeks. These lesions are too small to bx and too small to light up on PET so if Jolynn saw him she would just repeat the scan in 3 months. Or you can send to pulmonary nodule clinic which will take at least 3 months to get into! Pablo Medina:  Please let pt know that we will just repeat the ct chest in 3 mo.

## 2021-12-22 NOTE — TELEPHONE ENCOUNTER
Please let pt know that the CT CHEST shows 2 irregular nodules in right middle lobe.    Also seen is the plaque in his arteries that he sees cardiac for.    He has some enlarged lymphnodes in his chest along with Small bilateral fluid collection around his lungs.    i don't see that he is seeing pulm but i recommend he see them asap.  i will place referral if he doesn't already have one.    Also i am going to ask Jolynn Alarcon if she needs to see pt for the enlarged lymph nodes.  She does fast w/u on abn that could be a concern or need biopsy.  i will let him know what she recommends.

## 2022-01-07 ENCOUNTER — OFFICE VISIT (OUTPATIENT)
Dept: NEUROLOGY | Facility: MEDICAL CENTER | Age: 82
End: 2022-01-07
Attending: PSYCHIATRY & NEUROLOGY
Payer: MEDICARE

## 2022-01-07 VITALS
TEMPERATURE: 97.9 F | OXYGEN SATURATION: 95 % | HEART RATE: 110 BPM | WEIGHT: 213.63 LBS | DIASTOLIC BLOOD PRESSURE: 78 MMHG | BODY MASS INDEX: 27.42 KG/M2 | HEIGHT: 74 IN | SYSTOLIC BLOOD PRESSURE: 112 MMHG

## 2022-01-07 DIAGNOSIS — G96.89 OTHER SPECIFIED DISORDERS OF CENTRAL NERVOUS SYSTEM: ICD-10-CM

## 2022-01-07 DIAGNOSIS — G31.84 MILD COGNITIVE IMPAIRMENT WITH MEMORY LOSS: ICD-10-CM

## 2022-01-07 DIAGNOSIS — Z78.9 UNHEALTHY ALCOHOL DRINKING BEHAVIOR: ICD-10-CM

## 2022-01-07 PROCEDURE — 99215 OFFICE O/P EST HI 40 MIN: CPT | Performed by: PSYCHIATRY & NEUROLOGY

## 2022-01-07 PROCEDURE — 99212 OFFICE O/P EST SF 10 MIN: CPT | Performed by: PSYCHIATRY & NEUROLOGY

## 2022-01-07 ASSESSMENT — PATIENT HEALTH QUESTIONNAIRE - PHQ9: CLINICAL INTERPRETATION OF PHQ2 SCORE: 0

## 2022-01-07 ASSESSMENT — FIBROSIS 4 INDEX: FIB4 SCORE: 1.61

## 2022-01-07 NOTE — PROGRESS NOTES
"Reason for Neurology Consult:  ? Of Dementia    History of present illness:    Pedro Champion 81 y.o. right handed gentleman who worked in a [employer] (for [employer]) in [place] for much of his adult life. He retired from that job at age 63. He is from [place] and High School education.  He is here with his wife for nearly 54 years.    When asked directly about his memory issues, he commented about his hearing not being good for well over 10 years. He is aware that there is \"some change\" in his short term memory but he has not been misplacing items. He describes having problems remembering people's names (people specifically).    Over the last 12 to 24  months his wife has recognized that he will forgetting information easier (such as tending to repeat or ask the same thing back to his wife over and over). If his wife tells Pedro something within say 5-6 hours or within a 24 hours period he may forget parts of what he was told by his wife.  Wife has not recognized the tendency for Pedro to repeat information over and over to him.    He has minor problems with putting thinks together such as his wife describing that he could not put together a desk from [employer].    The changes have been mildly progressing and getting slightly worse.    He has not had any problems driving at all (agreed by wife) and there has been no self admitted accidents or incidents in the last 12 months or so.    There has been no clear changes in ubcpyr-dsistjpw-gqpbapxqiclyn ability in the last few years.    There has been on history of tobacco use, concussion(s) or seizure(s)/seizure like events.    Single suicidal attempt in 2019- by trying to shoot self> guns have been taken away. He denies having any further thought(s) of wanting to hurt himself in any way or manner in the last 6 to 12 months.    Averages 3 glasses of wine per day for over 20 years.    There has been no paranoia,delusion(s),visual-auditory changes, personality " or behavioral changes noticed by the wife in the last 6 months or so.    Weight,appetite has been stable in the recent months.              Mother: late onset Dementia (in her late 80's)-  ate age 90    Patient Active Problem List    Diagnosis Date Noted   • Memory loss 2021   • Basal cell carcinoma    • Moderate episode of recurrent major depressive disorder (HCC) 11/10/2021   • REBECCA (generalized anxiety disorder) 11/10/2021   • Vitamin D deficiency 2021   • Malignant melanoma (HCC) 2021   • Other insomnia 2020   • Cardiomyopathy ([facility]) 2020   • Dysphagia 10/05/2019   • Heart failure, left, 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% ([facility]) 2019   • Hypothyroid 2019   • Urinary retention 2019   • Other persistent atrial fibrillation ([facility]) 2019   • Anticoagulant long-term use 2019   • Mandibular fracture, open ([facility]) 2019   • Suicide attempt ([facility]) 2019   • Essential hypertension, benign 2016   • Dyslipidemia 2016   • Parotid nodule 2013   • Hearing loss of both ears 2012       Past medical history:   Past Medical History:   Diagnosis Date   • Basal cell carcinoma    • Cardiomyopathy ([facility]) 2020    Echocardiogram with LVEF 40-45%   • Chronic anticoagulation    • Depression    • Dyslipidemia 2016   • Dysphagia 10/5/2019   • REBECCA (generalized anxiety disorder) 11/10/2021   • Hearing loss    • Heart failure, left, 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% ([facility]) 2019   • Hypertension    • Hypothyroid 2019   • Insomnia    • Malignant melanoma (HCC) 2021   • Mandibular fracture, open ([facility]) 2019   • Memory loss 2021   • Other persistent atrial fibrillation (HCC) 2019   • Parotid nodule 2013   • Urinary retention 2019     IMO load 2020   • Vitamin D deficiency 2021       Past surgical history:   Past Surgical History:   Procedure Laterality Date   • CT DENTAL SURGERY PROCEDURE Left 10/13/2019    Procedure: EXTRACTION, TOOTH;   Surgeon: Troy Dong D.D.S.;  Location: SURGERY [facility];  Service: Oral Surgery   • MANDIBLE FRACTURE ORIF Bilateral 10/13/2019    Procedure: ORIF, FRACTURE, MANDIBLE - FOR HARDWARE REMOVAL MANDIBLE, POSS ORIF;  Surgeon: Troy Dong D.D.S.;  Location: SURGERY [facility];  Service: Oral Surgery   • ORAL FISTULA REPAIR N/A 10/13/2019    Procedure: CLOSURE, FISTULA, MOUTH;  Surgeon: Troy Dong D.D.S.;  Location: SURGERY [facility];  Service: Oral Surgery   • SUBMANDIBLE ABSCESS INCISION AND DRAINAGE N/A 8/31/2019    Procedure: INCISION AND DRAINAGE FACIAL WOUND;  Surgeon: Troy Dong D.D.S.;  Location: [facility];  Service: Oral Surgery   • INTERMAXILLARY FIXATATION N/A 8/31/2019    Procedure: FIXATION, MAXILLOMANDIBULAR. ORIF and MMF mandible fracture debridement of facial wounds extracton tooth #8;  Surgeon: Troy Dong D.D.S.;  Location: SURGERY [facility];  Service: Oral Surgery   • PAROTIDECTOMY SUPERFICIAL  7/19/2013    Performed by Mendy Stern M.D. at [facility]   • LUMBAR DECOMPRESSION  1988   • INGUINAL HERNIA REPAIR BILATERAL  1960's         Social history:   Social History     Socioeconomic History   • Marital status:      Spouse name: Not on file   • Number of children: Not on file   • Years of education: Not on file   • Highest education level: Not on file   Occupational History   • Not on file   Tobacco Use   • Smoking status: Never Smoker   • Smokeless tobacco: Never Used   Vaping Use   • Vaping Use: Never used   Substance and Sexual Activity   • Alcohol use: Yes     Alcohol/week: 8.4 oz     Types: 14 Glasses of wine per week     Comment: 2 glasses of wine a day   • Drug use: Never   • Sexual activity: Yes     Partners: Female   Other Topics Concern   • Not on file   Social History Narrative    ** Merged History Encounter **          Social Determinants of Health     Financial Resource  Strain:    • Difficulty of Paying Living Expenses: Not on file   Food Insecurity:    • Worried About Running Out of Food in the Last Year: Not on file   • Ran Out of Food in the Last Year: Not on file   Transportation Needs:    • Lack of Transportation (Medical): Not on file   • Lack of Transportation (Non-Medical): Not on file   Physical Activity:    • Days of Exercise per Week: Not on file   • Minutes of Exercise per Session: Not on file   Stress:    • Feeling of Stress : Not on file   Social Connections:    • Frequency of Communication with Friends and Family: Not on file   • Frequency of Social Gatherings with Friends and Family: Not on file   • Attends [employer] Services: Not on file   • Active Member of Clubs or Organizations: Not on file   • Attends Club or Organization Meetings: Not on file   • Marital Status: Not on file   Intimate Partner Violence:    • Fear of Current or Ex-Partner: Not on file   • Emotionally Abused: Not on file   • Physically Abused: Not on file   • Sexually Abused: Not on file   Housing Stability:    • Unable to Pay for Housing in the Last Year: Not on file   • Number of Places Lived in the Last Year: Not on file   • Unstable Housing in the Last Year: Not on file       Family history:   Family History   Problem Relation Age of Onset   • Heart Disease Father 81        Sudden cardiac death probably coronary   • Stroke Mother          Current medications:   Current Outpatient Medications   Medication   • finasteride (PROSCAR) 5 MG Tab   • tamsulosin (FLOMAX) 0.4 MG capsule   • atorvastatin (LIPITOR) 20 MG Tab   • vitamin D2, Ergocalciferol, (DRISDOL) 1.25 MG (70460 UT) Cap capsule   • sertraline (ZOLOFT) 25 MG tablet   • QUEtiapine (SEROQUEL) 25 MG Tab   • carvedilol (COREG) 6.25 MG Tab   • ELIQUIS 5 MG Tab   • LANOXIN 125 MCG Tab   • losartan (COZAAR) 100 MG Tab   • levothyroxine (SYNTHROID) 25 MCG Tab     No current facility-administered medications for this visit.       Medication  "Allergy:  Allergies   Allergen Reactions   • Cefepime Hcl Rash     Hives, eosinophilia            Physical examination:   Vitals:    01/07/22 0954   BP: 112/78   BP Location: Left arm   Patient Position: Sitting   BP Cuff Size: Adult   Pulse: (!) 110   Temp: 36.6 °C (97.9 °F)   TempSrc: Temporal   SpO2: 95%   Weight: 96.9 kg (213 lb 10 oz)   Height: 1.88 m (6' 2\")       Normal cephalic atraumatic.  There is full range of movement around the neck in all directions without restrictions or discrete pain evoked triggers.  No lower extremity edema.      Neurological  Exam:      Exchange Cognitive Assessment (MOCA) Version 7.1    Years of Education: High School Education    TOTAL SCORE: 20/30  (to be scanned into the MEDIA section in the E.M.R.)    President of the [place]- he had a hard time telling me (he needed prompting to tell me \"B[place]\">> [place].  He knew Salazar Bryson was president before Rafael.        Mental status: Awake, alert and fully oriented to person, place, time and situation. Normal attention, concentration and fund of knowledge for education level.  Did not appear/act combative,irritable,anxious,paranoid/delusional or aggressive to or with me.    Speech and language: Speech is fluent without errors, clear, intact to repetition and intact to naming.     Follows 3 step motor commands in sequence without significant delay and correctly.    Cranial nerve exam:  II: Pupils are equally round and reactive to light. Visual fields are intact by confrontation.  III, IV, VI: EOMI, no diplopia, no ptosis.  V: Sensation to light touch is normal over V1-3 distributions bilaterally.  .  VII: Facial movements are symmetrical. There is no facial droop. .  VIII: Hearing intact to soft speech and finger rub bilaterally  IX: Palate elevates symmetrically, uvula is midline. Dysarthria is not present.  XI: Shoulder shrug are symmetrical and strong.   XII: Tongue protrudes midline.      Motor exam:  Muscle tone is normal in all 4 " limbs. and No abnormal movements appreciated.    Muscle strength:    Neck Flexors/Extensors: 5/5       Right  Left  Deltoid   5/5  5/5      Biceps   5/5  5/5  Triceps  5/5  5/5   Wrist extensors 5/5  5/5  Wrist flexors  5/5  5/5     5/5  5/5  Interossei  5/5  5/5  Thenar (APB)  5/5  5/5   Hip flexors  5/5  5/5  Quadriceps  5/5  5/5    Hamstrings  5/5  5/5  Dorsiflexors  5/5  5/5  Plantarflexors  5/5  5/5  Toe extension  5/5  5/5      Sensory exam:  Intact to Vibration and Proprioception in bilaterally lower extremity.      Reflexes:       Right  Left  Biceps   2/4  2/4  Triceps  2/4  2/4  Brachioradialis 2/4  2/4  Knee jerk  2/4  2/4  Ankle jerk  1/4 1/4     Frontal release signs are absent    bilateral toes are downgoing to plantar stimulation..    Coordination (finger-to-nose, heel/knee/shin, rapid alternating movements) was normal.     There was no ataxia, no tremors, and no dysmetria.     Station and gait stands up from exam chair without retropulsion,veering,leaning,swaying (to either side).   Slight limp with external rotation of the right leg.    No swaying,staggering,leaning,veering.    No Rombergism.      Labs and Tests:    Blood work reviewed from 3 months- TSH,B12 (288)    CMP- glucose of 146 (elevated in the last 1 year)     NEUROIMAGING:       Head CT:  10/2019      IMPRESSION:     No acute intracranial abnormality is identified.     Atrophy     Paranasal sinus disease as above described.     Multiple metallic densities and fractures of the pterygoid plates on the left.     Opacification of the mastoid air cells and middle ears bilaterally, left greater than right.                Exam Ended: 09/26/19  5:38 PM Last Resulted: 09/26/19  6:08 PM               Impression/Plans/Recommendations:    1.  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    There is a component of alcohol use of many years that could be perpetuating and contributing to this issue which we discussed today.    MoCA of 20/30  "today.    FAQ of 6 today per wife.    Global Deterioration Score of 3 to 4 range per wife tody.    No Parkinsonism.    Slowly evolution of memory disturbances/forgetfullness dating back over 2 years.    There are no features of a subacute or rapid decline in cognition in the last 6 months and no neurobehavioral features of note at this point.    2. Chronic Gait Disorder: for over 2 to 3 years.    Gait issues does not seem to be progressing to wife in the last 6-12 months.    Not parkinsonian nor any myelopathic or peripheral neuropathy features.    3. A.fib- on Eliquis for stroke prevention issues.    4. Unhealthy Drinking Behavior- for years.      Today, I reviewed the clinical criteria and reviewed several  scenarios of the differences being using the medical terms of normal brain aging (age associated memory impairment),  Mild Cognitive Impairment (MCI) and Dementia.    MCI is a syndrome but statistically and for the majority of patients  occurs due  to a more rapid aging of the brain tissue or potentially from injury to certain parts of one's brain ( often from such contributing factors as  the cumulative effects of alcohol, from one or more ischemic or hemmorhagic stroke(s), from neurodegeneration or long standing with/without suboptimally controlled Hypertension). It is for some of these potential factors as to why I recommend a brain imaging test (Head CT or Brain MRI) be done for the 1st time or in certain circumstances repeated for comparison purposes  as such imaging can suggest one or more factors as to the reason(s) for the person's cognitive/memory changes. In fact, a normal or \"age related\" finding on a brain imaging test in and of itself is useful clinical and objective information to have in the evaluation of a person who has either an acute, evolving or even chronic (months to years) long cognitive/memory complaint.    Additional factors or contributors to Brain Health issues can be summarized in " several books/references which I discussed as well today.     Goals going forwards include:    A. Paying attention to one's risk factors and reducing their prevalence or potential impact on one's changing memory/thinking> an excellent example would be to stop smoking, reduce or eliminate alcohol use (depending on the amount and frequency of usage), maintain good blood pressure control by buying a digital arm blood pressure cuff such as an OMRON Series 3 or 5 and checking one's blood pressure atleast 3 days per week (in the am and early afternoon) that the numbers are under 140/90 and working as needed with the primary care doctor  to optimize blood pressure control).      B. Encouraging proper sleep hygiene which for most adults is 7 to 8 hours of uninterrupted sleep per night.      C. Encouraging some form of exercise preferable aerobic forms to be done (4 to 5 days per week- 30 minutes minimum per day)> 150 minutes per week as a goal. Example activities could include fast walking (up a slight incline), jogging, cycling (road or stationary), swimming,tennis. Essentially, even basic walking on a flat surface or a treadmill would be better than doing nothing.    D. Maintaining or forming new social contacts with family and friends  and a positive attitude despite the concerns and/or ongoing issues with thinking and/or memory.    E. Eating well which means a diet low in salt  (under 2 grams per day), sugar and saturated fat.    F. Maintaining one's BMI (Body Mass Index) under 30.    I provided a handout to the patient about Mild Cognitive Impairment as it relates to the above topics.    G. I reviewed that  I am keeping up with editorials and  comments from  many academic neurologists throughout the [place] who are writing reviews and summaries of the present state of this provisionally approved anti amyloid compound (aducanumab)    The FDA's Central and Peripheral Nervous System Advisory Committee voted 10-1 against the  compound (the 1 person voted “uncertain”) in that the data did not confirm or support meaningful clinical benefit.      I have read that several senior research neurologists have even commented the compound did nothing clinically meaningful or useful and moreover there was no  clear evidence it prevented the clinical deterioration  of the patients' condition despite potentially removing some amyloid from their brain(s)  tissue.      Based on the critique of the data so far,  there was no  clear scientific evidence this anti amyloid intravenous compound reduced or halted the underlying disease or slowed down the inherent clinical deterioration expected with the Alzheimer's type of Dementia. The clinical data did also not suggest that activities of daily living were improved upon with this compound.    I have discussed with the patient and family today that given  the great controversy about the study's data and analysis of the lack of clinical  efficacy to this point in time, I feel that I need to wait and see reasonable post marketing data and/or more robust efficacy data supported by the academic community and/or the American Academy of Neurology  before making a commitment to prescribe such a compound and  where there is more than  a minor/minimal amount of risk to the patient involved in being administered this compound on a chronic (monthly) basis.     G. Brain MRI to be ordered with additional blood work (Vitamin Bs).    H. P.T. evaluation chronic gait difficulties    I.  Continue anticoagulation for stroke prevention- Pedro is agreement to take on risks of bleeding related to anticoagulation in order to reduce stroke risk.    J. Strongly advised eliminating the alcohol gradually over the next 4-6 weeks.    I have performed  a history and physical exam and a directed /focused  ROS today.    Total time spent today or this patient's care was 62    minutes  and included reviewing  the diagnostic workup to date  (such as labs and imaging as well as interpreting such tests relevant to this patient's neurological condition),  reviewing/obtaining separately obtained history (from patient and/or accompanying ffamily member)  for today's neurological problem(s) ,counseling and educating the patient and family member on issues related to cognition/memory and cognitive health factors and documenting  the clinical information in the EMR.    Follow up in 6 months or so me.        Mitchell Christensen MD  [place] [facility].   [facility]

## 2022-01-13 ENCOUNTER — APPOINTMENT (OUTPATIENT)
Dept: RADIOLOGY | Facility: MEDICAL CENTER | Age: 82
End: 2022-01-13
Attending: PSYCHIATRY & NEUROLOGY
Payer: MEDICARE

## 2022-01-13 ENCOUNTER — HOSPITAL ENCOUNTER (OUTPATIENT)
Dept: LAB | Facility: MEDICAL CENTER | Age: 82
End: 2022-01-13
Attending: PSYCHIATRY & NEUROLOGY
Payer: MEDICARE

## 2022-01-13 DIAGNOSIS — G31.84 MILD COGNITIVE IMPAIRMENT WITH MEMORY LOSS: ICD-10-CM

## 2022-01-13 DIAGNOSIS — G96.89 OTHER SPECIFIED DISORDERS OF CENTRAL NERVOUS SYSTEM: ICD-10-CM

## 2022-01-13 LAB
FOLATE SERPL-MCNC: 7.2 NG/ML
VIT B12 SERPL-MCNC: 645 PG/ML (ref 211–911)

## 2022-01-13 PROCEDURE — 83921 ORGANIC ACID SINGLE QUANT: CPT

## 2022-01-13 PROCEDURE — 82746 ASSAY OF FOLIC ACID SERUM: CPT

## 2022-01-13 PROCEDURE — 84425 ASSAY OF VITAMIN B-1: CPT

## 2022-01-13 PROCEDURE — 82607 VITAMIN B-12: CPT

## 2022-01-13 PROCEDURE — 70551 MRI BRAIN STEM W/O DYE: CPT | Mod: MG

## 2022-01-13 PROCEDURE — 36415 COLL VENOUS BLD VENIPUNCTURE: CPT

## 2022-01-17 ENCOUNTER — HOSPITAL ENCOUNTER (OUTPATIENT)
Dept: PULMONOLOGY | Facility: MEDICAL CENTER | Age: 82
End: 2022-01-17
Attending: NURSE PRACTITIONER
Payer: MEDICARE

## 2022-01-17 DIAGNOSIS — R06.02 SOB (SHORTNESS OF BREATH): ICD-10-CM

## 2022-01-17 DIAGNOSIS — J98.11 ATELECTASIS OF BOTH LUNGS: ICD-10-CM

## 2022-01-17 DIAGNOSIS — J98.4 SCARRING OF LUNG: ICD-10-CM

## 2022-01-17 LAB
METHYLMALONATE SERPL-SCNC: 0.17 UMOL/L (ref 0–0.4)
VIT B1 BLD-MCNC: 135 NMOL/L (ref 70–180)

## 2022-01-26 RX ORDER — LEVOTHYROXINE SODIUM 0.03 MG/1
25 TABLET ORAL
Qty: 90 TABLET | Refills: 0 | Status: SHIPPED | OUTPATIENT
Start: 2022-01-26 | End: 2022-04-03

## 2022-01-26 NOTE — WOUND TEAM
[facility]  Inpatient Services  Initial Wound and Skin Care Evaluation    Admission Date: 10/18/2019     Consult Date: 10/24/2019   HPI, PMH, SH: Reviewed    Unit where seen by Wound Team: [id number]     WOUND CONSULT RELATED TO:  Sacral wounds     SUBJECTIVE:  Patient mumbled through trache     Self Report / Pain Level:  No indication of pain       OBJECTIVE:  Wounds open to air.    WOUND TYPE, LOCATION, CHARACTERISTICS (Pressure Injuries: location, stage, POA or date identified)    Wound 10/18/19 Pressure Injury Sacrum bilateral, lower (Active) Stage 2   Site Assessment Red    Carla-wound Assessment Blanchable erythema    Margins Epibole (rolled edges)    Wound Length (cm) 2.8 cm    Wound Width (cm) 1.5 cm    Wound Depth (cm) 0.3 cm    Wound Surface Area (cm^2) 4.2 cm^2    Tunneling 0 cm    Undermining 0 cm    Closure None    Drainage Amount Scant    Drainage Description Serosanguineous    Non-staged Wound Description Not applicable    Treatments Cleansed;Site care    Cleansing Normal Saline Irrigation    Periwound Protectant Not Applicable    Dressing Options Mepilex    Dressing Cleansing/Solutions Not Applicable    Dressing Changed New    Dressing Status Clean;Dry;Intact    Dressing Change Frequency Every 72 hrs    NEXT Dressing Change  10/27/19    NEXT Weekly Photo (Inpatient Only) 10/25/19    WOUND NURSE ONLY - Odor None    WOUND NURSE ONLY - Exposed Structures None    WOUND NURSE ONLY - Tissue Type and Percentage 100% red       Vascular:    Dorsal Pedal pulses:  NA not related to a vascular issue of lower extremities  Posterior tib pulses:   NA    LEONEL:      NA    Lab Values:    Lab Results   Component Value Date/Time    WBC 11.2 (H) 10/21/2019 05:50 AM    RBC 3.38 (L) 10/21/2019 05:50 AM    HEMOGLOBIN 9.6 (L) 10/21/2019 05:50 AM    HEMATOCRIT 31.9 (L) 10/21/2019 05:50 AM        Lab Results   Component Value Date/Time    HBA1C 4.7 10/19/2019 05:24 AM           Culture:   Obtained NA no signs of  infection, Results NA      INTERVENTIONS BY WOUND TEAM:  Performed hand hygiene, donned gloves. CNA assisted patient to the bathroom and wounds assessed. Wounds cleaned then sacral mepilex applied.     Dressing Selection:  Sacral mepilex         Interdisciplinary consultation: Patient, Bedside RN     EVALUATION: Sacral mepilex redistributes the forces of friction and shear protecting bony prominences     Factors affecting wound healing: Age, dementia, pressure.   Goals: Steady decrease in wound area and depth weekly.    NURSING PLAN OF CARE ORDERS (X):    Dressing changes: See Dressing Care orders: X  Skin care: See Skin Care orders:   Rectal tube care: See Rectal Tube Care orders:   Other orders:    RSKIN: CURRENT (X) ORDERED (O):   Q shift Pato:  X  Q shift pressure point assessments:  X  Pressure redistribution mattress     X       MARICEL          Bariatric MARICEL         Bariatric foam           Heel float boots      Heel silicone dressing      Float Heels off Bed with Pillows               Barrier wipes         Barrier Cream         Barrier paste          Sacral silicone dressing   X  Silicone O2 tubing         Anchorfast         Cannula fixation Device (Tender )          Gray Foam Ear protectors           Trach with Optifoam split foam                 Waffle cushion        Waffle Overlay         Rectal tube or BMS   Purwick/Condom Cath         Antifungal tx      Interdry          Reposition q 2 hours     Patient turns self   Up to chair  X      Ambulate      PT/OT        Dietician        Diabetes Education      PO     TF   X  TPN     NPO   # days   Other        WOUND TEAM PLAN OF CARE (X):   NPWT change 3 x week:        Dressing changes by wound team:       Follow up as needed:  X weekly     Other (explain):     Anticipated discharge plans (X):   SNF:           Home Care:           Outpatient Wound Center:            Self Care:            Other:       Currently at Rehab       36.9

## 2022-01-27 ENCOUNTER — TELEPHONE (OUTPATIENT)
Dept: MEDICAL GROUP | Facility: MEDICAL CENTER | Age: 82
End: 2022-01-27

## 2022-01-28 NOTE — TELEPHONE ENCOUNTER
----- Message from TORREY Jsesica sent at 1/17/2022 12:54 PM PST -----  Regarding: FW: PFT TEST  Please set up pt to come into clinic to discuss his lung evaluation  ----- Message -----  From: Deisy SIS Ledyronayamileth  Sent: 1/17/2022  11:27 AM PST  To: TORREY Jessica  Subject: PFT TEST                                         Mr. Akira Medina came in for his PFT today, short of breath, c/o waking up gasping for air at night.  He was very early for his appointment because he couldn't remember the time it was.  His sats were 94% on room air.  He stated that he gets confused at times.He had issues with following commands for doing the test so I was unable to obtain any good results. He c/o shortness of breath.   He is hard of hearing and did have his hearing aide in but didn't have a problem hearing or answering questions from me.  I am wondering if he should be treated with MDI's to improve his status and attempt the test again. At this time I have erronous results.      Deisy

## 2022-01-28 NOTE — TELEPHONE ENCOUNTER
Tried calling pt and his wife but both numbers are answered by a recording saying they changed their numbers. Pt notified via [employer].

## 2022-02-18 ENCOUNTER — HOSPITAL ENCOUNTER (OUTPATIENT)
Dept: PULMONOLOGY | Facility: MEDICAL CENTER | Age: 82
End: 2022-02-18
Attending: NURSE PRACTITIONER
Payer: MEDICARE

## 2022-02-18 DIAGNOSIS — J98.4 SCARRING OF LUNG: ICD-10-CM

## 2022-02-18 DIAGNOSIS — R06.02 SOB (SHORTNESS OF BREATH): ICD-10-CM

## 2022-02-18 DIAGNOSIS — J98.11 ATELECTASIS OF BOTH LUNGS: ICD-10-CM

## 2022-02-18 PROCEDURE — 94729 DIFFUSING CAPACITY: CPT | Mod: 26 | Performed by: INTERNAL MEDICINE

## 2022-02-18 PROCEDURE — 94729 DIFFUSING CAPACITY: CPT

## 2022-02-18 PROCEDURE — 94060 EVALUATION OF WHEEZING: CPT

## 2022-02-18 PROCEDURE — 94060 EVALUATION OF WHEEZING: CPT | Mod: 26 | Performed by: INTERNAL MEDICINE

## 2022-02-18 RX ADMIN — Medication 2.5 MG: at 11:06

## 2022-02-18 ASSESSMENT — PULMONARY FUNCTION TESTS
FEV1/FVC_PERCENT_PREDICTED: 94
FVC_PREDICTED: 4.34
FVC_PERCENT_PREDICTED: 84
FEV1_LLN: 2.63
FEV1/FVC_PERCENT_PREDICTED: 94
FEV1/FVC_PERCENT_PREDICTED: 73
FEV1_PREDICTED: 3.16
FEV1_LLN: 2.63
FVC_LLN: 3.62
FEV1/FVC: 70
FEV1/FVC: 69.57
FEV1_PERCENT_PREDICTED: 81
FEV1/FVC_PERCENT_LLN: 61.49
FEV1/FVC_PERCENT_CHANGE: 0
FEV1: 2.49
FVC_PERCENT_PREDICTED: 82
FEV1/FVC_PREDICTED: 73.65
FEV1/FVC: 69.33
FEV1/FVC_PERCENT_PREDICTED: 96
FEV1_PERCENT_CHANGE: 2
FEV1: 2.56
FEV1_PERCENT_CHANGE: 2
FEV1/FVC_PERCENT_CHANGE: 100
FEV1/FVC_PERCENT_LLN: 61.49
FEV1/FVC: 69.54
FEV1_PERCENT_PREDICTED: 78
FVC: 3.68
FEV1/FVC_PERCENT_PREDICTED: 95
FVC: 3.58
FVC_LLN: 3.62

## 2022-02-20 NOTE — PROCEDURES
DATE OF SERVICE:  02/18/2022     PULMONARY FUNCTION TEST INTERPRETATION     REQUESTING PROVIDER:  CHENG Jacobo     REASON FOR REQUEST:  Scarring of the lung atelectasis and shortness of breath.     INTERPRETATION:  1.  Acceptable and reproducible.  2.  FEV1 2.49 liters (78%), FVC 3.58 liters (82%), ratio 69%.  3.  Flow volume loops normal appearing.  4.  DLCO 27.69 mL per minute mmHg (102%).     IMPRESSION:  Spirometry consistent with mild obstruction with no response to   bronchodilator and normal gas transfer.  The patient is unable to do lung   volumes as he had left his hearing aids and was unable to follow instructions   for lung volumes.        ______________________________  MD NOE Membreno/MANJULA    DD:  02/20/2022 13:27  DT:  02/20/2022 14:35    Job#:  [id number]    CC:CHENG JACOBO

## 2022-02-22 DIAGNOSIS — R53.83 OTHER FATIGUE: ICD-10-CM

## 2022-02-22 RX ORDER — SERTRALINE HYDROCHLORIDE 25 MG/1
TABLET, FILM COATED ORAL
Qty: 180 TABLET | Refills: 0 | Status: SHIPPED | OUTPATIENT
Start: 2022-02-22 | End: 2022-05-19

## 2022-02-23 ENCOUNTER — TELEPHONE (OUTPATIENT)
Dept: MEDICAL GROUP | Facility: MEDICAL CENTER | Age: 82
End: 2022-02-23

## 2022-02-23 NOTE — TELEPHONE ENCOUNTER
Patient's wife came in asking to have test results for pulmonary explained because no one has reached out to let them know how the results turned out. Wife said Zhilian Zhaopinhart shows results but doesn't understand how to read them.

## 2022-03-02 DIAGNOSIS — R91.8 MULTIPLE PULMONARY NODULES: ICD-10-CM

## 2022-03-03 DIAGNOSIS — Z01.818 PREOP TESTING: ICD-10-CM

## 2022-03-03 RX ORDER — LORAZEPAM 0.5 MG/1
0.5 TABLET ORAL ONCE
Qty: 1 TABLET | Refills: 0 | Status: SHIPPED | OUTPATIENT
Start: 2022-03-03 | End: 2022-03-03

## 2022-03-07 ENCOUNTER — TELEPHONE (OUTPATIENT)
Dept: MEDICAL GROUP | Facility: MEDICAL CENTER | Age: 82
End: 2022-03-07
Payer: MEDICARE

## 2022-03-07 DIAGNOSIS — R59.1 LYMPHADENOPATHY OF HEAD AND NECK: ICD-10-CM

## 2022-03-07 DIAGNOSIS — R59.0 LOCALIZED ENLARGED LYMPH NODES: ICD-10-CM

## 2022-03-07 DIAGNOSIS — R91.8 PULMONARY NODULES: ICD-10-CM

## 2022-03-07 NOTE — TELEPHONE ENCOUNTER
Please let pt and his wife know that its time to redo the ct chest for his pulmonary nodules and enlarged lymph nodes

## 2022-03-08 DIAGNOSIS — G47.09 OTHER INSOMNIA: ICD-10-CM

## 2022-03-08 RX ORDER — QUETIAPINE FUMARATE 25 MG/1
TABLET, FILM COATED ORAL
Qty: 60 TABLET | Refills: 2 | Status: SHIPPED | OUTPATIENT
Start: 2022-03-08 | End: 2023-06-12

## 2022-03-17 ENCOUNTER — TELEPHONE (OUTPATIENT)
Dept: MEDICAL GROUP | Facility: MEDICAL CENTER | Age: 82
End: 2022-03-17
Payer: MEDICARE

## 2022-03-17 ENCOUNTER — HOSPITAL ENCOUNTER (OUTPATIENT)
Dept: RADIOLOGY | Facility: MEDICAL CENTER | Age: 82
End: 2022-03-17
Attending: NURSE PRACTITIONER
Payer: MEDICARE

## 2022-03-17 DIAGNOSIS — R91.8 PULMONARY NODULES: ICD-10-CM

## 2022-03-17 DIAGNOSIS — R59.1 LYMPHADENOPATHY OF HEAD AND NECK: ICD-10-CM

## 2022-03-17 DIAGNOSIS — R59.0 LOCALIZED ENLARGED LYMPH NODES: ICD-10-CM

## 2022-03-17 DIAGNOSIS — R91.8 PULMONARY NODULES/LESIONS, MULTIPLE: ICD-10-CM

## 2022-03-17 PROCEDURE — 71250 CT THORAX DX C-: CPT | Mod: MG

## 2022-03-17 NOTE — TELEPHONE ENCOUNTER
Please let pt and wife know that he continues to have multiple pulm nodules and some enlarged lymph nodes.  i recommend that he discuss these with pulm asap.  i see that he is seeing them but dont see a new appt.  Does he have an appt?

## 2022-03-18 ENCOUNTER — HOSPITAL ENCOUNTER (OUTPATIENT)
Dept: RADIOLOGY | Facility: MEDICAL CENTER | Age: 82
End: 2022-03-18
Attending: NURSE PRACTITIONER
Payer: MEDICARE

## 2022-03-18 DIAGNOSIS — R91.8 MULTIPLE PULMONARY NODULES: ICD-10-CM

## 2022-03-18 PROCEDURE — A9552 F18 FDG: HCPCS

## 2022-03-21 NOTE — TELEPHONE ENCOUNTER
Pt wife notified. Rosana states that Pedro needs a referral and is asking if you have any recommendations. Please advise.

## 2022-03-23 NOTE — TELEPHONE ENCOUNTER
i think that you talked to them yesterday about his ct chest.  Do they still have questions?  Do they need to speak with me?

## 2022-03-31 NOTE — FLOWSHEET NOTE
11/01/19 1001   Events/Summary/Plan   Events/Summary/Plan Trach and stoma care, aerosol check   Interdisciplinary Plan of Care-Outcomes    Bronchopulmonary Hygiene Outcome Optimal Hydration with Moderate or Less Sputum Production   Education   Education Yes - Pt. / Family has been Instructed in use of Respiratory Equipment   Aerosol Therapy / Airway Management   #Aerosol Therapy / Airway Management Trache Collar   Aerosol Humidity Temp (celsius) 34   Trache/Stoma Care   Trache/Stoma Care Yes   Chest Exam   Work Of Breathing / Effort Mild   Respiration 16   Pulse 74   Secretions   Cough Productive;Strong   How Sputum Obtained Spontaneous   Sputum Amount Large   Sputum Color Tan;White   Sputum Consistency Thick   Airway Trach Tracheostomy 6.0   Placement Date/Time: 09/14/19 1250   Airway Type: Trach  Brand: [email or web address]  Style: Cuffed;Fenestrated  Airway Location: Tracheostomy  Airway Size: 6.0  Inserted In: Unit  Inserted by: Respiratory care practitioner   Site Assessment Clean;Dry;Intact   Airway Tube Secured Velcro attachment   Date Securing Device changed? 11/01/19   Date Securing Device to be changed (Q 7 days) 11/08/19   Cuffless No   Status Speaking valve (Add duration in comment)   Tracheostomy/Stoma Care Clean Stoma Site;Dressing Change;Inner Cannula Changed   Tracheostomy/Cannula Changed (Date) 11/01/19   Next Tracheostomy/Cannula Change Due (Date) 11/02/19   Extra Tracheostomy Tube at Bedside Yes   Oximetry   #Pulse Oximetry (Single Determination) Yes   Oxygen   Home O2 Use Prior To Admission? No   Pulse Oximetry 98 %   FiO2% 30 %   O2 Daily Delivery Respiratory  Trache Collar   OTHER   Resuscitation Bag Resuscitation Bag and Mask at Bedside and Checked      Excision Method: Elliptical

## 2022-05-06 ENCOUNTER — OFFICE VISIT (OUTPATIENT)
Dept: CARDIOLOGY | Facility: MEDICAL CENTER | Age: 82
End: 2022-05-06
Payer: MEDICARE

## 2022-05-06 VITALS
RESPIRATION RATE: 20 BRPM | SYSTOLIC BLOOD PRESSURE: 126 MMHG | DIASTOLIC BLOOD PRESSURE: 78 MMHG | HEART RATE: 106 BPM | OXYGEN SATURATION: 96 % | HEIGHT: 74 IN | BODY MASS INDEX: 27.98 KG/M2 | WEIGHT: 218 LBS

## 2022-05-06 DIAGNOSIS — I42.9 CARDIOMYOPATHY, UNSPECIFIED TYPE (HCC): ICD-10-CM

## 2022-05-06 DIAGNOSIS — E78.5 DYSLIPIDEMIA: ICD-10-CM

## 2022-05-06 DIAGNOSIS — Z79.01 ANTICOAGULANT LONG-TERM USE: ICD-10-CM

## 2022-05-06 DIAGNOSIS — I48.19 OTHER PERSISTENT ATRIAL FIBRILLATION (HCC): ICD-10-CM

## 2022-05-06 DIAGNOSIS — I50.1 HEART FAILURE, LEFT, WITH LVEF <=30% (HCC): ICD-10-CM

## 2022-05-06 DIAGNOSIS — I10 ESSENTIAL HYPERTENSION, BENIGN: ICD-10-CM

## 2022-05-06 PROCEDURE — 99214 OFFICE O/P EST MOD 30 MIN: CPT | Performed by: INTERNAL MEDICINE

## 2022-05-06 RX ORDER — FLUTICASONE PROPIONATE 50 MCG
SPRAY, SUSPENSION (ML) NASAL
COMMUNITY
Start: 2022-04-23 | End: 2023-12-27

## 2022-05-06 RX ORDER — LORAZEPAM 0.5 MG/1
TABLET ORAL
COMMUNITY
Start: 2022-03-03 | End: 2022-08-24

## 2022-05-06 RX ORDER — FUROSEMIDE 20 MG/1
20 TABLET ORAL
Qty: 50 TABLET | Refills: 3 | Status: SHIPPED | OUTPATIENT
Start: 2022-05-06 | End: 2022-08-25 | Stop reason: SDUPTHER

## 2022-05-06 ASSESSMENT — FIBROSIS 4 INDEX: FIB4 SCORE: 1.61

## 2022-05-06 NOTE — PROGRESS NOTES
Chief Complaint   Patient presents with   • Atrial Fibrillation     F/V Dx: Other persistent atrial fibrillation (HCC)   • Cardiomyopathy (Non-ischemic)   • HTN (Controlled)       Subjective     Pedro Champion is a 81 y.o. 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% ([facility]) 9/23/2019   • Hypertension    • Hypothyroid 9/23/2019   • Insomnia    • Malignant melanoma ([facility]) 5/4/2021   • Mandibular fracture, open ([facility]) 8/28/2019   • Memory loss 11/22/2021   • Other persistent atrial fibrillation ([facility]) 8/28/2019   • Parotid nodule 7/19/2013   • Urinary retention 9/9/2019     IMO load March 2020   • Vitamin D deficiency 9/27/2021     Past Surgical History:   Procedure Laterality Date   • LA DENTAL SURGERY PROCEDURE Left 10/13/2019    Procedure: EXTRACTION, TOOTH;  Surgeon: Troy Dong D.D.SBetsy;  Location: [facility];  Service: Oral Surgery   • MANDIBLE FRACTURE ORIF Bilateral 10/13/2019    Procedure: ORIF, FRACTURE, MANDIBLE - FOR HARDWARE REMOVAL MANDIBLE, POSS ORIF;  Surgeon: Troy Dong D.D.SBetsy;  Location: [facility];  Service: Oral Surgery   • ORAL FISTULA REPAIR N/A 10/13/2019    Procedure: CLOSURE, FISTULA, MOUTH;  Surgeon: Troy Dong D.D.S.;  Location: [facility];  Service: Oral Surgery   • SUBMANDIBLE ABSCESS INCISION AND DRAINAGE N/A 8/31/2019    Procedure: INCISION AND DRAINAGE FACIAL WOUND;  Surgeon: Troy Dong D.D.S.;  Location: [facility];  Service: Oral Surgery   • INTERMAXILLARY FIXATATION N/A 8/31/2019    Procedure: FIXATION, MAXILLOMANDIBULAR. ORIF and MMF mandible fracture debridement of facial wounds extracton tooth #8;  Surgeon: Troy Dong D.D.SBetsy;  Location: SURGERY [facility];  Service: Oral Surgery   • PAROTIDECTOMY SUPERFICIAL  7/19/2013    Performed by Mendy Stern M.D. at SURGERY [facility]   • LUMBAR DECOMPRESSION  1988   • INGUINAL HERNIA REPAIR BILATERAL  1960's     Family History   Problem Relation Age of Onset   • Heart Disease Father 81        Sudden cardiac death probably coronary   • Stroke Mother      Social History     Socioeconomic History   • Marital status:      Spouse name: Not on file   • Number of children: Not on file   • Years of education: Not on file   • Highest education level: Not on file   Occupational History   • Not on file   Tobacco Use   • Smoking status: Never Smoker   • Smokeless tobacco: Never Used   Vaping Use   • Vaping Use: Never used   Substance and Sexual Activity   • Alcohol use: Yes     Alcohol/week: 8.4 - 12.6 oz     Types: 14 - 21 Glasses of wine per week     Comment: 3-4 glasses of wine a day   • Drug use: Never   • Sexual activity: Yes     Partners: Female   Other Topics Concern   • Not on file   Social History Narrative    ** Merged History Encounter **          Social Determinants of Health     Financial Resource Strain: Not on file   Food Insecurity: Not on file   Transportation Needs: Not on file   Physical Activity: Not on file   Stress: Not on file   Social Connections: Not on file   Intimate Partner Violence: Not on file   Housing Stability: Not on file     Allergies   Allergen Reactions   • Cefepime Hcl Rash     Hives, eosinophilia      Outpatient Encounter Medications as of 5/6/2022   Medication Sig Dispense Refill   • fluticasone (FLONASE) 50 MCG/ACT nasal spray SPRAY 2 SPRAYS INTO EACH NOSTRIL EVERY DAY     • LORazepam (ATIVAN) 0.5 MG Tab TAKE  "1 TABLET BY MOUTH ONCE FOR 1 DAY Z01.818     • carvedilol (COREG) 6.25 MG Tab TAKE 1 TABLET BY MOUTH TWICE A DAY WITH MEALS 180 Tablet 2   • SYNTHROID 25 MCG Tab TAKE 1 TABLET EVERY MORNING ON AN EMPTY STOMACH. 90 Tablet 0   • QUEtiapine (SEROQUEL) 25 MG Tab TAKE 1/2 TO 2 TABLETS BY MOUTH AT BEDTIME AS NEEDED FOR INSOMNIA (Patient taking differently: Take 25 mg by mouth. TAKE 1/2 TO 2 TABLETS BY MOUTH AT BEDTIME AS NEEDED FOR INSOMNIA) 60 Tablet 2   • sertraline (ZOLOFT) 25 MG tablet Take 2 tablets by mouth once a day 180 Tablet 0   • finasteride (PROSCAR) 5 MG Tab TAKE 1 TABLET BY MOUTH EVERY DAY 90 Tablet 2   • tamsulosin (FLOMAX) 0.4 MG capsule TAKE 1 CAPSULE BY ORAL ROUTE EVERY DAY 1/2 HOUR FOLLOWING THE SAME MEAL EACH DAY 90 Capsule 2   • atorvastatin (LIPITOR) 20 MG Tab Take 1 Tablet by mouth every day. 90 Tablet 3   • vitamin D2, Ergocalciferol, (DRISDOL) 1.25 MG (33329 UT) Cap capsule Take 1 Capsule by mouth every 7 days. 12 Capsule 0   • ELIQUIS 5 MG Tab TAKE 1 TABLET 2 TIMES DAILY 180 Tablet 3   • losartan (COZAAR) 100 MG Tab Take 1 tablet by mouth every day. 90 tablet 3   • LANOXIN 125 MCG Tab TAKE 1 TABLET EVERY DAY 90 Tablet 3     No facility-administered encounter medications on file as of 5/6/2022.     ROS           Objective     /78 (BP Location: Left arm, Patient Position: Sitting, BP Cuff Size: Adult)   Pulse (!) 106   Resp 20   Ht 1.88 m (6' 2\")   Wt 98.9 kg (218 lb)   SpO2 96%   BMI 27.99 kg/m²     Physical Exam  Constitutional:       General: He is not in acute distress.     Appearance: He is not diaphoretic.   Eyes:      General: No scleral icterus.  Neck:      Vascular: No JVD.   Cardiovascular:      Rate and Rhythm: Normal rate. Rhythm irregular.      Heart sounds: Normal heart sounds. No murmur heard.    No friction rub. No gallop.   Pulmonary:      Effort: No respiratory distress.      Breath sounds: No wheezing or rales.   Abdominal:      General: Bowel sounds are normal.      " Palpations: Abdomen is soft.   Skin:     Findings: No rash.   Neurological:      Mental Status: He is alert.            We reviewed in person the most recent labs  Recent Results (from the past 5040 hour(s))   Comp Metabolic Panel    Collection Time: 11/16/21  8:04 AM   Result Value Ref Range    Sodium 136 135 - 145 mmol/L    Potassium 4.1 3.6 - 5.5 mmol/L    Chloride 101 96 - 112 mmol/L    Co2 25 20 - 33 mmol/L    Anion Gap 10.0 7.0 - 16.0    Glucose 146 (H) 65 - 99 mg/dL    Bun 10 8 - 22 mg/dL    Creatinine 0.56 0.50 - 1.40 mg/dL    Calcium 8.7 8.5 - 10.5 mg/dL    AST(SGOT) 20 12 - 45 U/L    ALT(SGPT) 29 2 - 50 U/L    Alkaline Phosphatase 60 30 - 99 U/L    Total Bilirubin 1.2 0.1 - 1.5 mg/dL    Albumin 4.1 3.2 - 4.9 g/dL    Total Protein 6.5 6.0 - 8.2 g/dL    Globulin 2.4 1.9 - 3.5 g/dL    A-G Ratio 1.7 g/dL   CBC WITH DIFFERENTIAL    Collection Time: 11/16/21  8:04 AM   Result Value Ref Range    WBC 10.5 4.8 - 10.8 K/uL    RBC 4.76 4.70 - 6.10 M/uL    Hemoglobin 16.1 14.0 - 18.0 g/dL    Hematocrit 46.6 42.0 - 52.0 %    MCV 97.9 (H) 81.4 - 97.8 fL    MCH 33.8 (H) 27.0 - 33.0 pg    MCHC 34.5 33.7 - 35.3 g/dL    RDW 47.4 35.9 - 50.0 fL    Platelet Count 192 164 - 446 K/uL    MPV 9.9 9.0 - 12.9 fL    Neutrophils-Polys 82.40 (H) 44.00 - 72.00 %    Lymphocytes 10.30 (L) 22.00 - 41.00 %    Monocytes 5.20 0.00 - 13.40 %    Eosinophils 0.80 0.00 - 6.90 %    Basophils 0.50 0.00 - 1.80 %    Immature Granulocytes 0.80 0.00 - 0.90 %    Nucleated RBC 0.00 /100 WBC    Neutrophils (Absolute) 8.63 (H) 1.82 - 7.42 K/uL    Lymphs (Absolute) 1.08 1.00 - 4.80 K/uL    Monos (Absolute) 0.54 0.00 - 0.85 K/uL    Eos (Absolute) 0.08 0.00 - 0.51 K/uL    Baso (Absolute) 0.05 0.00 - 0.12 K/uL    Immature Granulocytes (abs) 0.08 0.00 - 0.11 K/uL    NRBC (Absolute) 0.00 K/uL   FREE THYROXINE    Collection Time: 11/16/21  8:04 AM   Result Value Ref Range    Free T-4 1.06 0.93 - 1.70 ng/dL   TSH    Collection Time: 11/16/21  8:04 AM   Result  Value Ref Range    TSH 3.060 0.380 - 5.330 uIU/mL   VITAMIN D,25 HYDROXY    Collection Time: 11/16/21  8:04 AM   Result Value Ref Range    25-Hydroxy   Vitamin D 25 22 (L) 30 - 80 ng/mL   Lipid Profile    Collection Time: 11/16/21  8:04 AM   Result Value Ref Range    Cholesterol,Tot 159 100 - 199 mg/dL    Triglycerides 79 0 - 149 mg/dL    HDL 53 >=40 mg/dL    LDL 90 <100 mg/dL   HEMOGLOBIN A1C    Collection Time: 11/16/21  8:04 AM   Result Value Ref Range    Glycohemoglobin 5.3 4.0 - 5.6 %    Est Avg Glucose 105 mg/dL   VITAMIN B12    Collection Time: 11/16/21  8:04 AM   Result Value Ref Range    Vitamin B12 -True Cobalamin 288 211 - 911 pg/mL   ESTIMATED GFR    Collection Time: 11/16/21  8:04 AM   Result Value Ref Range    GFR If African American >60 >60 mL/min/1.73 m 2    GFR If Non African American >60 >60 mL/min/1.73 m 2   VITAMIN B1    Collection Time: 01/13/22  9:30 AM   Result Value Ref Range    Vitamin B1 135 70 - 180 nmol/L   METHYLMALONIC ACID, SERUM    Collection Time: 01/13/22  9:30 AM   Result Value Ref Range    Methylmalonic Acid, Serum 0.17 0.00 - 0.40 umol/L   VITAMIN B12    Collection Time: 01/13/22  9:30 AM   Result Value Ref Range    Vitamin B12 -True Cobalamin 645 211 - 911 pg/mL   FOLATE    Collection Time: 01/13/22  9:30 AM   Result Value Ref Range    Folate -Folic Acid 7.2 >4.0 ng/mL         Assessment & Plan     1. Other persistent atrial fibrillation (HCC)     2. Anticoagulant long-term use     3. 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 Essential hypertension, benign     5. Dyslipidemia     6. Cardiomyopathy, unspecified type (HCC)         Medical Decision Making: Today's Assessment/Status/Plan:          It was my pleasure to meet with Mr. Champion.    We addressed the management of hypertension at today's visit. Blood pressure is well controlled.  We specifically assessed the labs on hypertension treatment    We addressed the management of dyslipidemia at today's visit. He is on  appropriate statin.      We addressed the management of chronic anticoagulation at today's visit. He understands the risks and benefits of chronic anticoagulation.  We reviewed and verified the results of annual testing for anemia and kidney function.      I will see Mr. Champion back in 1 year time and encouraged him to follow up with us over the phone or electronically using my MyChart as issues arise.    It is my pleasure to participate in the care of Mr. Champion.  Please do not hesitate to contact me with questions or concerns.    Troy Lorenzo MD PhD [facility]  Cardiologist [facility]    Please note that this dictation was created using voice recognition software. There may be errors I did not discover before finalizing the note.

## 2022-05-25 DIAGNOSIS — I10 ESSENTIAL HYPERTENSION, BENIGN: ICD-10-CM

## 2022-05-25 RX ORDER — LOSARTAN POTASSIUM 100 MG/1
100 TABLET ORAL DAILY
Qty: 90 TABLET | Refills: 3 | Status: SHIPPED
Start: 2022-05-25 | End: 2022-09-08

## 2022-05-25 NOTE — TELEPHONE ENCOUNTER
Refill Request  Sharla Vivar, Med Ass't  You 5 hours ago (10:02 AM)       Please see [place] message.   Thank you.    Routing comment      Medication sent to preferred pharmacy as requested.

## 2022-06-12 ENCOUNTER — PATIENT MESSAGE (OUTPATIENT)
Dept: CARDIOLOGY | Facility: MEDICAL CENTER | Age: 82
End: 2022-06-12
Payer: MEDICARE

## 2022-06-12 DIAGNOSIS — I10 ESSENTIAL HYPERTENSION, BENIGN: ICD-10-CM

## 2022-06-14 RX ORDER — CARVEDILOL 6.25 MG/1
6.25 TABLET ORAL 2 TIMES DAILY WITH MEALS
Qty: 180 TABLET | Refills: 3 | Status: SHIPPED | OUTPATIENT
Start: 2022-06-14 | End: 2023-08-09 | Stop reason: SDUPTHER

## 2022-07-14 DIAGNOSIS — R53.83 OTHER FATIGUE: ICD-10-CM

## 2022-07-14 RX ORDER — SERTRALINE HYDROCHLORIDE 25 MG/1
TABLET, FILM COATED ORAL
Qty: 60 TABLET | Refills: 1 | Status: SHIPPED | OUTPATIENT
Start: 2022-07-14 | End: 2022-09-12

## 2022-08-15 ENCOUNTER — TELEPHONE (OUTPATIENT)
Dept: CARDIOLOGY | Facility: MEDICAL CENTER | Age: 82
End: 2022-08-15
Payer: MEDICARE

## 2022-08-15 DIAGNOSIS — Z79.899 ENCOUNTER FOR LONG-TERM (CURRENT) USE OF HIGH-RISK MEDICATION: ICD-10-CM

## 2022-08-15 DIAGNOSIS — I48.19 OTHER PERSISTENT ATRIAL FIBRILLATION (HCC): ICD-10-CM

## 2022-08-15 NOTE — TELEPHONE ENCOUNTER
CW    Caller: Rosana (spouse)    Medication Name and Dosage: ELIQUIS 5 MG Tab    Did patient contact pharmacy (If no, please call pharmacy first): Yes    Medication amount left: 1 day    Preferred Pharmacy    [facility]   [address] [place] [address]   Phone:  [phone]  Fax:  [phone]     Other questions (Topic): N/A    Callback Number (Will only call for issues): [phone]

## 2022-08-15 NOTE — TELEPHONE ENCOUNTER
Is the patient due for a refill? Yes    Was the patient seen the past year? Yes    Date of last office visit: 5/6/22    Does the patient have an upcoming appointment?  No    Provider to refill:CW    Does the patients insurance require a 100 day supply?  No

## 2022-08-24 ENCOUNTER — OFFICE VISIT (OUTPATIENT)
Dept: SLEEP MEDICINE | Facility: MEDICAL CENTER | Age: 82
End: 2022-08-24
Payer: MEDICARE

## 2022-08-24 VITALS
HEART RATE: 82 BPM | BODY MASS INDEX: 27.46 KG/M2 | SYSTOLIC BLOOD PRESSURE: 134 MMHG | RESPIRATION RATE: 16 BRPM | DIASTOLIC BLOOD PRESSURE: 62 MMHG | OXYGEN SATURATION: 95 % | HEIGHT: 74 IN | WEIGHT: 214 LBS

## 2022-08-24 DIAGNOSIS — R91.8 PULMONARY NODULES: ICD-10-CM

## 2022-08-24 DIAGNOSIS — R60.9 EDEMA, UNSPECIFIED TYPE: ICD-10-CM

## 2022-08-24 DIAGNOSIS — I50.20 HFREF (HEART FAILURE WITH REDUCED EJECTION FRACTION) (HCC): ICD-10-CM

## 2022-08-24 DIAGNOSIS — I48.19 OTHER PERSISTENT ATRIAL FIBRILLATION (HCC): ICD-10-CM

## 2022-08-24 DIAGNOSIS — R06.09 DOE (DYSPNEA ON EXERTION): ICD-10-CM

## 2022-08-24 PROCEDURE — 99204 OFFICE O/P NEW MOD 45 MIN: CPT | Performed by: INTERNAL MEDICINE

## 2022-08-24 ASSESSMENT — FIBROSIS 4 INDEX: FIB4 SCORE: 1.61

## 2022-08-24 ASSESSMENT — ENCOUNTER SYMPTOMS
FALLS: 0
DIAPHORESIS: 0
CONSTIPATION: 0
HEMOPTYSIS: 0
PALPITATIONS: 0
EYE DISCHARGE: 0
COUGH: 0
WEAKNESS: 0
FOCAL WEAKNESS: 0
HEADACHES: 0
CLAUDICATION: 0
VOMITING: 0
DIZZINESS: 0
DIARRHEA: 0
CHILLS: 0
DEPRESSION: 0
WEIGHT LOSS: 0
SORE THROAT: 0
PHOTOPHOBIA: 0
SPUTUM PRODUCTION: 0
TREMORS: 0
EYE REDNESS: 0
DOUBLE VISION: 0
EYE PAIN: 0
ABDOMINAL PAIN: 0
SINUS PAIN: 0
SHORTNESS OF BREATH: 1
ORTHOPNEA: 0
SPEECH CHANGE: 0
NAUSEA: 0
BLURRED VISION: 0
WHEEZING: 0
FEVER: 0
STRIDOR: 0
NECK PAIN: 0
BACK PAIN: 0
PND: 0
HEARTBURN: 0
MYALGIAS: 0

## 2022-08-24 NOTE — PROGRESS NOTES
Chief Complaint   Patient presents with    [facility] Care     Referral aston White DX Pulmonary nodules/lesions, multiple    Results     Ct PET 3/18/22        HPI: This patient is a 81 y.o. male presenting for evaluation of abnormal CT chest. PMHx is significant for HFrEF most recent TTE from 2020 showing EF of 40-45% and chronic AF on anticoagulation, hypothyroid, HTN well controlled on RX. He is a life-long non-smoker. He is retired from a career as a . His father did have lung Ca but was a smoker and  at 81 of cardiac complications. The pt presents today for evaluation of abnormal CT chest first noted 2021 after CT ordered by PCP for progressive dyspnea. CT showed sub-cm pulmonary nodules up to 8 mm in size with mildly enlarged pretracheal LN. He has small b/l pleural effusions as well. A repeat CT in March showed possible mild increase in siz eof LLL nodule but no new pulmonary nodules. Small GGO in RLL (read as cavitary but I am not sure I appreciate this). A subsequent PET showed no FDG upatake. The pt is quite dyspneic just walking across the room. No wheezing or cough although he does bring up mucous in the AM and uses flonase for sinus congestion before bed. No f/c. No nt sweats or wt loss. He has b/l LE that per pt wife has progressively worsened over the past 3-4 months despite compliance with low-dose lasix. PFTs from 2022 showed mild reduction in both FEV1 of 78% and FVC at 82% pred, ratio of 69 suggesting mild obstruction. No concavity to expiratory flow volume loop, no BD response and carlos DLCO.     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% (HCC) 2019    Hypertension     Hypothyroid  9/23/2019    Insomnia     Malignant melanoma (HCC) 5/4/2021    Mandibular fracture, open (HCC) 8/28/2019    Memory loss 11/22/2021    Other persistent atrial fibrillation (HCC) 8/28/2019    Parotid nodule 7/19/2013    Urinary retention 9/9/2019     IMO load March 2020    Vitamin D deficiency 9/27/2021       Social History     Socioeconomic History    Marital status:      Spouse name: Not on file    Number of children: Not on file    Years of education: Not on file    Highest education level: Not on file   Occupational History    Not on file   Tobacco Use    Smoking status: Never     Passive exposure: Past (father was a smoker)    Smokeless tobacco: Never   Vaping Use    Vaping Use: Never used   Substance and Sexual Activity    Alcohol use: Yes     Alcohol/week: 8.4 - 12.6 oz     Types: 14 - 21 Glasses of wine per week     Comment: 3-4 glasses of wine a day    Drug use: Never    Sexual activity: Yes     Partners: Female   Other Topics Concern    Not on file   Social History Narrative    ** Merged History Encounter **          Social Determinants of Health     Financial Resource Strain: Not on file   Food Insecurity: Not on file   Transportation Needs: Not on file   Physical Activity: Not on file   Stress: Not on file   Social Connections: Not on file   Intimate Partner Violence: Not on file   Housing Stability: Not on file       Family History   Problem Relation Age of Onset    Heart Disease Father 81        Sudden cardiac death probably coronary    Stroke Mother        Current Outpatient Medications on File Prior to Visit   Medication Sig Dispense Refill    apixaban (ELIQUIS) 5mg Tab TAKE 1 TABLET 2 TIMES DAILY 180 Tablet 3    digoxin (LANOXIN) 125 MCG Tab Take 1 Tablet by mouth every day. 90 Tablet 3    sertraline (ZOLOFT) 25 MG tablet TAKE 2 TABLETS BY MOUTH EVERY DAY . APPT NEEDED FOR REFILLS 60 Tablet 1    carvedilol (COREG) 6.25 MG Tab Take 1 Tablet by mouth 2 times a day with meals. 180 Tablet 3     losartan (COZAAR) 100 MG Tab Take 1 Tablet by mouth every day. 90 Tablet 3    fluticasone (FLONASE) 50 MCG/ACT nasal spray SPRAY 2 SPRAYS INTO EACH NOSTRIL EVERY DAY      furosemide (LASIX) 20 MG Tab Take 1 Tablet by mouth 1 time a day as needed (swelling). 50 Tablet 3    SYNTHROID 25 MCG Tab TAKE 1 TABLET EVERY MORNING ON AN EMPTY STOMACH. 90 Tablet 0    QUEtiapine (SEROQUEL) 25 MG Tab TAKE 1/2 TO 2 TABLETS BY MOUTH AT BEDTIME AS NEEDED FOR INSOMNIA (Patient taking differently: Take 25 mg by mouth. TAKE 1/2 TO 2 TABLETS BY MOUTH AT BEDTIME AS NEEDED FOR INSOMNIA) 60 Tablet 2    finasteride (PROSCAR) 5 MG Tab TAKE 1 TABLET BY MOUTH EVERY DAY 90 Tablet 2    tamsulosin (FLOMAX) 0.4 MG capsule TAKE 1 CAPSULE BY ORAL ROUTE EVERY DAY 1/2 HOUR FOLLOWING THE SAME MEAL EACH DAY 90 Capsule 2    atorvastatin (LIPITOR) 20 MG Tab Take 1 Tablet by mouth every day. 90 Tablet 3    vitamin D2, Ergocalciferol, (DRISDOL) 1.25 MG (33593 UT) Cap capsule Take 1 Capsule by mouth every 7 days. (Patient taking differently: Take 2,000 Units by mouth every day.) 12 Capsule 0     No current facility-administered medications on file prior to visit.       Allergies: Cefepime hcl    ROS:   Review of Systems   Constitutional:  Negative for chills, diaphoresis, fever, malaise/fatigue and weight loss.   HENT:  Negative for congestion, ear discharge, ear pain, hearing loss, nosebleeds, sinus pain, sore throat and tinnitus.    Eyes:  Negative for blurred vision, double vision, photophobia, pain, discharge and redness.   Respiratory:  Positive for shortness of breath. Negative for cough, hemoptysis, sputum production, wheezing and stridor.    Cardiovascular:  Positive for leg swelling. Negative for chest pain, palpitations, orthopnea, claudication and PND.   Gastrointestinal:  Negative for abdominal pain, constipation, diarrhea, heartburn, nausea and vomiting.   Genitourinary:  Negative for dysuria and urgency.   Musculoskeletal:  Negative for back  "pain, falls, joint pain, myalgias and neck pain.   Skin:  Negative for itching and rash.   Neurological:  Negative for dizziness, tremors, speech change, focal weakness, weakness and headaches.   Endo/Heme/Allergies:  Negative for environmental allergies.   Psychiatric/Behavioral:  Negative for depression.      /62 (BP Location: Right arm, Patient Position: Sitting, BP Cuff Size: Adult)   Pulse 82   Resp 16   Ht 1.88 m (6' 2\")   Wt 97.1 kg (214 lb)   SpO2 95%     Physical Exam:  Physical Exam  Vitals reviewed.   Constitutional:       General: He is not in acute distress.     Appearance: Normal appearance. He is normal weight.   HENT:      Head: Normocephalic and atraumatic.      Right Ear: External ear normal.      Left Ear: External ear normal.      Nose: Nose normal. No congestion.      Mouth/Throat:      Mouth: Mucous membranes are moist.      Pharynx: Oropharynx is clear. No oropharyngeal exudate.   Eyes:      General: No scleral icterus.     Extraocular Movements: Extraocular movements intact.      Conjunctiva/sclera: Conjunctivae normal.      Pupils: Pupils are equal, round, and reactive to light.   Cardiovascular:      Rate and Rhythm: Normal rate. Rhythm irregular.      Heart sounds: Normal heart sounds. No murmur heard.    No gallop.   Pulmonary:      Effort: Pulmonary effort is normal. No respiratory distress.      Breath sounds: Normal breath sounds. No wheezing or rales.   Abdominal:      General: There is no distension.      Palpations: Abdomen is soft.   Musculoskeletal:         General: Normal range of motion.      Cervical back: Normal range of motion and neck supple.      Right lower leg: Edema present.      Left lower leg: Edema present.      Comments: 2+ b/l LE pitting edema to just below the knee   Skin:     General: Skin is warm and dry.      Findings: No rash.   Neurological:      Mental Status: He is alert and oriented to person, place, and time.      Cranial Nerves: No cranial " nerve deficit.   Psychiatric:         Mood and Affect: Mood normal.         Behavior: Behavior normal.       PFTs as reviewed by me personally: as per hPI    Imaging as reviewed by me personally:as per hPI    Assessment:  1. ALANIS (dyspnea on exertion)        2. Pulmonary nodules  CT-CHEST (THORAX) W/O      3. Edema, unspecified type  EC-ECHOCARDIOGRAM COMPLETE W/O CONT      4. Other persistent atrial fibrillation (HCC)        5. HFrEF (heart failure with reduced ejection fraction) (HCC)            Plan:  This is new for pt and progressive. Exam is more suggestive of cardiac source of ALANIS given AF and edema. His PFTs are almost normal. I have low suspicion for active pulmonary process such infection or inflammation however we will f/u CT as per below and update TTE; reach out to cardiology  Chronicity unknown; not FDG avid. 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 Continue to monitor with repeat CT next month  This is new and progressive in pt w/hx of HFrEF and AF. I have ordered updated TTE and reached out to cardiology to help optimize diuretics  Chronic, on anticoagulation; rate controlled today  Last EF had improved but no TTE in 2 years; e/o volume overload today; see above  Return in about 5 months (around 1/24/2023) for CT chest, echo.

## 2022-08-24 NOTE — Clinical Note
Good morning Dr. Lorenzo, I saw Pedro today for abnormal CT. He has some minor changes on CT I plan to monitor but he has fairly severe edema b/l lower extremities and dyspnea. HR irreg consistent with AF. I told him I would update his TTE and reach out to you all to maybe adjust his diuretics. Thank you! Hannah

## 2022-08-25 ENCOUNTER — TELEPHONE (OUTPATIENT)
Dept: CARDIOLOGY | Facility: PHYSICIAN GROUP | Age: 82
End: 2022-08-25
Payer: MEDICARE

## 2022-08-25 DIAGNOSIS — I48.19 OTHER PERSISTENT ATRIAL FIBRILLATION (HCC): ICD-10-CM

## 2022-08-25 DIAGNOSIS — I50.1 HEART FAILURE, LEFT, WITH LVEF <=30% (HCC): ICD-10-CM

## 2022-08-25 RX ORDER — POTASSIUM CHLORIDE 750 MG/1
10 CAPSULE, EXTENDED RELEASE ORAL 2 TIMES DAILY
Qty: 180 CAPSULE | Refills: 3 | Status: SHIPPED | OUTPATIENT
Start: 2022-08-25 | End: 2022-08-25 | Stop reason: SDUPTHER

## 2022-08-25 RX ORDER — FUROSEMIDE 40 MG/1
40 TABLET ORAL DAILY
Qty: 90 TABLET | Refills: 3 | Status: SHIPPED | OUTPATIENT
Start: 2022-08-25 | End: 2023-11-06 | Stop reason: SDUPTHER

## 2022-08-26 ENCOUNTER — PATIENT MESSAGE (OUTPATIENT)
Dept: CARDIOLOGY | Facility: MEDICAL CENTER | Age: 82
End: 2022-08-26
Payer: MEDICARE

## 2022-08-26 NOTE — TELEPHONE ENCOUNTER
Thanks for the heads up    Verenice reach out to him and let him know he should take extra doses of diuretics to get his weight down to 200 pounds.  I ordered him for furosemide 40 mg daily and potassium 10 M EQ's twice daily hopefully he has no issues swallowing this     he needs close follow-up on his labs and schedule the echocardiogram under my order so that we get it in my inbox

## 2022-08-26 NOTE — TELEPHONE ENCOUNTER
----- Message from Tania Mckenzie M.D. sent at 8/24/2022 11:25 AM PDT -----  Good morning Dr. Lorenzo,  I saw Pedro today for abnormal CT. He has some minor changes on CT I plan to monitor but he has fairly severe edema b/l lower extremities and dyspnea. HR irreg consistent with AF. I told him I would update his TTE and reach out to you all to maybe adjust his diuretics. Thank you!  Hannah

## 2022-08-26 NOTE — TELEPHONE ENCOUNTER
Task deferred to ROD VALDES to add CW order to current scheduled Echo via staff message.    [employer] message sent to pt regarding MD recommendations.    Called pt, [phone], to review MD recommendations with wife Mar.  She verbalizes understanding and states no other concerns or questions at this time.  Mar is appreciative of information given.

## 2022-08-29 RX ORDER — POTASSIUM CHLORIDE 750 MG/1
10 CAPSULE, EXTENDED RELEASE ORAL 2 TIMES DAILY
Qty: 180 CAPSULE | Refills: 2 | Status: SHIPPED | OUTPATIENT
Start: 2022-08-29 | End: 2023-08-09 | Stop reason: SDUPTHER

## 2022-08-29 NOTE — TELEPHONE ENCOUNTER
Is the patient due for a refill? Yes  - Requested to be sent to mail delivery pharmacy     Was the patient seen the past year? Yes    Date of last office visit: 5/6/22    Does the patient have an upcoming appointment?  No    Provider to refill: CW    Does the patients insurance require a 100 day supply?  No

## 2022-09-02 ENCOUNTER — PATIENT MESSAGE (OUTPATIENT)
Dept: CARDIOLOGY | Facility: MEDICAL CENTER | Age: 82
End: 2022-09-02

## 2022-09-02 ENCOUNTER — HOSPITAL ENCOUNTER (OUTPATIENT)
Dept: CARDIOLOGY | Facility: MEDICAL CENTER | Age: 82
End: 2022-09-02
Attending: INTERNAL MEDICINE
Payer: MEDICARE

## 2022-09-02 DIAGNOSIS — I48.19 OTHER PERSISTENT ATRIAL FIBRILLATION (HCC): ICD-10-CM

## 2022-09-02 DIAGNOSIS — I50.1 HEART FAILURE, LEFT, WITH LVEF <=30% (HCC): ICD-10-CM

## 2022-09-02 LAB
LV EJECT FRACT  99904: 30
LV EJECT FRACT MOD 2C 99903: 51.56
LV EJECT FRACT MOD 4C 99902: 40.53
LV EJECT FRACT MOD BP 99901: 45.28

## 2022-09-02 PROCEDURE — 93306 TTE W/DOPPLER COMPLETE: CPT | Mod: 26 | Performed by: INTERNAL MEDICINE

## 2022-09-02 PROCEDURE — 93306 TTE W/DOPPLER COMPLETE: CPT

## 2022-09-03 NOTE — TELEPHONE ENCOUNTER
----- Message from Troy Lorenzo M.D. sent at 9/2/2022  3:21 PM PDT -----  Echo shows persistent low ejection fraction, he should keep track of his weight as we discussed and he could take entresto and jardiance, the cost would be similar to his rx for eliquis.  He should see me or CHF to discuss further

## 2022-09-06 NOTE — PATIENT COMMUNICATION
To CW, please clarify dosing for Jardiance and Entresto per pt request as pt is following up with insurance.  Thank you

## 2022-09-07 NOTE — PATIENT COMMUNICATION
[facility] recommendations, see encounter.    Awaiting pt response.

## 2022-09-08 ENCOUNTER — HOSPITAL ENCOUNTER (OUTPATIENT)
Dept: LAB | Facility: MEDICAL CENTER | Age: 82
End: 2022-09-08
Attending: INTERNAL MEDICINE
Payer: MEDICARE

## 2022-09-08 DIAGNOSIS — I50.1 HEART FAILURE, LEFT, WITH LVEF <=30% (HCC): ICD-10-CM

## 2022-09-08 LAB
ALBUMIN SERPL BCP-MCNC: 4.2 G/DL (ref 3.2–4.9)
ALBUMIN/GLOB SERPL: 2 G/DL
ALP SERPL-CCNC: 63 U/L (ref 30–99)
ALT SERPL-CCNC: 15 U/L (ref 2–50)
ANION GAP SERPL CALC-SCNC: 11 MMOL/L (ref 7–16)
AST SERPL-CCNC: 16 U/L (ref 12–45)
BILIRUB SERPL-MCNC: 1.3 MG/DL (ref 0.1–1.5)
BUN SERPL-MCNC: 14 MG/DL (ref 8–22)
CALCIUM SERPL-MCNC: 9.1 MG/DL (ref 8.5–10.5)
CHLORIDE SERPL-SCNC: 99 MMOL/L (ref 96–112)
CO2 SERPL-SCNC: 24 MMOL/L (ref 20–33)
CREAT SERPL-MCNC: 0.65 MG/DL (ref 0.5–1.4)
GFR SERPLBLD CREATININE-BSD FMLA CKD-EPI: 93 ML/MIN/1.73 M 2
GLOBULIN SER CALC-MCNC: 2.1 G/DL (ref 1.9–3.5)
GLUCOSE SERPL-MCNC: 139 MG/DL (ref 65–99)
POTASSIUM SERPL-SCNC: 3.7 MMOL/L (ref 3.6–5.5)
PROT SERPL-MCNC: 6.3 G/DL (ref 6–8.2)
SODIUM SERPL-SCNC: 134 MMOL/L (ref 135–145)

## 2022-09-08 PROCEDURE — 36415 COLL VENOUS BLD VENIPUNCTURE: CPT

## 2022-09-08 PROCEDURE — 80053 COMPREHEN METABOLIC PANEL: CPT

## 2022-09-08 RX ORDER — SACUBITRIL AND VALSARTAN 49; 51 MG/1; MG/1
1 TABLET, FILM COATED ORAL 2 TIMES DAILY
Qty: 180 TABLET | Refills: 3 | Status: SHIPPED | OUTPATIENT
Start: 2022-09-08 | End: 2023-10-03 | Stop reason: SDUPTHER

## 2022-09-08 RX ORDER — EMPAGLIFLOZIN 10 MG/1
10 TABLET, FILM COATED ORAL DAILY
Qty: 90 TABLET | Refills: 3 | Status: SHIPPED | OUTPATIENT
Start: 2022-09-08 | End: 2023-09-27 | Stop reason: SDUPTHER

## 2022-09-08 NOTE — PATIENT COMMUNICATION
Replied to pt via [employer] requesting more information regarding concerns, awaiting pt response.

## 2022-09-08 NOTE — PATIENT COMMUNICATION
To CW, pt states he would like to proceed with Jardiance and Entresto.  Ok to d/c Losartan?  Please advise, thank you

## 2022-09-09 NOTE — PATIENT COMMUNICATION
Noted findings.    Replied to pt via [facility] regarding MD recommendations, see encounter.

## 2022-09-13 ENCOUNTER — TELEPHONE (OUTPATIENT)
Dept: CARDIOLOGY | Facility: MEDICAL CENTER | Age: 82
End: 2022-09-13
Payer: MEDICARE

## 2022-09-13 NOTE — TELEPHONE ENCOUNTER
Pedro Champion Key: [id number] - PA Case ID: [id number] - Rx #: [id number] help? Call us at [phone]  Status  Sent to [facility]  Drug  Entresto 49-51MG tablets  Form  [employer] Electronic Prior Authorization Request Form

## 2022-09-17 DIAGNOSIS — E55.9 VITAMIN D DEFICIENCY: ICD-10-CM

## 2022-09-17 DIAGNOSIS — E03.9 ACQUIRED HYPOTHYROIDISM: ICD-10-CM

## 2022-09-17 DIAGNOSIS — Z12.5 SCREENING FOR MALIGNANT NEOPLASM OF PROSTATE: ICD-10-CM

## 2022-09-17 DIAGNOSIS — E78.5 DYSLIPIDEMIA: ICD-10-CM

## 2022-09-17 DIAGNOSIS — R73.9 HYPERGLYCEMIA: ICD-10-CM

## 2022-09-17 DIAGNOSIS — I10 ESSENTIAL HYPERTENSION, BENIGN: ICD-10-CM

## 2022-09-17 NOTE — LETTER
September 21, 2022        Pedro Champion  [address]  [address]  [facility] [address]        Dear Pedro:    Our records indicate that you are due for your next  in-clinic visit in November. Please give us a call at [phone] and our scheduling team will assist you with scheduling this appointment. Be sure to complete your labs prior to this appointment!      If you have any questions or concerns, please don't hesitate to call.        Sincerely,        WILIAN Jessica.    Electronically Signed

## 2022-09-19 RX ORDER — FINASTERIDE 5 MG/1
TABLET, FILM COATED ORAL
Qty: 90 TABLET | Refills: 0 | Status: SHIPPED | OUTPATIENT
Start: 2022-09-19 | End: 2022-11-19

## 2022-09-19 RX ORDER — TAMSULOSIN HYDROCHLORIDE 0.4 MG/1
CAPSULE ORAL
Qty: 90 CAPSULE | Refills: 0 | Status: SHIPPED | OUTPATIENT
Start: 2022-09-19 | End: 2022-11-19

## 2022-09-19 NOTE — TELEPHONE ENCOUNTER
Pedro Champion Key: [id number] - PA Case ID: [id number] - Rx #: [id number] help? Call us at [phone]  Outcome  Approvedon September 13  PA Case: [id number], Status: Approved, Coverage Starts on: 9/13/2022 12:00:00 AM, Coverage Ends on: 9/13/2023 12:00:00 AM.  Drug  Entresto 49-51MG tablets  Form  [employer] Electronic Prior Authorization Request Form

## 2022-09-21 RX ORDER — LEVOTHYROXINE SODIUM 25 MCG
TABLET ORAL
Qty: 90 TABLET | Refills: 0 | Status: SHIPPED | OUTPATIENT
Start: 2022-09-21 | End: 2022-11-07 | Stop reason: SDUPTHER

## 2022-09-24 ENCOUNTER — HOSPITAL ENCOUNTER (OUTPATIENT)
Dept: RADIOLOGY | Facility: MEDICAL CENTER | Age: 82
End: 2022-09-24
Attending: INTERNAL MEDICINE
Payer: MEDICARE

## 2022-09-24 DIAGNOSIS — R91.8 PULMONARY NODULES: ICD-10-CM

## 2022-09-24 PROCEDURE — 71250 CT THORAX DX C-: CPT

## 2022-10-04 ENCOUNTER — HOSPITAL ENCOUNTER (OUTPATIENT)
Dept: LAB | Facility: MEDICAL CENTER | Age: 82
End: 2022-10-04
Attending: NURSE PRACTITIONER
Payer: MEDICARE

## 2022-10-04 DIAGNOSIS — Z12.5 SCREENING FOR MALIGNANT NEOPLASM OF PROSTATE: ICD-10-CM

## 2022-10-04 DIAGNOSIS — E03.9 ACQUIRED HYPOTHYROIDISM: ICD-10-CM

## 2022-10-04 DIAGNOSIS — I10 ESSENTIAL HYPERTENSION, BENIGN: ICD-10-CM

## 2022-10-04 DIAGNOSIS — E78.5 DYSLIPIDEMIA: ICD-10-CM

## 2022-10-04 DIAGNOSIS — R73.9 HYPERGLYCEMIA: ICD-10-CM

## 2022-10-04 DIAGNOSIS — E55.9 VITAMIN D DEFICIENCY: ICD-10-CM

## 2022-10-04 LAB
25(OH)D3 SERPL-MCNC: 36 NG/ML (ref 30–100)
ALBUMIN SERPL BCP-MCNC: 4.2 G/DL (ref 3.2–4.9)
ALBUMIN/GLOB SERPL: 1.9 G/DL
ALP SERPL-CCNC: 59 U/L (ref 30–99)
ALT SERPL-CCNC: 14 U/L (ref 2–50)
ANION GAP SERPL CALC-SCNC: 11 MMOL/L (ref 7–16)
AST SERPL-CCNC: 19 U/L (ref 12–45)
BASOPHILS # BLD AUTO: 0.5 % (ref 0–1.8)
BASOPHILS # BLD: 0.04 K/UL (ref 0–0.12)
BILIRUB SERPL-MCNC: 1.2 MG/DL (ref 0.1–1.5)
BUN SERPL-MCNC: 16 MG/DL (ref 8–22)
CALCIUM SERPL-MCNC: 8.9 MG/DL (ref 8.5–10.5)
CHLORIDE SERPL-SCNC: 100 MMOL/L (ref 96–112)
CHOLEST SERPL-MCNC: 149 MG/DL (ref 100–199)
CO2 SERPL-SCNC: 25 MMOL/L (ref 20–33)
CREAT SERPL-MCNC: 0.82 MG/DL (ref 0.5–1.4)
CREAT UR-MCNC: 111.63 MG/DL
EOSINOPHIL # BLD AUTO: 0.1 K/UL (ref 0–0.51)
EOSINOPHIL NFR BLD: 1.2 % (ref 0–6.9)
ERYTHROCYTE [DISTWIDTH] IN BLOOD BY AUTOMATED COUNT: 48.6 FL (ref 35.9–50)
EST. AVERAGE GLUCOSE BLD GHB EST-MCNC: 108 MG/DL
FASTING STATUS PATIENT QL REPORTED: NORMAL
GFR SERPLBLD CREATININE-BSD FMLA CKD-EPI: 87 ML/MIN/1.73 M 2
GLOBULIN SER CALC-MCNC: 2.2 G/DL (ref 1.9–3.5)
GLUCOSE SERPL-MCNC: 140 MG/DL (ref 65–99)
HBA1C MFR BLD: 5.4 % (ref 4–5.6)
HCT VFR BLD AUTO: 49.1 % (ref 42–52)
HDLC SERPL-MCNC: 55 MG/DL
HGB BLD-MCNC: 17.2 G/DL (ref 14–18)
IMM GRANULOCYTES # BLD AUTO: 0.06 K/UL (ref 0–0.11)
IMM GRANULOCYTES NFR BLD AUTO: 0.7 % (ref 0–0.9)
LDLC SERPL CALC-MCNC: 77 MG/DL
LYMPHOCYTES # BLD AUTO: 1.28 K/UL (ref 1–4.8)
LYMPHOCYTES NFR BLD: 14.8 % (ref 22–41)
MCH RBC QN AUTO: 33.7 PG (ref 27–33)
MCHC RBC AUTO-ENTMCNC: 35 G/DL (ref 33.7–35.3)
MCV RBC AUTO: 96.3 FL (ref 81.4–97.8)
MICROALBUMIN UR-MCNC: 1.6 MG/DL
MICROALBUMIN/CREAT UR: 14 MG/G (ref 0–30)
MONOCYTES # BLD AUTO: 0.67 K/UL (ref 0–0.85)
MONOCYTES NFR BLD AUTO: 7.7 % (ref 0–13.4)
NEUTROPHILS # BLD AUTO: 6.5 K/UL (ref 1.82–7.42)
NEUTROPHILS NFR BLD: 75.1 % (ref 44–72)
NRBC # BLD AUTO: 0 K/UL
NRBC BLD-RTO: 0 /100 WBC
PLATELET # BLD AUTO: 188 K/UL (ref 164–446)
PMV BLD AUTO: 9.7 FL (ref 9–12.9)
POTASSIUM SERPL-SCNC: 4.6 MMOL/L (ref 3.6–5.5)
PROT SERPL-MCNC: 6.4 G/DL (ref 6–8.2)
RBC # BLD AUTO: 5.1 M/UL (ref 4.7–6.1)
SODIUM SERPL-SCNC: 136 MMOL/L (ref 135–145)
T4 FREE SERPL-MCNC: 0.89 NG/DL (ref 0.93–1.7)
TRIGL SERPL-MCNC: 83 MG/DL (ref 0–149)
TSH SERPL DL<=0.005 MIU/L-ACNC: 4.5 UIU/ML (ref 0.38–5.33)
WBC # BLD AUTO: 8.7 K/UL (ref 4.8–10.8)

## 2022-10-04 PROCEDURE — 80053 COMPREHEN METABOLIC PANEL: CPT

## 2022-10-04 PROCEDURE — 84153 ASSAY OF PSA TOTAL: CPT | Mod: GA

## 2022-10-04 PROCEDURE — 83036 HEMOGLOBIN GLYCOSYLATED A1C: CPT | Mod: GA

## 2022-10-04 PROCEDURE — 82306 VITAMIN D 25 HYDROXY: CPT

## 2022-10-04 PROCEDURE — 84443 ASSAY THYROID STIM HORMONE: CPT

## 2022-10-04 PROCEDURE — 84154 ASSAY OF PSA FREE: CPT

## 2022-10-04 PROCEDURE — 85025 COMPLETE CBC W/AUTO DIFF WBC: CPT

## 2022-10-04 PROCEDURE — 84439 ASSAY OF FREE THYROXINE: CPT

## 2022-10-04 PROCEDURE — 82043 UR ALBUMIN QUANTITATIVE: CPT

## 2022-10-04 PROCEDURE — 80061 LIPID PANEL: CPT

## 2022-10-04 PROCEDURE — 36415 COLL VENOUS BLD VENIPUNCTURE: CPT

## 2022-10-04 PROCEDURE — 82570 ASSAY OF URINE CREATININE: CPT

## 2022-10-07 LAB
PSA FREE MFR SERPL: 22 %
PSA FREE SERPL-MCNC: 0.2 NG/ML
PSA SERPL-MCNC: 0.9 NG/ML (ref 0–4)
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
## 2022-11-07 ENCOUNTER — OFFICE VISIT (OUTPATIENT)
Dept: MEDICAL GROUP | Facility: MEDICAL CENTER | Age: 82
End: 2022-11-07
Payer: MEDICARE

## 2022-11-07 VITALS
HEIGHT: 74 IN | TEMPERATURE: 97.8 F | RESPIRATION RATE: 16 BRPM | HEART RATE: 111 BPM | WEIGHT: 204.8 LBS | SYSTOLIC BLOOD PRESSURE: 120 MMHG | OXYGEN SATURATION: 95 % | BODY MASS INDEX: 26.28 KG/M2 | DIASTOLIC BLOOD PRESSURE: 66 MMHG

## 2022-11-07 DIAGNOSIS — D72.9 NEUTROPHILIA: ICD-10-CM

## 2022-11-07 DIAGNOSIS — R94.6 ABNORMAL THYROID FUNCTION TEST: ICD-10-CM

## 2022-11-07 DIAGNOSIS — R63.4 WEIGHT LOSS, NON-INTENTIONAL: ICD-10-CM

## 2022-11-07 DIAGNOSIS — R14.3 FLATUS: ICD-10-CM

## 2022-11-07 DIAGNOSIS — R73.9 HYPERGLYCEMIA: ICD-10-CM

## 2022-11-07 DIAGNOSIS — I10 ESSENTIAL HYPERTENSION, BENIGN: ICD-10-CM

## 2022-11-07 DIAGNOSIS — E03.9 ACQUIRED HYPOTHYROIDISM: ICD-10-CM

## 2022-11-07 DIAGNOSIS — E55.9 VITAMIN D DEFICIENCY: ICD-10-CM

## 2022-11-07 DIAGNOSIS — R91.8 PULMONARY NODULES/LESIONS, MULTIPLE: ICD-10-CM

## 2022-11-07 DIAGNOSIS — E78.5 HYPERLIPIDEMIA LDL GOAL <70: ICD-10-CM

## 2022-11-07 PROCEDURE — 99214 OFFICE O/P EST MOD 30 MIN: CPT | Performed by: NURSE PRACTITIONER

## 2022-11-07 RX ORDER — LEVOTHYROXINE SODIUM 0.03 MG/1
25 TABLET ORAL
Qty: 90 TABLET | Refills: 3 | Status: SHIPPED | OUTPATIENT
Start: 2022-11-07 | End: 2022-11-08 | Stop reason: SDUPTHER

## 2022-11-07 ASSESSMENT — FIBROSIS 4 INDEX: FIB4 SCORE: 2.27

## 2022-11-07 NOTE — PROGRESS NOTES
Subjective:     Pedro Champino is a 82 y.o. male who presents with HTN.    HPI:   Seen in f/u for HTN.  He is feeling ok  His bp is stable and controlled on meds.  Taking med approp. He also checks his bp at home and its mostly controlled.    He has been having more gas over the last month.  Bm's are wnl.  He has not changed his diet.  He eats veggies.  Drinking adequate water.   His appetites is decreased.  He has not eaten today.  He has lost 14 lbs since 5/22.   He eats small tomatoes for snacks.  He doesn't eat sweets.    He does exercise a small amt with walking.  Does not eat sweets.   He has seen pulm in august for his pulmonary nodles.  He will f/u with them in 2023.  Has SOB with mild exertion but feels that its getting better.    Reviewed lab with pt.  GFR, PSA, LP, A1C, CMP is wnl except glucose elevated at 140.  His a1c has been normal long term.  Not on med for DM.   Vitamin D is wnl.  Up from 22 to 36.  He cannot remember if he is on otc supplement.  TSH is wnl but T4 is dec.  Taking synthroid approp.  Meds are given to pt by his wife.  He is stable on lipitor for his chol.  LDL goal is <70.    CBC is wnl except chronic increased neutrophils and dec lymphs.  Both are improving.  No recent illnesses.  No fever, chills or sweating.  Alb/cr ratio is 14.          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) 09/23/2019    Hypothyroid 09/23/2019    Urinary retention 09/09/2019    Other persistent atrial fibrillation (HCC) 08/28/2019    Anticoagulant long-term use 08/28/2019    Mandibular fracture, open (HCC)  08/28/2019    Suicide attempt (HCC) 08/28/2019    Essential hypertension, benign 04/13/2016    Dyslipidemia 04/13/2016    Parotid nodule 07/19/2013    Hearing loss of both ears 09/26/2012       Current medicines (including changes today)  Current Outpatient Medications   Medication Sig Dispense Refill    levothyroxine (SYNTHROID) 25 MCG Tab Take 1 Tablet by mouth every morning on an empty stomach. 90 Tablet 3    finasteride (PROSCAR) 5 MG Tab TAKE 1 TABLET BY MOUTH EVERY DAY 90 Tablet 0    tamsulosin (FLOMAX) 0.4 MG capsule TAKE 1 CAPSULE BY ORAL ROUTE EVERY DAY 1/2 HOUR FOLLOWING THE SAME MEAL EACH DAY 90 Capsule 0    sertraline (ZOLOFT) 25 MG tablet TAKE 2 TABLETS BY MOUTH EVERY DAY . APPT NEEDED FOR REFILLS 60 Tablet 1    Empagliflozin (JARDIANCE) 10 MG Tab Take 1 Tablet by mouth every day. 90 Tablet 3    sacubitril-valsartan (ENTRESTO) 49-51 MG Tab Take 1 Tablet by mouth 2 times a day. 180 Tablet 3    potassium chloride (MICRO-K) 10 MEQ capsule Take 1 Capsule by mouth 2 times a day. 180 Capsule 2    furosemide (LASIX) 40 MG Tab Take 1 Tablet by mouth every day. 90 Tablet 3    apixaban (ELIQUIS) 5mg Tab TAKE 1 TABLET 2 TIMES DAILY 180 Tablet 3    digoxin (LANOXIN) 125 MCG Tab Take 1 Tablet by mouth every day. 90 Tablet 3    carvedilol (COREG) 6.25 MG Tab Take 1 Tablet by mouth 2 times a day with meals. 180 Tablet 3    fluticasone (FLONASE) 50 MCG/ACT nasal spray SPRAY 2 SPRAYS INTO EACH NOSTRIL EVERY DAY      QUEtiapine (SEROQUEL) 25 MG Tab TAKE 1/2 TO 2 TABLETS BY MOUTH AT BEDTIME AS NEEDED FOR INSOMNIA (Patient taking differently: Take 25 mg by mouth. TAKE 1/2 TO 2 TABLETS BY MOUTH AT BEDTIME AS NEEDED FOR INSOMNIA) 60 Tablet 2    atorvastatin (LIPITOR) 20 MG Tab Take 1 Tablet by mouth every day. 90 Tablet 3     No current facility-administered medications for this visit.       Allergies   Allergen Reactions    Cefepime Hcl Rash     Hives, eosinophilia        ROS  Constitutional: Negative. Negative for fever,  "chills, weight loss, malaise/fatigue and diaphoresis.   HENT: Negative. Negative for hearing loss, ear pain, nosebleeds, congestion, sore throat, neck pain, tinnitus and ear discharge.   Respiratory: Negative. Negative for cough, hemoptysis, sputum production, shortness of breath, wheezing and stridor.   Cardiovascular: Negative. Negative for chest pain, palpitations, orthopnea, claudication, leg swelling and PND.   Gastrointestinal: Denies nausea, vomiting, diarrhea, constipation, heartburn, melena or hematochezia.  Genitourinary: Denies dysuria, hematuria, urinary incontinence, frequency or urgency.        Objective:     /66   Pulse (!) 111   Temp 36.6 °C (97.8 °F) (Temporal)   Resp 16   Ht 1.88 m (6' 2\")   Wt 92.9 kg (204 lb 12.8 oz)   SpO2 95%  Body mass index is 26.29 kg/m².    Physical Exam:  Vitals reviewed.  Constitutional: Oriented to person, place, and time. appears well-developed and well-nourished. No distress.   Cardiovascular: Normal rate, regular rhythm, normal heart sounds and intact distal pulses. Exam reveals no gallop and no friction rub. No murmur heard. No carotid bruits.   Pulmonary/Chest: Effort normal and breath sounds normal. No stridor. No respiratory distress. no wheezes or rales. exhibits no tenderness.   Musculoskeletal: Normal range of motion. exhibits no edema. aye pedal pulses 2+.  Lymphadenopathy: No cervical or supraclavicular adenopathy.   Neurological: Alert and oriented to person, place, and time. exhibits normal muscle tone.  Skin: Skin is warm and dry. No diaphoresis.   Psychiatric: Normal mood and affect. Behavior is normal.      Assessment and Plan:     The following treatment plan was discussed:    1. Weight loss, non-intentional      has lost 14 lbs since may.  enc pt to eat 3 meals daily.  add 1 can boost/ensure daily. f/u 1 mo for wt check      2. Acquired hypothyroidism  levothyroxine (SYNTHROID) 25 MCG Tab    TSH wnl but T4 abn.  no change in dose.  will " continue to monitor      3. Essential hypertension, benign      stable and well controlled on meds. taking med approp      4. Abnormal thyroid function test      T4 abn but TSH wnl.  will continue to monitor w/o change in med dose      5. Hyperlipidemia LDL goal <70      trg and HDL at goal.  LDL almost at goal of <70.  now at 77.  continue lipitor and entresto.  followed by cardiac      6. Neutrophilia      no current sx, recurrent illnesses or chronic fever.  will continue to monitor.      7. Vitamin D deficiency      enc pt to make sure on d3 2000 units dialy.  note given to pt to give to wife who manages pt meds.      8. Flatus      he has started to have more flatus.  not change in diet.        9. Pulmonary nodules/lesions, multiple      stable at this time.  followed by pulm      10. Hyperglycemia      glucoses chronically up to a1c wnl. on jardiance for cardiac issues.  no other tx at this time.            Followup: Return in about 4 weeks (around 12/5/2022), or for weight check.

## 2022-11-08 DIAGNOSIS — E03.9 ACQUIRED HYPOTHYROIDISM: ICD-10-CM

## 2022-11-08 RX ORDER — LEVOTHYROXINE SODIUM 0.03 MG/1
25 TABLET ORAL
Qty: 90 TABLET | Refills: 3 | Status: SHIPPED | OUTPATIENT
Start: 2022-11-08 | End: 2023-09-27 | Stop reason: SDUPTHER

## 2022-11-10 ENCOUNTER — PATIENT MESSAGE (OUTPATIENT)
Dept: HEALTH INFORMATION MANAGEMENT | Facility: OTHER | Age: 82
End: 2022-11-10

## 2022-11-17 RX ORDER — HYDROXYZINE HYDROCHLORIDE 25 MG/1
25 TABLET, FILM COATED ORAL EVERY 8 HOURS PRN
Qty: 10 TABLET | Refills: 0 | Status: SHIPPED | OUTPATIENT
Start: 2022-11-17 | End: 2023-12-05 | Stop reason: SDUPTHER

## 2022-11-26 DIAGNOSIS — N40.0 BENIGN PROSTATIC HYPERPLASIA, UNSPECIFIED WHETHER LOWER URINARY TRACT SYMPTOMS PRESENT: ICD-10-CM

## 2022-11-26 RX ORDER — FINASTERIDE 5 MG/1
5 TABLET, FILM COATED ORAL
Qty: 30 TABLET | Refills: 0 | Status: SHIPPED | OUTPATIENT
Start: 2022-11-26 | End: 2022-11-26 | Stop reason: SDUPTHER

## 2022-11-26 RX ORDER — TAMSULOSIN HYDROCHLORIDE 0.4 MG/1
0.4 CAPSULE ORAL DAILY
Qty: 30 CAPSULE | Refills: 0 | Status: SHIPPED | OUTPATIENT
Start: 2022-11-26 | End: 2022-11-26 | Stop reason: SDUPTHER

## 2022-11-26 RX ORDER — FINASTERIDE 5 MG/1
5 TABLET, FILM COATED ORAL
Qty: 90 TABLET | Refills: 0 | Status: SHIPPED | OUTPATIENT
Start: 2022-11-26 | End: 2023-05-04 | Stop reason: SDUPTHER

## 2022-11-26 RX ORDER — TAMSULOSIN HYDROCHLORIDE 0.4 MG/1
0.4 CAPSULE ORAL DAILY
Qty: 90 CAPSULE | Refills: 0 | Status: SHIPPED | OUTPATIENT
Start: 2022-11-26 | End: 2023-04-19 | Stop reason: SDUPTHER

## 2023-02-28 NOTE — TELEPHONE ENCOUNTER
Is the patient due for a refill? Yes    Was the patient seen the past year? Yes    Date of last office visit: 5/6/2022    Does the patient have an upcoming appointment?  No   If yes, When?     Provider to refill:CW    Does the patients insurance require a 100 day supply?  No

## 2023-03-01 RX ORDER — ATORVASTATIN CALCIUM 20 MG/1
20 TABLET, FILM COATED ORAL DAILY
Qty: 90 TABLET | Refills: 0 | Status: SHIPPED | OUTPATIENT
Start: 2023-03-01 | End: 2023-05-04 | Stop reason: SDUPTHER

## 2023-04-19 DIAGNOSIS — N40.0 BENIGN PROSTATIC HYPERPLASIA, UNSPECIFIED WHETHER LOWER URINARY TRACT SYMPTOMS PRESENT: ICD-10-CM

## 2023-04-19 RX ORDER — TAMSULOSIN HYDROCHLORIDE 0.4 MG/1
0.4 CAPSULE ORAL DAILY
Qty: 30 CAPSULE | Refills: 0 | Status: SHIPPED | OUTPATIENT
Start: 2023-04-19 | End: 2023-05-04 | Stop reason: SDUPTHER

## 2023-04-24 ENCOUNTER — TELEPHONE (OUTPATIENT)
Dept: HEALTH INFORMATION MANAGEMENT | Facility: OTHER | Age: 83
End: 2023-04-24
Payer: MEDICARE

## 2023-05-04 ENCOUNTER — TELEPHONE (OUTPATIENT)
Dept: CARDIOLOGY | Facility: MEDICAL CENTER | Age: 83
End: 2023-05-04
Payer: MEDICARE

## 2023-05-04 DIAGNOSIS — N40.0 BENIGN PROSTATIC HYPERPLASIA, UNSPECIFIED WHETHER LOWER URINARY TRACT SYMPTOMS PRESENT: ICD-10-CM

## 2023-05-04 RX ORDER — ATORVASTATIN CALCIUM 20 MG/1
40 TABLET, FILM COATED ORAL DAILY
Qty: 90 TABLET | Refills: 3 | Status: SHIPPED | OUTPATIENT
Start: 2023-05-04 | End: 2023-05-04

## 2023-05-04 RX ORDER — FINASTERIDE 5 MG/1
5 TABLET, FILM COATED ORAL
Qty: 90 TABLET | Refills: 0 | Status: SHIPPED | OUTPATIENT
Start: 2023-05-04 | End: 2023-08-06 | Stop reason: SDUPTHER

## 2023-05-04 RX ORDER — TAMSULOSIN HYDROCHLORIDE 0.4 MG/1
0.4 CAPSULE ORAL DAILY
Qty: 30 CAPSULE | Refills: 0 | Status: SHIPPED | OUTPATIENT
Start: 2023-05-04 | End: 2023-05-12 | Stop reason: SDUPTHER

## 2023-05-04 RX ORDER — ATORVASTATIN CALCIUM 40 MG/1
40 TABLET, FILM COATED ORAL DAILY
Qty: 90 TABLET | Refills: 3 | Status: SHIPPED
Start: 2023-05-04 | End: 2023-08-11

## 2023-05-04 NOTE — TELEPHONE ENCOUNTER
CW    Caller: Rosana - wife    Medication Name and Dosage: atorvastatin (LIPITOR) 20 MG Tab     Medication amount left: week    Preferred Pharmacy: [facility] [address] [facility] - [place], [place] - [facility]    Other questions (Topic): need new order     Callback Number (Will only call for issues):  [phone]       Thank you   Deb JONES

## 2023-05-04 NOTE — TELEPHONE ENCOUNTER
To CW: pt is due for one year follow up however not scheduled until Nov this year. Please confirm ok to renew until Nov. Thank you!

## 2023-05-12 DIAGNOSIS — N40.0 BENIGN PROSTATIC HYPERPLASIA, UNSPECIFIED WHETHER LOWER URINARY TRACT SYMPTOMS PRESENT: ICD-10-CM

## 2023-05-14 RX ORDER — TAMSULOSIN HYDROCHLORIDE 0.4 MG/1
0.4 CAPSULE ORAL DAILY
Qty: 90 CAPSULE | Refills: 0 | Status: SHIPPED | OUTPATIENT
Start: 2023-05-14 | End: 2023-06-01

## 2023-05-15 NOTE — TELEPHONE ENCOUNTER
CW    Caller: Rosana-wife     Topic/issue:   Was calling in regards to the  dosage change in atorvastatin (LIPITOR) .  Wife states prior dosage was 20mg and when she picked up from pharmacy the dosage was for 40 mg. Please call back to advise.     Callback Number: [phone]    Thank You  Mendy GRANADOS

## 2023-05-16 NOTE — TELEPHONE ENCOUNTER
Returned Mar call, [phone] and reviewed MD recommendations.  She verbalizes understanding and states no other concerns or questions at this time.  Mar is appreciative of information given.

## 2023-06-01 DIAGNOSIS — R73.9 HYPERGLYCEMIA: ICD-10-CM

## 2023-06-01 DIAGNOSIS — N40.0 BENIGN PROSTATIC HYPERPLASIA, UNSPECIFIED WHETHER LOWER URINARY TRACT SYMPTOMS PRESENT: ICD-10-CM

## 2023-06-01 DIAGNOSIS — R63.4 WEIGHT LOSS, NON-INTENTIONAL: ICD-10-CM

## 2023-06-01 DIAGNOSIS — E55.9 VITAMIN D DEFICIENCY: ICD-10-CM

## 2023-06-01 DIAGNOSIS — E03.9 ACQUIRED HYPOTHYROIDISM: ICD-10-CM

## 2023-06-01 DIAGNOSIS — D72.9 NEUTROPHILIA: ICD-10-CM

## 2023-06-01 DIAGNOSIS — I10 ESSENTIAL HYPERTENSION, BENIGN: ICD-10-CM

## 2023-06-01 DIAGNOSIS — E78.5 HYPERLIPIDEMIA LDL GOAL <70: ICD-10-CM

## 2023-06-01 RX ORDER — FLOMAX 0.4 MG/1
CAPSULE ORAL
Qty: 15 CAPSULE | Refills: 0 | Status: SHIPPED | OUTPATIENT
Start: 2023-06-01 | End: 2023-06-01 | Stop reason: SDUPTHER

## 2023-06-01 RX ORDER — TAMSULOSIN HYDROCHLORIDE 0.4 MG/1
0.4 CAPSULE ORAL
Qty: 90 CAPSULE | Refills: 0 | Status: SHIPPED | OUTPATIENT
Start: 2023-06-01 | End: 2023-08-25 | Stop reason: SDUPTHER

## 2023-06-01 NOTE — TELEPHONE ENCOUNTER
Received request via: Pharmacy    Was the patient seen in the last year in this department? Yes    Does the patient have an active prescription (recently filled or refills available) for medication(s) requested?  yes    Does the patient have [facility] and need 100 day supply (blood pressure, diabetes and cholesterol meds only)? Patient does not have SCP  Requested Prescriptions     Pending Prescriptions Disp Refills    FLOMAX 0.4 MG capsule [Pharmacy Med Name: FLOMAX       CAP 0.4MG] 30 Capsule      Sig: TAKE 1 CAPSULE EVERY DAY

## 2023-06-06 ENCOUNTER — HOSPITAL ENCOUNTER (OUTPATIENT)
Dept: LAB | Facility: MEDICAL CENTER | Age: 83
End: 2023-06-06
Attending: NURSE PRACTITIONER
Payer: MEDICARE

## 2023-06-06 DIAGNOSIS — E55.9 VITAMIN D DEFICIENCY: ICD-10-CM

## 2023-06-06 DIAGNOSIS — R63.4 WEIGHT LOSS, NON-INTENTIONAL: ICD-10-CM

## 2023-06-06 DIAGNOSIS — E78.5 HYPERLIPIDEMIA LDL GOAL <70: ICD-10-CM

## 2023-06-06 DIAGNOSIS — E03.9 ACQUIRED HYPOTHYROIDISM: ICD-10-CM

## 2023-06-06 DIAGNOSIS — I10 ESSENTIAL HYPERTENSION, BENIGN: ICD-10-CM

## 2023-06-06 DIAGNOSIS — D72.9 NEUTROPHILIA: ICD-10-CM

## 2023-06-06 DIAGNOSIS — R73.9 HYPERGLYCEMIA: ICD-10-CM

## 2023-06-06 LAB
25(OH)D3 SERPL-MCNC: 31 NG/ML (ref 30–100)
ALBUMIN SERPL BCP-MCNC: 4 G/DL (ref 3.2–4.9)
ALBUMIN/GLOB SERPL: 1.7 G/DL
ALP SERPL-CCNC: 52 U/L (ref 30–99)
ALT SERPL-CCNC: 16 U/L (ref 2–50)
ANION GAP SERPL CALC-SCNC: 9 MMOL/L (ref 7–16)
AST SERPL-CCNC: 15 U/L (ref 12–45)
BASOPHILS # BLD AUTO: 0.4 % (ref 0–1.8)
BASOPHILS # BLD: 0.04 K/UL (ref 0–0.12)
BILIRUB SERPL-MCNC: 1.2 MG/DL (ref 0.1–1.5)
BUN SERPL-MCNC: 15 MG/DL (ref 8–22)
CALCIUM ALBUM COR SERPL-MCNC: 8.9 MG/DL (ref 8.5–10.5)
CALCIUM SERPL-MCNC: 8.9 MG/DL (ref 8.5–10.5)
CHLORIDE SERPL-SCNC: 100 MMOL/L (ref 96–112)
CHOLEST SERPL-MCNC: 140 MG/DL (ref 100–199)
CO2 SERPL-SCNC: 26 MMOL/L (ref 20–33)
CREAT SERPL-MCNC: 0.75 MG/DL (ref 0.5–1.4)
CREAT UR-MCNC: 125.28 MG/DL
EOSINOPHIL # BLD AUTO: 0.09 K/UL (ref 0–0.51)
EOSINOPHIL NFR BLD: 0.9 % (ref 0–6.9)
ERYTHROCYTE [DISTWIDTH] IN BLOOD BY AUTOMATED COUNT: 49.6 FL (ref 35.9–50)
EST. AVERAGE GLUCOSE BLD GHB EST-MCNC: 100 MG/DL
FASTING STATUS PATIENT QL REPORTED: NORMAL
GFR SERPLBLD CREATININE-BSD FMLA CKD-EPI: 89 ML/MIN/1.73 M 2
GLOBULIN SER CALC-MCNC: 2.4 G/DL (ref 1.9–3.5)
GLUCOSE SERPL-MCNC: 119 MG/DL (ref 65–99)
HBA1C MFR BLD: 5.1 % (ref 4–5.6)
HCT VFR BLD AUTO: 49.4 % (ref 42–52)
HDLC SERPL-MCNC: 59 MG/DL
HGB BLD-MCNC: 16.5 G/DL (ref 14–18)
IMM GRANULOCYTES # BLD AUTO: 0.1 K/UL (ref 0–0.11)
IMM GRANULOCYTES NFR BLD AUTO: 1 % (ref 0–0.9)
LDLC SERPL CALC-MCNC: 67 MG/DL
LYMPHOCYTES # BLD AUTO: 1.41 K/UL (ref 1–4.8)
LYMPHOCYTES NFR BLD: 14.7 % (ref 22–41)
MCH RBC QN AUTO: 33.5 PG (ref 27–33)
MCHC RBC AUTO-ENTMCNC: 33.4 G/DL (ref 32.3–36.5)
MCV RBC AUTO: 100.2 FL (ref 81.4–97.8)
MICROALBUMIN UR-MCNC: <1.2 MG/DL
MICROALBUMIN/CREAT UR: NORMAL MG/G (ref 0–30)
MONOCYTES # BLD AUTO: 0.64 K/UL (ref 0–0.85)
MONOCYTES NFR BLD AUTO: 6.7 % (ref 0–13.4)
NEUTROPHILS # BLD AUTO: 7.34 K/UL (ref 1.82–7.42)
NEUTROPHILS NFR BLD: 76.3 % (ref 44–72)
NRBC # BLD AUTO: 0 K/UL
NRBC BLD-RTO: 0 /100 WBC (ref 0–0.2)
PLATELET # BLD AUTO: 217 K/UL (ref 164–446)
PMV BLD AUTO: 9.7 FL (ref 9–12.9)
POTASSIUM SERPL-SCNC: 4.2 MMOL/L (ref 3.6–5.5)
PROT SERPL-MCNC: 6.4 G/DL (ref 6–8.2)
RBC # BLD AUTO: 4.93 M/UL (ref 4.7–6.1)
SODIUM SERPL-SCNC: 135 MMOL/L (ref 135–145)
T4 FREE SERPL-MCNC: 1.03 NG/DL (ref 0.93–1.7)
TRIGL SERPL-MCNC: 72 MG/DL (ref 0–149)
TSH SERPL DL<=0.005 MIU/L-ACNC: 4.36 UIU/ML (ref 0.38–5.33)
WBC # BLD AUTO: 9.6 K/UL (ref 4.8–10.8)

## 2023-06-06 PROCEDURE — 82570 ASSAY OF URINE CREATININE: CPT

## 2023-06-06 PROCEDURE — 80061 LIPID PANEL: CPT

## 2023-06-06 PROCEDURE — 82306 VITAMIN D 25 HYDROXY: CPT

## 2023-06-06 PROCEDURE — 84443 ASSAY THYROID STIM HORMONE: CPT

## 2023-06-06 PROCEDURE — 80053 COMPREHEN METABOLIC PANEL: CPT

## 2023-06-06 PROCEDURE — 82043 UR ALBUMIN QUANTITATIVE: CPT

## 2023-06-06 PROCEDURE — 83036 HEMOGLOBIN GLYCOSYLATED A1C: CPT | Mod: GA

## 2023-06-06 PROCEDURE — 36415 COLL VENOUS BLD VENIPUNCTURE: CPT

## 2023-06-06 PROCEDURE — 84439 ASSAY OF FREE THYROXINE: CPT

## 2023-06-06 PROCEDURE — 85025 COMPLETE CBC W/AUTO DIFF WBC: CPT

## 2023-06-12 ENCOUNTER — OFFICE VISIT (OUTPATIENT)
Dept: MEDICAL GROUP | Facility: MEDICAL CENTER | Age: 83
End: 2023-06-12
Payer: MEDICARE

## 2023-06-12 VITALS
SYSTOLIC BLOOD PRESSURE: 110 MMHG | TEMPERATURE: 97.7 F | HEART RATE: 54 BPM | WEIGHT: 194 LBS | RESPIRATION RATE: 17 BRPM | BODY MASS INDEX: 24.9 KG/M2 | OXYGEN SATURATION: 97 % | HEIGHT: 74 IN | DIASTOLIC BLOOD PRESSURE: 60 MMHG

## 2023-06-12 DIAGNOSIS — Z23 NEED FOR PNEUMOCOCCAL 20-VALENT CONJUGATE VACCINATION: ICD-10-CM

## 2023-06-12 DIAGNOSIS — E03.9 ACQUIRED HYPOTHYROIDISM: ICD-10-CM

## 2023-06-12 DIAGNOSIS — R71.8 ELEVATED MCV: ICD-10-CM

## 2023-06-12 DIAGNOSIS — I10 ESSENTIAL HYPERTENSION, BENIGN: ICD-10-CM

## 2023-06-12 DIAGNOSIS — D72.9 ABNORMAL NEUTROPHIL COUNT: ICD-10-CM

## 2023-06-12 DIAGNOSIS — R41.3 MEMORY LOSS: ICD-10-CM

## 2023-06-12 DIAGNOSIS — R63.4 WEIGHT LOSS, NON-INTENTIONAL: ICD-10-CM

## 2023-06-12 DIAGNOSIS — E78.5 HYPERLIPIDEMIA LDL GOAL <70: ICD-10-CM

## 2023-06-12 PROCEDURE — 3074F SYST BP LT 130 MM HG: CPT | Performed by: NURSE PRACTITIONER

## 2023-06-12 PROCEDURE — 90677 PCV20 VACCINE IM: CPT | Performed by: NURSE PRACTITIONER

## 2023-06-12 PROCEDURE — G0009 ADMIN PNEUMOCOCCAL VACCINE: HCPCS | Performed by: NURSE PRACTITIONER

## 2023-06-12 PROCEDURE — 3078F DIAST BP <80 MM HG: CPT | Performed by: NURSE PRACTITIONER

## 2023-06-12 PROCEDURE — 99214 OFFICE O/P EST MOD 30 MIN: CPT | Mod: 25 | Performed by: NURSE PRACTITIONER

## 2023-06-12 ASSESSMENT — FIBROSIS 4 INDEX: FIB4 SCORE: 1.45

## 2023-06-12 NOTE — PROGRESS NOTES
Subjective:     Pedro Champion is a 82 y.o. male who presents with HTN.    HPI:   Seen in f/u for HTN.  He is feeling ok.   He is stable on coreg for his heart and BP.  Taking med approp.  He is having some memory loss.  He reports that he is still driving but drives very carefully.  He has no hx of accidents or tickets.  When he is with his wife she drives.  He only drives short distances. He only drives short distances.   He eats a healthy diet.  He walks for exercise.  He is due prevnar 20  He has been eating less than he used to .  He now has a small stomach.  He wanted  to loose wt.  Has lost 20 lbs since january.  Reviewed lab with pt. GFR, corrected ca++, alb/cr ratio, LP, TSH, T4 IS WNL.   CMP is wnl except for glucose elevated.  A1c is wnl at 5.1.  He is stable and well controlled on lipitor.  Taking med approp. MCV is also elevatd.    CBC shows that his neutrophils have been elevated and lymphs decrease for last several labs.  No sx.  No fever, chills or sweating.  No sx of infection.   He is also taking synthroid approp.  Previous thyroid lab was abn but now wnl.    He is on jardiance for his cardiac issues.          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) 09/23/2019    Hypothyroid 09/23/2019    Urinary retention 09/09/2019    Other persistent atrial fibrillation (HCC) 08/28/2019    Anticoagulant long-term use 08/28/2019    Mandibular fracture, open ([facility]) 08/28/2019    Suicide attempt ([facility]) 08/28/2019    Essential hypertension, benign 04/13/2016    Dyslipidemia 04/13/2016    Parotid nodule 07/19/2013     Hearing loss of both ears 09/26/2012       Current medicines (including changes today)  Current Outpatient Medications   Medication Sig Dispense Refill    tamsulosin (FLOMAX) 0.4 MG capsule Take 1 Capsule by mouth every day. 90 Capsule 0    finasteride (PROSCAR) 5 MG Tab Take 1 Tablet by mouth every day. 90 Tablet 0    atorvastatin (LIPITOR) 40 MG Tab Take 1 Tablet by mouth every day. 90 Tablet 3    hydrOXYzine HCl (ATARAX) 25 MG Tab Take 1 Tablet by mouth every 8 hours as needed for Anxiety. 10 Tablet 0    levothyroxine (SYNTHROID) 25 MCG Tab Take 1 Tablet by mouth every morning on an empty stomach. 90 Tablet 3    sertraline (ZOLOFT) 25 MG tablet TAKE 2 TABLETS BY MOUTH EVERY DAY . APPT NEEDED FOR REFILLS 60 Tablet 1    Empagliflozin (JARDIANCE) 10 MG Tab Take 1 Tablet by mouth every day. 90 Tablet 3    sacubitril-valsartan (ENTRESTO) 49-51 MG Tab Take 1 Tablet by mouth 2 times a day. 180 Tablet 3    potassium chloride (MICRO-K) 10 MEQ capsule Take 1 Capsule by mouth 2 times a day. 180 Capsule 2    furosemide (LASIX) 40 MG Tab Take 1 Tablet by mouth every day. 90 Tablet 3    apixaban (ELIQUIS) 5mg Tab TAKE 1 TABLET 2 TIMES DAILY 180 Tablet 3    digoxin (LANOXIN) 125 MCG Tab Take 1 Tablet by mouth every day. 90 Tablet 3    carvedilol (COREG) 6.25 MG Tab Take 1 Tablet by mouth 2 times a day with meals. 180 Tablet 3    fluticasone (FLONASE) 50 MCG/ACT nasal spray SPRAY 2 SPRAYS INTO EACH NOSTRIL EVERY DAY       No current facility-administered medications for this visit.       Allergies   Allergen Reactions    Cefepime Hcl Rash     Hives, eosinophilia        ROS  Constitutional: Negative. Negative for fever, chills, weight loss, malaise/fatigue and diaphoresis.   HENT: Negative. Negative for hearing loss, ear pain, nosebleeds, congestion, sore throat, neck pain, tinnitus and ear discharge.   Respiratory: Negative. Negative for cough, hemoptysis, sputum production, shortness of breath, wheezing and stridor.  "  Cardiovascular: Negative. Negative for chest pain, palpitations, orthopnea, claudication, leg swelling and PND.   Gastrointestinal: Denies nausea, vomiting, diarrhea, constipation, heartburn, melena or hematochezia.  Genitourinary: Denies dysuria, hematuria, urinary incontinence, frequency or urgency.        Objective:     /60 (BP Location: Right arm, Patient Position: Sitting, BP Cuff Size: Adult)   Pulse (!) 54   Temp 36.5 °C (97.7 °F) (Temporal)   Resp 17   Ht 1.88 m (6' 2\")   Wt 88 kg (194 lb)   SpO2 97%  Body mass index is 24.91 kg/m².    Physical Exam:  Vitals reviewed.  Constitutional: Oriented to person, place, and time. appears well-developed and well-nourished. No distress.   Cardiovascular: Normal rate, regular rhythm, normal heart sounds and intact distal pulses. Exam reveals no gallop and no friction rub. No murmur heard. No carotid bruits.   Pulmonary/Chest: Effort normal and breath sounds normal. No stridor. No respiratory distress. no wheezes or rales. exhibits no tenderness.   Musculoskeletal: Normal range of motion. exhibits no edema. aye pedal pulses 2+.  Lymphadenopathy: No cervical or supraclavicular adenopathy.   Neurological: Alert and oriented to person, place, and time. exhibits normal muscle tone.  Skin: Skin is warm and dry. No diaphoresis.   Psychiatric: Normal mood and affect. Behavior is normal.      Assessment and Plan:     The following treatment plan was discussed:    1. Weight loss, non-intentional      has lost 30 lbs in january. enc pt to improve nutrition and stop dieting. he agrees.  f/u 3 mo for lab review and wt ck      2. Acquired hypothyroidism      stable and controlled on med      3. Elevated MCV  CBC WITH DIFFERENTIAL    rechk lab 3 mo f/u for review.  no sx.      4. Essential hypertension, benign      stable and well controlled on med.  taking med approp      5. Abnormal neutrophil count  CBC WITH DIFFERENTIAL    has had elevated neutrophils and dec lymphs " for awhile. tj lab 3 mo. f/u for review consider hematology referral if continues. no sx      6. Memory loss      recommended no driving with pt except very short drive to grocery store.  he will have his wife drive otherwise.      7. Hyperlipidemia LDL goal <70      stable and well controlled on lipitor.  enc pt to improve healthy diet and continue regular exercise.      8. Need for pneumococcal 20-valent conjugate vaccination  Pneumococcal Conjugate Vaccine 20-Valent (19 yrs+)    prevnar 20 given today            Followup: Return in about 3 months (around 9/12/2023), or for lab review and weight check.

## 2023-07-17 ENCOUNTER — APPOINTMENT (OUTPATIENT)
Dept: MEDICAL GROUP | Facility: MEDICAL CENTER | Age: 83
End: 2023-07-17
Payer: COMMERCIAL

## 2023-07-21 ENCOUNTER — OFFICE VISIT (OUTPATIENT)
Dept: URGENT CARE | Facility: CLINIC | Age: 83
End: 2023-07-21
Payer: COMMERCIAL

## 2023-07-21 VITALS
HEART RATE: 72 BPM | HEIGHT: 74 IN | SYSTOLIC BLOOD PRESSURE: 122 MMHG | RESPIRATION RATE: 20 BRPM | OXYGEN SATURATION: 98 % | TEMPERATURE: 98.7 F | BODY MASS INDEX: 25.03 KG/M2 | WEIGHT: 195 LBS | DIASTOLIC BLOOD PRESSURE: 78 MMHG

## 2023-07-21 DIAGNOSIS — S61.401A AVULSION OF SKIN OF RIGHT HAND, INITIAL ENCOUNTER: ICD-10-CM

## 2023-07-21 PROCEDURE — 99214 OFFICE O/P EST MOD 30 MIN: CPT | Performed by: STUDENT IN AN ORGANIZED HEALTH CARE EDUCATION/TRAINING PROGRAM

## 2023-07-21 PROCEDURE — 3074F SYST BP LT 130 MM HG: CPT | Performed by: STUDENT IN AN ORGANIZED HEALTH CARE EDUCATION/TRAINING PROGRAM

## 2023-07-21 PROCEDURE — 3078F DIAST BP <80 MM HG: CPT | Performed by: STUDENT IN AN ORGANIZED HEALTH CARE EDUCATION/TRAINING PROGRAM

## 2023-07-21 RX ORDER — ATORVASTATIN CALCIUM 20 MG/1
TABLET, FILM COATED ORAL
COMMUNITY
End: 2023-08-11

## 2023-07-21 RX ORDER — AMOXICILLIN AND CLAVULANATE POTASSIUM 875; 125 MG/1; MG/1
1 TABLET, FILM COATED ORAL 2 TIMES DAILY
Qty: 10 TABLET | Refills: 0 | Status: SHIPPED | OUTPATIENT
Start: 2023-07-21 | End: 2023-07-26

## 2023-07-21 RX ORDER — MIRABEGRON 25 MG/1
TABLET, FILM COATED, EXTENDED RELEASE ORAL
COMMUNITY
End: 2024-01-23

## 2023-07-21 ASSESSMENT — FIBROSIS 4 INDEX: FIB4 SCORE: 1.45

## 2023-07-21 NOTE — PROGRESS NOTES
Subjective:   Pedro Champion is a 82 y.o. male who presents for Hand Injury (X4 days, right back of hand with a cut )      HPI:  Pleasant 82-year-old male presents the urgent care for a skin tear to his right hand on the dorsal aspect that occurred today.  Patient had a superficial cut to the back of his hand that he placed a waterproof bandage on.  When he remove this bandage the skin tear occurred.  He does take Eliquis.  Reports that the bleeding has been controlled.  No numbness, tingling, burning, reduced range of motion, weakness.  He reports that the initial cut was very superficial and was not caused by an instrument/knife.      Medications:    amoxicillin-clavulanate Tabs  apixaban Tabs  atorvastatin Tabs  carvedilol Tabs  digoxin Tabs  Entresto Tabs  finasteride Tabs  fluticasone  furosemide Tabs  hydrOXYzine HCl Tabs  Jardiance Tabs  levothyroxine Tabs  Myrbetriq Tb24  potassium chloride  sertraline  tamsulosin    Allergies: Cefepime hcl    Problem List: Pedro Champion does not have any pertinent problems on file.    Surgical History:  Past Surgical History:   Procedure Laterality Date    ND DENTAL SURGERY PROCEDURE Left 10/13/2019    Procedure: EXTRACTION, TOOTH;  Surgeon: Troy Dong D.D.S.;  Location: [facility];  Service: Oral Surgery    MANDIBLE FRACTURE ORIF Bilateral 10/13/2019    Procedure: ORIF, FRACTURE, MANDIBLE - FOR HARDWARE REMOVAL MANDIBLE, POSS ORIF;  Surgeon: Troy Dong D.D.S.;  Location: [facility];  Service: Oral Surgery    ORAL FISTULA REPAIR N/A 10/13/2019    Procedure: CLOSURE, FISTULA, MOUTH;  Surgeon: Troy Dong D.D.S.;  Location: [facility];  Service: Oral Surgery    SUBMANDIBLE ABSCESS INCISION AND DRAINAGE N/A 8/31/2019    Procedure: INCISION AND DRAINAGE FACIAL WOUND;  Surgeon: Troy Dong D.D.S.;  Location: [facility];  Service: Oral Surgery    INTERMAXILLARY  "FIXATATION N/A 8/31/2019    Procedure: FIXATION, MAXILLOMANDIBULAR. ORIF and MMF mandible fracture debridement of facial wounds extracton tooth #8;  Surgeon: Troy Dong D.D.S.;  Location: SURGERY [facility];  Service: Oral Surgery    PAROTIDECTOMY SUPERFICIAL  7/19/2013    Performed by Mendy Stern M.D. at SURGERY [facility]    LUMBAR DECOMPRESSION  1988    INGUINAL HERNIA REPAIR BILATERAL  1960's       Past Social Hx: Pedro Champion  reports that he has never smoked. He has been exposed to tobacco smoke. He has never used smokeless tobacco. He reports current alcohol use of about 8.4 - 12.6 oz of alcohol per week. He reports that he does not use drugs.     Past Family Hx:  Pedro Champion family history includes Heart Disease (age of onset: 81) in his father; Stroke in his mother.     Problem list, medications, and allergies reviewed by myself today in Epic.     Objective:     /78   Pulse 72   Temp 37.1 °C (98.7 °F) (Temporal)   Resp 20   Ht 1.88 m (6' 2\")   Wt 88.5 kg (195 lb)   SpO2 98%   BMI 25.04 kg/m²     Physical Exam  Vitals reviewed.   Constitutional:       General: He is not in acute distress.     Appearance: Normal appearance.   HENT:      Head: Normocephalic.      Right Ear: Tympanic membrane, ear canal and external ear normal.      Left Ear: Tympanic membrane, ear canal and external ear normal.      Nose: Nose normal.      Mouth/Throat:      Mouth: Mucous membranes are moist.   Eyes:      Conjunctiva/sclera: Conjunctivae normal.      Pupils: Pupils are equal, round, and reactive to light.   Cardiovascular:      Rate and Rhythm: Normal rate and regular rhythm.      Pulses: Normal pulses.      Heart sounds: Normal heart sounds. No murmur heard.  Pulmonary:      Effort: Pulmonary effort is normal. No respiratory distress.      Breath sounds: Normal breath sounds. No stridor. No wheezing, rhonchi or rales.   Musculoskeletal:        Arms:       " Cervical back: Normal range of motion.      Comments: Right hand: 2 inch x 2 inch skin avulsion present to the dorsal aspect of the hand.  No deep wound or laceration.  Erythema surrounding the skin avulsion.  Mild oozing/bleeding.  Pressure dressing applied and bleeding controlled.  Wound was irrigated with copious amounts of saline.  Covered with nonstick bandage and further dressed.  Cap refill less than 2 seconds and 2+ radial pulse.  Full active and passive range of motion at the wrist and fingers.  Sensation intact.   Lymphadenopathy:      Cervical: No cervical adenopathy.   Skin:     General: Skin is warm and dry.      Capillary Refill: Capillary refill takes less than 2 seconds.      Findings: No erythema, lesion or rash.   Neurological:      General: No focal deficit present.      Mental Status: He is alert and oriented to person, place, and time.         Assessment/Plan:     Diagnosis and associated orders:     1. Avulsion of skin of right hand, initial encounter  amoxicillin-clavulanate (AUGMENTIN) 875-125 MG Tab         Comments/MDM:     Patient's presentation physical exam findings are consistent with an avulsion of skin to the right hand after removing the bandage at home.  Wound was irrigated and cleaned in clinic.  Dressed with nonstick dressing.  Patient was given additional nonstick dressing and advised to change this once daily.  May wash with gentle water and soap.  We will do a short course of Augmentin due to surrounding erythema and prophylactic coverage.  Referral to wound clinic for further management.  Discussed signs of infection that warrant immediate reevaluation with the patient.  He is neurovascular intact.  Bleeding controlled at this time.  ED/return precautions given.  No signs of foreign body in the skin avulsion.  No deep laceration to require sutures.  No signs of fracture.         Differential diagnosis, natural history, supportive care, and indications for immediate follow-up  discussed.    Advised the patient to follow-up with the primary care physician for recheck, reevaluation, and consideration of further management.    Please note that this dictation was created using voice recognition software. I have made a reasonable attempt to correct obvious errors, but I expect that there are errors of grammar and possibly content that I did not discover before finalizing the note.    Electronically signed by Jayden Diego PA-C.

## 2023-08-01 NOTE — PROGRESS NOTES
Bladder scan results= >999 ml.   

## 2023-08-06 ENCOUNTER — PATIENT MESSAGE (OUTPATIENT)
Dept: MEDICAL GROUP | Facility: MEDICAL CENTER | Age: 83
End: 2023-08-06
Payer: COMMERCIAL

## 2023-08-06 DIAGNOSIS — N40.0 BENIGN PROSTATIC HYPERPLASIA, UNSPECIFIED WHETHER LOWER URINARY TRACT SYMPTOMS PRESENT: ICD-10-CM

## 2023-08-06 RX ORDER — FINASTERIDE 5 MG/1
5 TABLET, FILM COATED ORAL
Qty: 90 TABLET | Refills: 0 | Status: SHIPPED | OUTPATIENT
Start: 2023-08-06 | End: 2023-08-11 | Stop reason: SDUPTHER

## 2023-08-08 ENCOUNTER — TELEPHONE (OUTPATIENT)
Dept: CARDIOLOGY | Facility: MEDICAL CENTER | Age: 83
End: 2023-08-08
Payer: COMMERCIAL

## 2023-08-08 DIAGNOSIS — I10 ESSENTIAL HYPERTENSION, BENIGN: ICD-10-CM

## 2023-08-08 DIAGNOSIS — I50.1 HEART FAILURE, LEFT, WITH LVEF <=30% (HCC): ICD-10-CM

## 2023-08-08 DIAGNOSIS — I48.19 OTHER PERSISTENT ATRIAL FIBRILLATION (HCC): ICD-10-CM

## 2023-08-08 NOTE — TELEPHONE ENCOUNTER
Phone Number Called: [phone]     Call outcome: Did not leave a detailed message. Requested patient to call back.    Message: Called to return patient's phone call. Unable to reach. Left VM for patient to call back when able.       ----- Message from Fady Valencia Ass't sent at 2023  7:28 AM PDT -----  Regarding: FW: After Hours Call    ----- Message -----  From: Destiny Quinonez  Sent: 2023   7:27 PM PDT  To: Kojo Michael  Subject: After Hours Call                                 PATIENT NAME: Pedro Champion  CALLER NAME: Mar Champion  REASON FOR CALL: RX Prescription  PHONE NUMBER: [phone]  CARDIO PROVIDER: Troy Lorenzo  : [id number]  MRN: [id number]  ADVISED 1-2 BUSINESS DAYS FOR CALL BACK Y/N: Y

## 2023-08-09 ENCOUNTER — TELEPHONE (OUTPATIENT)
Dept: CARDIOLOGY | Facility: MEDICAL CENTER | Age: 83
End: 2023-08-09
Payer: COMMERCIAL

## 2023-08-09 RX ORDER — CARVEDILOL 6.25 MG/1
6.25 TABLET ORAL 2 TIMES DAILY WITH MEALS
Qty: 180 TABLET | Refills: 0 | Status: SHIPPED | OUTPATIENT
Start: 2023-08-09 | End: 2023-08-11 | Stop reason: SDUPTHER

## 2023-08-09 RX ORDER — DIGOXIN 125 MCG
125 TABLET ORAL
Qty: 90 TABLET | Refills: 0 | Status: SHIPPED | OUTPATIENT
Start: 2023-08-09 | End: 2023-08-11 | Stop reason: SDUPTHER

## 2023-08-09 RX ORDER — POTASSIUM CHLORIDE 750 MG/1
10 CAPSULE, EXTENDED RELEASE ORAL 2 TIMES DAILY
Qty: 180 CAPSULE | Refills: 0 | Status: SHIPPED | OUTPATIENT
Start: 2023-08-09 | End: 2023-08-11 | Stop reason: SDUPTHER

## 2023-08-09 NOTE — TELEPHONE ENCOUNTER
Phone Number Called: [phone]    Call outcome:  Spoke with patient's wife - Rosana    Message: Called to return patient's phone call. Needs Carvedilol, Digoxin, and potassium chloride. Daniellehart message seen in chart from 8/7/23. Medications refilled. RN reached out to [facility] to update insurance information.     ----------------------    [employer] (PPO)  Member ID:  [id number]  Group: [id number]  Issuer ID: [id number]  RxBIN:  [id number]  RxPCN:IS  RxGRP: [id number]  RxID: [id number]     Phone:  [phone]  Fax:  [phone]

## 2023-08-09 NOTE — TELEPHONE ENCOUNTER
CALLED PT TO CONFIRM AN APPT, SPOKE TO A SERENE AND STATED WE HAVE THE WRONG NUMBER AND TO REMOVE IT.    THANKS!

## 2023-08-09 NOTE — TELEPHONE ENCOUNTER
CW    Caller: Rosana spouse    Topic/issue: Returning call    Callback Number: [phone] (home)       Thank You    Grace STEWART

## 2023-08-11 DIAGNOSIS — I10 ESSENTIAL HYPERTENSION, BENIGN: ICD-10-CM

## 2023-08-11 DIAGNOSIS — I50.1 HEART FAILURE, LEFT, WITH LVEF <=30% (HCC): ICD-10-CM

## 2023-08-11 DIAGNOSIS — N40.0 BENIGN PROSTATIC HYPERPLASIA, UNSPECIFIED WHETHER LOWER URINARY TRACT SYMPTOMS PRESENT: ICD-10-CM

## 2023-08-11 DIAGNOSIS — I48.19 OTHER PERSISTENT ATRIAL FIBRILLATION (HCC): ICD-10-CM

## 2023-08-11 DIAGNOSIS — E78.5 DYSLIPIDEMIA: ICD-10-CM

## 2023-08-11 RX ORDER — CARVEDILOL 6.25 MG/1
6.25 TABLET ORAL 2 TIMES DAILY WITH MEALS
Qty: 180 TABLET | Refills: 0 | Status: SHIPPED | OUTPATIENT
Start: 2023-08-11 | End: 2023-11-06 | Stop reason: SDUPTHER

## 2023-08-11 RX ORDER — POTASSIUM CHLORIDE 750 MG/1
10 CAPSULE, EXTENDED RELEASE ORAL 2 TIMES DAILY
Qty: 180 CAPSULE | Refills: 0 | Status: SHIPPED | OUTPATIENT
Start: 2023-08-11 | End: 2023-11-10

## 2023-08-11 RX ORDER — DIGOXIN 125 MCG
125 TABLET ORAL
Qty: 90 TABLET | Refills: 0 | Status: SHIPPED | OUTPATIENT
Start: 2023-08-11 | End: 2023-11-06 | Stop reason: SDUPTHER

## 2023-08-11 RX ORDER — ATORVASTATIN CALCIUM 40 MG/1
40 TABLET, FILM COATED ORAL DAILY
Qty: 90 TABLET | Refills: 3 | Status: SHIPPED | OUTPATIENT
Start: 2023-08-11 | End: 2023-11-06 | Stop reason: SDUPTHER

## 2023-08-12 RX ORDER — FINASTERIDE 5 MG/1
5 TABLET, FILM COATED ORAL
Qty: 90 TABLET | Refills: 0 | Status: SHIPPED | OUTPATIENT
Start: 2023-08-12 | End: 2024-02-07 | Stop reason: SDUPTHER

## 2023-08-25 DIAGNOSIS — N40.0 BENIGN PROSTATIC HYPERPLASIA, UNSPECIFIED WHETHER LOWER URINARY TRACT SYMPTOMS PRESENT: ICD-10-CM

## 2023-08-25 DIAGNOSIS — Z79.899 ENCOUNTER FOR LONG-TERM (CURRENT) USE OF HIGH-RISK MEDICATION: ICD-10-CM

## 2023-08-25 DIAGNOSIS — I48.19 OTHER PERSISTENT ATRIAL FIBRILLATION (HCC): ICD-10-CM

## 2023-08-26 RX ORDER — TAMSULOSIN HYDROCHLORIDE 0.4 MG/1
0.4 CAPSULE ORAL
Qty: 90 CAPSULE | Refills: 0 | Status: SHIPPED | OUTPATIENT
Start: 2023-08-26 | End: 2023-11-22

## 2023-09-19 ENCOUNTER — HOSPITAL ENCOUNTER (OUTPATIENT)
Dept: LAB | Facility: MEDICAL CENTER | Age: 83
End: 2023-09-19
Attending: NURSE PRACTITIONER
Payer: COMMERCIAL

## 2023-09-19 DIAGNOSIS — D72.9 ABNORMAL NEUTROPHIL COUNT: ICD-10-CM

## 2023-09-19 DIAGNOSIS — R71.8 ELEVATED MCV: ICD-10-CM

## 2023-09-19 LAB
BASOPHILS # BLD AUTO: 0.4 % (ref 0–1.8)
BASOPHILS # BLD: 0.04 K/UL (ref 0–0.12)
EOSINOPHIL # BLD AUTO: 0.08 K/UL (ref 0–0.51)
EOSINOPHIL NFR BLD: 0.7 % (ref 0–6.9)
ERYTHROCYTE [DISTWIDTH] IN BLOOD BY AUTOMATED COUNT: 49.4 FL (ref 35.9–50)
HCT VFR BLD AUTO: 47 % (ref 42–52)
HGB BLD-MCNC: 16 G/DL (ref 14–18)
IMM GRANULOCYTES # BLD AUTO: 0.07 K/UL (ref 0–0.11)
IMM GRANULOCYTES NFR BLD AUTO: 0.6 % (ref 0–0.9)
LYMPHOCYTES # BLD AUTO: 0.9 K/UL (ref 1–4.8)
LYMPHOCYTES NFR BLD: 8.3 % (ref 22–41)
MCH RBC QN AUTO: 33.8 PG (ref 27–33)
MCHC RBC AUTO-ENTMCNC: 34 G/DL (ref 32.3–36.5)
MCV RBC AUTO: 99.4 FL (ref 81.4–97.8)
MONOCYTES # BLD AUTO: 0.64 K/UL (ref 0–0.85)
MONOCYTES NFR BLD AUTO: 5.9 % (ref 0–13.4)
NEUTROPHILS # BLD AUTO: 9.05 K/UL (ref 1.82–7.42)
NEUTROPHILS NFR BLD: 84.1 % (ref 44–72)
NRBC # BLD AUTO: 0 K/UL
NRBC BLD-RTO: 0 /100 WBC (ref 0–0.2)
PLATELET # BLD AUTO: 244 K/UL (ref 164–446)
PMV BLD AUTO: 9.4 FL (ref 9–12.9)
RBC # BLD AUTO: 4.73 M/UL (ref 4.7–6.1)
WBC # BLD AUTO: 10.8 K/UL (ref 4.8–10.8)

## 2023-09-19 PROCEDURE — 36415 COLL VENOUS BLD VENIPUNCTURE: CPT

## 2023-09-19 PROCEDURE — 85025 COMPLETE CBC W/AUTO DIFF WBC: CPT

## 2023-09-27 DIAGNOSIS — I50.1 HEART FAILURE, LEFT, WITH LVEF <=30% (HCC): ICD-10-CM

## 2023-09-28 RX ORDER — EMPAGLIFLOZIN 10 MG/1
10 TABLET, FILM COATED ORAL DAILY
Qty: 90 TABLET | Refills: 0 | Status: SHIPPED | OUTPATIENT
Start: 2023-09-28 | End: 2023-11-06 | Stop reason: SDUPTHER

## 2023-09-28 NOTE — TELEPHONE ENCOUNTER
Is the patient due for a refill? Yes    Was the patient seen the past year? No    Date of last office visit: 5/06/23    Does the patient have an upcoming appointment?  Yes   If yes, When? 11/06/23    Provider to refill:CW    Does the patients insurance require a 100 day supply?  No

## 2023-10-03 ENCOUNTER — TELEPHONE (OUTPATIENT)
Dept: CARDIOLOGY | Facility: MEDICAL CENTER | Age: 83
End: 2023-10-03
Payer: COMMERCIAL

## 2023-10-03 DIAGNOSIS — I50.1 HEART FAILURE, LEFT, WITH LVEF <=30% (HCC): ICD-10-CM

## 2023-10-03 RX ORDER — SACUBITRIL AND VALSARTAN 49; 51 MG/1; MG/1
1 TABLET, FILM COATED ORAL 2 TIMES DAILY
Qty: 180 TABLET | Refills: 0 | Status: SHIPPED | OUTPATIENT
Start: 2023-10-03 | End: 2023-11-06 | Stop reason: SDUPTHER

## 2023-10-03 NOTE — TELEPHONE ENCOUNTER
CW    Caller: Mar Champion (Spouse)     Medication Name and Dosage: sacubitril-valsartan (ENTRESTO) 49-51 MG      Medication amount left: 1 week left     Preferred Pharmacy: [facility] -  [facility] [address] [facility] [address]     Phone:  [phone]  Fax:  [phone]     Other questions (Topic): N/A    Callback Number (Will only call for issues): [phone] (home)      Thank you,     Axel AARON

## 2023-10-04 ENCOUNTER — HOSPITAL ENCOUNTER (OUTPATIENT)
Dept: LAB | Facility: MEDICAL CENTER | Age: 83
End: 2023-10-04
Attending: INTERNAL MEDICINE
Payer: COMMERCIAL

## 2023-10-04 DIAGNOSIS — I50.1 HEART FAILURE, LEFT, WITH LVEF <=30% (HCC): ICD-10-CM

## 2023-10-04 LAB — MAGNESIUM SERPL-MCNC: 1.9 MG/DL (ref 1.5–2.5)

## 2023-10-04 PROCEDURE — 36415 COLL VENOUS BLD VENIPUNCTURE: CPT

## 2023-10-04 PROCEDURE — 83735 ASSAY OF MAGNESIUM: CPT

## 2023-10-09 ENCOUNTER — OFFICE VISIT (OUTPATIENT)
Dept: MEDICAL GROUP | Facility: MEDICAL CENTER | Age: 83
End: 2023-10-09
Payer: COMMERCIAL

## 2023-10-09 VITALS
BODY MASS INDEX: 24.13 KG/M2 | RESPIRATION RATE: 17 BRPM | DIASTOLIC BLOOD PRESSURE: 70 MMHG | SYSTOLIC BLOOD PRESSURE: 100 MMHG | HEIGHT: 74 IN | TEMPERATURE: 98.4 F | OXYGEN SATURATION: 95 % | HEART RATE: 87 BPM | WEIGHT: 188 LBS

## 2023-10-09 DIAGNOSIS — R91.1 PULMONARY NODULE: ICD-10-CM

## 2023-10-09 DIAGNOSIS — R63.4 WEIGHT LOSS, NON-INTENTIONAL: ICD-10-CM

## 2023-10-09 DIAGNOSIS — D72.9 ABNORMAL NEUTROPHIL COUNT: ICD-10-CM

## 2023-10-09 PROCEDURE — 3074F SYST BP LT 130 MM HG: CPT | Performed by: NURSE PRACTITIONER

## 2023-10-09 PROCEDURE — 3078F DIAST BP <80 MM HG: CPT | Performed by: NURSE PRACTITIONER

## 2023-10-09 PROCEDURE — 99214 OFFICE O/P EST MOD 30 MIN: CPT | Performed by: NURSE PRACTITIONER

## 2023-10-09 ASSESSMENT — FIBROSIS 4 INDEX: FIB4 SCORE: 1.31

## 2023-10-09 NOTE — DISCHARGE PLANNING
DME referral sent to [facility].  HH referral sent to [facility] per choice form.  Awaiting responses.   lateral step only

## 2023-10-09 NOTE — PROGRESS NOTES
Subjective:     Pedro Champion is a 83 y.o. male who presents with non-intentional weight loss.    HPI:   Seen in f/u for non-intentional weight loss.    He has been loosing weight for awhile.  He is down 195 lbs in july to 188 lbs currently.  He reports that he is not eating as much as he used to but this is chronic.  He is also feeling more tired for last several years.  He is tired all the time. He eats 2 meals a day.  Bkfst is small with cereal or toast.  Today had just a piece of toast until early pm with sourdough.  Eats salad.  Bm's are normal.    He is due updated CT chest for his pulm nodules.  They were stable last yr.  A past CT abd and PET scan did not show any reason for weight loss.  Reviewed lab with pt and wife.  Mg++ is wnl.  CBC continues to show chronic elevated neutrophils and lymphs. Has been abn since 2018 but also has a record of 20 yrs ago abn.  No fever, chills or sweating. No chest pain or palp.  No black tarry stools.     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) 09/23/2019    Hypothyroid 09/23/2019    Urinary retention 09/09/2019    Other persistent atrial fibrillation (HCC) 08/28/2019    Anticoagulant long-term use 08/28/2019    Mandibular fracture, open (HCC) 08/28/2019    Suicide attempt ([facility]) 08/28/2019    Essential hypertension, benign 04/13/2016    Dyslipidemia 04/13/2016    Parotid nodule 07/19/2013    Hearing loss of both ears 09/26/2012       Current medicines (including changes today)  Current Outpatient Medications   Medication Sig Dispense Refill    sacubitril-valsartan (ENTRESTO)  49-51 MG Tab Take 1 Tablet by mouth 2 times a day. **LAST SEEN 5/2022 .  TO ENSURE FURTHER REFILLS, COMPLETE LAB, AND KEEP SCHEDULED FOLLOW UP VISIT 11/6/2023.** 180 Tablet 0    levothyroxine (SYNTHROID) 25 MCG Tab Take 1 Tablet by mouth every morning on an empty stomach. 90 Tablet 0    Empagliflozin (JARDIANCE) 10 MG Tab tablet Take 1 Tablet by mouth every day. **LAST SEEN 9/2022 .  TO ENSURE FURTHER REFILLS, KEEP SCHEDULED FOLLOW UP VISIT 11/6/2023.** 90 Tablet 0    apixaban (ELIQUIS) 5mg Tab TAKE 1 TABLET 2 TIMES DAILY. *Must keep appointment for further refills.* Thank you! 180 Tablet 0    tamsulosin (FLOMAX) 0.4 MG capsule Take 1 Capsule by mouth every day. 90 Capsule 0    finasteride (PROSCAR) 5 MG Tab Take 1 Tablet by mouth every day. 90 Tablet 0    carvedilol (COREG) 6.25 MG Tab Take 1 Tablet by mouth 2 times a day with meals. 180 Tablet 0    digoxin (LANOXIN) 125 MCG Tab Take 1 Tablet by mouth every day. 90 Tablet 0    potassium chloride (MICRO-K) 10 MEQ capsule Take 1 Capsule by mouth 2 times a day. 180 Capsule 0    atorvastatin (LIPITOR) 40 MG Tab Take 1 Tablet by mouth every day. 90 Tablet 3    mirabegron ER (MYRBETRIQ) 25 MG TABLET SR 24 HR Myrbetriq 25 mg tablet,extended release   Take 1 tablet every day by oral route for 28 days.      hydrOXYzine HCl (ATARAX) 25 MG Tab Take 1 Tablet by mouth every 8 hours as needed for Anxiety. 10 Tablet 0    sertraline (ZOLOFT) 25 MG tablet TAKE 2 TABLETS BY MOUTH EVERY DAY . APPT NEEDED FOR REFILLS 60 Tablet 1    furosemide (LASIX) 40 MG Tab Take 1 Tablet by mouth every day. 90 Tablet 3    fluticasone (FLONASE) 50 MCG/ACT nasal spray SPRAY 2 SPRAYS INTO EACH NOSTRIL EVERY DAY       No current facility-administered medications for this visit.       Allergies   Allergen Reactions    Cefepime Hcl Rash     Hives, eosinophilia        ROS  Constitutional: Negative. Negative for fever, chills, weight loss, malaise/fatigue and diaphoresis.   HENT: Negative. Negative for  "hearing loss, ear pain, nosebleeds, congestion, sore throat, neck pain, tinnitus and ear discharge.   Respiratory: Negative. Negative for cough, hemoptysis, sputum production, shortness of breath, wheezing and stridor.   Cardiovascular: Negative. Negative for chest pain, palpitations, orthopnea, claudication, leg swelling and PND.   Gastrointestinal: Denies nausea, vomiting, diarrhea, constipation, heartburn, melena or hematochezia.  Genitourinary: Denies dysuria, hematuria, urinary incontinence, frequency or urgency.        Objective:     /70 (BP Location: Right arm, Patient Position: Sitting, BP Cuff Size: Adult)   Pulse 87   Temp 36.9 °C (98.4 °F) (Temporal)   Resp 17   Ht 1.88 m (6' 2\")   Wt 85.3 kg (188 lb)   SpO2 95%  Body mass index is 24.14 kg/m².    Physical Exam:  Vitals reviewed.  Constitutional: Oriented to person, place, and time. appears well-developed and well-nourished. No distress.   Cardiovascular: Normal rate, irregular rhythm w/a-fib, normal heart sounds and intact distal pulses. Exam reveals no gallop and no friction rub. No murmur heard. No carotid bruits.   Pulmonary/Chest: Effort normal and breath sounds normal. No stridor. No respiratory distress. no wheezes or rales. exhibits no tenderness.   Musculoskeletal: Normal range of motion. exhibits no edema. aye pedal pulses 2+.  Lymphadenopathy: No cervical or supraclavicular adenopathy.   Neurological: Alert and oriented to person, place, and time. exhibits normal muscle tone.  Skin: Skin is warm and dry. No diaphoresis.   Psychiatric: Normal mood and affect. Behavior is normal.      Assessment and Plan:     The following treatment plan was discussed:    1. Weight loss, non-intentional  Referral to Hematology Oncology    ? etiology. enc pt to improve protein intake with jprotein supplements.  f/u 3mo for wt check and lab review      2. Abnormal neutrophil count  Referral to Hematology Oncology    chronic long term but worsening,  no " recurrent infections.  no previous w/u.  refer hematology for eval      3. Pulmonary nodule  CT-CHEST (THORAX) W/O    due for updated CT chest to reassess pulmonary nodules             Followup: Return in about 3 months (around 1/9/2024), or call for lab slip.

## 2023-10-18 ENCOUNTER — HOSPITAL ENCOUNTER (OUTPATIENT)
Dept: RADIOLOGY | Facility: MEDICAL CENTER | Age: 83
End: 2023-10-18
Attending: NURSE PRACTITIONER
Payer: COMMERCIAL

## 2023-10-18 DIAGNOSIS — R91.1 PULMONARY NODULE: ICD-10-CM

## 2023-10-18 PROCEDURE — 71250 CT THORAX DX C-: CPT

## 2023-10-19 DIAGNOSIS — E03.9 ACQUIRED HYPOTHYROIDISM: ICD-10-CM

## 2023-10-20 RX ORDER — LEVOTHYROXINE SODIUM 0.03 MG/1
25 TABLET ORAL
Qty: 100 TABLET | Refills: 2 | Status: SHIPPED | OUTPATIENT
Start: 2023-10-20 | End: 2023-12-29 | Stop reason: SDUPTHER

## 2023-10-20 NOTE — TELEPHONE ENCOUNTER
Received request via: Pharmacy    Was the patient seen in the last year in this department? Yes    Does the patient have an active prescription (recently filled or refills available) for medication(s) requested? yes    Does the patient have [facility] and need 100 day supply (blood pressure, diabetes and cholesterol meds only)? Patient does not have SCP   Requested Prescriptions     Pending Prescriptions Disp Refills    levothyroxine (SYNTHROID) 25 MCG Tab 90 Tablet 0     Sig: Take 1 Tablet by mouth every morning on an empty stomach.

## 2023-11-06 ENCOUNTER — OFFICE VISIT (OUTPATIENT)
Dept: CARDIOLOGY | Facility: MEDICAL CENTER | Age: 83
End: 2023-11-06
Attending: INTERNAL MEDICINE
Payer: COMMERCIAL

## 2023-11-06 VITALS
SYSTOLIC BLOOD PRESSURE: 108 MMHG | BODY MASS INDEX: 24.51 KG/M2 | RESPIRATION RATE: 12 BRPM | HEART RATE: 89 BPM | HEIGHT: 74 IN | OXYGEN SATURATION: 95 % | DIASTOLIC BLOOD PRESSURE: 72 MMHG | WEIGHT: 191 LBS

## 2023-11-06 DIAGNOSIS — I42.9 CARDIOMYOPATHY, UNSPECIFIED TYPE (HCC): ICD-10-CM

## 2023-11-06 DIAGNOSIS — Z79.899 ENCOUNTER FOR LONG-TERM (CURRENT) USE OF HIGH-RISK MEDICATION: ICD-10-CM

## 2023-11-06 DIAGNOSIS — I10 ESSENTIAL HYPERTENSION, BENIGN: ICD-10-CM

## 2023-11-06 DIAGNOSIS — I70.0 ATHEROSCLEROSIS OF AORTA (HCC): Chronic | ICD-10-CM

## 2023-11-06 DIAGNOSIS — I48.21 HYPERCOAGULABLE STATE DUE TO PERMANENT ATRIAL FIBRILLATION (HCC): Chronic | ICD-10-CM

## 2023-11-06 DIAGNOSIS — E78.5 DYSLIPIDEMIA: ICD-10-CM

## 2023-11-06 DIAGNOSIS — I48.19 OTHER PERSISTENT ATRIAL FIBRILLATION (HCC): ICD-10-CM

## 2023-11-06 DIAGNOSIS — I50.1 HEART FAILURE, LEFT, WITH LVEF <=30% (HCC): ICD-10-CM

## 2023-11-06 DIAGNOSIS — D68.69 HYPERCOAGULABLE STATE DUE TO PERMANENT ATRIAL FIBRILLATION (HCC): Chronic | ICD-10-CM

## 2023-11-06 PROBLEM — Z79.01 ANTICOAGULANT LONG-TERM USE: Status: RESOLVED | Noted: 2019-08-28 | Resolved: 2023-11-06

## 2023-11-06 PROCEDURE — 3074F SYST BP LT 130 MM HG: CPT | Performed by: INTERNAL MEDICINE

## 2023-11-06 PROCEDURE — 3078F DIAST BP <80 MM HG: CPT | Performed by: INTERNAL MEDICINE

## 2023-11-06 PROCEDURE — 99214 OFFICE O/P EST MOD 30 MIN: CPT | Performed by: INTERNAL MEDICINE

## 2023-11-06 PROCEDURE — 99213 OFFICE O/P EST LOW 20 MIN: CPT | Performed by: INTERNAL MEDICINE

## 2023-11-06 RX ORDER — DIGOXIN 125 MCG
125 TABLET ORAL
Qty: 90 TABLET | Refills: 3 | Status: SHIPPED | OUTPATIENT
Start: 2023-11-06 | End: 2023-11-10

## 2023-11-06 RX ORDER — ATORVASTATIN CALCIUM 40 MG/1
40 TABLET, FILM COATED ORAL DAILY
Qty: 90 TABLET | Refills: 3 | Status: SHIPPED | OUTPATIENT
Start: 2023-11-06 | End: 2024-02-19 | Stop reason: SDUPTHER

## 2023-11-06 RX ORDER — FUROSEMIDE 40 MG/1
40 TABLET ORAL
Qty: 90 TABLET | Refills: 3 | Status: SHIPPED | OUTPATIENT
Start: 2023-11-06

## 2023-11-06 RX ORDER — CARVEDILOL 6.25 MG/1
6.25 TABLET ORAL 2 TIMES DAILY WITH MEALS
Qty: 180 TABLET | Refills: 0 | Status: SHIPPED | OUTPATIENT
Start: 2023-11-06 | End: 2023-11-10

## 2023-11-06 RX ORDER — SACUBITRIL AND VALSARTAN 49; 51 MG/1; MG/1
1 TABLET, FILM COATED ORAL 2 TIMES DAILY
Qty: 180 TABLET | Refills: 3 | Status: SHIPPED | OUTPATIENT
Start: 2023-11-06 | End: 2024-01-16

## 2023-11-06 RX ORDER — EMPAGLIFLOZIN 10 MG/1
10 TABLET, FILM COATED ORAL DAILY
Qty: 90 TABLET | Refills: 3 | Status: SHIPPED | OUTPATIENT
Start: 2023-11-06 | End: 2024-02-19 | Stop reason: SDUPTHER

## 2023-11-06 ASSESSMENT — FIBROSIS 4 INDEX: FIB4 SCORE: 1.31

## 2023-11-06 NOTE — PROGRESS NOTES
Chief Complaint   Patient presents with    Atrial Fibrillation     Dx: Other persistent atrial fibrillation        Subjective     Pedro Champion is a 83 y.o. male who presents today with CHF pAF    Doing okay     Hasn't needed diuretics    Past Medical History:   Diagnosis Date    Basal cell carcinoma     Cardiomyopathy (HCC) 02/2020    Echocardiogram with LVEF 40-45%    Chronic anticoagulation     Depression     Dyslipidemia 4/13/2016    Dysphagia 10/5/2019    REBECCA (generalized anxiety disorder) 11/10/2021    Hearing loss     Heart failure, left, 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% (HCC) 9/23/2019    Hypertension     Hypothyroid 9/23/2019    Insomnia     Malignant melanoma ([facility]) 5/4/2021    Mandibular fracture, open ([facility]) 8/28/2019    Memory loss 11/22/2021    Other persistent atrial fibrillation ([facility]) 8/28/2019    Parotid nodule 7/19/2013    Urinary retention 9/9/2019     IMO load March 2020    Vitamin D deficiency 9/27/2021     Past Surgical History:   Procedure Laterality Date    ND DENTAL SURGERY PROCEDURE Left 10/13/2019    Procedure: EXTRACTION, TOOTH;  Surgeon: Troy Dong D.D.SBetsy;  Location: SURGERY [facility];  Service: Oral Surgery    MANDIBLE FRACTURE ORIF Bilateral 10/13/2019    Procedure: ORIF, FRACTURE, MANDIBLE - FOR HARDWARE REMOVAL MANDIBLE, POSS ORIF;  Surgeon: Troy Dong D.D.SBetsy;  Location: [facility];  Service: Oral Surgery    ORAL FISTULA REPAIR N/A 10/13/2019    Procedure: CLOSURE, FISTULA, MOUTH;  Surgeon: Troy Dong D.D.SBetsy;  Location: SURGERY [facility];  Service: Oral Surgery    SUBMANDIBLE ABSCESS INCISION AND DRAINAGE N/A 8/31/2019    Procedure: INCISION AND DRAINAGE FACIAL WOUND;  Surgeon: Troy Dong D.D.SBetsy;  Location: SURGERY [facility];  Service: Oral Surgery    INTERMAXILLARY FIXATATION N/A 8/31/2019    Procedure: FIXATION, MAXILLOMANDIBULAR. ORIF and MMF mandible fracture debridement of facial wounds extracton tooth #8;   Surgeon: Troy Dong D.D.S.;  Location: SURGERY [facility];  Service: Oral Surgery    PAROTIDECTOMY SUPERFICIAL  7/19/2013    Performed by Mendy Stern M.D. at SURGERY [facility]    LUMBAR DECOMPRESSION  1988    INGUINAL HERNIA REPAIR BILATERAL  1960's     Family History   Problem Relation Age of Onset    Heart Disease Father 81        Sudden cardiac death probably coronary    Stroke Mother      Social History     Socioeconomic History    Marital status:      Spouse name: Not on file    Number of children: Not on file    Years of education: Not on file    Highest education level: Not on file   Occupational History    Not on file   Tobacco Use    Smoking status: Never     Passive exposure: Past (father was a smoker)    Smokeless tobacco: Never   Vaping Use    Vaping Use: Never used   Substance and Sexual Activity    Alcohol use: Yes     Alcohol/week: 8.4 - 12.6 oz     Types: 14 - 21 Glasses of wine per week     Comment: 3-4 glasses of wine a day    Drug use: Never    Sexual activity: Yes     Partners: Female   Other Topics Concern    Not on file   Social History Narrative    ** Merged History Encounter **          Social Determinants of Health     Financial Resource Strain: Not on file   Food Insecurity: Not on file   Transportation Needs: Not on file   Physical Activity: Not on file   Stress: Not on file   Social Connections: Not on file   Intimate Partner Violence: Not on file   Housing Stability: Not on file     Allergies   Allergen Reactions    Cefepime Hcl Rash     Hives, eosinophilia      Outpatient Encounter Medications as of 11/6/2023   Medication Sig Dispense Refill    levothyroxine (SYNTHROID) 25 MCG Tab Take 1 Tablet by mouth every morning on an empty stomach. 100 Tablet 2    sacubitril-valsartan (ENTRESTO) 49-51 MG Tab Take 1 Tablet by mouth 2 times a day. **LAST SEEN 5/2022 .  TO ENSURE FURTHER REFILLS, COMPLETE LAB, AND KEEP SCHEDULED FOLLOW UP VISIT 11/6/2023.** 180  "Tablet 0    Empagliflozin (JARDIANCE) 10 MG Tab tablet Take 1 Tablet by mouth every day. **LAST SEEN 9/2022 .  TO ENSURE FURTHER REFILLS, KEEP SCHEDULED FOLLOW UP VISIT 11/6/2023.** 90 Tablet 0    apixaban (ELIQUIS) 5mg Tab TAKE 1 TABLET 2 TIMES DAILY. *Must keep appointment for further refills.* Thank you! 180 Tablet 0    tamsulosin (FLOMAX) 0.4 MG capsule Take 1 Capsule by mouth every day. 90 Capsule 0    finasteride (PROSCAR) 5 MG Tab Take 1 Tablet by mouth every day. 90 Tablet 0    carvedilol (COREG) 6.25 MG Tab Take 1 Tablet by mouth 2 times a day with meals. 180 Tablet 0    digoxin (LANOXIN) 125 MCG Tab Take 1 Tablet by mouth every day. 90 Tablet 0    potassium chloride (MICRO-K) 10 MEQ capsule Take 1 Capsule by mouth 2 times a day. 180 Capsule 0    atorvastatin (LIPITOR) 40 MG Tab Take 1 Tablet by mouth every day. 90 Tablet 3    mirabegron ER (MYRBETRIQ) 25 MG TABLET SR 24 HR Myrbetriq 25 mg tablet,extended release   Take 1 tablet every day by oral route for 28 days.      hydrOXYzine HCl (ATARAX) 25 MG Tab Take 1 Tablet by mouth every 8 hours as needed for Anxiety. 10 Tablet 0    sertraline (ZOLOFT) 25 MG tablet TAKE 2 TABLETS BY MOUTH EVERY DAY . APPT NEEDED FOR REFILLS 60 Tablet 1    furosemide (LASIX) 40 MG Tab Take 1 Tablet by mouth every day. 90 Tablet 3    fluticasone (FLONASE) 50 MCG/ACT nasal spray SPRAY 2 SPRAYS INTO EACH NOSTRIL EVERY DAY       No facility-administered encounter medications on file as of 11/6/2023.     ROS           Objective     /72 (BP Location: Left arm, Patient Position: Sitting, BP Cuff Size: Adult)   Pulse 89   Resp 12   Ht 1.88 m (6' 2\")   Wt 86.6 kg (191 lb)   SpO2 95%   BMI 24.52 kg/m²     Physical Exam  Constitutional:       General: He is not in acute distress.     Appearance: He is not diaphoretic.   Eyes:      General: No scleral icterus.  Neck:      Vascular: No JVD.   Cardiovascular:      Rate and Rhythm: Normal rate.      Heart sounds: Normal heart " sounds. No murmur heard.     No friction rub. No gallop.   Pulmonary:      Effort: No respiratory distress.      Breath sounds: No wheezing or rales.   Abdominal:      General: Bowel sounds are normal.      Palpations: Abdomen is soft.   Musculoskeletal:      Right lower leg: No edema.      Left lower leg: No edema.   Skin:     Findings: No rash.   Neurological:      Mental Status: He is alert. Mental status is at baseline.   Psychiatric:         Mood and Affect: Mood normal.          We reviewed in person the most recent labs  Recent Results (from the past 5040 hour(s))   Comp Metabolic Panel    Collection Time: 06/06/23  7:33 AM   Result Value Ref Range    Sodium 135 135 - 145 mmol/L    Potassium 4.2 3.6 - 5.5 mmol/L    Chloride 100 96 - 112 mmol/L    Co2 26 20 - 33 mmol/L    Anion Gap 9.0 7.0 - 16.0    Glucose 119 (H) 65 - 99 mg/dL    Bun 15 8 - 22 mg/dL    Creatinine 0.75 0.50 - 1.40 mg/dL    Calcium 8.9 8.5 - 10.5 mg/dL    AST(SGOT) 15 12 - 45 U/L    ALT(SGPT) 16 2 - 50 U/L    Alkaline Phosphatase 52 30 - 99 U/L    Total Bilirubin 1.2 0.1 - 1.5 mg/dL    Albumin 4.0 3.2 - 4.9 g/dL    Total Protein 6.4 6.0 - 8.2 g/dL    Globulin 2.4 1.9 - 3.5 g/dL    A-G Ratio 1.7 g/dL   Lipid Profile    Collection Time: 06/06/23  7:33 AM   Result Value Ref Range    Cholesterol,Tot 140 100 - 199 mg/dL    Triglycerides 72 0 - 149 mg/dL    HDL 59 >=40 mg/dL    LDL 67 <100 mg/dL   TSH    Collection Time: 06/06/23  7:33 AM   Result Value Ref Range    TSH 4.360 0.380 - 5.330 uIU/mL   FREE THYROXINE    Collection Time: 06/06/23  7:33 AM   Result Value Ref Range    Free T-4 1.03 0.93 - 1.70 ng/dL   VITAMIN D,25 HYDROXY (DEFICIENCY)    Collection Time: 06/06/23  7:33 AM   Result Value Ref Range    25-Hydroxy   Vitamin D 25 31 30 - 100 ng/mL   HEMOGLOBIN A1C    Collection Time: 06/06/23  7:33 AM   Result Value Ref Range    Glycohemoglobin 5.1 4.0 - 5.6 %    Est Avg Glucose 100 mg/dL   CBC WITH DIFFERENTIAL    Collection Time: 06/06/23   7:33 AM   Result Value Ref Range    WBC 9.6 4.8 - 10.8 K/uL    RBC 4.93 4.70 - 6.10 M/uL    Hemoglobin 16.5 14.0 - 18.0 g/dL    Hematocrit 49.4 42.0 - 52.0 %    .2 (H) 81.4 - 97.8 fL    MCH 33.5 (H) 27.0 - 33.0 pg    MCHC 33.4 32.3 - 36.5 g/dL    RDW 49.6 35.9 - 50.0 fL    Platelet Count 217 164 - 446 K/uL    MPV 9.7 9.0 - 12.9 fL    Neutrophils-Polys 76.30 (H) 44.00 - 72.00 %    Lymphocytes 14.70 (L) 22.00 - 41.00 %    Monocytes 6.70 0.00 - 13.40 %    Eosinophils 0.90 0.00 - 6.90 %    Basophils 0.40 0.00 - 1.80 %    Immature Granulocytes 1.00 (H) 0.00 - 0.90 %    Nucleated RBC 0.00 0.00 - 0.20 /100 WBC    Neutrophils (Absolute) 7.34 1.82 - 7.42 K/uL    Lymphs (Absolute) 1.41 1.00 - 4.80 K/uL    Monos (Absolute) 0.64 0.00 - 0.85 K/uL    Eos (Absolute) 0.09 0.00 - 0.51 K/uL    Baso (Absolute) 0.04 0.00 - 0.12 K/uL    Immature Granulocytes (abs) 0.10 0.00 - 0.11 K/uL    NRBC (Absolute) 0.00 K/uL   FASTING STATUS    Collection Time: 06/06/23  7:33 AM   Result Value Ref Range    Fasting Status Fasting    CORRECTED CALCIUM    Collection Time: 06/06/23  7:33 AM   Result Value Ref Range    Correct Calcium 8.9 8.5 - 10.5 mg/dL   ESTIMATED GFR    Collection Time: 06/06/23  7:33 AM   Result Value Ref Range    GFR (CKD-EPI) 89 >60 mL/min/1.73 m 2   MICROALBUMIN CREAT RATIO URINE    Collection Time: 06/06/23  7:34 AM   Result Value Ref Range    Creatinine, Urine 125.28 mg/dL    Microalbumin, Urine Random <1.2 mg/dL    Micro Alb Creat Ratio see below 0 - 30 mg/g   CBC WITH DIFFERENTIAL    Collection Time: 09/19/23  8:42 AM   Result Value Ref Range    WBC 10.8 4.8 - 10.8 K/uL    RBC 4.73 4.70 - 6.10 M/uL    Hemoglobin 16.0 14.0 - 18.0 g/dL    Hematocrit 47.0 42.0 - 52.0 %    MCV 99.4 (H) 81.4 - 97.8 fL    MCH 33.8 (H) 27.0 - 33.0 pg    MCHC 34.0 32.3 - 36.5 g/dL    RDW 49.4 35.9 - 50.0 fL    Platelet Count 244 164 - 446 K/uL    MPV 9.4 9.0 - 12.9 fL    Neutrophils-Polys 84.10 (H) 44.00 - 72.00 %    Lymphocytes 8.30 (L)  22.00 - 41.00 %    Monocytes 5.90 0.00 - 13.40 %    Eosinophils 0.70 0.00 - 6.90 %    Basophils 0.40 0.00 - 1.80 %    Immature Granulocytes 0.60 0.00 - 0.90 %    Nucleated RBC 0.00 0.00 - 0.20 /100 WBC    Neutrophils (Absolute) 9.05 (H) 1.82 - 7.42 K/uL    Lymphs (Absolute) 0.90 (L) 1.00 - 4.80 K/uL    Monos (Absolute) 0.64 0.00 - 0.85 K/uL    Eos (Absolute) 0.08 0.00 - 0.51 K/uL    Baso (Absolute) 0.04 0.00 - 0.12 K/uL    Immature Granulocytes (abs) 0.07 0.00 - 0.11 K/uL    NRBC (Absolute) 0.00 K/uL   MAGNESIUM    Collection Time: 10/04/23  8:13 AM   Result Value Ref Range    Magnesium 1.9 1.5 - 2.5 mg/dL           Assessment & Plan     1. Other persistent atrial fibrillation (HCC)  apixaban (ELIQUIS) 5mg Tab    digoxin (LANOXIN) 125 MCG Tab      2. Cardiomyopathy, unspecified type (HCC)  EC-ECHOCARDIOGRAM COMPLETE W/O CONT      3. Hypercoagulable state due to permanent atrial fibrillation (HCC)        4. Dyslipidemia  atorvastatin (LIPITOR) 40 MG Tab      5. Essential hypertension, benign  furosemide (LASIX) 40 MG Tab    carvedilol (COREG) 6.25 MG Tab      6. 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 Encounter for long-term (current) use of high-risk medication  apixaban (ELIQUIS) 5mg Tab          Medical Decision Making: Today's Assessment/Status/Plan:        It was my pleasure to meet with Mr. Champion.    We addressed the management of hypertension at today's visit. Blood pressure is well controlled.  We specifically assessed the labs on hypertension treatment    We addressed the management of dyslipidemia and atherosclerosis at today's visit. He is on appropriate statin.    We addressed the management of atherosclerotic artery disease.  He is on proper antiplatelet, cholesterol management and beta-blockers as appropriate.  We addressed the potential side effects and  laboratory follow-up for these medications.    We addressed the management of congestive heart failure with reduced ejection fraction .  He is on proper mineralacorticoid, angiotensin/ace inhibition with neprilysin inhibition, SGLTs inhibitor and beta-blockers as appropriate.  We addressed the potential side effects and regular laboratory follow-up for these medications.    We addressed the management of atrial fibrillation.  He is on proper anticoagulation cholesterol management and rate or rhythm control as appropriate.  We addressed the potential side effects and laboratory follow-up for these medications.    Check echo and if EF < 35% EP referral for ICD    We addressed the management of chronic anticoagulation at today's visit. He understands the risks and benefits of chronic anticoagulation.  We reviewed and verified the results of annual testing for anemia and kidney function.      I will see Mr. Champion back in 1 year time and encouraged him to follow up with us over the phone or electronically using my MyChart as issues arise.    It is my pleasure to participate in the care of Mr. Champion.  Please do not hesitate to contact me with questions or concerns.    Troy Lorenzo MD PhD [facility]  Cardiologist [facility]    Please note that this dictation was created using voice recognition software. There may be errors I did not discover before finalizing the note.

## 2023-11-08 ENCOUNTER — HOSPITAL ENCOUNTER (OUTPATIENT)
Dept: CARDIOLOGY | Facility: MEDICAL CENTER | Age: 83
End: 2023-11-08
Attending: INTERNAL MEDICINE
Payer: COMMERCIAL

## 2023-11-08 ENCOUNTER — TELEPHONE (OUTPATIENT)
Dept: CARDIOLOGY | Facility: MEDICAL CENTER | Age: 83
End: 2023-11-08

## 2023-11-08 DIAGNOSIS — I42.9 CARDIOMYOPATHY, UNSPECIFIED TYPE (HCC): ICD-10-CM

## 2023-11-08 DIAGNOSIS — I50.1 HEART FAILURE, LEFT, WITH LVEF <=30% (HCC): ICD-10-CM

## 2023-11-08 LAB
LV EJECT FRACT  99904: 30
LV EJECT FRACT MOD 2C 99903: 52.71
LV EJECT FRACT MOD 4C 99902: 64.35
LV EJECT FRACT MOD BP 99901: 59.05

## 2023-11-08 PROCEDURE — 93306 TTE W/DOPPLER COMPLETE: CPT | Mod: 26 | Performed by: INTERNAL MEDICINE

## 2023-11-08 PROCEDURE — 93306 TTE W/DOPPLER COMPLETE: CPT

## 2023-11-09 DIAGNOSIS — I50.1 HEART FAILURE, LEFT, WITH LVEF <=30% (HCC): ICD-10-CM

## 2023-11-09 DIAGNOSIS — I10 ESSENTIAL HYPERTENSION, BENIGN: ICD-10-CM

## 2023-11-09 DIAGNOSIS — I48.19 OTHER PERSISTENT ATRIAL FIBRILLATION (HCC): ICD-10-CM

## 2023-11-10 RX ORDER — DIGOXIN 125 MCG
125 TABLET ORAL
Qty: 90 TABLET | Refills: 3 | Status: SHIPPED | OUTPATIENT
Start: 2023-11-10 | End: 2024-01-30 | Stop reason: SDUPTHER

## 2023-11-10 RX ORDER — POTASSIUM CHLORIDE 750 MG/1
10 CAPSULE, EXTENDED RELEASE ORAL 2 TIMES DAILY
Qty: 180 CAPSULE | Refills: 3 | Status: SHIPPED | OUTPATIENT
Start: 2023-11-10 | End: 2024-02-07 | Stop reason: SDUPTHER

## 2023-11-10 RX ORDER — CARVEDILOL 6.25 MG/1
6.25 TABLET ORAL 2 TIMES DAILY WITH MEALS
Qty: 180 TABLET | Refills: 3 | Status: SHIPPED | OUTPATIENT
Start: 2023-11-10 | End: 2024-02-14 | Stop reason: SDUPTHER

## 2023-11-14 DIAGNOSIS — I48.19 OTHER PERSISTENT ATRIAL FIBRILLATION (HCC): ICD-10-CM

## 2023-11-14 DIAGNOSIS — Z79.899 ENCOUNTER FOR LONG-TERM (CURRENT) USE OF HIGH-RISK MEDICATION: ICD-10-CM

## 2023-11-15 RX ORDER — APIXABAN 5 MG/1
5 TABLET, FILM COATED ORAL 2 TIMES DAILY
Qty: 180 TABLET | Refills: 0 | OUTPATIENT
Start: 2023-11-15

## 2023-11-21 DIAGNOSIS — N40.0 BENIGN PROSTATIC HYPERPLASIA, UNSPECIFIED WHETHER LOWER URINARY TRACT SYMPTOMS PRESENT: ICD-10-CM

## 2023-11-21 NOTE — TELEPHONE ENCOUNTER
Received request via: Pharmacy    Was the patient seen in the last year in this department? Yes    Does the patient have an active prescription (recently filled or refills available) for medication(s) requested? yes    Does the patient have [facility] and need 100 day supply (blood pressure, diabetes and cholesterol meds only)? Patient does not have SCP   Requested Prescriptions     Pending Prescriptions Disp Refills    tamsulosin (FLOMAX) 0.4 MG capsule [Pharmacy Med Name: TAMSULOSIN CAP 0.4MG] 90 Capsule 0     Sig: TAKE 1 CAPSULE DAILY

## 2023-11-22 RX ORDER — TAMSULOSIN HYDROCHLORIDE 0.4 MG/1
0.4 CAPSULE ORAL DAILY
Qty: 90 CAPSULE | Refills: 0 | Status: SHIPPED | OUTPATIENT
Start: 2023-11-22 | End: 2024-02-14 | Stop reason: SDUPTHER

## 2023-12-04 DIAGNOSIS — Z79.899 ENCOUNTER FOR LONG-TERM (CURRENT) USE OF HIGH-RISK MEDICATION: ICD-10-CM

## 2023-12-04 DIAGNOSIS — I48.19 OTHER PERSISTENT ATRIAL FIBRILLATION (HCC): ICD-10-CM

## 2023-12-05 NOTE — TELEPHONE ENCOUNTER
CW          Received request via: Patient    Was the patient seen in the last year in this department? Yes    Does the patient have an active prescription (recently filled or refills available) for medication(s) requested? Yes.      [facility] the [facility] have [facility] and need 100 day supply (blood pressure, diabetes and cholesterol meds only)? Patient does not have SCP        Thank you    -Jorge SINGH  
Medication sent to preferred pharmacy as requested.    
18

## 2023-12-06 NOTE — TELEPHONE ENCOUNTER
Pt's spouse, Rosana called and lm. Requesting a refill of Hydroxyzine for their travel.     Rx pended    Have we ever prescribed this med? Yes.  If yes, what date? 11/17/22    Last OV: 10/09/23 with Carol ANAND    Next OV: No Pending appt.     DX:     Medications:   Requested Prescriptions     Pending Prescriptions Disp Refills    hydrOXYzine HCl (ATARAX) 25 MG Tab 10 Tablet 0     Sig: Take 1 Tablet by mouth every 8 hours as needed for Anxiety.

## 2023-12-08 DIAGNOSIS — E78.5 HYPERLIPIDEMIA LDL GOAL <70: ICD-10-CM

## 2023-12-08 DIAGNOSIS — E78.5 DYSLIPIDEMIA: ICD-10-CM

## 2023-12-08 DIAGNOSIS — R71.8 ELEVATED MCV: ICD-10-CM

## 2023-12-08 DIAGNOSIS — R63.4 WEIGHT LOSS, NON-INTENTIONAL: ICD-10-CM

## 2023-12-08 DIAGNOSIS — D72.9 NEUTROPHILIA: ICD-10-CM

## 2023-12-08 DIAGNOSIS — E03.9 ACQUIRED HYPOTHYROIDISM: ICD-10-CM

## 2023-12-08 DIAGNOSIS — R73.9 HYPERGLYCEMIA: ICD-10-CM

## 2023-12-08 DIAGNOSIS — I10 ESSENTIAL HYPERTENSION, BENIGN: ICD-10-CM

## 2023-12-08 DIAGNOSIS — D72.9 ABNORMAL NEUTROPHIL COUNT: ICD-10-CM

## 2023-12-08 DIAGNOSIS — N40.0 BENIGN PROSTATIC HYPERPLASIA, UNSPECIFIED WHETHER LOWER URINARY TRACT SYMPTOMS PRESENT: ICD-10-CM

## 2023-12-08 DIAGNOSIS — E55.9 VITAMIN D DEFICIENCY: ICD-10-CM

## 2023-12-08 DIAGNOSIS — R94.6 ABNORMAL THYROID FUNCTION TEST: ICD-10-CM

## 2023-12-08 DIAGNOSIS — Z12.5 SCREENING FOR MALIGNANT NEOPLASM OF PROSTATE: ICD-10-CM

## 2023-12-08 RX ORDER — HYDROXYZINE HYDROCHLORIDE 25 MG/1
25 TABLET, FILM COATED ORAL EVERY 8 HOURS PRN
Qty: 10 TABLET | Refills: 0 | Status: SHIPPED | OUTPATIENT
Start: 2023-12-08 | End: 2024-02-19 | Stop reason: SDUPTHER

## 2023-12-08 NOTE — TELEPHONE ENCOUNTER
Was the patient seen in the last year in this department? Yes   Does patient have an active prescription for medications requested? No   Received Request Via: Patient

## 2023-12-14 ENCOUNTER — HOSPITAL ENCOUNTER (OUTPATIENT)
Dept: LAB | Facility: MEDICAL CENTER | Age: 83
End: 2023-12-14
Attending: NURSE PRACTITIONER
Payer: COMMERCIAL

## 2023-12-14 DIAGNOSIS — R71.8 ELEVATED MCV: ICD-10-CM

## 2023-12-14 DIAGNOSIS — I10 ESSENTIAL HYPERTENSION, BENIGN: ICD-10-CM

## 2023-12-14 DIAGNOSIS — D72.9 NEUTROPHILIA: ICD-10-CM

## 2023-12-14 DIAGNOSIS — R94.6 ABNORMAL THYROID FUNCTION TEST: ICD-10-CM

## 2023-12-14 DIAGNOSIS — R63.4 WEIGHT LOSS, NON-INTENTIONAL: ICD-10-CM

## 2023-12-14 DIAGNOSIS — N40.0 BENIGN PROSTATIC HYPERPLASIA, UNSPECIFIED WHETHER LOWER URINARY TRACT SYMPTOMS PRESENT: ICD-10-CM

## 2023-12-14 DIAGNOSIS — D72.9 ABNORMAL NEUTROPHIL COUNT: ICD-10-CM

## 2023-12-14 DIAGNOSIS — E03.9 ACQUIRED HYPOTHYROIDISM: ICD-10-CM

## 2023-12-14 DIAGNOSIS — E78.5 HYPERLIPIDEMIA LDL GOAL <70: ICD-10-CM

## 2023-12-14 DIAGNOSIS — R73.9 HYPERGLYCEMIA: ICD-10-CM

## 2023-12-14 DIAGNOSIS — Z12.5 SCREENING FOR MALIGNANT NEOPLASM OF PROSTATE: ICD-10-CM

## 2023-12-14 DIAGNOSIS — E55.9 VITAMIN D DEFICIENCY: ICD-10-CM

## 2023-12-14 LAB
25(OH)D3 SERPL-MCNC: 55 NG/ML (ref 30–100)
ALBUMIN SERPL BCP-MCNC: 4.2 G/DL (ref 3.2–4.9)
ALBUMIN/GLOB SERPL: 1.6 G/DL
ALP SERPL-CCNC: 55 U/L (ref 30–99)
ALT SERPL-CCNC: 11 U/L (ref 2–50)
ANION GAP SERPL CALC-SCNC: 9 MMOL/L (ref 7–16)
AST SERPL-CCNC: 16 U/L (ref 12–45)
BASOPHILS # BLD AUTO: 0.5 % (ref 0–1.8)
BASOPHILS # BLD: 0.05 K/UL (ref 0–0.12)
BILIRUB SERPL-MCNC: 1.6 MG/DL (ref 0.1–1.5)
BUN SERPL-MCNC: 9 MG/DL (ref 8–22)
CALCIUM ALBUM COR SERPL-MCNC: 8.9 MG/DL (ref 8.5–10.5)
CALCIUM SERPL-MCNC: 9.1 MG/DL (ref 8.5–10.5)
CHLORIDE SERPL-SCNC: 100 MMOL/L (ref 96–112)
CHOLEST SERPL-MCNC: 159 MG/DL (ref 100–199)
CO2 SERPL-SCNC: 28 MMOL/L (ref 20–33)
CREAT SERPL-MCNC: 0.74 MG/DL (ref 0.5–1.4)
CREAT UR-MCNC: 75.33 MG/DL
EOSINOPHIL # BLD AUTO: 0.08 K/UL (ref 0–0.51)
EOSINOPHIL NFR BLD: 0.8 % (ref 0–6.9)
ERYTHROCYTE [DISTWIDTH] IN BLOOD BY AUTOMATED COUNT: 47.8 FL (ref 35.9–50)
EST. AVERAGE GLUCOSE BLD GHB EST-MCNC: 97 MG/DL
FASTING STATUS PATIENT QL REPORTED: NORMAL
GFR SERPLBLD CREATININE-BSD FMLA CKD-EPI: 89 ML/MIN/1.73 M 2
GLOBULIN SER CALC-MCNC: 2.7 G/DL (ref 1.9–3.5)
GLUCOSE SERPL-MCNC: 122 MG/DL (ref 65–99)
HBA1C MFR BLD: 5 % (ref 4–5.6)
HCT VFR BLD AUTO: 50.5 % (ref 42–52)
HDLC SERPL-MCNC: 70 MG/DL
HGB BLD-MCNC: 17.3 G/DL (ref 14–18)
IMM GRANULOCYTES # BLD AUTO: 0.07 K/UL (ref 0–0.11)
IMM GRANULOCYTES NFR BLD AUTO: 0.7 % (ref 0–0.9)
LDLC SERPL CALC-MCNC: 76 MG/DL
LYMPHOCYTES # BLD AUTO: 1.32 K/UL (ref 1–4.8)
LYMPHOCYTES NFR BLD: 12.7 % (ref 22–41)
MCH RBC QN AUTO: 33.6 PG (ref 27–33)
MCHC RBC AUTO-ENTMCNC: 34.3 G/DL (ref 32.3–36.5)
MCV RBC AUTO: 98.1 FL (ref 81.4–97.8)
MICROALBUMIN UR-MCNC: <1.2 MG/DL
MICROALBUMIN/CREAT UR: NORMAL MG/G (ref 0–30)
MONOCYTES # BLD AUTO: 0.58 K/UL (ref 0–0.85)
MONOCYTES NFR BLD AUTO: 5.6 % (ref 0–13.4)
NEUTROPHILS # BLD AUTO: 8.31 K/UL (ref 1.82–7.42)
NEUTROPHILS NFR BLD: 79.7 % (ref 44–72)
NRBC # BLD AUTO: 0 K/UL
NRBC BLD-RTO: 0 /100 WBC (ref 0–0.2)
PLATELET # BLD AUTO: 216 K/UL (ref 164–446)
PMV BLD AUTO: 9.5 FL (ref 9–12.9)
POTASSIUM SERPL-SCNC: 4 MMOL/L (ref 3.6–5.5)
PROT SERPL-MCNC: 6.9 G/DL (ref 6–8.2)
RBC # BLD AUTO: 5.15 M/UL (ref 4.7–6.1)
SODIUM SERPL-SCNC: 137 MMOL/L (ref 135–145)
T4 FREE SERPL-MCNC: 1.12 NG/DL (ref 0.93–1.7)
TRIGL SERPL-MCNC: 63 MG/DL (ref 0–149)
TSH SERPL DL<=0.005 MIU/L-ACNC: 5.37 UIU/ML (ref 0.38–5.33)
WBC # BLD AUTO: 10.4 K/UL (ref 4.8–10.8)

## 2023-12-14 PROCEDURE — 84153 ASSAY OF PSA TOTAL: CPT

## 2023-12-14 PROCEDURE — 84154 ASSAY OF PSA FREE: CPT

## 2023-12-14 PROCEDURE — 36415 COLL VENOUS BLD VENIPUNCTURE: CPT

## 2023-12-14 PROCEDURE — 82043 UR ALBUMIN QUANTITATIVE: CPT

## 2023-12-14 PROCEDURE — 84443 ASSAY THYROID STIM HORMONE: CPT

## 2023-12-14 PROCEDURE — 85025 COMPLETE CBC W/AUTO DIFF WBC: CPT

## 2023-12-14 PROCEDURE — 82306 VITAMIN D 25 HYDROXY: CPT

## 2023-12-14 PROCEDURE — 80053 COMPREHEN METABOLIC PANEL: CPT

## 2023-12-14 PROCEDURE — 83036 HEMOGLOBIN GLYCOSYLATED A1C: CPT

## 2023-12-14 PROCEDURE — 80061 LIPID PANEL: CPT

## 2023-12-14 PROCEDURE — 82570 ASSAY OF URINE CREATININE: CPT

## 2023-12-14 PROCEDURE — 84439 ASSAY OF FREE THYROXINE: CPT

## 2023-12-16 LAB
PSA FREE MFR SERPL: 25 %
PSA FREE SERPL-MCNC: 0.2 NG/ML
PSA SERPL-MCNC: 0.8 NG/ML (ref 0–4)

## 2023-12-27 ENCOUNTER — OFFICE VISIT (OUTPATIENT)
Dept: CARDIOLOGY | Facility: MEDICAL CENTER | Age: 83
End: 2023-12-27
Attending: INTERNAL MEDICINE
Payer: COMMERCIAL

## 2023-12-27 ENCOUNTER — TELEPHONE (OUTPATIENT)
Dept: CARDIOLOGY | Facility: MEDICAL CENTER | Age: 83
End: 2023-12-27
Payer: COMMERCIAL

## 2023-12-27 VITALS
HEART RATE: 75 BPM | BODY MASS INDEX: 24.51 KG/M2 | OXYGEN SATURATION: 95 % | WEIGHT: 191 LBS | SYSTOLIC BLOOD PRESSURE: 102 MMHG | HEIGHT: 74 IN | RESPIRATION RATE: 16 BRPM | DIASTOLIC BLOOD PRESSURE: 50 MMHG

## 2023-12-27 DIAGNOSIS — I48.19 OTHER PERSISTENT ATRIAL FIBRILLATION (HCC): ICD-10-CM

## 2023-12-27 DIAGNOSIS — D68.69 HYPERCOAGULABLE STATE DUE TO PERMANENT ATRIAL FIBRILLATION (HCC): Chronic | ICD-10-CM

## 2023-12-27 DIAGNOSIS — I50.1 HEART FAILURE, LEFT, WITH LVEF <=30% (HCC): ICD-10-CM

## 2023-12-27 DIAGNOSIS — I42.8 OTHER CARDIOMYOPATHIES (HCC): ICD-10-CM

## 2023-12-27 DIAGNOSIS — I42.9 CARDIOMYOPATHY, UNSPECIFIED TYPE (HCC): ICD-10-CM

## 2023-12-27 DIAGNOSIS — I10 ESSENTIAL HYPERTENSION, BENIGN: ICD-10-CM

## 2023-12-27 DIAGNOSIS — I49.9 CARDIAC ARRHYTHMIA, UNSPECIFIED CARDIAC ARRHYTHMIA TYPE: ICD-10-CM

## 2023-12-27 DIAGNOSIS — I48.21 HYPERCOAGULABLE STATE DUE TO PERMANENT ATRIAL FIBRILLATION (HCC): Chronic | ICD-10-CM

## 2023-12-27 LAB — EKG IMPRESSION: NORMAL

## 2023-12-27 PROCEDURE — 99214 OFFICE O/P EST MOD 30 MIN: CPT | Performed by: INTERNAL MEDICINE

## 2023-12-27 PROCEDURE — 3078F DIAST BP <80 MM HG: CPT | Performed by: INTERNAL MEDICINE

## 2023-12-27 PROCEDURE — 93005 ELECTROCARDIOGRAM TRACING: CPT | Performed by: INTERNAL MEDICINE

## 2023-12-27 PROCEDURE — 3074F SYST BP LT 130 MM HG: CPT | Performed by: INTERNAL MEDICINE

## 2023-12-27 PROCEDURE — 93010 ELECTROCARDIOGRAM REPORT: CPT | Performed by: INTERNAL MEDICINE

## 2023-12-27 PROCEDURE — 99213 OFFICE O/P EST LOW 20 MIN: CPT | Performed by: INTERNAL MEDICINE

## 2023-12-27 ASSESSMENT — FIBROSIS 4 INDEX: FIB4 SCORE: 1.9

## 2023-12-27 NOTE — PROGRESS NOTES
Chief Complaint   Patient presents with    New Patient     NP Dx:Other persistent atrial fibrillation       Subjective     Pedro Champion is a 83 y.o. male who presents today sent over for consideration of a primary prevention AICD.  Chronic atrial fibrillation.  No history of MI.  Fairly active.  Minimal heart failure symptoms.  On appropriate medications.  No history of syncope or presyncope.    Past Medical History:   Diagnosis Date    Atherosclerosis of aorta (HCC)     Basal cell carcinoma     Cardiomyopathy (HCC) 02/2020    Echocardiogram with LVEF 40-45%    Chronic anticoagulation     Depression     Dyslipidemia 4/13/2016    Dysphagia 10/5/2019    REBECCA (generalized anxiety disorder) 11/10/2021    Hearing loss     Heart failure, left, 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% ([facility]) 9/23/2019    Hypertension     Hypothyroid 9/23/2019    Insomnia     Malignant melanoma ([facility]) 5/4/2021    Mandibular fracture, open ([facility]) 8/28/2019    Memory loss 11/22/2021    Other persistent atrial fibrillation ([facility]) 8/28/2019    Parotid nodule 7/19/2013    Urinary retention 9/9/2019     IMO load March 2020    Vitamin D deficiency 9/27/2021     Past Surgical History:   Procedure Laterality Date    PA DENTAL SURGERY PROCEDURE Left 10/13/2019    Procedure: EXTRACTION, TOOTH;  Surgeon: Troy Dong D.D.S.;  Location: [facility];  Service: Oral Surgery    MANDIBLE FRACTURE ORIF Bilateral 10/13/2019    Procedure: ORIF, FRACTURE, MANDIBLE - FOR HARDWARE REMOVAL MANDIBLE, POSS ORIF;  Surgeon: Troy Dong D.D.S.;  Location: [facility];  Service: Oral Surgery    ORAL FISTULA REPAIR N/A 10/13/2019    Procedure: CLOSURE, FISTULA, MOUTH;  Surgeon: Troy Dong D.D.S.;  Location: [facility];  Service: Oral Surgery    SUBMANDIBLE ABSCESS INCISION AND DRAINAGE N/A 8/31/2019    Procedure: INCISION AND DRAINAGE FACIAL WOUND;  Surgeon: Troy Dong D.D.S.;  Location: [facility];   Service: Oral Surgery    INTERMAXILLARY FIXATATION N/A 8/31/2019    Procedure: FIXATION, MAXILLOMANDIBULAR. ORIF and MMF mandible fracture debridement of facial wounds extracton tooth #8;  Surgeon: Troy Dong D.D.S.;  Location: SUR[facility]ERY [facility];  Service: Oral Surgery    PAROTIDECTOMY SUPERFICIAL  7/19/2013    Performed by Mendy Stern M.D. at SURGERY [facility]    LUMBAR DECOMPRESSION  1988    INGUINAL HERNIA REPAIR BILATERAL  1960's     Family History   Problem Relation Age of Onset    Heart Disease Father 81        Sudden cardiac death probably coronary    Stroke Mother      Social History     Socioeconomic History    Marital status:      Spouse name: Not on file    Number of children: Not on file    Years of education: Not on file    Highest education level: Not on file   Occupational History    Not on file   Tobacco Use    Smoking status: Never     Passive exposure: Past (father was a smoker)    Smokeless tobacco: Never   Vaping Use    Vaping Use: Never used   Substance and Sexual Activity    Alcohol use: Yes     Alcohol/week: 8.4 - 12.6 oz     Types: 14 - 21 Glasses of wine per week     Comment: 3-4 glasses of wine a day    Drug use: Never    Sexual activity: Yes     Partners: Female   Other Topics Concern    Not on file   Social History Narrative    ** Merged History Encounter **          Social Determinants of Health     Financial Resource Strain: Not on file   Food Insecurity: Not on file   Transportation Needs: Not on file   Physical Activity: Not on file   Stress: Not on file   Social Connections: Not on file   Intimate Partner Violence: Not on file   Housing Stability: Not on file     Allergies   Allergen Reactions    Cefepime Hcl Rash     Hives, eosinophilia      Outpatient Encounter Medications as of 12/27/2023   Medication Sig Dispense Refill    hydrOXYzine HCl (ATARAX) 25 MG Tab Take 1 Tablet by mouth every 8 hours as needed for Anxiety. 10 Tablet 0    apixaban  "(ELIQUIS) 5mg Tab Take 1 Tablet by mouth 2 times a day. 180 Tablet 3    tamsulosin (FLOMAX) 0.4 MG capsule TAKE 1 CAPSULE DAILY 90 Capsule 0    digoxin (LANOXIN) 125 MCG Tab TAKE 1 TABLET BY MOUTH EVERY DAY 90 Tablet 3    carvedilol (COREG) 6.25 MG Tab TAKE 1 TABLET BY MOUTH TWICE A DAY WITH FOOD 180 Tablet 3    potassium chloride (MICRO-K) 10 MEQ capsule TAKE 1 CAPSULE BY MOUTH TWICE A  Capsule 3    sacubitril-valsartan (ENTRESTO) 49-51 MG Tab Take 1 Tablet by mouth 2 times a day. 180 Tablet 3    Empagliflozin (JARDIANCE) 10 MG Tab tablet Take 1 Tablet by mouth every day. 90 Tablet 3    furosemide (LASIX) 40 MG Tab Take 1 Tablet by mouth 1 time a day as needed (swelling). 90 Tablet 3    atorvastatin (LIPITOR) 40 MG Tab Take 1 Tablet by mouth every day. 90 Tablet 3    levothyroxine (SYNTHROID) 25 MCG Tab Take 1 Tablet by mouth every morning on an empty stomach. 100 Tablet 2    finasteride (PROSCAR) 5 MG Tab Take 1 Tablet by mouth every day. 90 Tablet 0    mirabegron ER (MYRBETRIQ) 25 MG TABLET SR 24 HR Myrbetriq 25 mg tablet,extended release   Take 1 tablet every day by oral route for 28 days.      sertraline (ZOLOFT) 25 MG tablet TAKE 2 TABLETS BY MOUTH EVERY DAY . APPT NEEDED FOR REFILLS 60 Tablet 1    fluticasone (FLONASE) 50 MCG/ACT nasal spray SPRAY 2 SPRAYS INTO EACH NOSTRIL EVERY DAY       No facility-administered encounter medications on file as of 12/27/2023.     ROS           Objective     /50 (BP Location: Left arm, Patient Position: Sitting, BP Cuff Size: Adult)   Pulse 75   Resp 16   Ht 1.88 m (6' 2\")   Wt 86.6 kg (191 lb)   SpO2 95%   BMI 24.52 kg/m²     Physical Exam  Constitutional:       Appearance: He is well-developed.   HENT:      Head: Normocephalic and atraumatic.   Cardiovascular:      Rate and Rhythm: Normal rate. Rhythm irregular.      Heart sounds: No murmur heard.     No friction rub. No gallop.   Pulmonary:      Effort: Pulmonary effort is normal.      Breath sounds: " Normal breath sounds.   Abdominal:      Palpations: Abdomen is soft.   Musculoskeletal:         General: Normal range of motion.      Cervical back: Normal range of motion and neck supple.   Skin:     General: Skin is warm and dry.   Neurological:      Mental Status: He is alert and oriented to person, place, and time.   Psychiatric:         Behavior: Behavior normal.         Thought Content: Thought content normal.         Judgment: Judgment normal.                Assessment & Plan     1. Other persistent atrial fibrillation (HCC)  EKG      2. Cardiomyopathy, unspecified type (HCC)        3. Essential hypertension, benign        4. 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 Hypercoagulable state due to permanent atrial fibrillation (HCC)            Medical Decision Making: Today's Assessment/Status/Plan:   1.  Nonischemic cardiomyopathy for primary prevention AICD.  Discussed risk and benefits.  Patient wishes to proceed.  2.  Anticoagulation hold Eliquis 1 day.  The risk, benefits, and alternatives to ICD placement were discussed in great detail, specific risks mentioned including bleeding, infection, cardiac perforation with possible tamponade requiring pericardiocentesis or open heart surgery.  In addition the possibility of lead dislodgment (2-3%), inappropriate shocks, pneumothorax (3%), hemothorax were discussed. Also mentioned were the possibility of death, stroke, and myocardial infarction. The patient verbalized understanding of these potential complications and wishes to proceed with this procedure.

## 2023-12-28 NOTE — TELEPHONE ENCOUNTER
Recvd voice message from Rosana. Called and LM on patient's answering machine requesting a call back to schedule this procedure.

## 2023-12-28 NOTE — TELEPHONE ENCOUNTER
Recvd call from Rosana Vasquez has decided to not proceed with this procedure at this time. She will call me back to reschedule if he changes his mind.    FYI to Dr. Morrow

## 2023-12-28 NOTE — TELEPHONE ENCOUNTER
Patient scheduled for ICD insert on 1-5-24 with Dr. Morrow. Patient has been instructed to check in at 11:00 for 1:00 case time. Hold Eliquis 1 day before. Message sent to authorizations. HUMBERTO Bailon with [employer]

## 2023-12-28 NOTE — TELEPHONE ENCOUNTER
Order changed per below:  Karli Haley, Med Ass't  You1 hour ago (11:20 AM)     Can you please change this to an ICD insert not a BiV ICD    Thank You,  Karli

## 2023-12-29 DIAGNOSIS — E03.9 ACQUIRED HYPOTHYROIDISM: ICD-10-CM

## 2023-12-29 RX ORDER — LEVOTHYROXINE SODIUM 0.03 MG/1
25 TABLET ORAL
Qty: 100 TABLET | Refills: 0 | Status: SHIPPED | OUTPATIENT
Start: 2023-12-29 | End: 2024-02-14 | Stop reason: SDUPTHER

## 2023-12-29 NOTE — TELEPHONE ENCOUNTER
Received request via: Pharmacy    Was the patient seen in the last year in this department? Yes    Does the patient have an active prescription (recently filled or refills available) for medication(s) requested? yes    Does the patient have [facility] and need 100 day supply (blood pressure, diabetes and cholesterol meds only)? Patient does not have SCP  Requested Prescriptions     Pending Prescriptions Disp Refills    levothyroxine (SYNTHROID) 25 MCG Tab 100 Tablet 2     Sig: Take 1 Tablet by mouth every morning on an empty stomach.

## 2024-01-15 DIAGNOSIS — I50.1 HEART FAILURE, LEFT, WITH LVEF <=30% (HCC): ICD-10-CM

## 2024-01-16 RX ORDER — SACUBITRIL AND VALSARTAN 49; 51 MG/1; MG/1
TABLET, FILM COATED ORAL
Qty: 180 TABLET | Refills: 3 | Status: SHIPPED | OUTPATIENT
Start: 2024-01-16 | End: 2024-01-22 | Stop reason: SDUPTHER

## 2024-01-16 NOTE — TELEPHONE ENCOUNTER
Is the patient due for a refill? Yes    Was the patient seen the past year? Yes    Date of last office visit: 12/27/23    Does the patient have an upcoming appointment?  No      Provider to refill:DS    Does the patients insurance require a 100 day supply?  No

## 2024-01-22 ENCOUNTER — TELEPHONE (OUTPATIENT)
Dept: CARDIOLOGY | Facility: MEDICAL CENTER | Age: 84
End: 2024-01-22
Payer: COMMERCIAL

## 2024-01-22 DIAGNOSIS — I50.1 HEART FAILURE, LEFT, WITH LVEF <=30% (HCC): ICD-10-CM

## 2024-01-22 RX ORDER — SACUBITRIL AND VALSARTAN 49; 51 MG/1; MG/1
1 TABLET, FILM COATED ORAL 2 TIMES DAILY
Qty: 180 TABLET | Refills: 3 | Status: SHIPPED | OUTPATIENT
Start: 2024-01-22 | End: 2024-01-23

## 2024-01-22 RX ORDER — SACUBITRIL AND VALSARTAN 49; 51 MG/1; MG/1
1 TABLET, FILM COATED ORAL 2 TIMES DAILY
Qty: 60 TABLET | Refills: 0 | Status: SHIPPED | OUTPATIENT
Start: 2024-01-22 | End: 2024-01-24 | Stop reason: SDUPTHER

## 2024-01-22 NOTE — TELEPHONE ENCOUNTER
Phone Number Called: [phone]    Call outcome:  Spoke to Mar    Message: Called to follow up with patient wife about which medication is needed. They need the Entresto sent. Updated prescriptions sent.

## 2024-01-22 NOTE — TELEPHONE ENCOUNTER
[place]    Caller: Mar Champion (wifey)    Topic/issue: MEDICATION MANAGEMENT    Per Jin Ramires has changed their electronic prescription address and it needs to be resent to the new [facility] listed below:    [facility] - [address] [[id number]]     However since the initial script was never received Pedro will need a short fill at the local pharmacy:    [id number] - [place], [place] - [address] [[id number]]    Please advise.    Thank you,  Collins MEI     Callback Number: [phone] (home)

## 2024-01-23 ENCOUNTER — OFFICE VISIT (OUTPATIENT)
Dept: MEDICAL GROUP | Facility: MEDICAL CENTER | Age: 84
End: 2024-01-23
Payer: COMMERCIAL

## 2024-01-23 VITALS
HEART RATE: 78 BPM | TEMPERATURE: 97.7 F | WEIGHT: 191 LBS | BODY MASS INDEX: 24.51 KG/M2 | SYSTOLIC BLOOD PRESSURE: 104 MMHG | OXYGEN SATURATION: 97 % | DIASTOLIC BLOOD PRESSURE: 80 MMHG | HEIGHT: 74 IN

## 2024-01-23 DIAGNOSIS — R41.3 SHORT-TERM MEMORY LOSS: ICD-10-CM

## 2024-01-23 DIAGNOSIS — E78.5 HYPERLIPIDEMIA LDL GOAL <70: ICD-10-CM

## 2024-01-23 DIAGNOSIS — E55.9 VITAMIN D DEFICIENCY: ICD-10-CM

## 2024-01-23 DIAGNOSIS — D72.9 ABNORMAL NEUTROPHIL COUNT: ICD-10-CM

## 2024-01-23 DIAGNOSIS — R79.89 ELEVATED LFTS: ICD-10-CM

## 2024-01-23 DIAGNOSIS — R71.8 ELEVATED MCV: ICD-10-CM

## 2024-01-23 DIAGNOSIS — R73.9 HYPERGLYCEMIA: ICD-10-CM

## 2024-01-23 DIAGNOSIS — E03.9 ACQUIRED HYPOTHYROIDISM: ICD-10-CM

## 2024-01-23 DIAGNOSIS — R94.6 ABNORMAL THYROID FUNCTION TEST: ICD-10-CM

## 2024-01-23 PROCEDURE — 99214 OFFICE O/P EST MOD 30 MIN: CPT | Performed by: NURSE PRACTITIONER

## 2024-01-23 PROCEDURE — 3079F DIAST BP 80-89 MM HG: CPT | Performed by: NURSE PRACTITIONER

## 2024-01-23 PROCEDURE — 3074F SYST BP LT 130 MM HG: CPT | Performed by: NURSE PRACTITIONER

## 2024-01-23 RX ORDER — SACUBITRIL AND VALSARTAN 49; 51 MG/1; MG/1
1 TABLET, FILM COATED ORAL 2 TIMES DAILY
Qty: 180 TABLET | Refills: 3 | Status: SHIPPED | OUTPATIENT
Start: 2024-01-23 | End: 2024-01-23

## 2024-01-23 RX ORDER — DONEPEZIL HYDROCHLORIDE 5 MG/1
5 TABLET, FILM COATED ORAL NIGHTLY
Qty: 30 TABLET | Refills: 1 | Status: SHIPPED | OUTPATIENT
Start: 2024-01-23 | End: 2024-02-19 | Stop reason: SDUPTHER

## 2024-01-23 ASSESSMENT — FIBROSIS 4 INDEX: FIB4 SCORE: 1.9

## 2024-01-23 NOTE — PROGRESS NOTES
Subjective:     Pedro Champion is a 83 y.o. male who presents with ST memory loss.    HPI:   Seen in f/u for  ST memory loss.  He is feeling OK.   He is seen with his wife today.  She reports that he is having worsening ST memory.   Pt agrees that he has poor ST memory.  He will reask questions freq.  Wife is now controlling his medications.    He is stable on levothyroxine for his hypothyroidism.  Taking med approp.  No s/e to med.   Reviewed lab with pt and wife.  GFR, PSA, vitamin D, T4, LP, a1c, alb/cr ratio is wnl.  He is stable on otc vitamin D3 supplement. He was previously low but now finally up at goal.  T4 is wnl but TSH very mildly elevated.    He is stable and well controlled with his chol on lipitor.  Taking med approp.  No s/e to med.  He is not on healthy diet all the time but they try to follow low fat diet.  He does not exercise.  His followed by cardiac for his CMP and afib.    His a1c has been normal long term. He is not on med for DM.  However his glucose is chronically elevated on CMP.    CBC is wnl except MCV mildly elevated.  That is down from 100.2 to 98.1.  he also has chronic elevated neutrophils and decreased lymphs.  No sx of infection.  These abn are chronic.   CMP is wnl except for glucose up at 122.  Has been higher in past despite a1c wnl.  Also his t. Bili is mildly up to 1.6. previous wnl.      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 30 Tablet 1    sacubitril-valsartan (ENTRESTO) 49-51 MG Tab Take 1 Tablet by mouth 2 times a day. 60 Tablet 0    levothyroxine (SYNTHROID) 25 MCG Tab Take 1 Tablet by mouth every morning on an empty stomach. 100 Tablet 0    hydrOXYzine HCl (ATARAX) 25 MG Tab Take 1 Tablet by mouth every 8 hours as needed for Anxiety. 10 Tablet 0    apixaban (ELIQUIS) 5mg Tab Take 1 Tablet by mouth 2 times a day. 180 Tablet 3    tamsulosin (FLOMAX) 0.4 MG capsule TAKE 1 CAPSULE DAILY 90 Capsule 0    digoxin (LANOXIN) 125 MCG Tab TAKE 1 TABLET BY MOUTH EVERY DAY 90 Tablet 3    carvedilol (COREG) 6.25 MG Tab TAKE 1 TABLET BY MOUTH TWICE A DAY WITH FOOD 180 Tablet 3    potassium chloride (MICRO-K) 10 MEQ capsule TAKE 1 CAPSULE BY MOUTH TWICE A  Capsule 3    Empagliflozin (JARDIANCE) 10 MG Tab tablet Take 1 Tablet by mouth every day. 90 Tablet 3    furosemide (LASIX) 40 MG Tab Take 1 Tablet by mouth 1 time a day as needed (swelling). 90 Tablet 3    atorvastatin (LIPITOR) 40 MG Tab Take 1 Tablet by mouth every day. 90 Tablet 3    finasteride (PROSCAR) 5 MG Tab Take 1 Tablet by mouth every day. 90 Tablet 0    sertraline (ZOLOFT) 25 MG tablet TAKE 2 TABLETS BY MOUTH EVERY DAY . APPT NEEDED FOR REFILLS 60 Tablet 1     No current facility-administered medications for this visit.       Allergies   Allergen Reactions    Cefepime Hcl Rash     Hives, eosinophilia        ROS  Constitutional: Negative. Negative for fever, chills, weight loss, malaise/fatigue and  "diaphoresis.   HENT: Negative. Negative for hearing loss, ear pain, nosebleeds, congestion, sore throat, neck pain, tinnitus and ear discharge.   Respiratory: Negative. Negative for cough, hemoptysis, sputum production, shortness of breath, wheezing and stridor.   Cardiovascular: Negative. Negative for chest pain, palpitations, orthopnea, claudication, leg swelling and PND.   Gastrointestinal: Denies nausea, vomiting, diarrhea, constipation, heartburn, melena or hematochezia.  Genitourinary: Denies dysuria, hematuria, urinary incontinence, frequency or urgency.        Objective:     /80 (BP Location: Left arm, Patient Position: Sitting, BP Cuff Size: Adult)   Pulse 78   Temp 36.5 °C (97.7 °F) (Temporal)   Ht 1.88 m (6' 2\")   Wt 86.6 kg (191 lb)   SpO2 97%  Body mass index is 24.52 kg/m².    Physical Exam:  Vitals reviewed.  Constitutional: Oriented to person, place, and time. appears well-developed and well-nourished. No distress.   Cardiovascular: Normal rate, regular rhythm, normal heart sounds and intact distal pulses. Exam reveals no gallop and no friction rub. No murmur heard. No carotid bruits.   Pulmonary/Chest: Effort normal and breath sounds normal. No stridor. No respiratory distress. no wheezes or rales. exhibits no tenderness.   Musculoskeletal: Normal range of motion. exhibits no edema. aye pedal pulses 2+.  Lymphadenopathy: No cervical or supraclavicular adenopathy.   Neurological: Alert and oriented to person, place, and time. exhibits normal muscle tone.  Skin: Skin is warm and dry. No diaphoresis.   Psychiatric: Normal mood and affect. Behavior is normal.   MMSE today 21/30     Assessment and Plan:     The following treatment plan was discussed:    1. Acquired hypothyroidism  TSH    FREE THYROXINE    no change in levothyroxine dose since very mildly abn TSH. tj lab 2 mo. f/u for review      2. Abnormal thyroid function test  TSH    FREE THYROXINE    no chg in med dose. plan: tj lab 2 " mo. f/u for lab review      3. Short-term memory loss  donepezil (ARICEPT) 5 MG Tab    start aricept. f/u 1 mo for poss increase & add namenda.  repeat MMSE      4. Elevated MCV      MCV mildly elevated.  will monitor since no sx      5. Abnormal neutrophil count      chronic elevated w/o sx. consider e-consult hematology.      6. Hyperlipidemia LDL goal <70      stable and well controlled on lipitor. followed by cardiac      7. Vitamin D deficiency      stable and well controlled on otc supplement.      8. Hyperglycemia      chronic elevated glucose on CMP but a1c is wnl      9. Elevated LFTs      newly elevated t. bili.  does drink wine almost daily.  will continue to monitor.            Followup: Return in about 4 weeks (around 2/20/2024), or for sx eval,  poss increase aricept & add namenda.   [Nl] : Integumentary

## 2024-01-24 RX ORDER — SACUBITRIL AND VALSARTAN 49; 51 MG/1; MG/1
1 TABLET, FILM COATED ORAL 2 TIMES DAILY
Qty: 60 TABLET | Refills: 0 | Status: SHIPPED | OUTPATIENT
Start: 2024-01-24 | End: 2024-02-15 | Stop reason: SDUPTHER

## 2024-01-25 NOTE — TELEPHONE ENCOUNTER
Phone Number Called: [phone]     Call outcome:  Spoke to patient's wife - Rosana    Message: Called to inform patient that [facility]. RN advised patient to call if still not able to receive from [facility]. Patient's wife verbalized understanding. No further questions at this time.

## 2024-01-25 NOTE — TELEPHONE ENCOUNTER
[place]    Caller: Mar Champion    Topic/issue: MEDICATION MANAGEMENT    Per Mar;    [facility] for the ENTRESTO. Pedro needs a 30 day supply for this medication while they wait for the full script from [facility]. Please advise.    Thank you,  Collins MEI    Callback Number: [phone] (home)

## 2024-01-30 DIAGNOSIS — I48.19 OTHER PERSISTENT ATRIAL FIBRILLATION (HCC): ICD-10-CM

## 2024-01-30 RX ORDER — DIGOXIN 125 MCG
125 TABLET ORAL
Qty: 90 TABLET | Refills: 3 | Status: SHIPPED | OUTPATIENT
Start: 2024-01-30 | End: 2024-01-30

## 2024-01-30 RX ORDER — DIGOXIN 125 MCG
125 TABLET ORAL
Qty: 90 TABLET | Refills: 3 | Status: SHIPPED | OUTPATIENT
Start: 2024-01-30

## 2024-01-30 NOTE — TELEPHONE ENCOUNTER
----- Message from Catherine Ramos Med Ass't sent at 2024  9:05 AM PST -----  Regarding: FW: After hours    ----- Message -----  From: Linda Aguilar  Sent: 2024  12:27 PM PST  To: Kojo Michael  Subject: After hours                                      PATIENT NAME: Aleksandr Champion  CALLER NAME: Poornima  REASON FOR CALL: Needs a refill on Digoxin. Mail away Pharmacy that is in file.   PHONE NUMBER: [phone]  CARDIO PROVIDER: Dr. Lorenzo  : 1940  MRN: [id number]  ADVISED 1-2 BUSINESS DAYS FOR CALL BACK Y/N: Y

## 2024-01-30 NOTE — TELEPHONE ENCOUNTER
DS        Caller: Rosana(pt's wife)    Topic/issue: Patient's wife was asking for the digoxin (LANOXIN) 125 MCG Tab medication to be sent to another office instead of [facility]. She was asking it be [facility]    Callback Number: [phone]      Thank you    -Jorge SINGH

## 2024-02-07 ENCOUNTER — TELEPHONE (OUTPATIENT)
Dept: CARDIOLOGY | Facility: MEDICAL CENTER | Age: 84
End: 2024-02-07
Payer: COMMERCIAL

## 2024-02-07 DIAGNOSIS — I50.1 HEART FAILURE, LEFT, WITH LVEF <=30% (HCC): ICD-10-CM

## 2024-02-07 RX ORDER — POTASSIUM CHLORIDE 750 MG/1
10 CAPSULE, EXTENDED RELEASE ORAL 2 TIMES DAILY
Qty: 180 CAPSULE | Refills: 2 | Status: SHIPPED | OUTPATIENT
Start: 2024-02-07

## 2024-02-07 NOTE — TELEPHONE ENCOUNTER
[place]    Caller: Rosana (Wife)    Topic/issue: Patient is having a hard time getting [facility] from [facility]- Please send a new prescription for the potassium chloride (MICRO-K) 10 MEQ capsule to [facility] on [place] so they can get the medication and refill there, please also update the system to reflect [facility] in [place] as the only pharmacy     Callback Number: [phone]    Thank You   Sonia GUERRA

## 2024-02-08 NOTE — TELEPHONE ENCOUNTER
Phone Number Called: [phone]     Call outcome: Spoke to patient's wife - Rosana     Message: Called to inquire about patient's prescription. Patient's wife needs prescription for potassium sent to [facility] on [address] as the mail order pharmacy is behind. Prescription sent to [facility]. No further questions at this time.

## 2024-02-14 DIAGNOSIS — N40.0 BENIGN PROSTATIC HYPERPLASIA, UNSPECIFIED WHETHER LOWER URINARY TRACT SYMPTOMS PRESENT: ICD-10-CM

## 2024-02-14 DIAGNOSIS — I10 ESSENTIAL HYPERTENSION, BENIGN: ICD-10-CM

## 2024-02-14 DIAGNOSIS — E03.9 ACQUIRED HYPOTHYROIDISM: ICD-10-CM

## 2024-02-14 RX ORDER — CARVEDILOL 6.25 MG/1
6.25 TABLET ORAL 2 TIMES DAILY WITH MEALS
Qty: 180 TABLET | Refills: 3 | Status: SHIPPED | OUTPATIENT
Start: 2024-02-14 | End: 2024-02-15 | Stop reason: SDUPTHER

## 2024-02-14 NOTE — TELEPHONE ENCOUNTER
Is the patient due for a refill? Yes    Was the patient seen the past year? Yes    Date of last office visit: 12/27/2023    Does the patient have an upcoming appointment?  No   If yes, When?     Provider to refill:DS    Does the patients insurance require a 100 day supply?  No

## 2024-02-15 ENCOUNTER — TELEPHONE (OUTPATIENT)
Dept: MEDICAL GROUP | Facility: MEDICAL CENTER | Age: 84
End: 2024-02-15
Payer: COMMERCIAL

## 2024-02-15 ENCOUNTER — TELEPHONE (OUTPATIENT)
Dept: CARDIOLOGY | Facility: MEDICAL CENTER | Age: 84
End: 2024-02-15
Payer: COMMERCIAL

## 2024-02-15 DIAGNOSIS — I10 ESSENTIAL HYPERTENSION, BENIGN: ICD-10-CM

## 2024-02-15 DIAGNOSIS — I50.1 HEART FAILURE, LEFT, WITH LVEF <=30% (HCC): ICD-10-CM

## 2024-02-15 RX ORDER — SACUBITRIL AND VALSARTAN 49; 51 MG/1; MG/1
1 TABLET, FILM COATED ORAL 2 TIMES DAILY
Qty: 60 TABLET | Refills: 0 | Status: SHIPPED | OUTPATIENT
Start: 2024-02-15

## 2024-02-15 RX ORDER — LEVOTHYROXINE SODIUM 0.03 MG/1
25 TABLET ORAL
Qty: 30 TABLET | Refills: 0 | Status: SHIPPED | OUTPATIENT
Start: 2024-02-15 | End: 2024-02-19 | Stop reason: SDUPTHER

## 2024-02-15 RX ORDER — TAMSULOSIN HYDROCHLORIDE 0.4 MG/1
0.4 CAPSULE ORAL DAILY
Qty: 90 CAPSULE | Refills: 3 | Status: SHIPPED | OUTPATIENT
Start: 2024-02-15 | End: 2024-02-19 | Stop reason: SDUPTHER

## 2024-02-15 RX ORDER — CARVEDILOL 6.25 MG/1
6.25 TABLET ORAL 2 TIMES DAILY WITH MEALS
Qty: 180 TABLET | Refills: 3 | Status: SHIPPED | OUTPATIENT
Start: 2024-02-15

## 2024-02-15 RX ORDER — FINASTERIDE 5 MG/1
5 TABLET, FILM COATED ORAL
Qty: 100 TABLET | Refills: 3 | Status: SHIPPED | OUTPATIENT
Start: 2024-02-15 | End: 2024-02-19 | Stop reason: SDUPTHER

## 2024-02-15 NOTE — TELEPHONE ENCOUNTER
Received request via: Pharmacy    Was the patient seen in the last year in this department? Yes    Does the patient have an active prescription (recently filled or refills available) for medication(s) requested? No    Pharmacy Name[facility]    Does the patient have [facility] home [facility] and need 100 day supply (blood pressure, diabetes and cholesterol meds only)? Patient does not have SCP

## 2024-02-15 NOTE — TELEPHONE ENCOUNTER
Received request via: Pharmacy    Was the patient seen in the last year in this department? Yes    Does the patient have an active prescription (recently filled or refills available) for medication(s) requested? No    Pharmacy Name: [facility]    Does the patient have custodial Plus and need 100 day supply (blood pressure, diabetes and cholesterol meds only)? Patient does not have SCP

## 2024-02-15 NOTE — TELEPHONE ENCOUNTER
[place]  Caller: Pedro Ochoa Akira     Topic/issue: Patient's new preferred pharmacy is [facility]. Patient needs to have all his prescriptions sent here. He would like to have the other pharmacies taken off his chart as well. He needs the carvedilol promptly.    Medication: carvedilol (COREG) 6.25 MG Tab     Medication Left: Out    Preferred Pharmacy: [facility] on [address]    Callback Number: [phone]     Thank you,  Vivian RENNER

## 2024-02-16 NOTE — TELEPHONE ENCOUNTER
MARIA ELENA Morrison is a 74 y.o. female being seen today for   Chief Complaint   Patient presents with    Hypertension    Abdominal Pain    Heartburn   . she states that she is overall doing well. Has started chemotherapy which she thinks is going okay. Admits that the low energy that comes with the treatments has been difficult, but understands that it comes with it so not dwelling on this too much. Tries to stay busy around the house, but paces her activities. Mainly here because her blood sugar has been persistently elevated, was told by oncology to follow-up with primary care. Has to take steroids for many days after each treatment, likely cause of elevated blood sugars. Previous A1c was 6.7, states that she had been taking half of metformin dose twice daily. Was consistently having blood sugar readings in the 200s so started taking 875 mg of metformin twice daily. Did see a slight improvement in blood sugars, 280s now. Fasting blood sugars have been in the 180s to the 190s. Still taking 5 mg of glyburide daily. Requesting an order for teltoridine d/t overactive bladder, has taken \"teltoridina\" 2 mg with much relief in the past.   Did have her hernia surgery, which she feels was successful. 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 In April, had a cortisone injection to bilateral knees, states that the discomfort returned after a couple of months. Wonders if she should have repeat injections. Past Medical History:   Diagnosis Date    Arthritis     Diabetes ([facility]     Hypertension     Lymphoma ([facility] 2022    Right leg         ROS  Patient states that she is feeling well. Denies complaints of chest pain, shortness of breath, swelling of legs, dizziness or weakness. she denies nausea, vomiting or diarrhea. Current Outpatient Medications   Medication Sig    glyBURIDE (DIABETA) 1.25 mg tablet Take 1 Tablet by mouth Daily (before breakfast).     oxybutynin (DITROPAN) 5 Phone Number Called: [phone]    Call outcome: Left detailed message for patient. Informed to call back with any additional questions.    Message: Hi my name is Luana, I am calling back from [facility] to get clarification on what exactly medications, they needed us to send to over [facility]    mg tablet Take 0.5 Tablets by mouth two (2) times a day for 60 days.  lidocaine-prilocaine (EMLA) topical cream Apply to port 1/2 hour prior to needle insertion    prochlorperazine (COMPAZINE) 10 mg tablet Take 0.5 Tablets by mouth every six (6) hours as needed for Nausea or Vomiting for up to 180 days. Indications: nausea and vomiting caused by cancer drugs (Patient not taking: Reported on 6/28/2022)    predniSONE (DELTASONE) 10 mg tablet Take 70 mg by mouth daily for 30 days. Indications: diffuse large B-cell lymphoma (Patient not taking: Reported on 6/28/2022)    therapeutic multivitamin [facility]) tablet Take 1 Tablet by mouth daily.  losartan (COZAAR) 50 mg tablet Take 1 Tablet by mouth two (2) times a day.  verapamiL (CALAN) 80 mg tablet Take 1 Tablet by mouth two (2) times a day.  metFORMIN (GLUCOPHAGE) 850 mg tablet Take 1 Tablet by mouth two (2) times a day.  OTHER Take 5 mg by mouth nightly. glibenclamida - diabetes from [place]    OTHER Take 2 mg by mouth nightly. tolterodina - bladder - from [place] (Patient not taking: Reported on 6/28/2022)    indomethacin (INDOCIN) 25 mg capsule Take 25 mg by mouth two (2) times a day. No current facility-administered medications for this visit. Facility-Administered Medications Ordered in Other Visits   Medication Dose Route Frequency    0.9% sodium chloride infusion  20 mL/hr IntraVENous CONTINUOUS    heparin (porcine) 100 unit/mL injection 500 Units  500 Units InterCATHeter PRN       PE  Visit Vitals  /80 (BP 1 Location: Left arm, BP Patient Position: Sitting, BP Cuff Size: Adult)   Pulse 87   Temp 98.1 °F (36.7 °C) (Temporal)   Resp 20   Ht 4' 11\" (1.499 m)   Wt 164 lb (74.4 kg)   SpO2 98%   BMI 33.12 kg/m²        Alert and oriented with normal mood and affect. she is well developed and well nourished . Lungs are clear without wheezing. Heart rate is regular without murmurs or gallops. There is no lower extremity edema. Assessment and Plan:    After much discussion, shared decision made to add glyburide 1.25 mg daily to DM regimen. Instructed to monitor BS at least BID, aware to report and incidences of s/s of hypoglycemia. Teltoridine expensive and with many potential medication interactions, ditropan 5 mg started. D/t frequent medical appointments, will have telehealth visit in 3 weeks for f/u of OAB and DM. Encouraged to schedule f/u appointment with Dr. Shreya Yu for repeat cortisone injections to knees. Advised to call with any questions or concerns. ICD-10-CM ICD-9-CM    1. Controlled type 2 diabetes mellitus with hyperglycemia, without long-term current use of insulin (HCC)  E11.65 250.80 AMB POC HEMOGLOBIN A1C     790.29    2.  Hematuria, unspecified type  R31.9 599.70 CANCELED: AMB POC URINALYSIS DIP STICK AUTO W/ MICRO      CANCELED: CULTURE, URINE           [place], NP

## 2024-02-19 DIAGNOSIS — I50.1 HEART FAILURE, LEFT, WITH LVEF <=30% (HCC): ICD-10-CM

## 2024-02-19 DIAGNOSIS — Z79.899 ENCOUNTER FOR LONG-TERM (CURRENT) USE OF HIGH-RISK MEDICATION: ICD-10-CM

## 2024-02-19 DIAGNOSIS — N40.0 BENIGN PROSTATIC HYPERPLASIA, UNSPECIFIED WHETHER LOWER URINARY TRACT SYMPTOMS PRESENT: ICD-10-CM

## 2024-02-19 DIAGNOSIS — E03.9 ACQUIRED HYPOTHYROIDISM: ICD-10-CM

## 2024-02-19 DIAGNOSIS — I48.19 OTHER PERSISTENT ATRIAL FIBRILLATION (HCC): ICD-10-CM

## 2024-02-19 DIAGNOSIS — R53.83 OTHER FATIGUE: ICD-10-CM

## 2024-02-19 DIAGNOSIS — E78.5 DYSLIPIDEMIA: ICD-10-CM

## 2024-02-19 DIAGNOSIS — R41.3 SHORT-TERM MEMORY LOSS: ICD-10-CM

## 2024-02-19 RX ORDER — DONEPEZIL HYDROCHLORIDE 5 MG/1
5 TABLET, FILM COATED ORAL NIGHTLY
Qty: 30 TABLET | Refills: 1 | Status: SHIPPED | OUTPATIENT
Start: 2024-02-19

## 2024-02-19 RX ORDER — LEVOTHYROXINE SODIUM 0.03 MG/1
25 TABLET ORAL
Qty: 30 TABLET | Refills: 0 | Status: SHIPPED | OUTPATIENT
Start: 2024-02-19

## 2024-02-19 RX ORDER — FINASTERIDE 5 MG/1
5 TABLET, FILM COATED ORAL
Qty: 100 TABLET | Refills: 3 | Status: SHIPPED | OUTPATIENT
Start: 2024-02-19

## 2024-02-19 RX ORDER — HYDROXYZINE HYDROCHLORIDE 25 MG/1
25 TABLET, FILM COATED ORAL EVERY 8 HOURS PRN
Qty: 10 TABLET | Refills: 0 | Status: SHIPPED | OUTPATIENT
Start: 2024-02-19

## 2024-02-19 RX ORDER — ATORVASTATIN CALCIUM 40 MG/1
40 TABLET, FILM COATED ORAL DAILY
Qty: 90 TABLET | Refills: 3 | Status: SHIPPED | OUTPATIENT
Start: 2024-02-19

## 2024-02-19 RX ORDER — TAMSULOSIN HYDROCHLORIDE 0.4 MG/1
0.4 CAPSULE ORAL DAILY
Qty: 90 CAPSULE | Refills: 3 | Status: SHIPPED | OUTPATIENT
Start: 2024-02-19

## 2024-02-19 RX ORDER — EMPAGLIFLOZIN 10 MG/1
10 TABLET, FILM COATED ORAL DAILY
Qty: 90 TABLET | Refills: 3 | Status: SHIPPED | OUTPATIENT
Start: 2024-02-19

## 2024-02-20 DIAGNOSIS — N40.0 BENIGN PROSTATIC HYPERPLASIA, UNSPECIFIED WHETHER LOWER URINARY TRACT SYMPTOMS PRESENT: ICD-10-CM

## 2024-02-20 DIAGNOSIS — E03.9 ACQUIRED HYPOTHYROIDISM: ICD-10-CM

## 2024-02-20 RX ORDER — TAMSULOSIN HYDROCHLORIDE 0.4 MG/1
0.4 CAPSULE ORAL DAILY
Qty: 90 CAPSULE | Refills: 0 | OUTPATIENT
Start: 2024-02-20

## 2024-02-20 RX ORDER — FINASTERIDE 5 MG/1
5 TABLET, FILM COATED ORAL
Qty: 100 TABLET | Refills: 0 | OUTPATIENT
Start: 2024-02-20

## 2024-02-20 RX ORDER — LEVOTHYROXINE SODIUM 0.03 MG/1
25 TABLET ORAL
Qty: 30 TABLET | Refills: 0 | OUTPATIENT
Start: 2024-02-20

## 2024-02-20 NOTE — TELEPHONE ENCOUNTER
Received request via: Patient    Was the patient seen in the last year in this department? Yes    Does the patient have an active prescription (recently filled or refills available) for medication(s) requested? No    Pharmacy Name: [facility] pharmacy     Does the patient have [facility] Plus and need 100 day supply (blood pressure, diabetes and cholesterol meds only)? Patient does not have SCP    Patient comment: Please stop any rx request sent to [facility] pharmacy (ONLY [facility]) for all recent and future rx request.

## 2024-02-26 ENCOUNTER — APPOINTMENT (OUTPATIENT)
Dept: MEDICAL GROUP | Facility: MEDICAL CENTER | Age: 84
End: 2024-02-26
Payer: COMMERCIAL

## 2024-03-14 ENCOUNTER — HOSPITAL ENCOUNTER (OUTPATIENT)
Dept: LAB | Facility: MEDICAL CENTER | Age: 84
End: 2024-03-14
Attending: NURSE PRACTITIONER
Payer: COMMERCIAL

## 2024-03-14 DIAGNOSIS — R94.6 ABNORMAL THYROID FUNCTION TEST: ICD-10-CM

## 2024-03-14 DIAGNOSIS — E03.9 ACQUIRED HYPOTHYROIDISM: ICD-10-CM

## 2024-03-14 LAB
T4 FREE SERPL-MCNC: 1.17 NG/DL (ref 0.93–1.7)
TSH SERPL DL<=0.005 MIU/L-ACNC: 3.94 UIU/ML (ref 0.38–5.33)

## 2024-03-14 PROCEDURE — 84439 ASSAY OF FREE THYROXINE: CPT

## 2024-03-14 PROCEDURE — 36415 COLL VENOUS BLD VENIPUNCTURE: CPT

## 2024-03-14 PROCEDURE — 84443 ASSAY THYROID STIM HORMONE: CPT

## 2024-03-18 ENCOUNTER — APPOINTMENT (RX ONLY)
Dept: URBAN - METROPOLITAN AREA CLINIC 15 | Facility: CLINIC | Age: 84
Setting detail: DERMATOLOGY
End: 2024-03-18

## 2024-03-18 DIAGNOSIS — L663 OTHER SPECIFIED DISEASES OF HAIR AND HAIR FOLLICLES: ICD-10-CM | Status: INADEQUATELY CONTROLLED

## 2024-03-18 DIAGNOSIS — L738 OTHER SPECIFIED DISEASES OF HAIR AND HAIR FOLLICLES: ICD-10-CM | Status: INADEQUATELY CONTROLLED

## 2024-03-18 DIAGNOSIS — L82.1 OTHER SEBORRHEIC KERATOSIS: ICD-10-CM

## 2024-03-18 DIAGNOSIS — L81.4 OTHER MELANIN HYPERPIGMENTATION: ICD-10-CM

## 2024-03-18 DIAGNOSIS — D22 MELANOCYTIC NEVI: ICD-10-CM

## 2024-03-18 DIAGNOSIS — D69.2 OTHER NONTHROMBOCYTOPENIC PURPURA: ICD-10-CM

## 2024-03-18 DIAGNOSIS — D18.0 HEMANGIOMA: ICD-10-CM

## 2024-03-18 DIAGNOSIS — L57.0 ACTINIC KERATOSIS: ICD-10-CM | Status: INADEQUATELY CONTROLLED

## 2024-03-18 DIAGNOSIS — Z85.820 PERSONAL HISTORY OF MALIGNANT MELANOMA OF SKIN: ICD-10-CM

## 2024-03-18 DIAGNOSIS — L73.9 FOLLICULAR DISORDER, UNSPECIFIED: ICD-10-CM | Status: INADEQUATELY CONTROLLED

## 2024-03-18 DIAGNOSIS — Z71.89 OTHER SPECIFIED COUNSELING: ICD-10-CM

## 2024-03-18 PROBLEM — D22.71 MELANOCYTIC NEVI OF RIGHT LOWER LIMB, INCLUDING HIP: Status: ACTIVE | Noted: 2024-03-18

## 2024-03-18 PROBLEM — L02.12 FURUNCLE OF NECK: Status: ACTIVE | Noted: 2024-03-18

## 2024-03-18 PROBLEM — D18.01 HEMANGIOMA OF SKIN AND SUBCUTANEOUS TISSUE: Status: ACTIVE | Noted: 2024-03-18

## 2024-03-18 PROBLEM — D48.5 NEOPLASM OF UNCERTAIN BEHAVIOR OF SKIN: Status: ACTIVE | Noted: 2024-03-18

## 2024-03-18 PROCEDURE — ? BIOPSY BY SHAVE METHOD

## 2024-03-18 PROCEDURE — 11103 TANGNTL BX SKIN EA SEP/ADDL: CPT

## 2024-03-18 PROCEDURE — ? PRESCRIPTION

## 2024-03-18 PROCEDURE — ? SUNSCREEN RECOMMENDATIONS

## 2024-03-18 PROCEDURE — 99204 OFFICE O/P NEW MOD 45 MIN: CPT | Mod: 25

## 2024-03-18 PROCEDURE — 17000 DESTRUCT PREMALG LESION: CPT | Mod: 59

## 2024-03-18 PROCEDURE — ? DIAGNOSIS COMMENT

## 2024-03-18 PROCEDURE — 11102 TANGNTL BX SKIN SINGLE LES: CPT

## 2024-03-18 PROCEDURE — ? TREATMENT REGIMEN

## 2024-03-18 PROCEDURE — ? LIQUID NITROGEN

## 2024-03-18 PROCEDURE — ? COUNSELING

## 2024-03-18 RX ORDER — CLINDAMYCIN PHOSPHATE 10 MG/ML
1 LOTION TOPICAL QD
Qty: 60 | Refills: 3 | Status: ERX | COMMUNITY
Start: 2024-03-18

## 2024-03-18 RX ADMIN — CLINDAMYCIN PHOSPHATE 1: 10 LOTION TOPICAL at 00:00

## 2024-03-18 ASSESSMENT — LOCATION ZONE DERM
LOCATION ZONE: LEG
LOCATION ZONE: NECK
LOCATION ZONE: TRUNK
LOCATION ZONE: FACE
LOCATION ZONE: ARM

## 2024-03-18 ASSESSMENT — LOCATION DETAILED DESCRIPTION DERM
LOCATION DETAILED: LEFT CENTRAL LATERAL NECK
LOCATION DETAILED: RIGHT PROXIMAL CALF
LOCATION DETAILED: PERIUMBILICAL SKIN
LOCATION DETAILED: RIGHT PROXIMAL DORSAL FOREARM
LOCATION DETAILED: LEFT PROXIMAL DORSAL FOREARM
LOCATION DETAILED: LEFT ANTERIOR DISTAL UPPER ARM
LOCATION DETAILED: RIGHT MEDIAL INFERIOR CHEST
LOCATION DETAILED: LEFT BUTTOCK
LOCATION DETAILED: LEFT FOREHEAD
LOCATION DETAILED: RIGHT DISTAL DORSAL FOREARM

## 2024-03-18 ASSESSMENT — LOCATION SIMPLE DESCRIPTION DERM
LOCATION SIMPLE: NECK
LOCATION SIMPLE: RIGHT CALF
LOCATION SIMPLE: RIGHT FOREARM
LOCATION SIMPLE: CHEST
LOCATION SIMPLE: LEFT BUTTOCK
LOCATION SIMPLE: ABDOMEN
LOCATION SIMPLE: LEFT UPPER ARM
LOCATION SIMPLE: LEFT FOREHEAD
LOCATION SIMPLE: LEFT FOREARM

## 2024-03-18 NOTE — PROCEDURE: BIOPSY BY SHAVE METHOD

## 2024-03-18 NOTE — PROCEDURE: COUNSELING
Detail Level: Detailed
Detail Level: Generalized
Sunscreen Recommendation Label Override: Broad Spectrum Sunscreen minimum SPF 30+
Detail Level: Zone

## 2024-03-18 NOTE — PROCEDURE: LIQUID NITROGEN
Chief Complaint   Patient presents with    Annual Exam       1. \"Have you been to the ER, urgent care clinic since your last visit? Hospitalized since your last visit? \" Yes 3/3/23 at [facility] for fall    2. \"Have you seen or consulted any other health care providers outside of the [address] [place] [address] since your last visit? \" No     3. For patients over 39: Has the patient had a colorectal cancer screening? No     If the patient is female:    4. For patients over 40: Has the patient had a mammogram? Yes    5. For patients over 21: Has the patient had a pap smear? Yes     PHQ-9 Total Score: 7 (5/24/2023  2:50 PM)  Thoughts that you would be better off dead, or of hurting yourself in some way: 0 (5/24/2023  2:50 PM)         AMB Abuse Screening 5/24/2023   Do you ever feel afraid of your partner? N   Are you in a relationship with someone who physically or mentally threatens you? N   Is it safe for you to go home?  Y          Health Maintenance Due   Topic Date Due    Colorectal Cancer Screen  Never done    COVID-19 Vaccine (3 - Booster for Moments.me series) 03/02/2022
[facility] HOSPITAL Note   Well Adult Note  Name: Rosa Maria Cannon Date: 2023   MRN: [id number] Sex: Female   Age: 46 y.o. Ethnicity: Non- / Non    : 1972 Race: White (non-)      Wild Hernandez is here for well adult exam.      Review of Systems   Musculoskeletal:  Positive for arthralgias. All other systems reviewed and are negative. Allergies   Allergen Reactions    Penicillins Rash         Prior to Visit Medications    Medication Sig Taking?  Authorizing Provider   neomycin-polymyxin-hydrocortisone (CORTISPORIN) [id number] otic suspension Place 3 drops in ear(s) 4 times daily Yes Calli Going, APRN - NP   levothyroxine (SYNTHROID) 125 MCG tablet Take 1 tablet by mouth every morning (before breakfast) Yes Calli Going, APRN - NP   buPROPion (WELLBUTRIN XL) 300 MG extended release tablet Take 1 tablet by mouth every morning Yes Ar Automatic Reconciliation   escitalopram (LEXAPRO) 20 MG tablet Take 1 tablet by mouth daily Yes Ar Automatic Reconciliation         Past Medical History:   Diagnosis Date    Abnormal Papanicolaou smear of cervix     Depression with anxiety     h/[place] psychiatric hosp 2013 ([place])    Family history of skin cancer     History of migraine     preivoulsy required Botox    Sun-damaged skin     childhood and young adult    Sunburn, blistering     childhood    Tanning bed exposure     Unspecified hypothyroidism        Past Surgical History:   Procedure Laterality Date    BREAST ENHANCEMENT SURGERY      BREAST SURGERY  2006    bilateral    IMPLANT BREAST SILICONE/EQ Bilateral     2006 [facility]    LEEP  2004    KAL MAMMOGRAM DIGITAL BILATERAL      UROLOGICAL SURGERY      urethral dilation 9years of age         Family History   Problem Relation Age of Onset    Breast Cancer Cousin 39    Cancer Maternal Grandmother          from stomach cancer    Arthritis Maternal Grandmother         Double knee replacement    Breast Cancer
Show Applicator Variable?: Yes
Detail Level: Zone
Render Note In Bullet Format When Appropriate: No
Post-Care Instructions: I reviewed with the patient in detail post-care instructions. Patient is to wear sunprotection, and avoid picking at any of the treated lesions. Pt may apply Vaseline to crusted or scabbing areas.
Application Tool (Optional): Cry-AC
Consent: The patient's consent was obtained including but not limited to risks of crusting, scabbing, blistering, scarring, darker or lighter pigmentary change, recurrence, incomplete removal and infection.
Number Of Freeze-Thaw Cycles: 2 freeze-thaw cycles
Duration Of Freeze Thaw-Cycle (Seconds): 3

## 2024-03-18 NOTE — PROCEDURE: DIAGNOSIS COMMENT
Comment: Treated surgically in 2013. Pt and wife are unsure of MD
Render Risk Assessment In Note?: no
Detail Level: Simple

## 2024-03-18 NOTE — PROCEDURE: TREATMENT REGIMEN
Otc Regimen: Benzoyl peroxide 10% wash
Initiate Treatment: clindamycin 1 % lotion QD\\nSig: Apply to affected area once daily.
Detail Level: Detailed

## 2024-03-19 ENCOUNTER — OFFICE VISIT (OUTPATIENT)
Dept: MEDICAL GROUP | Facility: MEDICAL CENTER | Age: 84
End: 2024-03-19
Payer: COMMERCIAL

## 2024-03-19 VITALS
HEIGHT: 74 IN | BODY MASS INDEX: 24.55 KG/M2 | HEART RATE: 75 BPM | OXYGEN SATURATION: 96 % | SYSTOLIC BLOOD PRESSURE: 106 MMHG | TEMPERATURE: 97.6 F | DIASTOLIC BLOOD PRESSURE: 70 MMHG | WEIGHT: 191.31 LBS

## 2024-03-19 DIAGNOSIS — E03.9 ACQUIRED HYPOTHYROIDISM: ICD-10-CM

## 2024-03-19 DIAGNOSIS — R35.0 URINARY FREQUENCY: ICD-10-CM

## 2024-03-19 DIAGNOSIS — R41.3 MEMORY LOSS: ICD-10-CM

## 2024-03-19 PROCEDURE — 3074F SYST BP LT 130 MM HG: CPT | Performed by: NURSE PRACTITIONER

## 2024-03-19 PROCEDURE — 3078F DIAST BP <80 MM HG: CPT | Performed by: NURSE PRACTITIONER

## 2024-03-19 PROCEDURE — 99214 OFFICE O/P EST MOD 30 MIN: CPT | Performed by: NURSE PRACTITIONER

## 2024-03-19 RX ORDER — MEMANTINE HYDROCHLORIDE 5 MG/1
5 TABLET ORAL 2 TIMES DAILY
Qty: 60 TABLET | Refills: 1 | Status: SHIPPED | OUTPATIENT
Start: 2024-03-19

## 2024-03-19 RX ORDER — DONEPEZIL HYDROCHLORIDE 10 MG/1
10 TABLET, FILM COATED ORAL NIGHTLY
Qty: 30 TABLET | Refills: 1 | Status: SHIPPED | OUTPATIENT
Start: 2024-03-19

## 2024-03-19 ASSESSMENT — FIBROSIS 4 INDEX: FIB4 SCORE: 1.9

## 2024-03-19 NOTE — PROGRESS NOTES
Subjective:     Pedro Champion is a 83 y.o. male who presents with hypothyroidism.    HPI:   Seen in f/u for hypothyroidism.  He is having urinary freq at nite.  He is followed by urology. Thye put him on a med but initially he didn't think that it helped.  Now feels that it is helping.  He is taking nitely.  He has been drinking fluids until he goes to bed.  Then he's up atnite with voiding.    He is on  aricept 5 mg w/o much change in memory.   He is seen with his wife today. She is his caregiver.  He saw derm yesterday.  They did bx on lesions.  He has hx of melanoma.    He had a fall last sunday at [employer]. He got up right away.  Did not hit his head or have LOC.  Reviewed lab with pt.  TSH & T4 IS WNL.  Previous TSH was elevated. Now stable and well controlled on levothyroxine.     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% ([facility]) 09/23/2019    Hypothyroid 09/23/2019    Urinary retention 09/09/2019    Other persistent atrial fibrillation (HCC) 08/28/2019    Mandibular fracture, open ([facility]) 08/28/2019    Suicide attempt ([facility]) 08/28/2019    Essential hypertension, benign 04/13/2016    Dyslipidemia 04/13/2016    Parotid nodule 07/19/2013    Hearing loss of both ears 09/26/2012       Current medicines (including changes today)  Current Outpatient Medications   Medication Sig Dispense Refill    donepezil (ARICEPT) 10 MG tablet Take 1 Tablet by mouth every evening. 30 Tablet 1    memantine (NAMENDA) 5 MG Tab Take 1  Tablet by mouth 2 times a day. 60 Tablet 1    apixaban (ELIQUIS) 5mg Tab Take 1 Tablet by mouth 2 times a day. 180 Tablet 3    atorvastatin (LIPITOR) 40 MG Tab Take 1 Tablet by mouth every day. 90 Tablet 3    donepezil (ARICEPT) 5 MG Tab Take 1 Tablet by mouth every evening. 30 Tablet 1    Empagliflozin (JARDIANCE) 10 MG Tab tablet Take 1 Tablet by mouth every day. 90 Tablet 3    finasteride (PROSCAR) 5 MG Tab Take 1 Tablet by mouth every day. 100 Tablet 3    hydrOXYzine HCl (ATARAX) 25 MG Tab Take 1 Tablet by mouth every 8 hours as needed for Anxiety. 10 Tablet 0    levothyroxine (SYNTHROID) 25 MCG Tab Take 1 Tablet by mouth every morning on an empty stomach. 30 Tablet 0    tamsulosin (FLOMAX) 0.4 MG capsule Take 1 Capsule by mouth every day. 90 Capsule 3    carvedilol (COREG) 6.25 MG Tab Take 1 Tablet by mouth 2 times a day with meals. 180 Tablet 3    sacubitril-valsartan (ENTRESTO) 49-51 MG Tab Take 1 Tablet by mouth 2 times a day. 60 Tablet 0    potassium chloride (MICRO-K) 10 MEQ capsule Take 1 Capsule by mouth 2 times a day. 180 Capsule 2    digoxin (LANOXIN) 125 MCG Tab Take 1 Tablet by mouth every day. 90 Tablet 3    furosemide (LASIX) 40 MG Tab Take 1 Tablet by mouth 1 time a day as needed (swelling). 90 Tablet 3    sertraline (ZOLOFT) 25 MG tablet TAKE 2 TABLETS BY MOUTH EVERY DAY . APPT NEEDED FOR REFILLS 60 Tablet 1     No current facility-administered medications for this visit.       Allergies   Allergen Reactions    Cefepime Hcl Rash     Hives, eosinophilia        ROS  Constitutional: Negative. Negative for fever, chills, weight loss, malaise/fatigue and diaphoresis.   HENT: Negative. Negative for hearing loss, ear pain, nosebleeds, congestion, sore throat, neck pain, tinnitus and ear discharge.   Respiratory: Negative. Negative for cough, hemoptysis, sputum production, shortness of breath, wheezing and stridor.   Cardiovascular: Negative. Negative for chest pain, palpitations, orthopnea,  "claudication, leg swelling and PND.   Gastrointestinal: Denies nausea, vomiting, diarrhea, constipation, heartburn, melena or hematochezia.  Genitourinary: Denies dysuria, hematuria, urinary incontinence, frequency or urgency.        Objective:     /70 (BP Location: Right arm, Patient Position: Sitting, BP Cuff Size: Adult)   Pulse 75   Temp 36.4 °C (97.6 °F) (Temporal)   Ht 1.88 m (6' 2\")   Wt 86.8 kg (191 lb 5 oz)   SpO2 96%  Body mass index is 24.56 kg/m².    Physical Exam:  Vitals reviewed.  Constitutional: Oriented to person, place, and time. appears well-developed and well-nourished. No distress.   Cardiovascular: Normal rate, regular rhythm, normal heart sounds and intact distal pulses. Exam reveals no gallop and no friction rub. No murmur heard. No carotid bruits.   Pulmonary/Chest: Effort normal and breath sounds normal. No stridor. No respiratory distress. no wheezes or rales. exhibits no tenderness.   Musculoskeletal: Normal range of motion. exhibits no edema. aye pedal pulses 2+.  Lymphadenopathy: No cervical or supraclavicular adenopathy.   Neurological: Alert and oriented to person, place, and time. exhibits normal muscle tone.  Skin: Skin is warm and dry. No diaphoresis.   Psychiatric: Normal mood and affect. Behavior is normal.      Assessment and Plan:     The following treatment plan was discussed:    1. Acquired hypothyroidism      lab now wnl.  stable on levothyroxine. plan: tj lab 12/24. call for lab slip. f/u for review.      2. Memory loss  donepezil (ARICEPT) 10 MG tablet    memantine (NAMENDA) 5 MG Tab    aricept 5 mg not improving sx. increase to 10 mg.  if barbara after 2 wks then add namenda 5 mg bid.      3. Urinary frequency      followed by urology. stop drinking fluids after 1930.            Followup: Return in about 9 months (around 12/19/2024), or call for lab slip.  "

## 2024-05-12 DIAGNOSIS — R41.3 MEMORY LOSS: ICD-10-CM

## 2024-05-13 RX ORDER — MEMANTINE HYDROCHLORIDE 5 MG/1
5 TABLET ORAL 2 TIMES DAILY
Qty: 200 TABLET | Refills: 1 | Status: SHIPPED | OUTPATIENT
Start: 2024-05-13

## 2024-05-13 RX ORDER — DONEPEZIL HYDROCHLORIDE 10 MG/1
10 TABLET, FILM COATED ORAL NIGHTLY
Qty: 100 TABLET | Refills: 1 | Status: SHIPPED | OUTPATIENT
Start: 2024-05-13

## 2024-05-13 NOTE — TELEPHONE ENCOUNTER
Received request via: Pharmacy    Was the patient seen in the last year in this department? Yes    Does the patient have an active prescription (recently filled or refills available) for medication(s) requested? No    [facility]    Does the patient have skilled nursing Plus and need 100 day supply (blood pressure, diabetes and cholesterol meds only)? Patient does not have SCP

## 2024-05-23 DIAGNOSIS — I50.1 HEART FAILURE, LEFT, WITH LVEF <=30% (HCC): ICD-10-CM

## 2024-05-23 RX ORDER — SACUBITRIL AND VALSARTAN 49; 51 MG/1; MG/1
1 TABLET, FILM COATED ORAL 2 TIMES DAILY
Qty: 180 TABLET | Refills: 2 | Status: SHIPPED | OUTPATIENT
Start: 2024-05-23

## 2024-05-23 NOTE — TELEPHONE ENCOUNTER
Received request via: Patient    Was the patient seen in the last year in this department? Yes    Does the patient have an active prescription (recently filled or refills available) for medication(s) requested?  Yes    [facility] Name: [facility] - [address]     Does the patient have senior living Plus and need 100 day supply (blood pressure, diabetes and cholesterol meds only)? Patient does not have SCP    Thank you,     Axel AARON

## 2024-07-05 DIAGNOSIS — E78.5 DYSLIPIDEMIA: ICD-10-CM

## 2024-07-08 RX ORDER — ATORVASTATIN CALCIUM 40 MG/1
40 TABLET, FILM COATED ORAL DAILY
Qty: 90 TABLET | Refills: 1 | Status: SHIPPED | OUTPATIENT
Start: 2024-07-08

## 2024-07-31 NOTE — TELEPHONE ENCOUNTER
Received request via: Pharmacy    Was the patient seen in the last year in this department? Yes    Does the patient have an active prescription (recently filled or refills available) for medication(s) requested? No    [facility]    Does the patient have assisted Plus and need 100 day supply (blood pressure, diabetes and cholesterol meds only)? Patient does not have SCP

## 2024-08-02 NOTE — THERAPY
"Speech Language Pathology  Daily Treatment     Patient Name: Pedro Champion  Age:  79 y.o., Sex:  male  Medical Record #: [id number]  Today's Date: 11/8/2019     Subjective    Assumed care of patient from dining room. Pt agreeable to ST, however, eager to \"get working\" in the gym at the end of session.      Objective     11/08/19 0834   Outcome Measures   Outcome Measures Utilized SCCAN   Interdisciplinary Plan of Care Collaboration   Patient Position at End of Therapy Seated;Self Releasing Lap Belt Applied   SLP Total Time Spent   SLP Individual Total Time Spent (Mins) 60   Charge Group   SLP Cognitive Skill Development 4       FIM Eating Score:  1 - Total Assistance  Eating Description:       FIM Comprehension Score:  5 - Stand-by Prompting/Supervision or Set-up  Comprehension Description:  Glasses, Hearing aids/amplifiers, Verbal cues    FIM Expression Score:  6 - Modified Independent  Expression Description:  Verbal cueing    FIM Social Interaction Score:  6 - Modified Independent  Social Interaction Description:  Medication, Verbal cues    FIM Problem Solving Score:  4 - Minimal Assistance  Problem Solving Description:  Verbal cueing, Therapy schedule, Increased time    FIM Memory Score:  4 - Minimal Assistance  Memory Description:  Verbal cueing, Therapy schedule      Assessment    Initiated follow up outcome assessment using SCCAN in anticipation of d/c scheduled for tomorrow. Will report full scores in p.m. session.     Plan    Continue administration of SCCAN with reporting of scores.     " declines

## 2024-08-03 RX ORDER — HYDROXYZINE HYDROCHLORIDE 25 MG/1
25 TABLET, FILM COATED ORAL EVERY 8 HOURS PRN
Qty: 10 TABLET | Refills: 0 | Status: SHIPPED | OUTPATIENT
Start: 2024-08-03

## 2024-10-09 RX ORDER — HYDROXYZINE HYDROCHLORIDE 25 MG/1
25 TABLET, FILM COATED ORAL EVERY 8 HOURS PRN
Qty: 90 TABLET | Refills: 0 | Status: SHIPPED | OUTPATIENT
Start: 2024-10-09 | End: 2024-10-10 | Stop reason: SDUPTHER

## 2024-10-09 RX ORDER — HYDROXYZINE HYDROCHLORIDE 25 MG/1
25 TABLET, FILM COATED ORAL EVERY 8 HOURS PRN
Qty: 30 TABLET | Refills: 0 | Status: SHIPPED | OUTPATIENT
Start: 2024-10-09

## 2024-10-10 RX ORDER — HYDROXYZINE HYDROCHLORIDE 25 MG/1
25 TABLET, FILM COATED ORAL EVERY 8 HOURS PRN
Qty: 90 TABLET | Refills: 0 | Status: SHIPPED | OUTPATIENT
Start: 2024-10-10

## 2024-11-05 DIAGNOSIS — R73.9 HYPERGLYCEMIA: ICD-10-CM

## 2024-11-05 DIAGNOSIS — E03.9 ACQUIRED HYPOTHYROIDISM: ICD-10-CM

## 2024-11-05 DIAGNOSIS — D72.828 NEUTROPHILIA: ICD-10-CM

## 2024-11-05 DIAGNOSIS — R63.4 WEIGHT LOSS, NON-INTENTIONAL: ICD-10-CM

## 2024-11-05 DIAGNOSIS — R71.8 ELEVATED MCV: ICD-10-CM

## 2024-11-05 DIAGNOSIS — I10 ESSENTIAL HYPERTENSION, BENIGN: ICD-10-CM

## 2024-11-05 DIAGNOSIS — N40.0 BENIGN PROSTATIC HYPERPLASIA, UNSPECIFIED WHETHER LOWER URINARY TRACT SYMPTOMS PRESENT: ICD-10-CM

## 2024-11-05 DIAGNOSIS — E55.9 VITAMIN D DEFICIENCY: ICD-10-CM

## 2024-11-05 DIAGNOSIS — Z12.5 SCREENING FOR MALIGNANT NEOPLASM OF PROSTATE: ICD-10-CM

## 2024-11-05 DIAGNOSIS — R79.89 ELEVATED LFTS: ICD-10-CM

## 2024-11-05 DIAGNOSIS — E78.5 HYPERLIPIDEMIA LDL GOAL <70: ICD-10-CM

## 2024-11-05 DIAGNOSIS — R94.6 ABNORMAL THYROID FUNCTION TEST: ICD-10-CM

## 2024-11-05 DIAGNOSIS — Z79.899 ENCOUNTER FOR LONG-TERM (CURRENT) USE OF HIGH-RISK MEDICATION: ICD-10-CM

## 2024-11-05 DIAGNOSIS — D72.9 ABNORMAL NEUTROPHIL COUNT: ICD-10-CM

## 2024-11-06 RX ORDER — LEVOTHYROXINE SODIUM 25 UG/1
25 TABLET ORAL
Qty: 30 TABLET | Refills: 0 | Status: SHIPPED | OUTPATIENT
Start: 2024-11-06

## 2024-11-06 NOTE — TELEPHONE ENCOUNTER
Received request via: Pharmacy    Was the patient seen in the last year in this department? Yes    Does the patient have an active prescription (recently filled or refills available) for medication(s) requested? No    [facility]     Does the patient have [facility] Plus and need 100-day supply? (This applies to ALL medications) Patient does not have SCP

## 2024-11-08 DIAGNOSIS — I50.1 HEART FAILURE, LEFT, WITH LVEF <=30% (HCC): ICD-10-CM

## 2024-11-11 RX ORDER — POTASSIUM CHLORIDE 750 MG/1
10 CAPSULE, EXTENDED RELEASE ORAL 2 TIMES DAILY
Qty: 180 CAPSULE | Refills: 0 | Status: SHIPPED | OUTPATIENT
Start: 2024-11-11

## 2024-11-21 DIAGNOSIS — R41.3 MEMORY LOSS: ICD-10-CM

## 2024-11-21 NOTE — TELEPHONE ENCOUNTER
Received request via: Pharmacy    Was the patient seen in the last year in this department? Yes    Does the patient have an active prescription (recently filled or refills available) for medication(s) requested? No    [facility]    Does the patient have [facility] and need 100-day supply? (This applies to ALL medications) Patient does not have SCP

## 2024-11-27 DIAGNOSIS — E03.9 ACQUIRED HYPOTHYROIDISM: ICD-10-CM

## 2024-11-27 NOTE — TELEPHONE ENCOUNTER
Received request via: Patient    Was the patient seen in the last year in this department? Yes    Does the patient have an active prescription (recently filled or refills available) for medication(s) requested? No    [facility]    Does the patient have [facility] Plus and need 100-day supply? (This applies to ALL medications) Patient does not have SCP

## 2024-12-07 RX ORDER — DONEPEZIL HYDROCHLORIDE 10 MG/1
10 TABLET, FILM COATED ORAL EVERY EVENING
Qty: 100 TABLET | Refills: 0 | Status: SHIPPED | OUTPATIENT
Start: 2024-12-07

## 2024-12-07 RX ORDER — LEVOTHYROXINE SODIUM 25 UG/1
25 TABLET ORAL
Qty: 90 TABLET | Refills: 0 | Status: SHIPPED | OUTPATIENT
Start: 2024-12-07

## 2024-12-07 RX ORDER — MEMANTINE HYDROCHLORIDE 5 MG/1
5 TABLET ORAL 2 TIMES DAILY
Qty: 200 TABLET | Refills: 0 | Status: SHIPPED | OUTPATIENT
Start: 2024-12-07

## 2024-12-11 DIAGNOSIS — I48.19 OTHER PERSISTENT ATRIAL FIBRILLATION (HCC): ICD-10-CM

## 2024-12-11 DIAGNOSIS — Z79.899 ENCOUNTER FOR LONG-TERM (CURRENT) USE OF HIGH-RISK MEDICATION: ICD-10-CM

## 2024-12-12 NOTE — TELEPHONE ENCOUNTER
Received request via: Patient    Was the patient seen in the last year in this department? Yes    Does the patient have an active prescription (recently filled or refills available) for medication(s) requested? No    Pharmacy Name:   [facility] - [address]              Does the patient have long term Plus and need 100-day supply? (This applies to ALL medications) Patient does not have SCP

## 2024-12-19 ENCOUNTER — HOSPITAL ENCOUNTER (OUTPATIENT)
Dept: LAB | Facility: MEDICAL CENTER | Age: 84
End: 2024-12-19
Attending: NURSE PRACTITIONER
Payer: COMMERCIAL

## 2024-12-19 DIAGNOSIS — N40.0 BENIGN PROSTATIC HYPERPLASIA, UNSPECIFIED WHETHER LOWER URINARY TRACT SYMPTOMS PRESENT: ICD-10-CM

## 2024-12-19 DIAGNOSIS — I10 ESSENTIAL HYPERTENSION, BENIGN: ICD-10-CM

## 2024-12-19 DIAGNOSIS — R63.4 WEIGHT LOSS, NON-INTENTIONAL: ICD-10-CM

## 2024-12-19 DIAGNOSIS — E55.9 VITAMIN D DEFICIENCY: ICD-10-CM

## 2024-12-19 DIAGNOSIS — R94.6 ABNORMAL THYROID FUNCTION TEST: ICD-10-CM

## 2024-12-19 DIAGNOSIS — E78.5 HYPERLIPIDEMIA LDL GOAL <70: ICD-10-CM

## 2024-12-19 DIAGNOSIS — Z12.5 SCREENING FOR MALIGNANT NEOPLASM OF PROSTATE: ICD-10-CM

## 2024-12-19 DIAGNOSIS — R73.9 HYPERGLYCEMIA: ICD-10-CM

## 2024-12-19 DIAGNOSIS — R79.89 ELEVATED LFTS: ICD-10-CM

## 2024-12-19 DIAGNOSIS — E03.9 ACQUIRED HYPOTHYROIDISM: ICD-10-CM

## 2024-12-19 LAB
25(OH)D3 SERPL-MCNC: 37 NG/ML (ref 30–100)
ALBUMIN SERPL BCP-MCNC: 4.1 G/DL (ref 3.2–4.9)
ALBUMIN/GLOB SERPL: 1.9 G/DL
ALP SERPL-CCNC: 50 U/L (ref 30–99)
ALT SERPL-CCNC: 16 U/L (ref 2–50)
ANION GAP SERPL CALC-SCNC: 9 MMOL/L (ref 7–16)
AST SERPL-CCNC: 20 U/L (ref 12–45)
BASOPHILS # BLD AUTO: 0.2 % (ref 0–1.8)
BASOPHILS # BLD: 0.02 K/UL (ref 0–0.12)
BILIRUB SERPL-MCNC: 1.5 MG/DL (ref 0.1–1.5)
BUN SERPL-MCNC: 14 MG/DL (ref 8–22)
CALCIUM ALBUM COR SERPL-MCNC: 8.6 MG/DL (ref 8.5–10.5)
CALCIUM SERPL-MCNC: 8.7 MG/DL (ref 8.5–10.5)
CHLORIDE SERPL-SCNC: 101 MMOL/L (ref 96–112)
CHOLEST SERPL-MCNC: 151 MG/DL (ref 100–199)
CO2 SERPL-SCNC: 26 MMOL/L (ref 20–33)
CREAT SERPL-MCNC: 0.63 MG/DL (ref 0.5–1.4)
EOSINOPHIL # BLD AUTO: 0.07 K/UL (ref 0–0.51)
EOSINOPHIL NFR BLD: 0.8 % (ref 0–6.9)
ERYTHROCYTE [DISTWIDTH] IN BLOOD BY AUTOMATED COUNT: 54.3 FL (ref 35.9–50)
EST. AVERAGE GLUCOSE BLD GHB EST-MCNC: 80 MG/DL
GFR SERPLBLD CREATININE-BSD FMLA CKD-EPI: 92 ML/MIN/1.73 M 2
GLOBULIN SER CALC-MCNC: 2.2 G/DL (ref 1.9–3.5)
GLUCOSE SERPL-MCNC: 107 MG/DL (ref 65–99)
HBA1C MFR BLD: 4.4 % (ref 4–5.6)
HCT VFR BLD AUTO: 47.9 % (ref 42–52)
HDLC SERPL-MCNC: 68 MG/DL
HGB BLD-MCNC: 15.3 G/DL (ref 14–18)
IMM GRANULOCYTES # BLD AUTO: 0.05 K/UL (ref 0–0.11)
IMM GRANULOCYTES NFR BLD AUTO: 0.6 % (ref 0–0.9)
LDLC SERPL CALC-MCNC: 68 MG/DL
LYMPHOCYTES # BLD AUTO: 1.25 K/UL (ref 1–4.8)
LYMPHOCYTES NFR BLD: 14.8 % (ref 22–41)
MCH RBC QN AUTO: 33.6 PG (ref 27–33)
MCHC RBC AUTO-ENTMCNC: 31.9 G/DL (ref 32.3–36.5)
MCV RBC AUTO: 105 FL (ref 81.4–97.8)
MONOCYTES # BLD AUTO: 0.6 K/UL (ref 0–0.85)
MONOCYTES NFR BLD AUTO: 7.1 % (ref 0–13.4)
NEUTROPHILS # BLD AUTO: 6.44 K/UL (ref 1.82–7.42)
NEUTROPHILS NFR BLD: 76.5 % (ref 44–72)
NRBC # BLD AUTO: 0 K/UL
NRBC BLD-RTO: 0 /100 WBC (ref 0–0.2)
PLATELET # BLD AUTO: 214 K/UL (ref 164–446)
PMV BLD AUTO: 9.5 FL (ref 9–12.9)
POTASSIUM SERPL-SCNC: 4.1 MMOL/L (ref 3.6–5.5)
PROT SERPL-MCNC: 6.3 G/DL (ref 6–8.2)
RBC # BLD AUTO: 4.56 M/UL (ref 4.7–6.1)
SODIUM SERPL-SCNC: 136 MMOL/L (ref 135–145)
T3FREE SERPL-MCNC: 2.4 PG/ML (ref 2–4.4)
T4 FREE SERPL-MCNC: 1.04 NG/DL (ref 0.93–1.7)
TRIGL SERPL-MCNC: 74 MG/DL (ref 0–149)
TSH SERPL-ACNC: 3.94 UIU/ML (ref 0.35–5.5)
WBC # BLD AUTO: 8.4 K/UL (ref 4.8–10.8)

## 2024-12-19 PROCEDURE — 80053 COMPREHEN METABOLIC PANEL: CPT

## 2024-12-19 PROCEDURE — 84439 ASSAY OF FREE THYROXINE: CPT

## 2024-12-19 PROCEDURE — 85025 COMPLETE CBC W/AUTO DIFF WBC: CPT

## 2024-12-19 PROCEDURE — 84153 ASSAY OF PSA TOTAL: CPT

## 2024-12-19 PROCEDURE — 82306 VITAMIN D 25 HYDROXY: CPT

## 2024-12-19 PROCEDURE — 84481 FREE ASSAY (FT-3): CPT

## 2024-12-19 PROCEDURE — 84443 ASSAY THYROID STIM HORMONE: CPT

## 2024-12-19 PROCEDURE — 83036 HEMOGLOBIN GLYCOSYLATED A1C: CPT

## 2024-12-19 PROCEDURE — 82570 ASSAY OF URINE CREATININE: CPT

## 2024-12-19 PROCEDURE — 82043 UR ALBUMIN QUANTITATIVE: CPT

## 2024-12-19 PROCEDURE — 80061 LIPID PANEL: CPT

## 2024-12-19 PROCEDURE — 84154 ASSAY OF PSA FREE: CPT

## 2024-12-19 PROCEDURE — 36415 COLL VENOUS BLD VENIPUNCTURE: CPT

## 2024-12-20 LAB
CREAT UR-MCNC: 86.9 MG/DL
MICROALBUMIN UR-MCNC: <1.2 MG/DL
MICROALBUMIN/CREAT UR: NORMAL MG/G (ref 0–30)

## 2024-12-21 LAB
PSA FREE MFR SERPL: 22 %
PSA FREE SERPL-MCNC: 0.2 NG/ML
PSA SERPL-MCNC: 0.9 NG/ML (ref 0–4)

## 2025-01-01 DIAGNOSIS — I48.19 OTHER PERSISTENT ATRIAL FIBRILLATION (HCC): ICD-10-CM

## 2025-01-02 DIAGNOSIS — R41.3 MEMORY LOSS: ICD-10-CM

## 2025-01-02 RX ORDER — DIGOXIN 125 MCG
125 TABLET ORAL DAILY
Qty: 90 TABLET | Refills: 0 | Status: SHIPPED | OUTPATIENT
Start: 2025-01-02

## 2025-01-02 RX ORDER — MEMANTINE HYDROCHLORIDE 5 MG/1
5 TABLET ORAL 2 TIMES DAILY
Qty: 200 TABLET | Refills: 0 | Status: SHIPPED | OUTPATIENT
Start: 2025-01-02

## 2025-01-02 NOTE — TELEPHONE ENCOUNTER
Is the patient due for a refill? Yes    Was the patient seen the past year? NO   Date of last office visit: 12.27.2023    Does the patient have an upcoming appointment?  Yes   If yes, When? 02.27.2025    Provider to refill:DS    Does the patient have skilled nursing Plus and need 100-day supply? (This applies to ALL medications) Patient does not have SCP

## 2025-01-02 NOTE — TELEPHONE ENCOUNTER
Received request via: Patient    Was the patient seen in the last year in this department? Yes    Does the patient have an active prescription (recently filled or refills available) for medication(s) requested? No    Pharmacy Name: [id number]  [place], [place] - [address]     Does the patient have [facility] Plus and need 100-day supply? (This applies to ALL medications) Patient does not have SCP

## 2025-01-14 ENCOUNTER — APPOINTMENT (OUTPATIENT)
Dept: MEDICAL GROUP | Facility: MEDICAL CENTER | Age: 85
End: 2025-01-14
Payer: COMMERCIAL

## 2025-01-14 VITALS
TEMPERATURE: 97 F | HEIGHT: 74 IN | HEART RATE: 57 BPM | WEIGHT: 186 LBS | BODY MASS INDEX: 23.87 KG/M2 | DIASTOLIC BLOOD PRESSURE: 60 MMHG | OXYGEN SATURATION: 99 % | SYSTOLIC BLOOD PRESSURE: 112 MMHG

## 2025-01-14 DIAGNOSIS — R26.81 UNSTEADY GAIT WHEN WALKING: ICD-10-CM

## 2025-01-14 DIAGNOSIS — E03.9 ACQUIRED HYPOTHYROIDISM: ICD-10-CM

## 2025-01-14 DIAGNOSIS — I50.1 HEART FAILURE, LEFT, WITH LVEF <=30% (HCC): ICD-10-CM

## 2025-01-14 DIAGNOSIS — D75.89 MACROCYTOSIS WITHOUT ANEMIA: ICD-10-CM

## 2025-01-14 DIAGNOSIS — F51.01 PRIMARY INSOMNIA: ICD-10-CM

## 2025-01-14 DIAGNOSIS — R73.01 IFG (IMPAIRED FASTING GLUCOSE): ICD-10-CM

## 2025-01-14 DIAGNOSIS — R41.3 MEMORY LOSS: ICD-10-CM

## 2025-01-14 DIAGNOSIS — R63.4 WEIGHT LOSS, UNINTENTIONAL: ICD-10-CM

## 2025-01-14 DIAGNOSIS — N40.0 BENIGN PROSTATIC HYPERPLASIA, UNSPECIFIED WHETHER LOWER URINARY TRACT SYMPTOMS PRESENT: ICD-10-CM

## 2025-01-14 PROCEDURE — 3074F SYST BP LT 130 MM HG: CPT | Performed by: NURSE PRACTITIONER

## 2025-01-14 PROCEDURE — 3078F DIAST BP <80 MM HG: CPT | Performed by: NURSE PRACTITIONER

## 2025-01-14 PROCEDURE — 99214 OFFICE O/P EST MOD 30 MIN: CPT | Performed by: NURSE PRACTITIONER

## 2025-01-14 RX ORDER — EMPAGLIFLOZIN 10 MG/1
10 TABLET, FILM COATED ORAL DAILY
Qty: 100 TABLET | Refills: 3 | Status: SHIPPED | OUTPATIENT
Start: 2025-01-14

## 2025-01-14 RX ORDER — FINASTERIDE 5 MG/1
5 TABLET, FILM COATED ORAL
Qty: 100 TABLET | Refills: 3 | Status: SHIPPED | OUTPATIENT
Start: 2025-01-14

## 2025-01-14 RX ORDER — DONEPEZIL HYDROCHLORIDE 10 MG/1
10 TABLET, FILM COATED ORAL EVERY EVENING
Qty: 100 TABLET | Refills: 3 | Status: SHIPPED | OUTPATIENT
Start: 2025-01-14

## 2025-01-14 RX ORDER — TAMSULOSIN HYDROCHLORIDE 0.4 MG/1
0.4 CAPSULE ORAL DAILY
Qty: 90 CAPSULE | Refills: 0 | Status: SHIPPED | OUTPATIENT
Start: 2025-01-14

## 2025-01-14 RX ORDER — LEVOTHYROXINE SODIUM 25 UG/1
25 TABLET ORAL
Qty: 100 TABLET | Refills: 3 | Status: SHIPPED | OUTPATIENT
Start: 2025-01-14

## 2025-01-14 ASSESSMENT — FIBROSIS 4 INDEX: FIB4 SCORE: 2.01

## 2025-01-15 NOTE — PROGRESS NOTES
Subjective:     History of Present Illness  The patient is an 84-year-old male seen in follow-up for Insomnia.  He is seen with his wife today.  She is his caregiver d/t his dementia.    He is on aricept for his memory loss.  His wife does not feel that it has helped significantly.  Taking med approp.  Needs refills.    He has been experiencing weight loss, with a decrease from 191 pounds in 01/2024 to 186 pounds currently. His weight has remained stable at home, with no further loss reported. He had previously reached a peak weight of 244 pounds. His appetite has diminished, and he reports a decreased food intake compared to his past habits. He reports no presence of black, tarry stools or blood in his stools. He also reports no chest pain, shortness of breath, or swelling in his feet.    He has been experiencing sleep disturbances and is seeking pharmacological intervention. He has attempted to use melatonin without success. He has not previously tried doxylamine or trazodone. His sleep duration is approximately 5 hours per night, supplemented by a daily nap of 1.5 hours.    He has been experiencing frequent urination, particularly at night, which has been disrupting his sleep. He has been advised to limit fluid intake 2 hours prior to bedtime.  He is taking the proscan and flomax approp.  Needs meds refilled.    He has been observed to have an unsteady gait, often crossing his feet while walking. He has not undergone physical therapy for gait strengthening and does not engage in regular exercise.    He is followed by cardiac for his CHF.  He is on jardiance for this.  Taking med approp.  Needs med refill.    He is stable and well controlled on his levothyroxine for hypothyroidism.  His wife controls his medications.  Taking them approp.         SOCIAL HISTORY  He tries not to drink too much alcohol like wine.    MEDICATIONS  Current: tamsulosin, finasteride, Aricept, levothyroxine, Jardiance,  Proscar      Results  - Laboratory Studies:    - Microalbumin creatinine ratio: WNL    - a1c: 5.4, 5.1, 5.0, and now 4.4    - CBC:  MCV newly elevated majo 105. Neutrophils and lymphocytes abn but chronic    - TSH, T4, T4: WNL    - GFR: normal    - CMP:  wnl    - A1c: normal long term.      - LP: wnl. All at goal. Not on statin    - Vitamin D: normal but down from 55 to 37.  Wife will check and make sure he is taking otc supplement.    - PSA: normal      Current medicines (including changes today)  Current Outpatient Medications   Medication Sig Dispense Refill    tamsulosin (FLOMAX) 0.4 MG capsule Take 1 Capsule by mouth every day. 90 Capsule 0    finasteride (PROSCAR) 5 MG Tab Take 1 Tablet by mouth every day. 100 Tablet 3    Empagliflozin (JARDIANCE) 10 MG Tab tablet Take 1 Tablet by mouth every day. 100 Tablet 3    levothyroxine (SYNTHROID) 25 MCG Tab Take 1 Tablet by mouth every morning on an empty stomach. 100 Tablet 3    donepezil (ARICEPT) 10 MG tablet Take 1 Tablet by mouth every evening. 100 Tablet 3    digoxin (LANOXIN) 125 MCG Tab TAKE ONE TABLET BY MOUTH ONE TIME DAILY 90 Tablet 0    memantine (NAMENDA) 5 MG Tab Take 1 Tablet by mouth 2 times a day. 200 Tablet 0    apixaban (ELIQUIS) 5mg Tab Take 1 Tablet by mouth 2 times a day. 180 Tablet 0    potassium chloride (MICRO-K) 10 MEQ capsule TAKE ONE CAPSULE BY MOUTH TWICE DAILY 180 Capsule 0    hydrOXYzine HCl (ATARAX) 25 MG Tab Take 1 Tablet by mouth every 8 hours as needed for Anxiety. 90 Tablet 0    hydrOXYzine HCl (ATARAX) 25 MG Tab Take 1 Tablet by mouth every 8 hours as needed for Anxiety. 30 Tablet 0    atorvastatin (LIPITOR) 40 MG Tab TAKE 1 TABLET DAILY 90 Tablet 1    sacubitril-valsartan (ENTRESTO) 49-51 MG Tab Take 1 Tablet by mouth 2 times a day. 180 Tablet 2    carvedilol (COREG) 6.25 MG Tab Take 1 Tablet by mouth 2 times a day with meals. 180 Tablet 3    furosemide (LASIX) 40 MG Tab Take 1 Tablet by mouth 1 time a day as needed (swelling). 90  Tablet 3     No current facility-administered medications for this visit.     Current Outpatient Medications   Medication Sig Dispense Refill    tamsulosin (FLOMAX) 0.4 MG capsule Take 1 Capsule by mouth every day. 90 Capsule 0    finasteride (PROSCAR) 5 MG Tab Take 1 Tablet by mouth every day. 100 Tablet 3    Empagliflozin (JARDIANCE) 10 MG Tab tablet Take 1 Tablet by mouth every day. 100 Tablet 3    levothyroxine (SYNTHROID) 25 MCG Tab Take 1 Tablet by mouth every morning on an empty stomach. 100 Tablet 3    donepezil (ARICEPT) 10 MG tablet Take 1 Tablet by mouth every evening. 100 Tablet 3    digoxin (LANOXIN) 125 MCG Tab TAKE ONE TABLET BY MOUTH ONE TIME DAILY 90 Tablet 0    memantine (NAMENDA) 5 MG Tab Take 1 Tablet by mouth 2 times a day. 200 Tablet 0    apixaban (ELIQUIS) 5mg Tab Take 1 Tablet by mouth 2 times a day. 180 Tablet 0    potassium chloride (MICRO-K) 10 MEQ capsule TAKE ONE CAPSULE BY MOUTH TWICE DAILY 180 Capsule 0    hydrOXYzine HCl (ATARAX) 25 MG Tab Take 1 Tablet by mouth every 8 hours as needed for Anxiety. 90 Tablet 0    hydrOXYzine HCl (ATARAX) 25 MG Tab Take 1 Tablet by mouth every 8 hours as needed for Anxiety. 30 Tablet 0    atorvastatin (LIPITOR) 40 MG Tab TAKE 1 TABLET DAILY 90 Tablet 1    sacubitril-valsartan (ENTRESTO) 49-51 MG Tab Take 1 Tablet by mouth 2 times a day. 180 Tablet 2    carvedilol (COREG) 6.25 MG Tab Take 1 Tablet by mouth 2 times a day with meals. 180 Tablet 3    furosemide (LASIX) 40 MG Tab Take 1 Tablet by mouth 1 time a day as needed (swelling). 90 Tablet 3     No current facility-administered medications for this visit.       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    Hemoglobin A1c:  Lab Results   Component Value Date/Time    HBA1C 4.4 12/19/2024 07:35 AM    HBA1C 5.0 12/14/2023 08:09 AM    HBA1C 5.1 06/06/2023 07:33 AM       Microalbumin/creatinine Ratio:  Lab Results   Component Value Date/Time    URCREAT 86.90 12/19/2024 0735    MICROALBUR <1.2 12/19/2024 0735    MALBCRT see below 12/19/2024 0735       Lipid Panel:  Lab Results   Component Value Date/Time    CHOLSTRLTOT 151 12/19/2024 0735    TRIGLYCERIDE 74 12/19/2024 0735    LDL 68 12/19/2024 0735       Thyroid:  Lab Results   Component Value Date/Time    TSHULTRASEN 3.940 12/19/2024 0735     Lab Results   Component Value Date/Time    FREET4 1.04 12/19/2024 0735       Complete Blood Count:  Lab Results   Component Value Date/Time    WBC 8.4 12/19/2024 0735    RBC 4.56 (L) 12/19/2024 0735    HEMOGLOBIN 15.3 12/19/2024 0735    HEMATOCRIT 47.9 12/19/2024 0735    .0 (H) 12/19/2024 0735    MCH 33.6 (H) 12/19/2024 0735    MCHC 31.9 (L) 12/19/2024 0735    RDW 54.3 (H) 12/19/2024 0735    MPV 9.5 12/19/2024 0735    LYMPHOCYTES 14.80 (L) 12/19/2024 0735    LYMPHS 1.25 12/19/2024 0735    MONOCYTES 7.10 12/19/2024 0735    MONOS 0.60 12/19/2024 0735    EOSINOPHILS 0.80 12/19/2024 0735    EOS 0.07 12/19/2024 0735    BASOPHILS 0.20 12/19/2024 0735    BASO 0.02 12/19/2024 0735    NRBC 0.00 12/19/2024 0735       Complete Metabolic Panel:  Lab Results   Component Value Date/Time    SODIUM 136 12/19/2024 07:35 AM    POTASSIUM 4.1 12/19/2024 07:35 AM    CHLORIDE 101 12/19/2024 07:35 AM    CO2 26 12/19/2024 07:35 AM    ANION 9.0 12/19/2024 07:35 AM    GLUCOSE 107 (H) 12/19/2024 07:35 AM    BUN 14 12/19/2024 07:35 AM    CREATININE 0.63 12/19/2024  "07:35 AM    CREATININE 1.0 06/30/2005 10:40 AM    ASTSGOT 20 12/19/2024 07:35 AM    ALTSGPT 16 12/19/2024 07:35 AM    TBILIRUBIN 1.5 12/19/2024 07:35 AM    ALBUMIN 4.1 12/19/2024 07:35 AM    TOTPROTEIN 6.3 12/19/2024 07:35 AM    GLOBULIN 2.2 12/19/2024 07:35 AM    AGRATIO 1.9 12/19/2024 07:35 AM         Vitamin D:    Lab Results   Component Value Date/Time    25HYDROXY 37 12/19/2024 0735       GFR:    Lab Results   Component Value Date/Time    GFRCKD 92 12/19/2024 0735         ROS   Review of Systems   Constitutional: Negative.  Negative for fever, chills, weight loss, malaise/fatigue and diaphoresis.   HENT: Negative.  Negative for hearing loss, ear pain, nosebleeds, congestion, sore throat, neck pain, tinnitus and ear discharge.    Respiratory: Negative.  Negative for cough, hemoptysis, sputum production, shortness of breath, wheezing and stridor.    Cardiovascular: Negative.  Negative for chest pain, palpitations, orthopnea, claudication, leg swelling and PND.   Gastrointestinal: denies nausea, vomiting, diarrhea, constipation, heartburn, melena or hematochezia.  Genitourinary: Denies dysuria, hematuria, urinary incontinence, frequency or urgency.    Musculoskeletal: Negative.  Negative for myalgias and back pain.   Neurological: Negative.  Negative for dizziness, tingling, tremors, weakness and headaches.   Psych:  Denies depression, anxiety or insomnia.  All other systems reviewed and are negative.       Objective:     /60   Pulse (!) 57   Temp 36.1 °C (97 °F) (Temporal)   Ht 1.88 m (6' 2\")   Wt 84.4 kg (186 lb)   SpO2 99%  Body mass index is 23.88 kg/m².   Physical Exam  Lungs were auscultated.  There is mild swelling in the right leg, more than the left.    Vital Signs  Weight is 186 pounds.  Physical Exam   Vitals reviewed.  Constitutional: oriented to person, place, and time. appears well-developed and well-nourished. No distress.   Neck: No JVD present.  Cardiovascular: Normal rate, regular " rhythm, normal heart sounds and intact distal pulses.  Exam reveals no gallop and no friction rub.  No murmur heard.  No carotid bruits.   Pulmonary/Chest: Effort normal and breath sounds normal. No stridor. No respiratory distress. no wheezes or rales. exhibits no tenderness.   Musculoskeletal: Normal range of motion. exhibits 1+ rt ald trace left pedal edema. aye pedal pulses 2+.  Lymphadenopathy: no cervical or supraclavicular adenopathy.   Neurological: alert and oriented to person, place, and time. exhibits normal muscle tone. Coordination normal.   Skin: Skin is warm and dry. no diaphoresis.   Psychiatric: normal mood and affect. behavior is normal.       Assessment and Plan:   The following treatment plan was discussed    Assessment & Plan  1. Diabetes Mellitus, CHF and weight loss.  His A1c levels have consistently been within the normal range for several years, with a recent reading of 4.4. He has experienced a weight loss of 5 pounds since March 2024, bringing his current weight to 186 pounds. He is advised to maintain a balanced diet, ensuring adequate protein intake to prevent malnutrition. Prescriptions for Aricept 100 mg, levothyroxine 100 mcg, Jardiance 100 mg, and Proscar 100 mg, each with 3 refills, have been provided.      2. Macrocytosis.  His hemoglobin and hematocrit levels are within the normal range, indicating no anemia. However, there is an observed increase in MCV, which could suggest a deficiency in vitamin B12 or folate. His platelet count is normal, and there are no significant changes in his red blood cells, neutrophils, or lymphocytes. All other white blood cell counts are within normal limits. A repeat CBC, iron studies, and ferritin levels will be ordered. Additionally, vitamin B12 and folate levels will be checked.    3. Insomnia.  He is advised to try doxylamine for his sleep issues. If doxylamine proves ineffective, trazodone will be considered as an alternative.    4. Frequent  urination.  He is currently on tamsulosin and finasteride, which are effective in managing his urinary symptoms. He is advised to stop drinking fluids 2 hours before bedtime to reduce nighttime urination.    5. Unsteady gait.  He is encouraged to use a cane for mobility support. He is also advised to increase his physical activity to improve strength and stability.    Follow-up  The patient will follow up in 1 month.      ORDERS:  1. Macrocytosis without anemia    - VITAMIN B12; Future  - FOLATE; Future  - CBC WITH DIFFERENTIAL; Future  - IRON/TOTAL IRON BIND; Future  - FERRITIN; Future    2. Acquired hypothyroidism    - levothyroxine (SYNTHROID) 25 MCG Tab; Take 1 Tablet by mouth every morning on an empty stomach.  Dispense: 100 Tablet; Refill: 3    3. Benign prostatic hyperplasia, unspecified whether lower urinary tract symptoms present    - tamsulosin (FLOMAX) 0.4 MG capsule; Take 1 Capsule by mouth every day.  Dispense: 90 Capsule; Refill: 0  - finasteride (PROSCAR) 5 MG Tab; Take 1 Tablet by mouth every day.  Dispense: 100 Tablet; Refill: 3    4. Memory loss    - donepezil (ARICEPT) 10 MG tablet; Take 1 Tablet by mouth every evening.  Dispense: 100 Tablet; Refill: 3    5. 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  Dispense: 100 Tablet; Refill: 3    1. Primary insomnia      will try doxalamine initially. if not helping will f/u for trazodone      2. Macrocytosis without anemia  VITAMIN B12    FOLATE    CBC WITH DIFFERENTIAL    IRON/TOTAL IRON BIND    FERRITIN    new finding.  no sx.  will monitor. check B12, folate, fe studies. f/u for review      3. Acquired hypothyroidism  levothyroxine (SYNTHROID) 25 MCG Tab    stable and well controlled on levothyroixine.      4. Benign prostatic hyperplasia, unspecified whether lower urinary tract symptoms present  tamsulosin (FLOMAX) 0.4 MG capsule    finasteride (PROSCAR) 5 MG Tab    still  has urinary freq.  RF proscar and flomax.      5. Memory loss  donepezil (ARICEPT) 10 MG tablet    taking aricept but wife feels not helping.  RF med. consider dc if not better at next f/u      6. 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 followed by cardiac.  RF med      7. Weight loss, unintentional      his weight is now stablized. wife & pt both report just not eating as much as used to.      8. IFG (impaired fasting glucose)      a1c wnl long term. he is on jardiance for his CHF. followed by cardaic      9. Unsteady gait when walking      pt declines PT for gait strengthening. enc to increase walking and activities at home for leg strengthening            Please note that this dictation was created using voice recognition software. I have made every reasonable attempt to correct obvious errors, but I expect that there are errors of grammar and possibly content that I did not discover before finalizing the note.      Attestation      Verbal consent was acquired by the patient to use [employer] ambient listening note generation during this visit Yes

## 2025-02-03 DIAGNOSIS — I10 ESSENTIAL HYPERTENSION, BENIGN: ICD-10-CM

## 2025-02-03 RX ORDER — CARVEDILOL 6.25 MG/1
6.25 TABLET ORAL 2 TIMES DAILY WITH MEALS
Qty: 180 TABLET | Refills: 0 | Status: SHIPPED | OUTPATIENT
Start: 2025-02-03 | End: 2025-02-27 | Stop reason: SDUPTHER

## 2025-02-03 NOTE — TELEPHONE ENCOUNTER
Is the patient due for a refill? Yes    Was the patient seen the last 15 months? Yes    Date of last office visit: 12.27.2023    Does the patient have an upcoming appointment?  Yes   If yes, When? 02.27.2025    Provider to refill:DS    Does the patient have group home Plus and need 100-day supply? (This applies to ALL medications) Patient does not have SCP

## 2025-02-05 DIAGNOSIS — N40.0 BENIGN PROSTATIC HYPERPLASIA, UNSPECIFIED WHETHER LOWER URINARY TRACT SYMPTOMS PRESENT: ICD-10-CM

## 2025-02-05 DIAGNOSIS — E78.5 DYSLIPIDEMIA: ICD-10-CM

## 2025-02-06 RX ORDER — ATORVASTATIN CALCIUM 40 MG/1
40 TABLET, FILM COATED ORAL DAILY
Qty: 90 TABLET | Refills: 0 | Status: SHIPPED | OUTPATIENT
Start: 2025-02-06 | End: 2025-02-27 | Stop reason: SDUPTHER

## 2025-02-06 NOTE — TELEPHONE ENCOUNTER
Received request via: Pharmacy    Was the patient seen in the last year in this department? Yes    Does the patient have an active prescription (recently filled or refills available) for medication(s) requested? No    Pharmacy Name: [email or web address] - [address]     Does the patient have retirement Plus and need 100-day supply? (This applies to ALL medications) Patient does not have SCP

## 2025-02-06 NOTE — TELEPHONE ENCOUNTER
Is the patient due for a refill? Yes    Was the patient seen the last 15 months? Yes    Date of last office visit: 12.27.2023    Does the patient have an upcoming appointment?  Yes   If yes, When? 2.27.2025    Provider to refill:DS    Does the patient have [facility] Plus and need 100-day supply? (This applies to ALL medications) Patient does not have SCP

## 2025-02-08 RX ORDER — TAMSULOSIN HYDROCHLORIDE 0.4 MG/1
0.4 CAPSULE ORAL
Qty: 90 CAPSULE | Refills: 0 | Status: SHIPPED | OUTPATIENT
Start: 2025-02-08

## 2025-02-10 DIAGNOSIS — I50.1 HEART FAILURE, LEFT, WITH LVEF <=30% (HCC): ICD-10-CM

## 2025-02-10 RX ORDER — POTASSIUM CHLORIDE 750 MG/1
10 CAPSULE, EXTENDED RELEASE ORAL 2 TIMES DAILY
Qty: 180 CAPSULE | Refills: 0 | Status: SHIPPED | OUTPATIENT
Start: 2025-02-10 | End: 2025-02-27 | Stop reason: SDUPTHER

## 2025-02-18 DIAGNOSIS — I50.1 HEART FAILURE, LEFT, WITH LVEF <=30% (HCC): ICD-10-CM

## 2025-02-19 RX ORDER — SACUBITRIL AND VALSARTAN 49; 51 MG/1; MG/1
1 TABLET, FILM COATED ORAL 2 TIMES DAILY
Qty: 180 TABLET | Refills: 0 | Status: SHIPPED | OUTPATIENT
Start: 2025-02-19 | End: 2025-02-27 | Stop reason: SDUPTHER

## 2025-02-19 NOTE — TELEPHONE ENCOUNTER
Is the patient due for a refill? Yes    Was the patient seen the last 15 months? Yes    Date of last office visit: 12.27.2023    Does the patient have an upcoming appointment?  Yes   If yes, When? 02.27.2025    Provider to refill:DS    Does the patient have [facility] Plus and need 100-day supply? (This applies to ALL medications) Patient does not have SCP

## 2025-02-27 ENCOUNTER — OFFICE VISIT (OUTPATIENT)
Dept: CARDIOLOGY | Facility: MEDICAL CENTER | Age: 85
End: 2025-02-27
Attending: INTERNAL MEDICINE
Payer: COMMERCIAL

## 2025-02-27 VITALS
WEIGHT: 187 LBS | BODY MASS INDEX: 24 KG/M2 | OXYGEN SATURATION: 97 % | SYSTOLIC BLOOD PRESSURE: 108 MMHG | DIASTOLIC BLOOD PRESSURE: 68 MMHG | HEIGHT: 74 IN | RESPIRATION RATE: 16 BRPM | HEART RATE: 62 BPM

## 2025-02-27 DIAGNOSIS — I10 ESSENTIAL HYPERTENSION, BENIGN: ICD-10-CM

## 2025-02-27 DIAGNOSIS — E78.5 DYSLIPIDEMIA: ICD-10-CM

## 2025-02-27 DIAGNOSIS — I50.1 HEART FAILURE, LEFT, WITH LVEF <=30% (HCC): ICD-10-CM

## 2025-02-27 DIAGNOSIS — I48.19 OTHER PERSISTENT ATRIAL FIBRILLATION (HCC): ICD-10-CM

## 2025-02-27 DIAGNOSIS — Z79.899 ENCOUNTER FOR LONG-TERM (CURRENT) USE OF HIGH-RISK MEDICATION: ICD-10-CM

## 2025-02-27 DIAGNOSIS — D68.69 HYPERCOAGULABLE STATE DUE TO PERMANENT ATRIAL FIBRILLATION (HCC): Chronic | ICD-10-CM

## 2025-02-27 DIAGNOSIS — I48.21 HYPERCOAGULABLE STATE DUE TO PERMANENT ATRIAL FIBRILLATION (HCC): Chronic | ICD-10-CM

## 2025-02-27 PROCEDURE — 99213 OFFICE O/P EST LOW 20 MIN: CPT | Performed by: INTERNAL MEDICINE

## 2025-02-27 RX ORDER — CARVEDILOL 6.25 MG/1
6.25 TABLET ORAL 2 TIMES DAILY WITH MEALS
Qty: 180 TABLET | Refills: 3 | Status: SHIPPED | OUTPATIENT
Start: 2025-02-27

## 2025-02-27 RX ORDER — POTASSIUM CHLORIDE 750 MG/1
10 CAPSULE, EXTENDED RELEASE ORAL 2 TIMES DAILY
Qty: 180 CAPSULE | Refills: 3 | Status: SHIPPED | OUTPATIENT
Start: 2025-02-27

## 2025-02-27 RX ORDER — SACUBITRIL AND VALSARTAN 49; 51 MG/1; MG/1
1 TABLET, FILM COATED ORAL 2 TIMES DAILY
Qty: 180 TABLET | Refills: 3 | Status: SHIPPED | OUTPATIENT
Start: 2025-02-27

## 2025-02-27 RX ORDER — ATORVASTATIN CALCIUM 40 MG/1
40 TABLET, FILM COATED ORAL DAILY
Qty: 90 TABLET | Refills: 3 | Status: SHIPPED | OUTPATIENT
Start: 2025-02-27

## 2025-02-27 RX ORDER — DIGOXIN 125 MCG
125 TABLET ORAL DAILY
Qty: 90 TABLET | Refills: 3 | Status: SHIPPED | OUTPATIENT
Start: 2025-02-27

## 2025-02-27 RX ORDER — EMPAGLIFLOZIN 10 MG/1
10 TABLET, FILM COATED ORAL DAILY
Qty: 90 TABLET | Refills: 3 | Status: SHIPPED | OUTPATIENT
Start: 2025-02-27

## 2025-02-27 ASSESSMENT — FIBROSIS 4 INDEX: FIB4 SCORE: 2.01

## 2025-02-27 NOTE — PROGRESS NOTES
Chief Complaint   Patient presents with    Atrial Fibrillation    Hypertension    Aortic Atherosclerosis       Subjective     Pedro Champion is a 84 y.o. male who presents today with CHF pAF     Doing well no CHF symptoms tolerating medications well    Past Medical History:   Diagnosis Date    Atherosclerosis of aorta (HCC)     Basal cell carcinoma     Cardiomyopathy (HCC) 02/2020    Echocardiogram with LVEF 40-45%    Chronic anticoagulation     Depression     Dyslipidemia 4/13/2016    Dysphagia 10/5/2019    REBECCA (generalized anxiety disorder) 11/10/2021    Hearing loss     Heart failure, left, 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% (HCC) 9/23/2019    Hypertension     Hypothyroid 9/23/2019    Insomnia     Malignant melanoma (Formerly [facility]) 5/4/2021    Mandibular fracture, open (Formerly [facility]) 8/28/2019    Memory loss 11/22/2021    Other persistent atrial fibrillation (Formerly [facility]) 8/28/2019    Parotid nodule 7/19/2013    Urinary retention 9/9/2019     IMO load March 2020    Vitamin D deficiency 9/27/2021     Past Surgical History:   Procedure Laterality Date    CT DENTAL SURGERY PROCEDURE Left 10/13/2019    Procedure: EXTRACTION, TOOTH;  Surgeon: Troy Dong D.D.SBetsy;  Location: [facility];  Service: Oral Surgery    MANDIBLE FRACTURE ORIF Bilateral 10/13/2019    Procedure: ORIF, FRACTURE, MANDIBLE - FOR HARDWARE REMOVAL MANDIBLE, POSS ORIF;  Surgeon: Troy Dong D.D.SBetsy;  Location: [facility];  Service: Oral Surgery    ORAL FISTULA REPAIR N/A 10/13/2019    Procedure: CLOSURE, FISTULA, MOUTH;  Surgeon: Troy Dong D.D.SBetsy;  Location: [facility];  Service: Oral Surgery    SUBMANDIBLE ABSCESS INCISION AND DRAINAGE N/A 8/31/2019    Procedure: INCISION AND DRAINAGE FACIAL WOUND;  Surgeon: Troy Dong D.D.S.;  Location: SURGERY [facility];  Service: Oral Surgery    INTERMAXILLARY FIXATATION N/A 8/31/2019    Procedure: FIXATION, MAXILLOMANDIBULAR. ORIF and MMF mandible fracture  debridement of facial wounds extracton tooth #8;  Surgeon: Troy Dong D.D.S.;  Location: SURGERY [facility];  Service: Oral Surgery    PAROTIDECTOMY SUPERFICIAL  7/19/2013    Performed by Mendy Stern M.D. at SURGERY [facility]    LUMBAR DECOMPRESSION  1988    INGUINAL HERNIA REPAIR BILATERAL  1960's     Family History   Problem Relation Age of Onset    Heart Disease Father 81        Sudden cardiac death probably coronary    Stroke Mother      Social History     Socioeconomic History    Marital status:      Spouse name: Not on file    Number of children: Not on file    Years of education: Not on file    Highest education level: Not on file   Occupational History    Not on file   Tobacco Use    Smoking status: Never     Passive exposure: Past (father was a smoker)    Smokeless tobacco: Never   Vaping Use    Vaping status: Never Used   Substance and Sexual Activity    Alcohol use: Yes     Alcohol/week: 8.4 - 12.6 oz     Types: 14 - 21 Glasses of wine per week     Comment: 3-4 glasses of wine a day    Drug use: Never    Sexual activity: Yes     Partners: Female   Other Topics Concern    Not on file   Social History Narrative    ** Merged History Encounter **          Social Drivers of Health     Financial Resource Strain: Not on file   Food Insecurity: Not on file   Transportation Needs: Not on file   Physical Activity: Not on file   Stress: Not on file   Social Connections: Not on file   Intimate Partner Violence: Not on file   Housing Stability: Not on file     Allergies   Allergen Reactions    Cefepime Hcl Rash     Hives, eosinophilia      Outpatient Encounter Medications as of 2/27/2025   Medication Sig Dispense Refill    sacubitril-valsartan (ENTRESTO) 49-51 MG Tab Take 1 Tablet by mouth 2 times a day. 180 Tablet 3    carvedilol (COREG) 6.25 MG Tab Take 1 Tablet by mouth 2 times a day with meals. 180 Tablet 3    digoxin (LANOXIN) 125 MCG Tab Take 1 Tablet by mouth every day. 90  Tablet 3    atorvastatin (LIPITOR) 40 MG Tab Take 1 Tablet by mouth every day. 90 Tablet 3    potassium chloride (MICRO-K) 10 MEQ capsule Take 1 Capsule by mouth 2 times a day. 180 Capsule 3    Empagliflozin (JARDIANCE) 10 MG Tab tablet Take 1 Tablet by mouth every day. 90 Tablet 3    apixaban (ELIQUIS) 5mg Tab Take 1 Tablet by mouth 2 times a day. 180 Tablet 3    tamsulosin (FLOMAX) 0.4 MG capsule TAKE ONE CAPSULE BY MOUTH EVERY DAY 90 Capsule 0    finasteride (PROSCAR) 5 MG Tab Take 1 Tablet by mouth every day. 100 Tablet 3    levothyroxine (SYNTHROID) 25 MCG Tab Take 1 Tablet by mouth every morning on an empty stomach. 100 Tablet 3    donepezil (ARICEPT) 10 MG tablet Take 1 Tablet by mouth every evening. 100 Tablet 3    memantine (NAMENDA) 5 MG Tab Take 1 Tablet by mouth 2 times a day. 200 Tablet 0    hydrOXYzine HCl (ATARAX) 25 MG Tab Take 1 Tablet by mouth every 8 hours as needed for Anxiety. 30 Tablet 0    [DISCONTINUED] ENTRESTO 49-51 MG Tab TAKE 1 TABLET BY MOUTH TWICE DAILY 180 Tablet 0    [DISCONTINUED] potassium chloride (MICRO-K) 10 MEQ capsule TAKE ONE CAPSULE BY MOUTH TWICE DAILY. 180 Capsule 0    [DISCONTINUED] atorvastatin (LIPITOR) 40 MG Tab TAKE ONE TABLET BY MOUTH DAILY 90 Tablet 0    [DISCONTINUED] carvedilol (COREG) 6.25 MG Tab Take 1 Tablet by mouth 2 times a day with meals. 180 Tablet 0    [DISCONTINUED] Empagliflozin (JARDIANCE) 10 MG Tab tablet Take 1 Tablet by mouth every day. 100 Tablet 3    [DISCONTINUED] digoxin (LANOXIN) 125 MCG Tab TAKE ONE TABLET BY MOUTH ONE TIME DAILY 90 Tablet 0    [DISCONTINUED] apixaban (ELIQUIS) 5mg Tab Take 1 Tablet by mouth 2 times a day. 180 Tablet 0    hydrOXYzine HCl (ATARAX) 25 MG Tab Take 1 Tablet by mouth every 8 hours as needed for Anxiety. (Patient not taking: Reported on 2/27/2025) 90 Tablet 0    furosemide (LASIX) 40 MG Tab Take 1 Tablet by mouth 1 time a day as needed (swelling). (Patient not taking: Reported on 2/27/2025) 90 Tablet 3     No  "facility-administered encounter medications on file as of 2/27/2025.     ROS           Objective     /68 (BP Location: Left arm, Patient Position: Sitting, BP Cuff Size: Adult)   Pulse 62   Resp 16   Ht 1.88 m (6' 2\")   Wt 84.8 kg (187 lb)   SpO2 97%   BMI 24.01 kg/m²     Physical Exam  Constitutional:       General: He is not in acute distress.     Appearance: He is not diaphoretic.   Eyes:      General: No scleral icterus.  Neck:      Vascular: No JVD.   Cardiovascular:      Rate and Rhythm: Normal rate.      Heart sounds: Normal heart sounds. No murmur heard.     No friction rub. No gallop.   Pulmonary:      Effort: No respiratory distress.      Breath sounds: No wheezing or rales.   Abdominal:      General: Bowel sounds are normal.      Palpations: Abdomen is soft.   Musculoskeletal:      Right lower leg: No edema.      Left lower leg: No edema.   Skin:     Findings: No rash.   Neurological:      Mental Status: He is alert. Mental status is at baseline.   Psychiatric:         Mood and Affect: Mood normal.          We reviewed in person the most recent labs  Recent Results (from the past 30 weeks)   PSA TOTAL + %FREE    Collection Time: 12/19/24  7:35 AM   Result Value Ref Range    PSA Total 0.9 0.0 - 4.0 ng/mL    Free Psa 0.2 ng/mL    % Free Psa 22 %   T3 FREE    Collection Time: 12/19/24  7:35 AM   Result Value Ref Range    T3,Free 2.40 2.00 - 4.40 pg/mL   VITAMIN D,25 HYDROXY (DEFICIENCY)    Collection Time: 12/19/24  7:35 AM   Result Value Ref Range    25-Hydroxy   Vitamin D 25 37 30 - 100 ng/mL   FREE THYROXINE    Collection Time: 12/19/24  7:35 AM   Result Value Ref Range    Free T-4 1.04 0.93 - 1.70 ng/dL   TSH    Collection Time: 12/19/24  7:35 AM   Result Value Ref Range    TSH 3.940 0.350 - 5.500 uIU/mL   Lipid Profile    Collection Time: 12/19/24  7:35 AM   Result Value Ref Range    Cholesterol,Tot 151 100 - 199 mg/dL    Triglycerides 74 0 - 149 mg/dL    HDL 68 >=40 mg/dL    LDL 68 <100 " mg/dL   MICROALBUMIN CREAT RATIO URINE    Collection Time: 12/19/24  7:35 AM   Result Value Ref Range    Creatinine, Urine 86.90 mg/dL    Microalbumin, Urine Random <1.2 mg/dL    Micro Alb Creat Ratio see below 0 - 30 mg/g   HEMOGLOBIN A1C    Collection Time: 12/19/24  7:35 AM   Result Value Ref Range    Glycohemoglobin 4.4 4.0 - 5.6 %    Est Avg Glucose 80 mg/dL   Comp Metabolic Panel    Collection Time: 12/19/24  7:35 AM   Result Value Ref Range    Sodium 136 135 - 145 mmol/L    Potassium 4.1 3.6 - 5.5 mmol/L    Chloride 101 96 - 112 mmol/L    Co2 26 20 - 33 mmol/L    Anion Gap 9.0 7.0 - 16.0    Glucose 107 (H) 65 - 99 mg/dL    Bun 14 8 - 22 mg/dL    Creatinine 0.63 0.50 - 1.40 mg/dL    Calcium 8.7 8.5 - 10.5 mg/dL    Correct Calcium 8.6 8.5 - 10.5 mg/dL    AST(SGOT) 20 12 - 45 U/L    ALT(SGPT) 16 2 - 50 U/L    Alkaline Phosphatase 50 30 - 99 U/L    Total Bilirubin 1.5 0.1 - 1.5 mg/dL    Albumin 4.1 3.2 - 4.9 g/dL    Total Protein 6.3 6.0 - 8.2 g/dL    Globulin 2.2 1.9 - 3.5 g/dL    A-G Ratio 1.9 g/dL   CBC WITH DIFFERENTIAL    Collection Time: 12/19/24  7:35 AM   Result Value Ref Range    WBC 8.4 4.8 - 10.8 K/uL    RBC 4.56 (L) 4.70 - 6.10 M/uL    Hemoglobin 15.3 14.0 - 18.0 g/dL    Hematocrit 47.9 42.0 - 52.0 %    .0 (H) 81.4 - 97.8 fL    MCH 33.6 (H) 27.0 - 33.0 pg    MCHC 31.9 (L) 32.3 - 36.5 g/dL    RDW 54.3 (H) 35.9 - 50.0 fL    Platelet Count 214 164 - 446 K/uL    MPV 9.5 9.0 - 12.9 fL    Neutrophils-Polys 76.50 (H) 44.00 - 72.00 %    Lymphocytes 14.80 (L) 22.00 - 41.00 %    Monocytes 7.10 0.00 - 13.40 %    Eosinophils 0.80 0.00 - 6.90 %    Basophils 0.20 0.00 - 1.80 %    Immature Granulocytes 0.60 0.00 - 0.90 %    Nucleated RBC 0.00 0.00 - 0.20 /100 WBC    Neutrophils (Absolute) 6.44 1.82 - 7.42 K/uL    Lymphs (Absolute) 1.25 1.00 - 4.80 K/uL    Monos (Absolute) 0.60 0.00 - 0.85 K/uL    Eos (Absolute) 0.07 0.00 - 0.51 K/uL    Baso (Absolute) 0.02 0.00 - 0.12 K/uL    Immature Granulocytes (abs) 0.05  0.00 - 0.11 K/uL    NRBC (Absolute) 0.00 K/uL   ESTIMATED GFR    Collection Time: 12/19/24  7:35 AM   Result Value Ref Range    GFR (CKD-EPI) 92 >60 mL/min/1.73 m 2           Assessment & Plan     1. Other persistent atrial fibrillation (HCC)  digoxin (LANOXIN) 125 MCG Tab    apixaban (ELIQUIS) 5mg Tab      2. Essential hypertension, benign  carvedilol (COREG) 6.25 MG Tab      3. Dyslipidemia  atorvastatin (LIPITOR) 40 MG Tab      4. Heart failure, left, 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% (HCC)  sacubitril-valsartan (ENTRESTO) 49-51 MG Tab    potassium chloride (MICRO-K) 10 MEQ capsule    Empagliflozin (JARDIANCE) 10 MG Tab tablet      5. Heart failure, left, 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% (HCC)  sacubitril-valsartan (ENTRESTO) 49-51 MG Tab    potassium chloride (MICRO-K) 10 MEQ capsule    Empagliflozin (JARDIANCE) 10 MG Tab tablet    stable on jardiance. followed by cardiac.  RF med      6. Encounter for long-term (current) use of high-risk medication  apixaban (ELIQUIS) 5mg Tab      7. Hypercoagulable state due to permanent atrial fibrillation (HCC)            Medical Decision Making: Today's Assessment/Status/Plan:          It was my pleasure to meet with Mr. Champion.    We addressed the management of hypertension at today's visit. Blood pressure is well controlled.  We specifically assessed the labs on hypertension treatment    We addressed the management of dyslipidemia at today's visit. He is on appropriate lipid lowering medication.      We addressed the management of congestive heart failure.  We addressed the potential side effects and laboratory follow-up for these medications. He is on proper mineralacorticoid, angiotensin/ace inhibition with neprilysin inhibition, SGLTs inhibitor and beta-blockers if appropriate.  We addressed the potential side effects and regular laboratory follow-up for these medications.    He declines ICD which is appropriate, reasonable to check echo every 3-5 years but his meds are optomized    He prefers mail  order pharamcy    I will see Mr. Champion back in 1 year time and encouraged him to follow up with us over the phone or electronically using my MyChart as issues arise.    It is my pleasure to participate in the care of Mr. Champion.  Please do not hesitate to contact me with questions or concerns.    Troy Lorenzo MD PhD [facility]  Cardiologist [facility]    Please note that this dictation was created using voice recognition software. There may be errors I did not discover before finalizing the note.     () Today's E/M visit is associated with medical care services that serve as the continuing focal point for all needed health care services and/or with medical care services that  are part of ongoing care related to a patient's single, serious condition, or a complex condition: This includes  furnishing services to patients on an ongoing basis that result in care that is personalized  to the patient. The services result in a comprehensive, longitudinal, and continuous  relationship with the patient and involve delivery of team-based care that is accessible, coordinated with other practitioners and providers, and integrated with the broader health  care landscape.

## 2025-03-13 ENCOUNTER — APPOINTMENT (OUTPATIENT)
Dept: URBAN - METROPOLITAN AREA CLINIC 15 | Facility: CLINIC | Age: 85
Setting detail: DERMATOLOGY
End: 2025-03-13

## 2025-03-13 DIAGNOSIS — D18.0 HEMANGIOMA: ICD-10-CM

## 2025-03-13 DIAGNOSIS — D22 MELANOCYTIC NEVI: ICD-10-CM

## 2025-03-13 DIAGNOSIS — D485 NEOPLASM OF UNCERTAIN BEHAVIOR OF SKIN: ICD-10-CM | Status: INADEQUATELY CONTROLLED

## 2025-03-13 DIAGNOSIS — Z85.820 PERSONAL HISTORY OF MALIGNANT MELANOMA OF SKIN: ICD-10-CM | Status: STABLE

## 2025-03-13 DIAGNOSIS — L81.4 OTHER MELANIN HYPERPIGMENTATION: ICD-10-CM

## 2025-03-13 DIAGNOSIS — Z71.89 OTHER SPECIFIED COUNSELING: ICD-10-CM

## 2025-03-13 DIAGNOSIS — L57.0 ACTINIC KERATOSIS: ICD-10-CM | Status: INADEQUATELY CONTROLLED

## 2025-03-13 DIAGNOSIS — D69.2 OTHER NONTHROMBOCYTOPENIC PURPURA: ICD-10-CM

## 2025-03-13 DIAGNOSIS — L73.9 FOLLICULAR DISORDER, UNSPECIFIED: ICD-10-CM | Status: INADEQUATELY CONTROLLED

## 2025-03-13 DIAGNOSIS — L82.1 OTHER SEBORRHEIC KERATOSIS: ICD-10-CM

## 2025-03-13 DIAGNOSIS — R23.3 SPONTANEOUS ECCHYMOSES: ICD-10-CM

## 2025-03-13 PROBLEM — D18.01 HEMANGIOMA OF SKIN AND SUBCUTANEOUS TISSUE: Status: ACTIVE | Noted: 2025-03-13

## 2025-03-13 PROBLEM — D22.5 MELANOCYTIC NEVI OF TRUNK: Status: ACTIVE | Noted: 2025-03-13

## 2025-03-13 PROBLEM — D48.5 NEOPLASM OF UNCERTAIN BEHAVIOR OF SKIN: Status: ACTIVE | Noted: 2025-03-13

## 2025-03-13 PROCEDURE — ? TREATMENT REGIMEN

## 2025-03-13 PROCEDURE — 17003 DESTRUCT PREMALG LES 2-14: CPT

## 2025-03-13 PROCEDURE — ? BIOPSY BY SHAVE METHOD

## 2025-03-13 PROCEDURE — ? PRESCRIPTION

## 2025-03-13 PROCEDURE — ? DIAGNOSIS COMMENT

## 2025-03-13 PROCEDURE — 11103 TANGNTL BX SKIN EA SEP/ADDL: CPT

## 2025-03-13 PROCEDURE — 99213 OFFICE O/P EST LOW 20 MIN: CPT | Mod: 25

## 2025-03-13 PROCEDURE — ? LIQUID NITROGEN

## 2025-03-13 PROCEDURE — 11102 TANGNTL BX SKIN SINGLE LES: CPT

## 2025-03-13 PROCEDURE — 17000 DESTRUCT PREMALG LESION: CPT | Mod: 59

## 2025-03-13 PROCEDURE — ? SUNSCREEN RECOMMENDATIONS

## 2025-03-13 PROCEDURE — ? COUNSELING

## 2025-03-13 PROCEDURE — ? MEDICATION COUNSELING

## 2025-03-13 RX ORDER — CLINDAMYCIN PHOSPHATE 10 MG/ML
LOTION TOPICAL QD
Qty: 60 | Refills: 3 | Status: ERX

## 2025-03-13 ASSESSMENT — LOCATION SIMPLE DESCRIPTION DERM
LOCATION SIMPLE: LEFT PRETIBIAL REGION
LOCATION SIMPLE: ABDOMEN
LOCATION SIMPLE: LOWER BACK
LOCATION SIMPLE: LEFT CHEEK
LOCATION SIMPLE: RIGHT FOREARM
LOCATION SIMPLE: SCALP
LOCATION SIMPLE: LEFT CHEEK
LOCATION SIMPLE: POSTERIOR NECK
LOCATION SIMPLE: CHEST
LOCATION SIMPLE: RIGHT UPPER ARM
LOCATION SIMPLE: LEFT FOREARM
LOCATION SIMPLE: LEFT FOREHEAD
LOCATION SIMPLE: LEFT ANTERIOR NECK
LOCATION SIMPLE: RIGHT HAND

## 2025-03-13 ASSESSMENT — LOCATION ZONE DERM
LOCATION ZONE: NECK
LOCATION ZONE: ARM
LOCATION ZONE: TRUNK
LOCATION ZONE: LEG
LOCATION ZONE: HAND
LOCATION ZONE: FACE
LOCATION ZONE: SCALP
LOCATION ZONE: FACE

## 2025-03-13 ASSESSMENT — LOCATION DETAILED DESCRIPTION DERM
LOCATION DETAILED: SUPERIOR LUMBAR SPINE
LOCATION DETAILED: RIGHT LATERAL SUPERIOR CHEST
LOCATION DETAILED: LEFT PROXIMAL PRETIBIAL REGION
LOCATION DETAILED: LEFT MID PREAURICULAR CHEEK
LOCATION DETAILED: MID POSTERIOR NECK
LOCATION DETAILED: LEFT FOREHEAD
LOCATION DETAILED: LEFT LATERAL ABDOMEN
LOCATION DETAILED: LEFT CLAVICULAR NECK
LOCATION DETAILED: RIGHT CENTRAL PARIETAL SCALP
LOCATION DETAILED: RIGHT DISTAL DORSAL FOREARM
LOCATION DETAILED: LEFT PROXIMAL DORSAL FOREARM
LOCATION DETAILED: LEFT VENTRAL PROXIMAL FOREARM
LOCATION DETAILED: LEFT INFERIOR CENTRAL MALAR CHEEK
LOCATION DETAILED: RIGHT PROXIMAL DORSAL FOREARM
LOCATION DETAILED: RIGHT ULNAR DORSAL HAND
LOCATION DETAILED: RIGHT DISTAL POSTERIOR UPPER ARM
LOCATION DETAILED: RIGHT ANTERIOR DISTAL UPPER ARM

## 2025-03-13 ASSESSMENT — SEVERITY ASSESSMENT: SEVERITY: MILD TO MODERATE

## 2025-03-13 NOTE — PROCEDURE: LIQUID NITROGEN
Consent: The patient's consent was obtained including but not limited to risks of crusting, scabbing, blistering, scarring, darker or lighter pigmentary change, recurrence, incomplete removal and infection.
Post-Care Instructions: I reviewed with the patient in detail post-care instructions. Patient is to wear sunprotection, and avoid picking at any of the treated lesions. Pt may apply Vaseline to crusted or scabbing areas.
Render Post-Care Instructions In Note?: yes
Duration Of Freeze Thaw-Cycle (Seconds): 3
Show Aperture Variable?: No
Application Tool (Optional): Cry-AC
Number Of Freeze-Thaw Cycles: 2 freeze-thaw cycles
Detail Level: Zone

## 2025-03-13 NOTE — PROCEDURE: DIAGNOSIS COMMENT
Comment: Treated surgically in 2013. Pt and wife are unsure of MD
Render Risk Assessment In Note?: no
Detail Level: Simple
week(s)/1

## 2025-03-13 NOTE — PROCEDURE: BIOPSY BY SHAVE METHOD
Detail Level: Detailed
Depth Of Biopsy: dermis
Was A Bandage Applied: Yes
Size Of Lesion In Cm: 0.5
X Size Of Lesion In Cm: 0
Biopsy Type: H and E
Biopsy Method: Dermablade
Anesthesia Type: 0.5% lidocaine without epinephrine
Anesthesia Volume In Cc: 0.3
Hemostasis: Electrocautery
Wound Care: Petrolatum
Dressing: bandage
Destruction After The Procedure: No
Type Of Destruction Used: Curettage
Curettage Text: The wound bed was treated with curettage after the biopsy was performed.
Cryotherapy Text: The wound bed was treated with cryotherapy after the biopsy was performed.
Electrodesiccation Text: The wound bed was treated with electrodesiccation after the biopsy was performed.
Electrodesiccation And Curettage Text: The wound bed was treated with electrodesiccation and curettage after the biopsy was performed.
Silver Nitrate Text: The wound bed was treated with silver nitrate after the biopsy was performed.
Lab: [id number]
Lab Facility: 
Medical Necessity Information: It is in your best interest to select a reason for this procedure from the list below. All of these items fulfill various CMS LCD requirements except the new and changing color options.
Consent: Written consent was obtained and risks were reviewed including but not limited to scarring, infection, bleeding, scabbing, incomplete removal, nerve damage and allergy to anesthesia.
Post-Care Instructions: I reviewed with the patient in detail post-care instructions. Patient is to keep the biopsy site dry overnight, and then apply bacitracin twice daily until healed. Patient may apply hydrogen peroxide soaks to remove any crusting.
Notification Instructions: Patient will be notified of biopsy results. However, patient instructed to call the office if not contacted within 2 weeks.
Billing Type: Third-Party Bill
Information: Selecting Yes will display possible errors in your note based on the variables you have selected. This validation is only offered as a suggestion for you. PLEASE NOTE THAT THE VALIDATION TEXT WILL BE REMOVED WHEN YOU FINALIZE YOUR NOTE. IF YOU WANT TO FAX A PRELIMINARY NOTE YOU WILL NEED TO TOGGLE THIS TO 'NO' IF YOU DO NOT WANT IT IN YOUR FAXED NOTE.
Size Of Lesion In Cm: 0.6
Size Of Lesion In Cm: 1

## 2025-03-13 NOTE — PROCEDURE: TREATMENT REGIMEN
Initiate Treatment: clindamycin 1 % lotion QD\\nSig: Apply to affected areas once daily in morning after washing area with benzoyl peroxide.\\n\\nOTC Benzoyl Peroxide 10% wash: Wash affected areas once daily in the shower (handout given)
Detail Level: Detailed

## 2025-03-13 NOTE — PROCEDURE: MEDICATION COUNSELING
Hydroxychloroquine Pregnancy And Lactation Text: This medication has been shown to cause fetal harm but it isn't assigned a Pregnancy Risk Category. There are small amounts excreted in breast milk.
Zyclara Counseling:  I discussed with the patient the risks of imiquimod including but not limited to erythema, scaling, itching, weeping, crusting, and pain.  Patient understands that the inflammatory response to imiquimod is variable from person to person and was educated regarded proper titration schedule.  If flu-like symptoms develop, patient knows to discontinue the medication and contact us.
Tetracycline Pregnancy And Lactation Text: This medication is Pregnancy Category D and not consider safe during pregnancy. It is also excreted in breast milk.
Humira Counseling:  I discussed with the patient the risks of adalimumab including but not limited to myelosuppression, immunosuppression, autoimmune hepatitis, demyelinating diseases, lymphoma, and serious infections.  The patient understands that monitoring is required including a PPD at baseline and must alert us or the primary physician if symptoms of infection or other concerning signs are noted.
Cephalexin Counseling: I counseled the patient regarding use of cephalexin as an antibiotic for prophylactic and/or therapeutic purposes. Cephalexin (commonly prescribed under brand name Keflex) is a cephalosporin antibiotic which is active against numerous classes of bacteria, including most skin bacteria. Side effects may include nausea, diarrhea, gastrointestinal upset, rash, hives, yeast infections, and in rare cases, hepatitis, kidney disease, seizures, fever, confusion, neurologic symptoms, and others. Patients with severe allergies to penicillin medications are cautioned that there is about a 10% incidence of cross-reactivity with cephalosporins. When possible, patients with penicillin allergies should use alternatives to cephalosporins for antibiotic therapy.
Topical Clindamycin Counseling: Patient counseled that this medication may cause skin irritation or allergic reactions.  In the event of skin irritation, the patient was advised to reduce the amount of the drug applied or use it less frequently.   The patient verbalized understanding of the proper use and possible adverse effects of clindamycin.  All of the patient's questions and concerns were addressed.
Prednisone Counseling:  I discussed with the patient the risks of prolonged use of prednisone including but not limited to weight gain, insomnia, osteoporosis, mood changes, diabetes, susceptibility to infection, glaucoma and high blood pressure.  In cases where prednisone use is prolonged, patients should be monitored with blood pressure checks, serum glucose levels and an eye exam.  Additionally, the patient may need to be placed on GI prophylaxis, PCP prophylaxis, and calcium and vitamin D supplementation and/or a bisphosphonate.  The patient verbalized understanding of the proper use and the possible adverse effects of prednisone.  All of the patient's questions and concerns were addressed.
Siliq Counseling:  I discussed with the patient the risks of Siliq including but not limited to new or worsening depression, suicidal thoughts and behavior, immunosuppression, malignancy, posterior leukoencephalopathy syndrome, and serious infections.  The patient understands that monitoring is required including a PPD at baseline and must alert us or the primary physician if symptoms of infection or other concerning signs are noted. There is also a special program designed to monitor depression which is required with Siliq.
Saxenda Counseling: I reviewed the possible side effects including: thyroid tumors, kidney disease, gallbladder disease, abdominal pain, constipation, diarrhea, nausea, vomiting and pancreatitis. Do not take this medication if you have a history or family history of multiple endocrine neoplasia syndrome type 2. Side effects reviewed, pt to contact office should one occur.
Cephalexin Pregnancy And Lactation Text: This medication is Pregnancy Category B and considered safe during pregnancy.  It is also excreted in breast milk but can be used safely for shorter doses.
Acitretin Pregnancy And Lactation Text: This medication is Pregnancy Category X and should not be given to women who are pregnant or may become pregnant in the future. This medication is excreted in breast milk.
Eucrisa Pregnancy And Lactation Text: This medication has not been assigned a Pregnancy Risk Category but animal studies failed to show danger with the topical medication. It is unknown if the medication is excreted in breast milk.
Terbinafine Counseling: Patient counseling regarding adverse effects of terbinafine including but not limited to headache, diarrhea, rash, upset stomach, liver function test abnormalities, itching, taste/smell disturbance, nausea, abdominal pain, and flatulence.  There is a rare possibility of liver failure that can occur when taking terbinafine.  The patient understands that a baseline LFT and kidney function test may be required. The patient verbalized understanding of the proper use and possible adverse effects of terbinafine.  All of the patient's questions and concerns were addressed.
Low Dose Naltrexone Counseling- I discussed with the patient the potential risks and side effects of low dose naltrexone including but not limited to: more vivid dreams, headaches, nausea, vomiting, abdominal pain, fatigue, dizziness, and anxiety.
Bexarotene Counseling:  I discussed with the patient the risks of bexarotene including but not limited to hair loss, dry lips/skin/eyes, liver abnormalities, hyperlipidemia, pancreatitis, depression/suicidal ideation, photosensitivity, drug rash/allergic reactions, hypothyroidism, anemia, leukopenia, infection, cataracts, and teratogenicity.  Patient understands that they will need regular blood tests to check lipid profile, liver function tests, white blood cell count, thyroid function tests and pregnancy test if applicable.
Prednisone Pregnancy And Lactation Text: This medication is Pregnancy Category C and it isn't know if it is safe during pregnancy. This medication is excreted in breast milk.
Aklief counseling:  Patient advised to apply a pea-sized amount only at bedtime and wait 30 minutes after washing their face before applying.  If too drying, patient may add a non-comedogenic moisturizer.  The most commonly reported side effects including irritation, redness, scaling, dryness, stinging, burning, itching, and increased risk of sunburn.  The patient verbalized understanding of the proper use and possible adverse effects of retinoids.  All of the patient's questions and concerns were addressed.
Topical Clindamycin Pregnancy And Lactation Text: This medication is Pregnancy Category B and is considered safe during pregnancy. It is unknown if it is excreted in breast milk.
Zyclara Pregnancy And Lactation Text: This medication is Pregnancy Category C. It is unknown if this medication is excreted in breast milk.
Aklief Pregnancy And Lactation Text: It is unknown if this medication is safe to use during pregnancy.  It is unknown if this medication is excreted in breast milk.  Breastfeeding women should use the topical cream on the smallest area of the skin for the shortest time needed while breastfeeding.  Do not apply to nipple and areola.
Clindamycin Counseling: I counseled the patient regarding use of clindamycin as an antibiotic for prophylactic and/or therapeutic purposes. Clindamycin is active against numerous classes of bacteria, including skin bacteria. Side effects may include nausea, diarrhea, gastrointestinal upset, rash, hives, yeast infections, and in rare cases, colitis.
Topical Ketoconazole Counseling: Patient counseled that this medication may cause skin irritation or allergic reactions.  In the event of skin irritation, the patient was advised to reduce the amount of the drug applied or use it less frequently.   The patient verbalized understanding of the proper use and possible adverse effects of ketoconazole.  All of the patient's questions and concerns were addressed.
Terbinafine Pregnancy And Lactation Text: This medication is Pregnancy Category B and is considered safe during pregnancy. It is also excreted in breast milk and breast feeding isn't recommended.
Siliq Pregnancy And Lactation Text: The risk during pregnancy and breastfeeding is uncertain with this medication.
Saxenda Pregnancy And Lactation Text: The fetal risk of this medication is unknown and taking while pregnant is not recommended. It is unknown if this medication is present in breast milk.
Hydroquinone Counseling:  Patient advised that medication may result in skin irritation, lightening (hypopigmentation), dryness, and burning.  In the event of skin irritation, the patient was advised to reduce the amount of the drug applied or use it less frequently.  Rarely, spots that are treated with hydroquinone can become darker (pseudoochronosis).  Should this occur, patient instructed to stop medication and call the office. The patient verbalized understanding of the proper use and possible adverse effects of hydroquinone.  All of the patient's questions and concerns were addressed.
Cantharidin Pregnancy And Lactation Text: This medication has not been proven safe during pregnancy. It is unknown if this medication is excreted in breast milk.
Simlandi Counseling:  I discussed with the patient the risks of adalimumab including but not limited to myelosuppression, immunosuppression, autoimmune hepatitis, demyelinating diseases, lymphoma, and serious infections.  The patient understands that monitoring is required including a PPD at baseline and must alert us or the primary physician if symptoms of infection or other concerning signs are noted.
Low Dose Naltrexone Pregnancy And Lactation Text: Naltrexone is pregnancy category C.  There have been no adequate and well-controlled studies in pregnant women.  It should be used in pregnancy only if the potential benefit justifies the potential risk to the fetus.   Limited data indicates that naltrexone is minimally excreted into breastmilk.
Bexarotene Pregnancy And Lactation Text: This medication is Pregnancy Category X and should not be given to women who are pregnant or may become pregnant. This medication should not be used if you are breast feeding.
Niacinamide Counseling: I recommended taking niacin or niacinamide, also know as vitamin B3, twice daily. Recent evidence suggests that taking vitamin B3 (500 mg twice daily) can reduce the risk of actinic keratoses and non-melanoma skin cancers. Side effects of vitamin B3 include flushing and headache.
Azelaic Acid Counseling: Patient counseled that medicine may cause skin irritation and to avoid applying near the eyes.  In the event of skin irritation, the patient was advised to reduce the amount of the drug applied or use it less frequently.   The patient verbalized understanding of the proper use and possible adverse effects of azelaic acid.  All of the patient's questions and concerns were addressed.
Semaglutide Counseling: I reviewed the possible side effects including: thyroid tumors, kidney disease, gallbladder disease, abdominal pain, constipation, diarrhea, nausea, vomiting and pancreatitis. Do not take this medication if you have a history or family history of multiple endocrine neoplasia syndrome type 2. Side effects reviewed, pt to contact office should one occur.
Clindamycin Pregnancy And Lactation Text: This medication can be used in pregnancy if certain situations. Clindamycin is also present in breast milk.
Doxycycline Counseling:  Patient counseled regarding possible photosensitivity and increased risk for sunburn.  Patient instructed to avoid sunlight, if possible.  When exposed to sunlight, patients should wear protective clothing, sunglasses, and sunscreen.  The patient was instructed to call the office immediately if the following severe adverse effects occur:  hearing changes, easy bruising/bleeding, severe headache, or vision changes.  The patient verbalized understanding of the proper use and possible adverse effects of doxycycline.  All of the patient's questions and concerns were addressed.
Imiquimod Counseling:  I discussed with the patient the risks of imiquimod including but not limited to erythema, scaling, itching, weeping, crusting, and pain.  Patient understands that the inflammatory response to imiquimod is variable from person to person and was educated regarded proper titration schedule.  If flu-like symptoms develop, patient knows to discontinue the medication and contact us.
Simlandi Pregnancy And Lactation Text: This medication is Pregnancy Category B and is considered safe during pregnancy. It is unknown if this medication is excreted in breast milk.
Isotretinoin Counseling: Patient should get monthly blood tests, not donate blood, not drive at night if vision affected, not share medication, and not undergo elective surgery for 6 months after tx completed. Side effects reviewed, pt to contact office should one occur.
Niacinamide Pregnancy And Lactation Text: These medications are considered safe during pregnancy.
Topical Metronidazole Counseling: Metronidazole is a topical antibiotic medication. You may experience burning, stinging, redness, or allergic reactions.  Please call our office if you develop any problems from using this medication.
Azelaic Acid Pregnancy And Lactation Text: This medication is considered safe during pregnancy and breast feeding.
Griseofulvin Pregnancy And Lactation Text: This medication is Pregnancy Category X and is known to cause serious birth defects. It is unknown if this medication is excreted in breast milk but breast feeding should be avoided.
Birth Control Pills Pregnancy And Lactation Text: This medication should be avoided if pregnant and for the first 30 days post-partum.
Drysol Counseling:  I discussed with the patient the risks of drysol/aluminum chloride including but not limited to skin rash, itching, irritation, burning.
Dupixent Pregnancy And Lactation Text: This medication likely crosses the placenta but the risk for the fetus is uncertain. This medication is excreted in breast milk.
Picato Counseling:  I discussed with the patient the risks of Picato including but not limited to erythema, scaling, itching, weeping, crusting, and pain.
Rifampin Counseling: I discussed with the patient the risks of rifampin including but not limited to liver damage, kidney damage, red-orange body fluids, nausea/vomiting and severe allergy.
Xelmaheshz Pregnancy And Lactation Text: This medication is Pregnancy Category D and is not considered safe during pregnancy.  The risk during breast feeding is also uncertain.
Odomzo Counseling- I discussed with the patient the risks of Odomzo including but not limited to nausea, vomiting, diarrhea, constipation, weight loss, changes in the sense of taste, decreased appetite, muscle spasms, and hair loss.  The patient verbalized understanding of the proper use and possible adverse effects of Odomzo.  All of the patient's questions and concerns were addressed.
VTAMA Counseling: I discussed with the patient that VTAMA is not for use in the eyes, mouth or mouth. They should call the office if they develop any signs of allergic reactions to VTAMA. The patient verbalized understanding of the proper use and possible adverse effects of VTAMA.  All of the patient's questions and concerns were addressed.
Rifampin Pregnancy And Lactation Text: This medication is Pregnancy Category C and it isn't know if it is safe during pregnancy. It is also excreted in breast milk and should not be used if you are breast feeding.
Tremfya Counseling: I discussed with the patient the risks of guselkumab including but not limited to immunosuppression, serious infections, and drug reactions.  The patient understands that monitoring is required including a PPD at baseline and must alert us or the primary physician if symptoms of infection or other concerning signs are noted.
Litfulo Pregnancy And Lactation Text: Based on animal studies, Lifulo may cause embryo-fetal harm when administered to pregnant women.  The medication should not be used in pregnancy.  Breastfeeding is not recommended during treatment.
Ebglyss Counseling: I discussed with the patient the risks of lebrikizumab including but not limited to eye inflammation and irritation, cold sores, injection site reactions, allergic reactions and increased risk of parasitic infection. The patient understands that monitoring is required and they must alert us or the primary physician if symptoms of infection or other concerning signs are noted.
Valtrex Counseling: I discussed with the patient the risks of valacyclovir including but not limited to kidney damage, nausea, vomiting and severe allergy.  The patient understands that if the infection seems to be worsening or is not improving, they are to call.
Doxepin Counseling:  Patient advised that the medication is sedating and not to drive a car after taking this medication. Patient informed of potential adverse effects including but not limited to dry mouth, urinary retention, and blurry vision.  The patient verbalized understanding of the proper use and possible adverse effects of doxepin.  All of the patient's questions and concerns were addressed.
Topical Retinoid counseling:  Patient advised to apply a pea-sized amount only at bedtime and wait 30 minutes after washing their face before applying.  If too drying, patient may add a non-comedogenic moisturizer. The patient verbalized understanding of the proper use and possible adverse effects of retinoids.  All of the patient's questions and concerns were addressed.
Azithromycin Counseling:  I discussed with the patient the risks of azithromycin including but not limited to GI upset, allergic reaction, drug rash, diarrhea, and yeast infections.
Cyclosporine Counseling:  I discussed with the patient the risks of cyclosporine including but not limited to hypertension, gingival hyperplasia,myelosuppression, immunosuppression, liver damage, kidney damage, neurotoxicity, lymphoma, and serious infections. The patient understands that monitoring is required including baseline blood pressure, CBC, CMP, lipid panel and uric acid, and then 1-2 times monthly CMP and blood pressure.
Gabapentin Pregnancy And Lactation Text: This medication is Pregnancy Category C and isn't considered safe during pregnancy. It is excreted in breast milk.
Spironolactone Counseling: Patient advised regarding risks of diarrhea, abdominal pain, hyperkalemia, birth defects (for female patients), liver toxicity and renal toxicity. The patient may need blood work to monitor liver and kidney function and potassium levels while on therapy. The patient verbalized understanding of the proper use and possible adverse effects of spironolactone.  All of the patient's questions and concerns were addressed.
Valtrex Pregnancy And Lactation Text: this medication is Pregnancy Category B and is considered safe during pregnancy. This medication is not directly found in breast milk but it's metabolite acyclovir is present.
Azithromycin Pregnancy And Lactation Text: This medication is considered safe during pregnancy and is also secreted in breast milk.
Doxepin Pregnancy And Lactation Text: This medication is Pregnancy Category C and it isn't known if it is safe during pregnancy. It is also excreted in breast milk and breast feeding isn't recommended.
Propranolol Counseling:  I discussed with the patient the risks of propranolol including but not limited to low heart rate, low blood pressure, low blood sugar, restlessness and increased cold sensitivity. They should call the office if they experience any of these side effects.
Odomzo Pregnancy And Lactation Text: This medication is Pregnancy Category X and is absolutely contraindicated during pregnancy. It is unknown if it is excreted in breast milk.
Itraconazole Counseling:  I discussed with the patient the risks of itraconazole including but not limited to liver damage, nausea/vomiting, neuropathy, and severe allergy.  The patient understands that this medication is best absorbed when taken with acidic beverages such as non-diet cola or ginger ale.  The patient understands that monitoring is required including baseline LFTs and repeat LFTs at intervals.  The patient understands that they are to contact us or the primary physician if concerning signs are noted.
Nemluvio Counseling: I discussed with the patient the risks of nemolizumab including but not limited to headache, gastrointestinal complaints, nasopharyngitis, musculoskeletal complaints, injection site reactions, and allergic reactions. The patient understands that monitoring is required and they must alert us or the primary physician if any side effects are noted.
Glycopyrrolate Counseling:  I discussed with the patient the risks of glycopyrrolate including but not limited to skin rash, drowsiness, dry mouth, difficulty urinating, and blurred vision.
Olumiant Counseling: I discussed with the patient the risks of Olumiant therapy including but not limited to upper respiratory tract infections, shingles, cold sores, and nausea. Live vaccines should be avoided.  This medication has been linked to serious infections; higher rate of mortality; malignancy and lymphoproliferative disorders; major adverse cardiovascular events; thrombosis; gastrointestinal perforations; neutropenia; lymphopenia; anemia; liver enzyme elevations; and lipid elevations.
Sarecycline Counseling: Patient advised regarding possible photosensitivity and discoloration of the teeth, skin, lips, tongue and gums.  Patient instructed to avoid sunlight, if possible.  When exposed to sunlight, patients should wear protective clothing, sunglasses, and sunscreen.  The patient was instructed to call the office immediately if the following severe adverse effects occur:  hearing changes, easy bruising/bleeding, severe headache, or vision changes.  The patient verbalized understanding of the proper use and possible adverse effects of sarecycline.  All of the patient's questions and concerns were addressed.
Itraconazole Pregnancy And Lactation Text: This medication is Pregnancy Category C and it isn't know if it is safe during pregnancy. It is also excreted in breast milk.
Protopic Counseling: Patient may experience a mild burning sensation during topical application. Protopic is not approved in children less than 2 years of age. There have been case reports of hematologic and skin malignancies in patients using topical calcineurin inhibitors although causality is questionable.
Ebglyss Pregnancy And Lactation Text: This medication likely crosses the placenta but the risk for the fetus is uncertain. It is unknown if this medication is excreted in breast milk.
Vtama Pregnancy And Lactation Text: It is unknown if this medication can cause problems during pregnancy and breastfeeding.
Spironolactone Pregnancy And Lactation Text: This medication can cause feminization of the male fetus and should be avoided during pregnancy. The active metabolite is also found in breast milk.
Propranolol Pregnancy And Lactation Text: This medication is Pregnancy Category C and it isn't known if it is safe during pregnancy. It is excreted in breast milk.
Hydroxyzine Counseling: Patient advised that the medication is sedating and not to drive a car after taking this medication.  Patient informed of potential adverse effects including but not limited to dry mouth, urinary retention, and blurry vision.  The patient verbalized understanding of the proper use and possible adverse effects of hydroxyzine.  All of the patient's questions and concerns were addressed.
Methotrexate Counseling:  Patient counseled regarding adverse effects of methotrexate including but not limited to nausea, vomiting, abnormalities in liver function tests. Patients may develop mouth sores, rash, diarrhea, and abnormalities in blood counts. The patient understands that monitoring is required including LFT's and blood counts.  There is a rare possibility of scarring of the liver and lung problems that can occur when taking methotrexate. Persistent nausea, loss of appetite, pale stools, dark urine, cough, and shortness of breath should be reported immediately. Patient advised to discontinue methotrexate treatment at least three months before attempting to become pregnant.  I discussed the need for folate supplements while taking methotrexate.  These supplements can decrease side effects during methotrexate treatment. The patient verbalized understanding of the proper use and possible adverse effects of methotrexate.  All of the patient's questions and concerns were addressed.
Use Enhanced Medication Counseling?: No
Tazorac Counseling:  Patient advised that medication is irritating and drying.  Patient may need to apply sparingly and wash off after an hour before eventually leaving it on overnight.  The patient verbalized understanding of the proper use and possible adverse effects of tazorac.  All of the patient's questions and concerns were addressed.
Nemluvio Pregnancy And Lactation Text: It is not known if Nemluvio causes fetal harm or is present in breast milk. Please proceed with caution if patients who are pregnant or breastfeeding.
Elidel Counseling: Patient may experience a mild burning sensation during topical application. Elidel is not approved in children less than 2 years of age. There have been case reports of hematologic and skin malignancies in patients using topical calcineurin inhibitors although causality is questionable.
Bactrim Counseling:  I discussed with the patient the risks of sulfa antibiotics including but not limited to GI upset, allergic reaction, drug rash, diarrhea, dizziness, photosensitivity, and yeast infections.  Rarely, more serious reactions can occur including but not limited to aplastic anemia, agranulocytosis, methemoglobinemia, blood dyscrasias, liver or kidney failure, lung infiltrates or desquamative/blistering drug rashes.
Ketoconazole Counseling:   Patient counseled regarding improving absorption with orange juice.  Adverse effects include but are not limited to breast enlargement, headache, diarrhea, nausea, upset stomach, liver function test abnormalities, taste disturbance, and stomach pain.  There is a rare possibility of liver failure that can occur when taking ketoconazole. The patient understands that monitoring of LFTs may be required, especially at baseline. The patient verbalized understanding of the proper use and possible adverse effects of ketoconazole.  All of the patient's questions and concerns were addressed.
Rituxan Counseling:  I discussed with the patient the risks of Rituxan infusions. Side effects can include infusion reactions, severe drug rashes including mucocutaneous reactions, reactivation of latent hepatitis and other infections and rarely progressive multifocal leukoencephalopathy.  All of the patient's questions and concerns were addressed.
Zoryve Counseling:  I discussed with the patient that Zoryve is not for use in the eyes, mouth or vagina. The most commonly reported side effects include diarrhea, headache, insomnia, application site pain, upper respiratory tract infections, and urinary tract infections.  All of the patient's questions and concerns were addressed.
Enbrel Counseling:  I discussed with the patient the risks of etanercept including but not limited to myelosuppression, immunosuppression, autoimmune hepatitis, demyelinating diseases, lymphoma, and infections.  The patient understands that monitoring is required including a PPD at baseline and must alert us or the primary physician if symptoms of infection or other concerning signs are noted.
Xolair Counseling:  Patient informed of potential adverse effects including but not limited to fever, muscle aches, rash and allergic reactions.  The patient verbalized understanding of the proper use and possible adverse effects of Xolair.  All of the patient's questions and concerns were addressed.
Glycopyrrolate Pregnancy And Lactation Text: This medication is Pregnancy Category B and is considered safe during pregnancy. It is unknown if it is excreted breast milk.
Protopic Pregnancy And Lactation Text: This medication is Pregnancy Category C. It is unknown if this medication is excreted in breast milk when applied topically.
Tazorac Pregnancy And Lactation Text: This medication is not safe during pregnancy. It is unknown if this medication is excreted in breast milk.
Hydroxychloroquine Counseling:  I discussed with the patient that a baseline ophthalmologic exam is needed at the start of therapy and every year thereafter while on therapy. A CBC may also be warranted for monitoring.  The side effects of this medication were discussed with the patient, including but not limited to agranulocytosis, aplastic anemia, seizures, rashes, retinopathy, and liver toxicity. Patient instructed to call the office should any adverse effect occur.  The patient verbalized understanding of the proper use and possible adverse effects of Plaquenil.  All the patient's questions and concerns were addressed.
Xolair Pregnancy And Lactation Text: This medication is Pregnancy Category B and is considered safe during pregnancy. This medication is excreted in breast milk.
Hydroxyzine Pregnancy And Lactation Text: This medication is not safe during pregnancy and should not be taken. It is also excreted in breast milk and breast feeding isn't recommended.
Detail Level: Simple
Ozempic Counseling: I reviewed the possible side effects including: thyroid tumors, kidney disease, gallbladder disease, abdominal pain, constipation, diarrhea, nausea, vomiting and pancreatitis. Do not take this medication if you have a history or family history of multiple endocrine neoplasia syndrome type 2. Side effects reviewed, pt to contact office should one occur.
Bactrim Pregnancy And Lactation Text: This medication is Pregnancy Category D and is known to cause fetal risk.  It is also excreted in breast milk.
Methotrexate Pregnancy And Lactation Text: This medication is Pregnancy Category X and is known to cause fetal harm. This medication is excreted in breast milk.
Ketoconazole Pregnancy And Lactation Text: This medication is Pregnancy Category C and it isn't know if it is safe during pregnancy. It is also excreted in breast milk and breast feeding isn't recommended.
Rituxan Pregnancy And Lactation Text: This medication is Pregnancy Category C and it isn't know if it is safe during pregnancy. It is unknown if this medication is excreted in breast milk but similar antibodies are known to be excreted.
Eucrisa Counseling: Patient may experience a mild burning sensation during topical application. Eucrisa is not approved in children less than 3 months of age.
Qbrexza Counseling:  I discussed with the patient the risks of Qbrexza including but not limited to headache, mydriasis, blurred vision, dry eyes, nasal dryness, dry mouth, dry throat, dry skin, urinary hesitation, and constipation.  Local skin reactions including erythema, burning, stinging, and itching can also occur.
Tetracycline Counseling: Patient counseled regarding possible photosensitivity and increased risk for sunburn.  Patient instructed to avoid sunlight, if possible.  When exposed to sunlight, patients should wear protective clothing, sunglasses, and sunscreen.  The patient was instructed to call the office immediately if the following severe adverse effects occur:  hearing changes, easy bruising/bleeding, severe headache, or vision changes.  The patient verbalized understanding of the proper use and possible adverse effects of tetracycline.  All of the patient's questions and concerns were addressed. Patient understands to avoid pregnancy while on therapy due to potential birth defects.
Acitretin Counseling:  I discussed with the patient the risks of acitretin including but not limited to hair loss, dry lips/skin/eyes, liver damage, hyperlipidemia, depression/suicidal ideation, photosensitivity.  Serious rare side effects can include but are not limited to pancreatitis, pseudotumor cerebri, bony changes, clot formation/stroke/heart attack.  Patient understands that alcohol is contraindicated since it can result in liver toxicity and significantly prolong the elimination of the drug by many years.
Albendazole Counseling:  I discussed with the patient the risks of albendazole including but not limited to cytopenia, kidney damage, nausea/vomiting and severe allergy.  The patient understands that this medication is being used in an off-label manner.
Oral Minoxidil Counseling- I discussed with the patient the risks of oral minoxidil including but not limited to shortness of breath, swelling of the feet or ankles, dizziness, lightheadedness, unwanted hair growth and allergic reaction.  The patient verbalized understanding of the proper use and possible adverse effects of oral minoxidil.  All of the patient's questions and concerns were addressed.
Topical Sulfur Applications Pregnancy And Lactation Text: This medication is considered safe during pregnancy and breast feeding secondary to limited systemic absorption.
Azathioprine Pregnancy And Lactation Text: This medication is Pregnancy Category D and isn't considered safe during pregnancy. It is unknown if this medication is excreted in breast milk.
Spevigo Pregnancy And Lactation Text: The risk during pregnancy and breastfeeding is uncertain with this medication. This medication does cross the placenta. It is unknown if this medication is found in breast milk.
Hyrimoz Counseling:  I discussed with the patient the risks of adalimumab including but not limited to myelosuppression, immunosuppression, autoimmune hepatitis, demyelinating diseases, lymphoma, and serious infections.  The patient understands that monitoring is required including a PPD at baseline and must alert us or the primary physician if symptoms of infection or other concerning signs are noted.
Colchicine Counseling:  Patient counseled regarding adverse effects including but not limited to stomach upset (nausea, vomiting, stomach pain, or diarrhea).  Patient instructed to limit alcohol consumption while taking this medication.  Colchicine may reduce blood counts especially with prolonged use.  The patient understands that monitoring of kidney function and blood counts may be required, especially at baseline. The patient verbalized understanding of the proper use and possible adverse effects of colchicine.  All of the patient's questions and concerns were addressed.
Dutasteride Pregnancy And Lactation Text: This medication is absolutely contraindicated in women, especially during pregnancy and breast feeding. Feminization of male fetuses is possible if taking while pregnant.
Calcipotriene Counseling:  I discussed with the patient the risks of calcipotriene including but not limited to erythema, scaling, itching, and irritation.
Rhofade Pregnancy And Lactation Text: This medication has not been assigned a Pregnancy Risk Category. It is unknown if the medication is excreted in breast milk.
Sski Pregnancy And Lactation Text: This medication is Pregnancy Category D and isn't considered safe during pregnancy. It is excreted in breast milk.
Calcipotriene Pregnancy And Lactation Text: The use of this medication during pregnancy or lactation is not recommended as there is insufficient data.
Finasteride Male Counseling: Finasteride Counseling:  I discussed with the patient the risks of use of finasteride including but not limited to decreased libido, decreased ejaculate volume, gynecomastia, and depression. Women should not handle medication.  All of the patient's questions and concerns were addressed.
Cimzia Pregnancy And Lactation Text: This medication crosses the placenta but can be considered safe in certain situations. Cimzia may be excreted in breast milk.
Mirvaso Counseling: Mirvaso is a topical medication which can decrease superficial blood flow where applied. Side effects are uncommon and include stinging, redness and allergic reactions.
Minocycline Counseling: Patient advised regarding possible photosensitivity and discoloration of the teeth, skin, lips, tongue and gums.  Patient instructed to avoid sunlight, if possible.  When exposed to sunlight, patients should wear protective clothing, sunglasses, and sunscreen.  The patient was instructed to call the office immediately if the following severe adverse effects occur:  hearing changes, easy bruising/bleeding, severe headache, or vision changes.  The patient verbalized understanding of the proper use and possible adverse effects of minocycline.  All of the patient's questions and concerns were addressed.
Olumiant Pregnancy And Lactation Text: Based on animal studies, Olumiant may cause embryo-fetal harm when administered to pregnant women.  The medication should not be used in pregnancy.  Breastfeeding is not recommended during treatment.
Erivedge Counseling- I discussed with the patient the risks of Erivedge including but not limited to nausea, vomiting, diarrhea, constipation, weight loss, changes in the sense of taste, decreased appetite, muscle spasms, and hair loss.  The patient verbalized understanding of the proper use and possible adverse effects of Erivedge.  All of the patient's questions and concerns were addressed.
Albendazole Pregnancy And Lactation Text: This medication is Pregnancy Category C and it isn't known if it is safe during pregnancy. It is also excreted in breast milk.
Solaraze Counseling:  I discussed with the patient the risks of Solaraze including but not limited to erythema, scaling, itching, weeping, crusting, and pain.
Stelara Counseling:  I discussed with the patient the risks of ustekinumab including but not limited to immunosuppression, malignancy, posterior leukoencephalopathy syndrome, and serious infections.  The patient understands that monitoring is required including a PPD at baseline and must alert us or the primary physician if symptoms of infection or other concerning signs are noted.
Rinvoq Counseling: I discussed with the patient the risks of Rinvoq therapy including but not limited to upper respiratory tract infections, shingles, cold sores, bronchitis, nausea, cough, fever, acne, and headache. Live vaccines should be avoided.  This medication has been linked to serious infections; higher rate of mortality; malignancy and lymphoproliferative disorders; major adverse cardiovascular events; thrombosis; thrombocytopenia, anemia, and neutropenia; lipid elevations; liver enzyme elevations; and gastrointestinal perforations.
Cosentyx Counseling:  I discussed with the patient the risks of Cosentyx including but not limited to worsening of Crohn's disease, immunosuppression, allergic reactions and infections.  The patient understands that monitoring is required including a PPD at baseline and must alert us or the primary physician if symptoms of infection or other concerning signs are noted.
Thalidomide Counseling: I discussed with the patient the risks of thalidomide including but not limited to birth defects, anxiety, weakness, chest pain, dizziness, cough and severe allergy.
Oral Minoxidil Pregnancy And Lactation Text: This medication should only be used when clearly needed if you are pregnant, attempting to become pregnant or breast feeding.
Cellcept Counseling:  I discussed with the patient the risks of mycophenolate mofetil including but not limited to infection/immunosuppression, GI upset, hypokalemia, hypercholesterolemia, bone marrow suppression, lymphoproliferative disorders, malignancy, GI ulceration/bleed/perforation, colitis, interstitial lung disease, kidney failure, progressive multifocal leukoencephalopathy, and birth defects.  The patient understands that monitoring is required including a baseline creatinine and regular CBC testing. In addition, patient must alert us immediately if symptoms of infection or other concerning signs are noted.
Wartpeel Counseling:  I discussed with the patient the risks of Wartpeel including but not limited to erythema, scaling, itching, weeping, crusting, and pain.
Ilumya Counseling: I discussed with the patient the risks of tildrakizumab including but not limited to immunosuppression, malignancy, posterior leukoencephalopathy syndrome, and serious infections.  The patient understands that monitoring is required including a PPD at baseline and must alert us or the primary physician if symptoms of infection or other concerning signs are noted.
Solaraze Pregnancy And Lactation Text: This medication is Pregnancy Category B and is considered safe. There is some data to suggest avoiding during the third trimester. It is unknown if this medication is excreted in breast milk.
Finasteride Female Counseling: Finasteride Counseling:  I discussed with the patient the risks of use of finasteride including but not limited to decreased libido and sexual dysfunction. Explained the teratogenic nature of the medication and stressed the importance of not getting pregnant during treatment. All of the patient's questions and concerns were addressed.
Otezla Counseling: The side effects of Otezla were discussed with the patient, including but not limited to worsening or new depression, weight loss, diarrhea, nausea, upper respiratory tract infection, and headache. Patient instructed to call the office should any adverse effect occur.  The patient verbalized understanding of the proper use and possible adverse effects of Otezla.  All the patient's questions and concerns were addressed.
Wartpeel Pregnancy And Lactation Text: This medication is Pregnancy Category X and contraindicated in pregnancy and in women who may become pregnant. It is unknown if this medication is excreted in breast milk.
Cantharidin Counseling:  I discussed with the patient the risks of Cantharidin including but not limited to pain, redness, burning, itching, and blistering.
Fluconazole Counseling:  Patient counseled regarding adverse effects of fluconazole including but not limited to headache, diarrhea, nausea, upset stomach, liver function test abnormalities, taste disturbance, and stomach pain.  There is a rare possibility of liver failure that can occur when taking fluconazole.  The patient understands that monitoring of LFTs and kidney function test may be required, especially at baseline. The patient verbalized understanding of the proper use and possible adverse effects of fluconazole.  All of the patient's questions and concerns were addressed.
Cibinqo Counseling: I discussed with the patient the risks of Cibinqo therapy including but not limited to common cold, nausea, headache, cold sores, increased blood CPK levels, dizziness, UTIs, fatigue, acne, and vomitting. Live vaccines should be avoided.  This medication has been linked to serious infections; higher rate of mortality; malignancy and lymphoproliferative disorders; major adverse cardiovascular events; thrombosis; thrombocytopenia and lymphopenia; lipid elevations; and retinal detachment.
Quinolones Counseling:  I discussed with the patient the risks of fluoroquinolones including but not limited to GI upset, allergic reaction, drug rash, diarrhea, dizziness, photosensitivity, yeast infections, liver function test abnormalities, tendonitis/tendon rupture.
Opzelura Counseling:  I discussed with the patient the risks of Opzelura including but not limited to nasopharngitis, bronchitis, ear infection, eosinophila, hives, diarrhea, folliculitis, tonsillitis, and rhinorrhea.  Taken orally, this medication has been linked to serious infections; higher rate of mortality; malignancy and lymphoproliferative disorders; major adverse cardiovascular events; thrombosis; thrombocytopenia, anemia, and neutropenia; and lipid elevations.
Dapsone Counseling: I discussed with the patient the risks of dapsone including but not limited to hemolytic anemia, agranulocytosis, rashes, methemoglobinemia, kidney failure, peripheral neuropathy, headaches, GI upset, and liver toxicity.  Patients who start dapsone require monitoring including baseline LFTs and weekly CBCs for the first month, then every month thereafter.  The patient verbalized understanding of the proper use and possible adverse effects of dapsone.  All of the patient's questions and concerns were addressed.
Ivermectin Counseling:  Patient instructed to take medication on an empty stomach with a full glass of water.  Patient informed of potential adverse effects including but not limited to nausea, diarrhea, dizziness, itching, and swelling of the extremities or lymph nodes.  The patient verbalized understanding of the proper use and possible adverse effects of ivermectin.  All of the patient's questions and concerns were addressed.
Finasteride Pregnancy And Lactation Text: This medication is absolutely contraindicated during pregnancy. It is unknown if it is excreted in breast milk.
Otezla Pregnancy And Lactation Text: This medication is Pregnancy Category C and it isn't known if it is safe during pregnancy. It is unknown if it is excreted in breast milk.
Libtayo Counseling- I discussed with the patient the risks of Libtayo including but not limited to nausea, vomiting, diarrhea, and bone or muscle pain.  The patient verbalized understanding of the proper use and possible adverse effects of Libtayo.  All of the patient's questions and concerns were addressed.
Rinvoq Pregnancy And Lactation Text: Based on animal studies, Rinvoq may cause embryo-fetal harm when administered to pregnant women.  The medication should not be used in pregnancy.  Breastfeeding is not recommended during treatment and for 6 days after the last dose.
Opioid Counseling: I discussed with the patient the potential side effects of opioids including but not limited to addiction, altered mental status, and depression. I stressed avoiding alcohol, benzodiazepines, muscle relaxants and sleep aids unless specifically okayed by a physician. The patient verbalized understanding of the proper use and possible adverse effects of opioids. All of the patient's questions and concerns were addressed. They were instructed to flush the remaining pills down the toilet if they did not need them for pain.
5-Fu Counseling: 5-Fluorouracil Counseling:  I discussed with the patient the risks of 5-fluorouracil including but not limited to erythema, scaling, itching, weeping, crusting, and pain.
Cimetidine Counseling:  I discussed with the patient the risks of Cimetidine including but not limited to gynecomastia, headache, diarrhea, nausea, drowsiness, arrhythmias, pancreatitis, skin rashes, psychosis, bone marrow suppression and kidney toxicity.
Soolantra Counseling: I discussed with the patients the risks of topial Soolantra. This is a medicine which decreases the number of mites and inflammation in the skin. You experience burning, stinging, eye irritation or allergic reactions.  Please call our office if you develop any problems from using this medication.
Tranexamic Acid Counseling:  Patient advised of the small risk of bleeding problems with tranexamic acid. They were also instructed to call if they developed any nausea, vomiting or diarrhea. All of the patient's questions and concerns were addressed.
Libtayo Pregnancy And Lactation Text: This medication is contraindicated in pregnancy and when breast feeding.
Xeljanz Counseling: I discussed with the patient the risks of Xeljanz therapy including increased risk of infection, liver issues, headache, diarrhea, or cold symptoms. Live vaccines should be avoided. They were instructed to call if they have any problems.
Dupixent Counseling: I discussed with the patient the risks of dupilumab including but not limited to eye infection and irritation, cold sores, injection site reactions, worsening of asthma, allergic reactions and increased risk of parasitic infection.  Live vaccines should be avoided while taking dupilumab. Dupilumab will also interact with certain medications such as warfarin and cyclosporine. The patient understands that monitoring is required and they must alert us or the primary physician if symptoms of infection or other concerning signs are noted.
Opioid Pregnancy And Lactation Text: These medications can lead to premature delivery and should be avoided during pregnancy. These medications are also present in breast milk in small amounts.
Winlevi Counseling:  I discussed with the patient the risks of topical clascoterone including but not limited to erythema, scaling, itching, and stinging. Patient voiced their understanding.
Cyclophosphamide Counseling:  I discussed with the patient the risks of cyclophosphamide including but not limited to hair loss, hormonal abnormalities, decreased fertility, abdominal pain, diarrhea, nausea and vomiting, bone marrow suppression and infection. The patient understands that monitoring is required while taking this medication.
Dapsone Pregnancy And Lactation Text: This medication is Pregnancy Category C and is not considered safe during pregnancy or breast feeding.
Taltz Counseling: I discussed with the patient the risks of ixekizumab including but not limited to immunosuppression, serious infections, worsening of inflammatory bowel disease and drug reactions.  The patient understands that monitoring is required including a PPD at baseline and must alert us or the primary physician if symptoms of infection or other concerning signs are noted.
Cibinqo Pregnancy And Lactation Text: It is unknown if this medication will adversely affect pregnancy or breast feeding.  You should not take this medication if you are currently pregnant or planning a pregnancy or while breastfeeding.
Opzelura Pregnancy And Lactation Text: There is insufficient data to evaluate drug-associated risk for major birth defects, miscarriage, or other adverse maternal or fetal outcomes.  There is a pregnancy registry that monitors pregnancy outcomes in pregnant persons exposed to the medication during pregnancy.  It is unknown if this medication is excreted in breast milk.  Do not breastfeed during treatment and for about 4 weeks after the last dose.
Oxybutynin Counseling:  I discussed with the patient the risks of oxybutynin including but not limited to skin rash, drowsiness, dry mouth, difficulty urinating, and blurred vision.
Cyclophosphamide Pregnancy And Lactation Text: This medication is Pregnancy Category D and it isn't considered safe during pregnancy. This medication is excreted in breast milk.
Winlevi Pregnancy And Lactation Text: This medication is considered safe during pregnancy and breastfeeding.
Litfulo Counseling: I discussed with the patient the risks of Litfulo therapy including but not limited to upper respiratory tract infections, shingles, cold sores, and nausea. Live vaccines should be avoided.  This medication has been linked to serious infections; higher rate of mortality; malignancy and lymphoproliferative disorders; major adverse cardiovascular events; thrombosis; gastrointestinal perforations; neutropenia; lymphopenia; anemia; liver enzyme elevations; and lipid elevations.
Infliximab Counseling:  I discussed with the patient the risks of infliximab including but not limited to myelosuppression, immunosuppression, autoimmune hepatitis, demyelinating diseases, lymphoma, and serious infections.  The patient understands that monitoring is required including a PPD at baseline and must alert us or the primary physician if symptoms of infection or other concerning signs are noted.
Gabapentin Counseling: I discussed with the patient the risks of gabapentin including but not limited to dizziness, somnolence, fatigue and ataxia.
Soolantra Pregnancy And Lactation Text: This medication is Pregnancy Category C. This medication is considered safe during breast feeding.
Griseofulvin Counseling:  I discussed with the patient the risks of griseofulvin including but not limited to photosensitivity, cytopenia, liver damage, nausea/vomiting and severe allergy.  The patient understands that this medication is best absorbed when taken with a fatty meal (e.g., ice cream or french fries).
Birth Control Pills Counseling: Birth Control Pill Counseling: I discussed with the patient the potential side effects of OCPs including but not limited to increased risk of stroke, heart attack, thrombophlebitis, deep venous thrombosis, hepatic adenomas, breast changes, GI upset, headaches, and depression.  The patient verbalized understanding of the proper use and possible adverse effects of OCPs. All of the patient's questions and concerns were addressed.
Tranexamic Acid Pregnancy And Lactation Text: It is unknown if this medication is safe during pregnancy or breast feeding.
Simponi Counseling:  I discussed with the patient the risks of golimumab including but not limited to myelosuppression, immunosuppression, autoimmune hepatitis, demyelinating diseases, lymphoma, and serious infections.  The patient understands that monitoring is required including a PPD at baseline and must alert us or the primary physician if symptoms of infection or other concerning signs are noted.
Wegovy Counseling: I reviewed the possible side effects including: thyroid tumors, kidney disease, gallbladder disease, abdominal pain, constipation, diarrhea, nausea, vomiting and pancreatitis. Do not take this medication if you have a history or family history of multiple endocrine neoplasia syndrome type 2. Side effects reviewed, pt to contact office should one occur.
Doxycycline Pregnancy And Lactation Text: This medication is Pregnancy Category D and not consider safe during pregnancy. It is also excreted in breast milk but is considered safe for shorter treatment courses.
Isotretinoin Pregnancy And Lactation Text: This medication is Pregnancy Category X and is considered extremely dangerous during pregnancy. It is unknown if it is excreted in breast milk.
Adbry Counseling: I discussed with the patient the risks of tralokinumab including but not limited to eye infection and irritation, cold sores, injection site reactions, worsening of asthma, allergic reactions and increased risk of parasitic infection.  Live vaccines should be avoided while taking tralokinumab. The patient understands that monitoring is required and they must alert us or the primary physician if symptoms of infection or other concerning signs are noted.
Nsaids Counseling: NSAID Counseling: I discussed with the patient that NSAIDs should be taken with food. Prolonged use of NSAIDs can result in the development of stomach ulcers.  Patient advised to stop taking NSAIDs if abdominal pain occurs.  The patient verbalized understanding of the proper use and possible adverse effects of NSAIDs.  All of the patient's questions and concerns were addressed.
High Dose Vitamin A Counseling: Side effects reviewed, pt to contact office should one occur.
Arava Counseling:  Patient counseled regarding adverse effects of Arava including but not limited to nausea, vomiting, abnormalities in liver function tests. Patients may develop mouth sores, rash, diarrhea, and abnormalities in blood counts. The patient understands that monitoring is required including LFTs and blood counts.  There is a rare possibility of scarring of the liver and lung problems that can occur when taking methotrexate. Persistent nausea, loss of appetite, pale stools, dark urine, cough, and shortness of breath should be reported immediately. Patient advised to discontinue Arava treatment and consult with a physician prior to attempting conception. The patient will have to undergo a treatment to eliminate Arava from the body prior to conception.
Benzoyl Peroxide Counseling: Patient counseled that medicine may cause skin irritation and bleach clothing.  In the event of skin irritation, the patient was advised to reduce the amount of the drug applied or use it less frequently.   The patient verbalized understanding of the proper use and possible adverse effects of benzoyl peroxide.  All of the patient's questions and concerns were addressed.
Topical Metronidazole Pregnancy And Lactation Text: This medication is Pregnancy Category B and considered safe during pregnancy.  It is also considered safe to use while breastfeeding.
Benzoyl Peroxide Pregnancy And Lactation Text: This medication is Pregnancy Category C. It is unknown if benzoyl peroxide is excreted in breast milk.
Erythromycin Counseling:  I discussed with the patient the risks of erythromycin including but not limited to GI upset, allergic reaction, drug rash, diarrhea, increase in liver enzymes, and yeast infections.
Sotyktu Counseling:  I discussed the most common side effects of Sotyktu including: common cold, sore throat, sinus infections, cold sores, canker sores, folliculitis, and acne.? I also discussed more serious side effects of Sotyktu including but not limited to: serious allergic reactions; increased risk for infections such as TB; cancers such as lymphomas; rhabdomyolysis and elevated CPK; and elevated triglycerides and liver enzymes.?
Adbry Pregnancy And Lactation Text: It is unknown if this medication will adversely affect pregnancy or breast feeding.
Klisyri Counseling:  I discussed with the patient the risks of Klisyri including but not limited to erythema, scaling, itching, weeping, crusting, and pain.
Skyrizi Counseling: I discussed with the patient the risks of risankizumab-rzaa including but not limited to immunosuppression, and serious infections.  The patient understands that monitoring is required including a PPD at baseline and must alert us or the primary physician if symptoms of infection or other concerning signs are noted.
Zepbound Counseling: I reviewed the possible side effects including: thyroid tumors, kidney disease, gallbladder disease, abdominal pain, constipation, diarrhea, nausea, vomiting and pancreatitis. Do not take this medication if you have a history or family history of multiple endocrine neoplasia syndrome type 2. Side effects reviewed, pt to contact office should one occur.
Nsaids Pregnancy And Lactation Text: These medications are considered safe up to 30 weeks gestation. It is excreted in breast milk.
High Dose Vitamin A Pregnancy And Lactation Text: High dose vitamin A therapy is contraindicated during pregnancy and breast feeding.
Topical Steroids Counseling: I discussed with the patient that prolonged use of topical steroids can result in the increased appearance of superficial blood vessels (telangiectasias), lightening (hypopigmentation) and thinning of the skin (atrophy).  Patient understands to avoid using high potency steroids in skin folds, the groin or the face.  The patient verbalized understanding of the proper use and possible adverse effects of topical steroids.  All of the patient's questions and concerns were addressed.
Dutasteride Male Counseling: Dustasteride Counseling:  I discussed with the patient the risks of use of dutasteride including but not limited to decreased libido, decreased ejaculate volume, and gynecomastia. Women who can become pregnant should not handle medication.  All of the patient's questions and concerns were addressed.
Carac Counseling:  I discussed with the patient the risks of Carac including but not limited to erythema, scaling, itching, weeping, crusting, and pain.
Topical Steroids Applications Pregnancy And Lactation Text: Most topical steroids are considered safe to use during pregnancy and lactation.  Any topical steroid applied to the breast or nipple should be washed off before breastfeeding.
Olanzapine Counseling- I discussed with the patient the common side effects of olanzapine including but are not limited to: lack of energy, dry mouth, increased appetite, sleepiness, tremor, constipation, dizziness, changes in behavior, or restlessness.  Explained that teenagers are more likely to experience headaches, abdominal pain, pain in the arms or legs, tiredness, and sleepiness.  Serious side effects include but are not limited: increased risk of death in elderly patients who are confused, have memory loss, or dementia-related psychosis; hyperglycemia; increased cholesterol and triglycerides; and weight gain.
Sotyktu Pregnancy And Lactation Text: There is insufficient data to evaluate whether or not Sotyktu is safe to use during pregnancy.? ?It is not known if Sotyktu passes into breast milk and whether or not it is safe to use when breastfeeding.??
Klisyri Pregnancy And Lactation Text: It is unknown if this medication can harm a developing fetus or if it is excreted in breast milk.
Bimzelx Counseling:  I discussed with the patient the risks of Bimzelx including but not limited to depression, immunosuppression, allergic reactions and infections.  The patient understands that monitoring is required including a PPD at baseline and must alert us or the primary physician if symptoms of infection or other concerning signs are noted.
Qbrexza Pregnancy And Lactation Text: There is no available data on Qbrexza use in pregnant women.  There is no available data on Qbrexza use in lactation.
Erythromycin Pregnancy And Lactation Text: This medication is Pregnancy Category B and is considered safe during pregnancy. It is also excreted in breast milk.
Metronidazole Counseling:  I discussed with the patient the risks of metronidazole including but not limited to seizures, nausea/vomiting, a metallic taste in the mouth, nausea/vomiting and severe allergy.
Minoxidil Counseling: Minoxidil is a topical medication which can increase blood flow where it is applied. It is uncertain how this medication increases hair growth. Side effects are uncommon and include stinging and allergic reactions.
Bimzelx Pregnancy And Lactation Text: This medication crosses the placenta and the safety is uncertain during pregnancy. It is unknown if this medication is present in breast milk.
Clofazimine Counseling:  I discussed with the patient the risks of clofazimine including but not limited to skin and eye pigmentation, liver damage, nausea/vomiting, gastrointestinal bleeding and allergy.
Rhofade Counseling: Rhofade is a topical medication which can decrease superficial blood flow where applied. Side effects are uncommon and include stinging, redness and allergic reactions.
Dutasteride Female Counseling: Dutasteride Counseling:  I discussed with the patient the risks of use of dutasteride including but not limited to decreased libido and sexual dysfunction. Explained the teratogenic nature of the medication and stressed the importance of not getting pregnant during treatment. All of the patient's questions and concerns were addressed.
Topical Sulfur Applications Counseling: Topical Sulfur Counseling: Patient counseled that this medication may cause skin irritation or allergic reactions.  In the event of skin irritation, the patient was advised to reduce the amount of the drug applied or use it less frequently.   The patient verbalized understanding of the proper use and possible adverse effects of topical sulfur application.  All of the patient's questions and concerns were addressed.
Olanzapine Pregnancy And Lactation Text: This medication is pregnancy category C.   There are no adequate and well controlled trials with olanzapine in pregnant females.  Olanzapine should be used during pregnancy only if the potential benefit justifies the potential risk to the fetus.   In a study in lactating healthy women, olanzapine was excreted in breast milk.  It is recommended that women taking olanzapine should not breast feed.
SSKI Counseling:  I discussed with the patient the risks of SSKI including but not limited to thyroid abnormalities, metallic taste, GI upset, fever, headache, acne, arthralgias, paraesthesias, lymphadenopathy, easy bleeding, arrhythmias, and allergic reaction.
Cimzia Counseling:  I discussed with the patient the risks of Cimzia including but not limited to immunosuppression, allergic reactions and infections.  The patient understands that monitoring is required including a PPD at baseline and must alert us or the primary physician if symptoms of infection or other concerning signs are noted.
Azathioprine Counseling:  I discussed with the patient the risks of azathioprine including but not limited to myelosuppression, immunosuppression, hepatotoxicity, lymphoma, and infections.  The patient understands that monitoring is required including baseline LFTs, Creatinine, possible TPMP genotyping and weekly CBCs for the first month and then every 2 weeks thereafter.  The patient verbalized understanding of the proper use and possible adverse effects of azathioprine.  All of the patient's questions and concerns were addressed.
Spevigo Counseling: I discussed with the patient the risks of Spevigo including but not limited to fatigue, nasuea, vomiting, headache, pruritus, urinary tract infection, an infusion related reactions.  The patient understands that monitoring is required including screening for tuberculosis at baseline and yearly screening thereafter while continuing Spevigo therapy. They should contact us if symptoms of infection or other concerning signs are noted.
Metronidazole Pregnancy And Lactation Text: This medication is Pregnancy Category B and considered safe during pregnancy.  It is also excreted in breast milk.

## 2025-03-13 NOTE — PROCEDURE: MIPS QUALITY
Quality 431: Preventive Care And Screening: Unhealthy Alcohol Use - Screening: Patient not identified as an unhealthy alcohol user when screened for unhealthy alcohol use using a systematic screening method
Quality 397: Melanoma: Reporting: Pathology report includes the pT Category, thickness, ulceration and mitotic rate, peripheral and deep margin status and presence or absence of microsatellitosis for invasive tumors.
Quality 226: Preventive Care And Screening: Tobacco Use: Screening And Cessation Intervention: Patient screened for tobacco use and is an ex/non-smoker
Quality 137: Melanoma: Continuity Of Care - Recall System: Patient information entered into a recall system that includes: target date for the next exam specified AND a process to follow up with patients regarding missed or unscheduled appointments
Detail Level: Detailed
Quality 130: Documentation Of Current Medications In The Medical Record: Current Medications Documented

## 2025-03-14 DIAGNOSIS — N40.0 BENIGN PROSTATIC HYPERPLASIA, UNSPECIFIED WHETHER LOWER URINARY TRACT SYMPTOMS PRESENT: ICD-10-CM

## 2025-03-15 RX ORDER — FINASTERIDE 5 MG/1
5 TABLET, FILM COATED ORAL
Qty: 100 TABLET | Refills: 3 | Status: SHIPPED | OUTPATIENT
Start: 2025-03-15

## 2025-03-20 ENCOUNTER — OFFICE VISIT (OUTPATIENT)
Dept: URGENT CARE | Facility: CLINIC | Age: 85
End: 2025-03-20
Payer: COMMERCIAL

## 2025-03-20 VITALS
HEIGHT: 74 IN | SYSTOLIC BLOOD PRESSURE: 118 MMHG | HEART RATE: 58 BPM | BODY MASS INDEX: 23.49 KG/M2 | WEIGHT: 183 LBS | TEMPERATURE: 97.7 F | DIASTOLIC BLOOD PRESSURE: 64 MMHG | OXYGEN SATURATION: 97 % | RESPIRATION RATE: 18 BRPM

## 2025-03-20 DIAGNOSIS — Z79.01 ANTICOAGULATED: ICD-10-CM

## 2025-03-20 DIAGNOSIS — S61.432A PUNCTURE WOUND OF LEFT HAND WITHOUT FOREIGN BODY, INITIAL ENCOUNTER: ICD-10-CM

## 2025-03-20 PROCEDURE — 12001 RPR S/N/AX/GEN/TRNK 2.5CM/<: CPT | Performed by: NURSE PRACTITIONER

## 2025-03-20 PROCEDURE — 99213 OFFICE O/P EST LOW 20 MIN: CPT | Mod: 25 | Performed by: NURSE PRACTITIONER

## 2025-03-20 ASSESSMENT — FIBROSIS 4 INDEX: FIB4 SCORE: 2.01

## 2025-03-20 NOTE — PROGRESS NOTES
Pedro Champion is a 84 y.o. male who presents for Puncture Wound (X last night, Lt hand, with a knife as he was cutting/taking out wax from a candle and it slipped )      HPI  This is a new problem. Pedro Champion is a 84 y.o. patient who presents to urgent care with c/o: Presents with a puncture wound to his left hand.  Injury occurred last evening less than 12 hours ago.  He was trying to get the wax out of a candle with a knife and the knife slipped puncturing his hand.  He washed it with soap and water and then put a dressing on it.  He is anticoagulated and has been bleeding all night.  He denies numbness, tingling, pain.  Unknown tetanus status.  No other aggravating leaving factors.    ROS See HPI    Allergies:       Allergies   Allergen Reactions   • Cefepime Hcl Rash     Hives, eosinophilia        PMSFS Hx:  Past Medical History:   Diagnosis Date   • Atherosclerosis of aorta (HCC)    • Basal cell carcinoma    • Cardiomyopathy ([facility]) 02/2020    Echocardiogram with LVEF 40-45%   • Chronic anticoagulation    • Depression    • Dyslipidemia 4/13/2016   • Dysphagia 10/5/2019   • REBECCA (generalized anxiety disorder) 11/10/2021   • Hearing loss    • Heart failure, left, 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% ([facility]) 9/23/2019   • Hypertension    • Hypothyroid 9/23/2019   • Insomnia    • Malignant melanoma ([facility]) 5/4/2021   • Mandibular fracture, open ([facility]) 8/28/2019   • Memory loss 11/22/2021   • Other persistent atrial fibrillation ([facility]) 8/28/2019   • Parotid nodule 7/19/2013   • Urinary retention 9/9/2019     IMO load March 2020   • Vitamin D deficiency 9/27/2021     Past Surgical History:   Procedure Laterality Date   • DE DENTAL SURGERY PROCEDURE Left 10/13/2019    Procedure: EXTRACTION, TOOTH;  Surgeon: Troy Dong D.D.S.;  Location: SURGERY [facility];  Service: Oral Surgery   • MANDIBLE FRACTURE ORIF Bilateral 10/13/2019    Procedure: ORIF, FRACTURE, MANDIBLE - FOR HARDWARE REMOVAL MANDIBLE, POSS ORIF;   Surgeon: Troy Dong D.D.S.;  Location: SURGERY [facility];  Service: Oral Surgery   • ORAL FISTULA REPAIR N/A 10/13/2019    Procedure: CLOSURE, FISTULA, MOUTH;  Surgeon: Troy Dong D.D.S.;  Location: SURGERY [facility];  Service: Oral Surgery   • SUBMANDIBLE ABSCESS INCISION AND DRAINAGE N/A 8/31/2019    Procedure: INCISION AND DRAINAGE FACIAL WOUND;  Surgeon: Troy Dong D.D.S.;  Location: SURGERY [facility];  Service: Oral Surgery   • INTERMAXILLARY FIXATATION N/A 8/31/2019    Procedure: FIXATION, MAXILLOMANDIBULAR. ORIF and MMF mandible fracture debridement of facial wounds extracton tooth #8;  Surgeon: Troy Dong D.D.S.;  Location: [facility];  Service: Oral Surgery   • PAROTIDECTOMY SUPERFICIAL  7/19/2013    Performed by Mendy Stern M.D. at [facility]   • LUMBAR DECOMPRESSION  1988   • INGUINAL HERNIA REPAIR BILATERAL  1960's     Family History   Problem Relation Age of Onset   • Heart Disease Father 81        Sudden cardiac death probably coronary   • Stroke Mother      Social History     Tobacco Use   • Smoking status: Never     Passive exposure: Past (father was a smoker)   • Smokeless tobacco: Never   Substance Use Topics   • Alcohol use: Not Currently     Comment: 3-4 glasses of wine a day         Problems:   Patient Active Problem List   Diagnosis   • Hearing loss of both ears   • Parotid nodule   • Essential hypertension, benign   • Dyslipidemia   • Other persistent atrial fibrillation (HCC)   • Mandibular fracture, open ([facility])   • Suicide attempt ([facility])   • Urinary retention   • Heart failure, left, 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% (HCC)   • Hypothyroid   • Dysphagia   • Other insomnia   • Malignant melanoma (HCC)   • Vitamin D deficiency   • Moderate episode of recurrent major depressive disorder (HCC)   • REBECCA (generalized anxiety disorder)   • Cardiomyopathy (HCC)   • Basal cell carcinoma   • Memory loss   • Hypercoagulable  "state due to permanent atrial fibrillation (HCC)   • Atherosclerosis of aorta (HCC)       Medications:   Current Outpatient Medications on File Prior to Visit   Medication Sig Dispense Refill   • finasteride (PROSCAR) 5 MG Tab TAKE ONE TABLET BY MOUTH EVERY  Tablet 3   • sacubitril-valsartan (ENTRESTO) 49-51 MG Tab Take 1 Tablet by mouth 2 times a day. 180 Tablet 3   • carvedilol (COREG) 6.25 MG Tab Take 1 Tablet by mouth 2 times a day with meals. 180 Tablet 3   • digoxin (LANOXIN) 125 MCG Tab Take 1 Tablet by mouth every day. 90 Tablet 3   • atorvastatin (LIPITOR) 40 MG Tab Take 1 Tablet by mouth every day. 90 Tablet 3   • potassium chloride (MICRO-K) 10 MEQ capsule Take 1 Capsule by mouth 2 times a day. 180 Capsule 3   • Empagliflozin (JARDIANCE) 10 MG Tab tablet Take 1 Tablet by mouth every day. 90 Tablet 3   • apixaban (ELIQUIS) 5mg Tab Take 1 Tablet by mouth 2 times a day. 180 Tablet 3   • tamsulosin (FLOMAX) 0.4 MG capsule TAKE ONE CAPSULE BY MOUTH EVERY DAY 90 Capsule 0   • levothyroxine (SYNTHROID) 25 MCG Tab Take 1 Tablet by mouth every morning on an empty stomach. 100 Tablet 3   • donepezil (ARICEPT) 10 MG tablet Take 1 Tablet by mouth every evening. 100 Tablet 3   • memantine (NAMENDA) 5 MG Tab Take 1 Tablet by mouth 2 times a day. 200 Tablet 0   • hydrOXYzine HCl (ATARAX) 25 MG Tab Take 1 Tablet by mouth every 8 hours as needed for Anxiety. 30 Tablet 0     No current facility-administered medications on file prior to visit.        Objective:     /64 (BP Location: Left arm, Patient Position: Sitting, BP Cuff Size: Adult)   Pulse (!) 58   Temp 36.5 °C (97.7 °F) (Temporal)   Resp 18   Ht 1.88 m (6' 2\")   Wt 83 kg (183 lb)   SpO2 97%   BMI 23.50 kg/m²     Physical Exam  Vitals and nursing note reviewed.   Constitutional:       Appearance: He is not ill-appearing.   Cardiovascular:      Rate and Rhythm: Normal rate.      Pulses: Normal pulses.   Pulmonary:      Effort: Pulmonary effort is " normal.   Skin:     General: Skin is warm and dry.      Capillary Refill: Capillary refill takes less than 2 seconds.      Findings: Laceration present.          Neurological:      Mental Status: He is alert.       Procedure: Laceration Repair   -Risks benefits and alternatives discussed including bleeding, nerve damage, infection, and poor cosmetic outcome discussed at length. Benefits and alternatives discussed. Consent was obtained for repair of laceration  Time since laceration occurred: Less than 12 hours ago sharp kitchen knife ARGENIS   Wound length 1 cm, location left hand webbing between thumb and index finger  straight laceration, subcut tissue visible   Wound NVI, No signs of tendon injury   Area extensively irrigated with NS, wound explored, No fb identified.   Under clean conditions, I injected  2  cc of   1 % lidocaine. Good anesthesia was obtained. Under sterile conditions, I applied 2 sutures with 5.0 ethilon interrupted sutures with good wound edge approximation.  last tetanus booster within 10 years   Bleeding was controlled. There were no procedural complications. Patient tolerated procedure well. Dressing was applied and wound care/follow up instructions were given.   Patients questions were answered.    Tdap vaccine was last given August 2019 her [place] [employer] I[facility]      Assessment /Associated Orders:      1. Puncture wound of left hand without foreign body, initial encounter        2. Anticoagulated            Medical Decision Making:    Pedro Champion is a very pleasant 84 y.o. male who is clinically stable at today's acute urgent care visit. Presents with acute problem/ concern today.    No acute distress is noted at the time of the visit.  VSS. Appropriate for outpatient care at this time.      Keep clean and dry for 24 hours then clean daily with mild soap and water. Return for s/s of infections ( swelling, pain, redness, pus, fever)   Suture removal 7  days.     Through shared decision making  a discussion of the Dx and DDx, management options (risks,benefits, and alternatives to planned treatment), natural progression, supportive care and indications for immediate follow-up discussed. Expressed understanding and the treatment plan was agreed upon.    Questions were encouraged and answered     Follow Up:   Return to urgent care prn if new or worsening sx or if there is no improvement in condition prn.          Please note that this dictation was created using voice recognition software. I have worked with consultants from the vendor as well as technical experts from [facility] [employer] to optimize the interface. I have made every reasonable attempt to correct obvious errors, but I expect that there are errors of grammar and possibly content that I did not discover before finalizing the note.  This note was electronically signed by provider

## 2025-03-24 NOTE — TELEPHONE ENCOUNTER
CAT Hamilton, patients wife calling with questions regarding patients medication. She will like a call back at [phone]      Thank you!    [id number]

## 2025-04-01 DIAGNOSIS — I48.19 OTHER PERSISTENT ATRIAL FIBRILLATION (HCC): ICD-10-CM

## 2025-04-01 NOTE — TELEPHONE ENCOUNTER
Is the patient due for a refill? Yes    Was the patient seen the last 15 months? Yes    Date of last office visit: 2/27/25    Does the patient have an upcoming appointment?  No    Provider to refill:CW    Does the patient have long term Plus and need 100-day supply? (This applies to ALL medications) Patient does not have SCP

## 2025-04-02 RX ORDER — DIGOXIN 125 MCG
125 TABLET ORAL DAILY
Qty: 90 TABLET | Refills: 0 | OUTPATIENT
Start: 2025-04-02

## 2025-04-02 NOTE — TELEPHONE ENCOUNTER
Duplicate request.  Full refill sent 2/27/25. Last Digoxin level done 1/20/20, new orders placed.  Sent printed request to patient by mail.

## 2025-04-03 ENCOUNTER — APPOINTMENT (OUTPATIENT)
Dept: URBAN - METROPOLITAN AREA CLINIC 4 | Facility: CLINIC | Age: 85
Setting detail: DERMATOLOGY
End: 2025-04-03

## 2025-04-03 DIAGNOSIS — I48.19 OTHER PERSISTENT ATRIAL FIBRILLATION (HCC): ICD-10-CM

## 2025-04-03 PROBLEM — C43.61 MALIGNANT MELANOMA OF RIGHT UPPER LIMB, INCLUDING SHOULDER: Status: ACTIVE | Noted: 2025-04-03

## 2025-04-03 PROCEDURE — 11606 EXC TR-EXT MAL+MARG >4 CM: CPT

## 2025-04-03 PROCEDURE — 13122 CMPLX RPR S/A/L ADDL 5 CM/>: CPT

## 2025-04-03 PROCEDURE — ? EXCISION

## 2025-04-03 PROCEDURE — ? COUNSELING

## 2025-04-03 PROCEDURE — 13121 CMPLX RPR S/A/L 2.6-7.5 CM: CPT

## 2025-04-03 PROCEDURE — ? DIAGNOSIS COMMENT

## 2025-04-03 NOTE — PROCEDURE: DIAGNOSIS COMMENT
Detail Level: Detailed
Render Risk Assessment In Note?: no
Comment: [id number], Melanoma Focally invasive, 0.2 MM in thickness

## 2025-04-03 NOTE — PROCEDURE: EXCISION
Referring Physician (Optional): LAMONT Coronado
Surgeon (Optional): Yoly
Biopsy Photograph Reviewed: Yes
Previous Accession (Optional): [id number]
Date Of Previous Biopsy (Optional): 3/13/25
Breslow Depth: 0.2
Size Of Lesion In Cm: 3.1
Size Of Margin In Cm: 1
Anesthesia Volume In Cc: 20.9
Was An Eye Clamp Used?: No
Eye Clamp Note Details: An eye clamp was used during the procedure.
Excision Method: Elliptical
Saucerization Depth: dermis and superficial adipose tissue
Repair Type: Complex
Intermediate / Complex Repair - Final Wound Length In Cm: 11
Deep Sutures: 2-0 Vicryl
Epidermal Sutures: 4-0 Vicryl Rapide
Suturegard Retention Suture: 2-0 Nylon
Retention Suture Bite Size: 3 mm
Length To Time In Minutes Device Was In Place: 10
Width Of Defect Perpendicular To Closure In Cm (Required): 3.2
Distance Of Undermining In Cm (Required): 4.1
Undermining Type: Entire Wound
Debridement Text: The wound edges were debrided prior to proceeding with the closure to facilitate wound healing.
Helical Rim Text: The closure involved the helical rim.
Vermilion Border Text: The closure involved the vermilion border.
Nostril Rim Text: The closure involved the nostril rim.
Retention Suture Text: Retention sutures were placed to support the closure and prevent dehiscence.
Primary Defect Length (In Cm): 0
Lab: [id number]
Lab Facility: 
Graft Donor Site Bandage (Optional-Leave Blank If You Don't Want In Note): Steri-strips and a pressure bandage were applied to the donor site.
Epidermal Closure Graft Donor Site (Optional): simple interrupted
Billing Type: Third-Party Bill
Excision Depth: adipose tissue
Scalpel Size: 15 blade
Anesthesia Type: 1% lidocaine with epinephrine
Additional Anesthesia Volume In Cc: 6
Hemostasis: Electrocautery
Estimated Blood Loss (Cc): minimal
Detail Level: Detailed
Repair Depth: use same depth as excision depth
Repair Anesthesia Method: local infiltration
Anesthesia Volume In Cc: 21
Dermal Closure: buried vertical mattress
Epidermal Closure: running subcuticular
Wound Care: Bacitracin
Dressing: dry sterile dressing
Suturegard Intro: Intraoperative tissue expansion was performed, utilizing the SUTUREGARD device, in order to reduce wound tension.
Suturegard Body: The suture ends were repeatedly re-tightened and re-clamped to achieve the desired tissue expansion.
Hemigard Intro: Due to skin fragility and wound tension, it was decided to use HEMIGARD adhesive retention suture devices to permit a linear closure. The skin was cleaned and dried for a 6cm distance away from the wound. Excessive hair, if present, was removed to allow for adhesion.
Hemigard Postcare Instructions: The HEMIGARD strips are to remain completely dry for at least 5-7 days.
Positioning (Leave Blank If You Do Not Want): The patient was placed in a comfortable position exposing the surgical site.
Pre-Excision Curettage Text (Leave Blank If You Do Not Want): Prior to drawing the surgical margin the visible lesion was removed with electrodesiccation and curettage to clearly define the lesion size.
Complex Repair Preamble Text (Leave Blank If You Do Not Want): Extensive wide undermining was performed.
Intermediate Repair Preamble Text (Leave Blank If You Do Not Want): Undermining was performed with blunt dissection.
Curvilinear Excision Additional Text (Leave Blank If You Do Not Want): The margin was drawn around the clinically apparent lesion.  A curvilinear shape was then drawn on the skin incorporating the lesion and margins.  Incisions were then made along these lines to the appropriate tissue plane and the lesion was extirpated.
Fusiform Excision Additional Text (Leave Blank If You Do Not Want): The margin was drawn around the clinically apparent lesion.  A fusiform shape was then drawn on the skin incorporating the lesion and margins.  Incisions were then made along these lines to the appropriate tissue plane and the lesion was extirpated.
Elliptical Excision Additional Text (Leave Blank If You Do Not Want): The margin was drawn around the clinically apparent lesion.  An elliptical shape was then drawn on the skin incorporating the lesion and margins.  Incisions were then made along these lines to the appropriate tissue plane and the lesion was extirpated.
Saucerization Excision Additional Text (Leave Blank If You Do Not Want): The margin was drawn around the clinically apparent lesion.  Incisions were then made along these lines, in a tangential fashion, to the appropriate tissue plane and the lesion was extirpated.
Slit Excision Additional Text (Leave Blank If You Do Not Want): A linear line was drawn on the skin overlying the lesion. An incision was made slowly until the lesion was visualized.  Once visualized, the lesion was removed with blunt dissection.
Excisional Biopsy Additional Text (Leave Blank If You Do Not Want): The margin was drawn around the clinically apparent lesion. An elliptical shape was then drawn on the skin incorporating the lesion and margins.  Incisions were then made along these lines to the appropriate tissue plane and the lesion was extirpated.
Perilesional Excision Additional Text (Leave Blank If You Do Not Want): The margin was drawn around the clinically apparent lesion. Incisions were then made along these lines to the appropriate tissue plane and the lesion was extirpated.
Repair Performed By Another Provider Text (Leave Blank If You Do Not Want): After the tissue was excised the defect was repaired by another provider.
No Repair - Repaired With Adjacent Surgical Defect Text (Leave Blank If You Do Not Want): After the excision the defect was repaired concurrently with another surgical defect which was in close approximation.
Adjacent Tissue Transfer Text: The defect edges were debeveled with a #15 scalpel blade. Given the location of the defect and the proximity to free margins an adjacent tissue transfer was deemed most appropriate. Using a sterile surgical marker, an appropriate flap was drawn incorporating the defect and placing the expected incisions within the relaxed skin tension lines where possible. The area thus outlined was incised deep to adipose tissue with a #15 scalpel blade. The skin margins were undermined to an appropriate distance in all directions utilizing iris scissors and carried over to close the primary defect.
Advancement Flap (Single) Text: The defect edges were debeveled with a #15 scalpel blade.  Given the location of the defect and the proximity to free margins a single advancement flap was deemed most appropriate.  Using a sterile surgical marker, an appropriate advancement flap was drawn incorporating the defect and placing the expected incisions within the relaxed skin tension lines where possible.    The area thus outlined was incised deep to adipose tissue with a #15 scalpel blade.  The skin margins were undermined to an appropriate distance in all directions utilizing iris scissors.
Advancement Flap (Double) Text: The defect edges were debeveled with a #15 scalpel blade.  Given the location of the defect and the proximity to free margins a double advancement flap was deemed most appropriate.  Using a sterile surgical marker, the appropriate advancement flaps were drawn incorporating the defect and placing the expected incisions within the relaxed skin tension lines where possible.    The area thus outlined was incised deep to adipose tissue with a #15 scalpel blade.  The skin margins were undermined to an appropriate distance in all directions utilizing iris scissors.
Burow's Advancement Flap Text: The defect edges were debeveled with a #15 scalpel blade.  Given the location of the defect and the proximity to free margins a Burow's advancement flap was deemed most appropriate.  Using a sterile surgical marker, the appropriate advancement flap was drawn incorporating the defect and placing the expected incisions within the relaxed skin tension lines where possible.    The area thus outlined was incised deep to adipose tissue with a #15 scalpel blade.  The skin margins were undermined to an appropriate distance in all directions utilizing iris scissors.
Chonodrocutaneous Helical Advancement Flap Text: The defect edges were debeveled with a #15 scalpel blade. Given the location of the defect and the proximity to free margins a chondrocutaneous helical advancement flap was deemed most appropriate. Using a sterile surgical marker, the appropriate advancement flap was drawn incorporating the defect and placing the expected incisions within the relaxed skin tension lines where possible. The area thus outlined was incised deep to adipose tissue with a #15 scalpel blade. The skin margins were undermined to an appropriate distance in all directions utilizing iris scissors. Following this, the designed flap was advanced and carried over into the primary defect and sutured into place.
Crescentic Advancement Flap Text: The defect edges were debeveled with a #15 scalpel blade.  Given the location of the defect and the proximity to free margins a crescentic advancement flap was deemed most appropriate.  Using a sterile surgical marker, the appropriate advancement flap was drawn incorporating the defect and placing the expected incisions within the relaxed skin tension lines where possible.    The area thus outlined was incised deep to adipose tissue with a #15 scalpel blade.  The skin margins were undermined to an appropriate distance in all directions utilizing iris scissors.
A-T Advancement Flap Text: The defect edges were debeveled with a #15 scalpel blade.  Given the location of the defect, shape of the defect and the proximity to free margins an A-T advancement flap was deemed most appropriate.  Using a sterile surgical marker, an appropriate advancement flap was drawn incorporating the defect and placing the expected incisions within the relaxed skin tension lines where possible.    The area thus outlined was incised deep to adipose tissue with a #15 scalpel blade.  The skin margins were undermined to an appropriate distance in all directions utilizing iris scissors.
O-T Advancement Flap Text: The defect edges were debeveled with a #15 scalpel blade.  Given the location of the defect, shape of the defect and the proximity to free margins an O-T advancement flap was deemed most appropriate.  Using a sterile surgical marker, an appropriate advancement flap was drawn incorporating the defect and placing the expected incisions within the relaxed skin tension lines where possible.    The area thus outlined was incised deep to adipose tissue with a #15 scalpel blade.  The skin margins were undermined to an appropriate distance in all directions utilizing iris scissors.
O-L Flap Text: The defect edges were debeveled with a #15 scalpel blade.  Given the location of the defect, shape of the defect and the proximity to free margins an O-L flap was deemed most appropriate.  Using a sterile surgical marker, an appropriate advancement flap was drawn incorporating the defect and placing the expected incisions within the relaxed skin tension lines where possible.    The area thus outlined was incised deep to adipose tissue with a #15 scalpel blade.  The skin margins were undermined to an appropriate distance in all directions utilizing iris scissors.
O-Z Flap Text: The defect edges were debeveled with a #15 scalpel blade. Given the location of the defect, shape of the defect and the proximity to free margins an O-Z flap was deemed most appropriate. Using a sterile surgical marker, an appropriate transposition flap was drawn incorporating the defect and placing the expected incisions within the relaxed skin tension lines where possible. The area thus outlined was incised deep to adipose tissue with a #15 scalpel blade. The skin margins were undermined to an appropriate distance in all directions utilizing iris scissors. Following this, the designed flap was carried over into the primary defect and sutured into place.
Double O-Z Flap Text: The defect edges were debeveled with a #15 scalpel blade. Given the location of the defect, shape of the defect and the proximity to free margins a Double O-Z flap was deemed most appropriate. Using a sterile surgical marker, an appropriate transposition flap was drawn incorporating the defect and placing the expected incisions within the relaxed skin tension lines where possible. The area thus outlined was incised deep to adipose tissue with a #15 scalpel blade. The skin margins were undermined to an appropriate distance in all directions utilizing iris scissors. Following this, the designed flap was carried over into the primary defect and sutured into place.
V-Y Flap Text: The defect edges were debeveled with a #15 scalpel blade.  Given the location of the defect, shape of the defect and the proximity to free margins a V-Y flap was deemed most appropriate.  Using a sterile surgical marker, an appropriate advancement flap was drawn incorporating the defect and placing the expected incisions within the relaxed skin tension lines where possible.    The area thus outlined was incised deep to adipose tissue with a #15 scalpel blade.  The skin margins were undermined to an appropriate distance in all directions utilizing iris scissors.
Advancement-Rotation Flap Text: The defect edges were debeveled with a #15 scalpel blade.  Given the location of the defect, shape of the defect and the proximity to free margins an advancement-rotation flap was deemed most appropriate.  Using a sterile surgical marker, an appropriate flap was drawn incorporating the defect and placing the expected incisions within the relaxed skin tension lines where possible. The area thus outlined was incised deep to adipose tissue with a #15 scalpel blade.  The skin margins were undermined to an appropriate distance in all directions utilizing iris scissors.
Mercedes Flap Text: The defect edges were debeveled with a #15 scalpel blade. Given the location of the defect, shape of the defect and the proximity to free margins a Mercedes flap was deemed most appropriate. Using a sterile surgical marker, an appropriate advancement flap was drawn incorporating the defect and placing the expected incisions within the relaxed skin tension lines where possible. The area thus outlined was incised deep to adipose tissue with a #15 scalpel blade. The skin margins were undermined to an appropriate distance in all directions utilizing iris scissors. Following this, the designed flap was advanced and carried over into the primary defect and sutured into place.
Modified Advancement Flap Text: The defect edges were debeveled with a #15 scalpel blade.  Given the location of the defect, shape of the defect and the proximity to free margins a modified advancement flap was deemed most appropriate.  Using a sterile surgical marker, an appropriate advancement flap was drawn incorporating the defect and placing the expected incisions within the relaxed skin tension lines where possible.    The area thus outlined was incised deep to adipose tissue with a #15 scalpel blade.  The skin margins were undermined to an appropriate distance in all directions utilizing iris scissors.
Mucosal Advancement Flap Text: Given the location of the defect, shape of the defect and the proximity to free margins a mucosal advancement flap was deemed most appropriate. Incisions were made with a 15 blade scalpel in the appropriate fashion along the cutaneous vermilion border and the mucosal lip. The remaining actinically damaged mucosal tissue was excised.  The mucosal advancement flap was then elevated to the gingival sulcus with care taken to preserve the neurovascular structures and advanced into the primary defect. Care was taken to ensure that precise realignment of the vermilion border was achieved.
Peng Advancement Flap Text: The defect edges were debeveled with a #15 scalpel blade. Given the location of the defect, shape of the defect and the proximity to free margins a Peng advancement flap was deemed most appropriate. Using a sterile surgical marker, an appropriate advancement flap was drawn incorporating the defect and placing the expected incisions within the relaxed skin tension lines where possible. The area thus outlined was incised deep to adipose tissue with a #15 scalpel blade. The skin margins were undermined to an appropriate distance in all directions utilizing iris scissors. Following this, the designed flap was advanced and carried over into the primary defect and sutured into place.
Hatchet Flap Text: The defect edges were debeveled with a #15 scalpel blade.  Given the location of the defect, shape of the defect and the proximity to free margins a hatchet flap was deemed most appropriate.  Using a sterile surgical marker, an appropriate hatchet flap was drawn incorporating the defect and placing the expected incisions within the relaxed skin tension lines where possible.    The area thus outlined was incised deep to adipose tissue with a #15 scalpel blade.  The skin margins were undermined to an appropriate distance in all directions utilizing iris scissors.
Rotation Flap Text: The defect edges were debeveled with a #15 scalpel blade.  Given the location of the defect, shape of the defect and the proximity to free margins a rotation flap was deemed most appropriate.  Using a sterile surgical marker, an appropriate rotation flap was drawn incorporating the defect and placing the expected incisions within the relaxed skin tension lines where possible.    The area thus outlined was incised deep to adipose tissue with a #15 scalpel blade.  The skin margins were undermined to an appropriate distance in all directions utilizing iris scissors.
Bilateral Rotation Flap Text: The defect edges were debeveled with a #15 scalpel blade. Given the location of the defect, shape of the defect and the proximity to free margins a bilateral rotation flap was deemed most appropriate. Using a sterile surgical marker, an appropriate rotation flap was drawn incorporating the defect and placing the expected incisions within the relaxed skin tension lines where possible. The area thus outlined was incised deep to adipose tissue with a #15 scalpel blade. The skin margins were undermined to an appropriate distance in all directions utilizing iris scissors. Following this, the designed flap was carried over into the primary defect and sutured into place.
Spiral Flap Text: The defect edges were debeveled with a #15 scalpel blade.  Given the location of the defect, shape of the defect and the proximity to free margins a spiral flap was deemed most appropriate.  Using a sterile surgical marker, an appropriate rotation flap was drawn incorporating the defect and placing the expected incisions within the relaxed skin tension lines where possible. The area thus outlined was incised deep to adipose tissue with a #15 scalpel blade.  The skin margins were undermined to an appropriate distance in all directions utilizing iris scissors.
Staged Advancement Flap Text: The defect edges were debeveled with a #15 scalpel blade. Given the location of the defect, shape of the defect and the proximity to free margins a staged advancement flap was deemed most appropriate. Using a sterile surgical marker, an appropriate advancement flap was drawn incorporating the defect and placing the expected incisions within the relaxed skin tension lines where possible. The area thus outlined was incised deep to adipose tissue with a #15 scalpel blade. The skin margins were undermined to an appropriate distance in all directions utilizing iris scissors. Following this, the designed flap was carried over into the primary defect and sutured into place.
Star Wedge Flap Text: The defect edges were debeveled with a #15 scalpel blade.  Given the location of the defect, shape of the defect and the proximity to free margins a star wedge flap was deemed most appropriate.  Using a sterile surgical marker, an appropriate rotation flap was drawn incorporating the defect and placing the expected incisions within the relaxed skin tension lines where possible. The area thus outlined was incised deep to adipose tissue with a #15 scalpel blade.  The skin margins were undermined to an appropriate distance in all directions utilizing iris scissors.
Transposition Flap Text: The defect edges were debeveled with a #15 scalpel blade.  Given the location of the defect and the proximity to free margins a transposition flap was deemed most appropriate.  Using a sterile surgical marker, an appropriate transposition flap was drawn incorporating the defect.    The area thus outlined was incised deep to adipose tissue with a #15 scalpel blade.  The skin margins were undermined to an appropriate distance in all directions utilizing iris scissors.
Muscle Hinge Flap Text: The defect edges were debeveled with a #15 scalpel blade.  Given the size, depth and location of the defect and the proximity to free margins a muscle hinge flap was deemed most appropriate.  Using a sterile surgical marker, an appropriate hinge flap was drawn incorporating the defect. The area thus outlined was incised with a #15 scalpel blade.  The skin margins were undermined to an appropriate distance in all directions utilizing iris scissors.
Mustarde Flap Text: The defect edges were debeveled with a #15 scalpel blade.  Given the size, depth and location of the defect and the proximity to free margins a Mustarde flap was deemed most appropriate. Using a sterile surgical marker, an appropriate flap was drawn incorporating the defect. The area thus outlined was incised with a #15 scalpel blade. The skin margins were undermined to an appropriate distance in all directions utilizing iris scissors. Following this, the designed flap was carried into the primary defect and sutured into place.
Nasal Turnover Hinge Flap Text: The defect edges were debeveled with a #15 scalpel blade.  Given the size, depth, location of the defect and the defect being full thickness a nasal turnover hinge flap was deemed most appropriate. Using a sterile surgical marker, an appropriate hinge flap was drawn incorporating the defect. The area thus outlined was incised with a #15 scalpel blade. The flap was designed to recreate the nasal mucosal lining and the alar rim. The skin margins were undermined to an appropriate distance in all directions utilizing iris scissors. Following this, the designed flap was carried over into the primary defect and sutured into place
Nasalis-Muscle-Based Myocutaneous Island Pedicle Flap Text: Using a #15 blade, an incision was made around the donor flap to the level of the nasalis muscle. Wide lateral undermining was then performed in both the subcutaneous plane above the nasalis muscle, and in a submuscular plane just above periosteum. This allowed the formation of a free nasalis muscle axial pedicle (based on the angular artery) which was still attached to the actual cutaneous flap, increasing its mobility and vascular viability. Hemostasis was obtained with pinpoint electrocoagulation. The flap was mobilized into position and the pivotal anchor points positioned and stabilized with buried interrupted sutures. Subcutaneous and dermal tissues were closed in a multilayered fashion with sutures. Tissue redundancies were excised, and the epidermal edges were apposed without significant tension and sutured with sutures.
Nasalis Myocutaneous Flap Text: Using a #15 blade, an incision was made around the donor flap to the level of the nasalis muscle. Wide lateral undermining was then performed in both the subcutaneous plane above the nasalis muscle, and in a submuscular plane just above periosteum. This allowed the formation of a free nasalis muscle axial pedicle which was still attached to the actual cutaneous flap, increasing its mobility and vascular viability. Hemostasis was obtained with pinpoint electrocoagulation. The flap was mobilized into position and the pivotal anchor points positioned and stabilized with buried interrupted sutures. Subcutaneous and dermal tissues were closed in a multilayered fashion with sutures. Tissue redundancies were excised, and the epidermal edges were apposed without significant tension and sutured with sutures.
Nasolabial Transposition Flap Text: The defect edges were debeveled with a #15 scalpel blade.  Given the size, depth and location of the defect and the proximity to free margins a nasolabial transposition flap was deemed most appropriate. Using a sterile surgical marker, an appropriate flap was drawn incorporating the defect. The area thus outlined was incised with a #15 scalpel blade. The skin margins were undermined to an appropriate distance in all directions utilizing iris scissors. Following this, the designed flap was carried into the primary defect and sutured into place.
Orbicularis Oris Muscle Flap Text: The defect edges were debeveled with a #15 scalpel blade.  Given that the defect affected the competency of the oral sphincter an orbicularis oris muscle flap was deemed most appropriate to restore this competency and normal muscle function.  Using a sterile surgical marker, an appropriate flap was drawn incorporating the defect. The area thus outlined was incised with a #15 scalpel blade. Following this, the designed flap was carried over into the primary defect and sutured into place.
Melolabial Transposition Flap Text: The defect edges were debeveled with a #15 scalpel blade.  Given the location of the defect and the proximity to free margins a melolabial flap was deemed most appropriate.  Using a sterile surgical marker, an appropriate melolabial transposition flap was drawn incorporating the defect.    The area thus outlined was incised deep to adipose tissue with a #15 scalpel blade.  The skin margins were undermined to an appropriate distance in all directions utilizing iris scissors.
Rectangular Flap Text: The defect edges were debeveled with a #15 scalpel blade. Given the location of the defect and the proximity to free margins a rectangular flap was deemed most appropriate. Using a sterile surgical marker, an appropriate rectangular flap was drawn incorporating the defect. The area thus outlined was incised deep to adipose tissue with a #15 scalpel blade. The skin margins were undermined to an appropriate distance in all directions utilizing iris scissors. Following this, the designed flap was carried over into the primary defect and sutured into place.
Rhombic Flap Text: The defect edges were debeveled with a #15 scalpel blade.  Given the location of the defect and the proximity to free margins a rhombic flap was deemed most appropriate.  Using a sterile surgical marker, an appropriate rhombic flap was drawn incorporating the defect.    The area thus outlined was incised deep to adipose tissue with a #15 scalpel blade.  The skin margins were undermined to an appropriate distance in all directions utilizing iris scissors.
Rhomboid Transposition Flap Text: The defect edges were debeveled with a #15 scalpel blade. Given the location of the defect and the proximity to free margins a rhomboid transposition flap was deemed most appropriate. Using a sterile surgical marker, an appropriate rhomboid flap was drawn incorporating the defect. The area thus outlined was incised deep to adipose tissue with a #15 scalpel blade. The skin margins were undermined to an appropriate distance in all directions utilizing iris scissors. Following this, the designed flap was carried over into the primary defect and sutured into place.
Bi-Rhombic Flap Text: The defect edges were debeveled with a #15 scalpel blade.  Given the location of the defect and the proximity to free margins a bi-rhombic flap was deemed most appropriate.  Using a sterile surgical marker, an appropriate rhombic flap was drawn incorporating the defect. The area thus outlined was incised deep to adipose tissue with a #15 scalpel blade.  The skin margins were undermined to an appropriate distance in all directions utilizing iris scissors.
Helical Rim Advancement Flap Text: The defect edges were debeveled with a #15 blade scalpel.  Given the location of the defect and the proximity to free margins (helical rim) a double helical rim advancement flap was deemed most appropriate.  Using a sterile surgical marker, the appropriate advancement flaps were drawn incorporating the defect and placing the expected incisions between the helical rim and antihelix where possible.  The area thus outlined was incised through and through with a #15 scalpel blade.  With a skin hook and iris scissors, the flaps were gently and sharply undermined and freed up.
Bilateral Helical Rim Advancement Flap Text: The defect edges were debeveled with a #15 blade scalpel.  Given the location of the defect and the proximity to free margins (helical rim) a bilateral helical rim advancement flap was deemed most appropriate.  Using a sterile surgical marker, the appropriate advancement flaps were drawn incorporating the defect and placing the expected incisions between the helical rim and antihelix where possible.  The area thus outlined was incised through and through with a #15 scalpel blade.  With a skin hook and iris scissors, the flaps were gently and sharply undermined and freed up.
Ear Star Wedge Flap Text: The defect edges were debeveled with a #15 blade scalpel.  Given the location of the defect and the proximity to free margins (helical rim) an ear star wedge flap was deemed most appropriate.  Using a sterile surgical marker, the appropriate flap was drawn incorporating the defect and placing the expected incisions between the helical rim and antihelix where possible.  The area thus outlined was incised through and through with a #15 scalpel blade.
Flip-Flop Flap Text: The defect edges were debeveled with a #15 blade scalpel.  Given the location of the defect and the proximity to free margins a flip-flop flap was deemed most appropriate. Using a sterile surgical marker, the appropriate flap was drawn incorporating the defect and placing the expected incisions between the helical rim and antihelix where possible.  The area thus outlined was incised through and through with a #15 scalpel blade. Following this, the designed flap was carried over into the primary defect and sutured into place.
Banner Transposition Flap Text: The defect edges were debeveled with a #15 scalpel blade. Given the location of the defect and the proximity to free margins a Banner transposition flap was deemed most appropriate. Using a sterile surgical marker, an appropriate flap was drawn around the defect. The area thus outlined was incised deep to adipose tissue with a #15 scalpel blade. The skin margins were undermined to an appropriate distance in all directions utilizing iris scissors. Following this, the designed flap was carried into the primary defect and sutured into place.
Bilobed Flap Text: The defect edges were debeveled with a #15 scalpel blade.  Given the location of the defect and the proximity to free margins a bilobe flap was deemed most appropriate.  Using a sterile surgical marker, an appropriate bilobe flap drawn around the defect.    The area thus outlined was incised deep to adipose tissue with a #15 scalpel blade.  The skin margins were undermined to an appropriate distance in all directions utilizing iris scissors.
Bilobed Transposition Flap Text: The defect edges were debeveled with a #15 scalpel blade.  Given the location of the defect and the proximity to free margins a bilobed transposition flap was deemed most appropriate.  Using a sterile surgical marker, an appropriate bilobe flap drawn around the defect.    The area thus outlined was incised deep to adipose tissue with a #15 scalpel blade.  The skin margins were undermined to an appropriate distance in all directions utilizing iris scissors.
Trilobed Flap Text: The defect edges were debeveled with a #15 scalpel blade.  Given the location of the defect and the proximity to free margins a trilobed flap was deemed most appropriate.  Using a sterile surgical marker, an appropriate trilobed flap drawn around the defect.    The area thus outlined was incised deep to adipose tissue with a #15 scalpel blade.  The skin margins were undermined to an appropriate distance in all directions utilizing iris scissors.
Dorsal Nasal Flap Text: The defect edges were debeveled with a #15 scalpel blade.  Given the location of the defect and the proximity to free margins a dorsal nasal flap was deemed most appropriate.  Using a sterile surgical marker, an appropriate dorsal nasal flap was drawn around the defect.    The area thus outlined was incised deep to adipose tissue with a #15 scalpel blade.  The skin margins were undermined to an appropriate distance in all directions utilizing iris scissors.
Island Pedicle Flap Text: The defect edges were debeveled with a #15 scalpel blade.  Given the location of the defect, shape of the defect and the proximity to free margins an island pedicle advancement flap was deemed most appropriate.  Using a sterile surgical marker, an appropriate advancement flap was drawn incorporating the defect, outlining the appropriate donor tissue and placing the expected incisions within the relaxed skin tension lines where possible.    The area thus outlined was incised deep to adipose tissue with a #15 scalpel blade.  The skin margins were undermined to an appropriate distance in all directions around the primary defect and laterally outward around the island pedicle utilizing iris scissors.  There was minimal undermining beneath the pedicle flap.
Island Pedicle Flap With Canthal Suspension Text: The defect edges were debeveled with a #15 scalpel blade.  Given the location of the defect, shape of the defect and the proximity to free margins an island pedicle advancement flap was deemed most appropriate.  Using a sterile surgical marker, an appropriate advancement flap was drawn incorporating the defect, outlining the appropriate donor tissue and placing the expected incisions within the relaxed skin tension lines where possible. The area thus outlined was incised deep to adipose tissue with a #15 scalpel blade.  The skin margins were undermined to an appropriate distance in all directions around the primary defect and laterally outward around the island pedicle utilizing iris scissors.  There was minimal undermining beneath the pedicle flap. A suspension suture was placed in the canthal tendon to prevent tension and prevent ectropion.
Alar Island Pedicle Flap Text: The defect edges were debeveled with a #15 scalpel blade.  Given the location of the defect, shape of the defect and the proximity to the alar rim an island pedicle advancement flap was deemed most appropriate.  Using a sterile surgical marker, an appropriate advancement flap was drawn incorporating the defect, outlining the appropriate donor tissue and placing the expected incisions within the nasal ala running parallel to the alar rim. The area thus outlined was incised with a #15 scalpel blade.  The skin margins were undermined minimally to an appropriate distance in all directions around the primary defect and laterally outward around the island pedicle utilizing iris scissors.  There was minimal undermining beneath the pedicle flap.
Double Island Pedicle Flap Text: The defect edges were debeveled with a #15 scalpel blade.  Given the location of the defect, shape of the defect and the proximity to free margins a double island pedicle advancement flap was deemed most appropriate.  Using a sterile surgical marker, an appropriate advancement flap was drawn incorporating the defect, outlining the appropriate donor tissue and placing the expected incisions within the relaxed skin tension lines where possible.    The area thus outlined was incised deep to adipose tissue with a #15 scalpel blade.  The skin margins were undermined to an appropriate distance in all directions around the primary defect and laterally outward around the island pedicle utilizing iris scissors.  There was minimal undermining beneath the pedicle flap.
Island Pedicle Flap-Requiring Vessel Identification Text: The defect edges were debeveled with a #15 scalpel blade.  Given the location of the defect, shape of the defect and the proximity to free margins an island pedicle advancement flap was deemed most appropriate.  Using a sterile surgical marker, an appropriate advancement flap was drawn, based on the axial vessel mentioned above, incorporating the defect, outlining the appropriate donor tissue and placing the expected incisions within the relaxed skin tension lines where possible.    The area thus outlined was incised deep to adipose tissue with a #15 scalpel blade.  The skin margins were undermined to an appropriate distance in all directions around the primary defect and laterally outward around the island pedicle utilizing iris scissors.  There was minimal undermining beneath the pedicle flap.
Keystone Flap Text: The defect edges were debeveled with a #15 scalpel blade.  Given the location of the defect, shape of the defect a keystone flap was deemed most appropriate.  Using a sterile surgical marker, an appropriate keystone flap was drawn incorporating the defect, outlining the appropriate donor tissue and placing the expected incisions within the relaxed skin tension lines where possible. The area thus outlined was incised deep to adipose tissue with a #15 scalpel blade.  The skin margins were undermined to an appropriate distance in all directions around the primary defect and laterally outward around the flap utilizing iris scissors.
O-T Plasty Text: The defect edges were debeveled with a #15 scalpel blade.  Given the location of the defect, shape of the defect and the proximity to free margins an O-T plasty was deemed most appropriate.  Using a sterile surgical marker, an appropriate O-T plasty was drawn incorporating the defect and placing the expected incisions within the relaxed skin tension lines where possible.    The area thus outlined was incised deep to adipose tissue with a #15 scalpel blade.  The skin margins were undermined to an appropriate distance in all directions utilizing iris scissors.
O-Z Plasty Text: The defect edges were debeveled with a #15 scalpel blade.  Given the location of the defect, shape of the defect and the proximity to free margins an O-Z plasty (double transposition flap) was deemed most appropriate.  Using a sterile surgical marker, the appropriate transposition flaps were drawn incorporating the defect and placing the expected incisions within the relaxed skin tension lines where possible.    The area thus outlined was incised deep to adipose tissue with a #15 scalpel blade.  The skin margins were undermined to an appropriate distance in all directions utilizing iris scissors.  Hemostasis was achieved with electrocautery.  The flaps were then transposed into place, one clockwise and the other counterclockwise, and anchored with interrupted buried subcutaneous sutures.
Double O-Z Plasty Text: The defect edges were debeveled with a #15 scalpel blade. Given the location of the defect, shape of the defect and the proximity to free margins a Double O-Z plasty (double transposition flap) was deemed most appropriate. Using a sterile surgical marker, the appropriate transposition flaps were drawn incorporating the defect and placing the expected incisions within the relaxed skin tension lines where possible. The area thus outlined was incised deep to adipose tissue with a #15 scalpel blade. The skin margins were undermined to an appropriate distance in all directions utilizing iris scissors. Hemostasis was achieved with electrocautery. The flaps were then transposed and carried over into place, one clockwise and the other counterclockwise, and anchored with interrupted buried subcutaneous sutures.
V-Y Plasty Text: The defect edges were debeveled with a #15 scalpel blade.  Given the location of the defect, shape of the defect and the proximity to free margins an V-Y advancement flap was deemed most appropriate.  Using a sterile surgical marker, an appropriate advancement flap was drawn incorporating the defect and placing the expected incisions within the relaxed skin tension lines where possible.    The area thus outlined was incised deep to adipose tissue with a #15 scalpel blade.  The skin margins were undermined to an appropriate distance in all directions utilizing iris scissors.
H Plasty Text: Given the location of the defect, shape of the defect and the proximity to free margins a H-plasty was deemed most appropriate for repair.  Using a sterile surgical marker, the appropriate advancement arms of the H-plasty were drawn incorporating the defect and placing the expected incisions within the relaxed skin tension lines where possible. The area thus outlined was incised deep to adipose tissue with a #15 scalpel blade. The skin margins were undermined to an appropriate distance in all directions utilizing iris scissors.  The opposing advancement arms were then advanced into place in opposite direction and anchored with interrupted buried subcutaneous sutures.
W Plasty Text: The lesion was extirpated to the level of the fat with a #15 scalpel blade.  Given the location of the defect, shape of the defect and the proximity to free margins a W-plasty was deemed most appropriate for repair.  Using a sterile surgical marker, the appropriate transposition arms of the W-plasty were drawn incorporating the defect and placing the expected incisions within the relaxed skin tension lines where possible.    The area thus outlined was incised deep to adipose tissue with a #15 scalpel blade.  The skin margins were undermined to an appropriate distance in all directions utilizing iris scissors.  The opposing transposition arms were then transposed into place in opposite direction and anchored with interrupted buried subcutaneous sutures.
Z Plasty Text: The lesion was extirpated to the level of the fat with a #15 scalpel blade.  Given the location of the defect, shape of the defect and the proximity to free margins a Z-plasty was deemed most appropriate for repair.  Using a sterile surgical marker, the appropriate transposition arms of the Z-plasty were drawn incorporating the defect and placing the expected incisions within the relaxed skin tension lines where possible.    The area thus outlined was incised deep to adipose tissue with a #15 scalpel blade.  The skin margins were undermined to an appropriate distance in all directions utilizing iris scissors.  The opposing transposition arms were then transposed into place in opposite direction and anchored with interrupted buried subcutaneous sutures.
Double Z Plasty Text: The lesion was extirpated to the level of the fat with a #15 scalpel blade. Given the location of the defect, shape of the defect and the proximity to free margins a double Z-plasty was deemed most appropriate for repair. Using a sterile surgical marker, the appropriate transposition arms of the double Z-plasty were drawn incorporating the defect and placing the expected incisions within the relaxed skin tension lines where possible. The area thus outlined was incised deep to adipose tissue with a #15 scalpel blade. The skin margins were undermined to an appropriate distance in all directions utilizing iris scissors. The opposing transposition arms were then transposed and carried over into place in opposite direction and anchored with interrupted buried subcutaneous sutures.
Zygomaticofacial Flap Text: Given the location of the defect, shape of the defect and the proximity to free margins a zygomaticofacial flap was deemed most appropriate for repair. Using a sterile surgical marker, the appropriate flap was drawn incorporating the defect and placing the expected incisions within the relaxed skin tension lines where possible. The area thus outlined was incised deep to adipose tissue with a #15 scalpel blade with preservation of a vascular pedicle.  The skin margins were undermined to an appropriate distance in all directions utilizing iris scissors. The flap was then carried over into the defect and anchored with interrupted buried subcutaneous sutures.
Cheek Interpolation Flap Text: A decision was made to reconstruct the defect utilizing an interpolation axial flap and a staged reconstruction.  A telfa template was made of the defect.  This telfa template was then used to outline the Cheek Interpolation flap.  The donor area for the pedicle flap was then injected with anesthesia.  The flap was excised through the skin and subcutaneous tissue down to the layer of the underlying musculature.  The interpolation flap was carefully excised within this deep plane to maintain its blood supply.  The edges of the donor site were undermined.   The donor site was closed in a primary fashion.  The pedicle was then rotated into position and sutured.  Once the tube was sutured into place, adequate blood supply was confirmed with blanching and refill.  The pedicle was then wrapped with xeroform gauze and dressed appropriately with a telfa and gauze bandage to ensure continued blood supply and protect the attached pedicle.
Cheek-To-Nose Interpolation Flap Text: A decision was made to reconstruct the defect utilizing an interpolation axial flap and a staged reconstruction.  A telfa template was made of the defect.  This telfa template was then used to outline the Cheek-To-Nose Interpolation flap.  The donor area for the pedicle flap was then injected with anesthesia.  The flap was excised through the skin and subcutaneous tissue down to the layer of the underlying musculature.  The interpolation flap was carefully excised within this deep plane to maintain its blood supply.  The edges of the donor site were undermined.   The donor site was closed in a primary fashion.  The pedicle was then rotated into position and sutured.  Once the tube was sutured into place, adequate blood supply was confirmed with blanching and refill.  The pedicle was then wrapped with xeroform gauze and dressed appropriately with a telfa and gauze bandage to ensure continued blood supply and protect the attached pedicle.
Interpolation Flap Text: A decision was made to reconstruct the defect utilizing an interpolation axial flap and a staged reconstruction.  A telfa template was made of the defect.  This telfa template was then used to outline the interpolation flap.  The donor area for the pedicle flap was then injected with anesthesia.  The flap was excised through the skin and subcutaneous tissue down to the layer of the underlying musculature.  The interpolation flap was carefully excised within this deep plane to maintain its blood supply.  The edges of the donor site were undermined.   The donor site was closed in a primary fashion.  The pedicle was then rotated into position and sutured.  Once the tube was sutured into place, adequate blood supply was confirmed with blanching and refill.  The pedicle was then wrapped with xeroform gauze and dressed appropriately with a telfa and gauze bandage to ensure continued blood supply and protect the attached pedicle.
Melolabial Interpolation Flap Text: A decision was made to reconstruct the defect utilizing an interpolation axial flap and a staged reconstruction.  A telfa template was made of the defect.  This telfa template was then used to outline the melolabial interpolation flap.  The donor area for the pedicle flap was then injected with anesthesia.  The flap was excised through the skin and subcutaneous tissue down to the layer of the underlying musculature.  The pedicle flap was carefully excised within this deep plane to maintain its blood supply.  The edges of the donor site were undermined.   The donor site was closed in a primary fashion.  The pedicle was then rotated into position and sutured.  Once the tube was sutured into place, adequate blood supply was confirmed with blanching and refill.  The pedicle was then wrapped with xeroform gauze and dressed appropriately with a telfa and gauze bandage to ensure continued blood supply and protect the attached pedicle.
Mastoid Interpolation Flap Text: A decision was made to reconstruct the defect utilizing an interpolation axial flap and a staged reconstruction.  A telfa template was made of the defect.  This telfa template was then used to outline the mastoid interpolation flap.  The donor area for the pedicle flap was then injected with anesthesia.  The flap was excised through the skin and subcutaneous tissue down to the layer of the underlying musculature.  The pedicle flap was carefully excised within this deep plane to maintain its blood supply.  The edges of the donor site were undermined.   The donor site was closed in a primary fashion.  The pedicle was then rotated into position and sutured.  Once the tube was sutured into place, adequate blood supply was confirmed with blanching and refill.  The pedicle was then wrapped with xeroform gauze and dressed appropriately with a telfa and gauze bandage to ensure continued blood supply and protect the attached pedicle.
Posterior Auricular Interpolation Flap Text: A decision was made to reconstruct the defect utilizing an interpolation axial flap and a staged reconstruction.  A telfa template was made of the defect.  This telfa template was then used to outline the posterior auricular interpolation flap.  The donor area for the pedicle flap was then injected with anesthesia.  The flap was excised through the skin and subcutaneous tissue down to the layer of the underlying musculature.  The pedicle flap was carefully excised within this deep plane to maintain its blood supply.  The edges of the donor site were undermined.   The donor site was closed in a primary fashion.  The pedicle was then rotated into position and sutured.  Once the tube was sutured into place, adequate blood supply was confirmed with blanching and refill.  The pedicle was then wrapped with xeroform gauze and dressed appropriately with a telfa and gauze bandage to ensure continued blood supply and protect the attached pedicle.
Paramedian Forehead Flap Text: A decision was made to reconstruct the defect utilizing an interpolation axial flap and a staged reconstruction.  A telfa template was made of the defect.  This telfa template was then used to outline the paramedian forehead pedicle flap.  The donor area for the pedicle flap was then injected with anesthesia.  The flap was excised through the skin and subcutaneous tissue down to the layer of the underlying musculature.  The pedicle flap was carefully excised within this deep plane to maintain its blood supply.  The edges of the donor site were undermined.   The donor site was closed in a primary fashion.  The pedicle was then rotated into position and sutured.  Once the tube was sutured into place, adequate blood supply was confirmed with blanching and refill.  The pedicle was then wrapped with xeroform gauze and dressed appropriately with a telfa and gauze bandage to ensure continued blood supply and protect the attached pedicle.
Abbe Flap (Upper To Lower Lip) Text: The defect of the lower lip was assessed and measured.  Given the location and size of the defect, an Abbe flap was deemed most appropriate. Using a sterile surgical marker, an appropriate Abbe flap was measured and drawn on the upper lip. Local anesthesia was then infiltrated.  A scalpel was then used to incise the upper lip through and through the skin, vermilion, muscle and mucosa, leaving the flap pedicled on the opposite side.  The flap was then rotated and transferred to the lower lip defect.  The flap was then sutured into place with a three layer technique, closing the orbicularis oris muscle layer with subcutaneous buried sutures, followed by a mucosal layer and an epidermal layer.
Abbe Flap (Lower To Upper Lip) Text: The defect of the upper lip was assessed and measured.  Given the location and size of the defect, an Abbe flap was deemed most appropriate. Using a sterile surgical marker, an appropriate Abbe flap was measured and drawn on the lower lip. Local anesthesia was then infiltrated. A scalpel was then used to incise the upper lip through and through the skin, vermilion, muscle and mucosa, leaving the flap pedicled on the opposite side.  The flap was then rotated and transferred to the lower lip defect.  The flap was then sutured into place with a three layer technique, closing the orbicularis oris muscle layer with subcutaneous buried sutures, followed by a mucosal layer and an epidermal layer.
Estlander Flap (Upper To Lower Lip) Text: The defect of the lower lip was assessed and measured.  Given the location and size of the defect, an Estlander flap was deemed most appropriate. Using a sterile surgical marker, an appropriate Estlander flap was measured and drawn on the upper lip. Local anesthesia was then infiltrated. A scalpel was then used to incise the lateral aspect of the flap, through skin, muscle and mucosa, leaving the flap pedicled medially.  The flap was then rotated and positioned to fill the lower lip defect.  The flap was then sutured into place with a three layer technique, closing the orbicularis oris muscle layer with subcutaneous buried sutures, followed by a mucosal layer and an epidermal layer.
Lip Wedge Excision Repair Text: Given the location of the defect and the proximity to free margins a full thickness wedge repair was deemed most appropriate.  Using a sterile surgical marker, the appropriate repair was drawn incorporating the defect and placing the expected incisions perpendicular to the vermilion border.  The vermilion border was also meticulously outlined to ensure appropriate reapproximation during the repair.  The area thus outlined was incised through and through with a #15 scalpel blade.  The muscularis and dermis were reaproximated with deep sutures following hemostasis. Care was taken to realign the vermilion border before proceeding with the superficial closure.  Once the vermilion was realigned the superfical and mucosal closure was finished.
Ftsg Text: The defect edges were debeveled with a #15 scalpel blade.  Given the location of the defect, shape of the defect and the proximity to free margins a full thickness skin graft was deemed most appropriate.  Using a sterile surgical marker, the primary defect shape was transferred to the donor site. The area thus outlined was incised deep to adipose tissue with a #15 scalpel blade.  The harvested graft was then trimmed of adipose tissue until only dermis and epidermis was left.  The skin margins of the secondary defect were undermined to an appropriate distance in all directions utilizing iris scissors.  The secondary defect was closed with interrupted buried subcutaneous sutures.  The skin edges were then re-apposed with running  sutures.  The skin graft was then placed in the primary defect and oriented appropriately.
Split-Thickness Skin Graft Text: The defect edges were debeveled with a #15 scalpel blade.  Given the location of the defect, shape of the defect and the proximity to free margins a split thickness skin graft was deemed most appropriate.  Using a sterile surgical marker, the primary defect shape was transferred to the donor site. The split thickness graft was then harvested.  The skin graft was then placed in the primary defect and oriented appropriately.
Pinch Graft Text: The defect edges were debeveled with a #15 scalpel blade. Given the location of the defect, shape of the defect and the proximity to free margins a pinch graft was deemed most appropriate. Using a sterile surgical marker, the primary defect shape was transferred to the donor site. The area thus outlined was incised deep to adipose tissue with a #15 scalpel blade.  The harvested graft was then trimmed of adipose tissue until only dermis and epidermis was left. The skin graft was then placed in the primary defect and oriented appropriately.
Burow's Graft Text: The defect edges were debeveled with a #15 scalpel blade. Given the location of the defect, shape of the defect, the proximity to free margins and the presence of a standing cone deformity a Burow's skin graft was deemed most appropriate. The standing cone was removed and this tissue was then trimmed to the shape of the primary defect. The adipose tissue was also removed until only dermis and epidermis were left.  The skin graft was then placed in the primary defect and oriented appropriately.
Cartilage Graft Text: The defect edges were debeveled with a #15 scalpel blade.  Given the location of the defect, shape of the defect, the fact the defect involved a full thickness cartilage defect a cartilage graft was deemed most appropriate.  An appropriate donor site was identified, cleansed, and anesthetized. The cartilage graft was then harvested and transferred to the recipient site, oriented appropriately and then sutured into place.  The secondary defect was then repaired using a primary closure.
Composite Graft Text: The defect edges were debeveled with a #15 scalpel blade.  Given the location of the defect, shape of the defect, the proximity to free margins and the fact the defect was full thickness a composite graft was deemed most appropriate.  The defect was outline and then transferred to the donor site.  A full thickness graft was then excised from the donor site. The graft was then placed in the primary defect, oriented appropriately and then sutured into place.  The secondary defect was then repaired using a primary closure.
Epidermal Autograft Text: The defect edges were debeveled with a #15 scalpel blade.  Given the location of the defect, shape of the defect and the proximity to free margins an epidermal autograft was deemed most appropriate.  Using a sterile surgical marker, the primary defect shape was transferred to the donor site. The epidermal graft was then harvested.  The skin graft was then placed in the primary defect and oriented appropriately.
Dermal Autograft Text: The defect edges were debeveled with a #15 scalpel blade.  Given the location of the defect, shape of the defect and the proximity to free margins a dermal autograft was deemed most appropriate.  Using a sterile surgical marker, the primary defect shape was transferred to the donor site. The area thus outlined was incised deep to adipose tissue with a #15 scalpel blade.  The harvested graft was then trimmed of adipose and epidermal tissue until only dermis was left.  The skin graft was then placed in the primary defect and oriented appropriately.
Skin Substitute Text: The defect edges were debeveled with a #15 scalpel blade.  Given the location of the defect, shape of the defect and the proximity to free margins a skin substitute graft was deemed most appropriate.  The graft material was trimmed to fit the size of the defect. The graft was then placed in the primary defect and oriented appropriately.
Tissue Cultured Epidermal Autograft Text: The defect edges were debeveled with a #15 scalpel blade.  Given the location of the defect, shape of the defect and the proximity to free margins a tissue cultured epidermal autograft was deemed most appropriate.  The graft was then trimmed to fit the size of the defect.  The graft was then placed in the primary defect and oriented appropriately.
Xenograft Text: The defect edges were debeveled with a #15 scalpel blade.  Given the location of the defect, shape of the defect and the proximity to free margins a xenograft was deemed most appropriate.  The graft was then trimmed to fit the size of the defect.  The graft was then placed in the primary defect and oriented appropriately.
Purse String (Intermediate) Text: Given the location of the defect and the characteristics of the surrounding skin a purse string intermediate closure was deemed most appropriate.  Undermining was performed circumfirentially around the surgical defect.  A purse string suture was then placed and tightened.
Purse String (Simple) Text: Given the location of the defect and the characteristics of the surrounding skin a purse string simple closure was deemed most appropriate.  Undermining was performed circumferentially around the surgical defect.  A purse string suture was then placed and tightened.
Partial Purse String (Intermediate) Text: Given the location of the defect and the characteristics of the surrounding skin an intermediate purse string closure was deemed most appropriate.  Undermining was performed circumferentially around the surgical defect.  A purse string suture was then placed and tightened. Wound tension of the circular defect prevented complete closure of the wound.
Partial Purse String (Simple) Text: Given the location of the defect and the characteristics of the surrounding skin a simple purse string closure was deemed most appropriate.  Undermining was performed circumferentially around the surgical defect.  A purse string suture was then placed and tightened. Wound tension of the circular defect prevented complete closure of the wound.
Complex Repair And Single Advancement Flap Text: The defect edges were debeveled with a #15 scalpel blade.  The primary defect was closed partially with a complex linear closure.  Given the location of the remaining defect, shape of the defect and the proximity to free margins a single advancement flap was deemed most appropriate for complete closure of the defect.  Using a sterile surgical marker, an appropriate advancement flap was drawn incorporating the defect and placing the expected incisions within the relaxed skin tension lines where possible.    The area thus outlined was incised deep to adipose tissue with a #15 scalpel blade.  The skin margins were undermined to an appropriate distance in all directions utilizing iris scissors.
Complex Repair And Double Advancement Flap Text: The defect edges were debeveled with a #15 scalpel blade.  The primary defect was closed partially with a complex linear closure.  Given the location of the remaining defect, shape of the defect and the proximity to free margins a double advancement flap was deemed most appropriate for complete closure of the defect.  Using a sterile surgical marker, an appropriate advancement flap was drawn incorporating the defect and placing the expected incisions within the relaxed skin tension lines where possible.    The area thus outlined was incised deep to adipose tissue with a #15 scalpel blade.  The skin margins were undermined to an appropriate distance in all directions utilizing iris scissors.
Complex Repair And Modified Advancement Flap Text: The defect edges were debeveled with a #15 scalpel blade.  The primary defect was closed partially with a complex linear closure.  Given the location of the remaining defect, shape of the defect and the proximity to free margins a modified advancement flap was deemed most appropriate for complete closure of the defect.  Using a sterile surgical marker, an appropriate advancement flap was drawn incorporating the defect and placing the expected incisions within the relaxed skin tension lines where possible.    The area thus outlined was incised deep to adipose tissue with a #15 scalpel blade.  The skin margins were undermined to an appropriate distance in all directions utilizing iris scissors.
Complex Repair And A-T Advancement Flap Text: The defect edges were debeveled with a #15 scalpel blade.  The primary defect was closed partially with a complex linear closure.  Given the location of the remaining defect, shape of the defect and the proximity to free margins an A-T advancement flap was deemed most appropriate for complete closure of the defect.  Using a sterile surgical marker, an appropriate advancement flap was drawn incorporating the defect and placing the expected incisions within the relaxed skin tension lines where possible.    The area thus outlined was incised deep to adipose tissue with a #15 scalpel blade.  The skin margins were undermined to an appropriate distance in all directions utilizing iris scissors.
Complex Repair And O-T Advancement Flap Text: The defect edges were debeveled with a #15 scalpel blade.  The primary defect was closed partially with a complex linear closure.  Given the location of the remaining defect, shape of the defect and the proximity to free margins an O-T advancement flap was deemed most appropriate for complete closure of the defect.  Using a sterile surgical marker, an appropriate advancement flap was drawn incorporating the defect and placing the expected incisions within the relaxed skin tension lines where possible.    The area thus outlined was incised deep to adipose tissue with a #15 scalpel blade.  The skin margins were undermined to an appropriate distance in all directions utilizing iris scissors.
Complex Repair And O-L Flap Text: The defect edges were debeveled with a #15 scalpel blade.  The primary defect was closed partially with a complex linear closure.  Given the location of the remaining defect, shape of the defect and the proximity to free margins an O-L flap was deemed most appropriate for complete closure of the defect.  Using a sterile surgical marker, an appropriate flap was drawn incorporating the defect and placing the expected incisions within the relaxed skin tension lines where possible.    The area thus outlined was incised deep to adipose tissue with a #15 scalpel blade.  The skin margins were undermined to an appropriate distance in all directions utilizing iris scissors.
Complex Repair And Bilobe Flap Text: The defect edges were debeveled with a #15 scalpel blade.  The primary defect was closed partially with a complex linear closure.  Given the location of the remaining defect, shape of the defect and the proximity to free margins a bilobe flap was deemed most appropriate for complete closure of the defect.  Using a sterile surgical marker, an appropriate advancement flap was drawn incorporating the defect and placing the expected incisions within the relaxed skin tension lines where possible.    The area thus outlined was incised deep to adipose tissue with a #15 scalpel blade.  The skin margins were undermined to an appropriate distance in all directions utilizing iris scissors.
Complex Repair And Melolabial Flap Text: The defect edges were debeveled with a #15 scalpel blade.  The primary defect was closed partially with a complex linear closure.  Given the location of the remaining defect, shape of the defect and the proximity to free margins a melolabial flap was deemed most appropriate for complete closure of the defect.  Using a sterile surgical marker, an appropriate advancement flap was drawn incorporating the defect and placing the expected incisions within the relaxed skin tension lines where possible.    The area thus outlined was incised deep to adipose tissue with a #15 scalpel blade.  The skin margins were undermined to an appropriate distance in all directions utilizing iris scissors.
Complex Repair And Rotation Flap Text: The defect edges were debeveled with a #15 scalpel blade.  The primary defect was closed partially with a complex linear closure.  Given the location of the remaining defect, shape of the defect and the proximity to free margins a rotation flap was deemed most appropriate for complete closure of the defect.  Using a sterile surgical marker, an appropriate advancement flap was drawn incorporating the defect and placing the expected incisions within the relaxed skin tension lines where possible.    The area thus outlined was incised deep to adipose tissue with a #15 scalpel blade.  The skin margins were undermined to an appropriate distance in all directions utilizing iris scissors.
Complex Repair And Rhombic Flap Text: The defect edges were debeveled with a #15 scalpel blade.  The primary defect was closed partially with a complex linear closure.  Given the location of the remaining defect, shape of the defect and the proximity to free margins a rhombic flap was deemed most appropriate for complete closure of the defect.  Using a sterile surgical marker, an appropriate advancement flap was drawn incorporating the defect and placing the expected incisions within the relaxed skin tension lines where possible.    The area thus outlined was incised deep to adipose tissue with a #15 scalpel blade.  The skin margins were undermined to an appropriate distance in all directions utilizing iris scissors.
Complex Repair And Transposition Flap Text: The defect edges were debeveled with a #15 scalpel blade.  The primary defect was closed partially with a complex linear closure.  Given the location of the remaining defect, shape of the defect and the proximity to free margins a transposition flap was deemed most appropriate for complete closure of the defect.  Using a sterile surgical marker, an appropriate advancement flap was drawn incorporating the defect and placing the expected incisions within the relaxed skin tension lines where possible.    The area thus outlined was incised deep to adipose tissue with a #15 scalpel blade.  The skin margins were undermined to an appropriate distance in all directions utilizing iris scissors.
Complex Repair And V-Y Plasty Text: The defect edges were debeveled with a #15 scalpel blade.  The primary defect was closed partially with a complex linear closure.  Given the location of the remaining defect, shape of the defect and the proximity to free margins a V-Y plasty was deemed most appropriate for complete closure of the defect.  Using a sterile surgical marker, an appropriate advancement flap was drawn incorporating the defect and placing the expected incisions within the relaxed skin tension lines where possible.    The area thus outlined was incised deep to adipose tissue with a #15 scalpel blade.  The skin margins were undermined to an appropriate distance in all directions utilizing iris scissors.
Complex Repair And M Plasty Text: The defect edges were debeveled with a #15 scalpel blade.  The primary defect was closed partially with a complex linear closure.  Given the location of the remaining defect, shape of the defect and the proximity to free margins an M plasty was deemed most appropriate for complete closure of the defect.  Using a sterile surgical marker, an appropriate advancement flap was drawn incorporating the defect and placing the expected incisions within the relaxed skin tension lines where possible.    The area thus outlined was incised deep to adipose tissue with a #15 scalpel blade.  The skin margins were undermined to an appropriate distance in all directions utilizing iris scissors.
Complex Repair And Double M Plasty Text: The defect edges were debeveled with a #15 scalpel blade.  The primary defect was closed partially with a complex linear closure.  Given the location of the remaining defect, shape of the defect and the proximity to free margins a double M plasty was deemed most appropriate for complete closure of the defect.  Using a sterile surgical marker, an appropriate advancement flap was drawn incorporating the defect and placing the expected incisions within the relaxed skin tension lines where possible.    The area thus outlined was incised deep to adipose tissue with a #15 scalpel blade.  The skin margins were undermined to an appropriate distance in all directions utilizing iris scissors.
Complex Repair And W Plasty Text: The defect edges were debeveled with a #15 scalpel blade.  The primary defect was closed partially with a complex linear closure.  Given the location of the remaining defect, shape of the defect and the proximity to free margins a W plasty was deemed most appropriate for complete closure of the defect.  Using a sterile surgical marker, an appropriate advancement flap was drawn incorporating the defect and placing the expected incisions within the relaxed skin tension lines where possible.    The area thus outlined was incised deep to adipose tissue with a #15 scalpel blade.  The skin margins were undermined to an appropriate distance in all directions utilizing iris scissors.
Complex Repair And Z Plasty Text: The defect edges were debeveled with a #15 scalpel blade.  The primary defect was closed partially with a complex linear closure.  Given the location of the remaining defect, shape of the defect and the proximity to free margins a Z plasty was deemed most appropriate for complete closure of the defect.  Using a sterile surgical marker, an appropriate advancement flap was drawn incorporating the defect and placing the expected incisions within the relaxed skin tension lines where possible.    The area thus outlined was incised deep to adipose tissue with a #15 scalpel blade.  The skin margins were undermined to an appropriate distance in all directions utilizing iris scissors.
Complex Repair And Dorsal Nasal Flap Text: The defect edges were debeveled with a #15 scalpel blade.  The primary defect was closed partially with a complex linear closure.  Given the location of the remaining defect, shape of the defect and the proximity to free margins a dorsal nasal flap was deemed most appropriate for complete closure of the defect.  Using a sterile surgical marker, an appropriate flap was drawn incorporating the defect and placing the expected incisions within the relaxed skin tension lines where possible.    The area thus outlined was incised deep to adipose tissue with a #15 scalpel blade.  The skin margins were undermined to an appropriate distance in all directions utilizing iris scissors.
Complex Repair And Ftsg Text: The defect edges were debeveled with a #15 scalpel blade.  The primary defect was closed partially with a complex linear closure.  Given the location of the defect, shape of the defect and the proximity to free margins a full thickness skin graft was deemed most appropriate to repair the remaining defect.  The graft was trimmed to fit the size of the remaining defect.  The graft was then placed in the primary defect, oriented appropriately, and sutured into place.
Complex Repair And Burow's Graft Text: The defect edges were debeveled with a #15 scalpel blade.  The primary defect was closed partially with a complex linear closure.  Given the location of the defect, shape of the defect, the proximity to free margins and the presence of a standing cone deformity a Burow's graft was deemed most appropriate to repair the remaining defect.  The graft was trimmed to fit the size of the remaining defect.  The graft was then placed in the primary defect, oriented appropriately, and sutured into place.
Complex Repair And Split-Thickness Skin Graft Text: The defect edges were debeveled with a #15 scalpel blade.  The primary defect was closed partially with a complex linear closure.  Given the location of the defect, shape of the defect and the proximity to free margins a split thickness skin graft was deemed most appropriate to repair the remaining defect.  The graft was trimmed to fit the size of the remaining defect.  The graft was then placed in the primary defect, oriented appropriately, and sutured into place.
Complex Repair And Epidermal Autograft Text: The defect edges were debeveled with a #15 scalpel blade.  The primary defect was closed partially with a complex linear closure.  Given the location of the defect, shape of the defect and the proximity to free margins an epidermal autograft was deemed most appropriate to repair the remaining defect.  The graft was trimmed to fit the size of the remaining defect.  The graft was then placed in the primary defect, oriented appropriately, and sutured into place.
Complex Repair And Dermal Autograft Text: The defect edges were debeveled with a #15 scalpel blade.  The primary defect was closed partially with a complex linear closure.  Given the location of the defect, shape of the defect and the proximity to free margins an dermal autograft was deemed most appropriate to repair the remaining defect.  The graft was trimmed to fit the size of the remaining defect.  The graft was then placed in the primary defect, oriented appropriately, and sutured into place.
Complex Repair And Tissue Cultured Epidermal Autograft Text: The defect edges were debeveled with a #15 scalpel blade.  The primary defect was closed partially with a complex linear closure.  Given the location of the defect, shape of the defect and the proximity to free margins an tissue cultured epidermal autograft was deemed most appropriate to repair the remaining defect.  The graft was trimmed to fit the size of the remaining defect.  The graft was then placed in the primary defect, oriented appropriately, and sutured into place.
Complex Repair And Xenograft Text: The defect edges were debeveled with a #15 scalpel blade.  The primary defect was closed partially with a complex linear closure.  Given the location of the defect, shape of the defect and the proximity to free margins a xenograft was deemed most appropriate to repair the remaining defect.  The graft was trimmed to fit the size of the remaining defect.  The graft was then placed in the primary defect, oriented appropriately, and sutured into place.
Complex Repair And Skin Substitute Graft Text: The defect edges were debeveled with a #15 scalpel blade.  The primary defect was closed partially with a complex linear closure.  Given the location of the remaining defect, shape of the defect and the proximity to free margins a skin substitute graft was deemed most appropriate to repair the remaining defect.  The graft was trimmed to fit the size of the remaining defect.  The graft was then placed in the primary defect, oriented appropriately, and sutured into place.
Include Anticoagulation In Mohs Note?: Please Select the Appropriate Response
Path Notes (To The Dermatopathologist): Please check margins.
Consent was obtained from the patient. The risks and benefits to therapy were discussed in detail. Specifically, the risks of infection, scarring, bleeding, prolonged wound healing, incomplete removal, allergy to anesthesia, nerve injury and recurrence were addressed. Prior to the procedure, the treatment site was clearly identified and confirmed by the patient. All components of Universal Protocol/PAUSE Rule completed.
Post-Care Instructions: I reviewed with the patient in detail post-care instructions:\\n1. Apply bacitracin over the steri-strips.  \\n2. Cut non-stick pad (Telfa) to cover the steri-strips\\n3. Apply tape (hypafix) over the non-stick pad\\n4. Change once per day for 5 days\\n5. Shower with bandage on, change bandage after shower\\n\\nPatient is not to engage in any heavy lifting, exercise, hot tub, or swimming for the next 14 days. Should the patient develop any fevers, chills, bleeding, severe pain patient will contact the office immediately.
Home Suture Removal Text: Patient was provided a home suture removal kit and will remove their sutures at home.  If they have any questions or difficulties they will call the office.
Where Do You Want The Question To Include Opioid Counseling Located?: Case Summary Tab
Information: Selecting Yes will display possible errors in your note based on the variables you have selected. This validation is only offered as a suggestion for you. PLEASE NOTE THAT THE VALIDATION TEXT WILL BE REMOVED WHEN YOU FINALIZE YOUR NOTE. IF YOU WANT TO FAX A PRELIMINARY NOTE YOU WILL NEED TO TOGGLE THIS TO 'NO' IF YOU DO NOT WANT IT IN YOUR FAXED NOTE.

## 2025-04-03 NOTE — PROCEDURE: COUNSELING
Mercedes Flap Text: The defect edges were debeveled with a #15 scalpel blade.  Given the location of the defect, shape of the defect and the proximity to free margins a Mercedes flap was deemed most appropriate.  Using a sterile surgical marker, an appropriate advancement flap was drawn incorporating the defect and placing the expected incisions within the relaxed skin tension lines where possible. The area thus outlined was incised deep to adipose tissue with a #15 scalpel blade.  The skin margins were undermined to an appropriate distance in all directions utilizing iris scissors.
Quality 397: Melanoma: Reporting: Pathology report includes the pT Category, thickness, ulceration and mitotic rate, peripheral and deep margin status and presence or absence of microsatellitosis for invasive tumors.
Detail Level: Detailed
Show Follow-Up Variable: Yes
Quality 137: Melanoma: Continuity Of Care - Recall System: Patient information entered into a recall system that includes: target date for the next exam specified AND a process to follow up with patients regarding missed or unscheduled appointments
When Should The Patient Follow-Up For Their Next Full-Body Skin Exam?: 3 Months

## 2025-04-07 DIAGNOSIS — R41.3 MEMORY LOSS: ICD-10-CM

## 2025-04-07 RX ORDER — DIGOXIN 125 MCG
125 TABLET ORAL DAILY
Qty: 90 TABLET | Refills: 0 | OUTPATIENT
Start: 2025-04-07

## 2025-04-08 NOTE — TELEPHONE ENCOUNTER
Received request via: Patient    Was the patient seen in the last year in this department? Yes    Does the patient have an active prescription (recently filled or refills available) for medication(s) requested? No    Pharmacy Name: :  [id number] [address] [place], [place] - [address]     Does the patient have [facility]P Plus and need 100-day supply? (This applies to ALL medications) Patient does not have SCP

## 2025-04-14 DIAGNOSIS — R41.3 MEMORY LOSS: ICD-10-CM

## 2025-04-16 ENCOUNTER — HOSPITAL ENCOUNTER (OUTPATIENT)
Dept: LAB | Facility: MEDICAL CENTER | Age: 85
End: 2025-04-16
Attending: INTERNAL MEDICINE
Payer: COMMERCIAL

## 2025-04-16 DIAGNOSIS — R41.3 MEMORY LOSS: ICD-10-CM

## 2025-04-16 DIAGNOSIS — I48.19 OTHER PERSISTENT ATRIAL FIBRILLATION (HCC): ICD-10-CM

## 2025-04-16 LAB — DIGOXIN SERPL-MCNC: 1.2 NG/ML (ref 0.8–2)

## 2025-04-16 PROCEDURE — 36415 COLL VENOUS BLD VENIPUNCTURE: CPT

## 2025-04-16 PROCEDURE — 80162 ASSAY OF DIGOXIN TOTAL: CPT

## 2025-04-16 RX ORDER — MEMANTINE HYDROCHLORIDE 5 MG/1
5 TABLET ORAL 2 TIMES DAILY
Qty: 200 TABLET | Refills: 3 | Status: SHIPPED | OUTPATIENT
Start: 2025-04-16

## 2025-04-17 ENCOUNTER — RESULTS FOLLOW-UP (OUTPATIENT)
Dept: CARDIOLOGY | Facility: MEDICAL CENTER | Age: 85
End: 2025-04-17

## 2025-04-19 RX ORDER — MEMANTINE HYDROCHLORIDE 5 MG/1
5 TABLET ORAL 2 TIMES DAILY
Qty: 200 TABLET | Refills: 3 | Status: SHIPPED | OUTPATIENT
Start: 2025-04-19

## 2025-04-26 DIAGNOSIS — N40.0 BENIGN PROSTATIC HYPERPLASIA, UNSPECIFIED WHETHER LOWER URINARY TRACT SYMPTOMS PRESENT: ICD-10-CM

## 2025-04-27 RX ORDER — TAMSULOSIN HYDROCHLORIDE 0.4 MG/1
0.4 CAPSULE ORAL DAILY
Qty: 90 CAPSULE | Refills: 3 | Status: SHIPPED | OUTPATIENT
Start: 2025-04-27

## 2025-05-01 DIAGNOSIS — I10 ESSENTIAL HYPERTENSION, BENIGN: ICD-10-CM

## 2025-05-01 RX ORDER — CARVEDILOL 6.25 MG/1
TABLET ORAL
Qty: 180 TABLET | Refills: 0 | OUTPATIENT
Start: 2025-05-01

## 2025-05-09 DIAGNOSIS — I50.1 HEART FAILURE, LEFT, WITH LVEF <=30% (HCC): ICD-10-CM

## 2025-05-09 RX ORDER — POTASSIUM CHLORIDE 750 MG/1
10 CAPSULE, EXTENDED RELEASE ORAL 2 TIMES DAILY
Qty: 180 CAPSULE | Refills: 3 | OUTPATIENT
Start: 2025-05-09

## 2025-05-09 NOTE — TELEPHONE ENCOUNTER
Is the patient due for a refill? Yes    Was the patient seen the last 15 months? Yes    Date of last office visit: 02.27.2025    Does the patient have an upcoming appointment?  No    Provider to refill:CW    Does the patient have senior living Plus and need 100-day supply? (This applies to ALL medications) Patient does not have SCP

## 2025-07-01 ENCOUNTER — APPOINTMENT (OUTPATIENT)
Dept: URBAN - METROPOLITAN AREA CLINIC 15 | Facility: CLINIC | Age: 85
Setting detail: DERMATOLOGY
End: 2025-07-01

## 2025-07-01 DIAGNOSIS — D18.0 HEMANGIOMA: ICD-10-CM

## 2025-07-01 DIAGNOSIS — L81.4 OTHER MELANIN HYPERPIGMENTATION: ICD-10-CM

## 2025-07-01 DIAGNOSIS — Z85.820 PERSONAL HISTORY OF MALIGNANT MELANOMA OF SKIN: ICD-10-CM

## 2025-07-01 DIAGNOSIS — D22 MELANOCYTIC NEVI: ICD-10-CM

## 2025-07-01 DIAGNOSIS — L57.8 OTHER SKIN CHANGES DUE TO CHRONIC EXPOSURE TO NONIONIZING RADIATION: ICD-10-CM

## 2025-07-01 DIAGNOSIS — L82.1 OTHER SEBORRHEIC KERATOSIS: ICD-10-CM

## 2025-07-01 PROBLEM — D48.5 NEOPLASM OF UNCERTAIN BEHAVIOR OF SKIN: Status: ACTIVE | Noted: 2025-07-01

## 2025-07-01 PROBLEM — D22.5 MELANOCYTIC NEVI OF TRUNK: Status: ACTIVE | Noted: 2025-07-01

## 2025-07-01 PROBLEM — D18.01 HEMANGIOMA OF SKIN AND SUBCUTANEOUS TISSUE: Status: ACTIVE | Noted: 2025-07-01

## 2025-07-01 PROCEDURE — ? COUNSELING

## 2025-07-01 PROCEDURE — ? BIOPSY BY SHAVE METHOD

## 2025-07-01 ASSESSMENT — LOCATION SIMPLE DESCRIPTION DERM
LOCATION SIMPLE: RIGHT HAND
LOCATION SIMPLE: RIGHT UPPER ARM
LOCATION SIMPLE: LEFT UPPER EXTREMITY
LOCATION SIMPLE: BACK
LOCATION SIMPLE: LEFT LOWER EXTREMITY
LOCATION SIMPLE: RIGHT UPPER EXTREMITY
LOCATION SIMPLE: RIGHT CHEEK
LOCATION SIMPLE: LEFT CHEEK
LOCATION SIMPLE: RIGHT LOWER EXTREMITY
LOCATION SIMPLE: LEFT HAND

## 2025-07-01 ASSESSMENT — LOCATION ZONE DERM
LOCATION ZONE: TRUNK
LOCATION ZONE: LEG
LOCATION ZONE: FACE
LOCATION ZONE: HAND
LOCATION ZONE: ARM

## 2025-07-01 ASSESSMENT — LOCATION DETAILED DESCRIPTION DERM
LOCATION DETAILED: RIGHT UPPER BACK
LOCATION DETAILED: RIGHT ANTERIOR THIGH
LOCATION DETAILED: LEFT MID BACK
LOCATION DETAILED: LEFT ANTERIOR THIGH
LOCATION DETAILED: LEFT DORSAL FOREARM
LOCATION DETAILED: RIGHT ANTERIOR DISTAL UPPER ARM
LOCATION DETAILED: LEFT CHEEK
LOCATION DETAILED: RIGHT DORSAL HAND
LOCATION DETAILED: RIGHT DORSAL FOREARM
LOCATION DETAILED: RIGHT CHEEK
LOCATION DETAILED: LEFT UPPER BACK
LOCATION DETAILED: LEFT DORSAL HAND

## 2025-07-14 ENCOUNTER — APPOINTMENT (OUTPATIENT)
Dept: URBAN - METROPOLITAN AREA CLINIC 15 | Facility: CLINIC | Age: 85
Setting detail: DERMATOLOGY
End: 2025-07-14

## 2025-07-14 PROBLEM — C44.712 BASAL CELL CARCINOMA OF SKIN OF RIGHT LOWER LIMB, INCLUDING HIP: Status: ACTIVE | Noted: 2025-07-14

## 2025-07-14 PROBLEM — C44.319 BASAL CELL CARCINOMA OF SKIN OF OTHER PARTS OF FACE: Status: ACTIVE | Noted: 2025-07-14

## 2025-07-14 PROCEDURE — ? CURETTAGE AND DESTRUCTION

## 2025-07-14 PROCEDURE — ? EXCISION

## 2025-07-14 NOTE — PROCEDURE: CURETTAGE AND DESTRUCTION
Detail Level: Detailed
Biopsy Photograph Reviewed: Yes
Accession # (Optional): [id number]
Number Of Curettages: 3
Size Of Lesion After Curettage: 0.7
Add Intralesional Injection: No
Total Volume (Ccs): 1
Anesthesia Type: 1% lidocaine with epinephrine
Cautery Type: electrodesiccation
What Was Performed First?: Curettage
Final Size Statement: The size of the lesion after curettage was
Additional Information: (Optional): The wound was cleaned, and a pressure dressing was applied.  The patient received detailed post-op instructions.
Consent was obtained from the patient. The risks, benefits and alternatives to therapy were discussed in detail. Specifically, the risks of infection, scarring, bleeding, prolonged wound healing, nerve injury, incomplete removal, allergy to anesthesia and recurrence were addressed. Alternatives to ED&C, such as: surgical removal and XRT were also discussed.  Prior to the procedure, the treatment site was clearly identified and confirmed by the patient. All components of Universal Protocol/PAUSE Rule completed.
Post-Care Instructions: I reviewed with the patient in detail post-care instructions. Patient is to keep the area dry for 48 hours, and not to engage in any swimming until the area is healed. Should the patient develop any fevers, chills, bleeding, severe pain patient will contact the office immediately.
Bill As A Line Item Or As Units: Line Item

## 2025-07-14 NOTE — PROCEDURE: EXCISION
Surgeon (Optional): Yoly
Biopsy Photograph Reviewed: Yes
Previous Accession (Optional): [id number]
Date Of Previous Biopsy (Optional): 7/1/2025
Size Of Lesion In Cm: 2.9
Size Of Margin In Cm: 0.2
Anesthesia Volume In Cc: 15.5
Was An Eye Clamp Used?: No
Eye Clamp Note Details: An eye clamp was used during the procedure.
Excision Method: Elliptical
Saucerization Depth: dermis and superficial adipose tissue
Repair Type: Complex
Intermediate / Complex Repair - Final Wound Length In Cm: 8.1
Deep Sutures: 4-0 Vicryl
Epidermal Sutures: 4-0 Vicryl Rapide
Suturegard Retention Suture: 2-0 Nylon
Retention Suture Bite Size: 3 mm
Length To Time In Minutes Device Was In Place: 10
Number Of Hemigard Strips Per Side: 1
Width Of Defect Perpendicular To Closure In Cm (Required): 2.5
Distance Of Undermining In Cm (Required): 3.1
Undermining Type: Entire Wound
Debridement Text: The wound edges were debrided prior to proceeding with the closure to facilitate wound healing.
Helical Rim Text: The closure involved the helical rim.
Vermilion Border Text: The closure involved the vermilion border.
Nostril Rim Text: The closure involved the nostril rim.
Retention Suture Text: Retention sutures were placed to support the closure and prevent dehiscence.
Primary Defect Length (In Cm): 0
Lab: [id number]
Lab Facility: 
Graft Donor Site Bandage (Optional-Leave Blank If You Don't Want In Note): Steri-strips and a pressure bandage were applied to the donor site.
Epidermal Closure Graft Donor Site (Optional): simple interrupted
Billing Type: Third-Party Bill
Excision Depth: adipose tissue
Scalpel Size: 15 blade
Anesthesia Type: 1% lidocaine with epinephrine
Additional Anesthesia Volume In Cc: 6
Hemostasis: Electrocautery
Estimated Blood Loss (Cc): minimal
Detail Level: Detailed
Repair Depth: use same depth as excision depth
Repair Anesthesia Method: local infiltration
Anesthesia Volume In Cc: 15.1
Dermal Closure: buried vertical mattress
Epidermal Closure: running subcuticular
Wound Care: Bacitracin
Dressing: dry sterile dressing
Suturegard Intro: Intraoperative tissue expansion was performed, utilizing the SUTUREGARD device, in order to reduce wound tension.
Suturegard Body: The suture ends were repeatedly re-tightened and re-clamped to achieve the desired tissue expansion.
Hemigard Intro: Due to skin fragility and wound tension, it was decided to use HEMIGARD adhesive retention suture devices to permit a linear closure. The skin was cleaned and dried for a 6cm distance away from the wound. Excessive hair, if present, was removed to allow for adhesion.
Hemigard Postcare Instructions: The HEMIGARD strips are to remain completely dry for at least 5-7 days.
Positioning (Leave Blank If You Do Not Want): The patient was placed in a comfortable position exposing the surgical site.
Pre-Excision Curettage Text (Leave Blank If You Do Not Want): Prior to drawing the surgical margin the visible lesion was removed with electrodesiccation and curettage to clearly define the lesion size.
Complex Repair Preamble Text (Leave Blank If You Do Not Want): Extensive wide undermining was performed.
Intermediate Repair Preamble Text (Leave Blank If You Do Not Want): Undermining was performed with blunt dissection.
Curvilinear Excision Additional Text (Leave Blank If You Do Not Want): The margin was drawn around the clinically apparent lesion.  A curvilinear shape was then drawn on the skin incorporating the lesion and margins.  Incisions were then made along these lines to the appropriate tissue plane and the lesion was extirpated.
Fusiform Excision Additional Text (Leave Blank If You Do Not Want): The margin was drawn around the clinically apparent lesion.  A fusiform shape was then drawn on the skin incorporating the lesion and margins.  Incisions were then made along these lines to the appropriate tissue plane and the lesion was extirpated.
Elliptical Excision Additional Text (Leave Blank If You Do Not Want): The margin was drawn around the clinically apparent lesion.  An elliptical shape was then drawn on the skin incorporating the lesion and margins.  Incisions were then made along these lines to the appropriate tissue plane and the lesion was extirpated.
Saucerization Excision Additional Text (Leave Blank If You Do Not Want): The margin was drawn around the clinically apparent lesion.  Incisions were then made along these lines, in a tangential fashion, to the appropriate tissue plane and the lesion was extirpated.
Slit Excision Additional Text (Leave Blank If You Do Not Want): A linear line was drawn on the skin overlying the lesion. An incision was made slowly until the lesion was visualized.  Once visualized, the lesion was removed with blunt dissection.
Excisional Biopsy Additional Text (Leave Blank If You Do Not Want): The margin was drawn around the clinically apparent lesion. An elliptical shape was then drawn on the skin incorporating the lesion and margins.  Incisions were then made along these lines to the appropriate tissue plane and the lesion was extirpated.
Perilesional Excision Additional Text (Leave Blank If You Do Not Want): The margin was drawn around the clinically apparent lesion. Incisions were then made along these lines to the appropriate tissue plane and the lesion was extirpated.
Repair Performed By Another Provider Text (Leave Blank If You Do Not Want): After the tissue was excised the defect was repaired by another provider.
No Repair - Repaired With Adjacent Surgical Defect Text (Leave Blank If You Do Not Want): After the excision the defect was repaired concurrently with another surgical defect which was in close approximation.
Adjacent Tissue Transfer Text: The defect edges were debeveled with a #15 scalpel blade. Given the location of the defect and the proximity to free margins an adjacent tissue transfer was deemed most appropriate. Using a sterile surgical marker, an appropriate flap was drawn incorporating the defect and placing the expected incisions within the relaxed skin tension lines where possible. The area thus outlined was incised deep to adipose tissue with a #15 scalpel blade. The skin margins were undermined to an appropriate distance in all directions utilizing iris scissors and carried over to close the primary defect.
Advancement Flap (Single) Text: The defect edges were debeveled with a #15 scalpel blade.  Given the location of the defect and the proximity to free margins a single advancement flap was deemed most appropriate.  Using a sterile surgical marker, an appropriate advancement flap was drawn incorporating the defect and placing the expected incisions within the relaxed skin tension lines where possible.    The area thus outlined was incised deep to adipose tissue with a #15 scalpel blade.  The skin margins were undermined to an appropriate distance in all directions utilizing iris scissors.
Advancement Flap (Double) Text: The defect edges were debeveled with a #15 scalpel blade.  Given the location of the defect and the proximity to free margins a double advancement flap was deemed most appropriate.  Using a sterile surgical marker, the appropriate advancement flaps were drawn incorporating the defect and placing the expected incisions within the relaxed skin tension lines where possible.    The area thus outlined was incised deep to adipose tissue with a #15 scalpel blade.  The skin margins were undermined to an appropriate distance in all directions utilizing iris scissors.
Burow's Advancement Flap Text: The defect edges were debeveled with a #15 scalpel blade.  Given the location of the defect and the proximity to free margins a Burow's advancement flap was deemed most appropriate.  Using a sterile surgical marker, the appropriate advancement flap was drawn incorporating the defect and placing the expected incisions within the relaxed skin tension lines where possible.    The area thus outlined was incised deep to adipose tissue with a #15 scalpel blade.  The skin margins were undermined to an appropriate distance in all directions utilizing iris scissors.
Chonodrocutaneous Helical Advancement Flap Text: The defect edges were debeveled with a #15 scalpel blade. Given the location of the defect and the proximity to free margins a chondrocutaneous helical advancement flap was deemed most appropriate. Using a sterile surgical marker, the appropriate advancement flap was drawn incorporating the defect and placing the expected incisions within the relaxed skin tension lines where possible. The area thus outlined was incised deep to adipose tissue with a #15 scalpel blade. The skin margins were undermined to an appropriate distance in all directions utilizing iris scissors. Following this, the designed flap was advanced and carried over into the primary defect and sutured into place.
Crescentic Advancement Flap Text: The defect edges were debeveled with a #15 scalpel blade.  Given the location of the defect and the proximity to free margins a crescentic advancement flap was deemed most appropriate.  Using a sterile surgical marker, the appropriate advancement flap was drawn incorporating the defect and placing the expected incisions within the relaxed skin tension lines where possible.    The area thus outlined was incised deep to adipose tissue with a #15 scalpel blade.  The skin margins were undermined to an appropriate distance in all directions utilizing iris scissors.
A-T Advancement Flap Text: The defect edges were debeveled with a #15 scalpel blade.  Given the location of the defect, shape of the defect and the proximity to free margins an A-T advancement flap was deemed most appropriate.  Using a sterile surgical marker, an appropriate advancement flap was drawn incorporating the defect and placing the expected incisions within the relaxed skin tension lines where possible.    The area thus outlined was incised deep to adipose tissue with a #15 scalpel blade.  The skin margins were undermined to an appropriate distance in all directions utilizing iris scissors.
O-T Advancement Flap Text: The defect edges were debeveled with a #15 scalpel blade.  Given the location of the defect, shape of the defect and the proximity to free margins an O-T advancement flap was deemed most appropriate.  Using a sterile surgical marker, an appropriate advancement flap was drawn incorporating the defect and placing the expected incisions within the relaxed skin tension lines where possible.    The area thus outlined was incised deep to adipose tissue with a #15 scalpel blade.  The skin margins were undermined to an appropriate distance in all directions utilizing iris scissors.
O-L Flap Text: The defect edges were debeveled with a #15 scalpel blade.  Given the location of the defect, shape of the defect and the proximity to free margins an O-L flap was deemed most appropriate.  Using a sterile surgical marker, an appropriate advancement flap was drawn incorporating the defect and placing the expected incisions within the relaxed skin tension lines where possible.    The area thus outlined was incised deep to adipose tissue with a #15 scalpel blade.  The skin margins were undermined to an appropriate distance in all directions utilizing iris scissors.
O-Z Flap Text: The defect edges were debeveled with a #15 scalpel blade. Given the location of the defect, shape of the defect and the proximity to free margins an O-Z flap was deemed most appropriate. Using a sterile surgical marker, an appropriate transposition flap was drawn incorporating the defect and placing the expected incisions within the relaxed skin tension lines where possible. The area thus outlined was incised deep to adipose tissue with a #15 scalpel blade. The skin margins were undermined to an appropriate distance in all directions utilizing iris scissors. Following this, the designed flap was carried over into the primary defect and sutured into place.
Double O-Z Flap Text: The defect edges were debeveled with a #15 scalpel blade. Given the location of the defect, shape of the defect and the proximity to free margins a Double O-Z flap was deemed most appropriate. Using a sterile surgical marker, an appropriate transposition flap was drawn incorporating the defect and placing the expected incisions within the relaxed skin tension lines where possible. The area thus outlined was incised deep to adipose tissue with a #15 scalpel blade. The skin margins were undermined to an appropriate distance in all directions utilizing iris scissors. Following this, the designed flap was carried over into the primary defect and sutured into place.
V-Y Flap Text: The defect edges were debeveled with a #15 scalpel blade.  Given the location of the defect, shape of the defect and the proximity to free margins a V-Y flap was deemed most appropriate.  Using a sterile surgical marker, an appropriate advancement flap was drawn incorporating the defect and placing the expected incisions within the relaxed skin tension lines where possible.    The area thus outlined was incised deep to adipose tissue with a #15 scalpel blade.  The skin margins were undermined to an appropriate distance in all directions utilizing iris scissors.
Advancement-Rotation Flap Text: The defect edges were debeveled with a #15 scalpel blade.  Given the location of the defect, shape of the defect and the proximity to free margins an advancement-rotation flap was deemed most appropriate.  Using a sterile surgical marker, an appropriate flap was drawn incorporating the defect and placing the expected incisions within the relaxed skin tension lines where possible. The area thus outlined was incised deep to adipose tissue with a #15 scalpel blade.  The skin margins were undermined to an appropriate distance in all directions utilizing iris scissors.
Mercedes Flap Text: The defect edges were debeveled with a #15 scalpel blade. Given the location of the defect, shape of the defect and the proximity to free margins a Mercedes flap was deemed most appropriate. Using a sterile surgical marker, an appropriate advancement flap was drawn incorporating the defect and placing the expected incisions within the relaxed skin tension lines where possible. The area thus outlined was incised deep to adipose tissue with a #15 scalpel blade. The skin margins were undermined to an appropriate distance in all directions utilizing iris scissors. Following this, the designed flap was advanced and carried over into the primary defect and sutured into place.
Modified Advancement Flap Text: The defect edges were debeveled with a #15 scalpel blade.  Given the location of the defect, shape of the defect and the proximity to free margins a modified advancement flap was deemed most appropriate.  Using a sterile surgical marker, an appropriate advancement flap was drawn incorporating the defect and placing the expected incisions within the relaxed skin tension lines where possible.    The area thus outlined was incised deep to adipose tissue with a #15 scalpel blade.  The skin margins were undermined to an appropriate distance in all directions utilizing iris scissors.
Mucosal Advancement Flap Text: Given the location of the defect, shape of the defect and the proximity to free margins a mucosal advancement flap was deemed most appropriate. Incisions were made with a 15 blade scalpel in the appropriate fashion along the cutaneous vermilion border and the mucosal lip. The remaining actinically damaged mucosal tissue was excised.  The mucosal advancement flap was then elevated to the gingival sulcus with care taken to preserve the neurovascular structures and advanced into the primary defect. Care was taken to ensure that precise realignment of the vermilion border was achieved.
Peng Advancement Flap Text: The defect edges were debeveled with a #15 scalpel blade. Given the location of the defect, shape of the defect and the proximity to free margins a Peng advancement flap was deemed most appropriate. Using a sterile surgical marker, an appropriate advancement flap was drawn incorporating the defect and placing the expected incisions within the relaxed skin tension lines where possible. The area thus outlined was incised deep to adipose tissue with a #15 scalpel blade. The skin margins were undermined to an appropriate distance in all directions utilizing iris scissors. Following this, the designed flap was advanced and carried over into the primary defect and sutured into place.
Hatchet Flap Text: The defect edges were debeveled with a #15 scalpel blade.  Given the location of the defect, shape of the defect and the proximity to free margins a hatchet flap was deemed most appropriate.  Using a sterile surgical marker, an appropriate hatchet flap was drawn incorporating the defect and placing the expected incisions within the relaxed skin tension lines where possible.    The area thus outlined was incised deep to adipose tissue with a #15 scalpel blade.  The skin margins were undermined to an appropriate distance in all directions utilizing iris scissors.
Rotation Flap Text: The defect edges were debeveled with a #15 scalpel blade.  Given the location of the defect, shape of the defect and the proximity to free margins a rotation flap was deemed most appropriate.  Using a sterile surgical marker, an appropriate rotation flap was drawn incorporating the defect and placing the expected incisions within the relaxed skin tension lines where possible.    The area thus outlined was incised deep to adipose tissue with a #15 scalpel blade.  The skin margins were undermined to an appropriate distance in all directions utilizing iris scissors.
Bilateral Rotation Flap Text: The defect edges were debeveled with a #15 scalpel blade. Given the location of the defect, shape of the defect and the proximity to free margins a bilateral rotation flap was deemed most appropriate. Using a sterile surgical marker, an appropriate rotation flap was drawn incorporating the defect and placing the expected incisions within the relaxed skin tension lines where possible. The area thus outlined was incised deep to adipose tissue with a #15 scalpel blade. The skin margins were undermined to an appropriate distance in all directions utilizing iris scissors. Following this, the designed flap was carried over into the primary defect and sutured into place.
Spiral Flap Text: The defect edges were debeveled with a #15 scalpel blade.  Given the location of the defect, shape of the defect and the proximity to free margins a spiral flap was deemed most appropriate.  Using a sterile surgical marker, an appropriate rotation flap was drawn incorporating the defect and placing the expected incisions within the relaxed skin tension lines where possible. The area thus outlined was incised deep to adipose tissue with a #15 scalpel blade.  The skin margins were undermined to an appropriate distance in all directions utilizing iris scissors.
Staged Advancement Flap Text: The defect edges were debeveled with a #15 scalpel blade. Given the location of the defect, shape of the defect and the proximity to free margins a staged advancement flap was deemed most appropriate. Using a sterile surgical marker, an appropriate advancement flap was drawn incorporating the defect and placing the expected incisions within the relaxed skin tension lines where possible. The area thus outlined was incised deep to adipose tissue with a #15 scalpel blade. The skin margins were undermined to an appropriate distance in all directions utilizing iris scissors. Following this, the designed flap was carried over into the primary defect and sutured into place.
Star Wedge Flap Text: The defect edges were debeveled with a #15 scalpel blade.  Given the location of the defect, shape of the defect and the proximity to free margins a star wedge flap was deemed most appropriate.  Using a sterile surgical marker, an appropriate rotation flap was drawn incorporating the defect and placing the expected incisions within the relaxed skin tension lines where possible. The area thus outlined was incised deep to adipose tissue with a #15 scalpel blade.  The skin margins were undermined to an appropriate distance in all directions utilizing iris scissors.
Transposition Flap Text: The defect edges were debeveled with a #15 scalpel blade.  Given the location of the defect and the proximity to free margins a transposition flap was deemed most appropriate.  Using a sterile surgical marker, an appropriate transposition flap was drawn incorporating the defect.    The area thus outlined was incised deep to adipose tissue with a #15 scalpel blade.  The skin margins were undermined to an appropriate distance in all directions utilizing iris scissors.
Muscle Hinge Flap Text: The defect edges were debeveled with a #15 scalpel blade.  Given the size, depth and location of the defect and the proximity to free margins a muscle hinge flap was deemed most appropriate.  Using a sterile surgical marker, an appropriate hinge flap was drawn incorporating the defect. The area thus outlined was incised with a #15 scalpel blade.  The skin margins were undermined to an appropriate distance in all directions utilizing iris scissors.
Mustarde Flap Text: The defect edges were debeveled with a #15 scalpel blade.  Given the size, depth and location of the defect and the proximity to free margins a Mustarde flap was deemed most appropriate. Using a sterile surgical marker, an appropriate flap was drawn incorporating the defect. The area thus outlined was incised with a #15 scalpel blade. The skin margins were undermined to an appropriate distance in all directions utilizing iris scissors. Following this, the designed flap was carried into the primary defect and sutured into place.
Nasal Turnover Hinge Flap Text: The defect edges were debeveled with a #15 scalpel blade.  Given the size, depth, location of the defect and the defect being full thickness a nasal turnover hinge flap was deemed most appropriate. Using a sterile surgical marker, an appropriate hinge flap was drawn incorporating the defect. The area thus outlined was incised with a #15 scalpel blade. The flap was designed to recreate the nasal mucosal lining and the alar rim. The skin margins were undermined to an appropriate distance in all directions utilizing iris scissors. Following this, the designed flap was carried over into the primary defect and sutured into place
Nasalis-Muscle-Based Myocutaneous Island Pedicle Flap Text: Using a #15 blade, an incision was made around the donor flap to the level of the nasalis muscle. Wide lateral undermining was then performed in both the subcutaneous plane above the nasalis muscle, and in a submuscular plane just above periosteum. This allowed the formation of a free nasalis muscle axial pedicle (based on the angular artery) which was still attached to the actual cutaneous flap, increasing its mobility and vascular viability. Hemostasis was obtained with pinpoint electrocoagulation. The flap was mobilized into position and the pivotal anchor points positioned and stabilized with buried interrupted sutures. Subcutaneous and dermal tissues were closed in a multilayered fashion with sutures. Tissue redundancies were excised, and the epidermal edges were apposed without significant tension and sutured with sutures.
Nasalis Myocutaneous Flap Text: Using a #15 blade, an incision was made around the donor flap to the level of the nasalis muscle. Wide lateral undermining was then performed in both the subcutaneous plane above the nasalis muscle, and in a submuscular plane just above periosteum. This allowed the formation of a free nasalis muscle axial pedicle which was still attached to the actual cutaneous flap, increasing its mobility and vascular viability. Hemostasis was obtained with pinpoint electrocoagulation. The flap was mobilized into position and the pivotal anchor points positioned and stabilized with buried interrupted sutures. Subcutaneous and dermal tissues were closed in a multilayered fashion with sutures. Tissue redundancies were excised, and the epidermal edges were apposed without significant tension and sutured with sutures.
Nasolabial Transposition Flap Text: The defect edges were debeveled with a #15 scalpel blade.  Given the size, depth and location of the defect and the proximity to free margins a nasolabial transposition flap was deemed most appropriate. Using a sterile surgical marker, an appropriate flap was drawn incorporating the defect. The area thus outlined was incised with a #15 scalpel blade. The skin margins were undermined to an appropriate distance in all directions utilizing iris scissors. Following this, the designed flap was carried into the primary defect and sutured into place.
Orbicularis Oris Muscle Flap Text: The defect edges were debeveled with a #15 scalpel blade.  Given that the defect affected the competency of the oral sphincter an orbicularis oris muscle flap was deemed most appropriate to restore this competency and normal muscle function.  Using a sterile surgical marker, an appropriate flap was drawn incorporating the defect. The area thus outlined was incised with a #15 scalpel blade. Following this, the designed flap was carried over into the primary defect and sutured into place.
Melolabial Transposition Flap Text: The defect edges were debeveled with a #15 scalpel blade.  Given the location of the defect and the proximity to free margins a melolabial flap was deemed most appropriate.  Using a sterile surgical marker, an appropriate melolabial transposition flap was drawn incorporating the defect.    The area thus outlined was incised deep to adipose tissue with a #15 scalpel blade.  The skin margins were undermined to an appropriate distance in all directions utilizing iris scissors.
Rectangular Flap Text: The defect edges were debeveled with a #15 scalpel blade. Given the location of the defect and the proximity to free margins a rectangular flap was deemed most appropriate. Using a sterile surgical marker, an appropriate rectangular flap was drawn incorporating the defect. The area thus outlined was incised deep to adipose tissue with a #15 scalpel blade. The skin margins were undermined to an appropriate distance in all directions utilizing iris scissors. Following this, the designed flap was carried over into the primary defect and sutured into place.
Rhombic Flap Text: The defect edges were debeveled with a #15 scalpel blade.  Given the location of the defect and the proximity to free margins a rhombic flap was deemed most appropriate.  Using a sterile surgical marker, an appropriate rhombic flap was drawn incorporating the defect.    The area thus outlined was incised deep to adipose tissue with a #15 scalpel blade.  The skin margins were undermined to an appropriate distance in all directions utilizing iris scissors.
Rhomboid Transposition Flap Text: The defect edges were debeveled with a #15 scalpel blade. Given the location of the defect and the proximity to free margins a rhomboid transposition flap was deemed most appropriate. Using a sterile surgical marker, an appropriate rhomboid flap was drawn incorporating the defect. The area thus outlined was incised deep to adipose tissue with a #15 scalpel blade. The skin margins were undermined to an appropriate distance in all directions utilizing iris scissors. Following this, the designed flap was carried over into the primary defect and sutured into place.
Bi-Rhombic Flap Text: The defect edges were debeveled with a #15 scalpel blade.  Given the location of the defect and the proximity to free margins a bi-rhombic flap was deemed most appropriate.  Using a sterile surgical marker, an appropriate rhombic flap was drawn incorporating the defect. The area thus outlined was incised deep to adipose tissue with a #15 scalpel blade.  The skin margins were undermined to an appropriate distance in all directions utilizing iris scissors.
Helical Rim Advancement Flap Text: The defect edges were debeveled with a #15 blade scalpel.  Given the location of the defect and the proximity to free margins (helical rim) a double helical rim advancement flap was deemed most appropriate.  Using a sterile surgical marker, the appropriate advancement flaps were drawn incorporating the defect and placing the expected incisions between the helical rim and antihelix where possible.  The area thus outlined was incised through and through with a #15 scalpel blade.  With a skin hook and iris scissors, the flaps were gently and sharply undermined and freed up.
Bilateral Helical Rim Advancement Flap Text: The defect edges were debeveled with a #15 blade scalpel.  Given the location of the defect and the proximity to free margins (helical rim) a bilateral helical rim advancement flap was deemed most appropriate.  Using a sterile surgical marker, the appropriate advancement flaps were drawn incorporating the defect and placing the expected incisions between the helical rim and antihelix where possible.  The area thus outlined was incised through and through with a #15 scalpel blade.  With a skin hook and iris scissors, the flaps were gently and sharply undermined and freed up.
Ear Star Wedge Flap Text: The defect edges were debeveled with a #15 blade scalpel.  Given the location of the defect and the proximity to free margins (helical rim) an ear star wedge flap was deemed most appropriate.  Using a sterile surgical marker, the appropriate flap was drawn incorporating the defect and placing the expected incisions between the helical rim and antihelix where possible.  The area thus outlined was incised through and through with a #15 scalpel blade.
Flip-Flop Flap Text: The defect edges were debeveled with a #15 blade scalpel.  Given the location of the defect and the proximity to free margins a flip-flop flap was deemed most appropriate. Using a sterile surgical marker, the appropriate flap was drawn incorporating the defect and placing the expected incisions between the helical rim and antihelix where possible.  The area thus outlined was incised through and through with a #15 scalpel blade. Following this, the designed flap was carried over into the primary defect and sutured into place.
Banner Transposition Flap Text: The defect edges were debeveled with a #15 scalpel blade. Given the location of the defect and the proximity to free margins a Banner transposition flap was deemed most appropriate. Using a sterile surgical marker, an appropriate flap was drawn around the defect. The area thus outlined was incised deep to adipose tissue with a #15 scalpel blade. The skin margins were undermined to an appropriate distance in all directions utilizing iris scissors. Following this, the designed flap was carried into the primary defect and sutured into place.
Bilobed Flap Text: The defect edges were debeveled with a #15 scalpel blade.  Given the location of the defect and the proximity to free margins a bilobe flap was deemed most appropriate.  Using a sterile surgical marker, an appropriate bilobe flap drawn around the defect.    The area thus outlined was incised deep to adipose tissue with a #15 scalpel blade.  The skin margins were undermined to an appropriate distance in all directions utilizing iris scissors.
Bilobed Transposition Flap Text: The defect edges were debeveled with a #15 scalpel blade.  Given the location of the defect and the proximity to free margins a bilobed transposition flap was deemed most appropriate.  Using a sterile surgical marker, an appropriate bilobe flap drawn around the defect.    The area thus outlined was incised deep to adipose tissue with a #15 scalpel blade.  The skin margins were undermined to an appropriate distance in all directions utilizing iris scissors.
Trilobed Flap Text: The defect edges were debeveled with a #15 scalpel blade.  Given the location of the defect and the proximity to free margins a trilobed flap was deemed most appropriate.  Using a sterile surgical marker, an appropriate trilobed flap drawn around the defect.    The area thus outlined was incised deep to adipose tissue with a #15 scalpel blade.  The skin margins were undermined to an appropriate distance in all directions utilizing iris scissors.
Dorsal Nasal Flap Text: The defect edges were debeveled with a #15 scalpel blade.  Given the location of the defect and the proximity to free margins a dorsal nasal flap was deemed most appropriate.  Using a sterile surgical marker, an appropriate dorsal nasal flap was drawn around the defect.    The area thus outlined was incised deep to adipose tissue with a #15 scalpel blade.  The skin margins were undermined to an appropriate distance in all directions utilizing iris scissors.
Island Pedicle Flap Text: The defect edges were debeveled with a #15 scalpel blade.  Given the location of the defect, shape of the defect and the proximity to free margins an island pedicle advancement flap was deemed most appropriate.  Using a sterile surgical marker, an appropriate advancement flap was drawn incorporating the defect, outlining the appropriate donor tissue and placing the expected incisions within the relaxed skin tension lines where possible.    The area thus outlined was incised deep to adipose tissue with a #15 scalpel blade.  The skin margins were undermined to an appropriate distance in all directions around the primary defect and laterally outward around the island pedicle utilizing iris scissors.  There was minimal undermining beneath the pedicle flap.
Island Pedicle Flap With Canthal Suspension Text: The defect edges were debeveled with a #15 scalpel blade.  Given the location of the defect, shape of the defect and the proximity to free margins an island pedicle advancement flap was deemed most appropriate.  Using a sterile surgical marker, an appropriate advancement flap was drawn incorporating the defect, outlining the appropriate donor tissue and placing the expected incisions within the relaxed skin tension lines where possible. The area thus outlined was incised deep to adipose tissue with a #15 scalpel blade.  The skin margins were undermined to an appropriate distance in all directions around the primary defect and laterally outward around the island pedicle utilizing iris scissors.  There was minimal undermining beneath the pedicle flap. A suspension suture was placed in the canthal tendon to prevent tension and prevent ectropion.
Alar Island Pedicle Flap Text: The defect edges were debeveled with a #15 scalpel blade.  Given the location of the defect, shape of the defect and the proximity to the alar rim an island pedicle advancement flap was deemed most appropriate.  Using a sterile surgical marker, an appropriate advancement flap was drawn incorporating the defect, outlining the appropriate donor tissue and placing the expected incisions within the nasal ala running parallel to the alar rim. The area thus outlined was incised with a #15 scalpel blade.  The skin margins were undermined minimally to an appropriate distance in all directions around the primary defect and laterally outward around the island pedicle utilizing iris scissors.  There was minimal undermining beneath the pedicle flap.
Double Island Pedicle Flap Text: The defect edges were debeveled with a #15 scalpel blade.  Given the location of the defect, shape of the defect and the proximity to free margins a double island pedicle advancement flap was deemed most appropriate.  Using a sterile surgical marker, an appropriate advancement flap was drawn incorporating the defect, outlining the appropriate donor tissue and placing the expected incisions within the relaxed skin tension lines where possible.    The area thus outlined was incised deep to adipose tissue with a #15 scalpel blade.  The skin margins were undermined to an appropriate distance in all directions around the primary defect and laterally outward around the island pedicle utilizing iris scissors.  There was minimal undermining beneath the pedicle flap.
Island Pedicle Flap-Requiring Vessel Identification Text: The defect edges were debeveled with a #15 scalpel blade.  Given the location of the defect, shape of the defect and the proximity to free margins an island pedicle advancement flap was deemed most appropriate.  Using a sterile surgical marker, an appropriate advancement flap was drawn, based on the axial vessel mentioned above, incorporating the defect, outlining the appropriate donor tissue and placing the expected incisions within the relaxed skin tension lines where possible.    The area thus outlined was incised deep to adipose tissue with a #15 scalpel blade.  The skin margins were undermined to an appropriate distance in all directions around the primary defect and laterally outward around the island pedicle utilizing iris scissors.  There was minimal undermining beneath the pedicle flap.
Keystone Flap Text: The defect edges were debeveled with a #15 scalpel blade.  Given the location of the defect, shape of the defect a keystone flap was deemed most appropriate.  Using a sterile surgical marker, an appropriate keystone flap was drawn incorporating the defect, outlining the appropriate donor tissue and placing the expected incisions within the relaxed skin tension lines where possible. The area thus outlined was incised deep to adipose tissue with a #15 scalpel blade.  The skin margins were undermined to an appropriate distance in all directions around the primary defect and laterally outward around the flap utilizing iris scissors.
O-T Plasty Text: The defect edges were debeveled with a #15 scalpel blade.  Given the location of the defect, shape of the defect and the proximity to free margins an O-T plasty was deemed most appropriate.  Using a sterile surgical marker, an appropriate O-T plasty was drawn incorporating the defect and placing the expected incisions within the relaxed skin tension lines where possible.    The area thus outlined was incised deep to adipose tissue with a #15 scalpel blade.  The skin margins were undermined to an appropriate distance in all directions utilizing iris scissors.
O-Z Plasty Text: The defect edges were debeveled with a #15 scalpel blade.  Given the location of the defect, shape of the defect and the proximity to free margins an O-Z plasty (double transposition flap) was deemed most appropriate.  Using a sterile surgical marker, the appropriate transposition flaps were drawn incorporating the defect and placing the expected incisions within the relaxed skin tension lines where possible.    The area thus outlined was incised deep to adipose tissue with a #15 scalpel blade.  The skin margins were undermined to an appropriate distance in all directions utilizing iris scissors.  Hemostasis was achieved with electrocautery.  The flaps were then transposed into place, one clockwise and the other counterclockwise, and anchored with interrupted buried subcutaneous sutures.
Double O-Z Plasty Text: The defect edges were debeveled with a #15 scalpel blade. Given the location of the defect, shape of the defect and the proximity to free margins a Double O-Z plasty (double transposition flap) was deemed most appropriate. Using a sterile surgical marker, the appropriate transposition flaps were drawn incorporating the defect and placing the expected incisions within the relaxed skin tension lines where possible. The area thus outlined was incised deep to adipose tissue with a #15 scalpel blade. The skin margins were undermined to an appropriate distance in all directions utilizing iris scissors. Hemostasis was achieved with electrocautery. The flaps were then transposed and carried over into place, one clockwise and the other counterclockwise, and anchored with interrupted buried subcutaneous sutures.
V-Y Plasty Text: The defect edges were debeveled with a #15 scalpel blade.  Given the location of the defect, shape of the defect and the proximity to free margins an V-Y advancement flap was deemed most appropriate.  Using a sterile surgical marker, an appropriate advancement flap was drawn incorporating the defect and placing the expected incisions within the relaxed skin tension lines where possible.    The area thus outlined was incised deep to adipose tissue with a #15 scalpel blade.  The skin margins were undermined to an appropriate distance in all directions utilizing iris scissors.
H Plasty Text: Given the location of the defect, shape of the defect and the proximity to free margins a H-plasty was deemed most appropriate for repair.  Using a sterile surgical marker, the appropriate advancement arms of the H-plasty were drawn incorporating the defect and placing the expected incisions within the relaxed skin tension lines where possible. The area thus outlined was incised deep to adipose tissue with a #15 scalpel blade. The skin margins were undermined to an appropriate distance in all directions utilizing iris scissors.  The opposing advancement arms were then advanced into place in opposite direction and anchored with interrupted buried subcutaneous sutures.
W Plasty Text: The lesion was extirpated to the level of the fat with a #15 scalpel blade.  Given the location of the defect, shape of the defect and the proximity to free margins a W-plasty was deemed most appropriate for repair.  Using a sterile surgical marker, the appropriate transposition arms of the W-plasty were drawn incorporating the defect and placing the expected incisions within the relaxed skin tension lines where possible.    The area thus outlined was incised deep to adipose tissue with a #15 scalpel blade.  The skin margins were undermined to an appropriate distance in all directions utilizing iris scissors.  The opposing transposition arms were then transposed into place in opposite direction and anchored with interrupted buried subcutaneous sutures.
Z Plasty Text: The lesion was extirpated to the level of the fat with a #15 scalpel blade.  Given the location of the defect, shape of the defect and the proximity to free margins a Z-plasty was deemed most appropriate for repair.  Using a sterile surgical marker, the appropriate transposition arms of the Z-plasty were drawn incorporating the defect and placing the expected incisions within the relaxed skin tension lines where possible.    The area thus outlined was incised deep to adipose tissue with a #15 scalpel blade.  The skin margins were undermined to an appropriate distance in all directions utilizing iris scissors.  The opposing transposition arms were then transposed into place in opposite direction and anchored with interrupted buried subcutaneous sutures.
Double Z Plasty Text: The lesion was extirpated to the level of the fat with a #15 scalpel blade. Given the location of the defect, shape of the defect and the proximity to free margins a double Z-plasty was deemed most appropriate for repair. Using a sterile surgical marker, the appropriate transposition arms of the double Z-plasty were drawn incorporating the defect and placing the expected incisions within the relaxed skin tension lines where possible. The area thus outlined was incised deep to adipose tissue with a #15 scalpel blade. The skin margins were undermined to an appropriate distance in all directions utilizing iris scissors. The opposing transposition arms were then transposed and carried over into place in opposite direction and anchored with interrupted buried subcutaneous sutures.
Zygomaticofacial Flap Text: Given the location of the defect, shape of the defect and the proximity to free margins a zygomaticofacial flap was deemed most appropriate for repair. Using a sterile surgical marker, the appropriate flap was drawn incorporating the defect and placing the expected incisions within the relaxed skin tension lines where possible. The area thus outlined was incised deep to adipose tissue with a #15 scalpel blade with preservation of a vascular pedicle.  The skin margins were undermined to an appropriate distance in all directions utilizing iris scissors. The flap was then carried over into the defect and anchored with interrupted buried subcutaneous sutures.
Cheek Interpolation Flap Text: A decision was made to reconstruct the defect utilizing an interpolation axial flap and a staged reconstruction.  A telfa template was made of the defect.  This telfa template was then used to outline the Cheek Interpolation flap.  The donor area for the pedicle flap was then injected with anesthesia.  The flap was excised through the skin and subcutaneous tissue down to the layer of the underlying musculature.  The interpolation flap was carefully excised within this deep plane to maintain its blood supply.  The edges of the donor site were undermined.   The donor site was closed in a primary fashion.  The pedicle was then rotated into position and sutured.  Once the tube was sutured into place, adequate blood supply was confirmed with blanching and refill.  The pedicle was then wrapped with xeroform gauze and dressed appropriately with a telfa and gauze bandage to ensure continued blood supply and protect the attached pedicle.
Cheek-To-Nose Interpolation Flap Text: A decision was made to reconstruct the defect utilizing an interpolation axial flap and a staged reconstruction.  A telfa template was made of the defect.  This telfa template was then used to outline the Cheek-To-Nose Interpolation flap.  The donor area for the pedicle flap was then injected with anesthesia.  The flap was excised through the skin and subcutaneous tissue down to the layer of the underlying musculature.  The interpolation flap was carefully excised within this deep plane to maintain its blood supply.  The edges of the donor site were undermined.   The donor site was closed in a primary fashion.  The pedicle was then rotated into position and sutured.  Once the tube was sutured into place, adequate blood supply was confirmed with blanching and refill.  The pedicle was then wrapped with xeroform gauze and dressed appropriately with a telfa and gauze bandage to ensure continued blood supply and protect the attached pedicle.
Interpolation Flap Text: A decision was made to reconstruct the defect utilizing an interpolation axial flap and a staged reconstruction.  A telfa template was made of the defect.  This telfa template was then used to outline the interpolation flap.  The donor area for the pedicle flap was then injected with anesthesia.  The flap was excised through the skin and subcutaneous tissue down to the layer of the underlying musculature.  The interpolation flap was carefully excised within this deep plane to maintain its blood supply.  The edges of the donor site were undermined.   The donor site was closed in a primary fashion.  The pedicle was then rotated into position and sutured.  Once the tube was sutured into place, adequate blood supply was confirmed with blanching and refill.  The pedicle was then wrapped with xeroform gauze and dressed appropriately with a telfa and gauze bandage to ensure continued blood supply and protect the attached pedicle.
Melolabial Interpolation Flap Text: A decision was made to reconstruct the defect utilizing an interpolation axial flap and a staged reconstruction.  A telfa template was made of the defect.  This telfa template was then used to outline the melolabial interpolation flap.  The donor area for the pedicle flap was then injected with anesthesia.  The flap was excised through the skin and subcutaneous tissue down to the layer of the underlying musculature.  The pedicle flap was carefully excised within this deep plane to maintain its blood supply.  The edges of the donor site were undermined.   The donor site was closed in a primary fashion.  The pedicle was then rotated into position and sutured.  Once the tube was sutured into place, adequate blood supply was confirmed with blanching and refill.  The pedicle was then wrapped with xeroform gauze and dressed appropriately with a telfa and gauze bandage to ensure continued blood supply and protect the attached pedicle.
Mastoid Interpolation Flap Text: A decision was made to reconstruct the defect utilizing an interpolation axial flap and a staged reconstruction.  A telfa template was made of the defect.  This telfa template was then used to outline the mastoid interpolation flap.  The donor area for the pedicle flap was then injected with anesthesia.  The flap was excised through the skin and subcutaneous tissue down to the layer of the underlying musculature.  The pedicle flap was carefully excised within this deep plane to maintain its blood supply.  The edges of the donor site were undermined.   The donor site was closed in a primary fashion.  The pedicle was then rotated into position and sutured.  Once the tube was sutured into place, adequate blood supply was confirmed with blanching and refill.  The pedicle was then wrapped with xeroform gauze and dressed appropriately with a telfa and gauze bandage to ensure continued blood supply and protect the attached pedicle.
Posterior Auricular Interpolation Flap Text: A decision was made to reconstruct the defect utilizing an interpolation axial flap and a staged reconstruction.  A telfa template was made of the defect.  This telfa template was then used to outline the posterior auricular interpolation flap.  The donor area for the pedicle flap was then injected with anesthesia.  The flap was excised through the skin and subcutaneous tissue down to the layer of the underlying musculature.  The pedicle flap was carefully excised within this deep plane to maintain its blood supply.  The edges of the donor site were undermined.   The donor site was closed in a primary fashion.  The pedicle was then rotated into position and sutured.  Once the tube was sutured into place, adequate blood supply was confirmed with blanching and refill.  The pedicle was then wrapped with xeroform gauze and dressed appropriately with a telfa and gauze bandage to ensure continued blood supply and protect the attached pedicle.
Paramedian Forehead Flap Text: A decision was made to reconstruct the defect utilizing an interpolation axial flap and a staged reconstruction.  A telfa template was made of the defect.  This telfa template was then used to outline the paramedian forehead pedicle flap.  The donor area for the pedicle flap was then injected with anesthesia.  The flap was excised through the skin and subcutaneous tissue down to the layer of the underlying musculature.  The pedicle flap was carefully excised within this deep plane to maintain its blood supply.  The edges of the donor site were undermined.   The donor site was closed in a primary fashion.  The pedicle was then rotated into position and sutured.  Once the tube was sutured into place, adequate blood supply was confirmed with blanching and refill.  The pedicle was then wrapped with xeroform gauze and dressed appropriately with a telfa and gauze bandage to ensure continued blood supply and protect the attached pedicle.
Abbe Flap (Upper To Lower Lip) Text: The defect of the lower lip was assessed and measured.  Given the location and size of the defect, an Abbe flap was deemed most appropriate. Using a sterile surgical marker, an appropriate Abbe flap was measured and drawn on the upper lip. Local anesthesia was then infiltrated.  A scalpel was then used to incise the upper lip through and through the skin, vermilion, muscle and mucosa, leaving the flap pedicled on the opposite side.  The flap was then rotated and transferred to the lower lip defect.  The flap was then sutured into place with a three layer technique, closing the orbicularis oris muscle layer with subcutaneous buried sutures, followed by a mucosal layer and an epidermal layer.
Abbe Flap (Lower To Upper Lip) Text: The defect of the upper lip was assessed and measured.  Given the location and size of the defect, an Abbe flap was deemed most appropriate. Using a sterile surgical marker, an appropriate Abbe flap was measured and drawn on the lower lip. Local anesthesia was then infiltrated. A scalpel was then used to incise the upper lip through and through the skin, vermilion, muscle and mucosa, leaving the flap pedicled on the opposite side.  The flap was then rotated and transferred to the lower lip defect.  The flap was then sutured into place with a three layer technique, closing the orbicularis oris muscle layer with subcutaneous buried sutures, followed by a mucosal layer and an epidermal layer.
Estlander Flap (Upper To Lower Lip) Text: The defect of the lower lip was assessed and measured.  Given the location and size of the defect, an Estlander flap was deemed most appropriate. Using a sterile surgical marker, an appropriate Estlander flap was measured and drawn on the upper lip. Local anesthesia was then infiltrated. A scalpel was then used to incise the lateral aspect of the flap, through skin, muscle and mucosa, leaving the flap pedicled medially.  The flap was then rotated and positioned to fill the lower lip defect.  The flap was then sutured into place with a three layer technique, closing the orbicularis oris muscle layer with subcutaneous buried sutures, followed by a mucosal layer and an epidermal layer.
Lip Wedge Excision Repair Text: Given the location of the defect and the proximity to free margins a full thickness wedge repair was deemed most appropriate.  Using a sterile surgical marker, the appropriate repair was drawn incorporating the defect and placing the expected incisions perpendicular to the vermilion border.  The vermilion border was also meticulously outlined to ensure appropriate reapproximation during the repair.  The area thus outlined was incised through and through with a #15 scalpel blade.  The muscularis and dermis were reaproximated with deep sutures following hemostasis. Care was taken to realign the vermilion border before proceeding with the superficial closure.  Once the vermilion was realigned the superfical and mucosal closure was finished.
Ftsg Text: The defect edges were debeveled with a #15 scalpel blade.  Given the location of the defect, shape of the defect and the proximity to free margins a full thickness skin graft was deemed most appropriate.  Using a sterile surgical marker, the primary defect shape was transferred to the donor site. The area thus outlined was incised deep to adipose tissue with a #15 scalpel blade.  The harvested graft was then trimmed of adipose tissue until only dermis and epidermis was left.  The skin margins of the secondary defect were undermined to an appropriate distance in all directions utilizing iris scissors.  The secondary defect was closed with interrupted buried subcutaneous sutures.  The skin edges were then re-apposed with running  sutures.  The skin graft was then placed in the primary defect and oriented appropriately.
Split-Thickness Skin Graft Text: The defect edges were debeveled with a #15 scalpel blade.  Given the location of the defect, shape of the defect and the proximity to free margins a split thickness skin graft was deemed most appropriate.  Using a sterile surgical marker, the primary defect shape was transferred to the donor site. The split thickness graft was then harvested.  The skin graft was then placed in the primary defect and oriented appropriately.
Pinch Graft Text: The defect edges were debeveled with a #15 scalpel blade. Given the location of the defect, shape of the defect and the proximity to free margins a pinch graft was deemed most appropriate. Using a sterile surgical marker, the primary defect shape was transferred to the donor site. The area thus outlined was incised deep to adipose tissue with a #15 scalpel blade.  The harvested graft was then trimmed of adipose tissue until only dermis and epidermis was left. The skin graft was then placed in the primary defect and oriented appropriately.
Burow's Graft Text: The defect edges were debeveled with a #15 scalpel blade. Given the location of the defect, shape of the defect, the proximity to free margins and the presence of a standing cone deformity a Burow's skin graft was deemed most appropriate. The standing cone was removed and this tissue was then trimmed to the shape of the primary defect. The adipose tissue was also removed until only dermis and epidermis were left.  The skin graft was then placed in the primary defect and oriented appropriately.
Cartilage Graft Text: The defect edges were debeveled with a #15 scalpel blade.  Given the location of the defect, shape of the defect, the fact the defect involved a full thickness cartilage defect a cartilage graft was deemed most appropriate.  An appropriate donor site was identified, cleansed, and anesthetized. The cartilage graft was then harvested and transferred to the recipient site, oriented appropriately and then sutured into place.  The secondary defect was then repaired using a primary closure.
Composite Graft Text: The defect edges were debeveled with a #15 scalpel blade.  Given the location of the defect, shape of the defect, the proximity to free margins and the fact the defect was full thickness a composite graft was deemed most appropriate.  The defect was outline and then transferred to the donor site.  A full thickness graft was then excised from the donor site. The graft was then placed in the primary defect, oriented appropriately and then sutured into place.  The secondary defect was then repaired using a primary closure.
Epidermal Autograft Text: The defect edges were debeveled with a #15 scalpel blade.  Given the location of the defect, shape of the defect and the proximity to free margins an epidermal autograft was deemed most appropriate.  Using a sterile surgical marker, the primary defect shape was transferred to the donor site. The epidermal graft was then harvested.  The skin graft was then placed in the primary defect and oriented appropriately.
Dermal Autograft Text: The defect edges were debeveled with a #15 scalpel blade.  Given the location of the defect, shape of the defect and the proximity to free margins a dermal autograft was deemed most appropriate.  Using a sterile surgical marker, the primary defect shape was transferred to the donor site. The area thus outlined was incised deep to adipose tissue with a #15 scalpel blade.  The harvested graft was then trimmed of adipose and epidermal tissue until only dermis was left.  The skin graft was then placed in the primary defect and oriented appropriately.
Skin Substitute Text: The defect edges were debeveled with a #15 scalpel blade.  Given the location of the defect, shape of the defect and the proximity to free margins a skin substitute graft was deemed most appropriate.  The graft material was trimmed to fit the size of the defect. The graft was then placed in the primary defect and oriented appropriately.
Tissue Cultured Epidermal Autograft Text: The defect edges were debeveled with a #15 scalpel blade.  Given the location of the defect, shape of the defect and the proximity to free margins a tissue cultured epidermal autograft was deemed most appropriate.  The graft was then trimmed to fit the size of the defect.  The graft was then placed in the primary defect and oriented appropriately.
Xenograft Text: The defect edges were debeveled with a #15 scalpel blade.  Given the location of the defect, shape of the defect and the proximity to free margins a xenograft was deemed most appropriate.  The graft was then trimmed to fit the size of the defect.  The graft was then placed in the primary defect and oriented appropriately.
Purse String (Intermediate) Text: Given the location of the defect and the characteristics of the surrounding skin a purse string intermediate closure was deemed most appropriate.  Undermining was performed circumfirentially around the surgical defect.  A purse string suture was then placed and tightened.
Purse String (Simple) Text: Given the location of the defect and the characteristics of the surrounding skin a purse string simple closure was deemed most appropriate.  Undermining was performed circumferentially around the surgical defect.  A purse string suture was then placed and tightened.
Partial Purse String (Intermediate) Text: Given the location of the defect and the characteristics of the surrounding skin an intermediate purse string closure was deemed most appropriate.  Undermining was performed circumferentially around the surgical defect.  A purse string suture was then placed and tightened. Wound tension of the circular defect prevented complete closure of the wound.
Partial Purse String (Simple) Text: Given the location of the defect and the characteristics of the surrounding skin a simple purse string closure was deemed most appropriate.  Undermining was performed circumferentially around the surgical defect.  A purse string suture was then placed and tightened. Wound tension of the circular defect prevented complete closure of the wound.
Complex Repair And Single Advancement Flap Text: The defect edges were debeveled with a #15 scalpel blade.  The primary defect was closed partially with a complex linear closure.  Given the location of the remaining defect, shape of the defect and the proximity to free margins a single advancement flap was deemed most appropriate for complete closure of the defect.  Using a sterile surgical marker, an appropriate advancement flap was drawn incorporating the defect and placing the expected incisions within the relaxed skin tension lines where possible.    The area thus outlined was incised deep to adipose tissue with a #15 scalpel blade.  The skin margins were undermined to an appropriate distance in all directions utilizing iris scissors.
Complex Repair And Double Advancement Flap Text: The defect edges were debeveled with a #15 scalpel blade.  The primary defect was closed partially with a complex linear closure.  Given the location of the remaining defect, shape of the defect and the proximity to free margins a double advancement flap was deemed most appropriate for complete closure of the defect.  Using a sterile surgical marker, an appropriate advancement flap was drawn incorporating the defect and placing the expected incisions within the relaxed skin tension lines where possible.    The area thus outlined was incised deep to adipose tissue with a #15 scalpel blade.  The skin margins were undermined to an appropriate distance in all directions utilizing iris scissors.
Complex Repair And Modified Advancement Flap Text: The defect edges were debeveled with a #15 scalpel blade.  The primary defect was closed partially with a complex linear closure.  Given the location of the remaining defect, shape of the defect and the proximity to free margins a modified advancement flap was deemed most appropriate for complete closure of the defect.  Using a sterile surgical marker, an appropriate advancement flap was drawn incorporating the defect and placing the expected incisions within the relaxed skin tension lines where possible.    The area thus outlined was incised deep to adipose tissue with a #15 scalpel blade.  The skin margins were undermined to an appropriate distance in all directions utilizing iris scissors.
Complex Repair And A-T Advancement Flap Text: The defect edges were debeveled with a #15 scalpel blade.  The primary defect was closed partially with a complex linear closure.  Given the location of the remaining defect, shape of the defect and the proximity to free margins an A-T advancement flap was deemed most appropriate for complete closure of the defect.  Using a sterile surgical marker, an appropriate advancement flap was drawn incorporating the defect and placing the expected incisions within the relaxed skin tension lines where possible.    The area thus outlined was incised deep to adipose tissue with a #15 scalpel blade.  The skin margins were undermined to an appropriate distance in all directions utilizing iris scissors.
Complex Repair And O-T Advancement Flap Text: The defect edges were debeveled with a #15 scalpel blade.  The primary defect was closed partially with a complex linear closure.  Given the location of the remaining defect, shape of the defect and the proximity to free margins an O-T advancement flap was deemed most appropriate for complete closure of the defect.  Using a sterile surgical marker, an appropriate advancement flap was drawn incorporating the defect and placing the expected incisions within the relaxed skin tension lines where possible.    The area thus outlined was incised deep to adipose tissue with a #15 scalpel blade.  The skin margins were undermined to an appropriate distance in all directions utilizing iris scissors.
Complex Repair And O-L Flap Text: The defect edges were debeveled with a #15 scalpel blade.  The primary defect was closed partially with a complex linear closure.  Given the location of the remaining defect, shape of the defect and the proximity to free margins an O-L flap was deemed most appropriate for complete closure of the defect.  Using a sterile surgical marker, an appropriate flap was drawn incorporating the defect and placing the expected incisions within the relaxed skin tension lines where possible.    The area thus outlined was incised deep to adipose tissue with a #15 scalpel blade.  The skin margins were undermined to an appropriate distance in all directions utilizing iris scissors.
Complex Repair And Bilobe Flap Text: The defect edges were debeveled with a #15 scalpel blade.  The primary defect was closed partially with a complex linear closure.  Given the location of the remaining defect, shape of the defect and the proximity to free margins a bilobe flap was deemed most appropriate for complete closure of the defect.  Using a sterile surgical marker, an appropriate advancement flap was drawn incorporating the defect and placing the expected incisions within the relaxed skin tension lines where possible.    The area thus outlined was incised deep to adipose tissue with a #15 scalpel blade.  The skin margins were undermined to an appropriate distance in all directions utilizing iris scissors.
Complex Repair And Melolabial Flap Text: The defect edges were debeveled with a #15 scalpel blade.  The primary defect was closed partially with a complex linear closure.  Given the location of the remaining defect, shape of the defect and the proximity to free margins a melolabial flap was deemed most appropriate for complete closure of the defect.  Using a sterile surgical marker, an appropriate advancement flap was drawn incorporating the defect and placing the expected incisions within the relaxed skin tension lines where possible.    The area thus outlined was incised deep to adipose tissue with a #15 scalpel blade.  The skin margins were undermined to an appropriate distance in all directions utilizing iris scissors.
Complex Repair And Rotation Flap Text: The defect edges were debeveled with a #15 scalpel blade.  The primary defect was closed partially with a complex linear closure.  Given the location of the remaining defect, shape of the defect and the proximity to free margins a rotation flap was deemed most appropriate for complete closure of the defect.  Using a sterile surgical marker, an appropriate advancement flap was drawn incorporating the defect and placing the expected incisions within the relaxed skin tension lines where possible.    The area thus outlined was incised deep to adipose tissue with a #15 scalpel blade.  The skin margins were undermined to an appropriate distance in all directions utilizing iris scissors.
Complex Repair And Rhombic Flap Text: The defect edges were debeveled with a #15 scalpel blade.  The primary defect was closed partially with a complex linear closure.  Given the location of the remaining defect, shape of the defect and the proximity to free margins a rhombic flap was deemed most appropriate for complete closure of the defect.  Using a sterile surgical marker, an appropriate advancement flap was drawn incorporating the defect and placing the expected incisions within the relaxed skin tension lines where possible.    The area thus outlined was incised deep to adipose tissue with a #15 scalpel blade.  The skin margins were undermined to an appropriate distance in all directions utilizing iris scissors.
Complex Repair And Transposition Flap Text: The defect edges were debeveled with a #15 scalpel blade.  The primary defect was closed partially with a complex linear closure.  Given the location of the remaining defect, shape of the defect and the proximity to free margins a transposition flap was deemed most appropriate for complete closure of the defect.  Using a sterile surgical marker, an appropriate advancement flap was drawn incorporating the defect and placing the expected incisions within the relaxed skin tension lines where possible.    The area thus outlined was incised deep to adipose tissue with a #15 scalpel blade.  The skin margins were undermined to an appropriate distance in all directions utilizing iris scissors.
Complex Repair And V-Y Plasty Text: The defect edges were debeveled with a #15 scalpel blade.  The primary defect was closed partially with a complex linear closure.  Given the location of the remaining defect, shape of the defect and the proximity to free margins a V-Y plasty was deemed most appropriate for complete closure of the defect.  Using a sterile surgical marker, an appropriate advancement flap was drawn incorporating the defect and placing the expected incisions within the relaxed skin tension lines where possible.    The area thus outlined was incised deep to adipose tissue with a #15 scalpel blade.  The skin margins were undermined to an appropriate distance in all directions utilizing iris scissors.
Complex Repair And M Plasty Text: The defect edges were debeveled with a #15 scalpel blade.  The primary defect was closed partially with a complex linear closure.  Given the location of the remaining defect, shape of the defect and the proximity to free margins an M plasty was deemed most appropriate for complete closure of the defect.  Using a sterile surgical marker, an appropriate advancement flap was drawn incorporating the defect and placing the expected incisions within the relaxed skin tension lines where possible.    The area thus outlined was incised deep to adipose tissue with a #15 scalpel blade.  The skin margins were undermined to an appropriate distance in all directions utilizing iris scissors.
Complex Repair And Double M Plasty Text: The defect edges were debeveled with a #15 scalpel blade.  The primary defect was closed partially with a complex linear closure.  Given the location of the remaining defect, shape of the defect and the proximity to free margins a double M plasty was deemed most appropriate for complete closure of the defect.  Using a sterile surgical marker, an appropriate advancement flap was drawn incorporating the defect and placing the expected incisions within the relaxed skin tension lines where possible.    The area thus outlined was incised deep to adipose tissue with a #15 scalpel blade.  The skin margins were undermined to an appropriate distance in all directions utilizing iris scissors.
Complex Repair And W Plasty Text: The defect edges were debeveled with a #15 scalpel blade.  The primary defect was closed partially with a complex linear closure.  Given the location of the remaining defect, shape of the defect and the proximity to free margins a W plasty was deemed most appropriate for complete closure of the defect.  Using a sterile surgical marker, an appropriate advancement flap was drawn incorporating the defect and placing the expected incisions within the relaxed skin tension lines where possible.    The area thus outlined was incised deep to adipose tissue with a #15 scalpel blade.  The skin margins were undermined to an appropriate distance in all directions utilizing iris scissors.
Complex Repair And Z Plasty Text: The defect edges were debeveled with a #15 scalpel blade.  The primary defect was closed partially with a complex linear closure.  Given the location of the remaining defect, shape of the defect and the proximity to free margins a Z plasty was deemed most appropriate for complete closure of the defect.  Using a sterile surgical marker, an appropriate advancement flap was drawn incorporating the defect and placing the expected incisions within the relaxed skin tension lines where possible.    The area thus outlined was incised deep to adipose tissue with a #15 scalpel blade.  The skin margins were undermined to an appropriate distance in all directions utilizing iris scissors.
Complex Repair And Dorsal Nasal Flap Text: The defect edges were debeveled with a #15 scalpel blade.  The primary defect was closed partially with a complex linear closure.  Given the location of the remaining defect, shape of the defect and the proximity to free margins a dorsal nasal flap was deemed most appropriate for complete closure of the defect.  Using a sterile surgical marker, an appropriate flap was drawn incorporating the defect and placing the expected incisions within the relaxed skin tension lines where possible.    The area thus outlined was incised deep to adipose tissue with a #15 scalpel blade.  The skin margins were undermined to an appropriate distance in all directions utilizing iris scissors.
Complex Repair And Ftsg Text: The defect edges were debeveled with a #15 scalpel blade.  The primary defect was closed partially with a complex linear closure.  Given the location of the defect, shape of the defect and the proximity to free margins a full thickness skin graft was deemed most appropriate to repair the remaining defect.  The graft was trimmed to fit the size of the remaining defect.  The graft was then placed in the primary defect, oriented appropriately, and sutured into place.
Complex Repair And Burow's Graft Text: The defect edges were debeveled with a #15 scalpel blade.  The primary defect was closed partially with a complex linear closure.  Given the location of the defect, shape of the defect, the proximity to free margins and the presence of a standing cone deformity a Burow's graft was deemed most appropriate to repair the remaining defect.  The graft was trimmed to fit the size of the remaining defect.  The graft was then placed in the primary defect, oriented appropriately, and sutured into place.
Complex Repair And Split-Thickness Skin Graft Text: The defect edges were debeveled with a #15 scalpel blade.  The primary defect was closed partially with a complex linear closure.  Given the location of the defect, shape of the defect and the proximity to free margins a split thickness skin graft was deemed most appropriate to repair the remaining defect.  The graft was trimmed to fit the size of the remaining defect.  The graft was then placed in the primary defect, oriented appropriately, and sutured into place.
Complex Repair And Epidermal Autograft Text: The defect edges were debeveled with a #15 scalpel blade.  The primary defect was closed partially with a complex linear closure.  Given the location of the defect, shape of the defect and the proximity to free margins an epidermal autograft was deemed most appropriate to repair the remaining defect.  The graft was trimmed to fit the size of the remaining defect.  The graft was then placed in the primary defect, oriented appropriately, and sutured into place.
Complex Repair And Dermal Autograft Text: The defect edges were debeveled with a #15 scalpel blade.  The primary defect was closed partially with a complex linear closure.  Given the location of the defect, shape of the defect and the proximity to free margins an dermal autograft was deemed most appropriate to repair the remaining defect.  The graft was trimmed to fit the size of the remaining defect.  The graft was then placed in the primary defect, oriented appropriately, and sutured into place.
Complex Repair And Tissue Cultured Epidermal Autograft Text: The defect edges were debeveled with a #15 scalpel blade.  The primary defect was closed partially with a complex linear closure.  Given the location of the defect, shape of the defect and the proximity to free margins an tissue cultured epidermal autograft was deemed most appropriate to repair the remaining defect.  The graft was trimmed to fit the size of the remaining defect.  The graft was then placed in the primary defect, oriented appropriately, and sutured into place.
Complex Repair And Xenograft Text: The defect edges were debeveled with a #15 scalpel blade.  The primary defect was closed partially with a complex linear closure.  Given the location of the defect, shape of the defect and the proximity to free margins a xenograft was deemed most appropriate to repair the remaining defect.  The graft was trimmed to fit the size of the remaining defect.  The graft was then placed in the primary defect, oriented appropriately, and sutured into place.
Complex Repair And Skin Substitute Graft Text: The defect edges were debeveled with a #15 scalpel blade.  The primary defect was closed partially with a complex linear closure.  Given the location of the remaining defect, shape of the defect and the proximity to free margins a skin substitute graft was deemed most appropriate to repair the remaining defect.  The graft was trimmed to fit the size of the remaining defect.  The graft was then placed in the primary defect, oriented appropriately, and sutured into place.
Include Anticoagulation In Mohs Note?: Please Select the Appropriate Response
Path Notes (To The Dermatopathologist): Please check margins.
Consent was obtained from the patient. The risks and benefits to therapy were discussed in detail. Specifically, the risks of infection, scarring, bleeding, prolonged wound healing, incomplete removal, allergy to anesthesia, nerve injury and recurrence were addressed. Prior to the procedure, the treatment site was clearly identified and confirmed by the patient. All components of Universal Protocol/PAUSE Rule completed.
Post-Care Instructions: I reviewed with the patient in detail post-care instructions:\\n1. Apply bacitracin over the steri-strips.  \\n2. Cut non-stick pad (Telfa) to cover the steri-strips\\n3. Apply tape (hypafix) over the non-stick pad\\n4. Change once per day for 7 days\\n5. Shower with bandage on, change bandage after shower\\nIce and Tylenol as needed for pain \\n\\nPatient is not to engage in any heavy lifting, exercise, hot tub, or swimming for the next 14 days. Should the patient develop any fevers, chills, bleeding, severe pain patient will contact the office immediately.
Home Suture Removal Text: Patient was provided a home suture removal kit and will remove their sutures at home.  If they have any questions or difficulties they will call the office.
Where Do You Want The Question To Include Opioid Counseling Located?: Case Summary Tab
Information: Selecting Yes will display possible errors in your note based on the variables you have selected. This validation is only offered as a suggestion for you. PLEASE NOTE THAT THE VALIDATION TEXT WILL BE REMOVED WHEN YOU FINALIZE YOUR NOTE. IF YOU WANT TO FAX A PRELIMINARY NOTE YOU WILL NEED TO TOGGLE THIS TO 'NO' IF YOU DO NOT WANT IT IN YOUR FAXED NOTE.

## 2025-08-19 DIAGNOSIS — I10 ESSENTIAL HYPERTENSION, BENIGN: ICD-10-CM

## 2025-08-19 DIAGNOSIS — D72.828 NEUTROPHILIA: ICD-10-CM

## 2025-08-19 DIAGNOSIS — R71.8 ELEVATED MCV: ICD-10-CM

## 2025-08-19 DIAGNOSIS — E78.5 HYPERLIPIDEMIA LDL GOAL <70: ICD-10-CM

## 2025-08-19 DIAGNOSIS — R63.4 WEIGHT LOSS, NON-INTENTIONAL: ICD-10-CM

## 2025-08-19 DIAGNOSIS — E55.9 VITAMIN D DEFICIENCY: ICD-10-CM

## 2025-08-19 DIAGNOSIS — E03.9 ACQUIRED HYPOTHYROIDISM: ICD-10-CM

## 2025-08-19 DIAGNOSIS — D72.9 ABNORMAL NEUTROPHIL COUNT: ICD-10-CM

## 2025-08-19 DIAGNOSIS — I50.1 HEART FAILURE, LEFT, WITH LVEF <=30% (HCC): ICD-10-CM

## 2025-08-19 DIAGNOSIS — R73.01 IFG (IMPAIRED FASTING GLUCOSE): ICD-10-CM

## 2025-08-19 DIAGNOSIS — R79.89 ELEVATED LFTS: ICD-10-CM

## 2025-08-19 DIAGNOSIS — D75.89 MACROCYTOSIS WITHOUT ANEMIA: ICD-10-CM

## 2025-08-19 DIAGNOSIS — R94.6 ABNORMAL THYROID FUNCTION TEST: ICD-10-CM

## 2025-08-19 DIAGNOSIS — N40.0 BENIGN PROSTATIC HYPERPLASIA, UNSPECIFIED WHETHER LOWER URINARY TRACT SYMPTOMS PRESENT: Primary | ICD-10-CM

## 2025-08-19 DIAGNOSIS — Z12.5 SCREENING FOR MALIGNANT NEOPLASM OF PROSTATE: ICD-10-CM

## (undated) DEVICE — KIT ANESTHESIA W/CIRCUIT & 3/LT BAG W/FILTER (20EA/CA)

## (undated) DEVICE — TRAY SRGPRP PVP IOD WT PRP - (20/CA)

## (undated) DEVICE — SUTURE 2-0 SILK 12 X 18" (36PK/BX)"

## (undated) DEVICE — GOWN SURGEONS X-LARGE - DISP. (30/CA)

## (undated) DEVICE — CHLORAPREP 26 ML APPLICATOR - ORANGE TINT(25/CA)

## (undated) DEVICE — BLADE SURGICAL #11 - (50/BX)

## (undated) DEVICE — Device

## (undated) DEVICE — DRILL BIT STRYK UFS 1.6X50X5 - (2UFS2+M2=6) 5MM STOP

## (undated) DEVICE — DRAPE SURGICAL U 77X120 - (10/CA)

## (undated) DEVICE — SYRINGE 30 ML LL (56/BX)

## (undated) DEVICE — GLOVE BIOGEL PI ORTHO SZ 7.5 PF LF (40PR/BX)

## (undated) DEVICE — ELECTRODE 850 FOAM ADHESIVE - HYDROGEL RADIOTRNSPRNT (50/PK)

## (undated) DEVICE — DRAPE LARGE 3 QUARTER - (20/CA)

## (undated) DEVICE — GLOVE BIOGEL ECLIPSE  PF LATEX SIZE 6.5 (50PR/BX)

## (undated) DEVICE — BOVIE NEEDLE TIP 3CM COLORADO

## (undated) DEVICE — TOWELS CLOTH SURGICAL - (4/PK 20PK/CA)

## (undated) DEVICE — SUTURE 2-0 VICRYL PLUS SH - 27 INCH (36/BX)

## (undated) DEVICE — GOWN WARMING STANDARD FLEX - (30/CA)

## (undated) DEVICE — GLOVE BIOGEL SZ 7 SURGICAL PF LTX - (50PR/BX 4BX/CA)

## (undated) DEVICE — SUTURE 2-0 SILK FS (12EA/BX)

## (undated) DEVICE — PROTECTOR ULNA NERVE - (36PR/CA)

## (undated) DEVICE — SUTURE 3-0 CHROMIC GUT FS-2 27 (36PK/BX)"

## (undated) DEVICE — NEEDLE NON SAFETY 25 GA X 1 1/2 IN HYPO (100EA/BX)

## (undated) DEVICE — SUTURE 3-0 MONOCRYL PLUS PS-2 - (12/BX)

## (undated) DEVICE — SUTURE GENERAL

## (undated) DEVICE — SET EXTENSION WITH 2 PORTS (48EA/CA) ***PART #2C8610 IS A SUBSTITUTE*****

## (undated) DEVICE — CATHETER IV 14 GA X 2 ---SURG.& SDS ONLY---(200EA/CA)

## (undated) DEVICE — CUTTER WIRE ANG SRR DISP

## (undated) DEVICE — SYRINGE 10 ML CONTROL LL (25EA/BX 4BX/CA)

## (undated) DEVICE — NEEDLE NON SAFETY HYPO 22 GA X 1 1/2 IN (100/BX)

## (undated) DEVICE — SPONGE GAUZESTER 4 X 4 4PLY - (128PK/CA)

## (undated) DEVICE — SUCTION INSTRUMENT YANKAUER BULBOUS TIP W/O VENT (50EA/CA)

## (undated) DEVICE — BOVIE BLADE COATED - (50/PK)

## (undated) DEVICE — HEAD HOLDER JUNIOR/ADULT

## (undated) DEVICE — GLOVE BIOGEL ECLIPSE PF LATEX SIZE 9.0

## (undated) DEVICE — SPONGE PEANUT - (5/PK 50PK/CA)

## (undated) DEVICE — BURR EGG 4.0 ORAL

## (undated) DEVICE — DRILL BIT STRYK UFS 6019035 - (2UFS2+M4=8)

## (undated) DEVICE — CANISTER SUCTION 3000ML MECHANICAL FILTER AUTO SHUTOFF MEDI-VAC NONSTERILE LF DISP  (40EA/CA)

## (undated) DEVICE — GOWN SURGICAL X-LARGE ULTRA - FILM-REINFORCED (20/CA)

## (undated) DEVICE — LACTATED RINGERS INJ 1000 ML - (14EA/CA 60CA/PF)

## (undated) DEVICE — CORDS BIPOLAR COAGULATION - 12FT STERILE DISP. (10EA/BX)

## (undated) DEVICE — SET LEADWIRE 5 LEAD BEDSIDE DISPOSABLE ECG (1SET OF 5/EA)

## (undated) DEVICE — PROTECTOR CORNEAL - (10/BX)

## (undated) DEVICE — DRILL BIT STRYK UFS 6019526 - (2UFS2+M4=8)

## (undated) DEVICE — NEPTUNE 4 PORT MANIFOLD - (20/PK)

## (undated) DEVICE — SODIUM CHL IRRIGATION 0.9% 1000ML (12EA/CA)

## (undated) DEVICE — GLOVE BIOGEL INDICATOR SZ 7SURGICAL PF LTX - (50/BX 4BX/CA)

## (undated) DEVICE — SUTURE 4-0 VICRYL PLUS RB-1 - 8 X 18 INCH (12/BX)

## (undated) DEVICE — GLOVE BIOGEL SZ 6 PF LATEX - (50EA/BX 4BX/CA)

## (undated) DEVICE — CONTAINER SPECIMEN BAG OR - STERILE 4 OZ W/LID (100EA/CA)

## (undated) DEVICE — SUTURE 3-0 VICRYL PLUS RB-1 - 8 X 18 INCH (12/BX)

## (undated) DEVICE — GLOVE BIOGEL PI INDICATOR SZ 7.0 SURGICAL PF LF - (50/BX 4BX/CA)

## (undated) DEVICE — STAPLER SKIN DISP - (6/BX 10BX/CA) VISISTAT

## (undated) DEVICE — SUTURE 3-0 VICRYL PLUS SH - 8X 18 INCH (12/BX)

## (undated) DEVICE — ELECTRODE DUAL RETURN W/ CORD - (50/PK)

## (undated) DEVICE — MASK ANESTHESIA ADULT  - (100/CA)

## (undated) DEVICE — PACK MINOR BASIN - (2EA/CA)

## (undated) DEVICE — SENSOR SPO2 NEO LNCS ADHESIVE (20/BX) SEE USER NOTES

## (undated) DEVICE — SUTURE 3-0 SILK 12 X 18 IN - (36/BX)

## (undated) DEVICE — GLOVE BIOGEL SZ 6.5 SURGICAL PF LTX (50PR/BX 4BX/CA)

## (undated) DEVICE — SPONGE XRAY 8X4 STERL. 12PL - (10EA/TY 80TY/CA)

## (undated) DEVICE — BLADE SURGICAL #15 - (50/BX 3BX/CA)

## (undated) DEVICE — KIT ROOM DECONTAMINATION

## (undated) DEVICE — DETERGENT RENUZYME PLUS 10 OZ PACKET (50/BX)

## (undated) DEVICE — GLOVE BIOGEL SZ 7.5 SURGICAL PF LTX - (50PR/BX 4BX/CA)

## (undated) DEVICE — SYRINGE EAR/NOSE 3 OZ STERILE (50/CA

## (undated) DEVICE — SUTURE 2-0 VICRYL PLUS SH - 8 X 18 INCH (12/BX)

## (undated) DEVICE — TUBING CLEARLINK DUO-VENT - C-FLO (48EA/CA)